# Patient Record
Sex: FEMALE | Race: WHITE | NOT HISPANIC OR LATINO | Employment: FULL TIME | URBAN - METROPOLITAN AREA
[De-identification: names, ages, dates, MRNs, and addresses within clinical notes are randomized per-mention and may not be internally consistent; named-entity substitution may affect disease eponyms.]

---

## 2022-11-30 ENCOUNTER — TELEMEDICINE (OUTPATIENT)
Dept: OBGYN CLINIC | Facility: CLINIC | Age: 32
End: 2022-11-30

## 2022-11-30 DIAGNOSIS — Z31.69 ENCOUNTER FOR PRECONCEPTION CONSULTATION: Primary | ICD-10-CM

## 2022-12-01 NOTE — PROGRESS NOTES
Virtual Regular Visit    Verification of patient location:    Patient is located in the following state in which I hold an active license [place]      Assessment/Plan:    Problem List Items Addressed This Visit    None  Visit Diagnoses     Encounter for preconception consultation    -  Primary        1) Labs drawn and results reviewed  Low Day 3 FSH and adequate AMH, indicative of great ovarian reserve  Thyroid panel normal  Adrenal axis total testosterone 25 ( not too high which would be indicative of PCO), DHEAS 183 in optimal range  Antiphospholipid antibodies negative  2) Luckily, her hormonal axes appear normal, prognosis for pregnancy great  Now I recommend infertility referral for semen analysis and consideration of ovulation induction with IUI or IVF  Referral recommendations given  3)PNV supplementation already discussed  Hold on libido enhancers due to impending pregnancy  4) Follow up prn    This was a 20 minute visit with greater than 50% of time spent in face to face counseling and coordination of care       Reason for visit is   Chief Complaint   Patient presents with   • Virtual Regular Visit        Encounter provider Rene Fernandes MD    Provider located at [facility] [place] [address]  [phone]      Recent Visits  No visits were found meeting these conditions  Showing recent visits within past 7 days and meeting all other requirements  Future Appointments  No visits were found meeting these conditions  Showing future appointments within next 150 days and meeting all other requirements       The patient was identified by name and date of birth  Jillian Richards was informed that this is a telemedicine visit and that the visit is being conducted through the [facility] [employer]  She agrees to proceed     My office door was closed  No one else was in the room    She acknowledged consent and understanding of privacy and security of the video platform  The patient has agreed to participate and understands they can discontinue the visit at any time  Patient is aware this is a billable service  Subjective      Jillian presents for lab review after our last preconception consultation  She and her  have had difficulty conceiving for over a year and was hoping I could offer advice and test labs before referral to infertility specialist, as my services were most likely covered by insurance  Luckily, she is ovulating predictably and regularly, but was concerned about hormone levels, and family history of miscarriage  Past Medical History:   Diagnosis Date   • Anxiety    • Asthma    • GERD (gastroesophageal reflux disease)    • Heart murmur    • Kidney stone    • Miscarriage 3/15/2015    7 weeks along  • Neck pain        Past Surgical History:   Procedure Laterality Date   • IR UPPER EXTREMITY VENOGRAM- DIAGNOSTIC  5/28/2021   • MD REMOVAL 1ST/CERVICAL RIB Left 8/12/2021    Procedure: FIRST RIB RESECTION;  Surgeon: Sven Cruz MD;  [facility];  Service: Thoracic   • THORACOSCOPY VIDEO ASSISTED SURGERY (VATS) Left 8/12/2021    Procedure: THORACOSCOPY VIDEO ASSISTED SURGERY (VATS);   Surgeon: Sven Cruz MD;  [facility];  Service: Thoracic   • WISDOM TOOTH EXTRACTION         Current Outpatient Medications   Medication Sig Dispense Refill   • albuterol (PROVENTIL HFA,VENTOLIN HFA) 90 mcg/act inhaler Inhale 2 puffs every 6 (six) hours as needed for wheezing or shortness of breath 1 Inhaler 5   • cetirizine (ZyrTEC) 10 mg tablet Take 1 tablet (10 mg total) by mouth daily 90 tablet 2   • citalopram (CeleXA) 20 mg tablet TAKE 1 TABLET BY MOUTH EVERY DAY 90 tablet 0   • clindamycin-benzoyl peroxide (BENZACLIN) gel Apply topically every morning 50 g 0   • clonazePAM (KlonoPIN) 0 5 mg tablet Take 1 tablet by mouth twice daily as needed for anxiety 60 tablet 0   • lidocaine (LIDODERM) 5 % Apply 1 patch topically daily for 10 days Remove & Discard patch within 12 hours or as directed by MD 10 patch 0   • ofloxacin (FLOXIN) 0 3 % otic solution Administer 10 drops into the left ear daily 5 mL 0   • omeprazole (PriLOSEC) 20 mg delayed release capsule TAKE 1 CAPSULE BY MOUTH EVERY DAY 90 capsule 0     No current facility-administered medications for this visit  Allergies   Allergen Reactions   • Nsaids GI Intolerance   • Formic Acid Swelling and Rash   • Latex Rash       Review of Systems   Constitutional: Negative for activity change, appetite change, fatigue and unexpected weight change  Endocrine: Negative  Genitourinary:        Decreased libido, see HPI   Skin: Negative  Psychiatric/Behavioral: The patient is nervous/anxious  Video Exam    There were no vitals filed for this visit      Physical Exam

## 2023-01-13 ENCOUNTER — APPOINTMENT (OUTPATIENT)
Dept: RADIOLOGY | Facility: CLINIC | Age: 33
End: 2023-01-13

## 2023-01-13 ENCOUNTER — OCCMED (OUTPATIENT)
Dept: URGENT CARE | Facility: CLINIC | Age: 33
End: 2023-01-13

## 2023-01-13 DIAGNOSIS — S63.502A LEFT WRIST SPRAIN, INITIAL ENCOUNTER: Primary | ICD-10-CM

## 2023-01-13 DIAGNOSIS — S63.502A LEFT WRIST SPRAIN, INITIAL ENCOUNTER: ICD-10-CM

## 2023-01-18 ENCOUNTER — OCCMED (OUTPATIENT)
Dept: URGENT CARE | Facility: CLINIC | Age: 33
End: 2023-01-18

## 2023-01-18 DIAGNOSIS — S63.502D SPRAIN OF LEFT WRIST, SUBSEQUENT ENCOUNTER: Primary | ICD-10-CM

## 2023-01-30 DIAGNOSIS — K21.00 GERD WITH ESOPHAGITIS: ICD-10-CM

## 2023-01-30 DIAGNOSIS — F41.9 ANXIETY: ICD-10-CM

## 2023-01-30 RX ORDER — OMEPRAZOLE 20 MG/1
CAPSULE, DELAYED RELEASE ORAL
Qty: 90 CAPSULE | Refills: 0 | Status: SHIPPED | OUTPATIENT
Start: 2023-01-30

## 2023-01-30 RX ORDER — CITALOPRAM 20 MG/1
TABLET ORAL
Qty: 90 TABLET | Refills: 0 | Status: SHIPPED | OUTPATIENT
Start: 2023-01-30 | End: 2023-02-06 | Stop reason: SDUPTHER

## 2023-01-31 ENCOUNTER — OFFICE VISIT (OUTPATIENT)
Dept: OBGYN CLINIC | Facility: CLINIC | Age: 33
End: 2023-01-31

## 2023-01-31 ENCOUNTER — APPOINTMENT (OUTPATIENT)
Dept: RADIOLOGY | Facility: AMBULARY SURGERY CENTER | Age: 33
End: 2023-01-31
Attending: STUDENT IN AN ORGANIZED HEALTH CARE EDUCATION/TRAINING PROGRAM

## 2023-01-31 ENCOUNTER — TELEPHONE (OUTPATIENT)
Dept: OBGYN CLINIC | Facility: HOSPITAL | Age: 33
End: 2023-01-31

## 2023-01-31 VITALS
BODY MASS INDEX: 25.9 KG/M2 | WEIGHT: 165 LBS | SYSTOLIC BLOOD PRESSURE: 126 MMHG | DIASTOLIC BLOOD PRESSURE: 77 MMHG | HEIGHT: 67 IN | HEART RATE: 87 BPM

## 2023-01-31 DIAGNOSIS — S69.92XA INJURY OF LEFT WRIST, INITIAL ENCOUNTER: ICD-10-CM

## 2023-01-31 DIAGNOSIS — T14.8XXA MUSCLE CONTUSION: Primary | ICD-10-CM

## 2023-01-31 DIAGNOSIS — M25.532 PAIN IN LEFT WRIST: ICD-10-CM

## 2023-01-31 DIAGNOSIS — G56.22 NEURITIS OF LEFT ULNAR NERVE: ICD-10-CM

## 2023-01-31 NOTE — LETTER
January 31, 2023     Patient: Osorio Dueñas  YOB: 1990  Date of Visit: 1/31/2023      To Whom it May Concern:    Osorio Dueñas is under my professional care  Jillian was seen in my office on 1/31/2023  Jillian may return to work full duty with no restrictions  If you have any questions or concerns, please don't hesitate to call           Sincerely,          Janis Alvarez MD
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
## 2023-01-31 NOTE — TELEPHONE ENCOUNTER
Caller: Karey Burgos from [facility]    Doctor/Office: Dr Jose Chen    CB#:       What needs to be faxed: Kelli Sanchez for 1/31/23    ATTN to: Karey Burgos    Fax#: [phone]      Documents were successfully e-faxed

## 2023-02-01 ENCOUNTER — TELEPHONE (OUTPATIENT)
Dept: OBGYN CLINIC | Facility: CLINIC | Age: 33
End: 2023-02-01

## 2023-02-01 NOTE — TELEPHONE ENCOUNTER
Caller: Erenest Fare w/ [facility]     Doctor: Joey Garcia     Reason for call: Please fax full MRI order to : [phone]  They only received front page and not the page with the doctors signature       Apt is 2/10/23     Call back#: [phone]

## 2023-02-01 NOTE — TELEPHONE ENCOUNTER
Order re faxed to number provided  Providers electronically sign the orders through Epic, they do not actually hand sign orders

## 2023-02-03 DIAGNOSIS — Z91.09 ENVIRONMENTAL ALLERGIES: ICD-10-CM

## 2023-02-03 RX ORDER — CETIRIZINE HYDROCHLORIDE 10 MG/1
10 TABLET ORAL DAILY
Qty: 90 TABLET | Refills: 0 | Status: SHIPPED | OUTPATIENT
Start: 2023-02-03

## 2023-02-06 DIAGNOSIS — F41.9 ANXIETY: ICD-10-CM

## 2023-02-07 RX ORDER — CITALOPRAM 20 MG/1
20 TABLET ORAL DAILY
Qty: 90 TABLET | Refills: 0 | Status: SHIPPED | OUTPATIENT
Start: 2023-02-07 | End: 2023-02-16

## 2023-02-16 ENCOUNTER — OFFICE VISIT (OUTPATIENT)
Dept: FAMILY MEDICINE CLINIC | Facility: CLINIC | Age: 33
End: 2023-02-16

## 2023-02-16 VITALS
DIASTOLIC BLOOD PRESSURE: 72 MMHG | HEART RATE: 80 BPM | RESPIRATION RATE: 18 BRPM | HEIGHT: 67 IN | WEIGHT: 173.8 LBS | TEMPERATURE: 96.5 F | SYSTOLIC BLOOD PRESSURE: 118 MMHG | OXYGEN SATURATION: 99 % | BODY MASS INDEX: 27.28 KG/M2

## 2023-02-16 DIAGNOSIS — F41.8 DEPRESSION WITH ANXIETY: ICD-10-CM

## 2023-02-16 DIAGNOSIS — J45.30 MILD PERSISTENT ASTHMA WITHOUT COMPLICATION: Primary | ICD-10-CM

## 2023-02-16 DIAGNOSIS — R63.5 UNEXPLAINED WEIGHT GAIN: ICD-10-CM

## 2023-02-16 RX ORDER — ALBUTEROL SULFATE 90 UG/1
2 AEROSOL, METERED RESPIRATORY (INHALATION) EVERY 6 HOURS PRN
Qty: 18 G | Refills: 3 | Status: SHIPPED | OUTPATIENT
Start: 2023-02-16

## 2023-02-16 RX ORDER — CITALOPRAM 10 MG/1
10 TABLET ORAL DAILY
Qty: 90 TABLET | Refills: 0 | Status: SHIPPED | OUTPATIENT
Start: 2023-02-16

## 2023-02-16 NOTE — PROGRESS NOTES
BMI Counseling: Body mass index is 27 22 kg/m²  The BMI is above normal  Nutrition recommendations include reducing portion sizes  Exercise recommendations include moderate aerobic physical activity for 150 minutes/week  OFFICE VISIT  Jillian Bourgeois 27 y o  female MRN: [id number]          Assessment / Plan:  Problem List Items Addressed This Visit        Respiratory    Mild persistent asthma without complication - Primary     No recent flare ups, under good control, using albuterol prn          Relevant Medications    albuterol (PROVENTIL HFA,VENTOLIN HFA) 90 mcg/act inhaler       Other    Depression with anxiety     Will reduce celexa to 10mg         Relevant Medications    citalopram (CeleXA) 10 mg tablet    Unexplained weight gain     reports a healthy diet, is limited with activity due to surgery, CPS type 2  Will obtain labs, possibility  of SSRI increase          Relevant Orders    Hemoglobin A1C    CBC and differential    Comprehensive metabolic panel    Lipid Panel with Direct LDL reflex         Reason For Visit / Chief Complaint  Chief Complaint   Patient presents with   • Follow-up     6 month weight gain        HPI:  Luisana Ford is a 27 y o  female who presents today for routine check up, known anxiety with depression, anxiety, CPS type 2  reports increase in weight, unknown reason  Historical Information   Past Medical History:   Diagnosis Date   • Anxiety    • Asthma    • GERD (gastroesophageal reflux disease)    • Heart murmur    • Kidney stone    • Miscarriage 3/15/2015    7 weeks along     • Neck pain      Past Surgical History:   Procedure Laterality Date   • IR UPPER EXTREMITY VENOGRAM- DIAGNOSTIC  5/28/2021   • AL EXCISION 1ST &/CERVICAL RIB Left 8/12/2021    Procedure: FIRST RIB RESECTION;  Surgeon: Manjula Matthew MD;  [facility];  Service: Thoracic   • THORACOSCOPY VIDEO ASSISTED SURGERY (VATS) Left 8/12/2021    Procedure: THORACOSCOPY VIDEO ASSISTED SURGERY (VATS); Surgeon: Asher Cheng MD;  [facility];  Service: Thoracic   • WISDOM TOOTH EXTRACTION       Social History   Social History     Substance and Sexual Activity   Alcohol Use Not Currently    Comment: social     Social History     Substance and Sexual Activity   Drug Use Never     Social History     Tobacco Use   Smoking Status Never   Smokeless Tobacco Never     Family History   Problem Relation Age of Onset   • No Known Problems Mother    • No Known Problems Father    • Breast cancer Neg Hx    • Colon cancer Neg Hx    • Ovarian cancer Neg Hx    • Uterine cancer Neg Hx    • Cervical cancer Neg Hx        Meds/Allergies   Allergies   Allergen Reactions   • Nsaids GI Intolerance   • Formic Acid Swelling and Rash   • Latex Rash       Meds:    Current Outpatient Medications:   •  albuterol (PROVENTIL HFA,VENTOLIN HFA) 90 mcg/act inhaler, Inhale 2 puffs every 6 (six) hours as needed for wheezing or shortness of breath, Disp: 18 g, Rfl: 3  •  cetirizine (ZyrTEC) 10 mg tablet, Take 1 tablet (10 mg total) by mouth daily, Disp: 90 tablet, Rfl: 0  •  citalopram (CeleXA) 10 mg tablet, Take 1 tablet (10 mg total) by mouth daily, Disp: 90 tablet, Rfl: 0  •  clindamycin-benzoyl peroxide (BENZACLIN) gel, Apply topically every morning, Disp: 50 g, Rfl: 0  •  clonazePAM (KlonoPIN) 0 5 mg tablet, Take 1 tablet by mouth twice daily as needed for anxiety, Disp: 60 tablet, Rfl: 0  •  omeprazole (PriLOSEC) 20 mg delayed release capsule, TAKE 1 CAPSULE BY MOUTH EVERY DAY, Disp: 90 capsule, Rfl: 0  •  lidocaine (LIDODERM) 5 %, Apply 1 patch topically daily for 10 days Remove & Discard patch within 12 hours or as directed by MD, Disp: 10 patch, Rfl: 0  •  ofloxacin (FLOXIN) 0 3 % otic solution, Administer 10 drops into the left ear daily, Disp: 5 mL, Rfl: 0      REVIEW OF SYSTEMS  Review of Systems   Constitutional: Negative for activity change, chills, fatigue and fever     HENT: Negative for congestion, ear discharge, ear pain, sinus pressure, sinus pain, sore throat, tinnitus and trouble swallowing  Eyes: Negative for photophobia, pain, discharge, itching and visual disturbance  Respiratory: Negative for cough, chest tightness, shortness of breath and wheezing  Cardiovascular: Negative for chest pain and leg swelling  Gastrointestinal: Negative for abdominal distention, abdominal pain, constipation, diarrhea, nausea and vomiting  Endocrine: Negative for polydipsia, polyphagia and polyuria  Genitourinary: Negative for dysuria and frequency  Musculoskeletal: Negative for arthralgias, myalgias, neck pain and neck stiffness  Skin: Negative for color change  Neurological: Negative for dizziness, syncope, weakness, numbness and headaches  Hematological: Does not bruise/bleed easily  Psychiatric/Behavioral: Negative for behavioral problems, confusion, self-injury, sleep disturbance and suicidal ideas  The patient is not nervous/anxious  Current Vitals:   Blood Pressure: 118/72 (02/16/23 1522)  Pulse: 80 (02/16/23 1522)  Temperature: (!) 96 5 °F (35 8 °C) (02/16/23 1522)  Temp Source: Tympanic (02/16/23 1522)  Respirations: 18 (02/16/23 1522)  Height: 5' 7" (170 2 cm) (02/16/23 1522)  Weight - Scale: 78 8 kg (173 lb 12 8 oz) (02/16/23 1522)  SpO2: 99 % (02/16/23 1522)  [unfilled]    PHYSICAL EXAMS:  Physical Exam  Constitutional:       Appearance: Normal appearance  She is well-developed  HENT:      Head: Normocephalic and atraumatic  Pulmonary:      Breath sounds: No wheezing or rhonchi  Abdominal:      General: There is no distension  Tenderness: There is no abdominal tenderness  Musculoskeletal:         General: No swelling, tenderness, deformity or signs of injury  Right lower leg: No edema  Left lower leg: No edema  Skin:     Findings: No bruising, erythema, lesion or rash  Neurological:      General: No focal deficit present        Mental Status: She is alert and oriented to person, place, and time  Psychiatric:         Mood and Affect: Mood normal          Behavior: Behavior normal          Thought Content: Thought content normal          Judgment: Judgment normal              Lab, imaging and other studies: I have personally reviewed pertinent reports  Ian Martinez

## 2023-02-17 ENCOUNTER — LAB (OUTPATIENT)
Dept: LAB | Facility: CLINIC | Age: 33
End: 2023-02-17

## 2023-02-17 DIAGNOSIS — R63.5 UNEXPLAINED WEIGHT GAIN: ICD-10-CM

## 2023-02-17 LAB
BASOPHILS # BLD AUTO: 0.02 THOUSANDS/ÂΜL (ref 0–0.1)
BASOPHILS NFR BLD AUTO: 0 % (ref 0–1)
EOSINOPHIL # BLD AUTO: 0.12 THOUSAND/ÂΜL (ref 0–0.61)
EOSINOPHIL NFR BLD AUTO: 2 % (ref 0–6)
ERYTHROCYTE [DISTWIDTH] IN BLOOD BY AUTOMATED COUNT: 13.3 % (ref 11.6–15.1)
HCT VFR BLD AUTO: 39.4 % (ref 34.8–46.1)
HGB BLD-MCNC: 12.5 G/DL (ref 11.5–15.4)
IMM GRANULOCYTES # BLD AUTO: 0.02 THOUSAND/UL (ref 0–0.2)
IMM GRANULOCYTES NFR BLD AUTO: 0 % (ref 0–2)
LYMPHOCYTES # BLD AUTO: 1.57 THOUSANDS/ÂΜL (ref 0.6–4.47)
LYMPHOCYTES NFR BLD AUTO: 24 % (ref 14–44)
MCH RBC QN AUTO: 27.7 PG (ref 26.8–34.3)
MCHC RBC AUTO-ENTMCNC: 31.7 G/DL (ref 31.4–37.4)
MCV RBC AUTO: 87 FL (ref 82–98)
MONOCYTES # BLD AUTO: 0.43 THOUSAND/ÂΜL (ref 0.17–1.22)
MONOCYTES NFR BLD AUTO: 7 % (ref 4–12)
NEUTROPHILS # BLD AUTO: 4.37 THOUSANDS/ÂΜL (ref 1.85–7.62)
NEUTS SEG NFR BLD AUTO: 67 % (ref 43–75)
NRBC BLD AUTO-RTO: 0 /100 WBCS
PLATELET # BLD AUTO: 204 THOUSANDS/UL (ref 149–390)
PMV BLD AUTO: 12 FL (ref 8.9–12.7)
RBC # BLD AUTO: 4.51 MILLION/UL (ref 3.81–5.12)
WBC # BLD AUTO: 6.53 THOUSAND/UL (ref 4.31–10.16)

## 2023-02-17 NOTE — ASSESSMENT & PLAN NOTE
reports a healthy diet, is limited with activity due to surgery, CPS type 2   Will obtain labs, possibility  of SSRI increase

## 2023-02-18 LAB
ALBUMIN SERPL BCP-MCNC: 3.5 G/DL (ref 3.5–5)
ALP SERPL-CCNC: 82 U/L (ref 46–116)
ALT SERPL W P-5'-P-CCNC: 21 U/L (ref 12–78)
ANION GAP SERPL CALCULATED.3IONS-SCNC: 5 MMOL/L (ref 4–13)
AST SERPL W P-5'-P-CCNC: 12 U/L (ref 5–45)
BILIRUB SERPL-MCNC: 0.32 MG/DL (ref 0.2–1)
BUN SERPL-MCNC: 9 MG/DL (ref 5–25)
CALCIUM SERPL-MCNC: 9.1 MG/DL (ref 8.3–10.1)
CHLORIDE SERPL-SCNC: 108 MMOL/L (ref 96–108)
CHOLEST SERPL-MCNC: 153 MG/DL
CO2 SERPL-SCNC: 25 MMOL/L (ref 21–32)
CREAT SERPL-MCNC: 0.71 MG/DL (ref 0.6–1.3)
GFR SERPL CREATININE-BSD FRML MDRD: 111 ML/MIN/1.73SQ M
GLUCOSE P FAST SERPL-MCNC: 72 MG/DL (ref 65–99)
HDLC SERPL-MCNC: 55 MG/DL
LDLC SERPL CALC-MCNC: 85 MG/DL (ref 0–100)
POTASSIUM SERPL-SCNC: 3.9 MMOL/L (ref 3.5–5.3)
PROT SERPL-MCNC: 7.1 G/DL (ref 6.4–8.4)
SODIUM SERPL-SCNC: 138 MMOL/L (ref 135–147)
TRIGL SERPL-MCNC: 67 MG/DL

## 2023-02-20 NOTE — RESULT ENCOUNTER NOTE
Please let her know all labs were noted, at present her [facility] is still pending  If abnormal we will call  (This may results overnight into Monday)

## 2023-02-21 ENCOUNTER — OFFICE VISIT (OUTPATIENT)
Dept: OBGYN CLINIC | Facility: CLINIC | Age: 33
End: 2023-02-21

## 2023-02-21 ENCOUNTER — TELEPHONE (OUTPATIENT)
Dept: OBGYN CLINIC | Facility: HOSPITAL | Age: 33
End: 2023-02-21

## 2023-02-21 VITALS
BODY MASS INDEX: 27.21 KG/M2 | HEART RATE: 72 BPM | DIASTOLIC BLOOD PRESSURE: 77 MMHG | WEIGHT: 173.4 LBS | SYSTOLIC BLOOD PRESSURE: 119 MMHG | HEIGHT: 67 IN

## 2023-02-21 DIAGNOSIS — S69.92XA INJURY OF LEFT WRIST, INITIAL ENCOUNTER: Primary | ICD-10-CM

## 2023-02-21 NOTE — PROGRESS NOTES
ORTHOPAEDIC HAND, WRIST, AND ELBOW OFFICE  VISIT       ASSESSMENT/PLAN:      Diagnoses and all orders for this visit:    Injury of left wrist, initial encounter  -     Ambulatory Referral to PT/OT Hand Therapy; Future      26 y/o female with left wrist injury DOI 1/13/23  We discussed pt's MRI findings with her today  I explained that there may be some slight signal in the area of the TFCC, possibly c/w a peripheral tear  However, this tear is not consistent with the type of injury sustained so could be either an incidental finding or not a true tear  We discussed how at this time, we do advise she start formal therapy  We'll have her continue her current work restrictions as she rehabs the wrist      The patient verbalized understanding of exam findings and treatment plan  We engaged in the shared decision-making process and treatment options were discussed at length with the patient  Surgical and conservative management discussed today along with risks and benefits  Follow Up:  6 weeks       To Do Next Visit:  Re-evaluation of current issue      Gregorio Foster MD  Attending, Orthopaedic Surgery  Hand, Wrist, and Elbow Surgery  [address] [place] [address]    ____________________________________________________________________________________________________________________________________________      CHIEF COMPLAINT:  Chief Complaint   Patient presents with   • Left Wrist - Follow-up       SUBJECTIVE:  Jillian Nix is a 27y o  year old female who presents today for follow up of left wrist pain  Since pt had continued discomfort, MRI was ordered and she is here to go over the results  She states that she discontinued the brace once she saw her MRI showed no fracture  Describes most of her pain being along the volar/ulnar aspect of her wrist where she sets it down to do work          I have personally reviewed all the relevant PMH, PSH, SH, FH, Medications and allergies      PAST MEDICAL HISTORY:  Past Medical History:   Diagnosis Date   • Anxiety    • Asthma    • GERD (gastroesophageal reflux disease)    • Heart murmur    • Kidney stone    • Miscarriage 3/15/2015    7 weeks along  • Neck pain        PAST SURGICAL HISTORY:  Past Surgical History:   Procedure Laterality Date   • IR UPPER EXTREMITY VENOGRAM- DIAGNOSTIC  5/28/2021   • CA EXCISION 1ST &/CERVICAL RIB Left 8/12/2021    Procedure: FIRST RIB RESECTION;  Surgeon: Anyi Parrish MD;  [facility];  Service: Thoracic   • THORACOSCOPY VIDEO ASSISTED SURGERY (VATS) Left 8/12/2021    Procedure: THORACOSCOPY VIDEO ASSISTED SURGERY (VATS);   Surgeon: Anyi Parrish MD;  Location[facility];  Service: Thoracic   • WISDOM TOOTH EXTRACTION         FAMILY HISTORY:  Family History   Problem Relation Age of Onset   • No Known Problems Mother    • No Known Problems Father    • Breast cancer Neg Hx    • Colon cancer Neg Hx    • Ovarian cancer Neg Hx    • Uterine cancer Neg Hx    • Cervical cancer Neg Hx        SOCIAL HISTORY:  Social History     Tobacco Use   • Smoking status: Never   • Smokeless tobacco: Never   Vaping Use   • Vaping Use: Never used   Substance Use Topics   • Alcohol use: Not Currently     Comment: social   • Drug use: Never       MEDICATIONS:    Current Outpatient Medications:   •  albuterol (PROVENTIL HFA,VENTOLIN HFA) 90 mcg/act inhaler, Inhale 2 puffs every 6 (six) hours as needed for wheezing or shortness of breath, Disp: 18 g, Rfl: 3  •  cetirizine (ZyrTEC) 10 mg tablet, Take 1 tablet (10 mg total) by mouth daily, Disp: 90 tablet, Rfl: 0  •  citalopram (CeleXA) 10 mg tablet, Take 1 tablet (10 mg total) by mouth daily, Disp: 90 tablet, Rfl: 0  •  clindamycin-benzoyl peroxide (BENZACLIN) gel, Apply topically every morning, Disp: 50 g, Rfl: 0  •  clonazePAM (KlonoPIN) 0 5 mg tablet, Take 1 tablet by mouth twice daily as needed for anxiety, Disp: 60 tablet, Rfl: 0  •  ofloxacin (FLOXIN) 0 3 % otic solution, Administer 10 drops into the left ear daily, Disp: 5 mL, Rfl: 0  •  omeprazole (PriLOSEC) 20 mg delayed release capsule, TAKE 1 CAPSULE BY MOUTH EVERY DAY, Disp: 90 capsule, Rfl: 0  •  lidocaine (LIDODERM) 5 %, Apply 1 patch topically daily for 10 days Remove & Discard patch within 12 hours or as directed by MD, Disp: 10 patch, Rfl: 0    ALLERGIES:  Allergies   Allergen Reactions   • Nsaids GI Intolerance   • Formic Acid Swelling and Rash   • Latex Rash           REVIEW OF SYSTEMS:  Musculoskeletal:        As noted in HPI  All other systems reviewed and are negative  VITALS:  Vitals:    02/21/23 1504   BP: 119/77   Pulse: 72       LABS:  HgA1c: No results found for: HGBA1C  BMP:   Lab Results   Component Value Date    CALCIUM 9 1 02/17/2023    K 3 9 02/17/2023    CO2 25 02/17/2023     02/17/2023    BUN 9 02/17/2023    CREATININE 0 71 02/17/2023       _____________________________________________________  PHYSICAL EXAMINATION:  General: well developed and well nourished, alert, oriented times 3 and appears comfortable  Psychiatric: Normal  HEENT: Normocephalic, Atraumatic Trachea Midline, No torticollis  Pulmonary: No audible wheezing or respiratory distress   Abdomen/GI: Non tender, non distended   Cardiovascular: No pitting edema, 2+ radial pulse   Skin: No masses, erythema, lacerations, fluctation, ulcerations  Neurovascular: Sensation Intact to the Median, Ulnar, Radial Nerve, Motor Intact to the Median, Ulnar, Radial Nerve and Pulses Intact  Musculoskeletal: Normal, except as noted in detailed exam and in HPI  MUSCULOSKELETAL EXAMINATION:  Left Wrist:  TTP along the ulnar fovea  Nontender to TFCC or ECU  Some milder ttp to FCU insertion  Flexion limited compared to R side  Pt with pain with ulnar deviation  ___________________________________________________  STUDIES REVIEWED:  MRI of the left wrist was reviewed in PACS by Dr Lupe Ch and demonstrates no acute osseous abnormality   There may be some slight signal in the TFCC c/w possible tear     Outside radiologist read (under Media tab): no significant findings      PROCEDURES PERFORMED:  Procedures  No Procedures performed today    _____________________________________________________      Ascencion Kurtz    I,:  Jason Hughes PA-C am acting as a scribe while in the presence of the attending physician :       I,:  Harlie Heimlich, MD personally performed the services described in this documentation    as scribed in my presence :

## 2023-02-21 NOTE — TELEPHONE ENCOUNTER
Caller: Carolina Sauceda Nurse manager    Doctor: Dr Jose A Marie    Reason for call: requesting OVN from 2/21 faxed to [phone]

## 2023-02-22 LAB
EST. AVERAGE GLUCOSE BLD GHB EST-MCNC: 100 MG/DL
HBA1C MFR BLD: 5.1 %

## 2023-03-03 ENCOUNTER — TELEPHONE (OUTPATIENT)
Dept: FAMILY MEDICINE CLINIC | Facility: CLINIC | Age: 33
End: 2023-03-03

## 2023-03-03 NOTE — TELEPHONE ENCOUNTER
Can you please addend patients office note from 09/05/2019  Patient reports that she never had a previous rotator cuff tear  Can you please remove that from her office note      Ty

## 2023-03-27 ENCOUNTER — EVALUATION (OUTPATIENT)
Dept: PHYSICAL THERAPY | Facility: MEDICAL CENTER | Age: 33
End: 2023-03-27

## 2023-03-27 DIAGNOSIS — G56.42 COMPLEX REGIONAL PAIN SYNDROME TYPE 2 OF LEFT UPPER EXTREMITY: Primary | ICD-10-CM

## 2023-03-27 NOTE — PROGRESS NOTES
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) - addressing with extensive counseling regarding pain neuroscience, diagnosis, prognosis, care options, and care planning along with graded exposure and graded motor imagery    Jillian Collazo is a pleasant 27 y o  female who presents with severe L UE pain 2+ years post surgery for L TOS  She has uncontrollable nociplastic pain throughout her L UE resulting in CRPS  No further referral appears necessary at this time based upon examination results as she is already consulting with pain management and her PCP  I expect she will progress very slowly (and inconsistently at first) but then will make considerable improvements toward full functional return over the next 12 months  She will require 12 weekly sessions during the first 3 months and then I expect she will be able to decrease frequency to 2x/month and taper to 1x/2 months as she progresses to only needing her home program to manage her symptoms  Positive prognostic indicators include positive attitude toward recovery  Negative prognostic indicators include chronicity of symptoms, anxiety, high symptom irritability, central sensitization and degree of peripheralization  Comparable signs:  1) 2 point discrimination  2) ability to use L UE during high heat and humidity (and also cold temperatures)    Goals  Patient will be independent with home exercise program    Patient will be able to manage symptoms independently  Patient will be able to touch her L forearm without severe pain  Patient will be able to use her L UE during the mid-summer heat and humidity  Plan  Plan details: 24 visits over 9 months        Subjective Evaluation    History of Present Illness  Mechanism of injury: 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  She subsequently was diagnosed with TOS and had resection surgery in 2021  However, she had complications after that surgery and developed CRPS Type II  She has tried many oral pharmaceuticals with no benefit  She is frustrated as she is not sure what she can do as nothing has helped thus far  Pain  At worst pain rating: 10    Patient Goals  Patient goals for therapy: decreased pain, decreased edema, increased motion and independence with ADLs/IADLs          Objective    skin changes - blanched skin on L, increased generalized edema throughout L UE (<1+), skin temp on L cooler than R    Light touch ([place] monofilaments) - 3 61 - accurate throughout R UE and most of L UE except L forearm, 4 31 - accurate bilaterally    2 point discrimination  R upper arm 2 3mm  R dorsal forearm 2 1mm  R ventral forearm 1 3mm  R hand 2 5mm  L upper arm 2 7mm  L dorsal forearm 3 0mm  R ventral forearm - untestable due to allodynia  L hand 3 0mm    Lateral discrimination - (L/R) 2 3/2 2s, 50/55%    Unable to run due to chronic back pain  Unable to exercise at pre-injury level due to SOB and feelings of tachycardia  Due to pain and need for thorough consultation regarding care planning, ROM and strength were not fully tested today, but based upon functional doffing/donning of shirt, shoulder/elbow/wrist mobility appears WNL           Precautions: none    Date:       Manual       ROM screen       Strength screen                            Neuromuscular Re-education       PNE              Bike (test threshold)              Laterality training (recommended obtaining cynthia)      Motor imagery       Mirror therapy                     Therapeutic Exercise                                   Therapeutic Activities Gait Training                     Modalities

## 2023-04-03 ENCOUNTER — TELEPHONE (OUTPATIENT)
Dept: OBGYN CLINIC | Facility: HOSPITAL | Age: 33
End: 2023-04-03

## 2023-04-03 NOTE — TELEPHONE ENCOUNTER
Caller: Bishop    Doctor: Blu Pittman    Reason for call: calling to confirm pt's appt for tomorrow    Call back#:

## 2023-04-04 ENCOUNTER — OFFICE VISIT (OUTPATIENT)
Dept: OBGYN CLINIC | Facility: CLINIC | Age: 33
End: 2023-04-04

## 2023-04-04 ENCOUNTER — TELEPHONE (OUTPATIENT)
Dept: OBGYN CLINIC | Facility: HOSPITAL | Age: 33
End: 2023-04-04

## 2023-04-04 VITALS
HEIGHT: 67 IN | DIASTOLIC BLOOD PRESSURE: 79 MMHG | BODY MASS INDEX: 26.74 KG/M2 | WEIGHT: 170.4 LBS | SYSTOLIC BLOOD PRESSURE: 112 MMHG | HEART RATE: 73 BPM

## 2023-04-04 DIAGNOSIS — S69.92XD INJURY OF LEFT WRIST, SUBSEQUENT ENCOUNTER: Primary | ICD-10-CM

## 2023-04-04 NOTE — TELEPHONE ENCOUNTER
Caller: Leonardo [facility]      Doctor: Leila Gale    Reason for call: requesting OVN from 4/4 faxed to [phone] attn : Danial Gooden

## 2023-04-04 NOTE — PROGRESS NOTES
ORTHOPAEDIC HAND, WRIST, AND ELBOW OFFICE  VISIT       ASSESSMENT/PLAN:      Diagnoses and all orders for this visit:    Injury of left wrist, subsequent encounter  -     Diclofenac Sodium (VOLTAREN) 1 %; Apply 2 g topically 4 (four) times a day      26 y/o female with left wrist injury DOI 1/13/23  The patient is doing well  She was advised to continue her efforts at formal therapy  She was advised to use the cock-up wrist brace at night  A prescription was provided for Voltaren Gel  We did discuss a possible de quervain's steroid injection at her next visit if her pain is not improved  The patient verbalized understanding of exam findings and treatment plan  We engaged in the shared decision-making process and treatment options were discussed at length with the patient  Surgical and conservative management discussed today along with risks and benefits  Follow Up:  6 weeks       To Do Next Visit:  Re-evaluation of current issue      Chandra Osorio MD  Attending, Orthopaedic Surgery  Hand, Wrist, and Elbow Surgery  [address] [place] [address]    ____________________________________________________________________________________________________________________________________________      CHIEF COMPLAINT:  Chief Complaint   Patient presents with   • Left Wrist - Follow-up       SUBJECTIVE:  Jillian Feliciano is a 27 y o  RHD female who presents today for follow up left wrist injury DOI 1/13/23  The patient she is doing well  She has been attending formal therapy and notes improvement in her range of motion  She notes appx 75% improvement  She notes intermittent pain to the dorsal and radial aspect of her wrist depending on how she rests it  She notes increased pain with typing due to how her wrist is positioned  She also notes increased pain if she sleeps on it wrong  She has not been taking anything OTC for pain            I have personally reviewed all the relevant PMH, PSH, SH, FH, Medications and allergies      PAST MEDICAL HISTORY:  Past Medical History:   Diagnosis Date   • Anxiety    • Asthma    • GERD (gastroesophageal reflux disease)    • Heart murmur    • Kidney stone    • Miscarriage 3/15/2015    7 weeks along  • Neck pain        PAST SURGICAL HISTORY:  Past Surgical History:   Procedure Laterality Date   • IR UPPER EXTREMITY VENOGRAM- DIAGNOSTIC  5/28/2021   • MT EXCISION 1ST &/CERVICAL RIB Left 8/12/2021    Procedure: FIRST RIB RESECTION;  Surgeon: Kailee Monsivais MD;  [facility];  Service: Thoracic   • THORACOSCOPY VIDEO ASSISTED SURGERY (VATS) Left 8/12/2021    Procedure: THORACOSCOPY VIDEO ASSISTED SURGERY (VATS);   Surgeon: Kailee Monsivais MD;  [facility];  Service: Thoracic   • WISDOM TOOTH EXTRACTION         FAMILY HISTORY:  Family History   Problem Relation Age of Onset   • No Known Problems Mother    • No Known Problems Father    • Breast cancer Neg Hx    • Colon cancer Neg Hx    • Ovarian cancer Neg Hx    • Uterine cancer Neg Hx    • Cervical cancer Neg Hx        SOCIAL HISTORY:  Social History     Tobacco Use   • Smoking status: Never   • Smokeless tobacco: Never   Vaping Use   • Vaping Use: Never used   Substance Use Topics   • Alcohol use: Not Currently     Comment: social   • Drug use: Never       MEDICATIONS:    Current Outpatient Medications:   •  albuterol (PROVENTIL HFA,VENTOLIN HFA) 90 mcg/act inhaler, Inhale 2 puffs every 6 (six) hours as needed for wheezing or shortness of breath, Disp: 18 g, Rfl: 3  •  cetirizine (ZyrTEC) 10 mg tablet, Take 1 tablet (10 mg total) by mouth daily, Disp: 90 tablet, Rfl: 0  •  citalopram (CeleXA) 10 mg tablet, Take 1 tablet (10 mg total) by mouth daily, Disp: 90 tablet, Rfl: 0  •  clindamycin-benzoyl peroxide (BENZACLIN) gel, Apply topically every morning, Disp: 50 g, Rfl: 0  •  clonazePAM (KlonoPIN) 0 5 mg tablet, Take 1 tablet by mouth twice daily as needed for anxiety, Disp: 60 tablet, Rfl: 0  • Diclofenac Sodium (VOLTAREN) 1 %, Apply 2 g topically 4 (four) times a day, Disp: 150 g, Rfl: 2  •  ofloxacin (FLOXIN) 0 3 % otic solution, Administer 10 drops into the left ear daily, Disp: 5 mL, Rfl: 0  •  omeprazole (PriLOSEC) 20 mg delayed release capsule, TAKE 1 CAPSULE BY MOUTH EVERY DAY, Disp: 90 capsule, Rfl: 0  •  lidocaine (LIDODERM) 5 %, Apply 1 patch topically daily for 10 days Remove & Discard patch within 12 hours or as directed by MD, Disp: 10 patch, Rfl: 0    ALLERGIES:  Allergies   Allergen Reactions   • Nsaids GI Intolerance   • Formic Acid Swelling and Rash   • Latex Rash           REVIEW OF SYSTEMS:  Musculoskeletal:        As noted in HPI  All other systems reviewed and are negative  VITALS:  Vitals:    04/04/23 1509   BP: 112/79   Pulse: 73       LABS:  HgA1c:   Lab Results   Component Value Date    HGBA1C 5 1 02/17/2023     BMP:   Lab Results   Component Value Date    CALCIUM 9 1 02/17/2023    K 3 9 02/17/2023    CO2 25 02/17/2023     02/17/2023    BUN 9 02/17/2023    CREATININE 0 71 02/17/2023       _____________________________________________________  PHYSICAL EXAMINATION:  General: well developed and well nourished, alert, oriented times 3 and appears comfortable  Psychiatric: Normal  HEENT: Normocephalic, Atraumatic Trachea Midline, No torticollis  Pulmonary: No audible wheezing or respiratory distress   Abdomen/GI: Non tender, non distended   Cardiovascular: No pitting edema, 2+ radial pulse   Skin: No masses, erythema, lacerations, fluctation, ulcerations  Neurovascular: Sensation Intact to the Median, Ulnar, Radial Nerve, Motor Intact to the Median, Ulnar, Radial Nerve and Pulses Intact  Musculoskeletal: Normal, except as noted in detailed exam and in HPI        MUSCULOSKELETAL EXAMINATION:  Left wrist   Ext 30  Flex 75  Compartments soft  Brisk capillary refill   ___________________________________________________  STUDIES REVIEWED:  No studies to review         PROCEDURES PERFORMED:  Procedures  No Procedures performed today    _____________________________________________________      Scribe Attestation    I,:  Josefina Browning MA am acting as a scribe while in the presence of the attending physician :       I,:  Joseph Avila MD personally performed the services described in this documentation    as scribed in my presence :

## 2023-04-06 ENCOUNTER — OFFICE VISIT (OUTPATIENT)
Dept: PHYSICAL THERAPY | Facility: MEDICAL CENTER | Age: 33
End: 2023-04-06

## 2023-04-06 DIAGNOSIS — G56.42 COMPLEX REGIONAL PAIN SYNDROME TYPE 2 OF LEFT UPPER EXTREMITY: Primary | ICD-10-CM

## 2023-04-06 NOTE — PROGRESS NOTES
Daily Note     Today's date: 2023  Patient name: Renato Murray  : 1990  MRN: [id number]  Referring provider: JOSE Jessica  Dx:   Encounter Diagnosis     ICD-10-CM    1  Complex regional pain syndrome type 2 of left upper extremity  G56 42                      Assessment:   Problem List:  1) L UE CRPS Type II (nociplastic pain) - addressing with extensive counseling regarding pain neuroscience, diagnosis, prognosis, care options, and care planning along with graded exposure and graded motor imagery    Comparable signs:  1) 2 point discrimination  2) ability to use L UE during high heat and humidity (and also cold temperatures)     Goals  Patient will be independent with home exercise program  - in progress  Patient will be able to manage symptoms independently  - in progress  Patient will be able to touch her L forearm without severe pain  - in progress  Patient will be able to use her L UE during the mid-summer heat and humidity  - in progress      Plan: Continue with plan of 1x/wk x 12 weeks  Subjective: Patient reports she has been working on the laterality cynthia          Objective: See treatment diary below  Laterality - accuracy improving to 90%, time still slow at 2 5s    Precautions: none    Date: 2023      Manual       ROM screen WNL      Strength screen [facility]                           Neuromuscular Re-education       PNE Book given, hw chap 1             Bike 10' ()             Laterality training SK      Motor imagery Grabbing forearm, wool shirt, air conditioner      Mirror therapy                     Therapeutic Exercise                                   Therapeutic Activities                     Gait Training                     Modalities

## 2023-04-27 ENCOUNTER — APPOINTMENT (OUTPATIENT)
Dept: PHYSICAL THERAPY | Facility: MEDICAL CENTER | Age: 33
End: 2023-04-27

## 2023-04-27 DIAGNOSIS — K21.00 GERD WITH ESOPHAGITIS: ICD-10-CM

## 2023-04-27 RX ORDER — OMEPRAZOLE 20 MG/1
CAPSULE, DELAYED RELEASE ORAL
Qty: 90 CAPSULE | Refills: 0 | Status: SHIPPED | OUTPATIENT
Start: 2023-04-27

## 2023-05-03 DIAGNOSIS — Z91.09 ENVIRONMENTAL ALLERGIES: ICD-10-CM

## 2023-05-03 RX ORDER — CETIRIZINE HYDROCHLORIDE 10 MG/1
TABLET ORAL
Qty: 90 TABLET | Refills: 0 | Status: SHIPPED | OUTPATIENT
Start: 2023-05-03

## 2023-05-04 ENCOUNTER — OFFICE VISIT (OUTPATIENT)
Dept: PHYSICAL THERAPY | Facility: MEDICAL CENTER | Age: 33
End: 2023-05-04

## 2023-05-04 DIAGNOSIS — G56.42 COMPLEX REGIONAL PAIN SYNDROME TYPE 2 OF LEFT UPPER EXTREMITY: Primary | ICD-10-CM

## 2023-05-05 NOTE — PROGRESS NOTES
Daily Note     Today's date: 2023  Patient name: Glo Navarro  : 1990  MRN: [id number]  Referring provider: JOSE Marrero  Dx:   Encounter Diagnosis     ICD-10-CM    1  Complex regional pain syndrome type 2 of left upper extremity  G56 42                      Assessment:   Problem List:  1) L UE CRPS Type II (nociplastic pain) - addressing with extensive counseling regarding pain neuroscience, diagnosis, prognosis, care options, and care planning along with graded exposure and graded motor imagery    Comparable signs:  1) 2 point discrimination  2) ability to use L UE during high heat and humidity (and also cold temperatures)     Goals  Patient will be independent with home exercise program  - in progress  Patient will be able to manage symptoms independently  - in progress  Patient will be able to touch her L forearm without severe pain  - in progress  Patient will be able to use her L UE during the mid-summer heat and humidity  - in progress      Plan: Continue with plan of 1x/wk x 7 weeks        Subjective: Patient reports she has been working on the laterality cynthia once daily and has been seeing improvement      Objective: See treatment diary below  Laterality - accuracy consistently >80%, time improving 1 9-2 1s, completed 4-5x/wk  Graphesthesia 100% on L forearm    Precautions: none    Date:       Manual                                          Neuromuscular Re-education       PNE Reviewed Ch 3-4; HW ch 5             Graphesthesia training SK      Sensory discrimination SK             Laterality training SK      Motor imagery Grabbing forearm, wool shirt, air conditioner      Mirror therapy Touch, pressure, stroking                    Therapeutic Exercise       Bike 10' ()                           Therapeutic Activities                     Gait Training                     Modalities

## 2023-05-11 ENCOUNTER — OFFICE VISIT (OUTPATIENT)
Dept: PHYSICAL THERAPY | Facility: MEDICAL CENTER | Age: 33
End: 2023-05-11

## 2023-05-11 DIAGNOSIS — G56.42 COMPLEX REGIONAL PAIN SYNDROME TYPE 2 OF LEFT UPPER EXTREMITY: Primary | ICD-10-CM

## 2023-05-11 NOTE — PROGRESS NOTES
Daily Note     Today's date: 2023  Patient name: Ioana Viramontes  : 1990  MRN: [id number]  Referring provider: JOSE Saavedra  Dx:   Encounter Diagnosis     ICD-10-CM    1  Complex regional pain syndrome type 2 of left upper extremity  G56 42 Ambulatory Referral for Acupuncture                     Assessment:   Problem List:  1) L UE CRPS Type II (nociplastic pain) - addressing with extensive counseling regarding pain neuroscience, diagnosis, prognosis, care options, and care planning along with graded exposure and graded motor imagery    Comparable signs:  1) 2 point discrimination - proximal forearm 1cm, distal forearm 1 5 cm  2) ability to use L UE during high heat and humidity (and also cold temperatures)     Goals  Patient will be independent with home exercise program  - in progress  Patient will be able to manage symptoms independently  - in progress  Patient will be able to touch her L forearm without severe pain  - in progress  Patient will be able to use her L UE during the mid-summer heat and humidity  - in progress      Plan: Continue with plan of 1x/wk x 7 weeks        Subjective: Patient reports she has been working on the laterality cynthia once daily and has been seeing improvement      Objective: See treatment diary below  Laterality - accuracy consistently >80%, time improving 1 4-1 8s, completed daily  2pt discrimination proximal forearm 1cm, distal forearm 1 5 cm  Graphesthesia 100% on L forearm    Precautions: none    Date:       Manual                                          Neuromuscular Re-education       PNE Reviewed Ch 5; HW ch 6             Graphesthesia training SK      Sensory discrimination SK             Laterality training SK      Motor imagery Grabbing forearm, wool shirt, air conditioner      Mirror therapy Touch, pressure, stroking                    Therapeutic Exercise       Bike 10'                           Therapeutic Activities Gait Training                     Modalities

## 2023-05-18 ENCOUNTER — APPOINTMENT (OUTPATIENT)
Dept: PHYSICAL THERAPY | Facility: MEDICAL CENTER | Age: 33
End: 2023-05-18
Payer: COMMERCIAL

## 2023-05-25 ENCOUNTER — APPOINTMENT (OUTPATIENT)
Dept: PHYSICAL THERAPY | Facility: MEDICAL CENTER | Age: 33
End: 2023-05-25
Payer: COMMERCIAL

## 2023-05-25 DIAGNOSIS — F41.8 DEPRESSION WITH ANXIETY: ICD-10-CM

## 2023-05-25 RX ORDER — CITALOPRAM 10 MG/1
TABLET ORAL
Qty: 90 TABLET | Refills: 0 | Status: SHIPPED | OUTPATIENT
Start: 2023-05-25

## 2023-06-01 ENCOUNTER — OFFICE VISIT (OUTPATIENT)
Dept: PHYSICAL THERAPY | Facility: MEDICAL CENTER | Age: 33
End: 2023-06-01

## 2023-06-01 DIAGNOSIS — G56.42 COMPLEX REGIONAL PAIN SYNDROME TYPE 2 OF LEFT UPPER EXTREMITY: Primary | ICD-10-CM

## 2023-06-01 NOTE — PROGRESS NOTES
Daily Note     Today's date: 2023  Patient name: Jeanne Olivares  : 1990  MRN: [id number]  Referring provider: JOSE Gastelum  Dx:   Encounter Diagnosis     ICD-10-CM    1  Complex regional pain syndrome type 2 of left upper extremity  G56 42                      Assessment:   Problem List:  1) L UE CRPS Type II (nociplastic pain) - addressing with extensive counseling regarding pain neuroscience, diagnosis, prognosis, care options, and care planning along with graded exposure and graded motor imagery  2) cervical hypomobility - addressing with mobs and mobility exercises     Comparable signs:  1) 2 point discrimination - proximal forearm 1cm, distal forearm 1 5 cm  2) ability to use L UE during high heat and humidity (and also cold temperatures)     Goals  Patient will be independent with home exercise program  - in progress  Patient will be able to manage symptoms independently  - in progress  Patient will be able to touch her L forearm without severe pain  - in progress  Patient will be able to use her L UE during the mid-summer heat and humidity  - in progress      Plan: Continue with plan of 1x/wk x 6 weeks  She is to progress to vanilla and context paradigms for laterality  Subjective: Patient reports she has been working on the laterality cynthia once daily and has been seeing improvement  Objective: See treatment diary below  Laterality - accuracy consistently >90%, time improving 1 2-1 8s, completed daily  2pt discrimination proximal forearm 1cm, distal forearm 1 5 cm  Graphesthesia 100% on L forearm  Cervical rotation limited 25%, hypomobility and pain w/ L cervical opening - rotation ROM and no remaining hypomobility or pain with cervical movement by end of session, also decrease in headache    Patient was educated about nerve sensitivity caused by central sensitization, driven by both biological and psychosocial factors   The brain as  of the body metaphor was incorporated  Patient encouraged to identify personal stressors which contribute to pain and discuss these with PT for guidance and plan to move ahead      Precautions: none    Date: 6/1      Manual       R cervical upglide  SK      MFR to R cervical paraspinals SK                           Neuromuscular Re-education       PNE SK             Graphesthesia training SK      Sensory discrimination SK             Laterality training SK      Motor imagery Grabbing forearm, wool shirt, air conditioner      Mirror therapy Touch, pressure, stroking                    Therapeutic Exercise       Bike 10'                           Therapeutic Activities                     Gait Training                     Modalities

## 2023-06-05 ENCOUNTER — TELEPHONE (OUTPATIENT)
Dept: OBGYN CLINIC | Facility: MEDICAL CENTER | Age: 33
End: 2023-06-05

## 2023-06-05 NOTE — TELEPHONE ENCOUNTER
Caller: Fanny Plascencia at [address]: Antonino Farley    Reason for call:     Fanny Plascencia confirming appointment, appt canceled      Call back#: n/a

## 2023-06-06 DIAGNOSIS — J45.30 MILD PERSISTENT ASTHMA WITHOUT COMPLICATION: ICD-10-CM

## 2023-06-06 RX ORDER — ALBUTEROL SULFATE 90 UG/1
AEROSOL, METERED RESPIRATORY (INHALATION)
Qty: 18 G | Refills: 3 | Status: SHIPPED | OUTPATIENT
Start: 2023-06-06

## 2023-06-08 ENCOUNTER — OFFICE VISIT (OUTPATIENT)
Dept: PHYSICAL THERAPY | Facility: MEDICAL CENTER | Age: 33
End: 2023-06-08
Payer: COMMERCIAL

## 2023-06-08 ENCOUNTER — OFFICE VISIT (OUTPATIENT)
Dept: FAMILY MEDICINE CLINIC | Facility: CLINIC | Age: 33
End: 2023-06-08
Payer: COMMERCIAL

## 2023-06-08 VITALS
HEART RATE: 72 BPM | HEIGHT: 67 IN | RESPIRATION RATE: 18 BRPM | BODY MASS INDEX: 27.31 KG/M2 | SYSTOLIC BLOOD PRESSURE: 120 MMHG | DIASTOLIC BLOOD PRESSURE: 88 MMHG | TEMPERATURE: 97.4 F | OXYGEN SATURATION: 98 % | WEIGHT: 174 LBS

## 2023-06-08 DIAGNOSIS — G43.009 MIGRAINE WITHOUT AURA AND WITHOUT STATUS MIGRAINOSUS, NOT INTRACTABLE: ICD-10-CM

## 2023-06-08 DIAGNOSIS — J45.30 MILD PERSISTENT ASTHMA WITHOUT COMPLICATION: Primary | ICD-10-CM

## 2023-06-08 DIAGNOSIS — Z83.3 FAMILY HISTORY OF DIABETES MELLITUS: ICD-10-CM

## 2023-06-08 DIAGNOSIS — Z13.220 SCREENING, LIPID: ICD-10-CM

## 2023-06-08 DIAGNOSIS — N83.202 LEFT OVARIAN CYST: ICD-10-CM

## 2023-06-08 DIAGNOSIS — F41.9 ANXIETY: ICD-10-CM

## 2023-06-08 DIAGNOSIS — G56.42 COMPLEX REGIONAL PAIN SYNDROME TYPE 2 OF LEFT UPPER EXTREMITY: Primary | ICD-10-CM

## 2023-06-08 PROCEDURE — 97140 MANUAL THERAPY 1/> REGIONS: CPT | Performed by: PHYSICAL THERAPIST

## 2023-06-08 PROCEDURE — 97110 THERAPEUTIC EXERCISES: CPT | Performed by: PHYSICAL THERAPIST

## 2023-06-08 PROCEDURE — 97112 NEUROMUSCULAR REEDUCATION: CPT | Performed by: PHYSICAL THERAPIST

## 2023-06-08 PROCEDURE — 99214 OFFICE O/P EST MOD 30 MIN: CPT | Performed by: NURSE PRACTITIONER

## 2023-06-08 NOTE — PROGRESS NOTES
Daily Note     Today's date: 2023  Patient name: Elton Mccann  : 1990  MRN: [id number]  Referring provider: JOSE Moreira  Dx:   Encounter Diagnosis     ICD-10-CM    1  Complex regional pain syndrome type 2 of left upper extremity  G56 42                      Assessment:   Problem List:  1) L UE CRPS Type II (nociplastic pain) - addressing with extensive counseling regarding pain neuroscience, diagnosis, prognosis, care options, and care planning along with graded exposure and graded motor imagery  2) cervical hypomobility - addressing with mobs and mobility exercises     Comparable signs:  1) 2 point discrimination - proximal forearm 1cm, distal forearm 1 5 cm  2) ability to use L UE during high heat and humidity (and also cold temperatures)     Goals  Patient will be independent with home exercise program  - in progress  Patient will be able to manage symptoms independently  - in progress  Patient will be able to touch her L forearm without severe pain  - in progress  Patient will be able to use her L UE during the mid-summer heat and humidity  - in progress      Plan: Continue with plan of 1x/wk x 6 weeks  She is to progress to vanilla and context paradigms for laterality  Subjective: Patient reports she has been working on the laterality cynthia once daily and has been seeing improvement  Objective: See treatment diary below  Laterality - speed & accuracy worsened due to not performing at home x 1 week (was accuracy consistently >90%, time improving 1 2-1 8s, when completed daily)  2pt discrimination proximal forearm 1cm, distal forearm 1 5 cm  Graphesthesia 100% on L forearm  Cervical rotation WNL    Patient was educated about nerve sensitivity caused by central sensitization, driven by both biological and psychosocial factors  The brain as  of the body metaphor was incorporated      Patient encouraged to identify personal stressors which contribute to pain and discuss these with PT for guidance and plan to move ahead  Patient educated on the relationship between emotions and pain, and the role that fear, catastrophization, nociception and threat play in persistent pain, by activating the bodies alarm system, resulting in hypersensitive nerves  Metaphors incorporated to foster deep learning and paradigm shift for patient  Patient encouraged to journal all the various stressors and life situations they have had to deal with since pain, and to conceptualize these as “filling their cup”      Precautions: none    Date: 6/8      Manual                                          Neuromuscular Re-education       PNE SK             Graphesthesia training SK      Sensory discrimination SK             Laterality training SK      Motor imagery Grabbing forearm, wool shirt, air conditioner      Mirror therapy Touch, pressure, stroking, wrist ROM, finger opposition, putty                    Therapeutic Exercise       Bike 10'                           Therapeutic Activities                     Gait Training                     Modalities

## 2023-06-08 NOTE — ASSESSMENT & PLAN NOTE
She has been unsuccessful with medication management, handout provided on migraine education  She was previously suggested Botox in which she is hesitant

## 2023-06-15 ENCOUNTER — APPOINTMENT (OUTPATIENT)
Dept: PHYSICAL THERAPY | Facility: MEDICAL CENTER | Age: 33
End: 2023-06-15
Payer: COMMERCIAL

## 2023-06-22 ENCOUNTER — OFFICE VISIT (OUTPATIENT)
Dept: PHYSICAL THERAPY | Facility: MEDICAL CENTER | Age: 33
End: 2023-06-22
Payer: COMMERCIAL

## 2023-06-22 DIAGNOSIS — G56.42 COMPLEX REGIONAL PAIN SYNDROME TYPE 2 OF LEFT UPPER EXTREMITY: Primary | ICD-10-CM

## 2023-06-22 PROCEDURE — 97140 MANUAL THERAPY 1/> REGIONS: CPT | Performed by: PHYSICAL THERAPIST

## 2023-06-22 PROCEDURE — 97110 THERAPEUTIC EXERCISES: CPT | Performed by: PHYSICAL THERAPIST

## 2023-06-22 PROCEDURE — 97112 NEUROMUSCULAR REEDUCATION: CPT | Performed by: PHYSICAL THERAPIST

## 2023-06-22 NOTE — PROGRESS NOTES
Daily Note     Today's date: 2023  Patient name: Yuli Argueta  : 1990  MRN: [id number]  Referring provider: JOSE Armijo  Dx:   Encounter Diagnosis     ICD-10-CM    1  Complex regional pain syndrome type 2 of left upper extremity  G56 42                      Assessment:   Problem List:  1) L UE CRPS Type II (nociplastic pain) - addressing with extensive counseling regarding pain neuroscience, diagnosis, prognosis, care options, and care planning along with graded exposure and graded motor imagery  2) cervical hypomobility - addressing with mobs and mobility exercises     Comparable signs:  1) 2 point discrimination - proximal forearm 1cm, distal forearm 1 5 cm  2) ability to use L UE during high heat and humidity (and also cold temperatures)     Goals  Patient will be independent with home exercise program  - in progress  Patient will be able to manage symptoms independently  - in progress  Patient will be able to touch her L forearm without severe pain  - in progress  Patient will be able to use her L UE during the mid-summer heat and humidity  - in progress      Plan: Continue with plan of 1x/wk x 5 weeks  She is to continue to work on vanilla and context paradigms for laterality  Also, change motor imagery to resting on forearm and doing dishes  Subjective: Patient reports she has been working on the laterality cynthia once daily and has been seeing improvement  Still lots of difficulty with vanilla and context        Objective: See treatment diary below  Laterality - speed & accuracy worsened due to not performing at home x 1 week (was accuracy consistently >90%, time improving 1 2-1 8s, when completed daily)  2pt discrimination proximal forearm 1cm, distal forearm 1 5 cm  Graphesthesia 100% on L forearm  Cervical rotation WNL    Patient was educated regarding endogenous mechanisms and strategies to increase the brain’s production of chemicals which decrease pain, such as aerobic exercise and improved pain knowledge  The concepts of pacing, graded exposure, ‘sore but safe’, and ‘hurt does not equal harm’ were discussed  Sleep hygiene and diaphragmatic breathing topics introduced to help calm the nervous system and reduce stress  Patient provided with tools to start exercise log      Precautions: none    Date: 6/22      Manual                                          Neuromuscular Re-education       PNE SK             Graphesthesia training SK      Sensory discrimination SK             Laterality training SK      Motor imagery Resting on forearm, doing dishes      Mirror therapy Touch, pressure, stroking, wrist ROM, finger opposition, putty                    Therapeutic Exercise       Bike 10'                           Therapeutic Activities                     Gait Training                     Modalities

## 2023-07-10 ENCOUNTER — OFFICE VISIT (OUTPATIENT)
Dept: PHYSICAL THERAPY | Facility: MEDICAL CENTER | Age: 33
End: 2023-07-10
Payer: COMMERCIAL

## 2023-07-10 DIAGNOSIS — G56.42 COMPLEX REGIONAL PAIN SYNDROME TYPE 2 OF LEFT UPPER EXTREMITY: Primary | ICD-10-CM

## 2023-07-10 PROCEDURE — 97110 THERAPEUTIC EXERCISES: CPT | Performed by: PHYSICAL THERAPIST

## 2023-07-10 PROCEDURE — 97140 MANUAL THERAPY 1/> REGIONS: CPT | Performed by: PHYSICAL THERAPIST

## 2023-07-10 PROCEDURE — 97112 NEUROMUSCULAR REEDUCATION: CPT | Performed by: PHYSICAL THERAPIST

## 2023-07-10 PROCEDURE — 97164 PT RE-EVAL EST PLAN CARE: CPT | Performed by: PHYSICAL THERAPIST

## 2023-07-10 NOTE — PROGRESS NOTES
Re-evaluation     Today's date: 7/10/2023  Patient name: Blair Victoria  : 1990  MRN: [id number]  Referring provider: JOSE Baum  Dx:   Encounter Diagnosis     ICD-10-CM    1. Complex regional pain syndrome type 2 of left upper extremity  G56.42                      Assessment:   Problem List:  1) L UE CRPS Type II (nociplastic pain) - addressing with extensive counseling regarding pain neuroscience, diagnosis, prognosis, care options, and care planning along with graded exposure and graded motor imagery  2) cervical hypomobility - addressing with mobs and mobility exercises     Comparable signs:  1) 2 point discrimination - proximal forearm 1cm, distal forearm 1.5 cm  2) ability to use L UE during high heat and humidity (and also cold temperatures)     Goals  Patient will be independent with home exercise program. - in progress  Patient will be able to manage symptoms independently. - in progress  Patient will be able to touch her L forearm without severe pain. - in progress  Patient will be able to use her L UE during the mid-summer heat and humidity. - in progress      Plan: Continue with plan of 1x/wk x 4 weeks. She is to continue to work on vanilla and context paradigms for laterality. Continue motor imagery for resting on forearm and doing dishes as she forgot to do those at home. Also, initiate neural sliders as we continue to address the central sensitization. Subjective: Patient reports she has been working on the laterality cynthia once daily and has been seeing improvement. Still lots of difficulty with vanilla, especially with accuracy. She forgot to change the motor imagery to resting on her forearm and doing dishes, but has been working on the other imagery. She also reports significant difficulty reaching behind her.       Objective: See treatment diary below  Laterality - speed & accuracy has improved, but she still struggles with accuracy with vanilla (accuracy <80%, but speed <2.2s) and thus has not been able to progress to context  2pt discrimination proximal forearm 1cm, distal forearm 1.5 cm   Graphesthesia 100% on L forearm  Cervical rotation WNL  Abnormal upper limb neurodynamics (pos ULTTa, b, & c)    Patient was educated regarding sympathetic nervous system topics as they pertain to pain, including stress biology, fight or flight response, the role of adrenaline and cortisol in pain, the body’s immune response and multiple output mechanisms. Metaphors were used to promote deep learning, and the role of self-care techniques useful in calming the  sympathetic nervous system were addressed. Patient encouraged to identify and record stress issues they are dealing with on a daily basis since experiencing pain. Patient also encouraged to think about their persona response to a hypothetical large threat, and how the body’s response to pain is mimicking that response.       Precautions: none    Date: 7/10      Manual                                          Neuromuscular Re-education       PNE SK             Graphesthesia training review      Sensory discrimination SK             Laterality training SK      Motor imagery Resting on forearm, doing dishes      Mirror therapy Touch, pressure, stroking, wrist ROM, finger opposition, putty             Neural glides (ulnar, radial & median) 10 ea                    Therapeutic Exercise       Bike 10'                           Therapeutic Activities                     Gait Training                     Modalities

## 2023-07-21 ENCOUNTER — HOSPITAL ENCOUNTER (OUTPATIENT)
Dept: ULTRASOUND IMAGING | Facility: CLINIC | Age: 33
Discharge: HOME | End: 2023-07-21
Payer: COMMERCIAL

## 2023-07-21 DIAGNOSIS — N83.202 LEFT OVARIAN CYST: ICD-10-CM

## 2023-07-21 PROCEDURE — 76856 US EXAM PELVIC COMPLETE: CPT

## 2023-07-21 PROCEDURE — 76830 TRANSVAGINAL US NON-OB: CPT

## 2023-07-27 DIAGNOSIS — K21.00 GERD WITH ESOPHAGITIS: ICD-10-CM

## 2023-07-27 RX ORDER — OMEPRAZOLE 20 MG/1
CAPSULE, DELAYED RELEASE ORAL
Qty: 90 CAPSULE | Refills: 0 | Status: SHIPPED | OUTPATIENT
Start: 2023-07-27

## 2023-07-28 DIAGNOSIS — F41.8 DEPRESSION WITH ANXIETY: ICD-10-CM

## 2023-07-28 DIAGNOSIS — Z91.09 ENVIRONMENTAL ALLERGIES: ICD-10-CM

## 2023-07-28 RX ORDER — CETIRIZINE HYDROCHLORIDE 10 MG/1
10 TABLET ORAL DAILY
Qty: 90 TABLET | Refills: 0 | Status: SHIPPED | OUTPATIENT
Start: 2023-07-28

## 2023-07-28 RX ORDER — CITALOPRAM HYDROBROMIDE 10 MG/1
10 TABLET ORAL DAILY
Qty: 90 TABLET | Refills: 0 | Status: SHIPPED | OUTPATIENT
Start: 2023-07-28

## 2023-08-03 ENCOUNTER — APPOINTMENT (OUTPATIENT)
Dept: PHYSICAL THERAPY | Facility: MEDICAL CENTER | Age: 33
End: 2023-08-03
Payer: COMMERCIAL

## 2023-08-10 ENCOUNTER — OFFICE VISIT (OUTPATIENT)
Dept: PHYSICAL THERAPY | Facility: MEDICAL CENTER | Age: 33
End: 2023-08-10
Payer: COMMERCIAL

## 2023-08-10 DIAGNOSIS — G56.42 COMPLEX REGIONAL PAIN SYNDROME TYPE 2 OF LEFT UPPER EXTREMITY: Primary | ICD-10-CM

## 2023-08-10 PROCEDURE — 97112 NEUROMUSCULAR REEDUCATION: CPT | Performed by: PHYSICAL THERAPIST

## 2023-08-10 NOTE — PROGRESS NOTES
Re-evaluation     Today's date: 8/10/2023  Patient name: Jensen Beltran  : 1990  MRN: [id number]  Referring provider: JOSE Godinez  Dx:   Encounter Diagnosis     ICD-10-CM    1. Complex regional pain syndrome type 2 of left upper extremity  G56.42                      Assessment:   Problem List:  1) L UE CRPS Type II (nociplastic pain) - addressing with extensive counseling regarding pain neuroscience, diagnosis, prognosis, care options, and care planning along with graded exposure and graded motor imagery  2) cervical hypomobility - addressing with mobs and mobility exercises     Comparable signs:  1) 2 point discrimination - proximal forearm 1cm, distal forearm 1.5 cm  2) ability to use L UE during high heat and humidity (and also cold temperatures)     Goals  Patient will be independent with home exercise program. - in progress  Patient will be able to manage symptoms independently. - in progress  Patient will be able to touch her L forearm without severe pain. - in progress  Patient will be able to use her L UE during the mid-summer heat and humidity. - in progress      Plan: Continue with plan of 1x/wk x 4 weeks. She is to continue to work on vanilla and context paradigms for laterality. Continue motor imagery for resting on forearm and doing dishes as she forgot to do those at home. Also, initiate neural sliders as we continue to address the central sensitization. Subjective: Patient reports she has been working on the laterality cynthia once daily and has been seeing improvement. Still lots of difficulty with vanilla, especially with accuracy. She forgot to change the motor imagery to resting on her forearm and doing dishes, but has been working on the other imagery. She also reports significant difficulty reaching behind her.       Objective: See treatment diary below  Laterality - speed & accuracy has improved, but she still struggles with accuracy with vanilla (accuracy >80%, but speed >2.2s) and thus has not been able to progress to context  2pt discrimination proximal forearm 1cm, distal forearm 1.3 cm, 3rd finger 0.9cm  Graphesthesia 100% on L forearm  Cervical rotation WNL  Abnormal upper limb neurodynamics (pos ULTTa, b, & c)    Patient was educated regarding sympathetic nervous system topics as they pertain to pain, including stress biology, fight or flight response, the role of adrenaline and cortisol in pain, the body’s immune response and multiple output mechanisms. Metaphors were used to promote deep learning, and the role of self-care techniques useful in calming the  sympathetic nervous system were addressed. Patient encouraged to identify and record stress issues they are dealing with on a daily basis since experiencing pain. Patient also encouraged to think about their persona response to a hypothetical large threat, and how the body’s response to pain is mimicking that response.       Precautions: none    Date: 8/10      Manual                                          Neuromuscular Re-education       PNE SK             Graphesthesia training review      Sensory discrimination SK             Laterality training SK      Motor imagery Resting on forearm, doing dishes      Mirror therapy Touch, pressure, stroking, wrist ROM, finger opposition, putty             Neural glides (ulnar, radial & median) 10 ea                    Therapeutic Exercise       Bike 10'                           Therapeutic Activities                     Gait Training                     Modalities

## 2023-08-22 ENCOUNTER — OFFICE VISIT (OUTPATIENT)
Dept: FAMILY MEDICINE CLINIC | Facility: CLINIC | Age: 33
End: 2023-08-22
Payer: COMMERCIAL

## 2023-08-22 VITALS
SYSTOLIC BLOOD PRESSURE: 128 MMHG | RESPIRATION RATE: 18 BRPM | WEIGHT: 178 LBS | OXYGEN SATURATION: 99 % | DIASTOLIC BLOOD PRESSURE: 72 MMHG | HEIGHT: 67 IN | TEMPERATURE: 97.8 F | HEART RATE: 72 BPM | BODY MASS INDEX: 27.94 KG/M2

## 2023-08-22 DIAGNOSIS — R13.10 DYSPHAGIA, UNSPECIFIED TYPE: ICD-10-CM

## 2023-08-22 DIAGNOSIS — K21.9 GASTROESOPHAGEAL REFLUX DISEASE WITHOUT ESOPHAGITIS: Primary | ICD-10-CM

## 2023-08-22 DIAGNOSIS — J45.30 MILD PERSISTENT ASTHMA WITHOUT COMPLICATION: ICD-10-CM

## 2023-08-22 PROCEDURE — 99214 OFFICE O/P EST MOD 30 MIN: CPT | Performed by: NURSE PRACTITIONER

## 2023-08-22 RX ORDER — OMEPRAZOLE 40 MG/1
40 CAPSULE, DELAYED RELEASE ORAL DAILY
Qty: 90 CAPSULE | Refills: 0 | Status: SHIPPED | OUTPATIENT
Start: 2023-08-22

## 2023-08-22 NOTE — PROGRESS NOTES
OFFICE VISIT  Jillian Ugalde 27 y.o. female MRN: [id number]          Assessment / Plan:  Problem List Items Addressed This Visit        Digestive    Gastroesophageal reflux disease without esophagitis - Primary     Increase omeprazole to 40 mg, GERD lifestyle changes advised. Do recommend a follow-up EGD, referral provided         Relevant Medications    omeprazole (PriLOSEC) 40 MG capsule    Other Relevant Orders    Ambulatory Referral to Gastroenterology    Dysphagia    Relevant Medications    omeprazole (PriLOSEC) 40 MG capsule    Other Relevant Orders    Ambulatory Referral to Gastroenterology       Respiratory    Mild persistent asthma without complication     No recent flareups, albuterol as needed. Remains active with cheerleading              Reason For Visit / Chief Complaint  Chief Complaint   Patient presents with   • Throat is tightening        HPI:  Matthew Aguillon is a 27 y.o. female who presents today for trouble swallowing. She reports  feeling her throat is closing. She reports getting food trapped in her throat. She reports some increase in heart burn, she is taking Prilosec milligrams  prior hx of EGD in 2011. She reports an occurrence yesterday, after eating spaghetti. Historical Information   Past Medical History:   Diagnosis Date   • Anxiety    • Asthma    • GERD (gastroesophageal reflux disease)    • Heart murmur    • Kidney stone    • Miscarriage 3/15/2015    7 weeks along. • Neck pain      Past Surgical History:   Procedure Laterality Date   • IR UPPER EXTREMITY VENOGRAM- DIAGNOSTIC  5/28/2021   • NC EXCISION 1ST &/CERVICAL RIB Left 8/12/2021    Procedure: FIRST RIB RESECTION;  Surgeon: Bee Cerna MD;  Location: [facility];  Service: Thoracic   • THORACOSCOPY VIDEO ASSISTED SURGERY (VATS) Left 8/12/2021    Procedure: THORACOSCOPY VIDEO ASSISTED SURGERY (VATS);   Surgeon: Bee Cerna MD;  Location: [facility];  Service: Thoracic   • WISDOM TOOTH EXTRACTION       Social History   Social History     Substance and Sexual Activity   Alcohol Use Not Currently    Comment: social     Social History     Substance and Sexual Activity   Drug Use Never     Social History     Tobacco Use   Smoking Status Never   Smokeless Tobacco Never     Family History   Problem Relation Age of Onset   • No Known Problems Mother    • No Known Problems Father    • Breast cancer Neg Hx    • Colon cancer Neg Hx    • Ovarian cancer Neg Hx    • Uterine cancer Neg Hx    • Cervical cancer Neg Hx        Meds/Allergies   Allergies   Allergen Reactions   • Nsaids GI Intolerance   • Formic Acid Swelling and Rash   • Latex Rash       Meds:    Current Outpatient Medications:   •  albuterol (PROVENTIL HFA,VENTOLIN HFA) 90 mcg/act inhaler, TAKE 2 PUFFS BY MOUTH EVERY 6 HOURS AS NEEDED FOR WHEEZE OR FOR SHORTNESS OF BREATH, Disp: 18 g, Rfl: 3  •  cetirizine (ZyrTEC) 10 mg tablet, Take 1 tablet (10 mg total) by mouth daily, Disp: 90 tablet, Rfl: 0  •  citalopram (CeleXA) 10 mg tablet, Take 1 tablet (10 mg total) by mouth daily, Disp: 90 tablet, Rfl: 0  •  clindamycin-benzoyl peroxide (BENZACLIN) gel, Apply topically every morning, Disp: 50 g, Rfl: 0  •  clonazePAM (KlonoPIN) 0.5 mg tablet, Take 1 tablet by mouth twice daily as needed for anxiety, Disp: 60 tablet, Rfl: 0  •  Diclofenac Sodium (VOLTAREN) 1 %, Apply 2 g topically 4 (four) times a day, Disp: 150 g, Rfl: 2  •  omeprazole (PriLOSEC) 40 MG capsule, Take 1 capsule (40 mg total) by mouth daily, Disp: 90 capsule, Rfl: 0      REVIEW OF SYSTEMS  Review of Systems   Constitutional: Negative for activity change, chills, fatigue and fever. HENT: Positive for trouble swallowing. Negative for congestion, ear discharge, ear pain, sinus pressure, sinus pain, sore throat and tinnitus. Eyes: Negative for photophobia, pain, discharge, itching and visual disturbance. Respiratory: Negative for cough, chest tightness, shortness of breath and wheezing. Cardiovascular: Negative for chest pain and leg swelling. Gastrointestinal: Negative for abdominal distention, abdominal pain, constipation, diarrhea, nausea and vomiting. Endocrine: Negative for polydipsia, polyphagia and polyuria. Genitourinary: Negative for dysuria and frequency. Musculoskeletal: Negative for arthralgias, myalgias, neck pain and neck stiffness. Skin: Negative for color change. Neurological: Negative for dizziness, syncope, weakness, numbness and headaches. Hematological: Does not bruise/bleed easily. Psychiatric/Behavioral: Negative for behavioral problems, confusion, self-injury, sleep disturbance and suicidal ideas. The patient is not nervous/anxious. Current Vitals:   Blood Pressure: 128/72 (08/22/23 0737)  Pulse: 72 (08/22/23 0737)  Temperature: 97.8 °F (36.6 °C) (08/22/23 0737)  Temp Source: Tympanic (08/22/23 0737)  Respirations: 18 (08/22/23 0737)  Height: 5' 7" (170.2 cm) (08/22/23 0737)  Weight - Scale: 80.7 kg (178 lb) (08/22/23 0737)  SpO2: 99 % (08/22/23 0737)  [unfilled]    PHYSICAL EXAMS:  Physical Exam  Vitals and nursing note reviewed. Constitutional:       Appearance: Normal appearance. She is well-developed. HENT:      Head: Normocephalic and atraumatic. Right Ear: Tympanic membrane, ear canal and external ear normal.      Left Ear: Tympanic membrane and ear canal normal.      Nose: Nose normal.      Mouth/Throat:      Mouth: Mucous membranes are moist.      Pharynx: Oropharynx is clear. Eyes:      Conjunctiva/sclera: Conjunctivae normal.      Pupils: Pupils are equal, round, and reactive to light. Cardiovascular:      Rate and Rhythm: Normal rate and regular rhythm. Pulses: Normal pulses. Heart sounds: Normal heart sounds. Pulmonary:      Effort: Pulmonary effort is normal.      Breath sounds: Normal breath sounds. No wheezing or rhonchi. Abdominal:      General: There is no distension. Palpations: Abdomen is soft. Tenderness: There is no abdominal tenderness. Musculoskeletal:         General: No swelling, tenderness, deformity or signs of injury. Normal range of motion. Cervical back: Normal range of motion. Right lower leg: No edema. Left lower leg: No edema. Skin:     General: Skin is warm. Findings: No bruising, erythema, lesion or rash. Neurological:      General: No focal deficit present. Mental Status: She is alert and oriented to person, place, and time. Psychiatric:         Mood and Affect: Mood normal.         Behavior: Behavior normal.         Thought Content: Thought content normal.         Judgment: Judgment normal.             Lab, imaging and other studies: I have personally reviewed pertinent reports. Lidia Phalen

## 2023-08-22 NOTE — LETTER
August 22, 2023     Patient: Froy Zepeda  YOB: 1990  Date of Visit: 8/22/2023      To Whom it May Concern:    Froy Zepeda is under my professional care. Jillian was seen in my office on 8/22/2023. She unable to complete the physical training for TACT 2 due to medical condition. If you have any questions or concerns, please don't hesitate to call.          Sincerely,          JOSE Siegel        CC: No Recipients

## 2023-08-22 NOTE — ASSESSMENT & PLAN NOTE
Increase omeprazole to 40 mg, GERD lifestyle changes advised.   Do recommend a follow-up EGD, referral provided

## 2023-09-26 ENCOUNTER — OFFICE VISIT (OUTPATIENT)
Dept: OBGYN CLINIC | Facility: CLINIC | Age: 33
End: 2023-09-26
Payer: COMMERCIAL

## 2023-09-26 VITALS
SYSTOLIC BLOOD PRESSURE: 110 MMHG | BODY MASS INDEX: 29.51 KG/M2 | DIASTOLIC BLOOD PRESSURE: 70 MMHG | HEIGHT: 67 IN | WEIGHT: 188 LBS

## 2023-09-26 DIAGNOSIS — N63.0 BREAST NODULE: Primary | ICD-10-CM

## 2023-09-26 PROCEDURE — 99213 OFFICE O/P EST LOW 20 MIN: CPT | Performed by: OBSTETRICS & GYNECOLOGY

## 2023-09-26 NOTE — PATIENT INSTRUCTIONS
Breast Mass   AMBULATORY CARE:   A breast mass  is a lump or growth in your breast or underarm. A cyst (fluid-filled pocket), an injury, or changes in your breast tissue are the most common causes. A breast mass can also be caused by cancer. You must follow up as directed to find the cause of your breast mass. Call your doctor if:   Your breast mass returns or grows larger. You have pain in your breast or underarm. You have nipple discharge. You notice other changes to your breasts or nipples, such as dimpling or a rash. You had an aspiration and you have redness, pain, swelling, or warmth near the aspiration site. You have a fever. You have questions or concerns about your condition or care. Continue to do breast self-exams as directed:  Check your breast for changes in size, shape, or feel of the breast tissue. Check under your arms and all around your breasts. If you have monthly periods, examine your breasts after your period is over. Contact your healthcare provider if you notice any changes in your breasts. If you have questions, ask for more information on how to do a breast self-exam.       Follow up with your doctor as directed: You may need to return for more tests. Write down your questions so you remember to ask them during your visits. © Copyright Rosita Martino 2023 Information is for End User's use only and may not be sold, redistributed or otherwise used for commercial purposes. The above information is an  only. It is not intended as medical advice for individual conditions or treatments. Talk to your doctor, nurse or pharmacist before following any medical regimen to see if it is safe and effective for you.

## 2023-09-26 NOTE — PROGRESS NOTES
Diagnoses and all orders for this visit:    Breast nodule  -     Mammo diagnostic left w 3d & cad; Future    call to schedule for diagnostic imaging,     Subjective    CC: Problem visit     Jillian Marquez is a 26 y.o. female   Patient reports left breast lump noticed in July. Lump is not painful  Diagnosed with CRPS in her left arm 1 year ago, she also had removal of first rib on left chest Aug 2021   No family hx of BC     Patient's last menstrual period was 2023 (exact date). Past Medical History:   Diagnosis Date   • Anxiety    • Asthma    • GERD (gastroesophageal reflux disease)    • Heart murmur    • Kidney stone    • Miscarriage 3/15/2015    7 weeks along. • Neck pain      Past Surgical History:   Procedure Laterality Date   • IR UPPER EXTREMITY VENOGRAM- DIAGNOSTIC  2021   • KS EXCISION 1ST &/CERVICAL RIB Left 2021    Procedure: FIRST RIB RESECTION;  Surgeon: Ramez Rod MD;  [facility];  Service: Thoracic   • THORACOSCOPY VIDEO ASSISTED SURGERY (VATS) Left 2021    Procedure: THORACOSCOPY VIDEO ASSISTED SURGERY (VATS);   Surgeon: Ramez Rod MD;  [facility];  Service: Thoracic   • WISDOM TOOTH EXTRACTION         Immunization History   Administered Date(s) Administered   • HPV Quadrivalent 2012, 2012, 2013   • Tdap 06/15/2012, 2022   • Tuberculin Skin Test-PPD Intradermal 2019       Family History   Problem Relation Age of Onset   • No Known Problems Mother    • No Known Problems Father    • Breast cancer Neg Hx    • Colon cancer Neg Hx    • Ovarian cancer Neg Hx    • Uterine cancer Neg Hx    • Cervical cancer Neg Hx      Social History     Tobacco Use   • Smoking status: Never   • Smokeless tobacco: Never   Vaping Use   • Vaping Use: Never used   Substance Use Topics   • Alcohol use: Not Currently     Comment: social   • Drug use: Never       Current Outpatient Medications:   •  albuterol (PROVENTIL HFA,VENTOLIN HFA) 90 mcg/act inhaler, TAKE 2 PUFFS BY MOUTH EVERY 6 HOURS AS NEEDED FOR WHEEZE OR FOR SHORTNESS OF BREATH, Disp: 18 g, Rfl: 3  •  cetirizine (ZyrTEC) 10 mg tablet, Take 1 tablet (10 mg total) by mouth daily, Disp: 90 tablet, Rfl: 0  •  citalopram (CeleXA) 10 mg tablet, Take 1 tablet (10 mg total) by mouth daily, Disp: 90 tablet, Rfl: 0  •  clindamycin-benzoyl peroxide (BENZACLIN) gel, Apply topically every morning, Disp: 50 g, Rfl: 0  •  clonazePAM (KlonoPIN) 0.5 mg tablet, Take 1 tablet by mouth twice daily as needed for anxiety, Disp: 60 tablet, Rfl: 0  •  Diclofenac Sodium (VOLTAREN) 1 %, Apply 2 g topically 4 (four) times a day, Disp: 150 g, Rfl: 2  •  omeprazole (PriLOSEC) 40 MG capsule, Take 1 capsule (40 mg total) by mouth daily, Disp: 90 capsule, Rfl: 0  Patient Active Problem List    Diagnosis Date Noted   • Dysphagia 08/22/2023   • Left ovarian cyst 06/08/2023   • Unexplained weight gain 02/17/2023   • COVID-19 virus infection 07/18/2022   • Musculoskeletal chest pain 06/21/2022   • Reversible airway obstruction 02/08/2022   • SOB (shortness of breath) 02/08/2022   • Right middle lobe pulmonary nodule 11/17/2021   • Post-operative pain 08/18/2021   • Transaminitis 08/18/2021   • Palpitations 06/25/2021   • Thoracic outlet syndrome 06/08/2021   • Close exposure to COVID-19 virus 04/21/2021   • Vitamin D deficiency 04/05/2021   • Atypical squamous cells of undetermined significance (ASCUS) on Papanicolaou smear of cervix 03/05/2021   • Hypertrophic and atrophic condition of skin 03/05/2021   • Migraine headache 03/05/2021   • Shoulder impingement syndrome, left 03/05/2021   • Cervical disc disorder at C5-C6 level with radiculopathy 06/02/2020   • Chronic left shoulder pain 05/29/2020   • Gastroesophageal reflux disease without esophagitis 10/18/2019   • Depression with anxiety 10/18/2019   • Chronic fatigue 10/18/2019   • Myalgia 10/18/2019   • Chest wall pain 09/05/2019   • Anxiety 2019   • Mild persistent asthma without complication    • Axillary hidradenitis suppurativa 2019       Allergies   Allergen Reactions   • Nsaids GI Intolerance   • Formic Acid Swelling and Rash   • Latex Rash       OB History    Para Term  AB Living   1   0   1 0   SAB IAB Ectopic Multiple Live Births   1              # Outcome Date GA Lbr Germán/2nd Weight Sex Delivery Anes PTL Lv   1 SAB                Vitals:    23 1639   BP: 110/70   BP Location: Right arm   Patient Position: Sitting   Cuff Size: Large   Weight: 85.3 kg (188 lb)   Height: 5' 7" (1.702 m)     Body mass index is 29.44 kg/m². Review of Systems     Constitutional: Negative for chills, fatigue, fever, headaches, visual disturbances, and unexpected weight change. Respiratory: Negative for cough, & shortness of breath. Cardiovascular: Negative for chest pain. .    Gastrointestinal: Negative for Abd pain, nausea & vomiting, constipation and diarrhea. Genitourinary: Negative for difficulty urinating, dysuria, hematuria, dyspareunia, unusual vaginal bleeding or discharge  Skin: Negative skin changes    Physical Exam     Constitutional: Alert & Oriented x3, well-developed and well-nourished. No distress. HENT: Atraumatic, Normocephalic,   Neck: Normal range of motion. Pulmonary: Effort normal.   Abdominal: Soft. No tenderness or masses  Musculoskeletal: Normal ROM  Skin: Warm & Dry  Psychological: Normal mood, thought content, behavior & judgement     Breasts:   Right: tissue soft without masses, tenderness, skin changes or nipple discharge. No areas of erythema or pain. No subclavicular, axillary, pectoral adenopathy  Left: surgical scar left lower outer breast , small oval 0.5 cm non tender nodule noted at 1 oclock 5-6 cm from nipple, this may be scar tissue from previous surgical procedure to remove her 1st rib. Tissue soft without  tenderness, skin changes or nipple discharge.  No areas of erythema or pain.  No subclavicular, axillary, pectoral adenopathy

## 2023-10-13 ENCOUNTER — CONSULT (OUTPATIENT)
Dept: GASTROENTEROLOGY | Facility: CLINIC | Age: 33
End: 2023-10-13
Payer: COMMERCIAL

## 2023-10-13 VITALS
SYSTOLIC BLOOD PRESSURE: 122 MMHG | BODY MASS INDEX: 29.51 KG/M2 | HEART RATE: 82 BPM | DIASTOLIC BLOOD PRESSURE: 70 MMHG | WEIGHT: 188 LBS | HEIGHT: 67 IN

## 2023-10-13 DIAGNOSIS — R13.10 DYSPHAGIA, UNSPECIFIED TYPE: Primary | ICD-10-CM

## 2023-10-13 DIAGNOSIS — K21.9 GASTROESOPHAGEAL REFLUX DISEASE WITHOUT ESOPHAGITIS: ICD-10-CM

## 2023-10-13 PROCEDURE — 99204 OFFICE O/P NEW MOD 45 MIN: CPT | Performed by: INTERNAL MEDICINE

## 2023-10-13 NOTE — PATIENT INSTRUCTIONS
Scheduled date of EGD(as of today): 10/19/23  Physician performing EGD: Ede Benedict  Location of EGD: [facility]  Instructions reviewed with patient by: Joel VALLEJO  Clearances:

## 2023-10-13 NOTE — LETTER
October 13, 2023     [address]  [facility] [address]    Patient: Froy Zepeda   YOB: 1990   Date of Visit: 10/13/2023       Dear Dr. Figueroa Sides: Thank you for referring Froy Zepeda to me for evaluation. Below are my notes for this consultation. If you have questions, please do not hesitate to call me. I look forward to following your patient along with you. Sincerely,        Yony Hidalgo MD        CC: No Recipients    Yony Hidalgo MD  10/13/2023 11:12 AM  Incomplete  [facility]    Dear Rayne Nunez,    I had the pleasure of seeing your patient Froy Zepeda in the office today and I thank you for this kind referral.       Chief Complaint: Swallowing problems      HPI:  Froy Zepeda is a 26 y.o. female who presents with a history of reflux and swallowing issues going back approximately 9 years. At that time she had an EGD because of significant reaction to anti-inflammatory medications which caused her a lot of reflux and discomfort. She was placed on Prilosec at that time. She took this for years. She is currently taking it again. Recently however she has had a syndrome where she will feel her throat closing off. She feels very dry mouth. If she tries to swallow something many times she will actually choke and not be able to initiate the swallow. She feels a sensation of fullness in the back of her throat. When she does initiate the swallow she occasionally feels the food get hung up behind her chest bone. She does have significant reflux. She has not had an EGD since that time. She has not had an ENT evaluation. She had some black stool in the past but not recently. She does have Raynaud's. She does not have any change in the consistency of her skin. She has no nausea or vomiting.   She has no chest pain other than the one associated with her chest wall syndrome. She has no shortness of breath. She does get some change in bowel habits with intermittent diarrhea and does carry a history of irritable bowel syndrome. She has no fever or chills. No other associated symptoms. .      Review of Systems:   Constitutional: No fever or chills, feels well, no tiredness, no recent weight gain or weight loss. HENT: No complaints of earache, no hearing loss, no nosebleeds, no nasal discharge, no sore throat, no hoarseness. Eyes: No complaints of eye pain, no red eyes, no discharge from eyes, no itchy eyes. Cardiovascular: No complaints of slow heart rate, no fast heart rate, no chest pain, no palpitations, no leg claudication, no lower extremity edema. Respiratory: No complaints of shortness of breath, no wheezing, no cough, no SOB on exertion, no orthopnea. Gastrointestinal: As noted in HPI  Genitourinary: No complaints of dysuria, no incontinence, no hesitancy, no nocturia. Musculoskeletal: As per HPI  Neurological: No complaints of headache, no confusion, no convulsions, no numbness or tingling, no dizziness or fainting, no limb weakness, no difficulty walking. Skin: No complaints of skin rash or skin lesions, no itching, no skin wound, no dry skin. Hematological/Lymphatic: No complaints of swollen glands, does not bleed easy. Allergic/Immunologic: No immunocompromised state. Endocrine:  No complaints of polyuria, no polydipsia. Psychiatric/Behavioral: is not suicidal, no sleep disturbances, positive anxiety , no change in personality, no emotional problems. Historical Information  Past Medical History:   Diagnosis Date   • Anxiety    • Asthma    • GERD (gastroesophageal reflux disease)    • Heart murmur    • Kidney stone    • Miscarriage 3/15/2015    7 weeks along.    • Neck pain      Past Surgical History:   Procedure Laterality Date   • IR UPPER EXTREMITY VENOGRAM- DIAGNOSTIC  5/28/2021   • [place] EXCISION 1ST &/CERVICAL RIB Left 8/12/2021 Procedure: FIRST RIB RESECTION;  Surgeon: Rodolfo Pacheco MD;  [facility];  Service: Thoracic   • THORACOSCOPY VIDEO ASSISTED SURGERY (VATS) Left 8/12/2021    Procedure: THORACOSCOPY VIDEO ASSISTED SURGERY (VATS); Surgeon: Rodolfo Pacheco MD;  [facility];  Service: Thoracic   • WISDOM TOOTH EXTRACTION       Social History  Social History     Substance and Sexual Activity   Alcohol Use Not Currently    Comment: social     Social History     Substance and Sexual Activity   Drug Use Never     Social History     Tobacco Use   Smoking Status Never   Smokeless Tobacco Never     Family History   Problem Relation Age of Onset   • No Known Problems Mother    • No Known Problems Father    • Breast cancer Neg Hx    • Colon cancer Neg Hx    • Ovarian cancer Neg Hx    • Uterine cancer Neg Hx    • Cervical cancer Neg Hx          Current Medications: has a current medication list which includes the following prescription(s): albuterol, cetirizine, citalopram, clindamycin-benzoyl peroxide, clonazepam, diclofenac sodium, and omeprazole. Vital Signs: /70   Pulse 82   Ht 5' 7" (1.702 m)   Wt 85.3 kg (188 lb)   LMP 09/08/2023 (Exact Date)   BMI 29.44 kg/m²     Physical Exam:   Constitutional  General Appearance: No acute distress, well appearing and well nourished  Head  Normocephalic  Eyes  Conjunctivae and lids: No swelling, erythema, or discharge. Pupils and irises: Equal, round and reactive to light. Ears, Nose, Mouth, and Throat  External inspection of ears and nose: Normal  Nasal mucosa, septum and turbinates: Normal without edema or erythema/   Oropharynx: Normal with no erythema, edema, exudate or lesions. Neck  Normal range of motion. Neck supple. Cardiovascular  Auscultation of the heart: Normal rate and rhythm, normal S1 and S2 without murmurs.   Examination of the extremities for edema and/or varicosities: Normal  Pulmonary/Chest  Respiratory effort: No increased work of breathing or signs of respiratory distress. Auscultation of lungs: Clear to auscultation, equal breath sounds bilaterally, no wheezes, rales, no rhonchi. Abdomen  Abdomen: Mild tenderness on deep palpation across the lower abdomen with no point tenderness or rebound no masses. Liver and spleen: No hepatomegaly or splenomegaly. Musculoskeletal  Gait and station: normal.  Digits and Nails: normal without clubbing or cyanosis. Inspection/palpation of joints, bones, and muscles: Normal  Neurological  No nystagmus or asterixis. Skin  Skin and subcutaneous tissue: Normal without rashes or lesions. Lymphatic  Palpation of the lymph nodes in neck: No lymphadenopathy. Psychiatric  Orientation to person, place and time: Normal.  Mood and affect: Normal.         Labs:   Lab Results   Component Value Date    ALT 21 02/17/2023    AST 12 02/17/2023    BUN 9 02/17/2023    CALCIUM 9.1 02/17/2023     02/17/2023    CO2 25 02/17/2023    CREATININE 0.71 02/17/2023    HDL 55 02/17/2023    HCT 39.4 02/17/2023    HGB 12.5 02/17/2023    HGBA1C 5.1 02/17/2023    MG 2.3 10/19/2019     02/17/2023    K 3.9 02/17/2023    TRIG 67 02/17/2023    WBC 6.53 02/17/2023         X-Rays & Procedures:   No orders to display         ______________________________________________________________________      Assessment & Plan:      Diagnoses and all orders for this visit:    Dysphagia, unspecified type  -     Ambulatory Referral to Gastroenterology  -     Ambulatory Referral to Otolaryngology; Future  -     EGD; Future    Gastroesophageal reflux disease without esophagitis  -     Ambulatory Referral to Gastroenterology  -     EGD; Future        I have taken the liberty of scheduling the patient for an EGD. We did review how to take the Prilosec. I have also put in a consultation for an ENT evaluation because of the nature of some of her complaints. I will be happy to inform you of her results and any further recommendations. I would like to thank you for allowing me to participate in her care.             With warmest regards,    Joshua Hurt MD, [facility]

## 2023-10-13 NOTE — H&P (VIEW-ONLY)
[facility]    Dear Domi Hernandez,    I had the pleasure of seeing your patient Evi Mtz in the office today and I thank you for this kind referral.       Chief Complaint: Swallowing problems      HPI:  Evi Mtz is a 26 y.o. female who presents with a history of reflux and swallowing issues going back approximately 9 years. At that time she had an EGD because of significant reaction to anti-inflammatory medications which caused her a lot of reflux and discomfort. She was placed on Prilosec at that time. She took this for years. She is currently taking it again. Recently however she has had a syndrome where she will feel her throat closing off. She feels very dry mouth. If she tries to swallow something many times she will actually choke and not be able to initiate the swallow. She feels a sensation of fullness in the back of her throat. When she does initiate the swallow she occasionally feels the food get hung up behind her chest bone. She does have significant reflux. She has not had an EGD since that time. She has not had an ENT evaluation. She had some black stool in the past but not recently. She does have Raynaud's. She does not have any change in the consistency of her skin. She has no nausea or vomiting. She has no chest pain other than the one associated with her chest wall syndrome. She has no shortness of breath. She does get some change in bowel habits with intermittent diarrhea and does carry a history of irritable bowel syndrome. She has no fever or chills. No other associated symptoms. .      Review of Systems:   Constitutional: No fever or chills, feels well, no tiredness, no recent weight gain or weight loss. HENT: No complaints of earache, no hearing loss, no nosebleeds, no nasal discharge, no sore throat, no hoarseness. Eyes: No complaints of eye pain, no red eyes, no discharge from eyes, no itchy eyes.   Cardiovascular: No complaints of slow heart rate, no fast heart rate, no chest pain, no palpitations, no leg claudication, no lower extremity edema. Respiratory: No complaints of shortness of breath, no wheezing, no cough, no SOB on exertion, no orthopnea. Gastrointestinal: As noted in HPI  Genitourinary: No complaints of dysuria, no incontinence, no hesitancy, no nocturia. Musculoskeletal: As per HPI  Neurological: No complaints of headache, no confusion, no convulsions, no numbness or tingling, no dizziness or fainting, no limb weakness, no difficulty walking. Skin: No complaints of skin rash or skin lesions, no itching, no skin wound, no dry skin. Hematological/Lymphatic: No complaints of swollen glands, does not bleed easy. Allergic/Immunologic: No immunocompromised state. Endocrine:  No complaints of polyuria, no polydipsia. Psychiatric/Behavioral: is not suicidal, no sleep disturbances, positive anxiety , no change in personality, no emotional problems. Historical Information   Past Medical History:   Diagnosis Date    Anxiety     Asthma     GERD (gastroesophageal reflux disease)     Heart murmur     Kidney stone     Miscarriage 3/15/2015    7 weeks along. Neck pain      Past Surgical History:   Procedure Laterality Date    IR UPPER EXTREMITY VENOGRAM- DIAGNOSTIC  5/28/2021    MA EXCISION 1ST &/CERVICAL RIB Left 8/12/2021    Procedure: FIRST RIB RESECTION;  Surgeon: Christina Hernández MD;  [facility];  Service: Thoracic    THORACOSCOPY VIDEO ASSISTED SURGERY (VATS) Left 8/12/2021    Procedure: THORACOSCOPY VIDEO ASSISTED SURGERY (VATS);   Surgeon: Christina Hernández MD;  [facility];  Service: Thoracic    WISDOM TOOTH EXTRACTION       Social History   Social History     Substance and Sexual Activity   Alcohol Use Not Currently    Comment: social     Social History     Substance and Sexual Activity   Drug Use Never     Social History     Tobacco Use   Smoking Status Never   Smokeless Tobacco Never     Family History   Problem Relation Age of Onset    No Known Problems Mother     No Known Problems Father     Breast cancer Neg Hx     Colon cancer Neg Hx     Ovarian cancer Neg Hx     Uterine cancer Neg Hx     Cervical cancer Neg Hx          Current Medications: has a current medication list which includes the following prescription(s): albuterol, cetirizine, citalopram, clindamycin-benzoyl peroxide, clonazepam, diclofenac sodium, and omeprazole. Vital Signs: /70   Pulse 82   Ht 5' 7" (1.702 m)   Wt 85.3 kg (188 lb)   LMP 09/08/2023 (Exact Date)   BMI 29.44 kg/m²     Physical Exam:   Constitutional  General Appearance: No acute distress, well appearing and well nourished  Head  Normocephalic  Eyes  Conjunctivae and lids: No swelling, erythema, or discharge. Pupils and irises: Equal, round and reactive to light. Ears, Nose, Mouth, and Throat  External inspection of ears and nose: Normal  Nasal mucosa, septum and turbinates: Normal without edema or erythema/   Oropharynx: Normal with no erythema, edema, exudate or lesions. Neck  Normal range of motion. Neck supple. Cardiovascular  Auscultation of the heart: Normal rate and rhythm, normal S1 and S2 without murmurs. Examination of the extremities for edema and/or varicosities: Normal  Pulmonary/Chest  Respiratory effort: No increased work of breathing or signs of respiratory distress. Auscultation of lungs: Clear to auscultation, equal breath sounds bilaterally, no wheezes, rales, no rhonchi. Abdomen  Abdomen: Mild tenderness on deep palpation across the lower abdomen with no point tenderness or rebound no masses. Liver and spleen: No hepatomegaly or splenomegaly. Musculoskeletal  Gait and station: normal.  Digits and Nails: normal without clubbing or cyanosis. Inspection/palpation of joints, bones, and muscles: Normal  Neurological  No nystagmus or asterixis.    Skin  Skin and subcutaneous tissue: Normal without rashes or lesions. Lymphatic  Palpation of the lymph nodes in neck: No lymphadenopathy. Psychiatric  Orientation to person, place and time: Normal.  Mood and affect: Normal.         Labs:   Lab Results   Component Value Date    ALT 21 02/17/2023    AST 12 02/17/2023    BUN 9 02/17/2023    CALCIUM 9.1 02/17/2023     02/17/2023    CO2 25 02/17/2023    CREATININE 0.71 02/17/2023    HDL 55 02/17/2023    HCT 39.4 02/17/2023    HGB 12.5 02/17/2023    HGBA1C 5.1 02/17/2023    MG 2.3 10/19/2019     02/17/2023    K 3.9 02/17/2023    TRIG 67 02/17/2023    WBC 6.53 02/17/2023         X-Rays & Procedures:   No orders to display         ______________________________________________________________________      Assessment & Plan:      Diagnoses and all orders for this visit:    Dysphagia, unspecified type  -     Ambulatory Referral to Gastroenterology  -     Ambulatory Referral to Otolaryngology; Future  -     EGD; Future    Gastroesophageal reflux disease without esophagitis  -     Ambulatory Referral to Gastroenterology  -     EGD; Future        I have taken the liberty of scheduling the patient for an EGD. We did review how to take the Prilosec. I have also put in a consultation for an ENT evaluation because of the nature of some of her complaints. I will be happy to inform you of her results and any further recommendations. I would like to thank you for allowing me to participate in her care.             With warmest regards,    Livia Sotomayor MD, [facility]

## 2023-10-13 NOTE — PROGRESS NOTES
[facility]    Dear Chris Fairfax,    I had the pleasure of seeing your patient Anjelica Sauceda in the office today and I thank you for this kind referral.       Chief Complaint: Swallowing problems      HPI:  Anjelica Sauceda is a 26 y.o. female who presents with a history of reflux and swallowing issues going back approximately 9 years. At that time she had an EGD because of significant reaction to anti-inflammatory medications which caused her a lot of reflux and discomfort. She was placed on Prilosec at that time. She took this for years. She is currently taking it again. Recently however she has had a syndrome where she will feel her throat closing off. She feels very dry mouth. If she tries to swallow something many times she will actually choke and not be able to initiate the swallow. She feels a sensation of fullness in the back of her throat. When she does initiate the swallow she occasionally feels the food get hung up behind her chest bone. She does have significant reflux. She has not had an EGD since that time. She has not had an ENT evaluation. She had some black stool in the past but not recently. She does have Raynaud's. She does not have any change in the consistency of her skin. She has no nausea or vomiting. She has no chest pain other than the one associated with her chest wall syndrome. She has no shortness of breath. She does get some change in bowel habits with intermittent diarrhea and does carry a history of irritable bowel syndrome. She has no fever or chills. No other associated symptoms. .      Review of Systems:   Constitutional: No fever or chills, feels well, no tiredness, no recent weight gain or weight loss. HENT: No complaints of earache, no hearing loss, no nosebleeds, no nasal discharge, no sore throat, no hoarseness. Eyes: No complaints of eye pain, no red eyes, no discharge from eyes, no itchy eyes.   Cardiovascular: No complaints of slow heart rate, no fast heart rate, no chest pain, no palpitations, no leg claudication, no lower extremity edema. Respiratory: No complaints of shortness of breath, no wheezing, no cough, no SOB on exertion, no orthopnea. Gastrointestinal: As noted in HPI  Genitourinary: No complaints of dysuria, no incontinence, no hesitancy, no nocturia. Musculoskeletal: As per HPI  Neurological: No complaints of headache, no confusion, no convulsions, no numbness or tingling, no dizziness or fainting, no limb weakness, no difficulty walking. Skin: No complaints of skin rash or skin lesions, no itching, no skin wound, no dry skin. Hematological/Lymphatic: No complaints of swollen glands, does not bleed easy. Allergic/Immunologic: No immunocompromised state. Endocrine:  No complaints of polyuria, no polydipsia. Psychiatric/Behavioral: is not suicidal, no sleep disturbances, positive anxiety , no change in personality, no emotional problems. Historical Information   Past Medical History:   Diagnosis Date    Anxiety     Asthma     GERD (gastroesophageal reflux disease)     Heart murmur     Kidney stone     Miscarriage 3/15/2015    7 weeks along. Neck pain      Past Surgical History:   Procedure Laterality Date    IR UPPER EXTREMITY VENOGRAM- DIAGNOSTIC  5/28/2021    WV EXCISION 1ST &/CERVICAL RIB Left 8/12/2021    Procedure: FIRST RIB RESECTION;  Surgeon: Reynaldo Woodard MD;  Location: [facility];  Service: Thoracic    THORACOSCOPY VIDEO ASSISTED SURGERY (VATS) Left 8/12/2021    Procedure: THORACOSCOPY VIDEO ASSISTED SURGERY (VATS);   Surgeon: Reynaldo Woodard MD;  Location: [facility];  Service: Thoracic    WISDOM TOOTH EXTRACTION       Social History   Social History     Substance and Sexual Activity   Alcohol Use Not Currently    Comment: social     Social History     Substance and Sexual Activity   Drug Use Never     Social History     Tobacco Use   Smoking Status Never   Smokeless Tobacco Never     Family History   Problem Relation Age of Onset    No Known Problems Mother     No Known Problems Father     Breast cancer Neg Hx     Colon cancer Neg Hx     Ovarian cancer Neg Hx     Uterine cancer Neg Hx     Cervical cancer Neg Hx          Current Medications: has a current medication list which includes the following prescription(s): albuterol, cetirizine, citalopram, clindamycin-benzoyl peroxide, clonazepam, diclofenac sodium, and omeprazole. Vital Signs: /70   Pulse 82   Ht 5' 7" (1.702 m)   Wt 85.3 kg (188 lb)   LMP 09/08/2023 (Exact Date)   BMI 29.44 kg/m²     Physical Exam:   Constitutional  General Appearance: No acute distress, well appearing and well nourished  Head  Normocephalic  Eyes  Conjunctivae and lids: No swelling, erythema, or discharge. Pupils and irises: Equal, round and reactive to light. Ears, Nose, Mouth, and Throat  External inspection of ears and nose: Normal  Nasal mucosa, septum and turbinates: Normal without edema or erythema/   Oropharynx: Normal with no erythema, edema, exudate or lesions. Neck  Normal range of motion. Neck supple. Cardiovascular  Auscultation of the heart: Normal rate and rhythm, normal S1 and S2 without murmurs. Examination of the extremities for edema and/or varicosities: Normal  Pulmonary/Chest  Respiratory effort: No increased work of breathing or signs of respiratory distress. Auscultation of lungs: Clear to auscultation, equal breath sounds bilaterally, no wheezes, rales, no rhonchi. Abdomen  Abdomen: Mild tenderness on deep palpation across the lower abdomen with no point tenderness or rebound no masses. Liver and spleen: No hepatomegaly or splenomegaly. Musculoskeletal  Gait and station: normal.  Digits and Nails: normal without clubbing or cyanosis. Inspection/palpation of joints, bones, and muscles: Normal  Neurological  No nystagmus or asterixis.    Skin  Skin and subcutaneous tissue: Normal without rashes or lesions. Lymphatic  Palpation of the lymph nodes in neck: No lymphadenopathy. Psychiatric  Orientation to person, place and time: Normal.  Mood and affect: Normal.         Labs:   Lab Results   Component Value Date    ALT 21 02/17/2023    AST 12 02/17/2023    BUN 9 02/17/2023    CALCIUM 9.1 02/17/2023     02/17/2023    CO2 25 02/17/2023    CREATININE 0.71 02/17/2023    HDL 55 02/17/2023    HCT 39.4 02/17/2023    HGB 12.5 02/17/2023    HGBA1C 5.1 02/17/2023    MG 2.3 10/19/2019     02/17/2023    K 3.9 02/17/2023    TRIG 67 02/17/2023    WBC 6.53 02/17/2023         X-Rays & Procedures:   No orders to display         ______________________________________________________________________      Assessment & Plan:      Diagnoses and all orders for this visit:    Dysphagia, unspecified type  -     Ambulatory Referral to Gastroenterology  -     Ambulatory Referral to Otolaryngology; Future  -     EGD; Future    Gastroesophageal reflux disease without esophagitis  -     Ambulatory Referral to Gastroenterology  -     EGD; Future        I have taken the liberty of scheduling the patient for an EGD. We did review how to take the Prilosec. I have also put in a consultation for an ENT evaluation because of the nature of some of her complaints. I will be happy to inform you of her results and any further recommendations. I would like to thank you for allowing me to participate in her care.             With warmest regards,    Tatum Dumont MD, [facility]

## 2023-10-18 DIAGNOSIS — Z91.09 ENVIRONMENTAL ALLERGIES: ICD-10-CM

## 2023-10-18 DIAGNOSIS — F41.8 DEPRESSION WITH ANXIETY: ICD-10-CM

## 2023-10-18 RX ORDER — CITALOPRAM HYDROBROMIDE 10 MG/1
10 TABLET ORAL DAILY
Qty: 90 TABLET | Refills: 0 | Status: SHIPPED | OUTPATIENT
Start: 2023-10-18

## 2023-10-18 RX ORDER — CETIRIZINE HYDROCHLORIDE 10 MG/1
10 TABLET ORAL DAILY
Qty: 90 TABLET | Refills: 0 | Status: SHIPPED | OUTPATIENT
Start: 2023-10-18

## 2023-10-19 ENCOUNTER — ANESTHESIA EVENT (OUTPATIENT)
Dept: GASTROENTEROLOGY | Facility: HOSPITAL | Age: 33
End: 2023-10-19

## 2023-10-19 ENCOUNTER — ANESTHESIA (OUTPATIENT)
Dept: GASTROENTEROLOGY | Facility: HOSPITAL | Age: 33
End: 2023-10-19

## 2023-10-19 ENCOUNTER — HOSPITAL ENCOUNTER (OUTPATIENT)
Dept: GASTROENTEROLOGY | Facility: HOSPITAL | Age: 33
Setting detail: OUTPATIENT SURGERY
End: 2023-10-19
Attending: INTERNAL MEDICINE
Payer: COMMERCIAL

## 2023-10-19 VITALS
WEIGHT: 181 LBS | HEIGHT: 67 IN | OXYGEN SATURATION: 100 % | RESPIRATION RATE: 16 BRPM | DIASTOLIC BLOOD PRESSURE: 50 MMHG | SYSTOLIC BLOOD PRESSURE: 95 MMHG | BODY MASS INDEX: 28.41 KG/M2 | HEART RATE: 66 BPM | TEMPERATURE: 97.4 F

## 2023-10-19 DIAGNOSIS — R13.10 DYSPHAGIA, UNSPECIFIED TYPE: ICD-10-CM

## 2023-10-19 DIAGNOSIS — K21.9 GASTROESOPHAGEAL REFLUX DISEASE WITHOUT ESOPHAGITIS: ICD-10-CM

## 2023-10-19 LAB
EXT PREGNANCY TEST URINE: NEGATIVE
EXT. CONTROL: NORMAL

## 2023-10-19 PROCEDURE — 43248 EGD GUIDE WIRE INSERTION: CPT | Performed by: INTERNAL MEDICINE

## 2023-10-19 PROCEDURE — 88305 TISSUE EXAM BY PATHOLOGIST: CPT | Performed by: PATHOLOGY

## 2023-10-19 PROCEDURE — 43239 EGD BIOPSY SINGLE/MULTIPLE: CPT | Performed by: INTERNAL MEDICINE

## 2023-10-19 PROCEDURE — 81025 URINE PREGNANCY TEST: CPT | Performed by: STUDENT IN AN ORGANIZED HEALTH CARE EDUCATION/TRAINING PROGRAM

## 2023-10-19 RX ORDER — PROPOFOL 10 MG/ML
INJECTION, EMULSION INTRAVENOUS AS NEEDED
Status: DISCONTINUED | OUTPATIENT
Start: 2023-10-19 | End: 2023-10-19

## 2023-10-19 RX ORDER — LIDOCAINE HYDROCHLORIDE 20 MG/ML
INJECTION, SOLUTION EPIDURAL; INFILTRATION; INTRACAUDAL; PERINEURAL AS NEEDED
Status: DISCONTINUED | OUTPATIENT
Start: 2023-10-19 | End: 2023-10-19

## 2023-10-19 RX ADMIN — PROPOFOL 50 MG: 10 INJECTION, EMULSION INTRAVENOUS at 11:24

## 2023-10-19 RX ADMIN — PROPOFOL 100 MG: 10 INJECTION, EMULSION INTRAVENOUS at 11:21

## 2023-10-19 RX ADMIN — PROPOFOL 100 MG: 10 INJECTION, EMULSION INTRAVENOUS at 11:18

## 2023-10-19 RX ADMIN — LIDOCAINE HYDROCHLORIDE 100 MG: 20 INJECTION, SOLUTION EPIDURAL; INFILTRATION; INTRACAUDAL; PERINEURAL at 11:18

## 2023-10-19 NOTE — ANESTHESIA POSTPROCEDURE EVALUATION
Post-Op Assessment Note    CV Status:  Stable  Pain Score: 0    Pain management: adequate     Mental Status:  Sleepy   Hydration Status:  Euvolemic   PONV Controlled:  Controlled   Airway Patency:  Patent      Post Op Vitals Reviewed: Yes      Staff: CRNA         No notable events documented.     /51 (10/19/23 1128)    Temp (!) 97.4 °F (36.3 °C) (10/19/23 1128)    Pulse 73 (10/19/23 1128)   Resp 20 (10/19/23 1128)    SpO2 99 % (10/19/23 1128)

## 2023-10-19 NOTE — ANESTHESIA PREPROCEDURE EVALUATION
Medical History    History Comments   Asthma    Neck pain    Anxiety    Heart murmur    Kidney stone    GERD (gastroesophageal reflux disease)    Miscarriage 7 weeks along. Procedure:  EGD    Relevant Problems   ANESTHESIA (within normal limits)      CARDIO   (+) Chest wall pain   (+) Migraine headache   (+) Musculoskeletal chest pain      GI/HEPATIC   (+) Dysphagia   (+) Gastroesophageal reflux disease without esophagitis      MUSCULOSKELETAL   (+) Shoulder impingement syndrome, left      NEURO/PSYCH   (+) Anxiety   (+) Chronic left shoulder pain   (+) Depression with anxiety   (+) Migraine headache      PULMONARY   (+) Mild persistent asthma without complication   (+) SOB (shortness of breath)        Physical Exam    Airway    Mallampati score: II  TM Distance: >3 FB  Neck ROM: full     Dental       Cardiovascular  Rate: normal    Pulmonary  Pulmonary exam normal     Other Findings  Per pt denies anything remaining that is loose or removeable        Anesthesia Plan  ASA Score- 2     Anesthesia Type- IV sedation with anesthesia with ASA Monitors. Additional Monitors:     Airway Plan:     Comment: Per patient, appropriately NPO, denies active CP/SOB/wheezing/symptoms related to heartburn/nausea/vomiting  . Plan Factors-Exercise tolerance (METS): >4 METS. Chart reviewed. Patient summary reviewed. Patient is not a current smoker. Induction- intravenous. Postoperative Plan-     Informed Consent- Anesthetic plan and risks discussed with patient. I personally reviewed this patient with the CRNA. Discussed and agreed on the Anesthesia Plan with the CRNA. Lachelle Heaton

## 2023-10-19 NOTE — INTERVAL H&P NOTE
H&P reviewed. After examining the patient I find no changes in the patients condition since the H&P had been written.     Vitals:    10/19/23 1029   BP: 109/67   Pulse: 66   Resp: 18   Temp: 98.4 °F (36.9 °C)   SpO2: 100%

## 2023-10-23 PROCEDURE — 88305 TISSUE EXAM BY PATHOLOGIST: CPT | Performed by: PATHOLOGY

## 2023-11-09 ENCOUNTER — TELEPHONE (OUTPATIENT)
Age: 33
End: 2023-11-09

## 2023-11-09 DIAGNOSIS — R49.0 HOARSENESS OF VOICE: ICD-10-CM

## 2023-11-09 DIAGNOSIS — J37.0 CHRONIC LARYNGITIS: Primary | ICD-10-CM

## 2023-11-09 DIAGNOSIS — R09.89 THROAT TIGHTNESS: ICD-10-CM

## 2023-11-14 DIAGNOSIS — K21.9 GASTROESOPHAGEAL REFLUX DISEASE WITHOUT ESOPHAGITIS: ICD-10-CM

## 2023-11-14 DIAGNOSIS — R13.10 DYSPHAGIA, UNSPECIFIED TYPE: ICD-10-CM

## 2023-11-14 RX ORDER — OMEPRAZOLE 40 MG/1
40 CAPSULE, DELAYED RELEASE ORAL DAILY
Qty: 90 CAPSULE | Refills: 0 | Status: SHIPPED | OUTPATIENT
Start: 2023-11-14

## 2023-11-24 ENCOUNTER — HOSPITAL ENCOUNTER (OUTPATIENT)
Dept: ULTRASOUND IMAGING | Facility: CLINIC | Age: 33
End: 2023-11-24
Payer: COMMERCIAL

## 2023-11-24 ENCOUNTER — HOSPITAL ENCOUNTER (OUTPATIENT)
Dept: MAMMOGRAPHY | Facility: CLINIC | Age: 33
End: 2023-11-24
Payer: COMMERCIAL

## 2023-11-24 VITALS — WEIGHT: 181 LBS | BODY MASS INDEX: 28.41 KG/M2 | HEIGHT: 67 IN

## 2023-11-24 DIAGNOSIS — N63.0 BREAST NODULE: ICD-10-CM

## 2023-11-24 PROCEDURE — 77066 DX MAMMO INCL CAD BI: CPT

## 2023-11-24 PROCEDURE — 76642 ULTRASOUND BREAST LIMITED: CPT

## 2023-11-24 PROCEDURE — G0279 TOMOSYNTHESIS, MAMMO: HCPCS

## 2023-11-27 ENCOUNTER — TELEPHONE (OUTPATIENT)
Dept: HEMATOLOGY ONCOLOGY | Facility: CLINIC | Age: 33
End: 2023-11-27

## 2023-11-27 ENCOUNTER — TELEPHONE (OUTPATIENT)
Dept: MAMMOGRAPHY | Facility: CLINIC | Age: 33
End: 2023-11-27

## 2023-11-27 NOTE — TELEPHONE ENCOUNTER
[facility]    Diagnosis: Pt with breast imaging highly suggestive to be positive for breast CA    Is this diagnosis cancer (Y/N):     Biopsy Date: 12/2/23    Does the patient have another biopsy pending: N/A  If so, when:    Preferred provider: Dr. Boni Hernandez    Preferred location: [facility]    Any requests for dates/times:  Wed 12/6/23    Any additional information: Per my conversation with Sharan Felty from Dr. Lacey Iyer office, please schedule this patient for Wed 12/6/23 with their office, pt with highly suspicious mass for breast CA, to have biopsy on Sat 12/2/23    Please advise when appointment is made

## 2023-11-27 NOTE — TELEPHONE ENCOUNTER
I called Jillian in response to a referral that was received for patient to establish care with Surgical Oncology. Outreach was made to schedule a consultation. I left a voicemail explaining the reason for my call and advised patient to call \[facility] [place] [facility]\"" at [phone]. Another attempt will be made to contact patient.  [facility])  [facility] Referral     Diagnosis: Pt with breast imaging highly suggestive to be positive for breast CA     Is this diagnosis cancer (Y/N):      Biopsy Date: 12/2/23     Does the patient have another biopsy pending: N/A  If so, when:     Preferred provider: Dr. Mariam Woods     Preferred location: [facility]     Any requests for dates/times:  Wed 12/6/23     Any additional information: Per my conversation with Samira Abarca from Dr. Isai Cagle office, please schedule this patient for Wed 12/6/23 with their office, pt with highly suspicious mass for breast CA, to have biopsy on Sat 12/2/23     Please advise when appointment is made

## 2023-11-27 NOTE — PROGRESS NOTES
Met with patient, patients mother and Dr. Toni Ramon regarding recommendation for;    ___X__ RIGHT and ___X___LEFT      ___X__Ultrasound guided  ______Stereotactic breast biopsies. __X___Verbalized understanding.       Blood thinners:  No: __X___ Yes: ______ What:                 Biopsy teaching sheet given:  Yes: ___X___ No: ________    Pt given contact information and adv to call with any questions/needs    Patient advised to arrive at [phone] for a 1100 appointment

## 2023-11-28 ENCOUNTER — DOCUMENTATION (OUTPATIENT)
Dept: HEMATOLOGY ONCOLOGY | Facility: CLINIC | Age: 33
End: 2023-11-28

## 2023-11-28 NOTE — PROGRESS NOTES
Intake received- chart reviewed for external records retrieval.    Patient is scheduled on 12-6-2023 with Dr. Pacheco Nesbitt for Dx: Breast Mass. Due to Dx on patient's referral, no records retrieval needed by Oncology Care Coordination.

## 2023-12-02 ENCOUNTER — HOSPITAL ENCOUNTER (OUTPATIENT)
Dept: MAMMOGRAPHY | Facility: CLINIC | Age: 33
Discharge: HOME | End: 2023-12-02
Payer: COMMERCIAL

## 2023-12-02 ENCOUNTER — LAB (OUTPATIENT)
Dept: LAB | Facility: CLINIC | Age: 33
End: 2023-12-02
Payer: COMMERCIAL

## 2023-12-02 ENCOUNTER — HOSPITAL ENCOUNTER (OUTPATIENT)
Dept: ULTRASOUND IMAGING | Facility: CLINIC | Age: 33
Discharge: HOME | End: 2023-12-02
Payer: COMMERCIAL

## 2023-12-02 VITALS — HEART RATE: 84 BPM | SYSTOLIC BLOOD PRESSURE: 130 MMHG | DIASTOLIC BLOOD PRESSURE: 73 MMHG

## 2023-12-02 DIAGNOSIS — Z83.3 FAMILY HISTORY OF DIABETES MELLITUS: ICD-10-CM

## 2023-12-02 DIAGNOSIS — F41.9 ANXIETY: ICD-10-CM

## 2023-12-02 DIAGNOSIS — R92.8 ABNORMAL MAMMOGRAM: ICD-10-CM

## 2023-12-02 DIAGNOSIS — Z13.220 SCREENING, LIPID: ICD-10-CM

## 2023-12-02 LAB
ALBUMIN SERPL BCP-MCNC: 3.9 G/DL (ref 3.5–5)
ALP SERPL-CCNC: 65 U/L (ref 34–104)
ALT SERPL W P-5'-P-CCNC: 16 U/L (ref 7–52)
ANION GAP SERPL CALCULATED.3IONS-SCNC: 8 MMOL/L
AST SERPL W P-5'-P-CCNC: 17 U/L (ref 13–39)
BASOPHILS # BLD AUTO: 0.02 THOUSANDS/ÂΜL (ref 0–0.1)
BASOPHILS NFR BLD AUTO: 0 % (ref 0–1)
BILIRUB SERPL-MCNC: 0.4 MG/DL (ref 0.2–1)
BUN SERPL-MCNC: 8 MG/DL (ref 5–25)
CALCIUM SERPL-MCNC: 9.1 MG/DL (ref 8.4–10.2)
CHLORIDE SERPL-SCNC: 107 MMOL/L (ref 96–108)
CHOLEST SERPL-MCNC: 127 MG/DL
CO2 SERPL-SCNC: 24 MMOL/L (ref 21–32)
CREAT SERPL-MCNC: 0.61 MG/DL (ref 0.6–1.3)
EOSINOPHIL # BLD AUTO: 0.17 THOUSAND/ÂΜL (ref 0–0.61)
EOSINOPHIL NFR BLD AUTO: 3 % (ref 0–6)
ERYTHROCYTE [DISTWIDTH] IN BLOOD BY AUTOMATED COUNT: 14 % (ref 11.6–15.1)
EST. AVERAGE GLUCOSE BLD GHB EST-MCNC: 108 MG/DL
GFR SERPL CREATININE-BSD FRML MDRD: 117 ML/MIN/1.73SQ M
GLUCOSE P FAST SERPL-MCNC: 100 MG/DL (ref 65–99)
HBA1C MFR BLD: 5.4 %
HCT VFR BLD AUTO: 37.3 % (ref 34.8–46.1)
HDLC SERPL-MCNC: 43 MG/DL
HGB BLD-MCNC: 11.6 G/DL (ref 11.5–15.4)
IMM GRANULOCYTES # BLD AUTO: 0.02 THOUSAND/UL (ref 0–0.2)
IMM GRANULOCYTES NFR BLD AUTO: 0 % (ref 0–2)
LDLC SERPL CALC-MCNC: 73 MG/DL (ref 0–100)
LYMPHOCYTES # BLD AUTO: 1.15 THOUSANDS/ÂΜL (ref 0.6–4.47)
LYMPHOCYTES NFR BLD AUTO: 23 % (ref 14–44)
MCH RBC QN AUTO: 26.9 PG (ref 26.8–34.3)
MCHC RBC AUTO-ENTMCNC: 31.1 G/DL (ref 31.4–37.4)
MCV RBC AUTO: 86 FL (ref 82–98)
MONOCYTES # BLD AUTO: 0.42 THOUSAND/ÂΜL (ref 0.17–1.22)
MONOCYTES NFR BLD AUTO: 8 % (ref 4–12)
NEUTROPHILS # BLD AUTO: 3.22 THOUSANDS/ÂΜL (ref 1.85–7.62)
NEUTS SEG NFR BLD AUTO: 66 % (ref 43–75)
NRBC BLD AUTO-RTO: 0 /100 WBCS
PLATELET # BLD AUTO: 224 THOUSANDS/UL (ref 149–390)
PMV BLD AUTO: 12.1 FL (ref 8.9–12.7)
POTASSIUM SERPL-SCNC: 3.7 MMOL/L (ref 3.5–5.3)
PROT SERPL-MCNC: 6.8 G/DL (ref 6.4–8.4)
RBC # BLD AUTO: 4.32 MILLION/UL (ref 3.81–5.12)
SODIUM SERPL-SCNC: 139 MMOL/L (ref 135–147)
TRIGL SERPL-MCNC: 56 MG/DL
TSH SERPL DL<=0.05 MIU/L-ACNC: 1.16 UIU/ML (ref 0.45–4.5)
WBC # BLD AUTO: 5 THOUSAND/UL (ref 4.31–10.16)

## 2023-12-02 PROCEDURE — 88341 IMHCHEM/IMCYTCHM EA ADD ANTB: CPT | Performed by: PATHOLOGY

## 2023-12-02 PROCEDURE — 80053 COMPREHEN METABOLIC PANEL: CPT

## 2023-12-02 PROCEDURE — A4648 IMPLANTABLE TISSUE MARKER: HCPCS

## 2023-12-02 PROCEDURE — 19084 BX BREAST ADD LESION US IMAG: CPT

## 2023-12-02 PROCEDURE — 85025 COMPLETE CBC W/AUTO DIFF WBC: CPT

## 2023-12-02 PROCEDURE — 88342 IMHCHEM/IMCYTCHM 1ST ANTB: CPT | Performed by: PATHOLOGY

## 2023-12-02 PROCEDURE — 84443 ASSAY THYROID STIM HORMONE: CPT

## 2023-12-02 PROCEDURE — 88305 TISSUE EXAM BY PATHOLOGIST: CPT | Performed by: PATHOLOGY

## 2023-12-02 PROCEDURE — 83036 HEMOGLOBIN GLYCOSYLATED A1C: CPT

## 2023-12-02 PROCEDURE — 36415 COLL VENOUS BLD VENIPUNCTURE: CPT

## 2023-12-02 PROCEDURE — 19083 BX BREAST 1ST LESION US IMAG: CPT

## 2023-12-02 PROCEDURE — 88360 TUMOR IMMUNOHISTOCHEM/MANUAL: CPT | Performed by: PATHOLOGY

## 2023-12-02 PROCEDURE — 80061 LIPID PANEL: CPT

## 2023-12-02 RX ORDER — LIDOCAINE HYDROCHLORIDE 10 MG/ML
5 INJECTION, SOLUTION EPIDURAL; INFILTRATION; INTRACAUDAL; PERINEURAL ONCE
Status: COMPLETED | OUTPATIENT
Start: 2023-12-02 | End: 2023-12-02

## 2023-12-02 RX ADMIN — LIDOCAINE HYDROCHLORIDE 5 ML: 10 INJECTION, SOLUTION EPIDURAL; INFILTRATION; INTRACAUDAL; PERINEURAL at 11:20

## 2023-12-02 RX ADMIN — LIDOCAINE HYDROCHLORIDE 5 ML: 10 INJECTION, SOLUTION EPIDURAL; INFILTRATION; INTRACAUDAL; PERINEURAL at 11:26

## 2023-12-02 NOTE — PROGRESS NOTES
Procedure type:    __X ___ultrasound guided _____stereotactic    Breast:    ___X__Left _____Right    Location:12:00 6 cmfn    Needle:12G    # of passes:3    Clip:mini Dulce    Performed by:Dr. Sailaja Guzman    Pressure held for 5 minutes by:Ml Muir RN    Steri Strips:    ___X__yes _____no    Band aid:    __X___yes_____no    Tolerated procedure:    __X___yes _____no

## 2023-12-02 NOTE — PROGRESS NOTES
Procedure type:    __X___ultrasound guided _____stereotactic    Breast:    _____Left __X___Right    Location: 6503 1 cmfn    Needle:12G    # of passes:3    Clip:Tophat    Performed by:Dr. Jacky Crowe    Pressure held for 5 minutes by:Ml Muir RN    Steri Strips:    ___X__yes _____no    Jameel Pereira aid:    __X___yes_____no    Tolerated procedure:    __X___yes _____no

## 2023-12-02 NOTE — PROGRESS NOTES
Ice pack given:    __X___yes _____no    Discharge instructions reviewed and given to patient:    __X___yes _____no    Discharged via:    __X___amulatory    _____wheelchair    _____stretcher    Stable on discharge:    __X___yes ____no    Pt ok to receive results over the phone, ok to leave a message

## 2023-12-04 NOTE — PROGRESS NOTES
Post procedure call completed    Bleeding: _____yes __X___no, pt had scant amt of dried blood on bandaids when they were removed. Pain: _____yes ___X___no, pt denies    Redness/Swelling: ______yes ___X___no, pt denies, used ice pack as directed, does have some bruising to right breast biopsy site.     Band aid removed: ___X__yes _____no    Steri-Strips intact: ___X___yes _____no (discussed with patient to remove steri strips on 12/5 if they have not come off on their own)    Pt with no questions at this time, adv will call when results available, adv to call with any questions or concerns, has name/# for contact

## 2023-12-05 PROBLEM — C50.812 MALIGNANT NEOPLASM OF OVERLAPPING SITES OF LEFT FEMALE BREAST (HCC): Status: ACTIVE | Noted: 2023-12-05

## 2023-12-06 ENCOUNTER — TELEPHONE (OUTPATIENT)
Dept: OBGYN CLINIC | Facility: CLINIC | Age: 33
End: 2023-12-06

## 2023-12-06 ENCOUNTER — TELEPHONE (OUTPATIENT)
Dept: HEMATOLOGY ONCOLOGY | Facility: CLINIC | Age: 33
End: 2023-12-06

## 2023-12-06 ENCOUNTER — TELEPHONE (OUTPATIENT)
Dept: MAMMOGRAPHY | Facility: CLINIC | Age: 33
End: 2023-12-06

## 2023-12-06 ENCOUNTER — CONSULT (OUTPATIENT)
Dept: SURGICAL ONCOLOGY | Facility: CLINIC | Age: 33
End: 2023-12-06
Payer: COMMERCIAL

## 2023-12-06 ENCOUNTER — APPOINTMENT (OUTPATIENT)
Dept: LAB | Facility: HOSPITAL | Age: 33
End: 2023-12-06
Payer: COMMERCIAL

## 2023-12-06 VITALS
TEMPERATURE: 98.4 F | WEIGHT: 180 LBS | RESPIRATION RATE: 16 BRPM | SYSTOLIC BLOOD PRESSURE: 140 MMHG | OXYGEN SATURATION: 98 % | HEART RATE: 88 BPM | BODY MASS INDEX: 28.25 KG/M2 | HEIGHT: 67 IN | DIASTOLIC BLOOD PRESSURE: 78 MMHG

## 2023-12-06 DIAGNOSIS — C50.812 MALIGNANT NEOPLASM OF OVERLAPPING SITES OF LEFT FEMALE BREAST, UNSPECIFIED ESTROGEN RECEPTOR STATUS (HCC): ICD-10-CM

## 2023-12-06 DIAGNOSIS — N91.2 AMENORRHEA: Primary | ICD-10-CM

## 2023-12-06 DIAGNOSIS — C50.812 MALIGNANT NEOPLASM OF OVERLAPPING SITES OF LEFT FEMALE BREAST, UNSPECIFIED ESTROGEN RECEPTOR STATUS (HCC): Primary | ICD-10-CM

## 2023-12-06 LAB — MISCELLANEOUS LAB TEST RESULT: NORMAL

## 2023-12-06 PROCEDURE — 88342 IMHCHEM/IMCYTCHM 1ST ANTB: CPT | Performed by: PATHOLOGY

## 2023-12-06 PROCEDURE — 99205 OFFICE O/P NEW HI 60 MIN: CPT | Performed by: SURGERY

## 2023-12-06 PROCEDURE — 88341 IMHCHEM/IMCYTCHM EA ADD ANTB: CPT | Performed by: PATHOLOGY

## 2023-12-06 PROCEDURE — 88305 TISSUE EXAM BY PATHOLOGIST: CPT | Performed by: PATHOLOGY

## 2023-12-06 PROCEDURE — 36415 COLL VENOUS BLD VENIPUNCTURE: CPT

## 2023-12-06 NOTE — TELEPHONE ENCOUNTER
Chart reviewed. Just dx with Breast ca- + bx on 12/2. LMP 11/3- making her 4w5d with [facility] 8/9/24. Recommend not waiting until 1/11 for US. Let check [id number] x 2- to verify rising appropriately  Then Schedule US around 8 wks (vs 10wk)   Need to know ASAP if this is a viable pregnancy, as it may affect care for Breast Ca. I called and left msg for pt since no answer.  Please f/u with her

## 2023-12-06 NOTE — TELEPHONE ENCOUNTER
Patient is actively being treated for breast cancer and just found out she is pregnant. Her oncologist wanted her to contact us asap so our offices could coordinate care with each other. She is scheduled for an US 1/11.  LMP 11/3

## 2023-12-06 NOTE — PROGRESS NOTES
Surgical Oncology Consult Note       [employer]  [facility]  [address] [id number]    [facility] DIANA Welch  1990  [phone]      No chief complaint on file. Assessment/Plan    1. Malignant neoplasm of overlapping sites of left female breast, unspecified estrogen receptor status ([address])  -     CBC and differential; Future  -     Comprehensive metabolic panel; Future         Oncology History   Malignant neoplasm of overlapping sites of left female breast ([address])   2023 Initial Diagnosis    Malignant neoplasm of overlapping sites of left female breast ([address])     2023 Biopsy    Bilateral breast ultrasound guided biopsy  A. Breast, Left, US Guided Left Breast Biopsy 12:00 6cmfn 3 passes 12g marquee raissa:  - Invasive carcinoma. - Further characterization is pending stains. B. Breast, Right, US Guided Right Breast Biopsy 10:00 9cmfn 3 passes 12g marquee:  - Benign breast tissue.  - Deeper levels are pending. This is a very pleasant 68-year-old female who felt a mass in the left breast at 12:00 and this was followed by ultrasound and biopsy. Biopsy is consistent with invasive ductal carcinoma. She also had a right breast biopsy pathology is consistent with benign breast tissue. She denies a family family history of breast cancer. She is a non-smoker. She is also 5-week pregnant. Receptor status pending. She has had a spontaneous  in the past 7-week. She is here with her , mom and her mother-in-law to discuss further management. Review of Systems   Constitutional:  Negative for chills and fever. HENT:  Negative for ear pain and sore throat. Eyes:  Negative for pain and visual disturbance. Respiratory:  Negative for cough and shortness of breath. Cardiovascular:  Negative for chest pain and palpitations.    Gastrointestinal:  Negative for abdominal pain and vomiting. Genitourinary:  Negative for dysuria and hematuria. Musculoskeletal:  Negative for arthralgias and back pain. Skin:  Negative for color change and rash. Neurological:  Negative for seizures and syncope. All other systems reviewed and are negative. Past Medical History:      Patient Active Problem List   Diagnosis   • Mild persistent asthma without complication   • Axillary hidradenitis suppurativa   • Anxiety   • Chest wall pain   • Gastroesophageal reflux disease without esophagitis   • Depression with anxiety   • Chronic fatigue   • Myalgia   • Chronic left shoulder pain   • Cervical disc disorder at C5-C6 level with radiculopathy   • Atypical squamous cells of undetermined significance (ASCUS) on Papanicolaou smear of cervix   • Hypertrophic and atrophic condition of skin   • Migraine headache   • Shoulder impingement syndrome, left   • Vitamin D deficiency   • Close exposure to COVID-19 virus   • Thoracic outlet syndrome   • Palpitations   • Post-operative pain   • Transaminitis   • Right middle lobe pulmonary nodule   • Reversible airway obstruction   • SOB (shortness of breath)   • Musculoskeletal chest pain   • COVID-19 virus infection   • Unexplained weight gain   • Left ovarian cyst   • Dysphagia   • Malignant neoplasm of overlapping sites of left female breast [facility])        Past Medical History:   Diagnosis Date   • Anxiety    • Asthma    • GERD (gastroesophageal reflux disease)    • Heart murmur    • Kidney stone    • Miscarriage 3/15/2015    7 weeks along.    • Neck pain         Past Surgical History:   Procedure Laterality Date   • BREAST BIOPSY Right 12/02/2023   • BREAST BIOPSY Left 12/02/2023   • EGD     • IR UPPER EXTREMITY VENOGRAM- DIAGNOSTIC  05/28/2021   • NV EXCISION 1ST &/CERVICAL RIB Left 08/12/2021    Procedure: FIRST RIB RESECTION;  Surgeon: Haley Joaquin MD;  Location: [facility];  Service: Thoracic   • THORACOSCOPY VIDEO ASSISTED SURGERY (VATS) Left 08/12/2021 Procedure: THORACOSCOPY VIDEO ASSISTED SURGERY (VATS); Surgeon: Bee Cerna MD;  Location: [facility];  Service: Thoracic   • US GUIDANCE BREAST BIOPSY LEFT EACH ADDITIONAL Left 12/2/2023   • US GUIDED BREAST BIOPSY RIGHT COMPLETE Right 12/2/2023   • WISDOM TOOTH EXTRACTION          Family History   Problem Relation Age of Onset   • No Known Problems Mother    • No Known Problems Father    • Bone cancer Maternal Grandmother    • Breast cancer Neg Hx    • Colon cancer Neg Hx    • Ovarian cancer Neg Hx    • Uterine cancer Neg Hx    • Cervical cancer Neg Hx         Social History     Socioeconomic History   • Marital status: /Civil Union     Spouse name: Not on file   • Number of children: Not on file   • Years of education: Not on file   • Highest education level: Not on file   Occupational History   • Not on file   Tobacco Use   • Smoking status: Never   • Smokeless tobacco: Never   Vaping Use   • Vaping Use: Never used   Substance and Sexual Activity   • Alcohol use: Not Currently     Comment: social   • Drug use: Never   • Sexual activity: Yes     Partners: Male     Birth control/protection: None   Other Topics Concern   • Not on file   Social History Narrative   • Not on file     Social Determinants of Health     Financial Resource Strain: Not on file   Food Insecurity: Not on file   Transportation Needs: No Transportation Needs (8/19/2021)    PRAPARE - Transportation    • Lack of Transportation (Medical): No    • Lack of Transportation (Non-Medical):  No   Physical Activity: Not on file   Stress: Not on file   Social Connections: Not on file   Intimate Partner Violence: Not on file   Housing Stability: Not on file        Current Outpatient Medications:   •  albuterol (PROVENTIL HFA,VENTOLIN HFA) 90 mcg/act inhaler, TAKE 2 PUFFS BY MOUTH EVERY 6 HOURS AS NEEDED FOR WHEEZE OR FOR SHORTNESS OF BREATH, Disp: 18 g, Rfl: 3  •  cetirizine (ZyrTEC) 10 mg tablet, TAKE 1 TABLET BY MOUTH EVERY DAY, Disp: 90 tablet, Rfl: 0  •  citalopram (CeleXA) 10 mg tablet, TAKE 1 TABLET BY MOUTH EVERY DAY, Disp: 90 tablet, Rfl: 0  •  clindamycin-benzoyl peroxide (BENZACLIN) gel, Apply topically every morning, Disp: 50 g, Rfl: 0  •  clonazePAM (KlonoPIN) 0.5 mg tablet, Take 1 tablet by mouth twice daily as needed for anxiety, Disp: 60 tablet, Rfl: 0  •  Diclofenac Sodium (VOLTAREN) 1 %, Apply 2 g topically 4 (four) times a day, Disp: 150 g, Rfl: 2  •  omeprazole (PriLOSEC) 40 MG capsule, TAKE 1 CAPSULE (40 MG TOTAL) BY MOUTH DAILY. , Disp: 90 capsule, Rfl: 0     Allergies   Allergen Reactions   • Nsaids GI Intolerance   • Formic Acid Swelling and Rash   • Latex Rash       Physical Exam:     Vitals:    12/06/23 1330   BP: 140/78   Pulse: 88   Resp: 16   Temp: 98.4 °F (36.9 °C)   SpO2: 98%     Physical Exam  Constitutional:       Appearance: Normal appearance. HENT:      Head: Normocephalic and atraumatic. Nose: Nose normal.      Mouth/Throat:      Mouth: Mucous membranes are moist.   Eyes:      Pupils: Pupils are equal, round, and reactive to light. Cardiovascular:      Rate and Rhythm: Normal rate. Pulses: Normal pulses. Heart sounds: Normal heart sounds. Pulmonary:      Effort: Pulmonary effort is normal.      Breath sounds: Normal breath sounds. Chest:          Comments: Left breast palpable mass with hematoma and bruise from recent biopsy at 12:00 biopsy-proven invasive ductal carcinoma. No palpable additional mass or masses appreciated. Left axillary and supraclavicular examination no palpable adenopathy. Right breast no palpable mass masses nipple discharge nipple retraction or skin changes other than bruise from recent biopsy. Patient was examined seated as well as supine position  Abdominal:      General: Bowel sounds are normal.      Palpations: Abdomen is soft. Musculoskeletal:         General: Normal range of motion. Cervical back: Normal range of motion and neck supple.    Skin: General: Skin is warm. Neurological:      General: No focal deficit present. Mental Status: She is alert and oriented to person, place, and time. Psychiatric:         Mood and Affect: Mood normal.         Behavior: Behavior normal.         Thought Content: Thought content normal.         Judgment: Judgment normal.     Results:   A. 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 * [place] grade 2 of 3 (total score: 6 of 9)    -- tubule formation < 10%, score 3    -- nuclear grade 2 of 3, score 2    -- mitoses < 3/mm2, (</= 7 mitoses/10HPF), score 1.   * Confirmed by tumor cell immunophenotype:    -- positive: nuclear stain for GATA3 and membranous stain for E-cadherin and p120.    -- negative:  p63, [facility]. -- osteoclast like giant cells highlighted with CD68   * Invasive carcinoma involves 2 of 2 submitted core biopsies, max. dimension = 18 millimeters. * Estrogen, progesterone & HER2 receptor studies pending, to be described in a separate receptor report. * Ductal carcinoma in situ (DCIS): Present, minor component (< 25% of total tumor). -- solid and cribriform architectural pattern, nuclear grade 2 of 3, without necrosis. * Lymph-vascular invasion: Not identified. * Microcalcifications: Rare noted. Note:    Kevin Wright from Dr. Babcock [facility] office is notified of the diagnosis in [facility] via [address] [place] [address] on 12/06/2023  at 9.55 am.  This case was reviewed at the intradepartmental  conference. B. Breast, Right, US Guided Right Breast Biopsy 10:00 9cmfn 3 passes 12g marqueelisabeth:  - Benign, fatty breast tissue. Discussion/Summary: This 64-year-old female who is 5-week pregnant with recent diagnosis of left breast invasive ductal carcinoma preliminary pending final pathology results as well as the receptor status.   We did discuss in detail the nature of breast cancer and management in first trimester pregnancy. We will obtain genetic testing. We will also present her in our multidisciplinary tumor board to discuss further management. She and her family had several questions I answered all of them. We did discuss in detail the cancer disease status cancer treatment plans and cancer treatment goals with patient's and family in detail. I did spend more than 80 minutes reviewing discussing in this young 49-year-old female who  being waiting for pregnancy now she is pregnant with 5 weeks/first trimester newly diagnosed left breast cancer. We did discuss in detail breast cancer initiation promotion progression and effects of pregnancy on breast cancer management pending receptor status. I answered all of her questions into detai andl seems to be they are very satisfied    Advance Care Planning/Advance Directives: David Yanez MD discussed the disease status, and treatment plans with [facility] DIANA Vee today 12/06/23 and will follow-up with the patient.

## 2023-12-06 NOTE — TELEPHONE ENCOUNTER
Patient Call    Who are you speaking with? Patient    If it is not the patient, are they listed on an active communication consent form? Yes   What is the reason for this call? Patient calling to make sure her results were in before her appt. Sent message to [facility] and she responded yes   Does this require a call back? No   If a call back is required, please list best call back number N/A   If a call back is required, advise that a message will be forwarded to their care team and someone will return their call as soon as possible. Did you relay this information to the patient?  Yes

## 2023-12-07 ENCOUNTER — PATIENT OUTREACH (OUTPATIENT)
Dept: HEMATOLOGY ONCOLOGY | Facility: CLINIC | Age: 33
End: 2023-12-07

## 2023-12-07 ENCOUNTER — DOCUMENTATION (OUTPATIENT)
Dept: HEMATOLOGY ONCOLOGY | Facility: CLINIC | Age: 33
End: 2023-12-07

## 2023-12-07 DIAGNOSIS — C50.812 MALIGNANT NEOPLASM OF OVERLAPPING SITES OF LEFT FEMALE BREAST, UNSPECIFIED ESTROGEN RECEPTOR STATUS (HCC): Primary | ICD-10-CM

## 2023-12-07 NOTE — TELEPHONE ENCOUNTER
Receptor results pending  indirect vs direct. Oncologist said this would determine pregnancy outcome for patient. One of them is hormones from pregnancy can make cancer grow faster the other one pregnancy should not effect cancer growth. Patient doesn't want to terminate pregnancy. she is awaiting these results with oncology. Patient aware of labs ordered and when to go for them. Will send task to  to reschedule US appointment for earlier.

## 2023-12-07 NOTE — PROGRESS NOTES
In-basket message received from Radha Miller RN to add patient to the breast [facility] on 12/11/2023. Chart reviewed and prep completed.

## 2023-12-07 NOTE — TELEPHONE ENCOUNTER
Left message to call back - would like to review recommendations from [address] [facility] [address] with her situation.  12/7

## 2023-12-07 NOTE — PROGRESS NOTES
Breast Oncology Nurse Navigator    Called patient for initial outreach from nurse navigator. Left voicemail message with contact information. Requested a call back. Referral placed for oncology social worker. Patient returned my phone call at [phone]. Introduced myself and my role. Denies questions at this time related to her recently diagnosed breast cancer. She is currently five weeks pregnant. Her ob/gyn team is aware of breast cancer diagnosis. Her main ob/gyn is out on a leave, so she has seen different providers. Attempting to have one present for our breast multidisciplinary case conference this coming Monday, where patient will be presented. Confirmed upcoming appointment with Dr Tamera Benavidez on 12/13/23. She met with Dr Tamera Benavidez for a consultation yesterday. Had blood work drawn for genetic testing yesterday, too. Spoke with someone in the ob/gyn office today and needs to have blood work drawn for that office. Knows that Dr Tamera Benavidez is awaiting receptors and genetic test results before coming up with a treatment plan. Discussed possible other members of her treatment team, including medical oncology and radiation oncology. Patient lives with her supportive . Has a large support system, including her spouse, mother, mother in law, other family members and friends. Denies issues with transportation. Referral already placed to OSW. Cancer family history includes: Maternal grandmother had liver cancer, maternal great aunt had unknown cancer. States she does not know much about her father's side of the family. Patient awaiting results of genetic test performed yesterday. Patient was asking about sugar intake and cancer, as she was told by a friend to stop eating sugar. Spoke about eating everything in moderation and not having too much sugar in the diet. Patient states she is not a big meat eater, but is currently not interested in eating any meat.   Spoke about how protein is difficult to digest versus the pasta she prefers to eat right now. Patient interested in speaking with a dietician to learn more. MST form completed and sent to oncology dietician. Patient also states she has GERD and takes Prilosec for same. She also takes Zyrtec and Celexa. Asking if those medications are safe during pregnancy. Advised her to call the ob/gyn office for guidance. Patient has ongoing pain issues in her left arm and chest.  Pain is worsened by extreme cold or humidity. She had a work injury over two years ago and had to have a rib removed. She deals with constant pain and CRPS. Patient states she will be meeting with human resources for her employer next week. Asking about paperwork and questions to ask. Suggested she request FMLA, if it is offered. Advised her that once a treatment plan is in the place, the treating provider's office could complete work related paperwork on her behalf. Discussed the [address] and some of the programs and services offered, including in person and virtual options, as well as the satellite location in [facility]. Patient has my contact information and knows she can reach out with questions. General assessment completed.   Spoke for 30 minutes

## 2023-12-08 ENCOUNTER — TELEPHONE (OUTPATIENT)
Dept: NUTRITION | Facility: CLINIC | Age: 33
End: 2023-12-08

## 2023-12-08 ENCOUNTER — PATIENT OUTREACH (OUTPATIENT)
Dept: CASE MANAGEMENT | Facility: HOSPITAL | Age: 33
End: 2023-12-08

## 2023-12-08 NOTE — PROGRESS NOTES
OncSW referral received, chart review completed. Pt has a new dx of breast cancer and has consulted with Dr. Pablo Prasad, will follow up again next week. Pt is noted to be early in her first trimester of pregnancy. MSW will outreach when a tx plan is in place.

## 2023-12-08 NOTE — TELEPHONE ENCOUNTER
Received call back from [facility]. Initial oncology nutrition consultation set up for 12/13/23 at [facility].

## 2023-12-08 NOTE — TELEPHONE ENCOUNTER
Received notification by [facility] on 12/7/23 that pt has triggered for oncology nutrition care (reason for referral: Patient is pregnant and newly diagnosed with breast cancer. Had questions regarding nutrition and sugar intake, etc ). Contacted Jillian today to establish care. No answer. Left voice message with the reason for today's call and this RD’s contact information asking that [facility] call back as able/desired.

## 2023-12-09 ENCOUNTER — APPOINTMENT (OUTPATIENT)
Dept: LAB | Facility: CLINIC | Age: 33
End: 2023-12-09
Payer: COMMERCIAL

## 2023-12-09 DIAGNOSIS — N91.2 AMENORRHEA: ICD-10-CM

## 2023-12-09 DIAGNOSIS — C50.812 MALIGNANT NEOPLASM OF OVERLAPPING SITES OF LEFT FEMALE BREAST, UNSPECIFIED ESTROGEN RECEPTOR STATUS (HCC): ICD-10-CM

## 2023-12-09 LAB
ABO GROUP BLD: NORMAL
ALBUMIN SERPL BCP-MCNC: 3.9 G/DL (ref 3.5–5)
ALP SERPL-CCNC: 67 U/L (ref 34–104)
ALT SERPL W P-5'-P-CCNC: 25 U/L (ref 7–52)
ANION GAP SERPL CALCULATED.3IONS-SCNC: 9 MMOL/L
AST SERPL W P-5'-P-CCNC: 20 U/L (ref 13–39)
B-HCG SERPL-ACNC: ABNORMAL MIU/ML (ref 0–5)
BASOPHILS # BLD AUTO: 0.02 THOUSANDS/ÂΜL (ref 0–0.1)
BASOPHILS NFR BLD AUTO: 0 % (ref 0–1)
BILIRUB SERPL-MCNC: 0.32 MG/DL (ref 0.2–1)
BLD GP AB SCN SERPL QL: NEGATIVE
BUN SERPL-MCNC: 8 MG/DL (ref 5–25)
CALCIUM SERPL-MCNC: 8.5 MG/DL (ref 8.4–10.2)
CHLORIDE SERPL-SCNC: 107 MMOL/L (ref 96–108)
CO2 SERPL-SCNC: 24 MMOL/L (ref 21–32)
CREAT SERPL-MCNC: 0.64 MG/DL (ref 0.6–1.3)
EOSINOPHIL # BLD AUTO: 0.21 THOUSAND/ÂΜL (ref 0–0.61)
EOSINOPHIL NFR BLD AUTO: 4 % (ref 0–6)
ERYTHROCYTE [DISTWIDTH] IN BLOOD BY AUTOMATED COUNT: 13.8 % (ref 11.6–15.1)
GFR SERPL CREATININE-BSD FRML MDRD: 115 ML/MIN/1.73SQ M
GLUCOSE P FAST SERPL-MCNC: 79 MG/DL (ref 65–99)
HCT VFR BLD AUTO: 35.4 % (ref 34.8–46.1)
HGB BLD-MCNC: 11.3 G/DL (ref 11.5–15.4)
IMM GRANULOCYTES # BLD AUTO: 0.01 THOUSAND/UL (ref 0–0.2)
IMM GRANULOCYTES NFR BLD AUTO: 0 % (ref 0–2)
LYMPHOCYTES # BLD AUTO: 1.1 THOUSANDS/ÂΜL (ref 0.6–4.47)
LYMPHOCYTES NFR BLD AUTO: 20 % (ref 14–44)
MCH RBC QN AUTO: 27.2 PG (ref 26.8–34.3)
MCHC RBC AUTO-ENTMCNC: 31.9 G/DL (ref 31.4–37.4)
MCV RBC AUTO: 85 FL (ref 82–98)
MONOCYTES # BLD AUTO: 0.56 THOUSAND/ÂΜL (ref 0.17–1.22)
MONOCYTES NFR BLD AUTO: 10 % (ref 4–12)
NEUTROPHILS # BLD AUTO: 3.67 THOUSANDS/ÂΜL (ref 1.85–7.62)
NEUTS SEG NFR BLD AUTO: 66 % (ref 43–75)
NRBC BLD AUTO-RTO: 0 /100 WBCS
PLATELET # BLD AUTO: 197 THOUSANDS/UL (ref 149–390)
PMV BLD AUTO: 12 FL (ref 8.9–12.7)
POTASSIUM SERPL-SCNC: 3.5 MMOL/L (ref 3.5–5.3)
PROT SERPL-MCNC: 6.6 G/DL (ref 6.4–8.4)
RBC # BLD AUTO: 4.15 MILLION/UL (ref 3.81–5.12)
RH BLD: POSITIVE
SODIUM SERPL-SCNC: 140 MMOL/L (ref 135–147)
SPECIMEN EXPIRATION DATE: NORMAL
WBC # BLD AUTO: 5.57 THOUSAND/UL (ref 4.31–10.16)

## 2023-12-09 PROCEDURE — 85025 COMPLETE CBC W/AUTO DIFF WBC: CPT

## 2023-12-09 PROCEDURE — 86900 BLOOD TYPING SEROLOGIC ABO: CPT

## 2023-12-09 PROCEDURE — 86901 BLOOD TYPING SEROLOGIC RH(D): CPT

## 2023-12-09 PROCEDURE — 84702 CHORIONIC GONADOTROPIN TEST: CPT

## 2023-12-09 PROCEDURE — 80053 COMPREHEN METABOLIC PANEL: CPT

## 2023-12-09 PROCEDURE — 86850 RBC ANTIBODY SCREEN: CPT

## 2023-12-09 PROCEDURE — 36415 COLL VENOUS BLD VENIPUNCTURE: CPT

## 2023-12-11 ENCOUNTER — TELEPHONE (OUTPATIENT)
Age: 33
End: 2023-12-11

## 2023-12-11 ENCOUNTER — TELEPHONE (OUTPATIENT)
Dept: OBGYN CLINIC | Facility: CLINIC | Age: 33
End: 2023-12-11

## 2023-12-11 ENCOUNTER — LAB (OUTPATIENT)
Dept: LAB | Facility: CLINIC | Age: 33
End: 2023-12-11
Payer: COMMERCIAL

## 2023-12-11 ENCOUNTER — DOCUMENTATION (OUTPATIENT)
Dept: HEMATOLOGY ONCOLOGY | Facility: CLINIC | Age: 33
End: 2023-12-11

## 2023-12-11 DIAGNOSIS — N91.2 AMENORRHEA: ICD-10-CM

## 2023-12-11 DIAGNOSIS — Z17.0 MALIGNANT NEOPLASM OF OVERLAPPING SITES OF LEFT BREAST IN FEMALE, ESTROGEN RECEPTOR POSITIVE: Primary | ICD-10-CM

## 2023-12-11 DIAGNOSIS — N91.2 AMENORRHEA: Primary | ICD-10-CM

## 2023-12-11 DIAGNOSIS — C50.812 MALIGNANT NEOPLASM OF OVERLAPPING SITES OF LEFT BREAST IN FEMALE, ESTROGEN RECEPTOR POSITIVE: Primary | ICD-10-CM

## 2023-12-11 LAB — B-HCG SERPL-ACNC: ABNORMAL MIU/ML (ref 0–5)

## 2023-12-11 PROCEDURE — 36415 COLL VENOUS BLD VENIPUNCTURE: CPT

## 2023-12-11 PROCEDURE — 84702 CHORIONIC GONADOTROPIN TEST: CPT

## 2023-12-11 NOTE — PROGRESS NOTES
BREAST CANCER MULTIDISCIPLINARY CASE REVIEW    DATE: 12/11/23      PRESENTING DOCTOR:  Dr. Madison Nelson: Dr. 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 female who was presented at the Breast Multidisciplinary Case Conference today to discuss treatment planning for patient who newly pregnant. Patient palpated mass in left breast which led to the diagnosis. Patient found out she was pregnant after diagnostic testing for breast cancer was performed. GENETICS: Collected 12/6/23. Results are pending    IMAGING REVIEWED:   11/24/23-mammogram diagnostic bilateral w 3d & cad  11/24/23-US breast bilateral limited diagnostic      PATHOLOGY REVIEWED:  12/2/23-tissue exam-left and right breast ultrasound guided biopsies      PHYSICIAN RECOMMENDED PLAN:  Recommend referral to maternal fetal medicine and follow recommendations made  Recommend awaiting genetic test results before making treatment plan  Recommend awaiting results from upcoming ultrasound to ensure pregnancy is viable  Recommend surgery as soon as it is considered safe to perform  Consider obtaining genomic test on surgical pathology specimen  Recommend not using the blue dye for sentinel lymph node biopsy during surgery while patient is pregnant      FOLLOW UP APPOINTMENTS:  12/13/23-Dr CAT [facility] surgical oncology follow-up  12/15/23-Dr Jj Grande medical oncology consultation  1/11/24-JASON GARCIA OBGYN follow-up      Team agreed to plan. The final treatment plan will be left to the discretion of the patient and the treating physician.      DISCLAIMERS:  TO THE TREATING PHYSICIAN:  This conference is a meeting of clinicians from various specialty areas who evaluate and discuss patients for whom a multidisciplinary treatment approach is being considered. Please note that the above opinion was a consensus of the conference attendees and is intended only to assist in quality care of the discussed patient. The responsibility for follow up on the input given during the conference, along with any final decisions regarding plan of care, is that of the patient and the patient's provider. Accordingly, appointments have only been recommended based on this information and have NOT been scheduled unless otherwise noted. TO THE PATIENT:  This summary is a brief record of major aspects of your cancer treatment. You may choose to share a copy with any of your doctors or nurses. However, this is not a detailed or comprehensive record of your care.       NCCN guidelines were readily available for review at this discussion

## 2023-12-11 NOTE — TELEPHONE ENCOUNTER
Spoke with patient to notify her of her appt on 12/15 at 1pm. I informed her that I will send her a [employer] message with further details. Patient verbalized understanding.

## 2023-12-11 NOTE — TELEPHONE ENCOUNTER
----- Message from Jemma Clark, [address] [facility] sent at 12/11/2023  1:21 PM EST -----  Order placed for US  Should be done around 7-8 wks.    LMP 11/3 - today is 5w3d

## 2023-12-12 PROBLEM — Z17.0 MALIGNANT NEOPLASM OF OVERLAPPING SITES OF LEFT BREAST IN FEMALE, ESTROGEN RECEPTOR POSITIVE: Status: ACTIVE | Noted: 2023-12-05

## 2023-12-13 ENCOUNTER — TELEPHONE (OUTPATIENT)
Dept: PERINATAL CARE | Facility: CLINIC | Age: 33
End: 2023-12-13

## 2023-12-13 ENCOUNTER — NUTRITION (OUTPATIENT)
Dept: NUTRITION | Facility: CLINIC | Age: 33
End: 2023-12-13

## 2023-12-13 ENCOUNTER — OFFICE VISIT (OUTPATIENT)
Dept: SURGICAL ONCOLOGY | Facility: CLINIC | Age: 33
End: 2023-12-13
Payer: COMMERCIAL

## 2023-12-13 VITALS
OXYGEN SATURATION: 98 % | SYSTOLIC BLOOD PRESSURE: 124 MMHG | WEIGHT: 177 LBS | HEIGHT: 67 IN | TEMPERATURE: 97.6 F | DIASTOLIC BLOOD PRESSURE: 76 MMHG | RESPIRATION RATE: 15 BRPM | HEART RATE: 76 BPM | BODY MASS INDEX: 27.78 KG/M2

## 2023-12-13 DIAGNOSIS — Z17.0 MALIGNANT NEOPLASM OF OVERLAPPING SITES OF LEFT BREAST IN FEMALE, ESTROGEN RECEPTOR POSITIVE: Primary | ICD-10-CM

## 2023-12-13 DIAGNOSIS — C50.812 MALIGNANT NEOPLASM OF OVERLAPPING SITES OF LEFT BREAST IN FEMALE, ESTROGEN RECEPTOR POSITIVE: Primary | ICD-10-CM

## 2023-12-13 DIAGNOSIS — Z71.3 NUTRITIONAL COUNSELING: Primary | ICD-10-CM

## 2023-12-13 PROCEDURE — 99204 OFFICE O/P NEW MOD 45 MIN: CPT | Performed by: SURGERY

## 2023-12-13 NOTE — TELEPHONE ENCOUNTER
Received call from Guillaume BRIGGS (surgical oncology) per Dr. Guevara Hernandez patient needs [facility] ultrasound with consult as soon as possible to determine plan of care. Reviewed with Dr. Almas Garcia and 1 hour [facility] appointment made available. Spoke with [facility] and Chastity together on the call, patient confirmed appointment at [facility] Thursday 12/14 arrival 0745/ ultrasound 0800. [facility] address provided.   Patient aware she will need to complete new patient paper work upon arrival.

## 2023-12-13 NOTE — PROGRESS NOTES
Outpatient Oncology Nutrition Consultation   Type of Consult: Initial Consult  Care Location: Office Visit    Reason for referral: Notification from RN [facility]. on 12/7/23 that pt has triggered for oncology nutrition care (reason for referral: Patient is pregnant and newly diagnosed with breast cancer. Had questions regarding nutrition and sugar intake, etc). Nutrition Assessment:   Oncology Diagnosis & Treatments:   Diagnosed with left beast cancer, ER positive, 12/2/23. HemOnc consult 12/15/23. Oncology History   Malignant neoplasm of overlapping sites of left breast in female, estrogen receptor positive    12/2/2023 Initial Diagnosis    Malignant neoplasm of overlapping sites of left female breast ([address])     12/2/2023 Biopsy    Bilateral breast ultrasound guided biopsy  A. Breast, Left, US Guided Left Breast Biopsy 12:00 6cmfn: Invasive breast carcinoma of no special type (ductal) with osteoclast-like stromal giant cells  Grade 2  ER 85  LA 95  HER2 1+     B. Breast, Right, US Guided Right Breast Biopsy 10:00 9cmfn:  Benign breast tissue. In review of the patient’s recent imaging, the left malignancy appears unifocal.  The biopsy-proven carcinoma measured 2.1 cm on ultrasound. Ultrasound of the left axilla was performed on 11/24/2023 and showed no suspicious adenopathy. Recent imaging of the contralateral right breast dated 12/2/2023 and 11/24/2023 was reviewed and shows no suspicious findings. The pathology results for the ultrasound-guided core needle biopsy (right breast 10:00, 9 cm from the nipple) are benign and concordant with imaging.      12/2/2023 Genetic Testing    [facility]  pending     12/2/2023 -  Cancer Staged    Staging form: Breast, AJCC 8th Edition  - Clinical stage from 12/2/2023: Stage IB (cT2, cN0, cM0, G2, ER+, LA+, HER2-) - Signed by Warren Carrasquillo MD on 12/13/2023  Stage prefix: Initial diagnosis  Histologic grading system: 3 grade system         Past Medical & Surgical Hx:   Patient Active Problem List   Diagnosis    Mild persistent asthma without complication    Axillary hidradenitis suppurativa    Anxiety    Chest wall pain    Gastroesophageal reflux disease without esophagitis    Depression with anxiety    Chronic fatigue    Myalgia    Chronic left shoulder pain    Cervical disc disorder at C5-C6 level with radiculopathy    Atypical squamous cells of undetermined significance (ASCUS) on Papanicolaou smear of cervix    Hypertrophic and atrophic condition of skin    Migraine headache    Shoulder impingement syndrome, left    Vitamin D deficiency    Close exposure to COVID-19 virus    Thoracic outlet syndrome    Palpitations    Post-operative pain    Transaminitis    Right middle lobe pulmonary nodule    Reversible airway obstruction    SOB (shortness of breath)    Musculoskeletal chest pain    COVID-19 virus infection    Unexplained weight gain    Left ovarian cyst    Dysphagia    Malignant neoplasm of overlapping sites of left breast in female, estrogen receptor positive      Past Medical History:   Diagnosis Date    Anxiety     Asthma     GERD (gastroesophageal reflux disease)     Heart murmur     Kidney stone     Miscarriage 3/15/2015    7 weeks along. Neck pain      Past Surgical History:   Procedure Laterality Date    BREAST BIOPSY Right 12/02/2023    BREAST BIOPSY Left 12/02/2023    EGD      IR UPPER EXTREMITY VENOGRAM- DIAGNOSTIC  05/28/2021    MT EXCISION 1ST &/CERVICAL RIB Left 08/12/2021    Procedure: FIRST RIB RESECTION;  Surgeon: Marco Antonio Peck MD;  Location: [facility];  Service: Thoracic    THORACOSCOPY VIDEO ASSISTED SURGERY (VATS) Left 08/12/2021    Procedure: THORACOSCOPY VIDEO ASSISTED SURGERY (VATS);   Surgeon: Marco Antonio Peck MD;  Location: [facility];  Service: Thoracic    US GUIDANCE BREAST BIOPSY LEFT EACH ADDITIONAL Left 12/2/2023    US GUIDED BREAST BIOPSY RIGHT COMPLETE Right 12/2/2023    WISDOM TOOTH EXTRACTION         Review of Medications:   Vitamins, Supplements and Herbals: Med List Reviewed & pt is only taking: Prenatal MVI    Current Outpatient Medications:     albuterol (PROVENTIL HFA,VENTOLIN HFA) 90 mcg/act inhaler, TAKE 2 PUFFS BY MOUTH EVERY 6 HOURS AS NEEDED FOR WHEEZE OR FOR SHORTNESS OF BREATH, Disp: 18 g, Rfl: 3    cetirizine (ZyrTEC) 10 mg tablet, TAKE 1 TABLET BY MOUTH EVERY DAY, Disp: 90 tablet, Rfl: 0    citalopram (CeleXA) 10 mg tablet, TAKE 1 TABLET BY MOUTH EVERY DAY (Patient not taking: Reported on 12/14/2023), Disp: 90 tablet, Rfl: 0    clindamycin-benzoyl peroxide (BENZACLIN) gel, Apply topically every morning (Patient not taking: Reported on 12/14/2023), Disp: 50 g, Rfl: 0    clonazePAM (KlonoPIN) 0.5 mg tablet, Take 1 tablet by mouth twice daily as needed for anxiety (Patient not taking: Reported on 12/14/2023), Disp: 60 tablet, Rfl: 0    Diclofenac Sodium (VOLTAREN) 1 %, Apply 2 g topically 4 (four) times a day (Patient not taking: Reported on 12/14/2023), Disp: 150 g, Rfl: 2    omeprazole (PriLOSEC) 40 MG capsule, TAKE 1 CAPSULE (40 MG TOTAL) BY MOUTH DAILY. , Disp: 90 capsule, Rfl: 0    Most Recent Lab Results:   Lab Results   Component Value Date    WBC 5.57 12/09/2023    NEUTROABS 3.67 12/09/2023    IRON 83 07/29/2020    CHOLESTEROL 127 12/02/2023    TRIG 56 12/02/2023    HDL 43 (L) 12/02/2023    LDLCALC 73 12/02/2023    ALT 25 12/09/2023    AST 20 12/09/2023    ALB 3.9 12/09/2023    SODIUM 140 12/09/2023    SODIUM 139 12/02/2023    K 3.5 12/09/2023    K 3.7 12/02/2023     12/09/2023    BUN 8 12/09/2023    BUN 8 12/02/2023    CREATININE 0.64 12/09/2023    CREATININE 0.61 12/02/2023    EGFR 115 12/09/2023    GLUF 79 12/09/2023    GLUF 100 (H) 12/02/2023    GLUC 88 10/10/2021    HGBA1C 5.4 12/02/2023    HGBA1C 5.1 02/17/2023    CALCIUM 8.5 12/09/2023    MG 2.3 10/19/2019       Anthropometric Measurements:   Height: 67"  Ht Readings from Last 1 Encounters:   12/14/23 5' 7" (1.702 m)     Wt Readings from Last 25 Encounters:   12/14/23 81.3 kg (179 lb 3.2 oz)   12/13/23 80.3 kg (177 lb)   12/06/23 81.6 kg (180 lb)   11/24/23 82.1 kg (181 lb)   10/19/23 82.1 kg (181 lb)   10/13/23 85.3 kg (188 lb)   09/26/23 85.3 kg (188 lb)   08/22/23 80.7 kg (178 lb)   06/08/23 78.9 kg (174 lb)   04/04/23 77.3 kg (170 lb 6.4 oz)   02/21/23 78.7 kg (173 lb 6.4 oz)   02/16/23 78.8 kg (173 lb 12.8 oz)   01/31/23 74.8 kg (165 lb)   11/09/22 77.2 kg (170 lb 3.2 oz)   10/31/22 77.6 kg (171 lb)   08/15/22 74.5 kg (164 lb 3.2 oz)   06/28/22 75.3 kg (166 lb)   06/21/22 76.7 kg (169 lb 1.5 oz)   05/25/22 73.9 kg (163 lb)   04/14/22 73.1 kg (161 lb 3.2 oz)   04/04/22 68 kg (150 lb)   02/28/22 68 kg (150 lb)   02/08/22 72.1 kg (159 lb)   01/22/22 72.2 kg (159 lb 3.2 oz)   11/17/21 69.1 kg (152 lb 6.4 oz)     Weight History:   Usual Weight: 130-145#  Varian: n/a  Home Scale: none    Oncology Nutrition-Anthropometrics      Flowsheet Row Nutrition from 12/13/2023 in [id number] [facility]   Patient age (years): 26 years   Patient (female) height (in): 79 in   Current Weight to be used for anthropometric calculations (lbs) 177 lbs   Current Weight to be used for anthropometric calculations (kg) 80.5 kg   BMI: 27.7   IBW female: 135 lbs   IBW (kg) female: 61.4 kg   IBW % (female) 131.1 %   Adjusted BW (female): 145.5 lbs   Adjusted BW kg (female): 66.1 kg   % weight change after 1 week: -1.7 %   Weight change after 1 week (lbs) -3 lbs   % weight change after 1 month: -2.2 %   Weight change after 1 month (lbs) -4 lbs   % weight change after 3 months: -5.9 %   Weight change after 3 months (lbs) -11 lbs   % weight change after 6 months: 1.7 %   Weight change after 6 months (lbs) 3 lbs   % weight change after 1 year: 11.2 %   Weight change after 1 year (lbs) 17.8 lbs            Nutrition-Focused Physical Findings: none observed    Food/Nutrition-Related History & Client/Social History:    Current Nutrition Impact Symptoms:  [x] Nausea [x] Reduced Appetite  [] Acid Reflux    [] Vomiting  [] Unintended Wt Loss  [] Malabsorption    [x] Diarrhea  [x] Unintended Wt Gain -over the past two years  [] Dumping Syndrome    [] Constipation  [] Thick Mucous/Secretions  [] Abdominal Pain    [] Dysgeusia (Altered Taste)  [] Xerostomia (Dry Mouth)  [] Gas    [] Dysosmia (Altered Smell)  [] Thrush  [] Difficulty Chewing    [] Oral Mucositis (Sore Mouth)  [] Fatigue  [] Hyperglycemia   Lab Results   Component Value Date    HGBA1C 5.4 2023      [] Odynophagia  [] Esophagitis  [] Other:    [] Dysphagia  [] Early Satiety  [] No Problems Eating      Food Allergies & Intolerances: yes: lactose intolerant     Current Diet: Lactose-Free and No red meat   Current Nutrition Intake: Less than usual   Appetite: Fair   Nutrition Route: PO  Oral Care: brushes QD  Activity level: Dizzy often in the morning. Degenerative disc disease limits physical activity. 25 Hr Diet Recall:   Breakfast: eggs  Lunch: ground turkey with pasta   Dinner: baked potato     Beverages: water with lemon (32oz x3-4),  body armour electrolyte drink (16oz x1)   Supplements:   None      Oncology Nutrition-Estimated Needs      Flowsheet Row Nutrition from 2023 in [address] [facility],[address]   Weight type used Actual weight   Weight in kilograms (kg) used for estimated needs 80.5 kg   Energy needs formula: Other (single)  [Estimated Energy Requirements (EER)]   Single value (kcal/kg): 5446   Energy needs based on single value: 926944 kcal   Protein needs formula: 1-1.2 g/kg   Protein needs based on 1 g/kg 80 g   Protein needs based on 1.2 g/k g   Fluid needs formula: 35-40 mL/kg   Fluid needs based on 35 mL/kg 2818 mL   Fluid needs in ounces 95 oz   Fluid needs based on 40 mL/kg 3220 mL   Fluid needs in ounces 109 oz         **Estimated nutrition needs are based on recommendations from the Nutrition Care Manual for 1st trimester of pregnancy.  Will adjust estimated nutrition needs to consider the additional increase in demand for nutrients when cancer treatment begins. Discussion & Intervention:   Lucero Horowitz was evaluated today for an initial RD consultation regarding poor po intake and pt request for diet guidance throughout treatment . Jillian  is planned to undergo treatment for breast cancer . Jillian explains today that she has been experiencing nausea, diarrhea, fatigue, and decreased appetite. She is pregnant and these symptoms may be related to pregnancy. Reviewed 24 hour recall, which revealed an inadequate po intake, and discussed ways to increase kcal, protein, and fluid intakes and optimize nutrient intake to prepare for possible upcoming treatment. Also reviewed the importance of wt management throughout the tx process and the role of a high kcal/ high protein diet in managing wt and overall health. Based on today's assessment, discussion included: MNT for: breast cancer, nausea, diarrhea, weight management, choosing a variety of colorful, plant-based foods to support a cancer preventative diet, a high calorie high protein diet & food choices to include at all meals & snacks, adequate hydration & fluid choices, eating smaller more frequent meals every 2-3 hours (5-6 small meals/day), having consistent and planned snacks between meals, eating when feeling most hungry, and mindful eating concepts. Moving forward, Jillian was encouraged to increase kcal, protein, and fluid intakes. Materials Provided:  Nutrition for Breast Cancer Survivors, Plant Based Diet, Nutrition During Cancer Treatment,  Protein Sources handout   All questions and concerns addressed during today’s visit. Jillian has RD contact information. Nutrition Diagnosis:   Inadequate Energy Intake related to physiological causes, disease state and treatment related issues as evidenced by food recall, wt loss and discussion with pt and/or family.   Increased Nutrient Needs related to increased demand for nutrients as evidenced by pt planned for cancer treatment. Monitoring & Evaluation:   Goals:  weight maintenance/stabilization  adequate nutrition impact symptom management  pt to meet >/=75% estimated nutrition needs daily  increase protein intake  increase fluid intake  increase fluid intake    Progress Towards Goals: Initiated    Nutrition Rx & Recommendations:  Diet: High Calorie, High Protein (for high calorie foods see pages 52-53, and for high protein foods see pages 49-51 in your Eating Hints book)  Small, frequent meals/snacks may be easier to tolerate than 3 large daily meals. Aim for 5-6 small meals per day (every 2-3 hours). Include protein at all meals/snacks. Include a variety of foods (as tolerated/allowed by diet). Follow a Cancer Preventative Nutrition Pattern: colorful, plant-based, low-fat, avoid added sugars, limit alcohol, include high fiber foods, limit processed meats, limit red meat, choose lean protein sources, use low-fat cooking methods, balance calories with physical activity, avoid excessive weight gain throughout life. Incorporate physical activity as able/allowed. Stay hydrated by sipping fluids of choice/tolerance throughout the day. Liquid nutrition may be better tolerated than solids at times. Alter food choices and eating patterns to accommodate changing needs. Light physical activity (such as walking) is encouraged, as able/tolerated. For diarrhea: drink plenty of fluids (>64 oz/day), avoid carbonation; eat 5-6 small meals/day; include high sodium & potassium foods/liquids (Sodium: soup/broth;   Potassium: bananas, canned apricots, potatoes); eat low-fiber foods; choose foods/liquids that are room temperature; avoid foods/drinks that can make diarrhea worse (ex. high fiber, high sugar, hot/cold drinks, greasy/fatty foods, gas forming foods such as beans, raw produce, milk products, alcohol, spicy foods, caffeine, sugar alcohols (xylitol, sorbitol), and apple juice (high in sorbitol) (see pages 15-16 in your Eating Hints book). If diarrhea is severe, consider adding Banatrol (banana flakes) 1-3 times per day mixed in applesauce (can be purchased online from [employer]). For nausea/vomiting: try plain/bland foods; try lemon/lime flavors; eat 5-6 small meals/day (except when vomiting); do not skip meals/snacks; choose appealing foods; sip only small amounts of liquids during meals; stay hydrated in between meals; eat foods/drinks that are room temperature; eat dry toast/crackers (see pages  21-22 and 31-32 in your Eating Hints book). Weigh yourself regularly. If you notice weight loss, make an effort to increase your daily food/calorie intake. If you continue to notice loss after these efforts, reach out to your dietitian to establish a plan to stabilize weight.     Follow Up Plan:  12/27/23 11am    Recommend Referral to Other Providers: none at this time

## 2023-12-13 NOTE — PATIENT INSTRUCTIONS
Nutrition Rx & Recommendations:  Diet: High Calorie, High Protein (for high calorie foods see pages 52-53, and for high protein foods see pages 49-51 in your Eating Hints book)  Small, frequent meals/snacks may be easier to tolerate than 3 large daily meals. Aim for 5-6 small meals per day (every 2-3 hours). Include protein at all meals/snacks. Include a variety of foods (as tolerated/allowed by diet). Follow a Cancer Preventative Nutrition Pattern: colorful, plant-based, low-fat, avoid added sugars, limit alcohol, include high fiber foods, limit processed meats, limit red meat, choose lean protein sources, use low-fat cooking methods, balance calories with physical activity, avoid excessive weight gain throughout life. Incorporate physical activity as able/allowed. Stay hydrated by sipping fluids of choice/tolerance throughout the day. Liquid nutrition may be better tolerated than solids at times. Alter food choices and eating patterns to accommodate changing needs. Refer to Eating Hints book for other meal/snack ideas and symptom management. For diarrhea: drink plenty of fluids (>64 oz/day), avoid carbonation; eat 5-6 small meals/day; include high sodium & potassium foods/liquids (Sodium: soup/broth; Potassium: bananas, canned apricots, potatoes); eat low-fiber foods; choose foods/liquids that are room temperature; avoid foods/drinks that can make diarrhea worse (ex. high fiber, high sugar, hot/cold drinks, greasy/fatty foods, gas forming foods such as beans, raw produce, milk products, alcohol, spicy foods, caffeine, sugar alcohols (xylitol, sorbitol), and apple juice (high in sorbitol) (see pages 15-16 in your Eating Hints book). If diarrhea is severe, consider adding Banatrol (banana flakes) 1-3 times per day mixed in applesauce (can be purchased online from [employer]).   For nausea/vomiting: try plain/bland foods; try lemon/lime flavors; eat 5-6 small meals/day (except when vomiting); do not skip meals/snacks; choose appealing foods; sip only small amounts of liquids during meals; stay hydrated in between meals; eat foods/drinks that are room temperature; eat dry toast/crackers (see pages  21-22 and 31-32 in your Eating Hints book). Weigh yourself regularly. If you notice weight loss, make an effort to increase your daily food/calorie intake. If you continue to notice loss after these efforts, reach out to your dietitian to establish a plan to stabilize weight.     Follow Up Plan: 12/27/23 11am   Recommend Referral to Other Providers: none at this time

## 2023-12-13 NOTE — PROGRESS NOTES
Surgical Oncology Follow Up  [facility]  [facility] [place] [address]    [facility]  1990  [id number]      Chief Complaint   Patient presents with   • Follow-up        Assessment & Plan: This is a 56-year-old female with recent diagnosis of left breast cancer ER/CO positive HER2 -2.1 cm mass no clinical evidence of axillary adenopathy. She is 7 weeks pregnant. .  She was presented in our multidisciplinary tumor board for further management. I appreciate the presence of gynecology staff. Plan is to proceed with surgery as soon as deemed safe by maternal-fetal medicine. We also need to obtain ultrasound to confirm the pregnancy as well her hCG has been progressively increasing. These findings were discussed with her and NCCN guideline was discussed and  printout was also given. Dr. Digna Kaur is going to see her this week to discuss postsurgical treatment and discuss the benefits of Oncotype testing as well. She has an upcoming appointment this week. We were also able to arrange her to be seen by maternal-fetal medicine attending as well as ultrasound of uterus tomorrow. Consensus from the multidisciplinary tumor board there is no real indication at this point to do any staging workup as recommended by the team.  These findings were discussed with the patient and her family member in detail and we will follow her maternal-fetal medicine recommendation made to proceed with surgery. We also discussed based on NCCN guideline at first trimester possibly breast is mastectomy with sentinel lymph node biopsy with single tracer technique. If it is second trimester breast conservation as well as mastectomy is recommended. She also understand if she has breast conservation surgery she will need adjuvant radiation. Hopefully we will have a genetic test results and bring her soon to make the surgery date.   We will also obtain [facility] recommendations prior to surgery. She understands that she has significant risk of spontaneous abortions and also risks of fetal anomaly which anesthesia team will discuss in detail with her based on the medications anesthesia team is planning to use at the time time of surgery. Cancer History:     Oncology History   Malignant neoplasm of overlapping sites of left breast in female, estrogen receptor positive    12/2/2023 Initial Diagnosis    Malignant neoplasm of overlapping sites of left female breast ([address])     12/2/2023 Biopsy    Bilateral breast ultrasound guided biopsy  A. Breast, Left, US Guided Left Breast Biopsy 12:00 6cmfn: Invasive breast carcinoma of no special type (ductal) with osteoclast-like stromal giant cells  Grade 2  ER 85  [place] 95  HER2 1+     B. Breast, Right, US Guided Right Breast Biopsy 10:00 9cmfn:  Benign breast tissue. In review of the patient’s recent imaging, the left malignancy appears unifocal.  The biopsy-proven carcinoma measured 2.1 cm on ultrasound. Ultrasound of the left axilla was performed on 11/24/2023 and showed no suspicious adenopathy. Recent imaging of the contralateral right breast dated 12/2/2023 and 11/24/2023 was reviewed and shows no suspicious findings. The pathology results for the ultrasound-guided core needle biopsy (right breast 10:00, 9 cm from the nipple) are benign and concordant with imaging. 12/2/2023 Genetic Testing    Ambry  pending           Interval History:   Follow-up with left breast cancer and pregnancy first trimester    Review of Systems:   Review of Systems   Constitutional:  Negative for chills and fever. HENT:  Negative for ear pain and sore throat. Eyes:  Negative for pain and visual disturbance. Respiratory:  Negative for cough and shortness of breath. Cardiovascular:  Negative for chest pain and palpitations. Gastrointestinal:  Negative for abdominal pain and vomiting.    Genitourinary:  Negative for dysuria and hematuria. Musculoskeletal:  Negative for arthralgias and back pain. Skin:  Negative for color change and rash. Neurological:  Negative for seizures and syncope. All other systems reviewed and are negative. Past Medical History     Patient Active Problem List   Diagnosis   • Mild persistent asthma without complication   • Axillary hidradenitis suppurativa   • Anxiety   • Chest wall pain   • Gastroesophageal reflux disease without esophagitis   • Depression with anxiety   • Chronic fatigue   • Myalgia   • Chronic left shoulder pain   • Cervical disc disorder at C5-C6 level with radiculopathy   • Atypical squamous cells of undetermined significance (ASCUS) on Papanicolaou smear of cervix   • Hypertrophic and atrophic condition of skin   • Migraine headache   • Shoulder impingement syndrome, left   • Vitamin D deficiency   • Close exposure to COVID-19 virus   • Thoracic outlet syndrome   • Palpitations   • Post-operative pain   • Transaminitis   • Right middle lobe pulmonary nodule   • Reversible airway obstruction   • SOB (shortness of breath)   • Musculoskeletal chest pain   • COVID-19 virus infection   • Unexplained weight gain   • Left ovarian cyst   • Dysphagia   • Malignant neoplasm of overlapping sites of left breast in female, estrogen receptor positive      Past Medical History:   Diagnosis Date   • Anxiety    • Asthma    • GERD (gastroesophageal reflux disease)    • Heart murmur    • Kidney stone    • Miscarriage 3/15/2015    7 weeks along.    • Neck pain      Past Surgical History:   Procedure Laterality Date   • BREAST BIOPSY Right 12/02/2023   • BREAST BIOPSY Left 12/02/2023   • EGD     • IR UPPER EXTREMITY VENOGRAM- DIAGNOSTIC  05/28/2021   • WV EXCISION 1ST &/CERVICAL RIB Left 08/12/2021    Procedure: FIRST RIB RESECTION;  Surgeon: Harman Lazo MD;  Location: [facility];  Service: Thoracic   • THORACOSCOPY VIDEO ASSISTED SURGERY (VATS) Left 08/12/2021    Procedure: Maryuri Number ASSISTED SURGERY (VATS); Surgeon: Haley Joaquin MD;  Location: [facility];  Service: Thoracic   • US GUIDANCE BREAST BIOPSY LEFT EACH ADDITIONAL Left 12/2/2023   • US GUIDED BREAST BIOPSY RIGHT COMPLETE Right 12/2/2023   • WISDOM TOOTH EXTRACTION       Family History   Problem Relation Age of Onset   • No Known Problems Mother    • No Known Problems Father    • Bone cancer Maternal Grandmother    • Breast cancer Neg Hx    • Colon cancer Neg Hx    • Ovarian cancer Neg Hx    • Uterine cancer Neg Hx    • Cervical cancer Neg Hx      Social History     Socioeconomic History   • Marital status: /Civil Union     Spouse name: Not on file   • Number of children: Not on file   • Years of education: Not on file   • Highest education level: Not on file   Occupational History   • Not on file   Tobacco Use   • Smoking status: Never   • Smokeless tobacco: Never   Vaping Use   • Vaping status: Never Used   Substance and Sexual Activity   • Alcohol use: Not Currently     Comment: social   • Drug use: Never   • Sexual activity: Yes     Partners: Male     Birth control/protection: None   Other Topics Concern   • Not on file   Social History Narrative   • Not on file     Social Determinants of Health     Financial Resource Strain: Not on file   Food Insecurity: Not on file   Transportation Needs: No Transportation Needs (8/19/2021)    PRAPARE - Transportation    • Lack of Transportation (Medical): No    • Lack of Transportation (Non-Medical):  No   Physical Activity: Not on file   Stress: Not on file   Social Connections: Not on file   Intimate Partner Violence: Not on file   Housing Stability: Not on file       Current Outpatient Medications:   •  albuterol (PROVENTIL HFA,VENTOLIN HFA) 90 mcg/act inhaler, TAKE 2 PUFFS BY MOUTH EVERY 6 HOURS AS NEEDED FOR WHEEZE OR FOR SHORTNESS OF BREATH, Disp: 18 g, Rfl: 3  •  cetirizine (ZyrTEC) 10 mg tablet, TAKE 1 TABLET BY MOUTH EVERY DAY, Disp: 90 tablet, Rfl: 0  •  citalopram (CeleXA) 10 mg tablet, TAKE 1 TABLET BY MOUTH EVERY DAY, Disp: 90 tablet, Rfl: 0  •  clindamycin-benzoyl peroxide (BENZACLIN) gel, Apply topically every morning, Disp: 50 g, Rfl: 0  •  clonazePAM (KlonoPIN) 0.5 mg tablet, Take 1 tablet by mouth twice daily as needed for anxiety, Disp: 60 tablet, Rfl: 0  •  Diclofenac Sodium (VOLTAREN) 1 %, Apply 2 g topically 4 (four) times a day, Disp: 150 g, Rfl: 2  •  omeprazole (PriLOSEC) 40 MG capsule, TAKE 1 CAPSULE (40 MG TOTAL) BY MOUTH DAILY. , Disp: 90 capsule, Rfl: 0  Allergies   Allergen Reactions   • Nsaids GI Intolerance   • Formic Acid Swelling and Rash   • Latex Rash       Physical Exam:     Vitals:    12/13/23 1031   BP: 124/76   Pulse: 76   Resp: 15   Temp: 97.6 °F (36.4 °C)   SpO2: 98%     Physical Exam  Constitutional:       Appearance: Normal appearance. HENT:      Head: Normocephalic and atraumatic. Nose: Nose normal.      Mouth/Throat:      Mouth: Mucous membranes are moist.   Eyes:      Pupils: Pupils are equal, round, and reactive to light. Cardiovascular:      Rate and Rhythm: Normal rate. Pulses: Normal pulses. Heart sounds: Normal heart sounds. Pulmonary:      Effort: Pulmonary effort is normal.      Breath sounds: Normal breath sounds. Chest:      Comments: Both breasts are engorged and difficult to palpate any mass or masses. Bilateral axillary and supraclavicular examination no palpable adenopathy  Abdominal:      General: Bowel sounds are normal.      Palpations: Abdomen is soft. Musculoskeletal:         General: Normal range of motion. Cervical back: Normal range of motion and neck supple. Skin:     General: Skin is warm. Neurological:      General: No focal deficit present. Mental Status: She is alert and oriented to person, place, and time. Psychiatric:         Mood and Affect: Mood normal.         Behavior: Behavior normal.         Thought Content:  Thought content normal.         Judgment: Judgment normal. Results & Discussion:   I did review receptor status ER/IN positive HER2 negative and hormone dependence. I did discuss with her in detail however multidisciplinary team meeting recommendations as well I did discussed in detail nature of breast cancer and pregnancy and surgical options. Will wait for genetic testing. She is leaning towards mastectomy at this point, pending genetic testing she understands and  agrees . All patient questions were answered. Advance Care Planning/Advance Directives: Little Halsted, MD discussed the disease status with [facility] DIANA costello 12/13/23  treatment plans and follow-up with the patient.

## 2023-12-14 ENCOUNTER — ULTRASOUND (OUTPATIENT)
Dept: PERINATAL CARE | Facility: OTHER | Age: 33
End: 2023-12-14
Payer: COMMERCIAL

## 2023-12-14 VITALS
WEIGHT: 179.2 LBS | SYSTOLIC BLOOD PRESSURE: 112 MMHG | HEIGHT: 67 IN | HEART RATE: 98 BPM | BODY MASS INDEX: 28.12 KG/M2 | DIASTOLIC BLOOD PRESSURE: 64 MMHG

## 2023-12-14 DIAGNOSIS — N91.2 AMENORRHEA: Primary | ICD-10-CM

## 2023-12-14 DIAGNOSIS — O36.80X0 ENCOUNTER TO DETERMINE FETAL VIABILITY OF PREGNANCY, SINGLE OR UNSPECIFIED FETUS: ICD-10-CM

## 2023-12-14 DIAGNOSIS — C50.919 MALIGNANT NEOPLASM OF FEMALE BREAST, UNSPECIFIED ESTROGEN RECEPTOR STATUS, UNSPECIFIED LATERALITY, UNSPECIFIED SITE OF BREAST (HCC): ICD-10-CM

## 2023-12-14 DIAGNOSIS — Z3A.01 LESS THAN 8 WEEKS GESTATION OF PREGNANCY: ICD-10-CM

## 2023-12-14 DIAGNOSIS — O9A.111: Primary | ICD-10-CM

## 2023-12-14 PROCEDURE — 76817 TRANSVAGINAL US OBSTETRIC: CPT | Performed by: OBSTETRICS & GYNECOLOGY

## 2023-12-14 PROCEDURE — 99205 OFFICE O/P NEW HI 60 MIN: CPT | Performed by: OBSTETRICS & GYNECOLOGY

## 2023-12-14 PROCEDURE — 99417 PROLNG OP E/M EACH 15 MIN: CPT | Performed by: OBSTETRICS & GYNECOLOGY

## 2023-12-14 NOTE — LETTER
December 14, 2023     Sarah Karyna, [facility]  [address] [facility]    Patient: Davis Cooley   YOB: 1990   Date of Visit: 12/14/2023       Dear Dr. Valorie Litten: Thank you for referring Davis Cooley to me for evaluation. Below are my notes for this consultation. If you have questions, please do not hesitate to call me. I look forward to following your patient along with you. Sincerely,        Sharmaine Mclaughlin MD        CC: MD Makayla Mercado CRNP Diannah Blossom, RN Monica Riley-Hermes, MSW Redia Schwab, MD Jannie Ko, MD  12/14/2023 11:05 AM  Sign when Signing Visit  CONSULT NOTE    Sarah Troncoso, PT  [facility],  [address] [place] [address]     Thank you for referring your Davis Cooley for a Maternal-Fetal Medicine Consultation:  Below is my consultation. Britany Zapata presents today for a viability ultrasound and to discuss a recent diagnosis of breast cancer. This is her second pregnancy. Her first pregnancy resulted in a spontaneous miscarriage between 5 and 7 weeks gestation in 2015. She has a history of asthma and depression for which she recently stopped taking Celexa. She takes albuterol as needed. She takes sertraline and omeprazole. She has an allergy to nonsteroidals which causes GI intolerance. She has a surgical history significant for a rib removal for thoracic outlet syndrome. Substance use history and family medical history are otherwise unremarkable. A review of systems is otherwise negative. The patient was recently diagnosed with clinical stage Ib ER/TN positive breast cancer HER2 negative. She has a 2.1 cm mass in her left breast.  Plan is for left mastectomy with axillary dissection. Patient is here to further discuss recommendations from a high risk pregnancy perspective.     A viable summers intrauterine pregnancy is appreciated today. Today's ultrasound demonstrates crown-rump length 7 days difference from gestational age and therefore recommend EDC by today's ultrasound. We discussed today's findings in detail and answered all of her questions to apparent satisfaction. We discussed follow-up in detail and I recommend she return next week for a fetal viability evaluation. Maternal-Fetal Medicine:  Ms. Jesusita Au was seen today in the [address] [place] [address] office Thank you for the referral and please don't hesitate to contact our office with any concerns or questions. Cancer complicating pregnancy in first trimester  We reviewed gestational breast cancer in detail from an obstetrical standpoint. I discussed with the patient that I strongly recommend that pregnant women with breast cancer be treated according to guidelines for those patients who are not pregnant with some modifications to protect the fetus. I discussed with the patient that treatment should be approached with a curative intent. The patient was accompanied by her mother-in-law who both felt that that was the plan. I discussed with the patient the option for pregnancy termination which can be considered. We reviewed data that pregnancy termination has not been demonstrated to improve overall outcomes and gestational breast cancer. We discussed the data is likely biased to some extent with those patient to likely have more advanced cancer or required or desired experimental treatment were likely counseled towards termination. We reviewed that the data is overall reassuring that termination is not required to have similar outcomes to those patients who are not pregnant. The patient is felt to likely have more localized cancer.   We discussed that the same local treatment options that are available for nonpregnant patients should be considered to women during pregnancy with the exception of need for radiation therapy. Because radiation therapy is generally avoided during pregnancy, when cancer is diagnosed early in pregnancy, breast conserving surgery is generally avoided in favor of mastectomy which would not require postoperative radiation therapy. The patient, her family, and her breast cancer surgeon were planning mastectomy for which I strongly agree. We reviewed data that axillary staging and dissection as well as the use of sentinel lymph node biopsies demonstrates evidence of safety and efficacy in patients during pregnancy. Based upon review of the plan, isosulfan blue dye is not planned to be used in [facility]. This dye is systemically absorbed after subcutaneous injection. There are small series which have utilize methylene blue with no apparent adverse effects. Radioactive colloid or another type of tracer can also be utilized. Again, the principal of curative intent should take precedence in this particular patient given her early gestational age and potential for curative surgical intervention. We briefly discussed systematic therapy including chemotherapy. It is not clear if this patient will require chemotherapy but we reviewed that it is recommended to avoid chemotherapeutic agents in the first trimester. We discussed that chemotherapy, if it provides survival benefit and is recommended to women outside of pregnancy, should not be withheld from this patient after surgery. We do recommend delaying chemotherapy until after organogenesis which would be after 11 to 12 weeks of gestational age. We discussed that risk of congenital malformations is low if it is administered starting in the second trimester. There are some risks associated with fetal growth restriction and  birth; however, these risks generally are outweighed by the maternal benefit of therapy. Further discussion can occur if patient requires chemotherapy.     We reviewed data regarding nonobstetric of surgery during pregnancy. We discussed that data generally comes some observational studies and expert opinion; however, the amount of data is impressive given that approximately 1% of all women during pregnancy undergo some type of surgical procedure. We specifically discussed nonobstetric surgery in the first trimester given the patient's gestational age and pending need for breast cancer surgery. We discussed that the risk of teratogenesis from nonobstetric surgery is an area of active research however there is no strong evidence of increased risk from anesthetic agents even when used during early pregnancy. There is no compelling evidence that specific agents should be avoided during the perioperative care of women during pregnancy. The only exception to this is sugammadex which reduces free progesterone levels and pharmacologic studies. This is a medication I would avoid. Although there is some warnings about negative effects of developing brain from administration of anesthetics and sedative drugs, this is more specific to the third trimester and early childhood. Both the FDA and the [employer] of OB/GYN advised that necessary surgery should not be avoided or delay during pregnancy. There have been multiple large retrospective studies that have not shown an increased risk and congenital anomalies in infants born to mothers who had surgery during pregnancy including nearly 3000 pregnancies with first trimester exposures. A systemic review of 54 studies of over 12,000 patients underwent surgery during pregnancy showed no significant increased risk in congenital anomalies compared to the general population. There were some early studies which suggested benzodiazepine specifically diazepam use in early pregnancy may be associated with fetal clefts. Subsequent studies have failed to demonstrate disassociation or definitive risk although small risks could not be excluded.   The benzodiazepine that is commonly used during surgery is midazolam which has not been associated with congenital malformations. The patient will be having an anesthesia consultation however based upon her specific situation, I would not recommend delaying definitive curative surgery because of any theoretical concerns with first trimester anesthetic exposure. We discussed risk of miscarriage related to nonobstetric surgery. We first reviewed the overall baseline risk of pregnancy loss which varies with gestational age and patient history. Early pregnancy loss occurs anywhere from 10 to 25% of all clinically recognized pregnancies with higher percentages earlier in the first trimester. As a comparison, the aforementioned systematic review of over 12,000 pregnancies in women undergoing obstetric surgery reported a 10.5% incidence of miscarriage during the first trimester which is similar to baseline first trimester risk of miscarriage. I discussed with the patient and her mother-in-law that given her early gestational age, spontaneous miscarriage is more likely to occur than if she were closer to 12 weeks pregnant. If and when the patient undergoes surgery within the next couple of weeks, I would not attribute a potential miscarriage to the surgery itself or anesthetic components given the after mentioned data. She will be seen weekly for fetal viability as well. In summary, I strongly recommend definitive surgical therapy not be delayed in this patient. I communicated this recommendation to the patient's breast cancer surgeon and to anesthesia. The patient and her family members are in agreement and would like to proceed with surgery without delay. At the conclusion of our discussion, all questions were answered to apparent satisfaction. Thank you very much for allowing us to participate in the care of this very nice patient. Should you have any questions, please do not hesitate to contact our office.     Please note I spent 15 minutes reviewing records prior to the visit, 60  minutes in patient contact including counseling and coordination of care, and 20 minutes in charting in the electronic medical record. Total time spent for the encounter is 95 minutes. Sincerely,           Karl Rivers.  Alesha Cano MD  Attending Physician, [address] [place] [address]

## 2023-12-14 NOTE — PROGRESS NOTES
CONSULT NOTE    Murray Guardado, [address]  [place],  [address] [facility]     Thank you for referring your Vicenta Fee for a Maternal-Fetal Medicine Consultation:  Below is my consultation. Mark Nelson presents today for a viability ultrasound and to discuss a recent diagnosis of breast cancer. This is her second pregnancy. Her first pregnancy resulted in a spontaneous miscarriage between 5 and 7 weeks gestation in 2015. She has a history of asthma and depression for which she recently stopped taking Celexa. She takes albuterol as needed. She takes sertraline and omeprazole. She has an allergy to nonsteroidals which causes GI intolerance. She has a surgical history significant for a rib removal for thoracic outlet syndrome. Substance use history and family medical history are otherwise unremarkable. A review of systems is otherwise negative. The patient was recently diagnosed with clinical stage Ib ER/NM positive breast cancer HER2 negative. She has a 2.1 cm mass in her left breast.  Plan is for left mastectomy with axillary dissection. Patient is here to further discuss recommendations from a high risk pregnancy perspective. A viable summers intrauterine pregnancy is appreciated today. Today's ultrasound demonstrates crown-rump length 7 days difference from gestational age and therefore recommend EDC by today's ultrasound. We discussed today's findings in detail and answered all of her questions to apparent satisfaction. We discussed follow-up in detail and I recommend she return next week for a fetal viability evaluation. Maternal-Fetal Medicine:  Ms. Rafael Hamilton was seen today in the [address] [place] [address] office Thank you for the referral and please don't hesitate to contact our office with any concerns or questions. Cancer complicating pregnancy in first trimester  We reviewed gestational breast cancer in detail from an obstetrical standpoint.   I discussed with the patient that I strongly recommend that pregnant women with breast cancer be treated according to guidelines for those patients who are not pregnant with some modifications to protect the fetus. I discussed with the patient that treatment should be approached with a curative intent. The patient was accompanied by her mother-in-law who both felt that that was the plan. I discussed with the patient the option for pregnancy termination which can be considered. We reviewed data that pregnancy termination has not been demonstrated to improve overall outcomes and gestational breast cancer. We discussed the data is likely biased to some extent with those patient to likely have more advanced cancer or required or desired experimental treatment were likely counseled towards termination. We reviewed that the data is overall reassuring that termination is not required to have similar outcomes to those patients who are not pregnant. The patient is felt to likely have more localized cancer. We discussed that the same local treatment options that are available for nonpregnant patients should be considered to women during pregnancy with the exception of need for radiation therapy. Because radiation therapy is generally avoided during pregnancy, when cancer is diagnosed early in pregnancy, breast conserving surgery is generally avoided in favor of mastectomy which would not require postoperative radiation therapy. The patient, her family, and her breast cancer surgeon were planning mastectomy for which I strongly agree. We reviewed data that axillary staging and dissection as well as the use of sentinel lymph node biopsies demonstrates evidence of safety and efficacy in patients during pregnancy. Based upon review of the plan, isosulfan blue dye is not planned to be used in [facility]. This dye is systemically absorbed after subcutaneous injection.   There are small series which have utilize methylene blue with no apparent adverse effects. Radioactive colloid or another type of tracer can also be utilized. Again, the principal of curative intent should take precedence in this particular patient given her early gestational age and potential for curative surgical intervention. We briefly discussed systematic therapy including chemotherapy. It is not clear if this patient will require chemotherapy but we reviewed that it is recommended to avoid chemotherapeutic agents in the first trimester. We discussed that chemotherapy, if it provides survival benefit and is recommended to women outside of pregnancy, should not be withheld from this patient after surgery. We do recommend delaying chemotherapy until after organogenesis which would be after 11 to 12 weeks of gestational age. We discussed that risk of congenital malformations is low if it is administered starting in the second trimester. There are some risks associated with fetal growth restriction and  birth; however, these risks generally are outweighed by the maternal benefit of therapy. Further discussion can occur if patient requires chemotherapy. We reviewed data regarding nonobstetric of surgery during pregnancy. We discussed that data generally comes some observational studies and expert opinion; however, the amount of data is impressive given that approximately 1% of all women during pregnancy undergo some type of surgical procedure. We specifically discussed nonobstetric surgery in the first trimester given the patient's gestational age and pending need for breast cancer surgery. We discussed that the risk of teratogenesis from nonobstetric surgery is an area of active research however there is no strong evidence of increased risk from anesthetic agents even when used during early pregnancy. There is no compelling evidence that specific agents should be avoided during the perioperative care of women during pregnancy.   The only exception to this is sugammadex which reduces free progesterone levels and pharmacologic studies. This is a medication I would avoid. Although there is some warnings about negative effects of developing brain from administration of anesthetics and sedative drugs, this is more specific to the third trimester and early childhood. Both the FDA and the [employer] of OB/GYN advised that necessary surgery should not be avoided or delay during pregnancy. There have been multiple large retrospective studies that have not shown an increased risk and congenital anomalies in infants born to mothers who had surgery during pregnancy including nearly 3000 pregnancies with first trimester exposures. A systemic review of 54 studies of over 12,000 patients underwent surgery during pregnancy showed no significant increased risk in congenital anomalies compared to the general population. There were some early studies which suggested benzodiazepine specifically diazepam use in early pregnancy may be associated with fetal clefts. Subsequent studies have failed to demonstrate disassociation or definitive risk although small risks could not be excluded. The benzodiazepine that is commonly used during surgery is midazolam which has not been associated with congenital malformations. The patient will be having an anesthesia consultation however based upon her specific situation, I would not recommend delaying definitive curative surgery because of any theoretical concerns with first trimester anesthetic exposure. We discussed risk of miscarriage related to nonobstetric surgery. We first reviewed the overall baseline risk of pregnancy loss which varies with gestational age and patient history. Early pregnancy loss occurs anywhere from 10 to 25% of all clinically recognized pregnancies with higher percentages earlier in the first trimester.   As a comparison, the aforementioned systematic review of over 12,000 pregnancies in women undergoing obstetric surgery reported a 10.5% incidence of miscarriage during the first trimester which is similar to baseline first trimester risk of miscarriage. I discussed with the patient and her mother-in-law that given her early gestational age, spontaneous miscarriage is more likely to occur than if she were closer to 12 weeks pregnant. If and when the patient undergoes surgery within the next couple of weeks, I would not attribute a potential miscarriage to the surgery itself or anesthetic components given the after mentioned data. She will be seen weekly for fetal viability as well. In summary, I strongly recommend definitive surgical therapy not be delayed in this patient. I communicated this recommendation to the patient's breast cancer surgeon and to anesthesia. The patient and her family members are in agreement and would like to proceed with surgery without delay. At the conclusion of our discussion, all questions were answered to apparent satisfaction. Thank you very much for allowing us to participate in the care of this very nice patient. Should you have any questions, please do not hesitate to contact our office. Please note I spent 15 minutes reviewing records prior to the visit, 60  minutes in patient contact including counseling and coordination of care, and 20 minutes in charting in the electronic medical record. Total time spent for the encounter is 95 minutes. Sincerely,           Rick Mckeon.  Zeeshan Krause MD  Attending Physician, [address] [place] [address]

## 2023-12-14 NOTE — ASSESSMENT & PLAN NOTE
We reviewed gestational breast cancer in detail from an obstetrical standpoint. I discussed with the patient that I strongly recommend that pregnant women with breast cancer be treated according to guidelines for those patients who are not pregnant with some modifications to protect the fetus. I discussed with the patient that treatment should be approached with a curative intent. The patient was accompanied by her mother-in-law who both felt that that was the plan. I discussed with the patient the option for pregnancy termination which can be considered. We reviewed data that pregnancy termination has not been demonstrated to improve overall outcomes and gestational breast cancer. We discussed the data is likely biased to some extent with those patient to likely have more advanced cancer or required or desired experimental treatment were likely counseled towards termination. We reviewed that the data is overall reassuring that termination is not required to have similar outcomes to those patients who are not pregnant. The patient is felt to likely have more localized cancer. We discussed that the same local treatment options that are available for nonpregnant patients should be considered to women during pregnancy with the exception of need for radiation therapy. Because radiation therapy is generally avoided during pregnancy, when cancer is diagnosed early in pregnancy, breast conserving surgery is generally avoided in favor of mastectomy which would not require postoperative radiation therapy. The patient, her family, and her breast cancer surgeon were planning mastectomy for which I strongly agree. We reviewed data that axillary staging and dissection as well as the use of sentinel lymph node biopsies demonstrates evidence of safety and efficacy in patients during pregnancy. Based upon review of the plan, isosulfan blue dye is not planned to be used in [facility].   This dye is systemically absorbed after subcutaneous injection. There are small series which have utilize methylene blue with no apparent adverse effects. Radioactive colloid or another type of tracer can also be utilized. Again, the principal of curative intent should take precedence in this particular patient given her early gestational age and potential for curative surgical intervention. We briefly discussed systematic therapy including chemotherapy. It is not clear if this patient will require chemotherapy but we reviewed that it is recommended to avoid chemotherapeutic agents in the first trimester. We discussed that chemotherapy, if it provides survival benefit and is recommended to women outside of pregnancy, should not be withheld from this patient after surgery. We do recommend delaying chemotherapy until after organogenesis which would be after 11 to 12 weeks of gestational age. We discussed that risk of congenital malformations is low if it is administered starting in the second trimester. There are some risks associated with fetal growth restriction and  birth; however, these risks generally are outweighed by the maternal benefit of therapy. Further discussion can occur if patient requires chemotherapy. We reviewed data regarding nonobstetric of surgery during pregnancy. We discussed that data generally comes some observational studies and expert opinion; however, the amount of data is impressive given that approximately 1% of all women during pregnancy undergo some type of surgical procedure. We specifically discussed nonobstetric surgery in the first trimester given the patient's gestational age and pending need for breast cancer surgery. We discussed that the risk of teratogenesis from nonobstetric surgery is an area of active research however there is no strong evidence of increased risk from anesthetic agents even when used during early pregnancy.   There is no compelling evidence that specific agents should be avoided during the perioperative care of women during pregnancy. The only exception to this is sugammadex which reduces free progesterone levels and pharmacologic studies. This is a medication I would avoid. Although there is some warnings about negative effects of developing brain from administration of anesthetics and sedative drugs, this is more specific to the third trimester and early childhood. Both the FDA and the [employer] of OB/GYN advised that necessary surgery should not be avoided or delay during pregnancy. There have been multiple large retrospective studies that have not shown an increased risk and congenital anomalies in infants born to mothers who had surgery during pregnancy including nearly 3000 pregnancies with first trimester exposures. A systemic review of 54 studies of over 12,000 patients underwent surgery during pregnancy showed no significant increased risk in congenital anomalies compared to the general population. There were some early studies which suggested benzodiazepine specifically diazepam use in early pregnancy may be associated with fetal clefts. Subsequent studies have failed to demonstrate disassociation or definitive risk although small risks could not be excluded. The benzodiazepine that is commonly used during surgery is midazolam which has not been associated with congenital malformations. The patient will be having an anesthesia consultation however based upon her specific situation, I would not recommend delaying definitive curative surgery because of any theoretical concerns with first trimester anesthetic exposure. We discussed risk of miscarriage related to nonobstetric surgery. We first reviewed the overall baseline risk of pregnancy loss which varies with gestational age and patient history.   Early pregnancy loss occurs anywhere from 10 to 25% of all clinically recognized pregnancies with higher percentages earlier in the first trimester. As a comparison, the aforementioned systematic review of over 12,000 pregnancies in women undergoing obstetric surgery reported a 10.5% incidence of miscarriage during the first trimester which is similar to baseline first trimester risk of miscarriage. I discussed with the patient and her mother-in-law that given her early gestational age, spontaneous miscarriage is more likely to occur than if she were closer to 12 weeks pregnant. If and when the patient undergoes surgery within the next couple of weeks, I would not attribute a potential miscarriage to the surgery itself or anesthetic components given the after mentioned data. She will be seen weekly for fetal viability as well. In summary, I strongly recommend definitive surgical therapy not be delayed in this patient. I communicated this recommendation to the patient's breast cancer surgeon and to anesthesia. The patient and her family members are in agreement and would like to proceed with surgery without delay. At the conclusion of our discussion, all questions were answered to apparent satisfaction. Thank you very much for allowing us to participate in the care of this very nice patient. Should you have any questions, please do not hesitate to contact our office. Please note I spent 15 minutes reviewing records prior to the visit, 60  minutes in patient contact including counseling and coordination of care, and 20 minutes in charting in the electronic medical record. Total time spent for the encounter is 95 minutes.

## 2023-12-15 ENCOUNTER — TELEPHONE (OUTPATIENT)
Dept: HEMATOLOGY ONCOLOGY | Facility: CLINIC | Age: 33
End: 2023-12-15

## 2023-12-15 ENCOUNTER — OFFICE VISIT (OUTPATIENT)
Dept: HEMATOLOGY ONCOLOGY | Facility: CLINIC | Age: 33
End: 2023-12-15
Payer: COMMERCIAL

## 2023-12-15 VITALS
TEMPERATURE: 98.1 F | OXYGEN SATURATION: 100 % | HEIGHT: 67 IN | HEART RATE: 95 BPM | DIASTOLIC BLOOD PRESSURE: 82 MMHG | RESPIRATION RATE: 17 BRPM | SYSTOLIC BLOOD PRESSURE: 140 MMHG | WEIGHT: 180 LBS | BODY MASS INDEX: 28.25 KG/M2

## 2023-12-15 DIAGNOSIS — Z17.0 MALIGNANT NEOPLASM OF OVERLAPPING SITES OF LEFT BREAST IN FEMALE, ESTROGEN RECEPTOR POSITIVE: ICD-10-CM

## 2023-12-15 DIAGNOSIS — C50.812 MALIGNANT NEOPLASM OF OVERLAPPING SITES OF LEFT BREAST IN FEMALE, ESTROGEN RECEPTOR POSITIVE: ICD-10-CM

## 2023-12-15 PROCEDURE — 99205 OFFICE O/P NEW HI 60 MIN: CPT | Performed by: INTERNAL MEDICINE

## 2023-12-17 NOTE — PROGRESS NOTES
Oncology Outpatient Consult Note  Jillian Ch 33 y.o. female MRN: @ Encounter: [id number]        Date:  12/17/2023        CC:  new diagnosis breast cancer      HPI:  Jillian Ch is seen for initial consultation 12/17/2023 regarding her newly diagnosed breast cancer.  The patient palpated a small lesion in the 12 o'clock position of the left breast and sought medical attention.  She had her breast biopsy with results in the onc history section of this note.  5 days after she found her biopsy result she found out she was 7 weeks pregnant.  She has been seen by [facility] and the pregnancy is viable.  In 2015 her first pregnancy ended in a miscarriage at around 5 to 7 weeks.  Her other medical history is remarkable for removal for thoracic outlet syndrome, asthma, depression.  She has stopped all of her depression medication because of the pregnancy.  She is having significant nausea and this first trimester and had an episode of vomiting this morning.  She is having intermittent diarrhea and constipation.  She feels dizzy at times.  She has had genetic testing and that result is pending.  She denies any headaches.  No chest pain shortness of breath.  She works as an  and also coaches a dance team.  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  She is  and has not ruled out wanting to have a second child if this pregnancy remains viable.  Her only other complaint is a significant amount of weight gain since the start of the pregnancy.        ROS: As stated in history of present illness otherwise her 14 point review of systems today was negative.    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 Breast, Left, US Guided Left Breast Biopsy 12:00 6cmfn:  Invasive breast carcinoma of no special type (ductal) with osteoclast-like stromal giant cells  Grade 2  ER 85  OR 95  HER2 1+     B. Breast, Right, US Guided Right Breast Biopsy 10:00 9cmfn:  Benign breast tissue.    In review of the patient’s recent imaging, the left malignancy appears unifocal.  The biopsy-proven carcinoma measured 2.1 cm on ultrasound. Ultrasound of the left axilla was performed on 11/24/2023 and showed no suspicious adenopathy.  Recent imaging of the contralateral right breast dated 12/2/2023 and 11/24/2023 was reviewed and shows no suspicious findings.  The pathology results for the ultrasound-guided core needle biopsy (right breast 10:00, 9 cm from the nipple) are benign and concordant with imaging.     12/2/2023 Genetic Testing    [facility]  pending     12/2/2023 -  Cancer Staged    Staging form: Breast, AJCC 8th Edition  - Clinical stage from 12/2/2023: Stage IB (cT2, cN0, cM0, G2, ER+, OR+, HER2-) - Signed by Elena Cornell MD on 12/13/2023  Stage prefix: Initial diagnosis  Histologic grading system: 3 grade system               Test Results:    Imaging: US guided breast biopsy right complete, US guidance breast biopsy left each additional, Mammo post biopsy bilateral    Addendum Date: 12/6/2023 Addendum:   ADDENDUM: PATHOLOGY RESULTS: Final Diagnosis A. Breast, Left, US Guided Left Breast Biopsy  12:00 6cmfn 3 passes 12g marquee raissa: -Invasive breast carcinoma of no special type (ductal) with osteoclast-like stromal giant cells. *Mervin grade 2 of 3 (total score: 6 of 9) --tubule formation < 10%, score 3 --nuclear grade 2 of 3, score 2 --mitoses < 3/mm2, (</= 7 mitoses/10HPF), score 1. *Confirmed by tumor cell immunophenotype: --positive: nuclear stain for GATA3 and membranous stain for E-cadherin and p120. --negative:  p63, SMMHC.  --osteoclast like giant cells highlighted with CD68 *Invasive carcinoma involves 2 of 2 submitted core biopsies, max. dimension = 18 millimeters. *Estrogen, progesterone & HER2 receptor studies pending, to be described in a separate receptor report. *Ductal carcinoma in situ (DCIS): Present, minor component (< 25% of total tumor). --solid and cribriform architectural pattern, nuclear grade 2 of 3, without necrosis. *Lymph-vascular invasion: Not identified. *Microcalcifications: Rare noted.  B. Breast, Right, US Guided Right Breast Biopsy 10:00 9cmfn 3 passes 12g marquee: - Benign, fatty breast tissue. In review of the patient’s recent imaging, the left malignancy appears unifocal.  The biopsy-proven carcinoma measured 2.1 cm on ultrasound. Ultrasound of the left axilla was performed on 11/24/2023 and showed no suspicious adenopathy. Recent imaging of the contralateral right breast dated 12/2/2023 and 11/24/2023 was reviewed and shows no suspicious findings.  The pathology results for the ultrasound-guided core needle biopsy (right breast 10:00, 9 cm from the nipple) are benign and concordant with imaging. RECOMMENDATION:      - Surgical Consultation for the left breast.      - Routine Screening Mammogram in 1 year for the right breast. END ADDENDUM    Result Date: 12/6/2023  Narrative: DIAGNOSIS: Abnormal mammogram INDICATION: Jillian Ch is a 33 y.o. female presenting for ultrasound-guided core needle biopsies. Prior to the procedure, previous imaging was  reviewed.  The procedure was explained to the patient in detail.  All questions were answered.  Written and verbal informed consent was obtained. FINDINGS: LEFT 1) MASS [A] US guidance breast biopsy left each additional: Images of the left breast mass in the upper outer quadrant at 12 o'clock in the middle portion, 6 cm from the nipple were obtained. The patient was placed supine on the biopsy table. A core needle biopsy was performed under ultrasound guidance using a lateral approach. The skin was prepped in the usual fashion. Anesthesia was administered using lidocaine 1%. A 12 G device was advanced in multiple passes. Preoperative ultrasound-guided Dulce  radiofrequency reflector localization was performed. A Dulce  radiofrequency reflector was inserted into the target area.  Operation of the reflector was confirmed using the  check device.  Post-placement ultrasound and digital mammographic imaging demonstrate the radiofrequency reflector was at the site. The patient tolerated the procedure well. There were no immediate complications. RIGHT 2) MASS [B] US guided breast biopsy right complete: Images of the right breast mass in the upper outer quadrant at 10 o'clock in the posterior portion, 9 cm from the nipple were obtained. The patient was placed supine on the biopsy table. A core needle biopsy was performed under ultrasound guidance using a lateral approach. The skin was prepped in the usual fashion. Anesthesia was administered using lidocaine 1%. A 12 G device was advanced in multiple passes. A top hat clip was inserted into the target area. Post-placement ultrasound imaging demonstrates the clip was at the site.  Postplacement mammogram demonstrates that the clip does not correlate with the mass seen on diagnostic mammogram dated 11/20/2023.  Utilizing the biopsy site as a landmark, the mass on the diagnostic mammogram (bilobed oval circumscribed low-density mass) was identified as a 1 cm simple  cyst in the 11 o'clock position of the right breast, 6 cm from the nipple.  The patient tolerated the procedure well. There were no immediate complications.      Impression:  Technically successful ultrasound-guided core needle biopsy of a suspicious mass in the 12 o'clock position of the left breast, 6 cm from the nipple with preoperative Dulce  radiofrequency reflector localization. Technically successful ultrasound-guided core needle biopsy of a hypoechoic mass in the 10 o'clock position of the right breast, 9 cm from the nipple. Oval bilobed low-density circumscribed mass in the upper outer right breast seen on diagnostic mammogram dated 11/20/2023 identified as a 1 cm simple cyst in the 11 o'clock position of the right breast, 6 cm from the nipple. RECOMMENDATION:      - Waiting for pathology for both breasts. Workstation ID: [id number]     Mammo diagnostic bilateral w 3d & cad, US breast bilateral limited (diagnostic)    Result Date: 11/24/2023  Narrative: DIAGNOSIS: Breast nodule TECHNIQUE: Digital diagnostic mammography was performed. Computer Aided Detection (CAD) analyzed all applicable images. Bilateral breast ultrasound was performed. COMPARISONS: None available. RELEVANT HISTORY: Family Breast Cancer History: History of breast cancer in Neg Hx. Family Medical History: No known relevant family medical history. Personal History: No known relevant hormone history. No known relevant surgical history. No known relevant medical history. RISK ASSESSMENT: 5 Year Tyrer-Cuzick: 0.47 % 10 Year Tyrer-Cuzick: 1.36 % Lifetime Tyrer-Cuzick: 17.96 % TISSUE DENSITY: There are scattered areas of fibroglandular density. INDICATION: Jillian Ch is a 33 y.o. female presenting for 0.5 cm oval shaped breast lump at 1 o clock 5-6 cm from nipple, non tender. FINDINGS: LEFT 1) MASS [A] Mammo diagnostic bilateral w 3d & cad: There is a 20 mm high density, irregularly shaped mass with spiculated margins seen  in the upper outer quadrant of the left breast in the middle depth. The mass correlates with the palpable mass reported by the patient. US breast bilateral limited (diagnostic): There is a 21 mm x 17 mm x 14 mm irregularly shaped, hypoechoic mass with spiculated margins with no posterior features seen in the left breast at 12 o'clock, 6 cm from the nipple. The mass correlates with the palpable mass reported by the patient and with the prior mammogram finding.  Doppler interrogation demonstrates scattered peripheral vascularity.  Ultrasound-guided core biopsy recommended to exclude malignancy. Evaluation of the axilla confirms no pathologic lymphadenopathy. RIGHT 2) MASS [B] Mammo diagnostic bilateral w 3d & cad: There is an oval bilobed mass versus 2 adjacent masses with circumscribed margins seen in the upper outer quadrant of the right breast in the posterior depth on the MLO and XCCL views. US breast bilateral limited (diagnostic): There is a 14 mm x 7 mm x 11 mm oval, parallel, hypoechoic bilobed mass with circumscribed margins seen in the right breast at 10 o'clock, 9 cm from the nipple. The mass correlates with the prior mammogram finding.  Ultrasound-guided core biopsy recommended. The above findings and recommendations are discussed with the patient, her mother, and the nurse navigator at the time of the study.     Impression: Bilateral ultrasound core biopsies recommended including for highly suspicious mass 12:00 location left breast. ASSESSMENT/BI-RADS CATEGORY: Left: 5 - Highly Suggestive of Malignancy Right: 4A - Low Suspicion for Malignancy Overall: 5 - Highly Suggestive of Malignancy RECOMMENDATION:      - Ultrasound-guided breast biopsy for both breasts. Workstation ID: [id number]       Labs:   Lab Results   Component Value Date    WBC 5.57 12/09/2023    HGB 11.3 (L) 12/09/2023    HCT 35.4 12/09/2023    MCV 85 12/09/2023     12/09/2023     Lab Results   Component Value Date    K 3.5  "12/09/2023     12/09/2023    CO2 24 12/09/2023    BUN 8 12/09/2023    CREATININE 0.64 12/09/2023    GLUF 79 12/09/2023    CALCIUM 8.5 12/09/2023    CORRECTEDCA 9.5 08/19/2021    AST 20 12/09/2023    ALT 25 12/09/2023    ALKPHOS 67 12/09/2023    EGFR 115 12/09/2023         No results found for: \"SPEP\", \"UPEP\"    No results found for: \"PSA\"    No results found for: \"CEA\"    No results found for: \"AFP\"    Lab Results   Component Value Date    IRON 83 07/29/2020       Lab Results   Component Value Date    ACEXSAFV83 331 01/06/2021               Active Problems:   Patient Active Problem List   Diagnosis    Mild persistent asthma without complication    Axillary hidradenitis suppurativa    Anxiety    Chest wall pain    Gastroesophageal reflux disease without esophagitis    Depression with anxiety    Chronic fatigue    Myalgia    Chronic left shoulder pain    Cervical disc disorder at C5-C6 level with radiculopathy    Atypical squamous cells of undetermined significance (ASCUS) on Papanicolaou smear of cervix    Hypertrophic and atrophic condition of skin    Migraine headache    Shoulder impingement syndrome, left    Vitamin D deficiency    Close exposure to COVID-19 virus    Thoracic outlet syndrome    Palpitations    Post-operative pain    Transaminitis    Right middle lobe pulmonary nodule    Reversible airway obstruction    SOB (shortness of breath)    Musculoskeletal chest pain    COVID-19 virus infection    Unexplained weight gain    Left ovarian cyst    Dysphagia    Malignant neoplasm of overlapping sites of left breast in female, estrogen receptor positive     Cancer complicating pregnancy in first trimester       Past Medical History:   Past Medical History:   Diagnosis Date    Anxiety     Asthma     GERD (gastroesophageal reflux disease)     Heart murmur     Kidney stone     Miscarriage 3/15/2015    7 weeks along.    Neck pain        Surgical History:   Past Surgical History:   Procedure Laterality Date    " BREAST BIOPSY Right 12/02/2023    BREAST BIOPSY Left 12/02/2023    EGD      IR UPPER EXTREMITY VENOGRAM- DIAGNOSTIC  05/28/2021    CT EXCISION 1ST &/CERVICAL RIB Left 08/12/2021    Procedure: FIRST RIB RESECTION;  Surgeon: Jonathan Schaffer MD;  Location: [facility];  Service: Thoracic    THORACOSCOPY VIDEO ASSISTED SURGERY (VATS) Left 08/12/2021    Procedure: THORACOSCOPY VIDEO ASSISTED SURGERY (VATS);  Surgeon: Jonathan Schaffer MD;  Location: [facility];  Service: Thoracic    US GUIDANCE BREAST BIOPSY LEFT EACH ADDITIONAL Left 12/2/2023    US GUIDED BREAST BIOPSY RIGHT COMPLETE Right 12/2/2023    WISDOM TOOTH EXTRACTION         Family History:    Family History   Problem Relation Age of Onset    No Known Problems Mother     No Known Problems Father     Bone cancer Maternal Grandmother     Breast cancer Neg Hx     Colon cancer Neg Hx     Ovarian cancer Neg Hx     Uterine cancer Neg Hx     Cervical cancer Neg Hx        Cancer-related family history includes Bone cancer in her maternal grandmother. There is no history of Breast cancer, Colon cancer, Ovarian cancer, Uterine cancer, or Cervical cancer.    Social History:   Social History     Socioeconomic History    Marital status: /Civil Union     Spouse name: Not on file    Number of children: Not on file    Years of education: Not on file    Highest education level: Not on file   Occupational History    Not on file   Tobacco Use    Smoking status: Never    Smokeless tobacco: Never   Vaping Use    Vaping status: Never Used   Substance and Sexual Activity    Alcohol use: Not Currently     Comment: social    Drug use: Never    Sexual activity: Yes     Partners: Male     Birth control/protection: None   Other Topics Concern    Not on file   Social History Narrative    Not on file     Social Determinants of Health     Financial Resource Strain: Not on file   Food Insecurity: Not on file   Transportation Needs: No Transportation Needs (8/19/2021)    PRAPARE -  Transportation     Lack of Transportation (Medical): No     Lack of Transportation (Non-Medical): No   Physical Activity: Not on file   Stress: Not on file   Social Connections: Not on file   Intimate Partner Violence: Not on file   Housing Stability: Not on file       Current Medications:   Current Outpatient Medications   Medication Sig Dispense Refill    albuterol (PROVENTIL HFA,VENTOLIN HFA) 90 mcg/act inhaler TAKE 2 PUFFS BY MOUTH EVERY 6 HOURS AS NEEDED FOR WHEEZE OR FOR SHORTNESS OF BREATH 18 g 3    cetirizine (ZyrTEC) 10 mg tablet TAKE 1 TABLET BY MOUTH EVERY DAY 90 tablet 0    omeprazole (PriLOSEC) 40 MG capsule TAKE 1 CAPSULE (40 MG TOTAL) BY MOUTH DAILY. 90 capsule 0    citalopram (CeleXA) 10 mg tablet TAKE 1 TABLET BY MOUTH EVERY DAY (Patient not taking: Reported on 12/14/2023) 90 tablet 0    clindamycin-benzoyl peroxide (BENZACLIN) gel Apply topically every morning (Patient not taking: Reported on 12/14/2023) 50 g 0    clonazePAM (KlonoPIN) 0.5 mg tablet Take 1 tablet by mouth twice daily as needed for anxiety (Patient not taking: Reported on 12/14/2023) 60 tablet 0    Diclofenac Sodium (VOLTAREN) 1 % Apply 2 g topically 4 (four) times a day (Patient not taking: Reported on 12/14/2023) 150 g 2     No current facility-administered medications for this visit.       Allergies:   Allergies   Allergen Reactions    Nsaids GI Intolerance    Formic Acid Swelling and Rash    Latex Rash         Physical Exam:    Body surface area is 1.93 meters squared.    Wt Readings from Last 3 Encounters:   12/15/23 81.6 kg (180 lb)   12/14/23 81.3 kg (179 lb 3.2 oz)   12/13/23 80.3 kg (177 lb)        Temp Readings from Last 3 Encounters:   12/15/23 98.1 °F (36.7 °C)   12/13/23 97.6 °F (36.4 °C) (Temporal)   12/06/23 98.4 °F (36.9 °C) (Temporal)        BP Readings from Last 3 Encounters:   12/15/23 140/82   12/14/23 112/64   12/13/23 124/76         Pulse Readings from Last 3 Encounters:   12/15/23 95   12/14/23 98   12/13/23  76     @LASTSAO2(3)@    Physical Exam     Constitutional   General appearance: No acute distress, well appearing and well nourished.    Eyes   Conjunctiva and lids: No swelling, erythema or discharge.    Pupils and irises: Equal, round and reactive to light.    Ears, Nose, Mouth, and Throat   External inspection of ears and nose: Normal.    Nasal mucosa, septum, and turbinates: Normal without edema or erythema.    Oropharynx: Normal with no erythema, edema, exudate or lesions.    Pulmonary   Respiratory effort: No increased work of breathing or signs of respiratory distress.    Auscultation of lungs: Clear to auscultation.    Cardiovascular   Palpation of heart: Normal PMI, no thrills.    Auscultation of heart: Normal rate and rhythm, normal S1 and S2, without murmurs.    Examination of extremities for edema and/or varicosities: Normal.    Carotid pulses: Normal.    Abdomen   Abdomen: Non-tender, no masses.    Liver and spleen: No hepatomegaly or splenomegaly.    Lymphatic   Palpation of lymph nodes in neck: No lymphadenopathy.    Musculoskeletal   Gait and station: Normal.    Digits and nails: Normal without clubbing or cyanosis.    Inspection/palpation of joints, bones, and muscles: Normal.    Skin   Skin and subcutaneous tissue: Normal without rashes or lesions.    Neurologic   Cranial nerves: Cranial nerves 2-12 intact.    Sensation: No sensory loss.    Psychiatric   Orientation to person, place, and time: Normal.    Mood and affect: Normal.    Breast - 1 cm palpable lesion at the 12:00 positive of the left breast        Assessment/ Plan: 33-year-old female with a clinical T2 N0 ER/NM positive HER2 negative invasive ductal carcinoma.  The osteoclast like giant stromal cells are a rare histologic subtype of invasive ductal carcinoma.  As per my review of the literature this is not treated any differently than an invasive ductal carcinoma.  She is going to proceed with left-sided mastectomy and has been cleared  for this by [facility].  I appreciate their very thorough evaluation and examination of this patient.  I am mary send Oncotype on the biopsy specimen to know if there would be a role for adjuvant chemotherapy.  We broke down the Oncotype score and explained the benefit for each range.  26 and over she would need chemotherapy, 20-25 benefits of chemotherapy are about 6.5% in premenopausal women.  19 and under the benefit is very small to none at all.  We will plan on meeting in 3 weeks to review that result.  If she would need adjuvant chemotherapy I would offer her Adriamycin and Cytoxan.  By then we would be into the second trimester of pregnancy where chemotherapy becomes safer for the fetus.  She is planning on mastectomy to avoid radiation.  We are also waiting on her genetic testing.  The pregnancy itself, putting the ovaries in a quiescent state, may protect her fertility.  As long as she is pregnant, obviously, there is no way to harvest eggs for future pregnancies.  If she would want to have pregnancies in the future we know that is safe as is being pregnant now in terms of breast cancer outcomes and risks.  She does want to keep this pregnancy and I reassured her that there is no reason she cannot do that.  I will reach out to her [facility] provider to see if it would be okay if I gave her some Zofran to help with the nausea she is having in this trimester.  I gave her my nurses direct extension to call us if she has any questions or concerns in the meantime.        I spent 60 minutes in chart review, face to face counseling, coordination of care, and documentation.

## 2023-12-19 ENCOUNTER — TELEPHONE (OUTPATIENT)
Dept: SURGICAL ONCOLOGY | Facility: CLINIC | Age: 33
End: 2023-12-19

## 2023-12-19 ENCOUNTER — PATIENT OUTREACH (OUTPATIENT)
Dept: HEMATOLOGY ONCOLOGY | Facility: CLINIC | Age: 33
End: 2023-12-19

## 2023-12-19 DIAGNOSIS — O9A.111: Primary | ICD-10-CM

## 2023-12-19 NOTE — PROGRESS NOTES
Breast Oncology Nurse Navigator    Called patient to check in after recent follow up appointment with Dr Cornell and consultation with Dr Khalil.  Patient states the visits went well.      Reviewed all upcoming appointments related to her breast cancer diagnosis.  Patient is going to see her OB tomorrow.  Patient asking about when genetic test results will be in.  Message sent to genetics counselor.    Patient states she needs a letter for [employer] stating her diagnosis and current diagnostics/treatment plan.  Asked if it could be sent to [employer].  Will route this message to medical oncology and surgical oncology nurses for assistance.    Patient states her mother went to the hospital yesterday in [place], [place] after calling 911 and patient did not sleep well.  She is unsure of how her mother is doing at this time as her mom's cell phone  last night.  Support offered.    Patient has my contact information and knows to reach out with questions.

## 2023-12-19 NOTE — TELEPHONE ENCOUNTER
Called Jillian. Left voice message to check on patients mom. Advised her STAT genetic panel was negative. Advised to bring someone with her to tomorrow's appointment - someone that will be with her through recovery after surgery. Advised tomorrow's appointment can be a bit overwhelming since we review everything to expect from surgery. Told her if she has any information from [employer] to bring it to tomorrow's appointment so I can type the letter that she needs. Left direct teams number for patient to call back and if not I can answer all questions at tomorrow's visit with Dr. Cornell.

## 2023-12-20 ENCOUNTER — HOSPITAL ENCOUNTER (OUTPATIENT)
Dept: RADIOLOGY | Facility: HOSPITAL | Age: 33
Discharge: HOME | End: 2023-12-20
Payer: COMMERCIAL

## 2023-12-20 ENCOUNTER — APPOINTMENT (OUTPATIENT)
Dept: LAB | Facility: HOSPITAL | Age: 33
End: 2023-12-20
Attending: SURGERY
Payer: COMMERCIAL

## 2023-12-20 ENCOUNTER — OFFICE VISIT (OUTPATIENT)
Dept: SURGICAL ONCOLOGY | Facility: CLINIC | Age: 33
End: 2023-12-20
Payer: COMMERCIAL

## 2023-12-20 VITALS
TEMPERATURE: 97.8 F | SYSTOLIC BLOOD PRESSURE: 118 MMHG | DIASTOLIC BLOOD PRESSURE: 80 MMHG | WEIGHT: 177 LBS | BODY MASS INDEX: 27.78 KG/M2 | HEART RATE: 90 BPM | RESPIRATION RATE: 16 BRPM | HEIGHT: 67 IN | OXYGEN SATURATION: 99 %

## 2023-12-20 DIAGNOSIS — Z01.818 PRE-OP EXAMINATION: ICD-10-CM

## 2023-12-20 DIAGNOSIS — Z17.0 MALIGNANT NEOPLASM OF OVERLAPPING SITES OF LEFT BREAST IN FEMALE, ESTROGEN RECEPTOR POSITIVE: ICD-10-CM

## 2023-12-20 DIAGNOSIS — Z17.0 MALIGNANT NEOPLASM OF OVERLAPPING SITES OF LEFT BREAST IN FEMALE, ESTROGEN RECEPTOR POSITIVE: Primary | ICD-10-CM

## 2023-12-20 DIAGNOSIS — C50.812 MALIGNANT NEOPLASM OF OVERLAPPING SITES OF LEFT BREAST IN FEMALE, ESTROGEN RECEPTOR POSITIVE: ICD-10-CM

## 2023-12-20 DIAGNOSIS — C50.812 MALIGNANT NEOPLASM OF OVERLAPPING SITES OF LEFT BREAST IN FEMALE, ESTROGEN RECEPTOR POSITIVE: Primary | ICD-10-CM

## 2023-12-20 LAB
ATRIAL RATE: 82 BPM
P AXIS: 2 DEGREES
PR INTERVAL: 154 MS
QRS AXIS: 54 DEGREES
QRSD INTERVAL: 82 MS
QT INTERVAL: 360 MS
QTC INTERVAL: 420 MS
T WAVE AXIS: 49 DEGREES
VENTRICULAR RATE: 82 BPM

## 2023-12-20 PROCEDURE — 99205 OFFICE O/P NEW HI 60 MIN: CPT | Performed by: SURGERY

## 2023-12-20 PROCEDURE — 93005 ELECTROCARDIOGRAM TRACING: CPT

## 2023-12-20 PROCEDURE — 71046 X-RAY EXAM CHEST 2 VIEWS: CPT

## 2023-12-20 NOTE — PROGRESS NOTES
Surgical Oncology Follow Up  [facility]  [facility] [address] [address]    Jillian Ch  1990  [id number]      Chief Complaint   Patient presents with    Follow-up        Assessment & Plan:   33-year-old female with recent diagnosis of early stage breast cancer left side she has a surgical history of left report thoracic outlet syndrome .  She is recently diagnosed with ER/IN positive HER2 negative left breast cancer approximately 2.1 cm she had a ultrasound recently and consistent with viable pregnancy.  She has seen maternal-fetal medicine physician and I have discussed with him extensively over the phone as well as reviewed his note and we will proceed with surgery.  Surgical consent was obtained for left mastectomy with single tracer sentinel lymph node biopsy by utilizing radioactive tracer and possible axillary dissection.  Surgical consent was obtained after explained the benefit procedure alternatives and possible complication including slight risk of  as well as a birth defect risks were also discussed.  Wound healing chronic pain and all were discussed.  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 Breast, Left, US Guided Left Breast Biopsy 12:00 6cmfn:  Invasive breast carcinoma of no special type (ductal) with osteoclast-like stromal giant cells  Grade 2  ER 85  IN 95  HER2 1+     B. Breast, Right, US Guided Right Breast Biopsy 10:00 9cmfn:  Benign breast  tissue.    In review of the patient’s recent imaging, the left malignancy appears unifocal.  The biopsy-proven carcinoma measured 2.1 cm on ultrasound. Ultrasound of the left axilla was performed on 11/24/2023 and showed no suspicious adenopathy.  Recent imaging of the contralateral right breast dated 12/2/2023 and 11/24/2023 was reviewed and shows no suspicious findings.  The pathology results for the ultrasound-guided core needle biopsy (right breast 10:00, 9 cm from the nipple) are benign and concordant with imaging.     12/2/2023 Genetic Testing    [facility]  pending     12/2/2023 -  Cancer Staged    Staging form: Breast, AJCC 8th Edition  - Clinical stage from 12/2/2023: Stage IB (cT2, cN0, cM0, G2, ER+, OH+, HER2-) - Signed by Elena Cornell MD on 12/13/2023  Stage prefix: Initial diagnosis  Histologic grading system: 3 grade system             Interval History:   Follow-up with left breast cancer and pregnancy    Review of Systems:   Review of Systems   Constitutional:  Negative for chills and fever.   HENT:  Negative for ear pain and sore throat.    Eyes:  Negative for pain and visual disturbance.   Respiratory:  Negative for cough and shortness of breath.    Cardiovascular:  Negative for chest pain and palpitations.   Gastrointestinal:  Negative for abdominal pain and vomiting.   Genitourinary:  Negative for dysuria and hematuria.   Musculoskeletal:  Negative for arthralgias and back pain.   Skin:  Negative for color change and rash.   Neurological:  Negative for seizures and syncope.   All other systems reviewed and are negative.      Past Medical History     Patient Active Problem List   Diagnosis    Mild persistent asthma without complication    Axillary hidradenitis suppurativa    Anxiety    Chest wall pain    Gastroesophageal reflux disease without esophagitis    Depression with anxiety    Chronic fatigue    Myalgia    Chronic left shoulder pain    Cervical disc disorder at C5-C6 level with  radiculopathy    Atypical squamous cells of undetermined significance (ASCUS) on Papanicolaou smear of cervix    Hypertrophic and atrophic condition of skin    Migraine headache    Shoulder impingement syndrome, left    Vitamin D deficiency    Close exposure to COVID-19 virus    Thoracic outlet syndrome    Palpitations    Post-operative pain    Transaminitis    Right middle lobe pulmonary nodule    Reversible airway obstruction    SOB (shortness of breath)    Musculoskeletal chest pain    COVID-19 virus infection    Unexplained weight gain    Left ovarian cyst    Dysphagia    Malignant neoplasm of overlapping sites of left breast in female, estrogen receptor positive     Cancer complicating pregnancy in first trimester     Past Medical History:   Diagnosis Date    Anxiety     Asthma     GERD (gastroesophageal reflux disease)     Heart murmur     Kidney stone     Miscarriage 3/15/2015    7 weeks along.    Neck pain      Past Surgical History:   Procedure Laterality Date    BREAST BIOPSY Right 12/02/2023    BREAST BIOPSY Left 12/02/2023    EGD      IR UPPER EXTREMITY VENOGRAM- DIAGNOSTIC  05/28/2021    PA EXCISION 1ST &/CERVICAL RIB Left 08/12/2021    Procedure: FIRST RIB RESECTION;  Surgeon: Jonathan Schaffer MD;  [facility];  Service: Thoracic    THORACOSCOPY VIDEO ASSISTED SURGERY (VATS) Left 08/12/2021    Procedure: THORACOSCOPY VIDEO ASSISTED SURGERY (VATS);  Surgeon: Jonathan Schaffer MD;  [facility];  Service: Thoracic    US GUIDANCE BREAST BIOPSY LEFT EACH ADDITIONAL Left 12/2/2023    US GUIDED BREAST BIOPSY RIGHT COMPLETE Right 12/2/2023    WISDOM TOOTH EXTRACTION       Family History   Problem Relation Age of Onset    No Known Problems Mother     No Known Problems Father     Bone cancer Maternal Grandmother     Breast cancer Neg Hx     Colon cancer Neg Hx     Ovarian cancer Neg Hx     Uterine cancer Neg Hx     Cervical cancer Neg Hx      Social History     Socioeconomic History    Marital  status: /Civil Union     Spouse name: Not on file    Number of children: Not on file    Years of education: Not on file    Highest education level: Not on file   Occupational History    Not on file   Tobacco Use    Smoking status: Never    Smokeless tobacco: Never   Vaping Use    Vaping status: Never Used   Substance and Sexual Activity    Alcohol use: Not Currently     Comment: social    Drug use: Never    Sexual activity: Yes     Partners: Male     Birth control/protection: None   Other Topics Concern    Not on file   Social History Narrative    Not on file     Social Determinants of Health     Financial Resource Strain: Not on file   Food Insecurity: Not on file   Transportation Needs: No Transportation Needs (8/19/2021)    PRAPARE - Transportation     Lack of Transportation (Medical): No     Lack of Transportation (Non-Medical): No   Physical Activity: Not on file   Stress: Not on file   Social Connections: Not on file   Intimate Partner Violence: Not on file   Housing Stability: Not on file       Current Outpatient Medications:     albuterol (PROVENTIL HFA,VENTOLIN HFA) 90 mcg/act inhaler, TAKE 2 PUFFS BY MOUTH EVERY 6 HOURS AS NEEDED FOR WHEEZE OR FOR SHORTNESS OF BREATH, Disp: 18 g, Rfl: 3    cetirizine (ZyrTEC) 10 mg tablet, TAKE 1 TABLET BY MOUTH EVERY DAY, Disp: 90 tablet, Rfl: 0    omeprazole (PriLOSEC) 40 MG capsule, TAKE 1 CAPSULE (40 MG TOTAL) BY MOUTH DAILY., Disp: 90 capsule, Rfl: 0    citalopram (CeleXA) 10 mg tablet, TAKE 1 TABLET BY MOUTH EVERY DAY (Patient not taking: Reported on 12/14/2023), Disp: 90 tablet, Rfl: 0    clindamycin-benzoyl peroxide (BENZACLIN) gel, Apply topically every morning (Patient not taking: Reported on 12/14/2023), Disp: 50 g, Rfl: 0    clonazePAM (KlonoPIN) 0.5 mg tablet, Take 1 tablet by mouth twice daily as needed for anxiety (Patient not taking: Reported on 12/14/2023), Disp: 60 tablet, Rfl: 0    Diclofenac Sodium (VOLTAREN) 1 %, Apply 2 g topically 4 (four)  times a day (Patient not taking: Reported on 12/14/2023), Disp: 150 g, Rfl: 2  Allergies   Allergen Reactions    Nsaids GI Intolerance    Formic Acid Swelling and Rash    Latex Rash       Physical Exam:     Vitals:    12/20/23 1459   BP: 118/80   Pulse: 90   Resp: 16   Temp: 97.8 °F (36.6 °C)   SpO2: 99%     Physical Exam  Constitutional:       Appearance: Normal appearance.   HENT:      Head: Normocephalic and atraumatic.      Nose: Nose normal.      Mouth/Throat:      Mouth: Mucous membranes are moist.   Eyes:      Pupils: Pupils are equal, round, and reactive to light.   Cardiovascular:      Rate and Rhythm: Normal rate.      Pulses: Normal pulses.      Heart sounds: Normal heart sounds.   Pulmonary:      Effort: Pulmonary effort is normal.      Breath sounds: Normal breath sounds.   Chest:          Comments: Bilateral breast engorged no discrete discrete palpable masses.  Bilateral axillary and supraclavicular examination no palpable adenopathy  Abdominal:      General: Bowel sounds are normal.      Palpations: Abdomen is soft.   Musculoskeletal:         General: Normal range of motion.      Cervical back: Normal range of motion and neck supple.   Skin:     General: Skin is warm.   Neurological:      General: No focal deficit present.      Mental Status: She is alert and oriented to person, place, and time.   Psychiatric:         Mood and Affect: Mood normal.         Behavior: Behavior normal.         Thought Content: Thought content normal.         Judgment: Judgment normal.           Results & Discussion:   I did review maternal-fetal medicine progress note, medical oncology progress note as well as discussed over the phone with maternal-fetal medicine.  We also present the patient in our tumor board consensus was to proceed with surgery.  I did discussed in detail nature of breast cancer and pregnancy, we did discuss in detail disease status cancer treatment plans and cancer treatment goals.  She also have  already lymphedema of the left upper extremity from her thoracic outlet syndrome and rib removal surgery.  I did spend more than 80 minutes discussing with collaborating physicians, reviewing the charts, physical examination and extensive consultation of this young female with early stage breast cancer and pregnancy.  She and her 's hope to keep this pregnancy while taking care of breast the breast cancer.  She also has an upcoming ultrasound this week and maternal-fetal medicine consultation.  she understands and  agrees . All patient questions were answered.       Advance Care Planning/Advance Directives:  I Elena Cornell MD discussed the disease status with Jillian Ch  today 12/20/23  treatment plans and follow-up with the patient.

## 2023-12-20 NOTE — H&P (VIEW-ONLY)
Surgical Oncology Follow Up  [facility]  [facility] [address] [address]    Jillian Ch  1990  [id number]      Chief Complaint   Patient presents with    Follow-up        Assessment & Plan:   33-year-old female with recent diagnosis of early stage breast cancer left side she has a surgical history of left report thoracic outlet syndrome .  She is recently diagnosed with ER/SC positive HER2 negative left breast cancer approximately 2.1 cm she had a ultrasound recently and consistent with viable pregnancy.  She has seen maternal-fetal medicine physician and I have discussed with him extensively over the phone as well as reviewed his note and we will proceed with surgery.  Surgical consent was obtained for left mastectomy with single tracer sentinel lymph node biopsy by utilizing radioactive tracer and possible axillary dissection.  Surgical consent was obtained after explained the benefit procedure alternatives and possible complication including slight risk of  as well as a birth defect risks were also discussed.  Wound healing chronic pain and all were discussed.  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 Breast, Left, US Guided Left Breast Biopsy 12:00 6cmfn:  Invasive breast carcinoma of no special type (ductal) with osteoclast-like stromal giant cells  Grade 2  ER 85  SC 95  HER2 1+     B. Breast, Right, US Guided Right Breast Biopsy 10:00 9cmfn:  Benign breast  tissue.    In review of the patient’s recent imaging, the left malignancy appears unifocal.  The biopsy-proven carcinoma measured 2.1 cm on ultrasound. Ultrasound of the left axilla was performed on 11/24/2023 and showed no suspicious adenopathy.  Recent imaging of the contralateral right breast dated 12/2/2023 and 11/24/2023 was reviewed and shows no suspicious findings.  The pathology results for the ultrasound-guided core needle biopsy (right breast 10:00, 9 cm from the nipple) are benign and concordant with imaging.     12/2/2023 Genetic Testing    [facility]  pending     12/2/2023 -  Cancer Staged    Staging form: Breast, AJCC 8th Edition  - Clinical stage from 12/2/2023: Stage IB (cT2, cN0, cM0, G2, ER+, LA+, HER2-) - Signed by Elena Cornell MD on 12/13/2023  Stage prefix: Initial diagnosis  Histologic grading system: 3 grade system             Interval History:   Follow-up with left breast cancer and pregnancy    Review of Systems:   Review of Systems   Constitutional:  Negative for chills and fever.   HENT:  Negative for ear pain and sore throat.    Eyes:  Negative for pain and visual disturbance.   Respiratory:  Negative for cough and shortness of breath.    Cardiovascular:  Negative for chest pain and palpitations.   Gastrointestinal:  Negative for abdominal pain and vomiting.   Genitourinary:  Negative for dysuria and hematuria.   Musculoskeletal:  Negative for arthralgias and back pain.   Skin:  Negative for color change and rash.   Neurological:  Negative for seizures and syncope.   All other systems reviewed and are negative.      Past Medical History     Patient Active Problem List   Diagnosis    Mild persistent asthma without complication    Axillary hidradenitis suppurativa    Anxiety    Chest wall pain    Gastroesophageal reflux disease without esophagitis    Depression with anxiety    Chronic fatigue    Myalgia    Chronic left shoulder pain    Cervical disc disorder at C5-C6 level with  radiculopathy    Atypical squamous cells of undetermined significance (ASCUS) on Papanicolaou smear of cervix    Hypertrophic and atrophic condition of skin    Migraine headache    Shoulder impingement syndrome, left    Vitamin D deficiency    Close exposure to COVID-19 virus    Thoracic outlet syndrome    Palpitations    Post-operative pain    Transaminitis    Right middle lobe pulmonary nodule    Reversible airway obstruction    SOB (shortness of breath)    Musculoskeletal chest pain    COVID-19 virus infection    Unexplained weight gain    Left ovarian cyst    Dysphagia    Malignant neoplasm of overlapping sites of left breast in female, estrogen receptor positive     Cancer complicating pregnancy in first trimester     Past Medical History:   Diagnosis Date    Anxiety     Asthma     GERD (gastroesophageal reflux disease)     Heart murmur     Kidney stone     Miscarriage 3/15/2015    7 weeks along.    Neck pain      Past Surgical History:   Procedure Laterality Date    BREAST BIOPSY Right 12/02/2023    BREAST BIOPSY Left 12/02/2023    EGD      IR UPPER EXTREMITY VENOGRAM- DIAGNOSTIC  05/28/2021    DE EXCISION 1ST &/CERVICAL RIB Left 08/12/2021    Procedure: FIRST RIB RESECTION;  Surgeon: Jonathan Schaffer MD;  [facility];  Service: Thoracic    THORACOSCOPY VIDEO ASSISTED SURGERY (VATS) Left 08/12/2021    Procedure: THORACOSCOPY VIDEO ASSISTED SURGERY (VATS);  Surgeon: Jonathan Schaffer MD;  [facility];  Service: Thoracic    US GUIDANCE BREAST BIOPSY LEFT EACH ADDITIONAL Left 12/2/2023    US GUIDED BREAST BIOPSY RIGHT COMPLETE Right 12/2/2023    WISDOM TOOTH EXTRACTION       Family History   Problem Relation Age of Onset    No Known Problems Mother     No Known Problems Father     Bone cancer Maternal Grandmother     Breast cancer Neg Hx     Colon cancer Neg Hx     Ovarian cancer Neg Hx     Uterine cancer Neg Hx     Cervical cancer Neg Hx      Social History     Socioeconomic History    Marital  status: /Civil Union     Spouse name: Not on file    Number of children: Not on file    Years of education: Not on file    Highest education level: Not on file   Occupational History    Not on file   Tobacco Use    Smoking status: Never    Smokeless tobacco: Never   Vaping Use    Vaping status: Never Used   Substance and Sexual Activity    Alcohol use: Not Currently     Comment: social    Drug use: Never    Sexual activity: Yes     Partners: Male     Birth control/protection: None   Other Topics Concern    Not on file   Social History Narrative    Not on file     Social Determinants of Health     Financial Resource Strain: Not on file   Food Insecurity: Not on file   Transportation Needs: No Transportation Needs (8/19/2021)    PRAPARE - Transportation     Lack of Transportation (Medical): No     Lack of Transportation (Non-Medical): No   Physical Activity: Not on file   Stress: Not on file   Social Connections: Not on file   Intimate Partner Violence: Not on file   Housing Stability: Not on file       Current Outpatient Medications:     albuterol (PROVENTIL HFA,VENTOLIN HFA) 90 mcg/act inhaler, TAKE 2 PUFFS BY MOUTH EVERY 6 HOURS AS NEEDED FOR WHEEZE OR FOR SHORTNESS OF BREATH, Disp: 18 g, Rfl: 3    cetirizine (ZyrTEC) 10 mg tablet, TAKE 1 TABLET BY MOUTH EVERY DAY, Disp: 90 tablet, Rfl: 0    omeprazole (PriLOSEC) 40 MG capsule, TAKE 1 CAPSULE (40 MG TOTAL) BY MOUTH DAILY., Disp: 90 capsule, Rfl: 0    citalopram (CeleXA) 10 mg tablet, TAKE 1 TABLET BY MOUTH EVERY DAY (Patient not taking: Reported on 12/14/2023), Disp: 90 tablet, Rfl: 0    clindamycin-benzoyl peroxide (BENZACLIN) gel, Apply topically every morning (Patient not taking: Reported on 12/14/2023), Disp: 50 g, Rfl: 0    clonazePAM (KlonoPIN) 0.5 mg tablet, Take 1 tablet by mouth twice daily as needed for anxiety (Patient not taking: Reported on 12/14/2023), Disp: 60 tablet, Rfl: 0    Diclofenac Sodium (VOLTAREN) 1 %, Apply 2 g topically 4 (four)  times a day (Patient not taking: Reported on 12/14/2023), Disp: 150 g, Rfl: 2  Allergies   Allergen Reactions    Nsaids GI Intolerance    Formic Acid Swelling and Rash    Latex Rash       Physical Exam:     Vitals:    12/20/23 1459   BP: 118/80   Pulse: 90   Resp: 16   Temp: 97.8 °F (36.6 °C)   SpO2: 99%     Physical Exam  Constitutional:       Appearance: Normal appearance.   HENT:      Head: Normocephalic and atraumatic.      Nose: Nose normal.      Mouth/Throat:      Mouth: Mucous membranes are moist.   Eyes:      Pupils: Pupils are equal, round, and reactive to light.   Cardiovascular:      Rate and Rhythm: Normal rate.      Pulses: Normal pulses.      Heart sounds: Normal heart sounds.   Pulmonary:      Effort: Pulmonary effort is normal.      Breath sounds: Normal breath sounds.   Chest:          Comments: Bilateral breast engorged no discrete discrete palpable masses.  Bilateral axillary and supraclavicular examination no palpable adenopathy  Abdominal:      General: Bowel sounds are normal.      Palpations: Abdomen is soft.   Musculoskeletal:         General: Normal range of motion.      Cervical back: Normal range of motion and neck supple.   Skin:     General: Skin is warm.   Neurological:      General: No focal deficit present.      Mental Status: She is alert and oriented to person, place, and time.   Psychiatric:         Mood and Affect: Mood normal.         Behavior: Behavior normal.         Thought Content: Thought content normal.         Judgment: Judgment normal.           Results & Discussion:   I did review maternal-fetal medicine progress note, medical oncology progress note as well as discussed over the phone with maternal-fetal medicine.  We also present the patient in our tumor board consensus was to proceed with surgery.  I did discussed in detail nature of breast cancer and pregnancy, we did discuss in detail disease status cancer treatment plans and cancer treatment goals.  She also have  already lymphedema of the left upper extremity from her thoracic outlet syndrome and rib removal surgery.  I did spend more than 80 minutes discussing with collaborating physicians, reviewing the charts, physical examination and extensive consultation of this young female with early stage breast cancer and pregnancy.  She and her 's hope to keep this pregnancy while taking care of breast the breast cancer.  She also has an upcoming ultrasound this week and maternal-fetal medicine consultation.  she understands and  agrees . All patient questions were answered.       Advance Care Planning/Advance Directives:  I Elena Cornell MD discussed the disease status with Jillian Ch  today 12/20/23  treatment plans and follow-up with the patient.

## 2023-12-21 ENCOUNTER — TELEPHONE (OUTPATIENT)
Dept: SURGICAL ONCOLOGY | Facility: CLINIC | Age: 33
End: 2023-12-21

## 2023-12-21 ENCOUNTER — ULTRASOUND (OUTPATIENT)
Dept: PERINATAL CARE | Facility: OTHER | Age: 33
End: 2023-12-21
Payer: COMMERCIAL

## 2023-12-21 VITALS
HEIGHT: 67 IN | BODY MASS INDEX: 28.06 KG/M2 | SYSTOLIC BLOOD PRESSURE: 130 MMHG | HEART RATE: 93 BPM | WEIGHT: 178.8 LBS | DIASTOLIC BLOOD PRESSURE: 68 MMHG

## 2023-12-21 DIAGNOSIS — C50.812 MALIGNANT NEOPLASM OF OVERLAPPING SITES OF LEFT BREAST IN FEMALE, ESTROGEN RECEPTOR POSITIVE: Primary | ICD-10-CM

## 2023-12-21 DIAGNOSIS — R11.0 NAUSEA: Primary | ICD-10-CM

## 2023-12-21 DIAGNOSIS — O9A.111: ICD-10-CM

## 2023-12-21 DIAGNOSIS — Z17.0 MALIGNANT NEOPLASM OF OVERLAPPING SITES OF LEFT BREAST IN FEMALE, ESTROGEN RECEPTOR POSITIVE: Primary | ICD-10-CM

## 2023-12-21 DIAGNOSIS — Z3A.01 LESS THAN 8 WEEKS GESTATION OF PREGNANCY: ICD-10-CM

## 2023-12-21 DIAGNOSIS — C50.919 MALIGNANT NEOPLASM OF FEMALE BREAST, UNSPECIFIED ESTROGEN RECEPTOR STATUS, UNSPECIFIED LATERALITY, UNSPECIFIED SITE OF BREAST (HCC): ICD-10-CM

## 2023-12-21 DIAGNOSIS — N91.2 AMENORRHEA: ICD-10-CM

## 2023-12-21 LAB
DME PARACHUTE DELIVERY DATE REQUESTED: NORMAL
DME PARACHUTE DELIVERY NOTE: NORMAL
DME PARACHUTE ITEM DESCRIPTION: NORMAL
DME PARACHUTE ITEM DESCRIPTION: NORMAL
DME PARACHUTE ORDER STATUS: NORMAL
DME PARACHUTE SUPPLIER NAME: NORMAL
DME PARACHUTE SUPPLIER PHONE: NORMAL

## 2023-12-21 PROCEDURE — 76801 OB US < 14 WKS SINGLE FETUS: CPT | Performed by: OBSTETRICS & GYNECOLOGY

## 2023-12-21 PROCEDURE — 99214 OFFICE O/P EST MOD 30 MIN: CPT | Performed by: OBSTETRICS & GYNECOLOGY

## 2023-12-21 RX ORDER — ONDANSETRON 4 MG/1
4 TABLET, FILM COATED ORAL EVERY 8 HOURS PRN
Qty: 30 TABLET | Refills: 2 | Status: SHIPPED | OUTPATIENT
Start: 2023-12-21

## 2023-12-21 NOTE — TELEPHONE ENCOUNTER
Spoke with Fetal Medicine office in [place] to inquire if we could obtain fetal monitoring services for Jillian's surgery on 1/2. They stated that they would reach out to the provider who sees Jillian. Left my direct # to contact me.

## 2023-12-21 NOTE — PROGRESS NOTES
"[facility]: Jillian Ch was seen today for nuchal translucency ultrasound.  See ultrasound report under \"OB Procedures\" tab.   The time spent on this established patient on the encounter date included 10 minutes previsit service time reviewing records and precharting, 15 minutes face-to-face service time counseling regarding results and coordinating care, and  10 minutes charting, totalling 35 minutes.  Please don't hesitate to contact our office with any concerns or questions.  -Zina Cummings MD      "

## 2023-12-21 NOTE — TELEPHONE ENCOUNTER
Spoke with patient and let her know that Dr. Khalil spoke with Dr. Granda and she is allowed to take 4mg zofran. Patient would like us to send this to her pharmacy.

## 2023-12-22 ENCOUNTER — PATIENT MESSAGE (OUTPATIENT)
Dept: SURGICAL ONCOLOGY | Facility: CLINIC | Age: 33
End: 2023-12-22

## 2023-12-22 ENCOUNTER — PREP FOR PROCEDURE (OUTPATIENT)
Dept: SURGICAL ONCOLOGY | Facility: CLINIC | Age: 33
End: 2023-12-22

## 2023-12-22 ENCOUNTER — PATIENT OUTREACH (OUTPATIENT)
Dept: CASE MANAGEMENT | Facility: HOSPITAL | Age: 33
End: 2023-12-22

## 2023-12-22 ENCOUNTER — TELEPHONE (OUTPATIENT)
Dept: OBGYN CLINIC | Facility: CLINIC | Age: 33
End: 2023-12-22

## 2023-12-22 NOTE — TELEPHONE ENCOUNTER
Kiara with Dr Singletary' office was attempting to contact you regarding this patient's upcoming procedure. Dr Singletary is wanting to know about fetal monitoring during the surgery. Please call her back at [phone] as soon as you can. Thank you!

## 2023-12-22 NOTE — TELEPHONE ENCOUNTER
Discussed w/ Dr Rose and For pre and post surgery Fetal heart rate check- should be able to  done by ER/nursing staff.  They have doppler/portable US that they can bring to Recovery room to check this.

## 2023-12-22 NOTE — PROGRESS NOTES
Biopsychosocial and Barriers Assessment    Cancer Diagnosis: breast, stage IB  Home/Cell Phone: [phone]  Emergency Contact:  Efraín  Marital Status:   Interpretation concerns, speaks another language, preferred language: speaks English  Cultural concerns: none noted  Ability to read or write: independent    Caregiver/Support: , in laws, mother, friends  Children: currently in her first trimester of pregnancy  Child/Elder care: Not at this time    Housing: lives local to [facility]  Home Setup:   Lives With:   Daily Living Activities: independent  Durable Medical Equipment: none  Ambulation: independent    Preferred Pharmacy:   High co-pays with insurance: some concerns, see note  High co-pays with medication coverage: NA  No medication coverage: NA    Primary Care Provider: Dr. Baptiste  Hx of Home Health Care: none, will have after surgery, referral in progress  Hx of Short term rehab:  none  Mental Health Hx: depression and anxiety  Substance Abuse Hx: none  Employment: works full time with [employer], mentioned a second job that just ended  [place] Status/Location:  Ability to pay bills: some concerns for the future  POA/LW/AD:  Transportation Plan/Concerns: mother in law is retired and will assist as needed.      What do you know about your Cancer Diagnosis    What has your doctor told you about your cancer diagnosis:    What has your doctor told you about your cancer treatment:    What specific concerns do you have about your diagnosis and treatment:    Have you been made aware of any hair loss associated with treatment:    Additional Comments:      MSW s/w pt by phone this morning to introduce myself as a resource to her and assess for any needs.  Pt acknowledges being overwhelmed with appointments and information.  She is in her first trimester of pregnancy at this time and is newly dx with breast cancer.  She is scheduled for surgery with Dr. Cornell on 1/2, home health referral is placed  and noted to be pending at this time.  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  She notes that her  works full time as well and will take some time off to be with her after surgery.  There is some concern about finances, as she has had so many appointments recently which has used up a lot of her PTO.  She will be on FMLA starting 1/3 and has filed a claim with an insurance provider that she has through work, it sounds similar to a STD policy.  Pt reports that she is on her 's health insurance but did have a secondary insurance through the state which was termed on 11/30 without notice or explanation.  She says that she has been unable to get anyone on the phone to explain why this happened.  I suggested that she reach out to her state rep and have sent her this information today via email.    Pt notes that she has strong support in her life from her family, including her mother in law who is retired and lives locally.  She has been attending most appointments with her.  Pt's mother is very involved as well but lives 4 hours away and was recently hospitalized with pneumonia and an artery issue, so she has been unable to be here right now.  Pt says that she has a strong network of friends and coworkers as well.  She does note a history of depression and anxiety and was interested in information on support groups.  I sent this to her as well today via email.    At this point, pt says that she is just focused on what the next steps are.  She was appreciative of the call today, the offer for ongoing support, and the information provided.  I did send her an email with the state rep and state senator's office information as well as the [facility] website and their list of support groups.  My contact information is all listed in that email and she was  encouraged to reach out and use it as needed.   I will check that home health is set up for her closer to her surgery date, and will follow up with her when any additional tx recommendations are in place.  No other needs or concerns noted at this time.

## 2023-12-22 NOTE — TELEPHONE ENCOUNTER
Thank you for getting back to me. Just to clarify and confirm, Dr. Cornell was inquiring about intra op fetal monitoring, during the surgery. I realize this may be outside of the usual protocol for [place].   Please let me know if this is something we could obtain assistance with from [facility]. Thank you in advance!

## 2023-12-27 ENCOUNTER — PATIENT OUTREACH (OUTPATIENT)
Dept: CASE MANAGEMENT | Facility: HOSPITAL | Age: 33
End: 2023-12-27

## 2023-12-27 ENCOUNTER — ANESTHESIA EVENT (OUTPATIENT)
Dept: PERIOP | Facility: HOSPITAL | Age: 33
DRG: 781 | End: 2023-12-27
Payer: COMMERCIAL

## 2023-12-27 ENCOUNTER — NUTRITION (OUTPATIENT)
Dept: NUTRITION | Facility: CLINIC | Age: 33
End: 2023-12-27

## 2023-12-27 ENCOUNTER — OFFICE VISIT (OUTPATIENT)
Dept: FAMILY MEDICINE CLINIC | Facility: CLINIC | Age: 33
End: 2023-12-27
Payer: COMMERCIAL

## 2023-12-27 VITALS
WEIGHT: 173.8 LBS | BODY MASS INDEX: 27.28 KG/M2 | OXYGEN SATURATION: 100 % | DIASTOLIC BLOOD PRESSURE: 70 MMHG | HEART RATE: 88 BPM | SYSTOLIC BLOOD PRESSURE: 108 MMHG | TEMPERATURE: 98.5 F | HEIGHT: 67 IN

## 2023-12-27 DIAGNOSIS — Z00.00 ENCOUNTER FOR WELLNESS EXAMINATION IN ADULT: Primary | ICD-10-CM

## 2023-12-27 DIAGNOSIS — Z71.3 NUTRITIONAL COUNSELING: Primary | ICD-10-CM

## 2023-12-27 DIAGNOSIS — O9A.111: Primary | ICD-10-CM

## 2023-12-27 DIAGNOSIS — Z17.0 MALIGNANT NEOPLASM OF OVERLAPPING SITES OF LEFT BREAST IN FEMALE, ESTROGEN RECEPTOR POSITIVE: ICD-10-CM

## 2023-12-27 DIAGNOSIS — C50.812 MALIGNANT NEOPLASM OF OVERLAPPING SITES OF LEFT BREAST IN FEMALE, ESTROGEN RECEPTOR POSITIVE: ICD-10-CM

## 2023-12-27 PROCEDURE — 99395 PREV VISIT EST AGE 18-39: CPT | Performed by: NURSE PRACTITIONER

## 2023-12-27 NOTE — PATIENT INSTRUCTIONS
Nutrition Rx & Recommendations:  Diet: High Calorie, High Protein (for high calorie foods see pages 52-53, and for high protein foods see pages 49-51 in your Eating Hints book)  Small, frequent meals/snacks may be easier to tolerate than 3 large daily meals.  Aim for 5-6 small meals per day (every 2-3 hours).  Include protein at all meals/snacks.  Include a variety of foods (as tolerated/allowed by diet).  Follow a Cancer Preventative Nutrition Pattern: colorful, plant-based, low-fat, avoid added sugars, limit alcohol, include high fiber foods, limit processed meats, limit red meat, choose lean protein sources, use low-fat cooking methods, balance calories with physical activity, avoid excessive weight gain throughout life.  Incorporate physical activity as able/allowed.  Stay hydrated by sipping fluids of choice/tolerance throughout the day.  Liquid nutrition may be better tolerated than solids at times.  Alter food choices and eating patterns to accommodate changing needs.  Light physical activity (such as walking) is encouraged, as able/tolerated.  Weigh yourself regularly. If you notice weight loss, make an effort to increase your daily food/calorie intake. If you continue to notice loss after these efforts, reach out to your dietitian to establish a plan to stabilize weight.    For nausea/vomiting, suggestions include:  Eat 5-6 smaller meals throughout the day instead of 3 large meals.   Sip on calorie containing fluids throughout the day: 100% fruit juice, diluted juice, bone broth (higher protein broth), creamy soups, sports drinks (Gatorade, Poweraide, Pedialyte, etc.), Italian ice, popsicles, milkshakes, smoothies, oral nutrition supplements (Ensure, Boost, Glucerna etc.), gelatin/Jello, etc. (see page 41 of Eating Hints Book for other examples).  Try Ensure Clear and Pedialyte samples   Eat bland foods and foods easy on the stomach: clear broth (chicken, vegetable, bone, mushroom, beef), juice (caution  with acidic juices), potatoes, pretzels, crackers, cereal (Corn Flakes, Rice Chex, Rice Crispies, Cheerios), oatmeal or cream of wheat, white bread or toast, white rice, cooked vegetables (caution with gas producing vegetables), plain pasta, eggs, peanut butter, boiled chicken or turkey, soft cheeses.  Consume foods and drinks at room temperature. Try to avoid eating/drinking anything too hot or cold.   Try to avoid foods with strong odors.   Suck on tart candies such as lemon drops or ginger chews to help relieve nausea.   Ginger tea or flat ginger ale.   Foods to avoid:  High sugar foods such as soda, candies, desserts.   Greasy/fried foods  Gas producing foods such as broccoli, cabbage, Ione sprouts, raw fruits/vegetables, beans.  Caution with diary products unless they are low lactose or lactose free.   Alcohol  Spicy foods   Acidic foods: coffee, tea, citrus, tomato   Avoid eating your favorite foods when you feel nauseous so you don't develop a dislike of those foods.   Refer to pages 21-22 in Eating Hints Book for additional suggestions.    Follow Up Plan: 1/19/24 11am phone call follow up   Recommend Referral to Other Providers: none at this time

## 2023-12-27 NOTE — PROGRESS NOTES
ADULT ANNUAL PHYSICAL  [facility]    NAME: Jillian Ch  AGE: 33 y.o. SEX: female  : 1990     DATE: 2023     Assessment and Plan:     Problem List Items Addressed This Visit    None  Visit Diagnoses       Encounter for wellness examination in adult    -  Primary            Immunizations and preventive care screenings were discussed with patient today. Appropriate education was printed on patient's after visit summary.    Counseling:  Alcohol/drug use: discussed moderation in alcohol intake, the recommendations for healthy alcohol use, and avoidance of illicit drug use.  Dental Health: discussed importance of regular tooth brushing, flossing, and dental visits.  Injury prevention: discussed safety/seat belts, safety helmets, smoke detectors, carbon dioxide detectors, and smoking near bedding or upholstery.  Sexual health: discussed sexually transmitted diseases, partner selection, use of condoms, avoidance of unintended pregnancy, and contraceptive alternatives.  Exercise: the importance of regular exercise/physical activity was discussed. Recommend exercise 3-5 times per week for at least 30 minutes.          Return in about 6 months (around 2024).     Chief Complaint:     Chief Complaint   Patient presents with    Annual Exam      History of Present Illness:     Adult Annual Physical   Patient here for a comprehensive physical exam. The patient reports problems - recently dx with breast cancer, currently 8 weeks pregnant  .    Diet and Physical Activity  Diet/Nutrition: well balanced diet, poor diet, and nauseated, 1st trimester .   Exercise: no formal exercise.      Depression Screening  PHQ-2/9 Depression Screening    Little interest or pleasure in doing things: 1 - several days  Feeling down, depressed, or hopeless: 1 - several days  Trouble falling or staying asleep, or sleeping too much: 2 - more than half the  days  Feeling tired or having little energy: 3 - nearly every day  Poor appetite or overeatin - several days  Feeling bad about yourself - or that you are a failure or have let yourself or your family down: 1 - several days  Trouble concentrating on things, such as reading the newspaper or watching television: 2 - more than half the days  Moving or speaking so slowly that other people could have noticed. Or the opposite - being so fidgety or restless that you have been moving around a lot more than usual: 0 - not at all  Thoughts that you would be better off dead, or of hurting yourself in some way: 0 - not at all  PHQ-9 Score: 11   PHQ-9 Interpretation: Moderate depression          General Health  Sleep: gets 7-8 hours of sleep on average.   Hearing: normal - bilateral.  Vision: no vision problems.   Dental: regular dental visits.       /GYN Health  Follows with gynecology? yes   Last menstrual period: currently pregnant   Contraceptive method:  currently pregnant  .  History of STDs?: no.     Advanced Care Planning  Do you have an advanced directive? no  Do you have a durable medical power of ? no     Review of Systems:     Review of Systems   Constitutional:  Negative for activity change, chills, fatigue and fever.   HENT:  Negative for congestion, ear discharge, ear pain, sinus pressure, sinus pain, sore throat, tinnitus and trouble swallowing.    Eyes:  Negative for photophobia, pain, discharge, itching and visual disturbance.   Respiratory:  Negative for cough, chest tightness, shortness of breath and wheezing.    Cardiovascular:  Negative for chest pain and leg swelling.   Gastrointestinal:  Positive for nausea. Negative for abdominal distention, abdominal pain, constipation, diarrhea and vomiting.   Endocrine: Negative for polydipsia, polyphagia and polyuria.   Genitourinary:  Negative for dysuria and frequency.   Musculoskeletal:  Negative for arthralgias, myalgias, neck pain and neck  stiffness.   Skin:  Negative for color change.        Hot flashes   Neurological:  Positive for headaches. Negative for dizziness, syncope, weakness and numbness.   Hematological:  Does not bruise/bleed easily.   Psychiatric/Behavioral:  Negative for behavioral problems, confusion, self-injury, sleep disturbance and suicidal ideas. The patient is nervous/anxious.       Past Medical History:     Past Medical History:   Diagnosis Date    Anxiety     Asthma     GERD (gastroesophageal reflux disease)     Heart murmur     Kidney stone     Miscarriage 3/15/2015    7 weeks along.    Neck pain       Past Surgical History:     Past Surgical History:   Procedure Laterality Date    BREAST BIOPSY Right 12/02/2023    BREAST BIOPSY Left 12/02/2023    EGD      IR UPPER EXTREMITY VENOGRAM- DIAGNOSTIC  05/28/2021    VA EXCISION 1ST &/CERVICAL RIB Left 08/12/2021    Procedure: FIRST RIB RESECTION;  Surgeon: Jonathan Schaffer MD;  Location: [facility];  Service: Thoracic    THORACOSCOPY VIDEO ASSISTED SURGERY (VATS) Left 08/12/2021    Procedure: THORACOSCOPY VIDEO ASSISTED SURGERY (VATS);  Surgeon: Jonathan Schaffer MD;  Location: [facility];  Service: Thoracic    US GUIDANCE BREAST BIOPSY LEFT EACH ADDITIONAL Left 12/2/2023    US GUIDED BREAST BIOPSY RIGHT COMPLETE Right 12/2/2023    WISDOM TOOTH EXTRACTION        Social History:     Social History     Socioeconomic History    Marital status: /Civil Union     Spouse name: None    Number of children: None    Years of education: None    Highest education level: None   Occupational History    None   Tobacco Use    Smoking status: Never    Smokeless tobacco: Never   Vaping Use    Vaping status: Never Used   Substance and Sexual Activity    Alcohol use: Not Currently     Comment: social    Drug use: Never    Sexual activity: Yes     Partners: Male     Birth control/protection: None   Other Topics Concern    None   Social History Narrative    None     Social Determinants of Health      Financial Resource Strain: Not on file   Food Insecurity: Not on file   Transportation Needs: No Transportation Needs (8/19/2021)    PRAPARE - Transportation     Lack of Transportation (Medical): No     Lack of Transportation (Non-Medical): No   Physical Activity: Not on file   Stress: Not on file   Social Connections: Not on file   Intimate Partner Violence: Not on file   Housing Stability: Not on file      Family History:     Family History   Problem Relation Age of Onset    No Known Problems Mother     No Known Problems Father     Bone cancer Maternal Grandmother     Breast cancer Neg Hx     Colon cancer Neg Hx     Ovarian cancer Neg Hx     Uterine cancer Neg Hx     Cervical cancer Neg Hx       Current Medications:     Current Outpatient Medications   Medication Sig Dispense Refill    albuterol (PROVENTIL HFA,VENTOLIN HFA) 90 mcg/act inhaler TAKE 2 PUFFS BY MOUTH EVERY 6 HOURS AS NEEDED FOR WHEEZE OR FOR SHORTNESS OF BREATH 18 g 3    cetirizine (ZyrTEC) 10 mg tablet TAKE 1 TABLET BY MOUTH EVERY DAY 90 tablet 0    omeprazole (PriLOSEC) 40 MG capsule TAKE 1 CAPSULE (40 MG TOTAL) BY MOUTH DAILY. 90 capsule 0    ondansetron (ZOFRAN) 4 mg tablet Take 1 tablet (4 mg total) by mouth every 8 (eight) hours as needed for nausea or vomiting 30 tablet 2    citalopram (CeleXA) 10 mg tablet TAKE 1 TABLET BY MOUTH EVERY DAY (Patient not taking: Reported on 12/14/2023) 90 tablet 0    clindamycin-benzoyl peroxide (BENZACLIN) gel Apply topically every morning (Patient not taking: Reported on 12/14/2023) 50 g 0    clonazePAM (KlonoPIN) 0.5 mg tablet Take 1 tablet by mouth twice daily as needed for anxiety (Patient not taking: Reported on 12/14/2023) 60 tablet 0    Diclofenac Sodium (VOLTAREN) 1 % Apply 2 g topically 4 (four) times a day (Patient not taking: Reported on 12/14/2023) 150 g 2     No current facility-administered medications for this visit.      Allergies:     Allergies   Allergen Reactions    Nsaids GI Intolerance  "   Formic Acid Swelling and Rash    Latex Rash      Physical Exam:     /70   Pulse 88   Temp 98.5 °F (36.9 °C) (Tympanic)   Ht 5' 7\" (1.702 m)   Wt 78.8 kg (173 lb 12.8 oz)   LMP 10/31/2023   SpO2 100%   BMI 27.22 kg/m²     Physical Exam  Vitals and nursing note reviewed.   Constitutional:       Appearance: Normal appearance.   HENT:      Head: Normocephalic and atraumatic.   Cardiovascular:      Rate and Rhythm: Normal rate and regular rhythm.      Pulses: Normal pulses.      Heart sounds: Normal heart sounds.   Pulmonary:      Effort: Pulmonary effort is normal.      Breath sounds: Normal breath sounds.   Abdominal:      Palpations: Abdomen is soft.   Musculoskeletal:         General: Normal range of motion.      Cervical back: Normal range of motion and neck supple.   Skin:     General: Skin is warm.   Neurological:      General: No focal deficit present.      Mental Status: She is alert and oriented to person, place, and time.   Psychiatric:         Mood and Affect: Affect is tearful.      Comments: Emotional support provided           JOSE Stover   [facility]    "

## 2023-12-27 NOTE — PROGRESS NOTES
Outpatient Oncology Nutrition Consultation   Type of Consult: Follow Up  Care Location: Office Visit    Reason for referral: Notification from RN Navigator Kamla HAYWOOD on 12/7/23 that pt has triggered for oncology nutrition care (reason for referral: Patient is pregnant and newly diagnosed with breast cancer. Had questions regarding nutrition and sugar intake, etc).     Nutrition Assessment:   Oncology Diagnosis & Treatments:   Diagnosed with left beast cancer, ER positive, 12/2/23.   Scheduled for left mastectomy 1/2/24.   Planned for chemotherapy, follow up with [facility] 1/5/23.   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 Breast, Left, US Guided Left Breast Biopsy 12:00 6cmfn:  Invasive breast carcinoma of no special type (ductal) with osteoclast-like stromal giant cells  Grade 2  ER 85  WA 95  HER2 1+     B. Breast, Right, US Guided Right Breast Biopsy 10:00 9cmfn:  Benign breast tissue.    In review of the patient’s recent imaging, the left malignancy appears unifocal.  The biopsy-proven carcinoma measured 2.1 cm on ultrasound. Ultrasound of the left axilla was performed on 11/24/2023 and showed no suspicious adenopathy.  Recent imaging of the contralateral right breast dated 12/2/2023 and 11/24/2023 was reviewed and shows no suspicious findings.  The pathology results for the ultrasound-guided core needle biopsy (right breast 10:00, 9 cm from the nipple) are benign and concordant with imaging.     12/2/2023 Genetic Testing    [facility]  pending     12/2/2023 -  Cancer Staged    Staging form: Breast, AJCC 8th Edition  -  Clinical stage from 12/2/2023: Stage IB (cT2, cN0, cM0, G2, ER+, SD+, HER2-) - Signed by Elena Cornell MD on 12/13/2023  Stage prefix: Initial diagnosis  Histologic grading system: 3 grade system         Past Medical & Surgical Hx:   Patient Active Problem List   Diagnosis    Mild persistent asthma without complication    Axillary hidradenitis suppurativa    Anxiety    Chest wall pain    Gastroesophageal reflux disease without esophagitis    Depression with anxiety    Chronic fatigue    Myalgia    Chronic left shoulder pain    Cervical disc disorder at C5-C6 level with radiculopathy    Atypical squamous cells of undetermined significance (ASCUS) on Papanicolaou smear of cervix    Hypertrophic and atrophic condition of skin    Migraine headache    Shoulder impingement syndrome, left    Vitamin D deficiency    Close exposure to COVID-19 virus    Thoracic outlet syndrome    Palpitations    Post-operative pain    Transaminitis    Right middle lobe pulmonary nodule    Reversible airway obstruction    SOB (shortness of breath)    Musculoskeletal chest pain    COVID-19 virus infection    Unexplained weight gain    Left ovarian cyst    Dysphagia    Malignant neoplasm of overlapping sites of left breast in female, estrogen receptor positive     Cancer complicating pregnancy in first trimester     Past Medical History:   Diagnosis Date    Anxiety     Asthma     GERD (gastroesophageal reflux disease)     Heart murmur     Kidney stone     Miscarriage 3/15/2015    7 weeks along.    Neck pain      Past Surgical History:   Procedure Laterality Date    BREAST BIOPSY Right 12/02/2023    BREAST BIOPSY Left 12/02/2023    EGD      IR UPPER EXTREMITY VENOGRAM- DIAGNOSTIC  05/28/2021    SD EXCISION 1ST &/CERVICAL RIB Left 08/12/2021    Procedure: FIRST RIB RESECTION;  Surgeon: Jonathan Schaffer MD;  [facility]: [facility];  Service: Thoracic    THORACOSCOPY VIDEO ASSISTED SURGERY (VATS) Left 08/12/2021    Procedure:  THORACOSCOPY VIDEO ASSISTED SURGERY (VATS);  Surgeon: Jonathan Schaffer MD;  Location: [facility];  Service: Thoracic    US GUIDANCE BREAST BIOPSY LEFT EACH ADDITIONAL Left 12/2/2023    US GUIDED BREAST BIOPSY RIGHT COMPLETE Right 12/2/2023    WISDOM TOOTH EXTRACTION         Review of Medications:   Vitamins, Supplements and Herbals: Med List Reviewed & pt is only taking: Prenatal MVI    Current Outpatient Medications:     albuterol (PROVENTIL HFA,VENTOLIN HFA) 90 mcg/act inhaler, TAKE 2 PUFFS BY MOUTH EVERY 6 HOURS AS NEEDED FOR WHEEZE OR FOR SHORTNESS OF BREATH, Disp: 18 g, Rfl: 3    cetirizine (ZyrTEC) 10 mg tablet, TAKE 1 TABLET BY MOUTH EVERY DAY, Disp: 90 tablet, Rfl: 0    citalopram (CeleXA) 10 mg tablet, TAKE 1 TABLET BY MOUTH EVERY DAY (Patient not taking: Reported on 12/14/2023), Disp: 90 tablet, Rfl: 0    clindamycin-benzoyl peroxide (BENZACLIN) gel, Apply topically every morning (Patient not taking: Reported on 12/14/2023), Disp: 50 g, Rfl: 0    clonazePAM (KlonoPIN) 0.5 mg tablet, Take 1 tablet by mouth twice daily as needed for anxiety (Patient not taking: Reported on 12/14/2023), Disp: 60 tablet, Rfl: 0    Diclofenac Sodium (VOLTAREN) 1 %, Apply 2 g topically 4 (four) times a day (Patient not taking: Reported on 12/14/2023), Disp: 150 g, Rfl: 2    omeprazole (PriLOSEC) 40 MG capsule, TAKE 1 CAPSULE (40 MG TOTAL) BY MOUTH DAILY., Disp: 90 capsule, Rfl: 0    ondansetron (ZOFRAN) 4 mg tablet, Take 1 tablet (4 mg total) by mouth every 8 (eight) hours as needed for nausea or vomiting, Disp: 30 tablet, Rfl: 2    Most Recent Lab Results:   Lab Results   Component Value Date    WBC 5.57 12/09/2023    NEUTROABS 3.67 12/09/2023    IRON 83 07/29/2020    CHOLESTEROL 127 12/02/2023    TRIG 56 12/02/2023    HDL 43 (L) 12/02/2023    LDLCALC 73 12/02/2023    ALT 25 12/09/2023    AST 20 12/09/2023    ALB 3.9 12/09/2023    SODIUM 140 12/09/2023    SODIUM 139 12/02/2023    K 3.5 12/09/2023    K 3.7 12/02/2023      "12/09/2023    BUN 8 12/09/2023    BUN 8 12/02/2023    CREATININE 0.64 12/09/2023    CREATININE 0.61 12/02/2023    EGFR 115 12/09/2023    GLUF 79 12/09/2023    GLUF 100 (H) 12/02/2023    GLUC 88 10/10/2021    HGBA1C 5.4 12/02/2023    HGBA1C 5.1 02/17/2023    CALCIUM 8.5 12/09/2023    MG 2.3 10/19/2019       Anthropometric Measurements:   Height: 67\"  Ht Readings from Last 1 Encounters:   12/27/23 5' 7\" (1.702 m)     Wt Readings from Last 25 Encounters:   12/27/23 78.8 kg (173 lb 12.8 oz)   12/21/23 81.1 kg (178 lb 12.8 oz)   12/20/23 80.3 kg (177 lb)   12/15/23 81.6 kg (180 lb)   12/14/23 81.3 kg (179 lb 3.2 oz)   12/13/23 80.3 kg (177 lb)   12/06/23 81.6 kg (180 lb)   11/24/23 82.1 kg (181 lb)   10/19/23 82.1 kg (181 lb)   10/13/23 85.3 kg (188 lb)   09/26/23 85.3 kg (188 lb)   08/22/23 80.7 kg (178 lb)   06/08/23 78.9 kg (174 lb)   04/04/23 77.3 kg (170 lb 6.4 oz)   02/21/23 78.7 kg (173 lb 6.4 oz)   02/16/23 78.8 kg (173 lb 12.8 oz)   01/31/23 74.8 kg (165 lb)   11/09/22 77.2 kg (170 lb 3.2 oz)   10/31/22 77.6 kg (171 lb)   08/15/22 74.5 kg (164 lb 3.2 oz)   06/28/22 75.3 kg (166 lb)   06/21/22 76.7 kg (169 lb 1.5 oz)   05/25/22 73.9 kg (163 lb)   04/14/22 73.1 kg (161 lb 3.2 oz)   04/04/22 68 kg (150 lb)     Weight History:   Usual Weight: 130-145#  Varian: n/a  Home Scale: none    Oncology Nutrition-Anthropometrics      Flowsheet Row [facility] 12/13/2023 [facility]   Patient [age] (years): 33 years 33 years   Patient (female) height (in): 67 in 67 in   Current Weight to be used for anthropometric calculations (lbs) 173.8 lbs 177 lbs   Current Weight to be used for anthropometric calculations (kg) 79 kg 80.5 kg   BMI: 27.2 27.7   IBW female: 135 lbs 135 lbs   IBW (kg) female: 61.4 kg 61.4 kg   IBW % (female) 128.7 % 131.1 %   Adjusted BW (female): 144.7 lbs 145.5 lbs   Adjusted BW kg (female): 65.8 kg 66.1 " kg   % weight change after 1 week: -1.8 % -1.7 %   Weight change after 1 week (lbs) -3.2 lbs -3 lbs   % weight change after 1 month: -4 % -2.2 %   Weight change after 1 month (lbs) -7.2 lbs -4 lbs   % weight change after 3 months: -7.6 % -5.9 %   Weight change after 3 months (lbs) -14.2 lbs -11 lbs   % weight change after 6 months: -0.1 % 1.7 %   Weight change after 6 months (lbs) -0.2 lbs 3 lbs   % weight change after 1 year: 2.1 % 11.2 %   Weight change after 1 year (lbs) 3.6 lbs 17.8 lbs            Nutrition-Focused Physical Findings: none observed    Food/Nutrition-Related History & Client/Social History:    Current Nutrition Impact Symptoms:  [x] Nausea   -just picked up zofran rx  [x] Reduced Appetite  [] Acid Reflux    [x] Vomiting  [x] Unintended Wt Loss   -significant in past 3 months  [] Malabsorption    [x] Diarrhea  [] Unintended Wt Gain -over the past two years  [] Dumping Syndrome    [] Constipation  [] Thick Mucous/Secretions  [] Abdominal Pain    [] Dysgeusia (Altered Taste)  [] Xerostomia (Dry Mouth)  [] Gas    [] Dysosmia (Altered Smell)  [] Thrush  [] Difficulty Chewing    [] Oral Mucositis (Sore Mouth)  [] Fatigue  [] Hyperglycemia   Lab Results   Component Value Date    HGBA1C 5.4 12/02/2023      [] Odynophagia  [] Esophagitis  [] Other:    [] Dysphagia  [] Early Satiety  [] No Problems Eating      Food Allergies & Intolerances: yes: lactose intolerant     Current Diet: Lactose-Free and No red meat   Current Nutrition Intake: Less than usual   Appetite: Poor  Nutrition Route: PO  Oral Care: brushes QD  Activity level: Dizzy often in the morning. Degenerative disc disease limits physical activity.     24 Hr Diet Recall:   Yesterday: [place] w/ jam, cheese slices  This morning: Egg sandwich    Beverages: water with lemon (32oz x1),  body armour electrolyte drink (16oz x0-1), ginger ale (sips)  Supplements:   None      Oncology Nutrition-Estimated Needs      Flowsheet Row Nutrition from 12/27/2023  in [facility] [place] Nutrition from 2023 in [facility] [place]   Weight type used Actual weight Actual weight   Weight in kilograms (kg) used for estimated needs -- 80.5 kg   Weight in kilograms (kg) used for estimated needs 79 kg --   Energy needs formula:  35-40 kcal/kg Other (single)  [Estimated Energy Requirements (EER)]   Single value (kcal/kg): -- 2339   Energy needs based on 35 kcal/k kcal --   Energy needs based on 40 kcal/k kcal --   Energy needs based on single value: -- 556809 kcal   Protein needs formula: 1-1.2 g/kg 1-1.2 g/kg   Protein needs based on 1 g/kg 79 g 80 g   Protein needs based on 1.2 g/k g 97 g   Fluid needs formula: 35-40 mL/kg 35-40 mL/kg   Fluid needs based on 35 mL/kg 2769 mL 2818 mL   Fluid needs in ounces 94 oz 95 oz   Fluid needs based on 40 mL/kg 3164 mL 3220 mL   Fluid needs in ounces 107 oz 109 oz         **Estimated nutrition needs are based on comparative standards for malnutrition. Estimations fall within the comparative standards for energy and protein needs during pregnancy w/ morning sickness.     Discussion & Intervention:   Jillian was evaluated today for an RD follow up regarding poor po intake and pt request for diet guidance throughout treatment .  Jillian  is planned to undergo treatment for breast cancer , she is scheduled for mastectomy on 24. She explains today that she has been struggling with nausea and vomiting. She has been eating and hydrating very little. She did  a prescription for Zofran today.   Reviewed 24 hour recall, which revealed an inadequate po intake, and discussed ways to increase kcal, protein, and fluid intakes and optimize nutrient intake.Based on today's assessment, discussion included: MNT for: breast cancer, nausea/vomiting, weight management, adequate hydration & fluid choices, sipping on calorie containing beverages (examples include: adding whole milk or cream to  coffee, oral nutrition supplements, juice, electrolyte replacement beverages, milk, etc.), eating smaller more frequent meals every 2-3 hours (5-6 small meals/day), eating when feeling most hungry, utilizing oral nutrition supplements, and mindful eating concepts.   Moving forward, Jillian was encouraged to increase kcal, protein, and fluid intakes.  Materials Provided: Ensure samples and Ensure Coupons (Ensure Clear), Pedialyte samples   All questions and concerns addressed during today’s visit.  Jillian has RD contact information.    Nutrition Diagnosis:   Inadequate Energy Intake related to physiological causes, disease state and treatment related issues as evidenced by food recall, wt loss and discussion with pt and/or family.  Increased Nutrient Needs related to increased demand for nutrients as evidenced by pt planned for cancer treatment.  Patient has clinical indicators (or ASPEN criteria) consistent with moderate protein-calorie malnutrition in the context of Acute Illness or Injury as evidenced by >7.5% wt loss in 3 months and </=75% energy intake vs. Estimated needs for >/=1 month.  Monitoring & Evaluation:   Goals:  weight maintenance/stabilization  adequate nutrition impact symptom management  pt to meet >/=75% estimated nutrition needs daily  increase protein intake  increase fluid intake  increase calorie intake    Progress Towards Goals: Not Progressing    Nutrition Rx & Recommendations:  Diet: High Calorie, High Protein (for high calorie foods see pages 52-53, and for high protein foods see pages 49-51 in your Eating Hints book)  Small, frequent meals/snacks may be easier to tolerate than 3 large daily meals.  Aim for 5-6 small meals per day (every 2-3 hours).  Include protein at all meals/snacks.  Include a variety of foods (as tolerated/allowed by diet).  Follow a Cancer Preventative Nutrition Pattern: colorful, plant-based, low-fat, avoid added sugars, limit alcohol, include high fiber foods, limit  processed meats, limit red meat, choose lean protein sources, use low-fat cooking methods, balance calories with physical activity, avoid excessive weight gain throughout life.  Incorporate physical activity as able/allowed.  Stay hydrated by sipping fluids of choice/tolerance throughout the day.  Liquid nutrition may be better tolerated than solids at times.  Alter food choices and eating patterns to accommodate changing needs.  Light physical activity (such as walking) is encouraged, as able/tolerated.  Weigh yourself regularly. If you notice weight loss, make an effort to increase your daily food/calorie intake. If you continue to notice loss after these efforts, reach out to your dietitian to establish a plan to stabilize weight.    For nausea/vomiting, suggestions include:  Eat 5-6 smaller meals throughout the day instead of 3 large meals.   Sip on calorie containing fluids throughout the day: 100% fruit juice, diluted juice, bone broth (higher protein broth), creamy soups, sports drinks (Gatorade, Poweraide, Pedialyte, etc.), Italian ice, popsicles, milkshakes, smoothies, oral nutrition supplements (Ensure, Boost, Glucerna etc.), gelatin/Jello, etc. (see page 41 of Eating Hints Book for other examples).  Try Ensure Clear and Pedialyte samples   Eat bland foods and foods easy on the stomach: clear broth (chicken, vegetable, bone, mushroom, beef), juice (caution with acidic juices), potatoes, pretzels, crackers, cereal (Corn Flakes, Rice Chex, Rice Crispies, Cheerios), oatmeal or cream of wheat, white bread or toast, white rice, cooked vegetables (caution with gas producing vegetables), plain pasta, eggs, peanut butter, boiled chicken or turkey, soft cheeses.  Consume foods and drinks at room temperature. Try to avoid eating/drinking anything too hot or cold.   Try to avoid foods with strong odors.   Suck on tart candies such as lemon drops or ginger chews to help relieve nausea.   Ginger tea or flat ginger ale.    Foods to avoid:  High sugar foods such as soda, candies, desserts.   Greasy/fried foods  Gas producing foods such as broccoli, cabbage, [place] sprouts, raw fruits/vegetables, beans.  Caution with diary products unless they are low lactose or lactose free.   Alcohol  Spicy foods   Acidic foods: coffee, tea, citrus, tomato   Avoid eating your favorite foods when you feel nauseous so you don't develop a dislike of those foods.   Refer to pages 21-22 in Eating Hints Book for additional suggestions.    Follow Up Plan:  1/19/24 11am phone call follow up    Recommend Referral to Other Providers: none at this time

## 2023-12-27 NOTE — PROGRESS NOTES
Call out to faby REARDON/amada Villegas who confirms that pt is accepted for SOC 1/4/24 for post mastectomy drain care.  Made office and inpt team aware of this.  No other needs noted at this time.

## 2023-12-28 ENCOUNTER — TELEPHONE (OUTPATIENT)
Dept: SURGICAL ONCOLOGY | Facility: CLINIC | Age: 33
End: 2023-12-28

## 2023-12-28 ENCOUNTER — HOME HEALTH ADMISSION (OUTPATIENT)
Dept: HOME HEALTH SERVICES | Facility: HOME HEALTHCARE | Age: 33
End: 2023-12-28
Payer: COMMERCIAL

## 2023-12-28 NOTE — TELEPHONE ENCOUNTER
Called patient to discuss [employer] message included attachment of her completed FMLA as well as her spouse's. I stated on the voicemail that her FMLA paperwork did not have a return fax number. I stated her spouse's had 2 fax numbers so I have not faxed them anywhere as of yet. I did attach to her [employer] message so she can print it if she would like. Also advised the bra barry has been trying to get in contact with her to discuss the bra order after surgery. I left her Shabana number to call back as requested [phone]. I also left my direct teams number if patient would like to call me back.

## 2023-12-28 NOTE — PRE-PROCEDURE INSTRUCTIONS
Pre-Surgery Instructions:   Medication Instructions    albuterol (PROVENTIL HFA,VENTOLIN HFA) 90 mcg/act inhaler Continue as prescribed     cetirizine (ZyrTEC) 10 mg tablet Do not take day of surgery; continue as prescribed excluding DOS     omeprazole (PriLOSEC) 40 MG capsule Take Day of Surgery; Continue to take as prescribed including DOS with a small sip of water, unless usually taken at night     ondansetron (ZOFRAN) 4 mg tablet Take Day of Surgery; Continue to take as prescribed including DOS with a small sip of water, unless usually taken at night     Prenatal Multivit-Min-Fe-FA (PRE-SONIA PO) Do not take day of surgery; continue as prescribed excluding DOS (per patient, per surgery)    Medication instructions for day surgery reviewed. Please use only a sip of water to take your instructed medications. Avoid all over the counter vitamins, supplements and NSAIDS for one week prior to surgery per anesthesia guidelines. Tylenol is ok to take as needed.     You will receive a call one business day prior to surgery with an arrival time and hospital directions. If your surgery is scheduled on a Monday, the hospital will be calling you on the Friday prior to your surgery. If you have not heard from anyone by 8pm, please call the hospital supervisor through the hospital  at [phone]. (Srinivas [phone]).    Do not eat or drink anything after midnight the night before your surgery, including candy, mints, lifesavers, or chewing gum. Do not drink alcohol 24hrs before your surgery. Try not to smoke at least 24hrs before your surgery.       Follow the pre surgery showering instructions as listed in the “My Surgical Experience Booklet” or otherwise provided by your surgeon's office. Do not use a blade to shave the surgical area 1 week before surgery. It is okay to use a clean electric clippers up to 24 hours before surgery. Do not apply any lotions, creams, including makeup, cologne, deodorant, or perfumes  after showering on the day of your surgery. Do not use dry shampoo, hair spray, hair gel, or any type of hair products.     No contact lenses, eye make-up, or artificial eyelashes. Remove nail polish, including gel polish, and any artificial, gel, or acrylic nails if possible. Remove all jewelry including rings and body piercing jewelry.     Wear causal clothing that is easy to take on and off. Consider your type of surgery.    Keep any valuables, jewelry, piercings at home. Please bring any specially ordered equipment (sling, braces) if indicated.    Arrange for a responsible person to drive you to and from the hospital on the day of your surgery. Visitor Guidelines discussed.     Call the surgeon's office with any new illnesses, exposures, or additional questions prior to surgery.    Please reference your “My Surgical Experience Booklet” for additional information to prepare for your upcoming surgery.

## 2023-12-29 ENCOUNTER — TELEPHONE (OUTPATIENT)
Dept: OBGYN CLINIC | Facility: CLINIC | Age: 33
End: 2023-12-29

## 2023-12-29 ENCOUNTER — LAB REQUISITION (OUTPATIENT)
Dept: LAB | Facility: HOSPITAL | Age: 33
End: 2023-12-29

## 2023-12-29 ENCOUNTER — ULTRASOUND (OUTPATIENT)
Age: 33
End: 2023-12-29

## 2023-12-29 VITALS — WEIGHT: 173.8 LBS | BODY MASS INDEX: 27.22 KG/M2 | SYSTOLIC BLOOD PRESSURE: 118 MMHG | DIASTOLIC BLOOD PRESSURE: 58 MMHG

## 2023-12-29 DIAGNOSIS — R92.8 OTHER ABNORMAL AND INCONCLUSIVE FINDINGS ON DIAGNOSTIC IMAGING OF BREAST: ICD-10-CM

## 2023-12-29 DIAGNOSIS — C50.912 MALIGNANT NEOPLASM OF UNSPECIFIED SITE OF LEFT FEMALE BREAST (HCC): ICD-10-CM

## 2023-12-29 DIAGNOSIS — Z3A.01 7 WEEKS GESTATION OF PREGNANCY: Primary | ICD-10-CM

## 2023-12-29 PROCEDURE — PNV: Performed by: STUDENT IN AN ORGANIZED HEALTH CARE EDUCATION/TRAINING PROGRAM

## 2023-12-29 NOTE — TELEPHONE ENCOUNTER
Dr Rose to see pt today at [facility] office 11AM., TVUS.    Called pt - directions given- pt's mother to drive her to appt.

## 2023-12-29 NOTE — PROGRESS NOTES
"Patoient here with concerns of miscarry. Passed small clot this moring around 2:30am. Minimal cramping. Now only notices bleeding when wiping. States she should be around 8 weeks but she is \"measuring small\".   "

## 2023-12-29 NOTE — ASSESSMENT & PLAN NOTE
34yo  at 7weeks 3 days presents for viability check     Pt reports passing small clot at 2:30AM with subsequent light red spotting on pad. Pt has minimal cramping.     FHR present today on BSUS 168bpm.     -bleeding precautions provided   -of note, pt has mastectomy scheduled for 24 due to diagnosis of breast cancer. Plan for FHR check prior to surgery and post surgery.   -pt to return for initial PNV

## 2023-12-29 NOTE — TELEPHONE ENCOUNTER
Pt called with c/o vag. Blding & clots - started last night.  Pt is 8-3 wks according to LMP of 10/31/23.  Had early Dating US at [facility]- showed 7-3 wks.  Hx of miscarriage 2015.  TT sent to Dr JENKINS.

## 2023-12-29 NOTE — PROGRESS NOTES
7 weeks gestation of pregnancy  32yo  at 7weeks 3 days presents for viability check     Pt reports passing small clot at 2:30AM with subsequent light red spotting on pad. Pt has minimal cramping.     FHR present today on BSUS 168bpm.     -bleeding precautions provided   -of note, pt has mastectomy scheduled for 24 due to diagnosis of breast cancer. Plan for FHR check prior to surgery and post surgery.   -pt to return for initial PNV    Ultrasound Probe Disinfection    A transvaginal ultrasound was performed.   Prior to use, disinfection was performed with High Level Disinfection Process (Floqq).  Probe serial number [id number] was used.      Nemo Rose DO  23  11:38 AM

## 2023-12-29 NOTE — TELEPHONE ENCOUNTER
Called pt with Dr JENKINS recommendations to observe , per Dr JENKINS,  if miscarriage will have severe cramping & heavy blding. Can take motrin or tylenol  pt is B Positive.  Pt to be seen early next week.  Pt informed me she has breast cancer of the left breast & is scheduled for a mastectomy this Tuesday 1/2/24 at [facility]. Message sent to Dr JENKINS.- attempt to schedule US - office is closed Monday, will ck with DR Rose.  Await a call back.

## 2024-01-01 NOTE — ANESTHESIA PREPROCEDURE EVALUATION
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, estrogen receptor positive       MUSCULOSKELETAL   (+) Shoulder impingement syndrome, left      NEURO/PSYCH   (+) Anxiety   (+) Chronic left shoulder pain   (+) Depression with anxiety   (+) Migraine headache      PULMONARY   (+) Mild persistent asthma without complication   (+) SOB (shortness of breath)      Musculoskeletal and Integument   (+) Cervical disc disorder at C5-C6 level with radiculopathy      Other   (+) 7 weeks gestation of pregnancy   (+) Reversible airway obstruction        Physical Exam    Airway    Mallampati score: II  TM Distance: >3 FB  Neck ROM: full     Dental   No notable dental hx     Cardiovascular      Pulmonary   No wheezes    Other Findings  post-pubertal.      Anesthesia Plan  ASA Score- 3     Anesthesia Type- general with ASA Monitors.         Additional Monitors:     Airway Plan: LMA.    Comment: Current pregnancy, TIVA, scop patch.       Plan Factors-Exercise tolerance (METS): >4 METS.    Chart reviewed. EKG reviewed.  Existing labs reviewed. Patient summary reviewed.    Patient is not a current smoker.  Patient did not smoke on day of surgery.            Induction- intravenous.    Postoperative Plan- Plan for postoperative opioid use. Planned trial extubation    Informed Consent- Anesthetic plan and risks discussed with patient.  I personally reviewed this patient with the CRNA. Discussed  "and agreed on the Anesthesia Plan with the CRNA..            VITALS  LMP 10/31/2023   BP Readings from Last 3 Encounters:   12/29/23 118/58   12/27/23 108/70   12/21/23 130/68     LABS  Results from Last 12 Months   Lab Units 12/09/23  1000 12/02/23  0902   SODIUM mmol/L 140 139   POTASSIUM mmol/L 3.5 3.7   CHLORIDE mmol/L 107 107   CO2 mmol/L 24 24   ANION GAP mmol/L 9 8   BUN mg/dL 8 8   CREATININE mg/dL 0.64 0.61   CALCIUM mg/dL 8.5 9.1   AST U/L 20 17   ALT U/L 25 16   ALK PHOS U/L 67 65   TOTAL BILIRUBIN mg/dL 0.32 0.40   ALBUMIN g/dL 3.9 3.9     Results from Last 12 Months   Lab Units 12/09/23  1000 12/02/23  0902   WBC Thousand/uL 5.57 5.00   HEMOGLOBIN g/dL 11.3* 11.6   HEMATOCRIT % 35.4 37.3   PLATELETS Thousands/uL 197 224     No results for input(s): \"APTT\", \"INR\", \"PTT\" in the last 8784 hours.    ECG  Encounter Date: 12/20/23   EKG 12 lead   Result Value    Ventricular Rate 82    Atrial Rate 82    NJ Interval 154    QRSD Interval 82    QT Interval 360    QTC Interval 420    P Axis 2    QRS Axis 54    T Wave Axis 49    Narrative    Normal sinus rhythm  Normal ECG    Confirmed by Tianna Stone ([id number]) on 12/20/2023 7:53:47 PM     No results found for this or any previous visit.    ECHOCARDIOGRAPHY AND OTHER TESTING/IMAGING  n/a    ANESTHESIA RISK-BENEFIT DISCUSSION  BENEFITS INCLUDE (NBK [id number], PMID 05733031):   (1) A specialized anesthesia team reduces mortality and morbidity for major surgeries.  (2) The team provides analgesia/sedation/amnesia/akinesia as safely as possible.  (3) The team strives to reduce discomfort as much as reasonably possible.    RISKS ASSESSMENT (AND PLANS TO MITIGATE RISKS) INCLUDE:    Neurologic system: IntraOp awareness (Risk is ~1:1,000 - 1:14,000; PMID 67993205), Stroke (Risk ~<0.1-2% for most cases; PMID 88503551), nerve injury, vision loss, and POCD.  Since regional anesthesia may be used, risks of block failure, bleeding, infection, and nerve injury were " discussed.  Airway and Pulmonary system: Dental or mouth injury, throat pain, critical hypoxia, pneumothorax, prolonged intubation, post-op respiratory compromise.  Airway/Intubation risks and prior data: Direct laryngoscopy, Stylet; Type: Cuffed, Left double lumen tube, Oral; Tube Size: 37 FR; Laryngoscope: Mac; Blade Size: 3; Location: Oral; Grade View: 2a; Insertion Attempts: 1; Placement Verification: Auscultation, Cuff palpitation, End tidal CO2, Fiberoptic visualization   Major ARISCAT risk factors for pulmonary complications include: Other factors/Clinical gestalt: 1 pt, yielding a score 0-1= Low risk, 1.6%.  Cardiovascular system: Hypotension/Vasoplegia, arrhythmias, blood clot, elysia-op cardiac injury/MACE, bleeding, infection, or injury to vascular structures.  Signs of active, severe cardiac instability: none  Jorge's RCRI score items: none, yielding an RCRI Score of 0= 0.4% risk of MACE  Are elysia-op or intra-op beta blockers indicated? (PMID 32645299): no  FEN/GI system: Aspiration risk (~0.5% [facility] [id number]) and PONV (10-80% per Apfel score).  ASA NPO guideline compliance?: Yes  Medication risk assessment: Allergic reactions, bleeding due to anticoagulant use, overdoses, drug-drug interactions, injury to a fetus or  in pregnant or breastfeeding patients, sedation while driving/operating heavy machinery.  Recent relevant medications: yes: Recent albuterol  Personal or family history of anesthesia complications: yes: PONV  Pregnancy Status: Positive  Estimate mortality risks associated with anesthesia based on ASA-PS (PMID 17212764): ASA-PS III: 1:3,500

## 2024-01-02 ENCOUNTER — ANESTHESIA (OUTPATIENT)
Dept: PERIOP | Facility: HOSPITAL | Age: 34
DRG: 781 | End: 2024-01-02
Payer: COMMERCIAL

## 2024-01-02 ENCOUNTER — HOSPITAL ENCOUNTER (OUTPATIENT)
Dept: NUCLEAR MEDICINE | Facility: HOSPITAL | Age: 34
Discharge: HOME | DRG: 781 | End: 2024-01-02
Attending: SURGERY
Payer: COMMERCIAL

## 2024-01-02 ENCOUNTER — HOSPITAL ENCOUNTER (INPATIENT)
Facility: HOSPITAL | Age: 34
LOS: 1 days | Discharge: HOME | DRG: 781 | End: 2024-01-03
Attending: SURGERY | Admitting: SURGERY
Payer: COMMERCIAL

## 2024-01-02 ENCOUNTER — APPOINTMENT (OUTPATIENT)
Dept: ULTRASOUND IMAGING | Facility: HOSPITAL | Age: 34
DRG: 781 | End: 2024-01-02
Payer: COMMERCIAL

## 2024-01-02 ENCOUNTER — HOSPITAL ENCOUNTER (OUTPATIENT)
Dept: ULTRASOUND IMAGING | Facility: HOSPITAL | Age: 34
Discharge: HOME | DRG: 781 | End: 2024-01-02
Payer: COMMERCIAL

## 2024-01-02 ENCOUNTER — APPOINTMENT (OUTPATIENT)
Dept: RADIOLOGY | Facility: HOSPITAL | Age: 34
DRG: 781 | End: 2024-01-02
Payer: COMMERCIAL

## 2024-01-02 DIAGNOSIS — C50.812 MALIGNANT NEOPLASM OF OVERLAPPING SITES OF LEFT BREAST IN FEMALE, ESTROGEN RECEPTOR POSITIVE: ICD-10-CM

## 2024-01-02 DIAGNOSIS — O9A.111: ICD-10-CM

## 2024-01-02 DIAGNOSIS — Z17.0 MALIGNANT NEOPLASM OF OVERLAPPING SITES OF LEFT BREAST IN FEMALE, ESTROGEN RECEPTOR POSITIVE: ICD-10-CM

## 2024-01-02 PROBLEM — Z98.890 PONV (POSTOPERATIVE NAUSEA AND VOMITING): Status: ACTIVE | Noted: 2024-01-02

## 2024-01-02 PROBLEM — R11.2 PONV (POSTOPERATIVE NAUSEA AND VOMITING): Status: ACTIVE | Noted: 2024-01-02

## 2024-01-02 LAB
DME PARACHUTE DELIVERY DATE ACTUAL: NORMAL
DME PARACHUTE DELIVERY DATE REQUESTED: NORMAL
DME PARACHUTE DELIVERY NOTE: NORMAL
DME PARACHUTE ITEM DESCRIPTION: NORMAL
DME PARACHUTE ITEM DESCRIPTION: NORMAL
DME PARACHUTE ORDER STATUS: NORMAL
DME PARACHUTE SUPPLIER NAME: NORMAL
DME PARACHUTE SUPPLIER PHONE: NORMAL

## 2024-01-02 PROCEDURE — 88341 IMHCHEM/IMCYTCHM EA ADD ANTB: CPT | Performed by: PATHOLOGY

## 2024-01-02 PROCEDURE — 88342 IMHCHEM/IMCYTCHM 1ST ANTB: CPT | Performed by: PATHOLOGY

## 2024-01-02 PROCEDURE — 38792 RA TRACER ID OF SENTINL NODE: CPT

## 2024-01-02 PROCEDURE — 88344 IMHCHEM/IMCYTCHM EA MLT ANTB: CPT | Performed by: PATHOLOGY

## 2024-01-02 PROCEDURE — 0HTU0ZZ RESECTION OF LEFT BREAST, OPEN APPROACH: ICD-10-PCS | Performed by: SURGERY

## 2024-01-02 PROCEDURE — A9541 TC99M SULFUR COLLOID: HCPCS

## 2024-01-02 PROCEDURE — 88333 PATH CONSLTJ SURG CYTO XM 1: CPT | Performed by: PATHOLOGY

## 2024-01-02 PROCEDURE — 76801 OB US < 14 WKS SINGLE FETUS: CPT

## 2024-01-02 PROCEDURE — 88307 TISSUE EXAM BY PATHOLOGIST: CPT | Performed by: PATHOLOGY

## 2024-01-02 PROCEDURE — 07B60ZZ EXCISION OF LEFT AXILLARY LYMPHATIC, OPEN APPROACH: ICD-10-PCS | Performed by: SURGERY

## 2024-01-02 PROCEDURE — 38900 IO MAP OF SENT LYMPH NODE: CPT | Performed by: SURGERY

## 2024-01-02 PROCEDURE — 38792 RA TRACER ID OF SENTINL NODE: CPT | Performed by: SURGERY

## 2024-01-02 PROCEDURE — 19307 MAST MOD RAD: CPT | Performed by: SURGERY

## 2024-01-02 PROCEDURE — 76816 OB US FOLLOW-UP PER FETUS: CPT

## 2024-01-02 PROCEDURE — 19307 MAST MOD RAD: CPT

## 2024-01-02 RX ORDER — LIDOCAINE HYDROCHLORIDE 10 MG/ML
INJECTION, SOLUTION EPIDURAL; INFILTRATION; INTRACAUDAL; PERINEURAL AS NEEDED
Status: DISCONTINUED | OUTPATIENT
Start: 2024-01-02 | End: 2024-01-02

## 2024-01-02 RX ORDER — SODIUM CHLORIDE, SODIUM LACTATE, POTASSIUM CHLORIDE, CALCIUM CHLORIDE 600; 310; 30; 20 MG/100ML; MG/100ML; MG/100ML; MG/100ML
INJECTION, SOLUTION INTRAVENOUS CONTINUOUS PRN
Status: DISCONTINUED | OUTPATIENT
Start: 2024-01-02 | End: 2024-01-02

## 2024-01-02 RX ORDER — SCOLOPAMINE TRANSDERMAL SYSTEM 1 MG/1
1 PATCH, EXTENDED RELEASE TRANSDERMAL ONCE
Status: DISCONTINUED | OUTPATIENT
Start: 2024-01-02 | End: 2024-01-02

## 2024-01-02 RX ORDER — FENTANYL CITRATE 50 UG/ML
INJECTION, SOLUTION INTRAMUSCULAR; INTRAVENOUS AS NEEDED
Status: DISCONTINUED | OUTPATIENT
Start: 2024-01-02 | End: 2024-01-02

## 2024-01-02 RX ORDER — ONDANSETRON 2 MG/ML
4 INJECTION INTRAMUSCULAR; INTRAVENOUS ONCE AS NEEDED
Status: DISCONTINUED | OUTPATIENT
Start: 2024-01-02 | End: 2024-01-02 | Stop reason: HOSPADM

## 2024-01-02 RX ORDER — ONDANSETRON 2 MG/ML
INJECTION INTRAMUSCULAR; INTRAVENOUS AS NEEDED
Status: DISCONTINUED | OUTPATIENT
Start: 2024-01-02 | End: 2024-01-02

## 2024-01-02 RX ORDER — ACETAMINOPHEN 325 MG/1
650 TABLET ORAL EVERY 6 HOURS PRN
Status: DISCONTINUED | OUTPATIENT
Start: 2024-01-02 | End: 2024-01-03 | Stop reason: HOSPADM

## 2024-01-02 RX ORDER — PROPOFOL 10 MG/ML
INJECTION, EMULSION INTRAVENOUS AS NEEDED
Status: DISCONTINUED | OUTPATIENT
Start: 2024-01-02 | End: 2024-01-02

## 2024-01-02 RX ORDER — EPHEDRINE SULFATE 50 MG/ML
INJECTION INTRAVENOUS AS NEEDED
Status: DISCONTINUED | OUTPATIENT
Start: 2024-01-02 | End: 2024-01-02

## 2024-01-02 RX ORDER — PROPOFOL 10 MG/ML
INJECTION, EMULSION INTRAVENOUS CONTINUOUS PRN
Status: DISCONTINUED | OUTPATIENT
Start: 2024-01-02 | End: 2024-01-02

## 2024-01-02 RX ORDER — HYDROMORPHONE HCL/PF 1 MG/ML
0.5 SYRINGE (ML) INJECTION
Status: DISCONTINUED | OUTPATIENT
Start: 2024-01-02 | End: 2024-01-02 | Stop reason: HOSPADM

## 2024-01-02 RX ORDER — MAGNESIUM HYDROXIDE 1200 MG/15ML
LIQUID ORAL AS NEEDED
Status: DISCONTINUED | OUTPATIENT
Start: 2024-01-02 | End: 2024-01-02 | Stop reason: HOSPADM

## 2024-01-02 RX ORDER — ALBUTEROL SULFATE 90 UG/1
AEROSOL, METERED RESPIRATORY (INHALATION) AS NEEDED
Status: DISCONTINUED | OUTPATIENT
Start: 2024-01-02 | End: 2024-01-02

## 2024-01-02 RX ORDER — OXYCODONE HYDROCHLORIDE 5 MG/1
5 TABLET ORAL EVERY 4 HOURS PRN
Status: DISCONTINUED | OUTPATIENT
Start: 2024-01-02 | End: 2024-01-03 | Stop reason: HOSPADM

## 2024-01-02 RX ORDER — DIPHENHYDRAMINE HYDROCHLORIDE 50 MG/ML
INJECTION INTRAMUSCULAR; INTRAVENOUS AS NEEDED
Status: DISCONTINUED | OUTPATIENT
Start: 2024-01-02 | End: 2024-01-02

## 2024-01-02 RX ORDER — CEFAZOLIN SODIUM 2 G/50ML
2000 SOLUTION INTRAVENOUS ONCE
Status: COMPLETED | OUTPATIENT
Start: 2024-01-02 | End: 2024-01-02

## 2024-01-02 RX ORDER — BUPIVACAINE HYDROCHLORIDE 5 MG/ML
INJECTION, SOLUTION EPIDURAL; INTRACAUDAL AS NEEDED
Status: DISCONTINUED | OUTPATIENT
Start: 2024-01-02 | End: 2024-01-02 | Stop reason: HOSPADM

## 2024-01-02 RX ORDER — ONDANSETRON 2 MG/ML
4 INJECTION INTRAMUSCULAR; INTRAVENOUS EVERY 6 HOURS PRN
Status: DISCONTINUED | OUTPATIENT
Start: 2024-01-02 | End: 2024-01-03 | Stop reason: HOSPADM

## 2024-01-02 RX ORDER — METOCLOPRAMIDE HYDROCHLORIDE 5 MG/ML
INJECTION INTRAMUSCULAR; INTRAVENOUS AS NEEDED
Status: DISCONTINUED | OUTPATIENT
Start: 2024-01-02 | End: 2024-01-02

## 2024-01-02 RX ORDER — FENTANYL CITRATE/PF 50 MCG/ML
25 SYRINGE (ML) INJECTION
Status: DISCONTINUED | OUTPATIENT
Start: 2024-01-02 | End: 2024-01-02 | Stop reason: HOSPADM

## 2024-01-02 RX ADMIN — FENTANYL CITRATE 50 MCG: 50 INJECTION, SOLUTION INTRAMUSCULAR; INTRAVENOUS at 08:15

## 2024-01-02 RX ADMIN — FENTANYL CITRATE 25 MCG: 50 INJECTION INTRAMUSCULAR; INTRAVENOUS at 10:22

## 2024-01-02 RX ADMIN — EPHEDRINE SULFATE 5 MG: 50 INJECTION, SOLUTION INTRAVENOUS at 08:21

## 2024-01-02 RX ADMIN — ALBUTEROL SULFATE 2 PUFF: 90 AEROSOL, METERED RESPIRATORY (INHALATION) at 08:03

## 2024-01-02 RX ADMIN — LIDOCAINE HYDROCHLORIDE 50 MG: 10 INJECTION, SOLUTION EPIDURAL; INFILTRATION; INTRACAUDAL; PERINEURAL at 08:06

## 2024-01-02 RX ADMIN — DIPHENHYDRAMINE HYDROCHLORIDE 25 MG: 50 INJECTION, SOLUTION INTRAMUSCULAR; INTRAVENOUS at 08:37

## 2024-01-02 RX ADMIN — METOCLOPRAMIDE HYDROCHLORIDE 10 MG: 5 INJECTION INTRAMUSCULAR; INTRAVENOUS at 08:30

## 2024-01-02 RX ADMIN — DIPHENHYDRAMINE HYDROCHLORIDE 25 MG: 50 INJECTION, SOLUTION INTRAMUSCULAR; INTRAVENOUS at 08:08

## 2024-01-02 RX ADMIN — FENTANYL CITRATE 25 MCG: 50 INJECTION, SOLUTION INTRAMUSCULAR; INTRAVENOUS at 08:27

## 2024-01-02 RX ADMIN — FENTANYL CITRATE 25 MCG: 50 INJECTION, SOLUTION INTRAMUSCULAR; INTRAVENOUS at 08:05

## 2024-01-02 RX ADMIN — PROPOFOL 100 MCG/KG/MIN: 10 INJECTION, EMULSION INTRAVENOUS at 08:08

## 2024-01-02 RX ADMIN — Medication 2.5 MG: at 11:46

## 2024-01-02 RX ADMIN — PROPOFOL 200 MG: 10 INJECTION, EMULSION INTRAVENOUS at 08:08

## 2024-01-02 RX ADMIN — DIPHENHYDRAMINE HYDROCHLORIDE 25 MG: 50 INJECTION, SOLUTION INTRAMUSCULAR; INTRAVENOUS at 08:34

## 2024-01-02 RX ADMIN — ACETAMINOPHEN 650 MG: 325 TABLET, FILM COATED ORAL at 21:58

## 2024-01-02 RX ADMIN — CEFAZOLIN SODIUM 2000 MG: 2 SOLUTION INTRAVENOUS at 07:55

## 2024-01-02 RX ADMIN — CEFAZOLIN SODIUM 2000 MG: 2 SOLUTION INTRAVENOUS at 08:00

## 2024-01-02 RX ADMIN — SCOPALAMINE 1 PATCH: 1 PATCH, EXTENDED RELEASE TRANSDERMAL at 07:48

## 2024-01-02 RX ADMIN — SODIUM CHLORIDE, SODIUM LACTATE, POTASSIUM CHLORIDE, AND CALCIUM CHLORIDE: .6; .31; .03; .02 INJECTION, SOLUTION INTRAVENOUS at 07:20

## 2024-01-02 RX ADMIN — ONDANSETRON 4 MG: 2 INJECTION INTRAMUSCULAR; INTRAVENOUS at 09:10

## 2024-01-02 NOTE — ANESTHESIA POSTPROCEDURE EVALUATION
Post-Op Assessment Note    Post surgical fetal US shows similar HR to prior.    Patient tolerated the procedure well.

## 2024-01-02 NOTE — PLAN OF CARE
Problem: PAIN - ADULT  Goal: Verbalizes/displays adequate comfort level or baseline comfort level  Description: Interventions:  - Encourage patient to monitor pain and request assistance  - Assess pain using appropriate pain scale  - Administer analgesics based on type and severity of pain and evaluate response  - Implement non-pharmacological measures as appropriate and evaluate response  - Consider cultural and social influences on pain and pain management  - Notify physician/advanced practitioner if interventions unsuccessful or patient reports new pain  Outcome: Progressing     Problem: INFECTION - ADULT  Goal: Absence or prevention of progression during hospitalization  Description: INTERVENTIONS:  - Assess and monitor for signs and symptoms of infection  - Monitor lab/diagnostic results  - Monitor all insertion sites, i.e. indwelling lines, tubes, and drains  - Monitor endotracheal if appropriate and nasal secretions for changes in amount and color  - [place] appropriate cooling/warming therapies per order  - Administer medications as ordered  - Instruct and encourage patient and family to use good hand hygiene technique  - Identify and instruct in appropriate isolation precautions for identified infection/condition  Outcome: Progressing  Goal: Absence of fever/infection during neutropenic period  Description: INTERVENTIONS:  - Monitor WBC    Outcome: Progressing     Problem: SAFETY ADULT  Goal: Patient will remain free of falls  Description: INTERVENTIONS:  - Educate patient/family on patient safety including physical limitations  - Instruct patient to call for assistance with activity   - Consult OT/PT to assist with strengthening/mobility   - Keep Call bell within reach  - Keep bed low and locked with side rails adjusted as appropriate  - Keep care items and personal belongings within reach  - Initiate and maintain comfort rounds  - Make Fall Risk Sign visible to staff  - Offer Toileting every  Hours,  in advance of need  - Initiate/Maintain alarm  - Obtain necessary fall risk management equipment:   - Apply yellow socks and bracelet for high fall risk patients  - Consider moving patient to room near nurses station  Outcome: Progressing  Goal: Maintain or return to baseline ADL function  Description: INTERVENTIONS:  -  Assess patient's ability to carry out ADLs; assess patient's baseline for ADL function and identify physical deficits which impact ability to perform ADLs (bathing, care of mouth/teeth, toileting, grooming, dressing, etc.)  - Assess/evaluate cause of self-care deficits   - Assess range of motion  - Assess patient's mobility; develop plan if impaired  - Assess patient's need for assistive devices and provide as appropriate  - Encourage maximum independence but intervene and supervise when necessary  - Involve family in performance of ADLs  - Assess for home care needs following discharge   - Consider OT consult to assist with ADL evaluation and planning for discharge  - Provide patient education as appropriate  Outcome: Progressing  Goal: Maintains/Returns to pre admission functional level  Description: INTERVENTIONS:  - Perform AM-PAC 6 Click Basic Mobility/ Daily Activity assessment daily.  - Set and communicate daily mobility goal to care team and patient/family/caregiver.   - Collaborate with rehabilitation services on mobility goals if consulted  - Perform Range of Motion  times a day.  - Reposition patient every  hours.  - Dangle patient  times a day  - Stand patient  times a day  - Ambulate patient  times a day  - Out of bed to chair  times a day   - Out of bed for meals times a day  - Out of bed for toileting  - Record patient progress and toleration of activity level   Outcome: Progressing     Problem: Knowledge Deficit  Goal: Patient/family/caregiver demonstrates understanding of disease process, treatment plan, medications, and discharge instructions  Description: Complete learning  assessment and assess knowledge base.  Interventions:  - Provide teaching at level of understanding  - Provide teaching via preferred learning methods  Outcome: Progressing

## 2024-01-02 NOTE — INTERVAL H&P NOTE
H&P reviewed. After examining the patient I find no changes in the patients condition since the H&P had been written.    Vitals:    01/02/24 0648   BP: 113/65   Pulse: 81   Resp: 16   Temp: 97.9 °F (36.6 °C)   SpO2: 100%

## 2024-01-02 NOTE — ANESTHESIA POSTPROCEDURE EVALUATION
Post-Op Assessment Note    CV Status:  Stable  Pain Score: 0    Pain management: adequate       Mental Status:  Sleepy   Hydration Status:  Stable   PONV Controlled:  None   Airway Patency:  Patent     Post Op Vitals Reviewed: Yes      Staff: Anesthesiologist, CRNA               BP   131/61   Temp   98.1   Pulse  107   Resp   14   SpO2   100% on facemask

## 2024-01-03 VITALS
RESPIRATION RATE: 16 BRPM | DIASTOLIC BLOOD PRESSURE: 72 MMHG | OXYGEN SATURATION: 98 % | HEIGHT: 67 IN | BODY MASS INDEX: 27.06 KG/M2 | WEIGHT: 172.4 LBS | SYSTOLIC BLOOD PRESSURE: 115 MMHG | TEMPERATURE: 98.6 F | HEART RATE: 79 BPM

## 2024-01-03 PROBLEM — R11.2 PONV (POSTOPERATIVE NAUSEA AND VOMITING): Status: RESOLVED | Noted: 2024-01-02 | Resolved: 2024-01-03

## 2024-01-03 PROBLEM — Z98.890 PONV (POSTOPERATIVE NAUSEA AND VOMITING): Status: RESOLVED | Noted: 2024-01-02 | Resolved: 2024-01-03

## 2024-01-03 PROBLEM — Z90.12 H/O LEFT MASTECTOMY: Status: ACTIVE | Noted: 2024-01-03

## 2024-01-03 LAB
ANION GAP SERPL CALCULATED.3IONS-SCNC: 8 MMOL/L
BASOPHILS # BLD AUTO: 0.01 THOUSANDS/ÂΜL (ref 0–0.1)
BASOPHILS NFR BLD AUTO: 0 % (ref 0–1)
BUN SERPL-MCNC: 4 MG/DL (ref 5–25)
CALCIUM SERPL-MCNC: 8.4 MG/DL (ref 8.4–10.2)
CHLORIDE SERPL-SCNC: 107 MMOL/L (ref 96–108)
CO2 SERPL-SCNC: 22 MMOL/L (ref 21–32)
CREAT SERPL-MCNC: 0.54 MG/DL (ref 0.6–1.3)
EOSINOPHIL # BLD AUTO: 0.11 THOUSAND/ÂΜL (ref 0–0.61)
EOSINOPHIL NFR BLD AUTO: 2 % (ref 0–6)
ERYTHROCYTE [DISTWIDTH] IN BLOOD BY AUTOMATED COUNT: 14.3 % (ref 11.6–15.1)
GFR SERPL CREATININE-BSD FRML MDRD: 122 ML/MIN/1.73SQ M
GLUCOSE SERPL-MCNC: 95 MG/DL (ref 65–140)
HCT VFR BLD AUTO: 31.6 % (ref 34.8–46.1)
HGB BLD-MCNC: 10 G/DL (ref 11.5–15.4)
IMM GRANULOCYTES # BLD AUTO: 0.03 THOUSAND/UL (ref 0–0.2)
IMM GRANULOCYTES NFR BLD AUTO: 0 % (ref 0–2)
LYMPHOCYTES # BLD AUTO: 1.51 THOUSANDS/ÂΜL (ref 0.6–4.47)
LYMPHOCYTES NFR BLD AUTO: 21 % (ref 14–44)
MCH RBC QN AUTO: 26.4 PG (ref 26.8–34.3)
MCHC RBC AUTO-ENTMCNC: 31.6 G/DL (ref 31.4–37.4)
MCV RBC AUTO: 83 FL (ref 82–98)
MONOCYTES # BLD AUTO: 0.49 THOUSAND/ÂΜL (ref 0.17–1.22)
MONOCYTES NFR BLD AUTO: 7 % (ref 4–12)
NEUTROPHILS # BLD AUTO: 5.13 THOUSANDS/ÂΜL (ref 1.85–7.62)
NEUTS SEG NFR BLD AUTO: 70 % (ref 43–75)
NRBC BLD AUTO-RTO: 0 /100 WBCS
PLATELET # BLD AUTO: 183 THOUSANDS/UL (ref 149–390)
PMV BLD AUTO: 11.4 FL (ref 8.9–12.7)
POTASSIUM SERPL-SCNC: 3.5 MMOL/L (ref 3.5–5.3)
RBC # BLD AUTO: 3.79 MILLION/UL (ref 3.81–5.12)
SODIUM SERPL-SCNC: 137 MMOL/L (ref 135–147)
WBC # BLD AUTO: 7.28 THOUSAND/UL (ref 4.31–10.16)

## 2024-01-03 PROCEDURE — 85025 COMPLETE CBC W/AUTO DIFF WBC: CPT

## 2024-01-03 PROCEDURE — 99024 POSTOP FOLLOW-UP VISIT: CPT | Performed by: SURGERY

## 2024-01-03 PROCEDURE — 80048 BASIC METABOLIC PNL TOTAL CA: CPT

## 2024-01-03 RX ADMIN — Medication 2.5 MG: at 02:36

## 2024-01-03 RX ADMIN — ACETAMINOPHEN 650 MG: 325 TABLET, FILM COATED ORAL at 09:41

## 2024-01-03 NOTE — UTILIZATION REVIEW
"Initial Clinical Review    Elective  surgical procedure  Age/Sex: 33 y.o. female  Surgery Date: 1/2  Procedure: Left - LEFT BREAST MASTECTOMY. LYMPHATIC MAPPING WITH RADIOACTIVE DYE. SENTINEL LYMPH NODE BIOPSY. ZAHEER LEFT BREAST. INJECTION IN OR AT 0800 BY DR MOORE   Anesthesia: general   Operative Findings: [place] lymph node touch prep negative for carcinoma  Zaheer clip in the specimen.    POD#1 Progress Note:     Admission Orders: Date/Time/Statement:   POD1 s/p left mastectomy and lymph node biopsy . Prn meds pain . Enc amb , DVT ppx , IS , cont home meds. Okay for DC today .     Orders Placed This Encounter   Procedures    Inpatient Admission     Standing Status:   Standing     Number of Occurrences:   1     Order Specific Question:   Level of Care     Answer:   Med Surg [16]     Order Specific Question:   Estimated length of stay     Answer:   More than 2 Midnights     Order Specific Question:   Certification     Answer:   I certify that inpatient services are medically necessary for this patient for a duration of greater than two midnights. See H&P and MD Progress Notes for additional information about the patient's course of treatment.     Vital Signs: /69   Pulse 79   Temp 98.8 °F (37.1 °C)   Resp 16   Ht 5' 7\" (1.702 m)   Wt 78.2 kg (172 lb 6.4 oz)   LMP 10/31/2023   SpO2 98%   BMI 27.00 kg/m²     Pertinent Labs/Diagnostic Test Results:   US OB follow up transabdominal approach   Final Result by Kathy Christopher MD (01/02 1742)   1. Live embryo with fetal heart rate of approximately 183 bpm.      Workstation performed: [id number]         US follow up   Final Result by Reggie Coburn MD (01/02 1205)      US OB < 14 weeks single or first gestation level 1   Final Result by Luis Carlos Arechiga MD (01/02 0833)   Addendum (preliminary) 1 of 1 by Luis Carlos Arechiga MD (01/02 0833)   ADDENDUM:      Please note only transabdominal imaging was performed.      Final      Single live intrauterine " gestation at 8 weeks 6 days (range +/- 4 days).      BALTAZAR of 8/7/2024.            Workstation performed: [id number]         Mammo raissa  breast specimen (No Charge)    (Results Pending)         Results from last 7 days   Lab Units 01/03/24  0432   WBC Thousand/uL 7.28   HEMOGLOBIN g/dL 10.0*   HEMATOCRIT % 31.6*   PLATELETS Thousands/uL 183   NEUTROS ABS Thousands/µL 5.13         Results from last 7 days   Lab Units 01/03/24  0432   SODIUM mmol/L 137   POTASSIUM mmol/L 3.5   CHLORIDE mmol/L 107   CO2 mmol/L 22   ANION GAP mmol/L 8   BUN mg/dL 4*   CREATININE mg/dL 0.54*   EGFR ml/min/1.73sq m 122   CALCIUM mg/dL 8.4     Results from last 7 days   Lab Units 01/03/24  0432   GLUCOSE RANDOM mg/dL 95         Diet: reg   Mobility: act as linnette   DVT Prophylaxis: SCD    Medications/Pain Control:   Scheduled Medications:     Continuous IV Infusions:     PRN Meds:  acetaminophen, 650 mg, Oral, Q6H PRN  lactated ringers, 1,000 mL, Intravenous, Once PRN   And  lactated ringers, 1,000 mL, Intravenous, Once PRN  ondansetron, 4 mg, Intravenous, Q6H PRN  oxyCODONE, 5 mg, Oral, Q4H PRN  oxyCODONE, 2.5 mg, Oral, Q4H PRN        Network Utilization Review Department  ATTENTION: Please call with any questions or concerns to [phone] and carefully listen to the prompts so that you are directed to the right person. All voicemails are confidential.   For Discharge needs, contact Care Management DC Support Team at [phone] opt. [phone]  Send all requests for admission clinical reviews, approved or denied determinations and any other requests to dedicated fax number below belonging to the campus where the patient is receiving treatment. List of dedicated fax numbers for the Facilities:  FACILITY NAME UR FAX NUMBER   ADMISSION DENIALS (Administrative/Medical Necessity) [phone]   DISCHARGE SUPPORT TEAM (NETWORK) [phone]   PARENT CHILD HEALTH (Maternity/NICU/Pediatrics) [phone]   [facility]  [phone]   [facility] [phone]   Replaced by [facility] [phone]   [facility] [phone]   [facility] [phone]   [facility] [phone]   [facility] [phone]   [facility] [phone]   [facility] [phone]   [facility] [phone]   [facility] [phone]   '

## 2024-01-03 NOTE — NURSING NOTE
AVS reviewed with patient. Patient demonstrated understanding of AVS by performing teach back method. IV removed from patient. Patient is ready for discharge.

## 2024-01-03 NOTE — PROGRESS NOTES
"Progress Note - General Surgery   Jillian Ch 33 y.o. female MRN: [id number]  Unit/Bed#: -[facility] Encounter: [id number]    Assessment:  Jillian Ch is a 33 y.o. female POD1 s/p left mastectomy and lymph node biopsy  Lymph node negative    AVSS, no leukocytosis, remainder of labs unremarkle    Plan:  Okay for discharge home today  Patient educated on wound care and f/u instructions  PRN pain medication and anti-emetics  Encourage ambulation  DVT ppx: heparin  Incentive spirometry 10 times/hour while awake  Continue home medications as prescribed     Subjective/Objective    Subjective: No acute events overnight.     Objective:     Blood pressure 109/69, pulse 79, temperature 98.8 °F (37.1 °C), resp. rate 16, height 5' 7\" (1.702 m), weight 78.2 kg (172 lb 6.4 oz), last menstrual period 10/31/2023, SpO2 98%, not currently breastfeeding.,Body mass index is 27 kg/m².      Intake/Output Summary (Last 24 hours) at 1/3/2024 0807  Last data filed at 1/3/2024 0240  Gross per 24 hour   Intake 1000 ml   Output 75 ml   Net 925 ml       Invasive Devices       Peripheral Intravenous Line  Duration             Peripheral IV 01/02/24 Right Antecubital 1 day    Peripheral IV 01/02/24 Right <1 day              Drain  Duration             Closed/Suction Drain Inferior;Lateral;Left Breast Bulb 19 Fr. <1 day    Closed/Suction Drain Inferior;Left Breast Bulb 19 Fr. <1 day                    Physical Exam:   GEN: NAD  HEENT: NCAT, MMM  Chest: left mastectomy incision c/d/I with prineo dressing in place. No erythema, edema, exudate. DARRYL drain x 2 with light brown thin fluid from surgicel.  CV: RRR, no m/r/g  Lung: Normal effort, CTA B/L, no w/r/r  Ab: Soft, NT/ND  Extrem: No CCE   Neuro: A+Ox3     Lab, Imaging and other studies:I have personally reviewed pertinent lab results.     VTE Pharmacologic Prophylaxis: Heparin  VTE Mechanical Prophylaxis: sequential compression device    Recent Results (from the past 36 " hour(s))   Tissue Exam    Collection Time: 01/02/24  8:56 AM   Result Value Ref Range    Case Report       Surgical Pathology Report                         Case: [id number]                                   Authorizing Provider:  Elena Cornell,  Collected:           01/02/2024 0856                                     MD                                                                           Ordering Location:     [facility] Received:            01/02/2024 0902                                     Operating Room                                                               Pathologist:           Daxa Mayfield MD                                                         Intraop:               Daxa Mayfield MD                                                         Specimen:    Lymph Node, [place], Left Axillary sentinel lymph node #1 Hot                             Intraoperative Consultation       Touch  preparation diagnosis (3 slides):   Negative for malignant cells.    Dr. CARIE Cornell notified 1/2/24 @ 0925 hours.   *Electronic Signature* Daxa Mayfield M.D.   Interpretation performed at [facility], [address] [place] [address], [facility].     Basic metabolic panel    Collection Time: 01/03/24  4:32 AM   Result Value Ref Range    Sodium 137 135 - 147 mmol/L    Potassium 3.5 3.5 - 5.3 mmol/L    Chloride 107 96 - 108 mmol/L    CO2 22 21 - 32 mmol/L    ANION GAP 8 mmol/L    BUN 4 (L) 5 - 25 mg/dL    Creatinine 0.54 (L) 0.60 - 1.30 mg/dL    Glucose 95 65 - 140 mg/dL    Calcium 8.4 8.4 - 10.2 mg/dL    eGFR 122 ml/min/1.73sq m   CBC and differential    Collection Time: 01/03/24  4:32 AM   Result Value Ref Range    WBC 7.28 4.31 - 10.16 Thousand/uL    RBC 3.79 (L) 3.81 - 5.12 Million/uL    Hemoglobin 10.0 (L) 11.5 - 15.4 g/dL    Hematocrit 31.6 (L) 34.8 - 46.1 %    MCV 83 82 - 98 fL    MCH 26.4 (L) 26.8 - 34.3 pg    MCHC 31.6 31.4 - 37.4 g/dL    RDW 14.3 11.6 - 15.1 %     MPV 11.4 8.9 - 12.7 fL    Platelets 183 149 - 390 Thousands/uL    nRBC 0 /100 WBCs    Neutrophils Relative 70 43 - 75 %    Immat GRANS % 0 0 - 2 %    Lymphocytes Relative 21 14 - 44 %    Monocytes Relative 7 4 - 12 %    Eosinophils Relative 2 0 - 6 %    Basophils Relative 0 0 - 1 %    Neutrophils Absolute 5.13 1.85 - 7.62 Thousands/µL    Immature Grans Absolute 0.03 0.00 - 0.20 Thousand/uL    Lymphocytes Absolute 1.51 0.60 - 4.47 Thousands/µL    Monocytes Absolute 0.49 0.17 - 1.22 Thousand/µL    Eosinophils Absolute 0.11 0.00 - 0.61 Thousand/µL    Basophils Absolute 0.01 0.00 - 0.10 Thousands/µL

## 2024-01-03 NOTE — PLAN OF CARE
Problem: PAIN - ADULT  Goal: Verbalizes/displays adequate comfort level or baseline comfort level  Description: Interventions:  - Encourage patient to monitor pain and request assistance  - Assess pain using appropriate pain scale  - Administer analgesics based on type and severity of pain and evaluate response  - Implement non-pharmacological measures as appropriate and evaluate response  - Consider cultural and social influences on pain and pain management  - Notify physician/advanced practitioner if interventions unsuccessful or patient reports new pain  Outcome: Progressing     Problem: INFECTION - ADULT  Goal: Absence or prevention of progression during hospitalization  Description: INTERVENTIONS:  - Assess and monitor for signs and symptoms of infection  - Monitor lab/diagnostic results  - Monitor all insertion sites, i.e. indwelling lines, tubes, and drains  - Monitor endotracheal if appropriate and nasal secretions for changes in amount and color  - [place] appropriate cooling/warming therapies per order  - Administer medications as ordered  - Instruct and encourage patient and family to use good hand hygiene technique  - Identify and instruct in appropriate isolation precautions for identified infection/condition  Outcome: Progressing  Goal: Absence of fever/infection during neutropenic period  Description: INTERVENTIONS:  - Monitor WBC    Outcome: Progressing     Problem: SAFETY ADULT  Goal: Patient will remain free of falls  Description: INTERVENTIONS:  - Educate patient/family on patient safety including physical limitations  - Instruct patient to call for assistance with activity   - Consult OT/PT to assist with strengthening/mobility   - Keep Call bell within reach  - Keep bed low and locked with side rails adjusted as appropriate  - Keep care items and personal belongings within reach  - Initiate and maintain comfort rounds  - Make Fall Risk Sign visible to staff  - Offer Toileting every 2 Hours,  in advance of need  - Initiate/Maintain alarms  - Obtain necessary fall risk management equipment  - Apply yellow socks and bracelet for high fall risk patients  - Consider moving patient to room near nurses station  Outcome: Progressing  Goal: Maintain or return to baseline ADL function  Description: INTERVENTIONS:  -  Assess patient's ability to carry out ADLs; assess patient's baseline for ADL function and identify physical deficits which impact ability to perform ADLs (bathing, care of mouth/teeth, toileting, grooming, dressing, etc.)  - Assess/evaluate cause of self-care deficits   - Assess range of motion  - Assess patient's mobility; develop plan if impaired  - Assess patient's need for assistive devices and provide as appropriate  - Encourage maximum independence but intervene and supervise when necessary  - Involve family in performance of ADLs  - Assess for home care needs following discharge   - Consider OT consult to assist with ADL evaluation and planning for discharge  - Provide patient education as appropriate  Outcome: Progressing  Goal: Maintains/Returns to pre admission functional level  Description: INTERVENTIONS:  - Perform AM-PAC 6 Click Basic Mobility/ Daily Activity assessment daily.  - Set and communicate daily mobility goal to care team and patient/family/caregiver.   - Collaborate with rehabilitation services on mobility goals if consulted  - Perform Range of Motion 3 times a day.  - Reposition patient every 2 hours.  - Dangle patient 3 times a day  - Stand patient 3 times a day  - Ambulate patient 3 times a day  - Out of bed to chair 3 times a day   - Out of bed for meals 3 times a day  - Out of bed for toileting  - Record patient progress and toleration of activity level   Outcome: Progressing     Problem: DISCHARGE PLANNING  Goal: Discharge to home or other facility with appropriate resources  Description: INTERVENTIONS:  - Identify barriers to discharge w/patient and caregiver  -  Arrange for needed discharge resources and transportation as appropriate  - Identify discharge learning needs (meds, wound care, etc.)  - Arrange for interpretive services to assist at discharge as needed  - Refer to Case Management Department for coordinating discharge planning if the patient needs post-hospital services based on physician/advanced practitioner order or complex needs related to functional status, cognitive ability, or social support system  Outcome: Progressing     Problem: Knowledge Deficit  Goal: Patient/family/caregiver demonstrates understanding of disease process, treatment plan, medications, and discharge instructions  Description: Complete learning assessment and assess knowledge base.  Interventions:  - Provide teaching at level of understanding  - Provide teaching via preferred learning methods  Outcome: Progressing

## 2024-01-04 ENCOUNTER — TELEPHONE (OUTPATIENT)
Dept: HEMATOLOGY ONCOLOGY | Facility: CLINIC | Age: 34
End: 2024-01-04

## 2024-01-04 ENCOUNTER — TRANSITIONAL CARE MANAGEMENT (OUTPATIENT)
Dept: FAMILY MEDICINE CLINIC | Facility: CLINIC | Age: 34
End: 2024-01-04

## 2024-01-04 ENCOUNTER — PATIENT MESSAGE (OUTPATIENT)
Dept: HEMATOLOGY ONCOLOGY | Facility: CLINIC | Age: 34
End: 2024-01-04

## 2024-01-04 ENCOUNTER — HOME CARE VISIT (OUTPATIENT)
Dept: HOME HEALTH SERVICES | Facility: HOME HEALTHCARE | Age: 34
End: 2024-01-04
Payer: COMMERCIAL

## 2024-01-04 VITALS
HEART RATE: 80 BPM | OXYGEN SATURATION: 99 % | RESPIRATION RATE: 18 BRPM | DIASTOLIC BLOOD PRESSURE: 70 MMHG | SYSTOLIC BLOOD PRESSURE: 140 MMHG | TEMPERATURE: 98.2 F

## 2024-01-04 PROCEDURE — 400013 VN SOC

## 2024-01-04 PROCEDURE — G0299 HHS/HOSPICE OF RN EA 15 MIN: HCPCS

## 2024-01-04 NOTE — TELEPHONE ENCOUNTER
Patient Call    Who are you speaking with? Patient    If it is not the patient, are they listed on an active communication consent form? Yes   What is the reason for this call? Can appmt tomorrow be virtual appmt?   Does this require a call back? Yes   If a call back is required, please list best call back number [phone] or [phone]    If a call back is required, advise that a message will be forwarded to their care team and someone will return their call as soon as possible.   Did you relay this information to the patient? Yes

## 2024-01-04 NOTE — TELEPHONE ENCOUNTER
Patient Call    Who are you speaking with? Patient and [facility] Nurse     If it is not the patient, are they listed on an active communication consent form? N/A   What is the reason for this call? Patient would like to know if she can use ice on the surgicalarea since she is not on meds   Does this require a call back? Yes   If a call back is required, please list [facility] call back number [phone]   If a call back is required, advise that a message will be forwarded to their care team and someone will return their call as soon as possible.   Did you relay this information to the patient? Yes

## 2024-01-04 NOTE — TELEPHONE ENCOUNTER
Spoke with patient  made him aware her appt was made virtual. Dr. Khalil will give her a call at the EOD tomorrow.

## 2024-01-04 NOTE — UTILIZATION REVIEW
NOTIFICATION OF ADMISSION DISCHARGE   This is a Notification of Discharge from [facility]. Please be advised that this patient has been discharge from our facility. Below you will find the admission and discharge date and time including the patient’s disposition.   UTILIZATION REVIEW CONTACT:  Garima Fajardo  Utilization   Network Utilization Review Department  Phone: [phone] x carefully listen to the prompts. All voicemails are confidential.  Email: [email or web address]     ADMISSION INFORMATION  PRESENTATION DATE: 1/2/2024  6:20 AM  OBERVATION ADMISSION DATE:   INPATIENT ADMISSION DATE: 1/2/24 10:02 AM   DISCHARGE DATE: 1/3/2024  1:29 PM   DISPOSITION:Home/Self Care    Network Utilization Review Department  ATTENTION: Please call with any questions or concerns to [phone] and carefully listen to the prompts so that you are directed to the right person. All voicemails are confidential.   For Discharge needs, contact Care Management DC Support Team at [phone] opt. 2  Send all requests for admission clinical reviews, approved or denied determinations and any other requests to dedicated fax number below belonging to the campus where the patient is receiving treatment. List of dedicated fax numbers for the Facilities:  FACILITY NAME UR FAX NUMBER   ADMISSION DENIALS (Administrative/Medical Necessity) [phone]   DISCHARGE SUPPORT TEAM ([facility]) [phone]   PARENT CHILD HEALTH (Maternity/NICU/Pediatrics) [phone]   [facility] [phone]   [facility] [phone]   [facility] [phone]   [facility] [phone]   [facility] [phone]   [facility] [phone]   [facility] [phone]   [facility] [phone]    [facility] [phone]   [facility] [phone]   [facility] [phone]

## 2024-01-04 NOTE — TELEPHONE ENCOUNTER
Contacted Pathology: Melo Pleitez requested update on patients AP send out. She stated Dr. Mayfield is assigned [id number] made Claire [facility] aware.

## 2024-01-05 ENCOUNTER — APPOINTMENT (EMERGENCY)
Dept: VASCULAR ULTRASOUND | Facility: HOSPITAL | Age: 34
End: 2024-01-05
Payer: COMMERCIAL

## 2024-01-05 ENCOUNTER — HOSPITAL ENCOUNTER (EMERGENCY)
Facility: HOSPITAL | Age: 34
Discharge: HOME | End: 2024-01-05
Attending: EMERGENCY MEDICINE
Payer: COMMERCIAL

## 2024-01-05 ENCOUNTER — TELEMEDICINE (OUTPATIENT)
Dept: HEMATOLOGY ONCOLOGY | Facility: CLINIC | Age: 34
End: 2024-01-05
Payer: COMMERCIAL

## 2024-01-05 VITALS
TEMPERATURE: 98.6 F | RESPIRATION RATE: 18 BRPM | DIASTOLIC BLOOD PRESSURE: 66 MMHG | SYSTOLIC BLOOD PRESSURE: 122 MMHG | HEART RATE: 85 BPM | OXYGEN SATURATION: 100 %

## 2024-01-05 DIAGNOSIS — Z51.81 THERAPEUTIC DRUG MONITORING: Primary | ICD-10-CM

## 2024-01-05 DIAGNOSIS — O22.30 DVT (DEEP VEIN THROMBOSIS) IN PREGNANCY: Primary | ICD-10-CM

## 2024-01-05 LAB
ALBUMIN SERPL BCP-MCNC: 4 G/DL (ref 3.5–5)
ALP SERPL-CCNC: 84 U/L (ref 34–104)
ALT SERPL W P-5'-P-CCNC: 34 U/L (ref 7–52)
ANION GAP SERPL CALCULATED.3IONS-SCNC: 9 MMOL/L
AST SERPL W P-5'-P-CCNC: 28 U/L (ref 13–39)
BASOPHILS # BLD AUTO: 0.01 THOUSANDS/ÂΜL (ref 0–0.1)
BASOPHILS NFR BLD AUTO: 0 % (ref 0–1)
BILIRUB DIRECT SERPL-MCNC: 0 MG/DL (ref 0–0.2)
BILIRUB SERPL-MCNC: 0.25 MG/DL (ref 0.2–1)
BUN SERPL-MCNC: 6 MG/DL (ref 5–25)
CALCIUM SERPL-MCNC: 9.3 MG/DL (ref 8.4–10.2)
CHLORIDE SERPL-SCNC: 102 MMOL/L (ref 96–108)
CO2 SERPL-SCNC: 23 MMOL/L (ref 21–32)
CREAT SERPL-MCNC: 0.62 MG/DL (ref 0.6–1.3)
EOSINOPHIL # BLD AUTO: 0.15 THOUSAND/ÂΜL (ref 0–0.61)
EOSINOPHIL NFR BLD AUTO: 1 % (ref 0–6)
ERYTHROCYTE [DISTWIDTH] IN BLOOD BY AUTOMATED COUNT: 14.2 % (ref 11.6–15.1)
GFR SERPL CREATININE-BSD FRML MDRD: 117 ML/MIN/1.73SQ M
GLUCOSE SERPL-MCNC: 88 MG/DL (ref 65–140)
HCT VFR BLD AUTO: 35.8 % (ref 34.8–46.1)
HGB BLD-MCNC: 11.4 G/DL (ref 11.5–15.4)
IMM GRANULOCYTES # BLD AUTO: 0.03 THOUSAND/UL (ref 0–0.2)
IMM GRANULOCYTES NFR BLD AUTO: 0 % (ref 0–2)
INR PPP: 0.96 (ref 0.84–1.19)
LYMPHOCYTES # BLD AUTO: 1.23 THOUSANDS/ÂΜL (ref 0.6–4.47)
LYMPHOCYTES NFR BLD AUTO: 12 % (ref 14–44)
MCH RBC QN AUTO: 26.8 PG (ref 26.8–34.3)
MCHC RBC AUTO-ENTMCNC: 31.8 G/DL (ref 31.4–37.4)
MCV RBC AUTO: 84 FL (ref 82–98)
MONOCYTES # BLD AUTO: 0.48 THOUSAND/ÂΜL (ref 0.17–1.22)
MONOCYTES NFR BLD AUTO: 5 % (ref 4–12)
NEUTROPHILS # BLD AUTO: 8.74 THOUSANDS/ÂΜL (ref 1.85–7.62)
NEUTS SEG NFR BLD AUTO: 82 % (ref 43–75)
NRBC BLD AUTO-RTO: 0 /100 WBCS
PLATELET # BLD AUTO: 216 THOUSANDS/UL (ref 149–390)
PMV BLD AUTO: 11.6 FL (ref 8.9–12.7)
POTASSIUM SERPL-SCNC: 4.2 MMOL/L (ref 3.5–5.3)
PROT SERPL-MCNC: 7.5 G/DL (ref 6.4–8.4)
PROTHROMBIN TIME: 13.4 SECONDS (ref 11.6–14.5)
RBC # BLD AUTO: 4.25 MILLION/UL (ref 3.81–5.12)
SCAN RESULT: NORMAL
SODIUM SERPL-SCNC: 134 MMOL/L (ref 135–147)
WBC # BLD AUTO: 10.64 THOUSAND/UL (ref 4.31–10.16)

## 2024-01-05 PROCEDURE — 80048 BASIC METABOLIC PNL TOTAL CA: CPT | Performed by: EMERGENCY MEDICINE

## 2024-01-05 PROCEDURE — 99285 EMERGENCY DEPT VISIT HI MDM: CPT | Performed by: EMERGENCY MEDICINE

## 2024-01-05 PROCEDURE — 88307 TISSUE EXAM BY PATHOLOGIST: CPT | Performed by: PATHOLOGY

## 2024-01-05 PROCEDURE — 96372 THER/PROPH/DIAG INJ SC/IM: CPT

## 2024-01-05 PROCEDURE — 99285 EMERGENCY DEPT VISIT HI MDM: CPT

## 2024-01-05 PROCEDURE — 88344 IMHCHEM/IMCYTCHM EA MLT ANTB: CPT | Performed by: PATHOLOGY

## 2024-01-05 PROCEDURE — 88341 IMHCHEM/IMCYTCHM EA ADD ANTB: CPT | Performed by: PATHOLOGY

## 2024-01-05 PROCEDURE — 36415 COLL VENOUS BLD VENIPUNCTURE: CPT | Performed by: EMERGENCY MEDICINE

## 2024-01-05 PROCEDURE — 85025 COMPLETE CBC W/AUTO DIFF WBC: CPT | Performed by: EMERGENCY MEDICINE

## 2024-01-05 PROCEDURE — 85610 PROTHROMBIN TIME: CPT | Performed by: EMERGENCY MEDICINE

## 2024-01-05 PROCEDURE — 80076 HEPATIC FUNCTION PANEL: CPT | Performed by: EMERGENCY MEDICINE

## 2024-01-05 PROCEDURE — 88333 PATH CONSLTJ SURG CYTO XM 1: CPT | Performed by: PATHOLOGY

## 2024-01-05 PROCEDURE — 93971 EXTREMITY STUDY: CPT

## 2024-01-05 PROCEDURE — 88342 IMHCHEM/IMCYTCHM 1ST ANTB: CPT | Performed by: PATHOLOGY

## 2024-01-05 PROCEDURE — 99442 PR PHYS/QHP TELEPHONE EVALUATION 11-20 MIN: CPT | Performed by: INTERNAL MEDICINE

## 2024-01-05 RX ORDER — ENOXAPARIN SODIUM 100 MG/ML
1 INJECTION SUBCUTANEOUS ONCE
Status: COMPLETED | OUTPATIENT
Start: 2024-01-05 | End: 2024-01-05

## 2024-01-05 RX ORDER — ENOXAPARIN SODIUM 150 MG/ML
1 INJECTION SUBCUTANEOUS EVERY 12 HOURS
Qty: 60 ML | Refills: 0 | Status: SHIPPED | OUTPATIENT
Start: 2024-01-05 | End: 2024-02-04

## 2024-01-05 RX ADMIN — ENOXAPARIN SODIUM 80 MG: 80 INJECTION SUBCUTANEOUS at 18:32

## 2024-01-05 NOTE — DISCHARGE INSTRUCTIONS
Lovenox twice daily subcutaneous injection  If you have difficulty getting the medication from the pharmacy go to [facility] tomorrow morning for another dose and for evaluation by the OB service.  Follow-up with your surgeon for increasing bleeding  Return worsening symptoms or any problems

## 2024-01-05 NOTE — TELEPHONE ENCOUNTER
Called patient back regarding swelling in leg. Reviewed with Dr. Cornell and it was expressed she should go to the ER to be evaluated. Patient stated understanding. All questions answered at this time. Patient appreciative of call back

## 2024-01-05 NOTE — TELEPHONE ENCOUNTER
Called patient and let her know not to use any ice. She can use warm damp compression right over her dressing. Ensured I educated her on making sure the cloth is not too hot so she does not burn her self. Advised to use it 10-15 minutes at a time 4-5 times per day. Patient stated understanding. Patient asked about her bloodwork. I had Dr. Cornell review. Advised patient to stay well hydrated and if pain is not going away to call before end of day today so we can send her something a bit stronger for pain if needed. Patient appreciative and all questions answered at this time.

## 2024-01-05 NOTE — ED PROVIDER NOTES
History  Chief Complaint   Patient presents with    Post-op Problem     Pt had mastectomy on , now c/o L calf pain. Pt advised by surgeon to come in for eval. Pt also approx 8 weeks pregnant       HPI patient is a 33-year-old female status post mastectomy and currently 8 weeks pregnant.  She Emergency Department because she had called her surgeon and complained of some left calf pain.  Concerned about a DVT.  Patient has 2 drains in from her recent mastectomy she reports almost no drainage from the 1 Jeremi-Calvo drain she has a small amount of drainage and the other 1.  She reports a generalized soreness of her left calf that somewhat radiates up towards her left thigh primarily laterally on her left thigh.  She denies any recent trauma.  She denies any fever or chills.  Is any previous history of DVT.  Past medical history of asthma anxiety miscarriage in the past G1, P0, kidney stone  Family is noncontributory  Social history, non-smoker no history of drug abuse    Prior to Admission Medications   Prescriptions Last Dose Informant Patient Reported? Taking?   Prenatal Multivit-Min-Fe-FA (PRE- PO)   Yes No   Sig: Take 1 tablet by mouth in the morning   albuterol (PROVENTIL HFA,VENTOLIN HFA) 90 mcg/act inhaler  Self No No   Sig: TAKE 2 PUFFS BY MOUTH EVERY 6 HOURS AS NEEDED FOR WHEEZE OR FOR SHORTNESS OF BREATH   cetirizine (ZyrTEC) 10 mg tablet  Self No No   Sig: TAKE 1 TABLET BY MOUTH EVERY DAY   omeprazole (PriLOSEC) 40 MG capsule  Self No No   Sig: TAKE 1 CAPSULE (40 MG TOTAL) BY MOUTH DAILY.   ondansetron (ZOFRAN) 4 mg tablet   No No   Sig: Take 1 tablet (4 mg total) by mouth every 8 (eight) hours as needed for nausea or vomiting      Facility-Administered Medications: None       Past Medical History:   Diagnosis Date    Anesthesia complication     gait dysfunction/ urinary retention after nerve block,    Anxiety     Asthma     CRPS (complex regional pain syndrome), upper limb     left chest/arm/hand     GERD (gastroesophageal reflux disease)     Heart murmur     Kidney stone     Miscarriage 3/15/2015    7 weeks along.    Neck pain     PONV (postoperative nausea and vomiting) 1/2/2024       Past Surgical History:   Procedure Laterality Date    BREAST BIOPSY Right 12/02/2023    BREAST BIOPSY Left 12/02/2023    EGD      IR UPPER EXTREMITY VENOGRAM- DIAGNOSTIC  05/28/2021    IN BX/EXC LYMPH NODE OPEN SUPERFICIAL Left 1/2/2024    Procedure: LEFT BREAST MASTECTOMY, LYMPHATIC MAPPING WITH RADIOACTIVE DYE, SENTINEL LYMPH NODE BIOPSY, ZAHEER LEFT BREAST, INJECTION IN OR AT 0800 BY DR CORNELL;  Surgeon: Elena Cornell MD;  Location: [facility];  Service: Surgical Oncology    IN EXCISION 1ST &/CERVICAL RIB Left 08/12/2021    Procedure: FIRST RIB RESECTION;  Surgeon: Jonathan Schaffer MD;  Location: [facility];  Service: Thoracic    IN INJ RADIOACTIVE TRACER FOR ID OF SENTINEL NODE Left 1/2/2024    Procedure: LEFT BREAST MASTECTOMY, LYMPHATIC MAPPING WITH RADIOACTIVE DYE, SENTINEL LYMPH NODE BIOPSY, ZAHEER LEFT BREAST, INJECTION IN OR AT 0800 BY DR CORNELL;  Surgeon: Elena Cornell MD;  [facility];  Service: Surgical Oncology    IN INTRAOP SENTINEL LYMPH NODE ID W/DYE INJECTION Left 1/2/2024    Procedure: LEFT BREAST MASTECTOMY, LYMPHATIC MAPPING WITH RADIOACTIVE DYE, SENTINEL LYMPH NODE BIOPSY, ZAHEER LEFT BREAST, INJECTION IN OR AT 0800 BY DR CORNELL;  Surgeon: Elena Cornell MD;  [facility];  Service: Surgical Oncology    IN MASTECTOMY SIMPLE COMPLETE Left 1/2/2024    Procedure: LEFT BREAST MASTECTOMY, LYMPHATIC MAPPING WITH RADIOACTIVE DYE, SENTINEL LYMPH NODE BIOPSY, ZAHEER LEFT BREAST, INJECTION IN OR AT 0800 BY DR CORNELL;  Surgeon: Elena Cornell MD;  [facility];  Service: Surgical Oncology    THORACOSCOPY VIDEO ASSISTED SURGERY (VATS) Left 08/12/2021    Procedure: THORACOSCOPY VIDEO ASSISTED SURGERY (VATS);  Surgeon: Jonathan Schaffer MD;  Location: [facility]  MAIN [facility];  Service: Thoracic    US GUIDANCE BREAST BIOPSY LEFT EACH ADDITIONAL Left 12/2/2023    US GUIDED BREAST BIOPSY RIGHT COMPLETE Right 12/2/2023    WISDOM TOOTH EXTRACTION         Family History   Problem Relation Age of Onset    No Known Problems Mother     No Known Problems Father     Bone cancer Maternal Grandmother     Breast cancer Neg Hx     Colon cancer Neg Hx     Ovarian cancer Neg Hx     Uterine cancer Neg Hx     Cervical cancer Neg Hx      I have reviewed and agree with the history as documented.    E-Cigarette/Vaping    E-Cigarette Use Never User      E-Cigarette/Vaping Substances    Nicotine No     THC No     CBD No     Flavoring No     Other No     Unknown No      Social History     Tobacco Use    Smoking status: Never    Smokeless tobacco: Never   Vaping Use    Vaping status: Never Used   Substance Use Topics    Alcohol use: Not Currently     Comment: social    Drug use: Never       Review of Systems   Constitutional:  Negative for fever.   HENT:  Negative for congestion.    Eyes:  Negative for pain and redness.   Respiratory:  Negative for cough and shortness of breath.    Cardiovascular:  Negative for chest pain.   Gastrointestinal:  Negative for abdominal pain and vomiting.   Reports left leg pain primarily left calf    Physical Exam  Physical Exam  Vitals and nursing note reviewed.   Constitutional:       Appearance: She is well-developed.   HENT:      Head: Normocephalic.      Right Ear: External ear normal.      Left Ear: External ear normal.      Nose: Nose normal.      Mouth/Throat:      Mouth: Mucous membranes are moist.      Pharynx: Oropharynx is clear.   Eyes:      General: Lids are normal.      Extraocular Movements: Extraocular movements intact.      Pupils: Pupils are equal, round, and reactive to light.   Cardiovascular:      Rate and Rhythm: Normal rate and regular rhythm.      Pulses: Normal pulses.      Heart sounds: Normal heart sounds.   Pulmonary:      Effort: Pulmonary  effort is normal. No respiratory distress.      Comments: Patient has 2 drains from her left operative site 1 drain has no bleeding and at the other drain has a small amount of bright 30 cc of blood, no active drainage, her surgical dressing is dry  Abdominal:      General: Abdomen is flat. Bowel sounds are normal.      Tenderness: There is no abdominal tenderness.      Comments: Abdomen soft nontender bedside ultrasound shows a intrauterine approximately 8 weeks fetus with heart rate of about 140.   Musculoskeletal:         General: No deformity. Normal range of motion.      Cervical back: Normal range of motion and neck supple.      Comments: There is tenderness to the left calf and left lateral thigh, good distal pulses and sensation no discoloration of the skin.   Skin:     General: Skin is warm and dry.   Neurological:      General: No focal deficit present.      Mental Status: She is alert and oriented to person, place, and time.   Psychiatric:         Mood and Affect: Mood normal.         Vital Signs  ED Triage Vitals [01/05/24 1639]   Temperature Pulse Respirations Blood Pressure SpO2   98.6 °F (37 °C) 90 18 119/69 100 %      Temp Source Heart Rate Source Patient Position - Orthostatic VS BP Location FiO2 (%)   Temporal -- -- -- --      Pain Score       --           Vitals:    01/05/24 1639 01/05/24 1800 01/05/24 1830   BP: 119/69 118/66 122/66   Pulse: 90 84 85         Visual Acuity      ED Medications  Medications   enoxaparin (LOVENOX) subcutaneous injection 80 mg (80 mg Subcutaneous Given 1/5/24 1832)       Diagnostic Studies  Results Reviewed       Procedure Component Value Units Date/Time    Hepatic function panel [[id number]]  (Normal) Collected: 01/05/24 1706    Lab Status: Final result Specimen: Blood from Arm, Right Updated: 01/05/24 1756     Total Bilirubin 0.25 mg/dL      Bilirubin, Direct 0.00 mg/dL      Alkaline Phosphatase 84 U/L      AST 28 U/L      ALT 34 U/L      Total Protein 7.5 g/dL       Albumin 4.0 g/dL     Basic metabolic panel [[id number]]  (Abnormal) Collected: 01/05/24 1706    Lab Status: Final result Specimen: Blood from Arm, Right Updated: 01/05/24 1756     Sodium 134 mmol/L      Potassium 4.2 mmol/L      Chloride 102 mmol/L      CO2 23 mmol/L      ANION GAP 9 mmol/L      BUN 6 mg/dL      Creatinine 0.62 mg/dL      Glucose 88 mg/dL      Calcium 9.3 mg/dL      eGFR 117 ml/min/1.73sq m     Narrative:      National Kidney Disease Foundation guidelines for Chronic Kidney Disease (CKD):     Stage 1 with normal or high GFR (GFR > 90 mL/min/1.73 square meters)    Stage 2 Mild CKD (GFR = 60-89 mL/min/1.73 square meters)    Stage 3A Moderate CKD (GFR = 45-59 mL/min/1.73 square meters)    Stage 3B Moderate CKD (GFR = 30-44 mL/min/1.73 square meters)    Stage 4 Severe CKD (GFR = 15-29 mL/min/1.73 square meters)    Stage 5 End Stage CKD (GFR <15 mL/min/1.73 square meters)  Note: GFR calculation is accurate only with a steady state creatinine    Protime-INR [[id number]]  (Normal) Collected: 01/05/24 1706    Lab Status: Final result Specimen: Blood from Arm, Right Updated: 01/05/24 1731     Protime 13.4 seconds      INR 0.96    CBC and differential [[id number]]  (Abnormal) Collected: 01/05/24 1706    Lab Status: Final result Specimen: Blood from Arm, Right Updated: 01/05/24 1720     WBC 10.64 Thousand/uL      RBC 4.25 Million/uL      Hemoglobin 11.4 g/dL      Hematocrit 35.8 %      MCV 84 fL      MCH 26.8 pg      MCHC 31.8 g/dL      RDW 14.2 %      MPV 11.6 fL      Platelets 216 Thousands/uL      nRBC 0 /100 WBCs      Neutrophils Relative 82 %      Immat GRANS % 0 %      Lymphocytes Relative 12 %      Monocytes Relative 5 %      Eosinophils Relative 1 %      Basophils Relative 0 %      Neutrophils Absolute 8.74 Thousands/µL      Immature Grans Absolute 0.03 Thousand/uL      Lymphocytes Absolute 1.23 Thousands/µL      Monocytes Absolute 0.48 Thousand/µL      Eosinophils Absolute 0.15 Thousand/µL      Basophils  Absolute 0.01 Thousands/µL                    VAS lower limb venous duplex study, unilateral/limited    (Results Pending)              Procedures  Procedures         ED Course         Diagnostic testing:  Electrolytes are within normal limits normal BUN/creatinine  Liver functions were normal  Coags were normal  White count was normal at 10.6 no sign of inflammation hemoglobin was normal 11 no sign of anemia.  I spoke with the ultrasound tech there is a DVT in the left calf.    I spoke with the patient's surgeon Dr. Cornell discussed that the patient does have a DVT he agreed with anticoagulation.  I spoke with Dr. Vila covering SLOGA amaury first trimester DVT patient is usually started on Lovenox 1 mg/kg twice daily.  Patient was given her first injection here and we will train the patient to do the subcu injection.  I will write for Lovenox which patient will get from the pharmacy.  Discussed with the patient that if she has difficulty getting the prescription will be recommended that she go to [facility] tomorrow for second injection and that she could be followed by fetal maternal counseling.  Discussed with the patient and her significant other at length.                      SBIRT 22yo+      Flowsheet Row Most Recent Value   Initial Alcohol Screen: US AUDIT-C     1. How often do you have a drink containing alcohol? 0 Filed at: 01/05/2024 1706   2. How many drinks containing alcohol do you have on a typical day you are drinking?  0 Filed at: 01/05/2024 1706   3b. FEMALE Any Age, or MALE 65+: How often do you have 4 or more drinks on one occassion? 0 Filed at: 01/05/2024 1706   Audit-C Score 0 Filed at: 01/05/2024 1706   PETRA: How many times in the past year have you...    Used an illegal drug or used a prescription medication for non-medical reasons? Never Filed at: 01/05/2024 1706                      Medical Decision Making  Ed decision making unfortunate 33-year-old female currently 8 and half  weeks pregnant with a left breast cancer treated with mastectomy recently.  Now presents with some left calf pain.  Ultrasound consistent with DVT.  Discussed with the patient's surgeon and also with her obstetrician started on Lovenox.  Discussed treatment of follow-up discussed indications to return.    Amount and/or Complexity of Data Reviewed  Labs: ordered.    Risk  Prescription drug management.             Disposition  Final diagnoses:   DVT (deep vein thrombosis) in pregnancy     Time reflects when diagnosis was documented in both MDM as applicable and the Disposition within this note       Time User Action Codes Description Comment    1/5/2024  6:17 PM Javy Bender Add [O22.30] DVT (deep vein thrombosis) in pregnancy           ED Disposition       ED Disposition   Discharge    Condition   Stable    Date/Time   Fri Jan 5, 2024 1817    Comment   Jillian Ch discharge to home/self care.                   Follow-up Information       Follow up With Specialties Details Why Contact Info    Cheyanne Joseph MD Obstetrics and Gynecology, Obstetrics, Gynecology   [address]  [address]  [facility] [address]  [phone]              Discharge Medication List as of 1/5/2024  6:19 PM        START taking these medications    Details   enoxaparin (LOVENOX) 150 mg/mL injection Inject 0.5 mL (75 mg total) under the skin every 12 (twelve) hours, Starting Fri 1/5/2024, Until Sun 2/4/2024, Normal           CONTINUE these medications which have NOT CHANGED    Details   albuterol (PROVENTIL HFA,VENTOLIN HFA) 90 mcg/act inhaler TAKE 2 PUFFS BY MOUTH EVERY 6 HOURS AS NEEDED FOR WHEEZE OR FOR SHORTNESS OF BREATH, Normal      cetirizine (ZyrTEC) 10 mg tablet TAKE 1 TABLET BY MOUTH EVERY DAY, Starting Wed 10/18/2023, Normal      omeprazole (PriLOSEC) 40 MG capsule TAKE 1 CAPSULE (40 MG TOTAL) BY MOUTH DAILY., Starting Tue 11/14/2023, Normal      ondansetron (ZOFRAN) 4 mg tablet Take 1 tablet (4 mg total) by mouth  every 8 (eight) hours as needed for nausea or vomiting, Starting Thu 2023, Normal      Prenatal Multivit-Min-Fe-FA (PRE-SONIA PO) Take 1 tablet by mouth in the morning, Historical Med             No discharge procedures on file.    PDMP Review         Value Time User    PDMP Reviewed  Yes 2022 11:59 AM JOSE Stover            ED Provider  Electronically Signed by             Javy Bender MD  24 [id number]

## 2024-01-07 PROCEDURE — 93971 EXTREMITY STUDY: CPT | Performed by: SURGERY

## 2024-01-07 NOTE — PROGRESS NOTES
Virtual Brief Visit    This Visit is being completed by telephone. The Patient is located at Other and in the following state in which I hold an active license [place]    The patient was identified by name and date of birth. Jillian DIANA Ariasmisael Ch was informed that this is a telemedicine visit and that the visit is being conducted through Telephone.  My office door was closed. No one else was in the room.  She acknowledged consent and understanding of privacy and security of the video platform. The patient has agreed to participate and understands they can discontinue the visit at any time.    Patient is aware this is a billable service.     Patient presents for follow-up to discuss her Oncotype result.  When I called the patient she was actually in the emergency room due to severe leg cramps.  She had a mastectomy with axillary dissection on 1/2/24 and we do have final pathology results.  Pathologic stage is a pT2 N0.  Her Oncotype was 23 reflecting a 6.5% benefit from chemotherapy in her age group.  She is 8.5 weeks pregnant and the pregnancy itself so far is healthy and normal.    Assessment/Plan: 33-year-old female with a pT2 N0, ER positive left-sided breast cancer.  There will be a benefit in her age range to adjuvant chemotherapy.  I would offer her 4 cycles of AC given every 3 weeks.  She needs a month to heal from her surgery and by that time she will be in her second trimester.  I am going see her back towards the end of the month to start planning for chemotherapy.  I will get an echocardiogram before that visit to ensure normal ejection fraction.  I will also put her case back on for presentation at our breast tumor board.  She was in a lot of discomfort and we were talking so I did not go through many of the details of chemotherapy.  When she is feeling better I will see her in the office and we can do consent and set things up in person.  She knows to call me in the interim with any questions or  concerns.    Problem List Items Addressed This Visit    None      Recent Visits  Date Type Provider Dept   01/05/24 Telemedicine Nemo Khalil DO [facility] [place]   01/04/24 Telephone Larissa Howard [facility] [place]   01/04/24 Telephone Nemo Khalil DO [facility] [place]   Showing recent visits within past 7 days and meeting all other requirements  Future Appointments  No visits were found meeting these conditions.  Showing future appointments within next 150 days and meeting all other requirements         Visit Time  Total Visit Duration: 12 min

## 2024-01-08 ENCOUNTER — DOCUMENTATION (OUTPATIENT)
Dept: HEMATOLOGY ONCOLOGY | Facility: CLINIC | Age: 34
End: 2024-01-08

## 2024-01-08 ENCOUNTER — HOME CARE VISIT (OUTPATIENT)
Dept: HOME HEALTH SERVICES | Facility: HOME HEALTHCARE | Age: 34
End: 2024-01-08
Payer: COMMERCIAL

## 2024-01-08 ENCOUNTER — TELEPHONE (OUTPATIENT)
Dept: HEMATOLOGY ONCOLOGY | Facility: CLINIC | Age: 34
End: 2024-01-08

## 2024-01-08 ENCOUNTER — TELEPHONE (OUTPATIENT)
Dept: OBGYN CLINIC | Facility: CLINIC | Age: 34
End: 2024-01-08

## 2024-01-08 ENCOUNTER — PATIENT MESSAGE (OUTPATIENT)
Dept: SURGICAL ONCOLOGY | Facility: CLINIC | Age: 34
End: 2024-01-08

## 2024-01-08 VITALS
RESPIRATION RATE: 16 BRPM | DIASTOLIC BLOOD PRESSURE: 80 MMHG | TEMPERATURE: 98 F | SYSTOLIC BLOOD PRESSURE: 116 MMHG | OXYGEN SATURATION: 99 % | HEART RATE: 76 BPM

## 2024-01-08 PROCEDURE — G0299 HHS/HOSPICE OF RN EA 15 MIN: HCPCS

## 2024-01-08 RX ADMIN — ENOXAPARIN SODIUM 75 MG: 150 INJECTION SUBCUTANEOUS at 09:32

## 2024-01-08 NOTE — TELEPHONE ENCOUNTER
Return call from Kasey Montoya Nurse.  Reviewed OTC medications that are acceptable to take for constipation.  Also, recommended pt drink  required amt of water, also fruit & fiber, can take a colace daily, or metamucil, miralax.  Advised pt to call with any further questions/concerns.

## 2024-01-08 NOTE — PROGRESS NOTES
In-basket message received from Dr. Khalil to add patient to the breast [facility] on 1/15/2024. Chart reviewed and prep completed.

## 2024-01-08 NOTE — TELEPHONE ENCOUNTER
Patient's visiting nurse was calling as patient is currently 8 weeks pregnant and has not had a bowel movement since New Years.  She is on lovanox and has DVT, looking for a call back to the visiting nurse, Lindsay Alvarez, @ [phone] to discuss plan of action and what the patient can do

## 2024-01-08 NOTE — TELEPHONE ENCOUNTER
A call was placed to Jillian, reached her voicemail. A message was left regarding the scheduled echo. The appointment is set for 2/8/24 8am [place]. If this appt is nor convenient, please call [facility] [phone].

## 2024-01-09 ENCOUNTER — TELEPHONE (OUTPATIENT)
Dept: OBGYN CLINIC | Facility: CLINIC | Age: 34
End: 2024-01-09

## 2024-01-09 ENCOUNTER — INITIAL PRENATAL (OUTPATIENT)
Dept: OBGYN CLINIC | Facility: CLINIC | Age: 34
End: 2024-01-09

## 2024-01-09 VITALS — HEIGHT: 67 IN | BODY MASS INDEX: 27 KG/M2 | WEIGHT: 172 LBS

## 2024-01-09 DIAGNOSIS — O22.31 DVT COMPLICATING PREGNANCY, FIRST TRIMESTER: Primary | ICD-10-CM

## 2024-01-09 DIAGNOSIS — Z34.01 ENCOUNTER FOR SUPERVISION OF NORMAL FIRST PREGNANCY IN FIRST TRIMESTER: ICD-10-CM

## 2024-01-09 DIAGNOSIS — Z31.430 ENCOUNTER OF FEMALE FOR TESTING FOR GENETIC DISEASE CARRIER STATUS FOR PROCREATIVE MANAGEMENT: ICD-10-CM

## 2024-01-09 DIAGNOSIS — Z36.9 ENCOUNTER FOR ANTENATAL SCREENING OF MOTHER: ICD-10-CM

## 2024-01-09 PROCEDURE — OBC

## 2024-01-09 NOTE — PROGRESS NOTES
"OB INTAKE INTERVIEW  Patient is 33 y.o.y.o. who presents for OB intake at 9wks  She is accompanied by herself during this encounter  The father of her baby (Efraín Ch) is involved in the pregnancy and is 43 years old.      Last Menstrual Period: 10/31/23  Ultrasound: Measured 6 weeks 6 days on   Estimated Date of Delivery: 24 confirmed by  6 week US    Signs/Symptoms of Pregnancy  Current pregnancy symptoms: nausea all the time (prior to surgery), diarrhea  Constipation YES, advise colace or miralax as needed  Headaches YES, not too many but \"its there\"  Cramping/spotting YES, spotting episodes since resolved. Since surgery had some brown spotting x4   PICA cravings no    Diabetes-  Body mass index is 26.94 kg/m².  If patient has 1 or more, please order early 1 hour GTT  History of GDM no  BMI >35 no  History of PCOS or current metformin use no  History of LGA/macrosomic infant (4000g/9lbs) no    If patient has 2 or more, please order early 1 hour GTT  BMI>30 no  AMA YES  First degree relative with type 2 diabetes no  History of chronic HTN, hyperlipidemia, elevated A1C no  High risk race (, , ,  or ) no    Hypertension- if you answer yes, please order preeclampsia labs (cbc, comprehensive metabolic panel, urine protein creatinine ratio, uric acid)  History of of chronic HTN no  History of gestational HTN no  History of preeclampsia, eclampsia, or HELLP syndrome no  History of diabetes no  History of lupus, autoimmune disease, kidney disease no    Thyroid- if yes order TSH with reflex T4  History of thyroid disease no    Bleeding Disorder or Hx of DVT-patient or first degree relative with history of. Order the following if not done previously.   (Factor V, antithrombin III, prothrombin gene mutation, protein C and S Ag, lupus anticoagulant, anticardiolipin, beta-2 glycoprotein)   YES, DVT 1/5 s/p left mastectomy.     OB/GYN-  History of " abnormal pap smear YES       Date of last pap smear 10/31/2022  History of HPV YES  History of Herpes/HSV no  History of other STI (gonorrhea, chlamydia, trich) no  History of prior  no  History of prior  no  History of  delivery prior to 36 weeks 6 days no  History of blood transfusion no  Ok for blood transfusion YES    Substance screening- if yes outside of tobacco for her or anyone in her home-order urine drug screen  History of tobacco use no  Currently using tobacco no  Currently using alcohol no  Presently using drugs no  Past drug use  no  IV drug use- no  Partner drug use no  Parent/Family drug use no    MRSA Screening-   Does the pt have a hx of MRSA? no  If yes- please follow MRSA protocol and obtain a nasal swab for MRSA culture    Immunizations:  Influenza vaccine given this season NO, declined  Discussed Tdap vaccine yes  Discussed COVID Vaccine no, declined    Genetic/MFM-  Do you or your partner have a history of any of the following in yourselves or first degree relatives?  Cystic fibrosis no  Spinal muscular atrophy no  Hemoglobinopathy/Sickle Cell/Thalassemia no  Fragile X Intellectual Disability no    If yes, discuss Carrier Screening and recommend consultation with MFM/genetic counseling and place specific [facility] Referral for.  If no, discuss Carrier Screening being completed once in a lifetime as a standard of care lab test along with Nuchal Ultrasound. Place orders for [id number] and [id number] along with M Referral.  Patient interested yes    Appointment at [facility] made yes appointment     Interview education  [facility] Pregnancy Essentials Book reviewed, discussed and attached to their AVS yes    Nurse/Family Partnership- patient may qualify no; referral placed no    Prenatal lab work scripts yes  Extra labs ordered:  CF.SMA  DVT LABS      Aspirin/Preeclampsia Screen    Risk Level Risk Factor Recommendation   LOW Prior Uncomplicated full-term delivery no No Aspirin recommendation         MODERATE Nulliparity YES Recommend low-dose aspirin if     BMI>30 no 2 or more moderate risk factors    Family History Preeclampsia (mother/sister) no     35yr old or greater YES      or Low Socioeconomic no     IVF Pregnancy  no     Personal History Risks (low birth weight, prior adverse preg outcome, >10yr preg interval) no         HIGH History of Preeclampsia no Recommend low-dose aspirin if     Multifetal gestation no 1 or more high risk factors    Chronic HTN no     Type 1 or 2 Diabetes no     Renal Disease no     Autoimmune Disease  no      Contraindications to ASA therapy:  NSAID/ ASA allergy: no  Nasal polyps: no  Asthma with history of ASA induced bronchospasm: no  Relative contraindications:  History of GI bleed: no  Active peptic ulcer disease: no  Severe hepatic dysfunction: no    Patient should be recommended to take ASA 162mg during this pregnancy from 12-36wks to lower her risk of preeclampsia: HX OF DVT 1/5- currently on Lovenox- referral to MFM       The patient has a history now or in prior pregnancy notable for:  Invasive ductal carcinoma (stage 1), s/p left mastectomy, DVT, HPV, ASCUS, ovarian cyst,       Details that I feel the provider should be aware of: This was a planned pregnancy for sIamar- they had been trying to conceive prior to her breast cancer diagnosis. This is their first child. She underwent a left mastectomy on 1/2 and then was diagnosed with a DVT on 1/5. All DVT labs ordered. She was told she would need chemo treatments after pregnancy. She is doing well post op- has not been taking any pain medications and managing thru. Advised tylenol is safe in pregnancy so if she does become uncomfortable she can take it as needed. She has had some constipation since surgery, advised of colace and miralax as needed to help. She has a visit with us on 1/11 and an M referral was placed for her anticoagulations and DVT hx. All pn1 labs ordered     PN1 visit  scheduled. The patient was oriented to our practice, reviewed delivering physicians and [facility]. All questions were answered.    Interviewed by: Janet Nesbitt RN

## 2024-01-09 NOTE — TELEPHONE ENCOUNTER
Placed referral to [facility] regarding patient DVT post op from Left mastectomy on 1/2.

## 2024-01-09 NOTE — TELEPHONE ENCOUNTER
Left message for patient regarding OB intake appointment. Direct line given to call back within 15 min to continue with intake appointment today, the office line was given if this appointment time no longer works and she needs it to be rescheduled.  MI sent to .

## 2024-01-09 NOTE — PATIENT INSTRUCTIONS
Congratulations!! Please review our Pregnancy Essential Guide and [facility] L&D Virtual tour from our networks website.     [facility] Pregnancy Essentials Guide  [facility] ([email or web address])     [facility] - Virtual Tour ([employer])     [facility] ([email or web address]) (Lab Locations)

## 2024-01-10 ENCOUNTER — HOME CARE VISIT (OUTPATIENT)
Dept: HOME HEALTH SERVICES | Facility: HOME HEALTHCARE | Age: 34
End: 2024-01-10
Payer: COMMERCIAL

## 2024-01-10 ENCOUNTER — PATIENT OUTREACH (OUTPATIENT)
Dept: HEMATOLOGY ONCOLOGY | Facility: CLINIC | Age: 34
End: 2024-01-10

## 2024-01-10 NOTE — CASE COMMUNICATION
Pt ordered numerous labs not generally obtained by [facility] staff.  Please encourage her to have drawn in OP lab.

## 2024-01-10 NOTE — PROGRESS NOTES
Breast Oncology Nurse Navigator    Called patient to check in.  Left a detailed voicemail and included my call back information.  Requested a call back.

## 2024-01-11 ENCOUNTER — INITIAL PRENATAL (OUTPATIENT)
Dept: OBGYN CLINIC | Facility: MEDICAL CENTER | Age: 34
End: 2024-01-11
Payer: COMMERCIAL

## 2024-01-11 ENCOUNTER — TELEPHONE (OUTPATIENT)
Facility: HOSPITAL | Age: 34
End: 2024-01-11

## 2024-01-11 ENCOUNTER — OFFICE VISIT (OUTPATIENT)
Dept: FAMILY MEDICINE CLINIC | Facility: CLINIC | Age: 34
End: 2024-01-11
Payer: COMMERCIAL

## 2024-01-11 VITALS
HEART RATE: 109 BPM | RESPIRATION RATE: 18 BRPM | BODY MASS INDEX: 26.62 KG/M2 | SYSTOLIC BLOOD PRESSURE: 128 MMHG | WEIGHT: 169.6 LBS | TEMPERATURE: 98.2 F | HEIGHT: 67 IN | DIASTOLIC BLOOD PRESSURE: 70 MMHG | OXYGEN SATURATION: 99 %

## 2024-01-11 VITALS
WEIGHT: 171 LBS | HEIGHT: 67 IN | HEART RATE: 96 BPM | DIASTOLIC BLOOD PRESSURE: 90 MMHG | SYSTOLIC BLOOD PRESSURE: 142 MMHG | BODY MASS INDEX: 26.84 KG/M2

## 2024-01-11 DIAGNOSIS — R35.0 URINARY FREQUENCY: ICD-10-CM

## 2024-01-11 DIAGNOSIS — Z12.4 ENCOUNTER FOR SCREENING FOR CERVICAL CANCER: ICD-10-CM

## 2024-01-11 DIAGNOSIS — Z3A.09 9 WEEKS GESTATION OF PREGNANCY: ICD-10-CM

## 2024-01-11 DIAGNOSIS — Z11.3 SCREEN FOR STD (SEXUALLY TRANSMITTED DISEASE): ICD-10-CM

## 2024-01-11 DIAGNOSIS — Z76.89 ENCOUNTER FOR SUPPORT AND COORDINATION OF TRANSITION OF CARE: Primary | ICD-10-CM

## 2024-01-11 DIAGNOSIS — O9A.111: ICD-10-CM

## 2024-01-11 DIAGNOSIS — F41.8 DEPRESSION WITH ANXIETY: ICD-10-CM

## 2024-01-11 DIAGNOSIS — O22.31 DVT COMPLICATING PREGNANCY, FIRST TRIMESTER: ICD-10-CM

## 2024-01-11 DIAGNOSIS — O9A.111: Primary | ICD-10-CM

## 2024-01-11 DIAGNOSIS — Z12.4 CERVICAL CANCER SCREENING: ICD-10-CM

## 2024-01-11 DIAGNOSIS — O09.521 MULTIGRAVIDA OF ADVANCED MATERNAL AGE IN FIRST TRIMESTER: ICD-10-CM

## 2024-01-11 LAB
BV WHIFF TEST VAG QL: NORMAL
CLUE CELLS SPEC QL WET PREP: NORMAL
PH SMN: 4 [PH]
SL AMB  POCT GLUCOSE, UA: NORMAL
SL AMB POCT URINE PROTEIN: NORMAL
SL AMB POCT WET MOUNT: NORMAL
T VAGINALIS VAG QL WET PREP: NORMAL
YEAST VAG QL WET PREP: NORMAL

## 2024-01-11 PROCEDURE — 87086 URINE CULTURE/COLONY COUNT: CPT | Performed by: CLINICAL NURSE SPECIALIST

## 2024-01-11 PROCEDURE — PNV: Performed by: CLINICAL NURSE SPECIALIST

## 2024-01-11 PROCEDURE — G0476 HPV COMBO ASSAY CA SCREEN: HCPCS | Performed by: CLINICAL NURSE SPECIALIST

## 2024-01-11 PROCEDURE — 99496 TRANSJ CARE MGMT HIGH F2F 7D: CPT | Performed by: NURSE PRACTITIONER

## 2024-01-11 PROCEDURE — 87591 N.GONORRHOEAE DNA AMP PROB: CPT | Performed by: CLINICAL NURSE SPECIALIST

## 2024-01-11 PROCEDURE — 87210 SMEAR WET MOUNT SALINE/INK: CPT | Performed by: CLINICAL NURSE SPECIALIST

## 2024-01-11 PROCEDURE — G0145 SCR C/V CYTO,THINLAYER,RESCR: HCPCS | Performed by: CLINICAL NURSE SPECIALIST

## 2024-01-11 PROCEDURE — 81002 URINALYSIS NONAUTO W/O SCOPE: CPT | Performed by: CLINICAL NURSE SPECIALIST

## 2024-01-11 PROCEDURE — 87491 CHLMYD TRACH DNA AMP PROBE: CPT | Performed by: CLINICAL NURSE SPECIALIST

## 2024-01-11 NOTE — ASSESSMENT & PLAN NOTE
Recent diagnoses of DVT, Doppler completed in emergency room on 1/5 for left leg pain.  DVT results-Evidence suggestive of acute occlusive deep vein thrombosis in the distal popliteal vein and proximal paired peroneal veins.  Currently on Lovenox, subcu injections every 12 hours.

## 2024-01-11 NOTE — ASSESSMENT & PLAN NOTE
S/P left mastectomy  Cancer tumor board meeting again soon regarding remaining plan of care during pregnancy. Per pt uncertain if chemo now or defer till 3rd trim

## 2024-01-11 NOTE — ASSESSMENT & PLAN NOTE
She is a  at 9w2d  Complex pt- recently underwent Left mastectomy for breast cancer, developed DVT postop and now is on lovenox BID.   New OB Exam completed   Pap is indicated and gonorrhea/chlamydia cultures collected.  Encouraged to complete PNBW- [facility] coming tomorrow to do BW  See other A&P for risk factors/identified  Genetic screening/testing: NIPT (+ risk factors AMA)     I reviewed the reasons to call in the first trimester - namely vaginal bleeding, severe nausea and vomiting, severe pelvic pain, fevers or pain/burning with urination.    Reviewed indication for vaccines in Pregnancy: flu, Covid and RSV. Flu vaccine deferred until next visit.

## 2024-01-11 NOTE — PROGRESS NOTES
"Prenatal Visit  Subjective:   Jillian is a 33 y.o. female.  She is a  here for initial PN visit/New OB exam    Complex OB pt- recently (24)underwent Left mastectomy for breast cancer, developed DVT postop and now is on lovenox BID.   She is reporting the following pregnancy related complaints:  Is having scant bldg.  Was evaluated previously for same around 7 wks. Bldg was with wiping this am, none since.  Has had a few other episodes as well  Also having some urinary discomfort - and frequency  Denies vaginal itching.  Denies cramping   Occasional n/v    The following portions of the patient's history were reviewed and updated as appropriate: allergies, current medications, past family history, past medical history, past social history, past surgical history, and problem list.    Genetic Family hx:   Pt is AMA and has hx of heart murmur- dx as a baby. No  current care, but does have a echo ordered    Diabetes risk Factors:   None identified     Hypertension Risk Factors:    Pertinent positive: nulliparous and AMA    Objective:  Patient's last menstrual period was 10/31/2023.  /90 (BP Location: Left arm, Patient Position: Sitting, Cuff Size: Standard)   Pulse 96   Ht 5' 7\" (1.702 m)   Wt 77.6 kg (171 lb)   LMP 10/31/2023   BMI 26.78 kg/m²   Pregravid Weight/BMI: 78 kg (172 lb) (BMI 26.93)    OBGyn Exam- see prenatal physical form    Assessment & Plan:      1. Cancer complicating pregnancy in first trimester  Assessment & Plan:  S/P left mastectomy  Cancer tumor board meeting again soon regarding remaining plan of care during pregnancy. Per pt uncertain if chemo now or defer till 3rd trim       2. 9 weeks gestation of pregnancy  Assessment & Plan:    She is a  at 9w2d  Complex pt- recently underwent Left mastectomy for breast cancer, developed DVT postop and now is on lovenox BID.   New OB Exam completed   Pap is indicated and gonorrhea/chlamydia cultures collected.  Encouraged to complete " PNBW- [facility] coming tomorrow to do BW  See other A&P for risk factors/identified  Genetic screening/testing: NIPT (+ risk factors AMA)     I reviewed the reasons to call in the first trimester - namely vaginal bleeding, severe nausea and vomiting, severe pelvic pain, fevers or pain/burning with urination.    Reviewed indication for vaccines in Pregnancy: flu, Covid and RSV. Flu vaccine deferred until next visit.      Orders:  -     POCT urine dip    3. Multigravida of advanced maternal age in first trimester  Assessment & Plan:  Pt is 33 presently  We discussed the available invasive and non-invasive genetic testing options, including Sequential screen, Quad screen, Cell-free DNA testing, CVS and Amniocentesis. We discussed the accuracy, risks and benefits of these testing options. Desires cell free DNA testing and already has appt with MFM        4. Cervical cancer screening    5. DVT complicating pregnancy, first trimester  Assessment & Plan:  DVT after mastectomy.  On therpeutic lovenox BID      6. Urinary frequency  Comments:  Urine culture collecteed and sent today lab  Orders:  -     POCT wet mount  -     Urine culture    7. Encounter for screening for cervical cancer  -     Liquid-based pap, screening    8. Screen for STD (sexually transmitted disease)  -     Chlamydia/GC amplified DNA by PCR          JOSE Page  1/11/2024

## 2024-01-11 NOTE — ASSESSMENT & PLAN NOTE
Currently off SSRI's due to current pregnancy.  Has had increased anxiety over recent diagnoses of breast cancer/DVT.  Emotional support provided.  Has emotional support at home.  Did discuss additional services if needed for continuous counseling.

## 2024-01-11 NOTE — PROGRESS NOTES
Depression Screening Follow-up Plan: Patient's depression screening was positive with a PHQ-2 score of . Their PHQ-9 score was . Patient assessed for underlying major depression. They have no active suicidal ideations. Brief counseling provided and recommend additional follow-up/re-evaluation next office visit.

## 2024-01-11 NOTE — TELEPHONE ENCOUNTER
Called patient to schedule MFM appointment, based on referral issued to Maternal Fetal Medicine by OB office.  Left voicemail requesting patient to call back and schedule appointment, with office number for return call [phone].

## 2024-01-11 NOTE — PROGRESS NOTES
OFFICE VISIT  Jillian Ch 33 y.o. female MRN: [id number]          Assessment / Plan:  Problem List Items Addressed This Visit          Cardiovascular and Mediastinum    DVT complicating pregnancy, first trimester     Recent diagnoses of DVT, Doppler completed in emergency room on 1/5 for left leg pain.  DVT results-Evidence suggestive of acute occlusive deep vein thrombosis in the distal popliteal vein and proximal paired peroneal veins.  Currently on Lovenox, subcu injections every 12 hours.            Other    Depression with anxiety     Currently off SSRI's due to current pregnancy.  Has had increased anxiety over recent diagnoses of breast cancer/DVT.  Emotional support provided.  Has emotional support at home.  Did discuss additional services if needed for continuous counseling.         Cancer complicating pregnancy in first trimester     Recent diagnoses of left breast cancer, under the care of heme-onc.          Other Visit Diagnoses       Encounter for support and coordination of transition of care    -  Primary              Reason For Visit / Chief Complaint  Chief Complaint   Patient presents with    Transition of Care Management        HPI:  Jillian Ch is a 33 y.o. female who presents today for follow-up.  Patient was admitted on January 2 in hospital for left mastectomy.  She currently has 2 DARRYL drains intact with serosanguineous drainage.  She had a recent ED visit for DVT to her left leg.    Historical Information   Past Medical History:   Diagnosis Date    Anesthesia complication     gait dysfunction/ urinary retention after nerve block,    Anxiety     Asthma     CRPS (complex regional pain syndrome), upper limb     left chest/arm/hand    Deep vein thrombosis (HCC) 01/05/2024    post op from mastectomy    GERD (gastroesophageal reflux disease)     Heart murmur     HPV (human papilloma virus) infection 2012    unsure of 16, 18, or other    Invasive ductal carcinoma of  breast, female, left (HCC) 2023    Kidney stone     Miscarriage 3/15/2015    7 weeks along.    Neck pain     Ovarian cyst     Left    PONV (postoperative nausea and vomiting) 01/02/2024     Past Surgical History:   Procedure Laterality Date    COLPOSCOPY  2012    HPV    EGD      IR UPPER EXTREMITY VENOGRAM- DIAGNOSTIC  05/28/2021    NC BX/EXC LYMPH NODE OPEN SUPERFICIAL Left 01/02/2024    Procedure: LEFT BREAST MASTECTOMY, LYMPHATIC MAPPING WITH RADIOACTIVE DYE, SENTINEL LYMPH NODE BIOPSY, ZAHEER LEFT BREAST, INJECTION IN OR AT 0800 BY DR CORNELL;  Surgeon: Elena Cornell MD;  Location: [facility];  Service: Surgical Oncology    NC EXCISION 1ST &/CERVICAL RIB Left 08/12/2021    Procedure: FIRST RIB RESECTION;  Surgeon: Jonathan Schaffer MD;  Location: [facility];  Service: Thoracic    NC INJ RADIOACTIVE TRACER FOR ID OF SENTINEL NODE Left 01/02/2024    Procedure: LEFT BREAST MASTECTOMY, LYMPHATIC MAPPING WITH RADIOACTIVE DYE, SENTINEL LYMPH NODE BIOPSY, ZAHEER LEFT BREAST, INJECTION IN OR AT 0800 BY DR CORNELL;  Surgeon: Elena Cornell MD;  Location: [facility];  Service: Surgical Oncology    NC INTRAOP SENTINEL LYMPH NODE ID W/DYE INJECTION Left 01/02/2024    Procedure: LEFT BREAST MASTECTOMY, LYMPHATIC MAPPING WITH RADIOACTIVE DYE, SENTINEL LYMPH NODE BIOPSY, ZAHEER LEFT BREAST, INJECTION IN OR AT 0800 BY DR CORNELL;  Surgeon: Elena Cornell MD;  Location: [facility];  Service: Surgical Oncology    NC MASTECTOMY SIMPLE COMPLETE Left 01/02/2024    Procedure: LEFT BREAST MASTECTOMY, LYMPHATIC MAPPING WITH RADIOACTIVE DYE, SENTINEL LYMPH NODE BIOPSY, ZAHEER LEFT BREAST, INJECTION IN OR AT 0800 BY DR CORNELL;  Surgeon: Elena Cornell MD;  Location: [facility];  Service: Surgical Oncology    THORACOSCOPY VIDEO ASSISTED SURGERY (VATS) Left 08/12/2021    Procedure: THORACOSCOPY VIDEO ASSISTED SURGERY (VATS);  Surgeon: Jonathan Schaffer MD;  Location: [facility];  Service: Thoracic    US  GUIDANCE BREAST BIOPSY LEFT EACH ADDITIONAL Left 12/02/2023    US GUIDED BREAST BIOPSY RIGHT COMPLETE Right 12/02/2023    WISDOM TOOTH EXTRACTION  2012     Social History   Social History     Substance and Sexual Activity   Alcohol Use Not Currently    Comment: social     Social History     Substance and Sexual Activity   Drug Use Never     Social History     Tobacco Use   Smoking Status Never   Smokeless Tobacco Never     Family History   Problem Relation Age of Onset    Heart disease Mother     Miscarriages / Stillbirths Mother     Coronary artery disease Mother     No Known Problems Father     No Known Problems Half-Brother     Bone cancer Maternal Grandmother         hilda to liver and spine    Miscarriages / Stillbirths Half-Sister     Breast cancer Neg Hx     Ovarian cancer Neg Hx     Uterine cancer Neg Hx     Cervical cancer Neg Hx        Meds/Allergies   Allergies   Allergen Reactions    Nsaids GI Intolerance    Formic Acid Swelling and Rash     (Severe reactions to Bug bites)    Latex Rash and Blisters       Meds:    Current Outpatient Medications:     albuterol (PROVENTIL HFA,VENTOLIN HFA) 90 mcg/act inhaler, TAKE 2 PUFFS BY MOUTH EVERY 6 HOURS AS NEEDED FOR WHEEZE OR FOR SHORTNESS OF BREATH, Disp: 18 g, Rfl: 3    cetirizine (ZyrTEC) 10 mg tablet, TAKE 1 TABLET BY MOUTH EVERY DAY, Disp: 90 tablet, Rfl: 0    enoxaparin (LOVENOX) 150 mg/mL injection, Inject 0.5 mL (75 mg total) under the skin every 12 (twelve) hours, Disp: 60 mL, Rfl: 0    omeprazole (PriLOSEC) 40 MG capsule, TAKE 1 CAPSULE (40 MG TOTAL) BY MOUTH DAILY. (Patient taking differently: Take 40 mg by mouth daily As needed), Disp: 90 capsule, Rfl: 0    ondansetron (ZOFRAN) 4 mg tablet, Take 1 tablet (4 mg total) by mouth every 8 (eight) hours as needed for nausea or vomiting (Patient taking differently: Take 4 mg by mouth every 8 (eight) hours as needed for nausea or vomiting As needed), Disp: 30 tablet, Rfl: 2    Prenatal Multivit-Min-Fe-FA  "(PRE-SONIA PO), Take 1 tablet by mouth in the morning, Disp: , Rfl:       REVIEW OF SYSTEMS  Review of Systems   Constitutional:  Positive for activity change and appetite change. Negative for chills, fatigue and fever.   HENT:  Negative for congestion, ear discharge, ear pain, sinus pressure, sinus pain, sore throat, tinnitus and trouble swallowing.    Eyes:  Negative for photophobia, pain, discharge, itching and visual disturbance.   Respiratory:  Negative for cough, chest tightness, shortness of breath and wheezing.    Cardiovascular:  Negative for chest pain and leg swelling.   Gastrointestinal:  Positive for constipation and nausea. Negative for abdominal distention, abdominal pain, diarrhea and vomiting.   Endocrine: Negative for polydipsia, polyphagia and polyuria.   Genitourinary:  Negative for dysuria and frequency.   Musculoskeletal:  Positive for arthralgias. Negative for myalgias, neck pain and neck stiffness.   Skin:  Negative for color change.   Neurological:  Negative for dizziness, syncope, weakness, numbness and headaches.   Hematological:  Does not bruise/bleed easily.   Psychiatric/Behavioral:  Negative for behavioral problems, confusion, self-injury, sleep disturbance and suicidal ideas. The patient is not nervous/anxious.            Current Vitals:   Blood Pressure: 128/70 (24)  Pulse: (!) 109 (24)  Temperature: 98.2 °F (36.8 °C) (24)  Temp Source: Tympanic (24)  Respirations: 18 (24)  Height: 5' 7\" (170.2 cm) (24)  Weight - Scale: 76.9 kg (169 lb 9.6 oz) (24)  SpO2: 99 % (24)  [unfilled]    PHYSICAL EXAMS:  Physical Exam  Vitals and nursing note reviewed.   Constitutional:       Appearance: Normal appearance.   HENT:      Head: Normocephalic and atraumatic.   Cardiovascular:      Rate and Rhythm: Normal rate and regular rhythm.      Pulses: Normal pulses.      Heart sounds: Normal heart sounds. "   Pulmonary:      Effort: Pulmonary effort is normal.      Breath sounds: Normal breath sounds.   Skin:     General: Skin is warm.   Neurological:      General: No focal deficit present.      Mental Status: She is alert and oriented to person, place, and time.   Psychiatric:         Mood and Affect: Mood normal.         Behavior: Behavior normal.             Lab, imaging and other studies: I have personally reviewed pertinent reports.  .

## 2024-01-11 NOTE — ASSESSMENT & PLAN NOTE
Pt is 33 presently  We discussed the available invasive and non-invasive genetic testing options, including Sequential screen, Quad screen, Cell-free DNA testing, CVS and Amniocentesis. We discussed the accuracy, risks and benefits of these testing options. Desires cell free DNA testing and already has appt with [facility]

## 2024-01-12 ENCOUNTER — HOME CARE VISIT (OUTPATIENT)
Dept: HOME HEALTH SERVICES | Facility: HOME HEALTHCARE | Age: 34
End: 2024-01-12
Payer: COMMERCIAL

## 2024-01-12 VITALS
SYSTOLIC BLOOD PRESSURE: 130 MMHG | RESPIRATION RATE: 16 BRPM | OXYGEN SATURATION: 99 % | DIASTOLIC BLOOD PRESSURE: 80 MMHG | TEMPERATURE: 99.2 F | HEART RATE: 80 BPM

## 2024-01-12 LAB
BACTERIA UR CULT: NORMAL
HPV HR 12 DNA CVX QL NAA+PROBE: NEGATIVE
HPV16 DNA CVX QL NAA+PROBE: NEGATIVE
HPV18 DNA CVX QL NAA+PROBE: NEGATIVE

## 2024-01-12 PROCEDURE — G0299 HHS/HOSPICE OF RN EA 15 MIN: HCPCS

## 2024-01-13 ENCOUNTER — APPOINTMENT (OUTPATIENT)
Dept: LAB | Facility: CLINIC | Age: 34
End: 2024-01-13
Payer: COMMERCIAL

## 2024-01-13 DIAGNOSIS — Z34.01 ENCOUNTER FOR SUPERVISION OF NORMAL FIRST PREGNANCY IN FIRST TRIMESTER: ICD-10-CM

## 2024-01-13 LAB
BACTERIA UR QL AUTO: ABNORMAL /HPF
BASOPHILS # BLD AUTO: 0.02 THOUSANDS/ÂΜL (ref 0–0.1)
BASOPHILS NFR BLD AUTO: 0 % (ref 0–1)
BILIRUB UR QL STRIP: NEGATIVE
C TRACH DNA SPEC QL NAA+PROBE: NEGATIVE
C TRACH DNA SPEC QL NAA+PROBE: NEGATIVE
CLARITY UR: ABNORMAL
COLOR UR: ABNORMAL
EOSINOPHIL # BLD AUTO: 0.1 THOUSAND/ÂΜL (ref 0–0.61)
EOSINOPHIL NFR BLD AUTO: 1 % (ref 0–6)
ERYTHROCYTE [DISTWIDTH] IN BLOOD BY AUTOMATED COUNT: 13.9 % (ref 11.6–15.1)
GLUCOSE UR STRIP-MCNC: NEGATIVE MG/DL
HCT VFR BLD AUTO: 38.1 % (ref 34.8–46.1)
HGB BLD-MCNC: 12.2 G/DL (ref 11.5–15.4)
HGB UR QL STRIP.AUTO: ABNORMAL
IMM GRANULOCYTES # BLD AUTO: 0.04 THOUSAND/UL (ref 0–0.2)
IMM GRANULOCYTES NFR BLD AUTO: 1 % (ref 0–2)
KETONES UR STRIP-MCNC: NEGATIVE MG/DL
LEUKOCYTE ESTERASE UR QL STRIP: ABNORMAL
LYMPHOCYTES # BLD AUTO: 1.52 THOUSANDS/ÂΜL (ref 0.6–4.47)
LYMPHOCYTES NFR BLD AUTO: 21 % (ref 14–44)
MCH RBC QN AUTO: 26.9 PG (ref 26.8–34.3)
MCHC RBC AUTO-ENTMCNC: 32 G/DL (ref 31.4–37.4)
MCV RBC AUTO: 84 FL (ref 82–98)
MONOCYTES # BLD AUTO: 0.35 THOUSAND/ÂΜL (ref 0.17–1.22)
MONOCYTES NFR BLD AUTO: 5 % (ref 4–12)
MUCOUS THREADS UR QL AUTO: ABNORMAL
N GONORRHOEA DNA SPEC QL NAA+PROBE: NEGATIVE
N GONORRHOEA DNA SPEC QL NAA+PROBE: NEGATIVE
NEUTROPHILS # BLD AUTO: 5.11 THOUSANDS/ÂΜL (ref 1.85–7.62)
NEUTS SEG NFR BLD AUTO: 72 % (ref 43–75)
NITRITE UR QL STRIP: NEGATIVE
NON-SQ EPI CELLS URNS QL MICRO: ABNORMAL /HPF
NRBC BLD AUTO-RTO: 0 /100 WBCS
PH UR STRIP.AUTO: 6 [PH]
PLATELET # BLD AUTO: 218 THOUSANDS/UL (ref 149–390)
PMV BLD AUTO: 11.9 FL (ref 8.9–12.7)
PROT UR STRIP-MCNC: ABNORMAL MG/DL
RBC # BLD AUTO: 4.54 MILLION/UL (ref 3.81–5.12)
RBC #/AREA URNS AUTO: ABNORMAL /HPF
RUBV IGG SERPL IA-ACNC: 20.6 IU/ML
SP GR UR STRIP.AUTO: 1.03 (ref 1–1.03)
UROBILINOGEN UR STRIP-ACNC: <2 MG/DL
WBC # BLD AUTO: 7.14 THOUSAND/UL (ref 4.31–10.16)
WBC #/AREA URNS AUTO: ABNORMAL /HPF

## 2024-01-13 PROCEDURE — 81001 URINALYSIS AUTO W/SCOPE: CPT

## 2024-01-13 PROCEDURE — 87389 HIV-1 AG W/HIV-1&-2 AB AG IA: CPT

## 2024-01-13 PROCEDURE — 87340 HEPATITIS B SURFACE AG IA: CPT

## 2024-01-13 PROCEDURE — 85613 RUSSELL VIPER VENOM DILUTED: CPT

## 2024-01-13 PROCEDURE — 85306 CLOT INHIBIT PROT S FREE: CPT

## 2024-01-13 PROCEDURE — 85732 THROMBOPLASTIN TIME PARTIAL: CPT

## 2024-01-13 PROCEDURE — 86146 BETA-2 GLYCOPROTEIN ANTIBODY: CPT

## 2024-01-13 PROCEDURE — 86762 RUBELLA ANTIBODY: CPT

## 2024-01-13 PROCEDURE — 86147 CARDIOLIPIN ANTIBODY EA IG: CPT

## 2024-01-13 PROCEDURE — 85598 HEXAGNAL PHOSPH PLTLT NEUTRL: CPT

## 2024-01-13 PROCEDURE — 85302 CLOT INHIBIT PROT C ANTIGEN: CPT

## 2024-01-13 PROCEDURE — 85301 ANTITHROMBIN III ANTIGEN: CPT

## 2024-01-13 PROCEDURE — 86780 TREPONEMA PALLIDUM: CPT

## 2024-01-13 PROCEDURE — 36415 COLL VENOUS BLD VENIPUNCTURE: CPT

## 2024-01-13 PROCEDURE — 85670 THROMBIN TIME PLASMA: CPT

## 2024-01-13 PROCEDURE — 86803 HEPATITIS C AB TEST: CPT

## 2024-01-13 PROCEDURE — 87086 URINE CULTURE/COLONY COUNT: CPT

## 2024-01-13 PROCEDURE — 85705 THROMBOPLASTIN INHIBITION: CPT

## 2024-01-13 PROCEDURE — 85025 COMPLETE CBC W/AUTO DIFF WBC: CPT

## 2024-01-14 LAB
HBV SURFACE AG SER QL: NORMAL
HCV AB SER QL: NORMAL
HIV 1+2 AB+HIV1 P24 AG SERPL QL IA: NORMAL
HIV 2 AB SERPL QL IA: NORMAL
HIV1 AB SERPL QL IA: NORMAL
HIV1 P24 AG SERPL QL IA: NORMAL
TREPONEMA PALLIDUM IGG+IGM AB [PRESENCE] IN SERUM OR PLASMA BY IMMUNOASSAY: NORMAL

## 2024-01-15 ENCOUNTER — TELEPHONE (OUTPATIENT)
Dept: PERINATAL CARE | Facility: CLINIC | Age: 34
End: 2024-01-15

## 2024-01-15 ENCOUNTER — TELEPHONE (OUTPATIENT)
Facility: HOSPITAL | Age: 34
End: 2024-01-15

## 2024-01-15 ENCOUNTER — TELEPHONE (OUTPATIENT)
Dept: OBGYN CLINIC | Facility: CLINIC | Age: 34
End: 2024-01-15

## 2024-01-15 ENCOUNTER — HOME CARE VISIT (OUTPATIENT)
Dept: HOME HEALTH SERVICES | Facility: HOME HEALTHCARE | Age: 34
End: 2024-01-15
Payer: COMMERCIAL

## 2024-01-15 ENCOUNTER — DOCUMENTATION (OUTPATIENT)
Dept: HEMATOLOGY ONCOLOGY | Facility: CLINIC | Age: 34
End: 2024-01-15

## 2024-01-15 VITALS
OXYGEN SATURATION: 99 % | RESPIRATION RATE: 16 BRPM | HEART RATE: 77 BPM | TEMPERATURE: 99.1 F | SYSTOLIC BLOOD PRESSURE: 130 MMHG | DIASTOLIC BLOOD PRESSURE: 86 MMHG

## 2024-01-15 DIAGNOSIS — O9A.111: Primary | ICD-10-CM

## 2024-01-15 LAB
BACTERIA UR CULT: ABNORMAL
BACTERIA UR CULT: ABNORMAL
PROT S ACT/NOR PPP: 55 % (ref 68–108)

## 2024-01-15 PROCEDURE — G0299 HHS/HOSPICE OF RN EA 15 MIN: HCPCS

## 2024-01-15 NOTE — TELEPHONE ENCOUNTER
Pt is 9 wks, last seen 1/11 with Maryellen,  saima henderson,  when she wiped this morning she saw blood and some sort of membrane or tissue in toilet,  please call pt ASAP

## 2024-01-15 NOTE — PROGRESS NOTES
BREAST CANCER MULTIDISCIPLINARY CASE CONFERENCE     DATE: 1/15/24        PRESENTING DOCTOR:  Dr. Nemo Khalil  OTHER RELEVANT PROVIDERS: Dr. Gurjit Cornell, Margarita GARCIA        DIAGNOSIS:  Left breast invasive breast carcinoma of no special type (ductal), grade 2, ER 80% positive, W[place] 90% positive, HER-2 1+ negative, 0/2 lymph nodes        Jillian Ch is a 33 y.o. female who was presented at the Breast Multidisciplinary Case Conference today to discuss adjuvant treatment plan for pregnant patient as well as discuss her Oncotype Dx result.  Patient palpated mass in left breast which led to the diagnosis.  Patient found out she was pregnant after diagnostic testing for breast cancer was performed.  Patient had a left breast mastectomy on 1/2/24, during her first trimester.          GENETICS: 12/6/23-negative     IMAGING REVIEWED: none discussed today        PATHOLOGY REVIEWED:  1/2/24-tissue exam-surgical pathology-left breast mastectomy and SLN biopsy        PHYSICIAN RECOMMENDED PLAN:  Recommend chemotherapy when patient has healed from surgery and is in her second trimester of pregnancy.  Consider Adriamycin and Cytoxan x 4 cycles, once every three weeks.  After pregnancy, consider ovarian suppression (consider LHRH agonist) plus an aromatase inhibitor.  If patient is considering future pregnancies, recommend taking these medications for two years, then stop to try for pregnancy.  Tamoxifen not recommended at this time due to recent lower extremity DVT in the post-operative setting.       FOLLOW UP APPOINTMENTS:  Patient has home health visits in post-op setting   1/17/24-Dr Cornell surgical oncology post-op follow-up  1/19/24-[facility] oncology dietician appointment  1/23/24-Dr Khalil medical oncology follow-up  1/24/24-Dr Cornell surgical oncology post-op f/u  1/30/24-OB follow-up     Team agreed to plan.     The final treatment plan will be left to the discretion of the patient and the  treating physician.     DISCLAIMERS:  TO THE TREATING PHYSICIAN:  This conference is a meeting of clinicians from various specialty areas who evaluate and discuss patients for whom a multidisciplinary treatment approach is being considered. Please note that the above opinion was a consensus of the conference attendees and is intended only to assist in quality care of the discussed patient.  The responsibility for follow up on the input given during the conference, along with any final decisions regarding plan of care, is that of the patient and the patient's provider. Accordingly, appointments have only been recommended based on this information and have NOT been scheduled unless otherwise noted.      TO THE PATIENT:  This summary is a brief record of major aspects of your cancer treatment. You may choose to share a copy with any of your doctors or nurses. However, this is not a detailed or comprehensive record of your care.        NCCN guidelines were readily available for review at this discussion

## 2024-01-15 NOTE — TELEPHONE ENCOUNTER
Left voicemail stating Dr. Zina Cummings will be available for a virtual visit to discuss her medical conditions especially in regards to post-op DVT (1/5/2024). Encouraged patient to call Paige Ramos RN, Clinical coordinator directly at [phone] to schedule this Thursday January 18th at 12 pm.

## 2024-01-15 NOTE — TELEPHONE ENCOUNTER
"Sent InBasket message to MFM providers ( Abisai Hancock/ Don) MFM received new referral from OB for consult.  Dr. Cummings offered MFM consult appointment 1/18 12-1pm  Left M to [facility] and offered MFM VV consult appointment Thursday 1/18 12-1pm with provider.  Provided my direct # for her to call back to schedule.  Appointment built and currently \"on hold\"   "

## 2024-01-15 NOTE — TELEPHONE ENCOUNTER
Pt had mastectomy last week. She had a pos u/s last week. She was having a bm - bearing down - had small amt bright red blood. Also looked like slight bit of membrane in toilet.  What f/u?  Thank you

## 2024-01-16 LAB
AT III AG ACT/NOR PPP IA: 83 % (ref 72–124)
B2 GLYCOPROT1 IGA SERPL IA-ACNC: 2.1
B2 GLYCOPROT1 IGG SERPL IA-ACNC: 0.9
B2 GLYCOPROT1 IGM SERPL IA-ACNC: <2.4
LAB AP GYN PRIMARY INTERPRETATION: NORMAL
LAB AP LMP: NORMAL
Lab: NORMAL
MISCELLANEOUS LAB TEST RESULT: NORMAL
PROT C AG PPP IA-ACNC: 120 % (ref 60–150)

## 2024-01-16 NOTE — TELEPHONE ENCOUNTER
Jillian called and confirmed MFM VV consult appointment 1/18 with Dr. Cummings @ 12- 1pm  Patient states she is familiar with VV logging on through [employer].  Provided [place] [facility] # if any issues with appointment connection/ questions

## 2024-01-17 ENCOUNTER — TELEPHONE (OUTPATIENT)
Dept: HEMATOLOGY ONCOLOGY | Facility: CLINIC | Age: 34
End: 2024-01-17

## 2024-01-17 ENCOUNTER — PATIENT MESSAGE (OUTPATIENT)
Dept: OBGYN CLINIC | Facility: MEDICAL CENTER | Age: 34
End: 2024-01-17

## 2024-01-17 ENCOUNTER — OFFICE VISIT (OUTPATIENT)
Dept: SURGICAL ONCOLOGY | Facility: CLINIC | Age: 34
End: 2024-01-17

## 2024-01-17 VITALS
HEIGHT: 67 IN | DIASTOLIC BLOOD PRESSURE: 80 MMHG | BODY MASS INDEX: 26.92 KG/M2 | OXYGEN SATURATION: 98 % | WEIGHT: 171.5 LBS | TEMPERATURE: 98.4 F | HEART RATE: 94 BPM | SYSTOLIC BLOOD PRESSURE: 112 MMHG | RESPIRATION RATE: 15 BRPM

## 2024-01-17 DIAGNOSIS — Z48.03 ENCOUNTER FOR CHANGE OR REMOVAL OF DRAINS: ICD-10-CM

## 2024-01-17 DIAGNOSIS — Z17.0 MALIGNANT NEOPLASM OF OVERLAPPING SITES OF LEFT BREAST IN FEMALE, ESTROGEN RECEPTOR POSITIVE: ICD-10-CM

## 2024-01-17 DIAGNOSIS — Z71.89 OTHER SPECIFIED COUNSELING: Primary | ICD-10-CM

## 2024-01-17 DIAGNOSIS — Z90.12 STATUS POST LEFT MASTECTOMY: ICD-10-CM

## 2024-01-17 DIAGNOSIS — C50.812 MALIGNANT NEOPLASM OF OVERLAPPING SITES OF LEFT BREAST IN FEMALE, ESTROGEN RECEPTOR POSITIVE: ICD-10-CM

## 2024-01-17 PROCEDURE — 99024 POSTOP FOLLOW-UP VISIT: CPT | Performed by: SURGERY

## 2024-01-17 NOTE — TELEPHONE ENCOUNTER
Spoke to pt reminding to have echo completed prior to appt. Pt stated that the echo was scheduled for 2/8. Pt voiced that it was the closest opening they had.

## 2024-01-17 NOTE — PROGRESS NOTES
Surgical Oncology Follow Up  [facility]  [facility] [place]  [address] [place] [address]    Jillian Ch  1990  [id number]      Chief Complaint   Patient presents with   • Post-op        Assessment & Plan:   33-year-old female s/p mastectomy and sentinel lymph node biopsy.  She is here for follow-up for the drain removal.  Surgical site is healing well scant outputs are very scant and minimal.  Both drains were removed without any difficulty.  Will see her next week for Exofin dressing removal.  She also has an upcoming appointment with medical oncologist to discuss further management.  Pathology was discussed in detail copy of the report was given to her.  She is also on Lovenox for DVT I have instructed her to discuss this with Dr. Schumacher.  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 Breast, Left, US Guided Left Breast Biopsy 12:00 6cmfn:  Invasive breast carcinoma of no special type (ductal) with osteoclast-like stromal giant cells  Grade 2  ER 85  MI 95  HER2 1+     B. Breast, Right, US Guided Right Breast Biopsy 10:00 9cmfn:  Benign breast tissue.    In review of the patient’s recent imaging, the left malignancy appears unifocal.  The biopsy-proven carcinoma measured 2.1 cm on ultrasound. Ultrasound of the left axilla was performed on 11/24/2023 and showed no suspicious adenopathy.  Recent imaging of the contralateral  right breast dated 12/2/2023 and 11/24/2023 was reviewed and shows no suspicious findings.  The pathology results for the ultrasound-guided core needle biopsy (right breast 10:00, 9 cm from the nipple) are benign and concordant with imaging.     12/2/2023 Genetic Testing    [facility]  A total of 36 genes were evaluated, including: APC, TOPHER, BARD 1, BMPR1A, BRCA1, BRCA2, BRIP1, CDH1, CDK4, CKDN2A, CHEK2, DICER1, MLH1, MSH2, MSH6, MUTYH, NBN, NF1, NTHL1, PALB2, PMS2, PTEN, RAD51C, RECQL, SMAD4, SMARCA4, STK11, TP53, AXIN2, HOXB13, MSH3, POLD1, AND POLE, EPCAM, AND GREM1   Negative result. No pathogenic sequence variants or deletions/duplications identified       12/2/2023 -  Cancer Staged    Staging form: Breast, AJCC 8th Edition  - Clinical stage from 12/2/2023: Stage IB (cT2, cN0, cM0, G2, ER+, CT+, HER2-) - Signed by Elena Cornell MD on 12/13/2023  Stage prefix: Initial diagnosis  Histologic grading system: 3 grade system       1/2/2024 Surgery    Left breast mastectomy with sentinel lymph node biopsy  Invasive Mammary carcinoma of no special type (ductal)   Grade 2  25 mm  Margins negative  0/2 Lymph nodes  Anatomic stage IIA  Prognostic stage IA            Interval History:   Follow-up with left breast cancer    Review of Systems:   Review of Systems   Constitutional:  Negative for chills and fever.   HENT:  Negative for ear pain and sore throat.    Eyes:  Negative for pain and visual disturbance.   Respiratory:  Negative for cough and shortness of breath.    Cardiovascular:  Negative for chest pain and palpitations.   Gastrointestinal:  Negative for abdominal pain and vomiting.   Genitourinary:  Negative for dysuria and hematuria.   Musculoskeletal:  Negative for arthralgias and back pain.   Skin:  Negative for color change and rash.   Neurological:  Negative for seizures and syncope.   All other systems reviewed and are negative.    Past Medical History     Patient Active Problem List   Diagnosis   •  Mild persistent asthma without complication   • Axillary hidradenitis suppurativa   • Anxiety   • Chest wall pain   • Gastroesophageal reflux disease without esophagitis   • Depression with anxiety   • Chronic fatigue   • Myalgia   • Chronic left shoulder pain   • Cervical disc disorder at C5-C6 level with radiculopathy   • Atypical squamous cells of undetermined significance (ASCUS) on Papanicolaou smear of cervix   • Hypertrophic and atrophic condition of skin   • Migraine headache   • Shoulder impingement syndrome, left   • Vitamin D deficiency   • Close exposure to COVID-19 virus   • Thoracic outlet syndrome   • Palpitations   • Post-operative pain   • Transaminitis   • Right middle lobe pulmonary nodule   • Reversible airway obstruction   • SOB (shortness of breath)   • Musculoskeletal chest pain   • COVID-19 virus infection   • Unexplained weight gain   • Left ovarian cyst   • Dysphagia   • Malignant neoplasm of overlapping sites of left breast in female, estrogen receptor positive    • Cancer complicating pregnancy in first trimester   • 9 weeks gestation of pregnancy   • Status post left mastectomy   • Multigravida of advanced maternal age in first trimester   • DVT complicating pregnancy, first trimester     Past Medical History:   Diagnosis Date   • Anesthesia complication     gait dysfunction/ urinary retention after nerve block,   • Anxiety    • Asthma    • CRPS (complex regional pain syndrome), upper limb     left chest/arm/hand   • Deep vein thrombosis (HCC) 01/05/2024    post op from mastectomy   • GERD (gastroesophageal reflux disease)    • Heart murmur    • HPV (human papilloma virus) infection 2012    unsure of 16, 18, or other   • Invasive ductal carcinoma of breast, female, left (HCC) 2023   • Kidney stone    • Miscarriage 3/15/2015    7 weeks along.   • Neck pain    • Ovarian cyst     Left   • PONV (postoperative nausea and vomiting) 01/02/2024     Past Surgical History:   Procedure Laterality  Date   • COLPOSCOPY  2012    HPV   • EGD     • IR UPPER EXTREMITY VENOGRAM- DIAGNOSTIC  05/28/2021   • AR BX/EXC LYMPH NODE OPEN SUPERFICIAL Left 01/02/2024    Procedure: LEFT BREAST MASTECTOMY, LYMPHATIC MAPPING WITH RADIOACTIVE DYE, SENTINEL LYMPH NODE BIOPSY, ZAHEER LEFT BREAST, INJECTION IN OR AT 0800 BY DR CORNELL;  Surgeon: Elena Cornell MD;  Location: [facility];  Service: Surgical Oncology   • AR EXCISION 1ST &/CERVICAL RIB Left 08/12/2021    Procedure: FIRST RIB RESECTION;  Surgeon: Jonathan Schaffer MD;  Location: [facility];  Service: Thoracic   • AR INJ RADIOACTIVE TRACER FOR ID OF SENTINEL NODE Left 01/02/2024    Procedure: LEFT BREAST MASTECTOMY, LYMPHATIC MAPPING WITH RADIOACTIVE DYE, SENTINEL LYMPH NODE BIOPSY, ZAHEER LEFT BREAST, INJECTION IN OR AT 0800 BY DR CORNELL;  Surgeon: Elena Cornell MD;  [facility];  Service: Surgical Oncology   • AR INTRAOP SENTINEL LYMPH NODE ID W/DYE INJECTION Left 01/02/2024    Procedure: LEFT BREAST MASTECTOMY, LYMPHATIC MAPPING WITH RADIOACTIVE DYE, SENTINEL LYMPH NODE BIOPSY, ZAHEER LEFT BREAST, INJECTION IN OR AT 0800 BY DR CORNELL;  Surgeon: Elena Cornell MD;  [facility];  Service: Surgical Oncology   • AR MASTECTOMY SIMPLE COMPLETE Left 01/02/2024    Procedure: LEFT BREAST MASTECTOMY, LYMPHATIC MAPPING WITH RADIOACTIVE DYE, SENTINEL LYMPH NODE BIOPSY, ZAHEER LEFT BREAST, INJECTION IN OR AT 0800 BY DR CORNELL;  Surgeon: Elena Cornell MD;  [facility];  Service: Surgical Oncology   • THORACOSCOPY VIDEO ASSISTED SURGERY (VATS) Left 08/12/2021    Procedure: THORACOSCOPY VIDEO ASSISTED SURGERY (VATS);  Surgeon: Jonathan Schaffer MD;  [facility]: [facility];  Service: Thoracic   • US GUIDANCE BREAST BIOPSY LEFT EACH ADDITIONAL Left 12/02/2023   • US GUIDED BREAST BIOPSY RIGHT COMPLETE Right 12/02/2023   • WISDOM TOOTH EXTRACTION  2012     Family History   Problem Relation Age of Onset   • Heart disease  Mother    • Miscarriages / Stillbirths Mother    • Coronary artery disease Mother    • No Known Problems Father    • No Known Problems Half-Brother    • Bone cancer Maternal Grandmother         hilda to liver and spine   • Miscarriages / Stillbirths Half-Sister    • Breast cancer Neg Hx    • Ovarian cancer Neg Hx    • Uterine cancer Neg Hx    • Cervical cancer Neg Hx      Social History     Socioeconomic History   • Marital status: /Civil Union     Spouse name: Not on file   • Number of children: Not on file   • Years of education: Not on file   • Highest education level: Not on file   Occupational History   • Not on file   Tobacco Use   • Smoking status: Never   • Smokeless tobacco: Never   Vaping Use   • Vaping status: Never Used   Substance and Sexual Activity   • Alcohol use: Not Currently     Comment: social   • Drug use: Never   • Sexual activity: Yes     Partners: Male     Birth control/protection: None   Other Topics Concern   • Not on file   Social History Narrative   • Not on file     Social Determinants of Health     Financial Resource Strain: Not on file   Food Insecurity: Not on file   Transportation Needs: No Transportation Needs (8/19/2021)    PRAPARE - Transportation    • Lack of Transportation (Medical): No    • Lack of Transportation (Non-Medical): No   Physical Activity: Not on file   Stress: Not on file   Social Connections: Not on file   Intimate Partner Violence: Not on file   Housing Stability: Not on file       Current Outpatient Medications:   •  albuterol (PROVENTIL HFA,VENTOLIN HFA) 90 mcg/act inhaler, TAKE 2 PUFFS BY MOUTH EVERY 6 HOURS AS NEEDED FOR WHEEZE OR FOR SHORTNESS OF BREATH, Disp: 18 g, Rfl: 3  •  cetirizine (ZyrTEC) 10 mg tablet, TAKE 1 TABLET BY MOUTH EVERY DAY, Disp: 90 tablet, Rfl: 0  •  enoxaparin (LOVENOX) 150 mg/mL injection, Inject 0.5 mL (75 mg total) under the skin every 12 (twelve) hours, Disp: 60 mL, Rfl: 0  •  omeprazole (PriLOSEC) 40 MG capsule, TAKE 1  CAPSULE (40 MG TOTAL) BY MOUTH DAILY. (Patient taking differently: Take 40 mg by mouth daily As needed), Disp: 90 capsule, Rfl: 0  •  ondansetron (ZOFRAN) 4 mg tablet, Take 1 tablet (4 mg total) by mouth every 8 (eight) hours as needed for nausea or vomiting (Patient taking differently: Take 4 mg by mouth every 8 (eight) hours as needed for nausea or vomiting As needed), Disp: 30 tablet, Rfl: 2  •  Prenatal Multivit-Min-Fe-FA (PRE-SONIA PO), Take 1 tablet by mouth in the morning, Disp: , Rfl:   Allergies   Allergen Reactions   • Nsaids GI Intolerance   • Formic Acid Swelling and Rash     (Severe reactions to Bug bites)   • Latex Rash and Blisters       Physical Exam:     Vitals:    24 1102   BP: 112/80   Pulse: 94   Resp: 15   Temp: 98.4 °F (36.9 °C)   SpO2: 98%     Physical Exam  Constitutional:       Appearance: Normal appearance.   HENT:      Head: Normocephalic and atraumatic.      Nose: Nose normal.      Mouth/Throat:      Mouth: Mucous membranes are moist.   Eyes:      Pupils: Pupils are equal, round, and reactive to light.   Cardiovascular:      Rate and Rhythm: Normal rate.      Pulses: Normal pulses.      Heart sounds: Normal heart sounds.   Pulmonary:      Effort: Pulmonary effort is normal.      Breath sounds: Normal breath sounds.   Chest:          Comments: Left mastectomy site is healing well.  No evidence of surgical site infection.  There were 2 drains all removed as scant output.    Right breast no palpable mass masses.  No nipple discharge no nipple retraction.  Right axillary and supraclavicular examination no palpable adenopathy.    Patient was examined seated as well as supine position.  Abdominal:      General: Bowel sounds are normal. There is distension.      Palpations: Abdomen is soft.   Musculoskeletal:         General: Normal range of motion.      Cervical back: Normal range of motion and neck supple.   Skin:     General: Skin is warm.   Neurological:      General: No focal deficit  present.      Mental Status: She is alert and oriented to person, place, and time.   Psychiatric:         Mood and Affect: Mood normal.         Behavior: Behavior normal.         Thought Content: Thought content normal.         Judgment: Judgment normal.       Results & Discussion:   Pathology:A. Lymph Node, [place], Left Axillary sentinel lymph node #1 Hot, lymphadenectomy:  - One benign lymph node (0/1); negative for malignancy on multiple examined levels.       B. Breast, Left, Left Mastectomy Suture Marks Axillary Tail:  - Invasive mammary carcinoma of no special type (ductal), identified @ 12 o'clock,     upper quadrants, 25 mm in greatest gross dimension.    -- [place] histologic grade 2 of 3 (total score: 6 of 9)        * Glandular (acinar) Tubular Differentiation < 10%, score 3.        * Nuclear Pleomorphism 2 of 3, score 2.        * Mitotic Rate < 3/mm2, (</= 7 mitoses/10HPF; 5 mitoses/10 HPF), score 1.    -- Confirmed by tumor cell immunophenotype:        * Positive: E-cadherin, P120 (membranous, possible focal aberrant cytoplasmic staining).        * Negative: p63, calponin-B.    -- Associated prior biopsy site changes with ZAHEER  marker  - Ductal carcinoma in-situ (DCIS): Present, not an extensive intraductal component     (< 20% of total tumor).     -- Size and Extent: At least 16 mm in greatest approximate extent; present in        4 sequential sections/ 4 of 16 blocks associated with and adjacent to invasive carcinoma.     -- Architectural Patterns: Solid, cribriform, cancerization of lobules.     -- Nuclear grade: II (intermediate).     -- Necrosis: Present, focal necrosis.   - Margins uninvolved by invasive and in-situ carcinoma.    -- Invasive carcinoma & DCIS: At least 20 mm from nearest gross upper outer/inner quadrants,        anterior/superior margin (no invasive carcinoma or DCIS identified in examined margin sections).  - Benign nipple and skin.    -- Invasive carcinoma does not  invade the dermis or epidermis.     -- DCIS does not involve the nipple epidermis.     -- No dermal lymphatic and/or vascular invasion.     -- Dermal fibrosis suggestive of scar.   - Lymphatic and/or vascular invasion: Focus suspicious for lymphatic invasion.     -- D2-40 and CKC/CD31 immunostains performed.   - Microcalcifications: Present, associated with non-neoplastic breast tissue.    - One benign lymph node (0/1), lateral breast.     -- Black pigmentation present.   - Benign fibrocystic changes without atypia (usual ductal hyperplasia, apocrine adenosis,     fibroadenomatoid changes, cystic and papillary apocrine metaplasia).   I did review pathology in detail. I did discussed in detail nature of breast cancer especially during pregnancy.  Given her Oncotype is high she may adjuvant therapy and I will leave that up to medical oncologist to decide.  Discussed in detail the nature of breast cancer during pregnancy and further management.    Advance Care Planning/Advance Directives:  I Elena Cornell MD discussed the disease status with Jillian Ch  today 01/17/24  treatment plans and follow-up with the patient.

## 2024-01-18 ENCOUNTER — PATIENT MESSAGE (OUTPATIENT)
Dept: OBGYN CLINIC | Facility: MEDICAL CENTER | Age: 34
End: 2024-01-18

## 2024-01-18 ENCOUNTER — ROUTINE PRENATAL (OUTPATIENT)
Dept: OBGYN CLINIC | Facility: MEDICAL CENTER | Age: 34
End: 2024-01-18

## 2024-01-18 ENCOUNTER — TELEPHONE (OUTPATIENT)
Dept: OBGYN CLINIC | Facility: CLINIC | Age: 34
End: 2024-01-18

## 2024-01-18 ENCOUNTER — HOME CARE VISIT (OUTPATIENT)
Dept: HOME HEALTH SERVICES | Facility: HOME HEALTHCARE | Age: 34
End: 2024-01-18
Payer: COMMERCIAL

## 2024-01-18 ENCOUNTER — TELEMEDICINE (OUTPATIENT)
Dept: PERINATAL CARE | Facility: CLINIC | Age: 34
End: 2024-01-18
Payer: COMMERCIAL

## 2024-01-18 VITALS
WEIGHT: 171 LBS | SYSTOLIC BLOOD PRESSURE: 128 MMHG | BODY MASS INDEX: 26.84 KG/M2 | HEIGHT: 67 IN | DIASTOLIC BLOOD PRESSURE: 64 MMHG

## 2024-01-18 VITALS
TEMPERATURE: 96.5 F | HEART RATE: 95 BPM | SYSTOLIC BLOOD PRESSURE: 128 MMHG | DIASTOLIC BLOOD PRESSURE: 88 MMHG | RESPIRATION RATE: 16 BRPM | OXYGEN SATURATION: 98 %

## 2024-01-18 DIAGNOSIS — O20.9 VAGINAL BLEEDING AFFECTING EARLY PREGNANCY: ICD-10-CM

## 2024-01-18 DIAGNOSIS — O22.31 DVT COMPLICATING PREGNANCY, FIRST TRIMESTER: ICD-10-CM

## 2024-01-18 DIAGNOSIS — O9A.111: Primary | ICD-10-CM

## 2024-01-18 DIAGNOSIS — Z3A.10 10 WEEKS GESTATION OF PREGNANCY: Primary | ICD-10-CM

## 2024-01-18 LAB
APTT HEX PL PPP: 4 SEC (ref 0–11)
APTT SCREEN TO CONFIRM RATIO: 1.08 RATIO (ref 0–1.34)
APTT-LA 1H NP PPP: 40.6 SEC (ref 0–40.5)
APTT-LA IMM 4:1 NP PPP: 38.2 SEC (ref 0–40.5)
CONFIRM APTT/NORMAL: 37.8 SEC (ref 0–47.6)
DRVVT IMM 1:2 NP PPP: 41.9 SEC (ref 0–40.4)
DRVVT SCREEN TO CONFIRM RATIO: 1.2 RATIO (ref 0.8–1.2)
LA PPP-IMP: ABNORMAL
SCREEN APTT: 43.6 SEC (ref 0–43.5)
SCREEN DRVVT: 48.8 SEC (ref 0–47)
THROMBIN TIME: 16.2 SEC (ref 0–23)

## 2024-01-18 PROCEDURE — PNV: Performed by: CLINICAL NURSE SPECIALIST

## 2024-01-18 PROCEDURE — G0299 HHS/HOSPICE OF RN EA 15 MIN: HCPCS

## 2024-01-18 PROCEDURE — 99215 OFFICE O/P EST HI 40 MIN: CPT | Performed by: OBSTETRICS & GYNECOLOGY

## 2024-01-18 NOTE — PATIENT COMMUNICATION
from Dr Cummings from [facility]- they just completed a virtual visit with her and still spotting. Will have staff reach out for appt this afternoon.

## 2024-01-18 NOTE — TELEPHONE ENCOUNTER
----- Message from Jillian Ch sent at 1/17/2024  8:45 PM EST -----  Regarding: Ultrasound   Contact: [phone]  Wojciech Avila,     Anyway I can get my baby checked since I was bleeding the other day? I’m sure you saw the message from when I called, but it would put my mind at ease knowing if everything is okay.     Thank you,   Jillian

## 2024-01-18 NOTE — ASSESSMENT & PLAN NOTE
Her imaging on 2024 (POD#3 s/p L mastectomy) showed acute occlusive DVT in the distal popliteal vein and proximal paired peroneal veins on the left side.  There was no thrombus in the right.     We discussed that acute treatment is typically 3-6 months. I defer ultimately the duration of therapeutic anticoagulation to hematology. I would recommend continuing prophylactic therapy throughout pregnancy and at least 6 weeks postpartum (or further depending on hematology and medical oncology recommendations). It would be ideal to decreased to prophylactic after initial DVT treatment to optimize her anticoagulation getting closer to delivery as she would require 12 hours (versus 24 hours) to hold lovenox for prophylactic (vs therapeutic dosing) prior to neuraxial. Delivery is typically timed (At/after 39 weeks unless obstetrically indicated sooner). Repeat imaging for resolution of DVT at 6 months after the initial VTE is also recommended. Lovenox can be resumed 6 hours after  or 12 hours after  if clinically stable.    She is seeing hematology on 24 and we look forward to their recommendations on duration of therapeutic dosing and how long to continue prophylactic dosing after pregnancy (again, would recommend at least 6 weeks from obstetric standpoint).

## 2024-01-18 NOTE — LETTER
2024     Cheyanne Joseph MD  [address]  [address]  [facility] [address]    Patient: Jillian Ch   YOB: 1990   Date of Visit: 2024       Dear Dr. Joseph:    Thank you for referring Jillian Ch to me for evaluation. Below are my notes for this consultation.    If you have questions, please do not hesitate to call me. I look forward to following your patient along with you.         Sincerely,        Zina Cummings MD        CC: DO Zina Rodrigues MD  2024  2:42 PM  Sign when Signing Visit  Video Visit: MATERNAL-FETAL MEDICINE     Virtual Regular Visit    Verification of patient location: Jillian Ch is located in the [facility] state in which I hold an active license [place].  The patient was identified by name and date of birth. She was informed that this is a telemedicine visit and that the visit is being conducted through the Epic Embedded platform. She agrees to proceed..  My office door was closed. No one else was in the room.  She acknowledged consent and understanding of privacy and security of the video platform. The patient has agreed to participate and understands they can discontinue the visit at any time.  Patient is aware this is a billable service.     Assessment and Plan: Jillian Ch is a 33 y.o. 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  Her postoperative course was complicated by left lower leg DVT on postoperative day 3.  She is now on lovenox therapeutic dosing 150 mg BID         DVT complicating pregnancy, first trimester     Her imaging on 2024 (POD#3 s/p L mastectomy) showed acute occlusive DVT in the distal popliteal vein and proximal paired peroneal veins on the left side.  There was no thrombus in the right.     We discussed that acute treatment is typically 3-6 months. I defer ultimately the duration of therapeutic anticoagulation to hematology. I would recommend continuing prophylactic therapy throughout pregnancy and at least 6 weeks postpartum (or further depending on hematology and medical oncology recommendations). It would be ideal to decreased to prophylactic after initial DVT treatment to optimize her anticoagulation getting closer to delivery as she would require 12 hours (versus 24 hours) to hold lovenox for prophylactic (vs therapeutic dosing) prior to neuraxial. Delivery is typically timed (At/after 39 weeks unless obstetrically indicated sooner). Repeat imaging for resolution of DVT at 6 months after the initial VTE is also recommended. Lovenox can be resumed 6 hours after  or 12 hours after  if clinically stable.    She is seeing hematology on 24 and we look forward to their recommendations on duration of therapeutic dosing and how long to continue prophylactic dosing after pregnancy (again, would recommend at least 6 weeks from obstetric standpoint).         Vaginal bleeding affecting early pregnancy     She describes some spotting with wiping over several episodes yesterday. She has not yet heard back from her OB office. She is very much desiring to check on the baby given the context of recent surgery and DVT and now new bleeding either at her OB or with MFM. She is not seeing us in person next until  for her 12 week NT scan.  - I reached out to Margarita Walters of [facility] and they are  happy to help see her today to check on fetal status              Referring physician:   Cheyanne Joseph Md  [address]  [address]  [place] [address]         Allergies   Allergen Reactions   • Nsaids GI Intolerance   • Formic Acid Swelling and Rash     (Severe reactions to Bug bites)   • Latex Rash and Blisters        IDALMIS Walsh is a 32 yo  at 10w2d with BALTAZAR 2024.  She is known to [facility] for an early diagnosis of breast cancer in this pregnancy.  She underwent a left breast mastectomy with sentinel lymph node biopsy on 2024 when she was around 8 weeks gestation.  Her postoperative course was complicated by left lower leg DVT on postoperative day 3.      Her imaging on 2024 showed acute occlusive DVT in the distal popliteal vein and proximal paired peroneal veins on the left side.  There was no thrombus in the right.     She shares that overall she is doing okay however she has good days and bad days.  She had her drains removed yesterday and states that sleeping has somewhat improved last night since then.  However she has significant chest wall soreness associated with her left mastectomy and has limited her medication intake per her preference.    She does still have likely pregnancy related nausea for which she very sparingly takes Zofran.  She did have an episode of spotting when wiping over several episodes yesterday.  She has no further discharge/cramping so far today but is understandably wishing to check on the baby given all her above complications      Vitals: Last menstrual period 10/31/2023, not currently breastfeeding.  Physical Exam  Constitutional:       General: She is not in acute distress.     Appearance: Normal appearance. She is not ill-appearing, toxic-appearing or diaphoretic.   HENT:      Head: Normocephalic and atraumatic.      Nose: No congestion or rhinorrhea.   Eyes:      General: No scleral icterus.        Right eye: No discharge.         Left eye: No discharge.       Conjunctiva/sclera: Conjunctivae normal.   Pulmonary:      Effort: Pulmonary effort is normal. No respiratory distress.   Abdominal:      Palpations: Abdomen is soft.      Tenderness: There is no abdominal tenderness. There is no right CVA tenderness, left CVA tenderness, guarding or rebound.      Hernia: No hernia is present.   Musculoskeletal:         General: No tenderness or signs of injury.      Cervical back: Normal range of motion.      Right lower leg: No edema.      Left lower leg: No edema.   Skin:     Coloration: Skin is not jaundiced or pale.      Findings: No erythema, lesion or rash.   Neurological:      General: No focal deficit present.      Mental Status: She is alert and oriented to person, place, and time.   Psychiatric:         Mood and Affect: Mood normal.         Behavior: Behavior normal.          -------------------------    The total time spent on this established patient on the encounter date was 40 minutes. This time included previsit service time of  10 minutes reviewing records and precharting, face-to-face (via virtual platform) service time of  20 minutes counseling regarding results and coordinating care, and post-service time of  10 minutes charting.    At the conclusion of today's encounter, all questions were answered to her satisfaction.  Thank you very much for this kind referral and please do not hesitate to contact me with any further questions or concerns.    Sincerely,    Zina Cummings MD  Attending Physician, Maternal-Fetal Medicine  [facility]       VIRTUAL VISIT DISCLAIMER      Jillian Ch verbally agrees to participate in Virtual Care Services.  Patient is aware that Virtual Care Services could be limited without vital signs or the ability to perform a full hands-on physical exam. Jillian ORTIZ Capmarileejerardo Ch understands she or the provider may request at any time to terminate the video visit and request the patient to seek  care or treatment in person.

## 2024-01-18 NOTE — PROGRESS NOTES
Video Visit: MATERNAL-FETAL MEDICINE     Virtual Regular Visit    Verification of patient location: Jillian Ch is located in the following state in which I hold an active license [place].  The patient was identified by name and date of birth. She was informed that this is a telemedicine visit and that the visit is being conducted through the Epic Embedded platform. She agrees to proceed..  My office door was closed. No one else was in the room.  She acknowledged consent and understanding of privacy and security of the video platform. The patient has agreed to participate and understands they can discontinue the visit at any time.  Patient is aware this is a billable service.     Assessment and Plan: Jillian Ch is a 33 y.o. 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  She underwent a left breast mastectomy with sentinel lymph node biopsy on 2024 when she was around 8 weeks gestation  for invasive DCIS that is ER LA Her2 positive, with negative lymph node.  Her postoperative course was complicated by left lower leg DVT on postoperative day 3.  She is now on lovenox therapeutic dosing 150 mg BID         DVT complicating pregnancy, first trimester     Her imaging on 2024 (POD#3 s/p L mastectomy) showed acute occlusive DVT in the distal popliteal vein and proximal paired peroneal veins on the left side.  There was no thrombus in the right.     We discussed that acute treatment is typically 3-6 months. I defer ultimately the duration of therapeutic anticoagulation to hematology. I would recommend continuing  prophylactic therapy throughout pregnancy and at least 6 weeks postpartum (or further depending on hematology and medical oncology recommendations). It would be ideal to decreased to prophylactic after initial DVT treatment to optimize her anticoagulation getting closer to delivery as she would require 12 hours (versus 24 hours) to hold lovenox for prophylactic (vs therapeutic dosing) prior to neuraxial. Delivery is typically timed (At/after 39 weeks unless obstetrically indicated sooner). Repeat imaging for resolution of DVT at 6 months after the initial VTE is also recommended. Lovenox can be resumed 6 hours after  or 12 hours after  if clinically stable.    She is seeing hematology on 24 and we look forward to their recommendations on duration of therapeutic dosing and how long to continue prophylactic dosing after pregnancy (again, would recommend at least 6 weeks from obstetric standpoint).         Vaginal bleeding affecting early pregnancy     She describes some spotting with wiping over several episodes yesterday. She has not yet heard back from her OB office. She is very much desiring to check on the baby given the context of recent surgery and DVT and now new bleeding either at her OB or with [facility]. She is not seeing us in person next until  for her 12 week NT scan.  - I reached out to Margarita Walters of [facility] and they are happy to help see her today to check on fetal status              Referring physician:   Cheyanne Joseph Md  [address]  [address]  [place] [address]         Allergies   Allergen Reactions    Nsaids GI Intolerance    Formic Acid Swelling and Rash     (Severe reactions to Bug bites)    Latex Rash and Blisters        IDALMIS Walsh is a 34 yo  at 10w2d with BALTAZAR 2024.  She is known to [facility] for an early diagnosis of breast cancer in this pregnancy.  She underwent a left breast mastectomy with sentinel lymph node biopsy on 2024 when she was around 8 weeks  gestation.  Her postoperative course was complicated by left lower leg DVT on postoperative day 3.      Her imaging on 1/5/2024 showed acute occlusive DVT in the distal popliteal vein and proximal paired peroneal veins on the left side.  There was no thrombus in the right.     She shares that overall she is doing okay however she has good days and bad days.  She had her drains removed yesterday and states that sleeping has somewhat improved last night since then.  However she has significant chest wall soreness associated with her left mastectomy and has limited her medication intake per her preference.    She does still have likely pregnancy related nausea for which she very sparingly takes Zofran.  She did have an episode of spotting when wiping over several episodes yesterday.  She has no further discharge/cramping so far today but is understandably wishing to check on the baby given all her above complications      Vitals: Last menstrual period 10/31/2023, not currently breastfeeding.  Physical Exam  Constitutional:       General: She is not in acute distress.     Appearance: Normal appearance. She is not ill-appearing, toxic-appearing or diaphoretic.   HENT:      Head: Normocephalic and atraumatic.      Nose: No congestion or rhinorrhea.   Eyes:      General: No scleral icterus.        Right eye: No discharge.         Left eye: No discharge.      Conjunctiva/sclera: Conjunctivae normal.   Pulmonary:      Effort: Pulmonary effort is normal. No respiratory distress.   Abdominal:      Palpations: Abdomen is soft.      Tenderness: There is no abdominal tenderness. There is no right CVA tenderness, left CVA tenderness, guarding or rebound.      Hernia: No hernia is present.   Musculoskeletal:         General: No tenderness or signs of injury.      Cervical back: Normal range of motion.      Right lower leg: No edema.      Left lower leg: No edema.   Skin:     Coloration: Skin is not jaundiced or pale.      Findings:  No erythema, lesion or rash.   Neurological:      General: No focal deficit present.      Mental Status: She is alert and oriented to person, place, and time.   Psychiatric:         Mood and Affect: Mood normal.         Behavior: Behavior normal.          -------------------------    The total time spent on this established patient on the encounter date was 40 minutes. This time included previsit service time of  10 minutes reviewing records and precharting, face-to-face (via virtual platform) service time of  20 minutes counseling regarding results and coordinating care, and post-service time of  10 minutes charting.    At the conclusion of today's encounter, all questions were answered to her satisfaction.  Thank you very much for this kind referral and please do not hesitate to contact me with any further questions or concerns.    Sincerely,    Zina Cummings MD  Attending Physician, Maternal-Fetal Medicine  [facility]       VIRTUAL VISIT DISCLAIMER      Jillian Newsomeman verbally agrees to participate in Virtual Care Services.  Patient is aware that Virtual Care Services could be limited without vital signs or the ability to perform a full hands-on physical exam. Jillian Newsomeman understands she or the provider may request at any time to terminate the video visit and request the patient to seek care or treatment in person.

## 2024-01-18 NOTE — ASSESSMENT & PLAN NOTE
She is known to [facility] for an early diagnosis of breast cancer in this pregnancy.  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  Her postoperative course was complicated by left lower leg DVT on postoperative day 3.  She is now on lovenox therapeutic dosing 150 mg BID

## 2024-01-18 NOTE — ASSESSMENT & PLAN NOTE
US done today and confirms + FHR  No KAY noted on US and No bldg on pelvic exam.   Reassured.   F/U for routine visit.  Call with recurrent bldg.

## 2024-01-18 NOTE — PROGRESS NOTES
Prenatal Visit  Subjective:   Jillian is a 33 y.o.  10w2d here for Pregnancy Problem (Vaginal bldg- wants FHR check)    OB prob visit    She is a  who is 10w2d  c/o vaginal bleeding. Bleeding has been occurring intermittently since 7 wks of pregnancy.    2 days ago was having abd pain due to constipation and straining for a BM- and had red bldg after that lasted on and off through the day. No bleeding today. Denies menstrual like cramping.     Complicated pregnancy thus far, dx with Breast CA at around the same time as she discovered pregnancy. S/P L mastectomy on 24, complicated by DVT on POD#3. Now on lovenox    Objective:  Vitals:    24 1531   BP: 128/64     Pregravid Weight/BMI: 78 kg (172 lb) (BMI 26.93)  Current Weight: 77.6 kg (171 lb)   Total Weight Gain: -0.454 kg (-1 lb)     OBGyn Exam  Physical Exam  Fetal Heart Rate: 153 US     General: Not in acute distress and appearing well nourished and well groomed  Genitourinary:          Pelvic exam  Spec exam without active bldg- no red/brown in vagina. Normal appearing white/translucent d/c. Cervix is closed   Cardiovascular:   Rate and Rhythm: Normal rate.   Pulmonary:    Effort: normal, not labored  Abdominal:  Abdomen is soft and gravid   Musculoskeletal: Active movement of all extremities, no gross limitations of ROM     Edema: None  Neurological:   Mental Status: She is alert and oriented to person, place, and time.   Skin: General: Skin is warm and dry.   Psychiatric:    Mood and Affect: Mood normal.      Behavior: Behavior normal  FIRST TRIMESTER OBSTETRIC ULTRASOUND     Patient's last menstrual period was 10/31/2023.      INDICATION: vaginal bleeding      Additional Findings: none     FHR: 153     IMPRESSION:  SLIUP with +   No KAY seen.  No adnexal masses      JOSE Page   [facility] -[facility] [place] [address]    [place] [address]  Dept: [phone]  Dept Fax: [phone]  Loc: [phone]  Loc Fax: [phone]    Ultrasound Probe Disinfection    A transvaginal ultrasound was performed.   Prior to use, disinfection was performed with High Level Disinfection Process (Trophon).                  Assessment & Plan:    1. 10 weeks gestation of pregnancy  -     AMB US OB < 14 weeks single or first gestation level 1    2. Vaginal bleeding affecting early pregnancy  Assessment & Plan:  US done today and confirms + FHR  No KAY noted on US and No bldg on pelvic exam.   Reassured.   F/U for routine visit.  Call with recurrent bldg.    Orders:  -     AMB US OB < 14 weeks single or first gestation level 1        JOSE Page  1/18/2024

## 2024-01-18 NOTE — ASSESSMENT & PLAN NOTE
She describes some spotting with wiping over several episodes yesterday. She has not yet heard back from her OB office. She is very much desiring to check on the baby given the context of recent surgery and DVT and now new bleeding either at her OB or with MFM. She is not seeing us in person next until 1/30 for her 12 week NT scan.  - I reached out to Margarita Walters of [facility] and they are happy to help see her today to check on fetal status

## 2024-01-19 ENCOUNTER — NUTRITION (OUTPATIENT)
Dept: NUTRITION | Facility: CLINIC | Age: 34
End: 2024-01-19

## 2024-01-19 DIAGNOSIS — Z71.3 NUTRITIONAL COUNSELING: Primary | ICD-10-CM

## 2024-01-19 NOTE — PROGRESS NOTES
Outpatient Oncology Nutrition Consultation   Type of Consult: Follow Up  Care Location: Telephone Call    Reason for referral: Notification from RN Navigator Kamla HAYWOOD on 12/7/23 that pt has triggered for oncology nutrition care (reason for referral: Patient is pregnant and newly diagnosed with breast cancer. Had questions regarding nutrition and sugar intake, etc).     Nutrition Assessment:   Oncology Diagnosis & Treatments:   Diagnosed with left beast cancer, ER positive, 12/2/23.   S/p left mastectomy 1/2/24.   Planned for chemotherapy, follow up with [facility] 1/23/23.   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 Breast, Left, US Guided Left Breast Biopsy 12:00 6cmfn:  Invasive breast carcinoma of no special type (ductal) with osteoclast-like stromal giant cells  Grade 2  ER 85  MI 95  HER2 1+     B. Breast, Right, US Guided Right Breast Biopsy 10:00 9cmfn:  Benign breast tissue.    In review of the patient’s recent imaging, the left malignancy appears unifocal.  The biopsy-proven carcinoma measured 2.1 cm on ultrasound. Ultrasound of the left axilla was performed on 11/24/2023 and showed no suspicious adenopathy.  Recent imaging of the contralateral right breast dated 12/2/2023 and 11/24/2023 was reviewed and shows no suspicious findings.  The pathology results for the ultrasound-guided core needle biopsy (right breast 10:00, 9 cm from the nipple) are benign and concordant with imaging.     12/2/2023 Genetic Testing    [place]  A total of 36 genes  were evaluated, including: APC, TOPHER, BARD 1, BMPR1A, BRCA1, BRCA2, BRIP1, CDH1, CDK4, CKDN2A, CHEK2, DICER1, MLH1, MSH2, MSH6, MUTYH, NBN, NF1, NTHL1, PALB2, PMS2, PTEN, RAD51C, RECQL, SMAD4, SMARCA4, STK11, TP53, AXIN2, HOXB13, MSH3, POLD1, AND POLE, EPCAM, AND GREM1   Negative result. No pathogenic sequence variants or deletions/duplications identified       12/2/2023 -  Cancer Staged    Staging form: Breast, AJCC 8th Edition  - Clinical stage from 12/2/2023: Stage IB (cT2, cN0, cM0, G2, ER+, ND+, HER2-) - Signed by Elena Cornell MD on 12/13/2023  Stage prefix: Initial diagnosis  Histologic grading system: 3 grade system       1/2/2024 Surgery    Left breast mastectomy with sentinel lymph node biopsy  Invasive Mammary carcinoma of no special type (ductal)   Grade 2  25 mm  Margins negative  0/2 Lymph nodes  Anatomic stage IIA  Prognostic stage IA        Past Medical & Surgical Hx:   Patient Active Problem List   Diagnosis    Mild persistent asthma without complication    Axillary hidradenitis suppurativa    Anxiety    Chest wall pain    Gastroesophageal reflux disease without esophagitis    Depression with anxiety    Chronic fatigue    Myalgia    Chronic left shoulder pain    Cervical disc disorder at C5-C6 level with radiculopathy    Atypical squamous cells of undetermined significance (ASCUS) on Papanicolaou smear of cervix    Hypertrophic and atrophic condition of skin    Migraine headache    Shoulder impingement syndrome, left    Vitamin D deficiency    Close exposure to COVID-19 virus    Thoracic outlet syndrome    Palpitations    Post-operative pain    Transaminitis    Right middle lobe pulmonary nodule    Reversible airway obstruction    SOB (shortness of breath)    Musculoskeletal chest pain    COVID-19 virus infection    Unexplained weight gain    Left ovarian cyst    Dysphagia    Malignant neoplasm of overlapping sites of left breast in female, estrogen receptor positive     Cancer  complicating pregnancy in first trimester    10 weeks gestation of pregnancy    Status post left mastectomy    Multigravida of advanced maternal age in first trimester    DVT complicating pregnancy, first trimester    Vaginal bleeding affecting early pregnancy     Past Medical History:   Diagnosis Date    Anesthesia complication     gait dysfunction/ urinary retention after nerve block,    Anxiety     Asthma     CRPS (complex regional pain syndrome), upper limb     left chest/arm/hand    Deep vein thrombosis (HCC) 01/05/2024    post op from mastectomy    GERD (gastroesophageal reflux disease)     Heart murmur     HPV (human papilloma virus) infection 2012    unsure of 16, 18, or other    Invasive ductal carcinoma of breast, female, left (HCC) 2023    Kidney stone     Miscarriage 3/15/2015    7 weeks along.    Neck pain     Ovarian cyst     Left    PONV (postoperative nausea and vomiting) 01/02/2024     Past Surgical History:   Procedure Laterality Date    COLPOSCOPY  2012    HPV    EGD      IR UPPER EXTREMITY VENOGRAM- DIAGNOSTIC  05/28/2021    NE BX/EXC LYMPH NODE OPEN SUPERFICIAL Left 01/02/2024    Procedure: LEFT BREAST MASTECTOMY, LYMPHATIC MAPPING WITH RADIOACTIVE DYE, SENTINEL LYMPH NODE BIOPSY, ZAHEER LEFT BREAST, INJECTION IN OR AT 0800 BY DR CORNELL;  Surgeon: Elena Cornell MD;  Location: [facility];  Service: Surgical Oncology    NE EXCISION 1ST &/CERVICAL RIB Left 08/12/2021    Procedure: FIRST RIB RESECTION;  Surgeon: Jonathan Schaffer MD;  [facility];  Service: Thoracic    NE INJ RADIOACTIVE TRACER FOR ID OF SENTINEL NODE Left 01/02/2024    Procedure: LEFT BREAST MASTECTOMY, LYMPHATIC MAPPING WITH RADIOACTIVE DYE, SENTINEL LYMPH NODE BIOPSY, ZAHEER LEFT BREAST, INJECTION IN OR AT 0800 BY DR CORNELL;  Surgeon: Elena Cornell MD;  [facility];  Service: Surgical Oncology    NE INTRAOP SENTINEL LYMPH NODE ID W/DYE INJECTION Left 01/02/2024    Procedure: LEFT BREAST  MASTECTOMY, LYMPHATIC MAPPING WITH RADIOACTIVE DYE, SENTINEL LYMPH NODE BIOPSY, ZAHEER LEFT BREAST, INJECTION IN OR AT 0800 BY DR CORNELL;  Surgeon: Elena Cornell MD;  [facility];  Service: Surgical Oncology    NY MASTECTOMY SIMPLE COMPLETE Left 2024    Procedure: LEFT BREAST MASTECTOMY, LYMPHATIC MAPPING WITH RADIOACTIVE DYE, SENTINEL LYMPH NODE BIOPSY, ZAHEER LEFT BREAST, INJECTION IN OR AT 0800 BY DR CORNELL;  Surgeon: Elena Cornell MD;  [facility];  Service: Surgical Oncology    THORACOSCOPY VIDEO ASSISTED SURGERY (VATS) Left 2021    Procedure: THORACOSCOPY VIDEO ASSISTED SURGERY (VATS);  Surgeon: Jonathan Schaffer MD;  [facility];  Service: Thoracic    US GUIDANCE BREAST BIOPSY LEFT EACH ADDITIONAL Left 2023    US GUIDED BREAST BIOPSY RIGHT COMPLETE Right 2023    WISDOM TOOTH EXTRACTION         Review of Medications:   Vitamins, Supplements and Herbals: Med List Reviewed & pt is only taking: Prenatal MVI    Current Outpatient Medications:     albuterol (PROVENTIL HFA,VENTOLIN HFA) 90 mcg/act inhaler, TAKE 2 PUFFS BY MOUTH EVERY 6 HOURS AS NEEDED FOR WHEEZE OR FOR SHORTNESS OF BREATH, Disp: 18 g, Rfl: 3    cetirizine (ZyrTEC) 10 mg tablet, TAKE 1 TABLET BY MOUTH EVERY DAY, Disp: 90 tablet, Rfl: 0    enoxaparin (LOVENOX) 150 mg/mL injection, Inject 0.5 mL (75 mg total) under the skin every 12 (twelve) hours, Disp: 60 mL, Rfl: 0    omeprazole (PriLOSEC) 40 MG capsule, TAKE 1 CAPSULE (40 MG TOTAL) BY MOUTH DAILY. (Patient taking differently: Take 40 mg by mouth daily As needed), Disp: 90 capsule, Rfl: 0    ondansetron (ZOFRAN) 4 mg tablet, Take 1 tablet (4 mg total) by mouth every 8 (eight) hours as needed for nausea or vomiting (Patient taking differently: Take 4 mg by mouth every 8 (eight) hours as needed for nausea or vomiting As needed), Disp: 30 tablet, Rfl: 2    Prenatal Multivit-Min-Fe-FA (PRE- PO), Take 1 tablet by mouth in the  PAST MEDICAL HISTORY:  Atrial fibrillation     CVA (cerebral vascular accident)     Neuropathy     Osteoarthritis      "morning, Disp: , Rfl:     Most Recent Lab Results:   Lab Results   Component Value Date    WBC 7.14 01/13/2024    NEUTROABS 5.11 01/13/2024    IRON 83 07/29/2020    CHOLESTEROL 127 12/02/2023    TRIG 56 12/02/2023    HDL 43 (L) 12/02/2023    LDLCALC 73 12/02/2023    ALT 34 01/05/2024    AST 28 01/05/2024    ALB 4.0 01/05/2024    SODIUM 134 (L) 01/05/2024    SODIUM 137 01/03/2024    K 4.2 01/05/2024    K 3.5 01/03/2024     01/05/2024    BUN 6 01/05/2024    BUN 4 (L) 01/03/2024    CREATININE 0.62 01/05/2024    CREATININE 0.54 (L) 01/03/2024    EGFR 117 01/05/2024    GLUF 79 12/09/2023    GLUF 100 (H) 12/02/2023    GLUC 88 01/05/2024    HGBA1C 5.4 12/02/2023    HGBA1C 5.1 02/17/2023    CALCIUM 9.3 01/05/2024    MG 2.3 10/19/2019       Anthropometric Measurements:   Height: 67\"  Ht Readings from Last 1 Encounters:   01/18/24 5' 7\" (1.702 m)     Wt Readings from Last 25 Encounters:   01/18/24 77.6 kg (171 lb)   01/17/24 77.8 kg (171 lb 8 oz)   01/11/24 77.6 kg (171 lb)   01/11/24 76.9 kg (169 lb 9.6 oz)   01/09/24 78 kg (172 lb)   01/02/24 78.2 kg (172 lb 6.4 oz)   12/29/23 78.8 kg (173 lb 12.8 oz)   12/27/23 78.8 kg (173 lb 12.8 oz)   12/21/23 81.1 kg (178 lb 12.8 oz)   12/20/23 80.3 kg (177 lb)   12/15/23 81.6 kg (180 lb)   12/14/23 81.3 kg (179 lb 3.2 oz)   12/13/23 80.3 kg (177 lb)   12/06/23 81.6 kg (180 lb)   11/24/23 82.1 kg (181 lb)   10/19/23 82.1 kg (181 lb)   10/13/23 85.3 kg (188 lb)   09/26/23 85.3 kg (188 lb)   08/22/23 80.7 kg (178 lb)   06/08/23 78.9 kg (174 lb)   04/04/23 77.3 kg (170 lb 6.4 oz)   02/21/23 78.7 kg (173 lb 6.4 oz)   02/16/23 78.8 kg (173 lb 12.8 oz)   01/31/23 74.8 kg (165 lb)   11/09/22 77.2 kg (170 lb 3.2 oz)     Weight History:   Usual Weight: 130-145#  Varian: n/a  Home Scale: none    Oncology Nutrition-Anthropometrics      Flowsheet Row Nutrition from 1/19/2024 in [facility] [place] Nutrition from 12/27/2023 in [facility] [place] "   Patient age (years): 33 years 33 years   Patient (female) height (in): 67 in 67 in   Current Weight to be used for anthropometric calculations (lbs) 171 lbs 173.8 lbs   Current Weight to be used for anthropometric calculations (kg) 77.7 kg 79 kg   BMI: 26.8 27.2   IBW female: 135 lbs 135 lbs   IBW (kg) female: 61.4 kg 61.4 kg   IBW % (female) 126.7 % 128.7 %   Adjusted BW (female): 144 lbs 144.7 lbs   Adjusted BW kg (female): 65.5 kg 65.8 kg   % weight change after 1 week: 0 % -1.8 %   Weight change after 1 week (lbs) 0 lbs -3.2 lbs   % weight change after 1 month: -3.4 % -4 %   Weight change after 1 month (lbs) -6 lbs -7.2 lbs   % weight change after 3 months: -5.5 % -7.6 %   Weight change after 3 months (lbs) -10 lbs -14.2 lbs   % weight change after 6 months: -- -0.1 %   Weight change after 6 months (lbs) -- -0.2 lbs   % weight change after 1 year: 3.6 % 2.1 %   Weight change after 1 year (lbs) 6 lbs 3.6 lbs            Nutrition-Focused Physical Findings: n/a due to telephone call    Food/Nutrition-Related History & Client/Social History:    Current Nutrition Impact Symptoms:  [x] Nausea   -using zofran  [x] Reduced Appetite  [] Acid Reflux    [x] Vomiting  [x] Unintended Wt Loss   -about 10# in past 3 months  [] Malabsorption    [] Diarrhea  [] Unintended Wt Gain -over the past two years  [] Dumping Syndrome    [] Constipation  [] Thick Mucous/Secretions  [] Abdominal Pain    [] Dysgeusia (Altered Taste)  [] Xerostomia (Dry Mouth)  [] Gas    [] Dysosmia (Altered Smell)  [] Thrush  [] Difficulty Chewing    [] Oral Mucositis (Sore Mouth)  [] Fatigue  [] Hyperglycemia   Lab Results   Component Value Date    HGBA1C 5.4 12/02/2023      [] Odynophagia  [] Esophagitis  [] Other:    [] Dysphagia  [] Early Satiety  [] No Problems Eating      Food Allergies & Intolerances: yes: lactose intolerant     Current Diet: Lactose-Free and No red meat   Current Nutrition Intake: Decreased since last visit  Appetite:  Poor  Nutrition Route: PO  Oral Care: brushes QD  Activity level: Dizzy often in the morning. Degenerative disc disease limits physical activity.     24 Hr Diet Recall:   Breakfast: was eating eggs but can no longer tolerate  So far today has been sipping on a lactose free protein shake    Beverages: water with lemon (32oz x1),  body armour electrolyte drink (16oz x0-1), ginger ale (sips)  Supplements:   Ensure Clear (10 oz, 180 kcal, 8 g pro) was tolerating well; plans to get more when her  goes grocery shopping this weekend  Lactose free protein shake 1x daily     Oncology Nutrition-Estimated Needs      Flowsheet [facility] Nutrition from 2024 in [facility] [place] Nutrition from 2023 in [facility] [place]   Weight type used Actual weight Actual weight   Weight in kilograms (kg) used for estimated needs 77.7 kg --   Weight in kilograms (kg) used for estimated needs -- 79 kg   Energy needs formula:  30-35 kcal/kg 35-40 kcal/kg   Energy needs based on 30 kcal/k kcal --   Energy needs based on 35 kcal/k kcal --   Energy needs based on 35 kcal/kg: -- 2765 kcal   Energy needs based on 40 kcal/kg: -- 3160 kcal   Protein needs formula: 1.2-1.5 g/kg 1-1.2 g/kg   Protein needs based on 1 g/kg -- 79 g   Protein needs based on 1.2 g/kg: -- 95 g   Protein needs based on 1.2 g/k g --   Protein needs based on 1.5 g/kg 117 g --   Fluid needs formula: 35-40 mL/kg 35-40 mL/kg   Fluid needs based on 35 mL/kg 2720 mL 2769 mL   Fluid needs in ounces 92 oz 94 oz   Fluid needs based on 40 mL/kg 3108 mL 3164 mL   Fluid needs in ounces 105 oz 107 oz             Discussion & Intervention:   Jillian was evaluated today for an RD follow up regarding poor po intake and pt request for diet guidance throughout treatment .  Jillian  is planned to undergo treatment for breast cancer , she recently underwent mastectomy and is planned for chemotherapy. She explains today that  she continues to struggle with nausea. She is taking zofran which is helping. Her oral intakes are limited, she exlpains that if she consumes more than a few bites of food at a time she will feel very nauseous. Reviewed 24 hour recall, which revealed an inadequate po intake, and discussed ways to increase kcal, protein, and fluid intakes and optimize nutrient intake.Based on today's assessment, discussion included: MNT for: breast cancer, nausea/vomiting, weight management, adequate hydration & fluid choices, sipping on calorie containing beverages (examples include: adding whole milk or cream to coffee, oral nutrition supplements, juice, electrolyte replacement beverages, milk, etc.), eating smaller more frequent meals every 2-3 hours (5-6 small meals/day), eating when feeling most hungry, utilizing oral nutrition supplements, and mindful eating concepts.   Moving forward, Jillian was encouraged to increase kcal, protein, and fluid intakes.  Materials Provided: not applicable   All questions and concerns addressed during today’s visit.  Jillian has RD contact information.    Nutrition Diagnosis:   Inadequate Energy Intake related to physiological causes, disease state and treatment related issues as evidenced by food recall, wt loss and discussion with pt and/or family.  Increased Nutrient Needs related to increased demand for nutrients as evidenced by pt is s/p surgery and planned for chemotherapy.  Monitoring & Evaluation:   Goals:  weight maintenance/stabilization  adequate nutrition impact symptom management  pt to meet >/=75% estimated nutrition needs daily  increase protein intake  increase fluid intake  increase calorie intake    Progress Towards Goals: Not Progressing    Nutrition Rx & Recommendations:  Diet: High Calorie, High Protein (for high calorie foods see pages 52-53, and for high protein foods see pages 49-51 in your Eating Hints book)  Small, frequent meals/snacks may be easier to tolerate than 3  large daily meals.  Aim for 5-6 small meals per day (every 2-3 hours).  Include protein at all meals/snacks.  Include a variety of foods (as tolerated/allowed by diet).  Follow a Cancer Preventative Nutrition Pattern: colorful, plant-based, low-fat, avoid added sugars, limit alcohol, include high fiber foods, limit processed meats, limit red meat, choose lean protein sources, use low-fat cooking methods, balance calories with physical activity, avoid excessive weight gain throughout life.  Incorporate physical activity as able/allowed.  Stay hydrated by sipping fluids of choice/tolerance throughout the day.  Liquid nutrition may be better tolerated than solids at times.  Alter food choices and eating patterns to accommodate changing needs.  Light physical activity (such as walking) is encouraged, as able/tolerated.  Weigh yourself regularly. If you notice weight loss, make an effort to increase your daily food/calorie intake. If you continue to notice loss after these efforts, reach out to your dietitian to establish a plan to stabilize weight.    For nausea/vomiting, suggestions include:  Eat 5-6 smaller meals throughout the day instead of 3 large meals.   Sip on calorie containing fluids throughout the day: 100% fruit juice, diluted juice, bone broth (higher protein broth), creamy soups, sports drinks (Gatorade, Poweraide, Pedialyte, etc.), Italian ice, popsicles, milkshakes, smoothies, oral nutrition supplements (Ensure, Boost, Glucerna etc.), gelatin/Jello, etc. (see page 41 of Eating Hints Book for other examples).  Continue Ensure Clear   Continue lactose free protein shake   Eat bland foods and foods easy on the stomach: clear broth (chicken, vegetable, bone, mushroom, beef), juice (caution with acidic juices), potatoes, pretzels, crackers, cereal (Corn Flakes, Rice Chex, Rice Crispies, Cheerios), oatmeal or cream of wheat, white bread or toast, white rice, cooked vegetables (caution with gas producing  vegetables), plain pasta, eggs, peanut butter, boiled chicken or turkey, soft cheeses.  Consume foods and drinks at room temperature. Try to avoid eating/drinking anything too hot or cold.   Try to avoid foods with strong odors.   Suck on tart candies such as lemon drops or ginger chews to help relieve nausea.   Ginger tea or flat ginger ale.   Foods to avoid:  High sugar foods such as soda, candies, desserts.   Greasy/fried foods  Gas producing foods such as broccoli, cabbage, [place] sprouts, raw fruits/vegetables, beans.  Caution with diary products unless they are low lactose or lactose free.   Alcohol  Spicy foods   Acidic foods: coffee, tea, citrus, tomato   Avoid eating your favorite foods when you feel nauseous so you don't develop a dislike of those foods.   Refer to pages 21-22 in Eating Hints Book for additional suggestions.    Follow Up Plan:  2/2/24 11am phone call follow up    Recommend Referral to Other Providers: none at this time

## 2024-01-22 ENCOUNTER — TELEPHONE (OUTPATIENT)
Dept: OBGYN CLINIC | Facility: CLINIC | Age: 34
End: 2024-01-22

## 2024-01-22 ENCOUNTER — TELEPHONE (OUTPATIENT)
Age: 34
End: 2024-01-22

## 2024-01-22 NOTE — TELEPHONE ENCOUNTER
Spoke with patient to see if she could come in at 11am instead of 12. Patient confirmed appt time.

## 2024-01-22 NOTE — TELEPHONE ENCOUNTER
----- Message from Nemo Rose DO sent at 1/22/2024 11:40 AM EST -----  Please let patient know her prenatal labs are normal. Her APS workup did come back positive, but it is likely because she is currently being treated for a DVT. We can repeat again post partum if needed. Thanks!

## 2024-01-23 ENCOUNTER — TELEPHONE (OUTPATIENT)
Dept: HEMATOLOGY ONCOLOGY | Facility: CLINIC | Age: 34
End: 2024-01-23

## 2024-01-23 ENCOUNTER — OFFICE VISIT (OUTPATIENT)
Dept: HEMATOLOGY ONCOLOGY | Facility: CLINIC | Age: 34
End: 2024-01-23
Payer: COMMERCIAL

## 2024-01-23 ENCOUNTER — APPOINTMENT (OUTPATIENT)
Dept: LAB | Facility: HOSPITAL | Age: 34
End: 2024-01-23
Attending: STUDENT IN AN ORGANIZED HEALTH CARE EDUCATION/TRAINING PROGRAM
Payer: COMMERCIAL

## 2024-01-23 VITALS
DIASTOLIC BLOOD PRESSURE: 82 MMHG | WEIGHT: 169 LBS | OXYGEN SATURATION: 100 % | HEIGHT: 67 IN | HEART RATE: 101 BPM | RESPIRATION RATE: 17 BRPM | SYSTOLIC BLOOD PRESSURE: 126 MMHG | TEMPERATURE: 98 F | BODY MASS INDEX: 26.53 KG/M2

## 2024-01-23 DIAGNOSIS — Z31.430 ENCOUNTER OF FEMALE FOR TESTING FOR GENETIC DISEASE CARRIER STATUS FOR PROCREATIVE MANAGEMENT: ICD-10-CM

## 2024-01-23 DIAGNOSIS — Z17.0 MALIGNANT NEOPLASM OF OVERLAPPING SITES OF LEFT BREAST IN FEMALE, ESTROGEN RECEPTOR POSITIVE: ICD-10-CM

## 2024-01-23 DIAGNOSIS — Z17.0 MALIGNANT NEOPLASM OF OVERLAPPING SITES OF LEFT BREAST IN FEMALE, ESTROGEN RECEPTOR POSITIVE: Primary | ICD-10-CM

## 2024-01-23 DIAGNOSIS — C50.812 MALIGNANT NEOPLASM OF OVERLAPPING SITES OF LEFT BREAST IN FEMALE, ESTROGEN RECEPTOR POSITIVE: Primary | ICD-10-CM

## 2024-01-23 DIAGNOSIS — Z34.01 ENCOUNTER FOR SUPERVISION OF NORMAL FIRST PREGNANCY IN FIRST TRIMESTER: ICD-10-CM

## 2024-01-23 DIAGNOSIS — C50.812 MALIGNANT NEOPLASM OF OVERLAPPING SITES OF LEFT BREAST IN FEMALE, ESTROGEN RECEPTOR POSITIVE: ICD-10-CM

## 2024-01-23 DIAGNOSIS — Z36.9 ENCOUNTER FOR ANTENATAL SCREENING OF MOTHER: ICD-10-CM

## 2024-01-23 DIAGNOSIS — O22.30 DVT (DEEP VEIN THROMBOSIS) IN PREGNANCY: ICD-10-CM

## 2024-01-23 DIAGNOSIS — E86.0 DEHYDRATION: ICD-10-CM

## 2024-01-23 PROCEDURE — 36415 COLL VENOUS BLD VENIPUNCTURE: CPT

## 2024-01-23 PROCEDURE — 99215 OFFICE O/P EST HI 40 MIN: CPT | Performed by: INTERNAL MEDICINE

## 2024-01-23 PROCEDURE — 81329 SMN1 GENE DOS/DELETION ALYS: CPT

## 2024-01-23 RX ORDER — SODIUM CHLORIDE 9 MG/ML
20 INJECTION, SOLUTION INTRAVENOUS ONCE
OUTPATIENT
Start: 2024-02-12

## 2024-01-23 RX ORDER — LIDOCAINE AND PRILOCAINE 25; 25 MG/G; MG/G
CREAM TOPICAL
Qty: 30 G | Refills: 2 | Status: SHIPPED | OUTPATIENT
Start: 2024-01-23

## 2024-01-23 RX ORDER — ONDANSETRON HYDROCHLORIDE 8 MG/1
8 TABLET, FILM COATED ORAL EVERY 8 HOURS PRN
Qty: 30 TABLET | Refills: 2 | Status: SHIPPED | OUTPATIENT
Start: 2024-01-23 | End: 2024-01-24

## 2024-01-23 RX ORDER — DOXORUBICIN HYDROCHLORIDE 2 MG/ML
56.25 INJECTION, SOLUTION INTRAVENOUS ONCE
OUTPATIENT
Start: 2024-02-12

## 2024-01-23 RX ORDER — ENOXAPARIN SODIUM 150 MG/ML
1 INJECTION SUBCUTANEOUS EVERY 12 HOURS
Qty: 60 ML | Refills: 2 | Status: SHIPPED | OUTPATIENT
Start: 2024-01-23 | End: 2024-01-30

## 2024-01-23 NOTE — PROGRESS NOTES
Oncology Outpatient Follow-up Note  Jillian Ch 33 y.o. female MRN: @ Encounter: [id number]    Date:  1/23/2024    Interval History:     Patient is here for follow-up for ER/TN positive, left breast cancer. Patient found out that she is 7 weeks pregnant around the time she was diagnosed with breast cancer. She is currently on her 11th week of pregnancy. Patient underwent left breast mastectomy on 1/2/2024.  Pathology resulted as 85% positive, 95% positive, HER2 1+ invasive ductal carcinoma, 25 mm in greatest dimension.  All margins and sampled regional lymph nodes were negative for tumor. Stage IIA (pT2, pN0, cM0).      Additionally, patient was found to have an acute occlusive DVT in the distal popliteal vein and proximal paired peroneal veins in the left lower extremity few days after her surgery. Patient has been on Lovenox injections, tolerating it well without any acute bleeding complications.     She continues to have significant nausea and episodes of vomiting, refractory to the use of Zofran and scopolamine patch. Additionally, patient has been very constipated since her surgery (has had only 5-6 BMs despite use of colace).     Patient's Oncotype Dx recurrence score had resulted as 23; with patient's young age and intermediate score, adjuvant chemotherapy       Review of Systems   Constitutional:  Negative for appetite change, chills, fatigue and fever.   HENT:   Negative for hearing loss, mouth sores, nosebleeds and sore throat.    Eyes:  Negative for eye problems and icterus.   Respiratory:  Negative for chest tightness, cough, shortness of breath and wheezing.    Cardiovascular:  Negative for chest pain and palpitations.   Gastrointestinal:  Positive for constipation, nausea and vomiting. Negative for abdominal distention, abdominal pain, blood in stool and diarrhea.   Endocrine: Negative for hot flashes.   Genitourinary:  Negative for difficulty urinating, dyspareunia, dysuria, frequency  and hematuria.    Musculoskeletal:  Negative for back pain, flank pain, gait problem, myalgias and neck pain.   Skin:  Negative for itching and rash.   Neurological:  Negative for dizziness, gait problem, headaches, light-headedness and speech difficulty.   Hematological:  Does not bruise/bleed easily.   Psychiatric/Behavioral:  Negative for confusion. The patient is not nervous/anxious.        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 Breast, Left, US Guided Left Breast Biopsy 12:00 6cmfn:  Invasive breast carcinoma of no special type (ductal) with osteoclast-like stromal giant cells  Grade 2  ER 85  AL 95  HER2 1+     B. Breast, Right, US Guided Right Breast Biopsy 10:00 9cmfn:  Benign breast tissue.    In review of the patient’s recent imaging, the left malignancy appears unifocal.  The biopsy-proven carcinoma measured 2.1 cm on ultrasound. Ultrasound of the left axilla was performed on 11/24/2023 and showed no suspicious adenopathy.  Recent imaging of the contralateral right breast dated 12/2/2023 and 11/24/2023 was reviewed and  shows no suspicious findings.  The pathology results for the ultrasound-guided core needle biopsy (right breast 10:00, 9 cm from the nipple) are benign and concordant with imaging.     12/2/2023 Genetic Testing    [facility]  A total of 36 genes were evaluated, including: APC, TOPHER, BARD 1, BMPR1A, BRCA1, BRCA2, BRIP1, CDH1, CDK4, CKDN2A, CHEK2, DICER1, MLH1, MSH2, MSH6, MUTYH, NBN, NF1, NTHL1, PALB2, PMS2, PTEN, RAD51C, RECQL, SMAD4, SMARCA4, STK11, TP53, AXIN2, HOXB13, MSH3, POLD1, AND POLE, EPCAM, AND GREM1   Negative result. No pathogenic sequence variants or deletions/duplications identified       12/2/2023 -  Cancer Staged    Staging form: Breast, AJCC 8th Edition  - Clinical stage from 12/2/2023: Stage IB (cT2, cN0, cM0, G2, ER+, VT+, HER2-) - Signed by Elena Cornell MD on 12/13/2023  Stage prefix: Initial diagnosis  Histologic grading system: 3 grade system       1/2/2024 Surgery    Left breast mastectomy with sentinel lymph node biopsy  Invasive Mammary carcinoma of no special type (ductal)   Grade 2  25 mm  Margins negative  0/2 Lymph nodes  Anatomic stage IIA  Prognostic stage IA      2/12/2024 -  Chemotherapy    DOXOrubicin (ADRIAMYCIN), 56.25 mg/m2 = 106 mg (93.8 % of original dose 60 mg/m2), Intravenous, Once, 0 of 4 cycles  Dose modification: 56.25 mg/m2 (original dose 60 mg/m2, Cycle 1, Reason: Other (Must fill in a comment), Comment: max recommended dose)  alteplase (CATHFLO), 2 mg, Intracatheter, Every 1 Minute as needed, 0 of 4 cycles  cyclophosphamide (CYTOXAN) IVPB, 600 mg/m2 = 1,128 mg, Intravenous, Once, 0 of 4 cycles  fosaprepitant (EMEND) IVPB, 150 mg, Intravenous, Once, 0 of 4 cycles             Test Results:    Imaging: US guided breast biopsy right complete, US guidance breast biopsy left each additional, Mammo post biopsy bilateral    Addendum Date: 12/6/2023 Addendum:   ADDENDUM: PATHOLOGY RESULTS: Final Diagnosis A. Breast, Left, US Guided Left Breast Biopsy 12:00 6cmfn 3 passes  12g marquee raissa: -Invasive breast carcinoma of no special type (ductal) with osteoclast-like stromal giant cells. *Mervin grade 2 of 3 (total score: 6 of 9) --tubule formation < 10%, score 3 --nuclear grade 2 of 3, score 2 --mitoses < 3/mm2, (</= 7 mitoses/10HPF), score 1. *Confirmed by tumor cell immunophenotype: --positive: nuclear stain for GATA3 and membranous stain for E-cadherin and p120. --negative:  p63, SMMHC.  --osteoclast like giant cells highlighted with CD68 *Invasive carcinoma involves 2 of 2 submitted core biopsies, max. dimension = 18 millimeters. *Estrogen, progesterone & HER2 receptor studies pending, to be described in a separate receptor report. *Ductal carcinoma in situ (DCIS): Present, minor component (< 25% of total tumor). --solid and cribriform architectural pattern, nuclear grade 2 of 3, without necrosis. *Lymph-vascular invasion: Not identified. *Microcalcifications: Rare noted.  B. Breast, Right, US Guided Right Breast Biopsy 10:00 9cmfn 3 passes 12g castro: - Benign, fatty breast tissue. In review of the patient’s recent imaging, the left malignancy appears unifocal.  The biopsy-proven carcinoma measured 2.1 cm on ultrasound. Ultrasound of the left axilla was performed on 11/24/2023 and showed no suspicious adenopathy. Recent imaging of the contralateral right breast dated 12/2/2023 and 11/24/2023 was reviewed and shows no suspicious findings.  The pathology results for the ultrasound-guided core needle biopsy (right breast 10:00, 9 cm from the nipple) are benign and concordant with imaging. RECOMMENDATION:      - Surgical Consultation for the left breast.      - Routine Screening Mammogram in 1 year for the right breast. END ADDENDUM    Result Date: 12/6/2023  Narrative: DIAGNOSIS: Abnormal mammogram INDICATION: Jillian Ch is a 33 y.o. female presenting for ultrasound-guided core needle biopsies. Prior to the procedure, previous imaging was reviewed.  The procedure  was explained to the patient in detail.  All questions were answered.  Written and verbal informed consent was obtained. FINDINGS: LEFT 1) MASS [A] US guidance breast biopsy left each additional: Images of the left breast mass in the upper outer quadrant at 12 o'clock in the middle portion, 6 cm from the nipple were obtained. The patient was placed supine on the biopsy table. A core needle biopsy was performed under ultrasound guidance using a lateral approach. The skin was prepped in the usual fashion. Anesthesia was administered using lidocaine 1%. A 12 G device was advanced in multiple passes. Preoperative ultrasound-guided Dulce  radiofrequency reflector localization was performed. A Dulce  radiofrequency reflector was inserted into the target area.  Operation of the reflector was confirmed using the  check device.  Post-placement ultrasound and digital mammographic imaging demonstrate the radiofrequency reflector was at the site. The patient tolerated the procedure well. There were no immediate complications. RIGHT 2) MASS [B] US guided breast biopsy right complete: Images of the right breast mass in the upper outer quadrant at 10 o'clock in the posterior portion, 9 cm from the nipple were obtained. The patient was placed supine on the biopsy table. A core needle biopsy was performed under ultrasound guidance using a lateral approach. The skin was prepped in the usual fashion. Anesthesia was administered using lidocaine 1%. A 12 G device was advanced in multiple passes. A top hat clip was inserted into the target area. Post-placement ultrasound imaging demonstrates the clip was at the site.  Postplacement mammogram demonstrates that the clip does not correlate with the mass seen on diagnostic mammogram dated 11/20/2023.  Utilizing the biopsy site as a landmark, the mass on the diagnostic mammogram (bilobed oval circumscribed low-density mass) was identified as a 1 cm simple cyst in the 11 o'clock  position of the right breast, 6 cm from the nipple.  The patient tolerated the procedure well. There were no immediate complications.      Impression:  Technically successful ultrasound-guided core needle biopsy of a suspicious mass in the 12 o'clock position of the left breast, 6 cm from the nipple with preoperative Dulce  radiofrequency reflector localization. Technically successful ultrasound-guided core needle biopsy of a hypoechoic mass in the 10 o'clock position of the right breast, 9 cm from the nipple. Oval bilobed low-density circumscribed mass in the upper outer right breast seen on diagnostic mammogram dated 11/20/2023 identified as a 1 cm simple cyst in the 11 o'clock position of the right breast, 6 cm from the nipple. RECOMMENDATION:      - Waiting for pathology for both breasts. Workstation ID: [id number]     Mammo diagnostic bilateral w 3d & cad, US breast bilateral limited (diagnostic)    Result Date: 11/24/2023  Narrative: DIAGNOSIS: Breast nodule TECHNIQUE: Digital diagnostic mammography was performed. Computer Aided Detection (CAD) analyzed all applicable images. Bilateral breast ultrasound was performed. COMPARISONS: None available. RELEVANT HISTORY: Family Breast Cancer History: History of breast cancer in Neg Hx. Family Medical History: No known relevant family medical history. Personal History: No known relevant hormone history. No known relevant surgical history. No known relevant medical history. RISK ASSESSMENT: 5 Year Tyrer-Cuzick: 0.47 % 10 Year Tyrer-Cuzick: 1.36 % Lifetime Tyrer-Cuzick: 17.96 % TISSUE DENSITY: There are scattered areas of fibroglandular density. INDICATION: Jillian Ch is a 33 y.o. female presenting for 0.5 cm oval shaped breast lump at 1 o clock 5-6 cm from nipple, non tender. FINDINGS: LEFT 1) MASS [A] Mammo diagnostic bilateral w 3d & cad: There is a 20 mm high density, irregularly shaped mass with spiculated margins seen in the upper outer  quadrant of the left breast in the middle depth. The mass correlates with the palpable mass reported by the patient. US breast bilateral limited (diagnostic): There is a 21 mm x 17 mm x 14 mm irregularly shaped, hypoechoic mass with spiculated margins with no posterior features seen in the left breast at 12 o'clock, 6 cm from the nipple. The mass correlates with the palpable mass reported by the patient and with the prior mammogram finding.  Doppler interrogation demonstrates scattered peripheral vascularity.  Ultrasound-guided core biopsy recommended to exclude malignancy. Evaluation of the axilla confirms no pathologic lymphadenopathy. RIGHT 2) MASS [B] Mammo diagnostic bilateral w 3d & cad: There is an oval bilobed mass versus 2 adjacent masses with circumscribed margins seen in the upper outer quadrant of the right breast in the posterior depth on the MLO and XCCL views. US breast bilateral limited (diagnostic): There is a 14 mm x 7 mm x 11 mm oval, parallel, hypoechoic bilobed mass with circumscribed margins seen in the right breast at 10 o'clock, 9 cm from the nipple. The mass correlates with the prior mammogram finding.  Ultrasound-guided core biopsy recommended. The above findings and recommendations are discussed with the patient, her mother, and the nurse navigator at the time of the study.     Impression: Bilateral ultrasound core biopsies recommended including for highly suspicious mass 12:00 location left breast. ASSESSMENT/BI-RADS CATEGORY: Left: 5 - Highly Suggestive of Malignancy Right: 4A - Low Suspicion for Malignancy Overall: 5 - Highly Suggestive of Malignancy RECOMMENDATION:      - Ultrasound-guided breast biopsy for both breasts. Workstation ID: [id number]       Labs:   Lab Results   Component Value Date    WBC 7.14 01/13/2024    HGB 12.2 01/13/2024    HCT 38.1 01/13/2024    MCV 84 01/13/2024     01/13/2024     Lab Results   Component Value Date    K 4.2 01/05/2024      01/05/2024    CO2 23 01/05/2024    BUN 6 01/05/2024    CREATININE 0.62 01/05/2024    GLUF 79 12/09/2023    CALCIUM 9.3 01/05/2024    CORRECTEDCA 9.5 08/19/2021    AST 28 01/05/2024    ALT 34 01/05/2024    ALKPHOS 84 01/05/2024    EGFR 117 01/05/2024       Lab Results   Component Value Date    IRON 83 07/29/2020       Lab Results   Component Value Date    XOGVIYWT34 331 01/06/2021               Active Problems:   Patient Active Problem List   Diagnosis    Mild persistent asthma without complication    Axillary hidradenitis suppurativa    Anxiety    Chest wall pain    Gastroesophageal reflux disease without esophagitis    Depression with anxiety    Chronic fatigue    Myalgia    Chronic left shoulder pain    Cervical disc disorder at C5-C6 level with radiculopathy    Atypical squamous cells of undetermined significance (ASCUS) on Papanicolaou smear of cervix    Hypertrophic and atrophic condition of skin    Migraine headache    Shoulder impingement syndrome, left    Vitamin D deficiency    Close exposure to COVID-19 virus    Thoracic outlet syndrome    Palpitations    Post-operative pain    Transaminitis    Right middle lobe pulmonary nodule    Reversible airway obstruction    SOB (shortness of breath)    Musculoskeletal chest pain    COVID-19 virus infection    Unexplained weight gain    Left ovarian cyst    Dysphagia    Malignant neoplasm of overlapping sites of left breast in female, estrogen receptor positive     Cancer complicating pregnancy in first trimester    10 weeks gestation of pregnancy    Status post left mastectomy    Multigravida of advanced maternal age in first trimester    DVT complicating pregnancy, first trimester    Vaginal bleeding affecting early pregnancy    Dehydration       Past Medical History:   Past Medical History:   Diagnosis Date    Anesthesia complication     gait dysfunction/ urinary retention after nerve block,    Anxiety     Asthma     CRPS (complex regional pain syndrome), upper  limb     left chest/arm/hand    Deep vein thrombosis (HCC) 01/05/2024    post op from mastectomy    GERD (gastroesophageal reflux disease)     Heart murmur     HPV (human papilloma virus) infection 2012    unsure of 16, 18, or other    Invasive ductal carcinoma of breast, female, left (HCC) 2023    Kidney stone     Miscarriage 3/15/2015    7 weeks along.    Neck pain     Ovarian cyst     Left    PONV (postoperative nausea and vomiting) 01/02/2024       Surgical History:   Past Surgical History:   Procedure Laterality Date    COLPOSCOPY  2012    HPV    EGD      IR UPPER EXTREMITY VENOGRAM- DIAGNOSTIC  05/28/2021    CT BX/EXC LYMPH NODE OPEN SUPERFICIAL Left 01/02/2024    Procedure: LEFT BREAST MASTECTOMY, LYMPHATIC MAPPING WITH RADIOACTIVE DYE, SENTINEL LYMPH NODE BIOPSY, ZAHEER LEFT BREAST, INJECTION IN OR AT 0800 BY DR CORNELL;  Surgeon: Elena Cornell MD;  Location: [facility];  Service: Surgical Oncology    CT EXCISION 1ST &/CERVICAL RIB Left 08/12/2021    Procedure: FIRST RIB RESECTION;  Surgeon: Jonathan Schaffer MD;  Location: [facility];  Service: Thoracic    CT INJ RADIOACTIVE TRACER FOR ID OF SENTINEL NODE Left 01/02/2024    Procedure: LEFT BREAST MASTECTOMY, LYMPHATIC MAPPING WITH RADIOACTIVE DYE, SENTINEL LYMPH NODE BIOPSY, ZAHEER LEFT BREAST, INJECTION IN OR AT 0800 BY DR CORNELL;  Surgeon: Elena Cornell MD;  Location: [facility];  Service: Surgical Oncology    CT INTRAOP SENTINEL LYMPH NODE ID W/DYE INJECTION Left 01/02/2024    Procedure: LEFT BREAST MASTECTOMY, LYMPHATIC MAPPING WITH RADIOACTIVE DYE, SENTINEL LYMPH NODE BIOPSY, ZAHEER LEFT BREAST, INJECTION IN OR AT 0800 BY DR CORNELL;  Surgeon: Elena Cornell MD;  Location: [facility];  Service: Surgical Oncology    CT MASTECTOMY SIMPLE COMPLETE Left 01/02/2024    Procedure: LEFT BREAST MASTECTOMY, LYMPHATIC MAPPING WITH RADIOACTIVE DYE, SENTINEL LYMPH NODE BIOPSY, ZAHEER LEFT BREAST, INJECTION IN OR AT 0800 BY DR CORNELL;   Surgeon: Elena Cornell MD;  Location: [facility];  Service: Surgical Oncology    THORACOSCOPY VIDEO ASSISTED SURGERY (VATS) Left 08/12/2021    Procedure: THORACOSCOPY VIDEO ASSISTED SURGERY (VATS);  Surgeon: Jonathan Schaffer MD;  Location:  MAIN OR;  Service: Thoracic    US GUIDANCE BREAST BIOPSY LEFT EACH ADDITIONAL Left 12/02/2023    US GUIDED BREAST BIOPSY RIGHT COMPLETE Right 12/02/2023    WISDOM TOOTH EXTRACTION  2012       Family History:    Family History   Problem Relation Age of Onset    Heart disease Mother     Miscarriages / Stillbirths Mother     Coronary artery disease Mother     No Known Problems Father     No Known Problems Half-Brother     Bone cancer Maternal Grandmother         hilda to liver and spine    Miscarriages / Stillbirths Half-Sister     Breast cancer Neg Hx     Ovarian cancer Neg Hx     Uterine cancer Neg Hx     Cervical cancer Neg Hx        Cancer-related family history includes Bone cancer in her maternal grandmother. There is no history of Breast cancer, Ovarian cancer, Uterine cancer, or Cervical cancer.    Social History:   Social History     Socioeconomic History    Marital status: /Civil Union     Spouse name: Not on file    Number of children: Not on file    Years of education: Not on file    Highest education level: Not on file   Occupational History    Not on file   Tobacco Use    Smoking status: Never    Smokeless tobacco: Never   Vaping Use    Vaping status: Never Used   Substance and Sexual Activity    Alcohol use: Not Currently     Comment: social    Drug use: Never    Sexual activity: Yes     Partners: Male     Birth control/protection: None   Other Topics Concern    Not on file   Social History Narrative    Not on file     Social Determinants of Health     Financial Resource Strain: Not on file   Food Insecurity: Not on file   Transportation Needs: No Transportation Needs (8/19/2021)    PRAPARE - Transportation     Lack of Transportation (Medical): No      Lack of Transportation (Non-Medical): No   Physical Activity: Not on file   Stress: Not on file   Social Connections: Not on file   Intimate Partner Violence: Not on file   Housing Stability: Not on file       Current Medications:   Current Outpatient Medications   Medication Sig Dispense Refill    albuterol (PROVENTIL HFA,VENTOLIN HFA) 90 mcg/act inhaler TAKE 2 PUFFS BY MOUTH EVERY 6 HOURS AS NEEDED FOR WHEEZE OR FOR SHORTNESS OF BREATH 18 g 3    cetirizine (ZyrTEC) 10 mg tablet TAKE 1 TABLET BY MOUTH EVERY DAY 90 tablet 0    enoxaparin (LOVENOX) 150 mg/mL injection Inject 0.5 mL (75 mg total) under the skin every 12 (twelve) hours 60 mL 2    lidocaine-prilocaine (EMLA) cream Apply to port 30 to 60 min prior to use 30 g 2    omeprazole (PriLOSEC) 40 MG capsule TAKE 1 CAPSULE (40 MG TOTAL) BY MOUTH DAILY. (Patient taking differently: Take 40 mg by mouth daily As needed) 90 capsule 0    ondansetron (ZOFRAN) 8 mg tablet Take 1 tablet (8 mg total) by mouth every 8 (eight) hours as needed for nausea or vomiting 30 tablet 2    Prenatal Multivit-Min-Fe-FA (PRE- PO) Take 1 tablet by mouth in the morning       No current facility-administered medications for this visit.       Allergies:   Allergies   Allergen Reactions    Nsaids GI Intolerance    Formic Acid Swelling and Rash     (Severe reactions to Bug bites)    Latex Rash and Blisters         Physical Exam:    Body surface area is 1.88 meters squared.    Wt Readings from Last 3 Encounters:   24 76.7 kg (169 lb)   24 77.6 kg (171 lb)   24 77.8 kg (171 lb 8 oz)        Temp Readings from Last 3 Encounters:   24 98 °F (36.7 °C)   24 (!) 96.5 °F (35.8 °C) (Tympanic)   24 98.4 °F (36.9 °C) (Temporal)        BP Readings from Last 3 Encounters:   24 126/82   24 128/64   24 128/88         Pulse Readings from Last 3 Encounters:   24 101   24 95   24 94     @LASTSAO2(3)@    Physical  Exam  Constitutional:       General: She is not in acute distress.     Appearance: Normal appearance. She is not toxic-appearing.   HENT:      Head: Normocephalic and atraumatic.      Mouth/Throat:      Mouth: Mucous membranes are moist.      Pharynx: No oropharyngeal exudate or posterior oropharyngeal erythema.   Eyes:      General: No scleral icterus.     Extraocular Movements: Extraocular movements intact.      Conjunctiva/sclera: Conjunctivae normal.      Pupils: Pupils are equal, round, and reactive to light.   Cardiovascular:      Rate and Rhythm: Normal rate and regular rhythm.      Heart sounds: No murmur heard.     No gallop.   Pulmonary:      Effort: No respiratory distress.      Breath sounds: Normal breath sounds. No stridor. No wheezing or rhonchi.   Abdominal:      General: Bowel sounds are normal. There is no distension.      Palpations: Abdomen is soft. There is no mass.      Tenderness: There is no abdominal tenderness.   Musculoskeletal:         General: No swelling, tenderness, deformity or signs of injury.      Cervical back: No rigidity.      Right lower leg: No edema.      Left lower leg: No edema.   Lymphadenopathy:      Cervical: No cervical adenopathy.   Skin:     Capillary Refill: Capillary refill takes less than 2 seconds.      Coloration: Skin is not jaundiced or pale.      Findings: No bruising or erythema.      Comments: Left breast mastectomy surgical scars appear to be healing well   Neurological:      General: No focal deficit present.      Mental Status: She is oriented to person, place, and time.      Motor: No weakness.         Assessment/ Plan:     1. Malignant neoplasm of overlapping sites of left breast in female, estrogen receptor positive   ondansetron (ZOFRAN) 8 mg tablet    lidocaine-prilocaine (EMLA) cream    Infusion Calculated Appointment Request    CBC and differential    Comprehensive metabolic panel    Ambulatory Referral to Interventional Radiology      2. DVT (deep  vein thrombosis) in pregnancy  enoxaparin (LOVENOX) 150 mg/mL injection      3. Dehydration          Left breast cancer, ER/KS positive    Ms. Ch is a 33 year old pregnant female who was diagnosed with ER/KS positive, left breast cancer in December 2023.  Patient underwent left breast mastectomy on 1/2/2024.  Pathology resulted as 85% positive, 95% positive, HER2 1+ invasive ductal carcinoma, 25 mm in greatest dimension.  All margins and sampled regional lymph nodes were negative for tumor. Stage IIA (pT2, pN0, cM0).  She is currently on her 11th week of pregnancy.    Patient's Oncotype Dx recurrence score had resulted as 23. With patient's young age and intermediate Oncotype DX score, adjuvant chemotherapy was recommended to decrease chances of systemic cancer recurrence. Doxorubicin plus cytoxan (AC regimen, not dose-dense) x 4 cycles (every 21 days) was recommended for the patient to start during second trimester. We discussed the common potential side effects of this regimen and patient signed the informed consent to proceed with treatment.     We will tentatively plan to start first cycle around 2/12/24 (after patient gets her baseline cardiac echocardiogram on 2/8/24). Patient was recommended to have a port placed (we checked and confirmed with IR that it would be ok for her to receive contrast with port placement). Patient was prescribed Zofran 8mg q8h to be used as needed for nausea. Additionally, patient was recommended to start taking Miralax as she has been having significant level of constipation since her breast surgery. We will arrange for IV hydration to be arranged at the infusion center on a routine basis given that nausea/vomiting have been persistent problems for the patient. Patient was interested in participating in the cold capping program; our RN provided patient with details of cold capping. Patient was advised to follow up in office prior to C2 of treatment.     2. DVT     With regards  to patient's DVT, she was found to have an acute occlusive DVT in the distal popliteal vein and proximal paired peroneal veins in the left lower extremity few days after her surgery. Patient has been on Lovenox injections, tolerating it well without any acute bleeding complications. Patient was recommended to continue twice daily therapeutic dosing of Lovenox for the time being given that it was diagnosed just 3 weeks ago. Exact duration TBD, but would favor continuing therapeutic dose of AC for at least 3 to 6 months.

## 2024-01-24 ENCOUNTER — OFFICE VISIT (OUTPATIENT)
Dept: SURGICAL ONCOLOGY | Facility: CLINIC | Age: 34
End: 2024-01-24

## 2024-01-24 VITALS
DIASTOLIC BLOOD PRESSURE: 64 MMHG | RESPIRATION RATE: 15 BRPM | HEART RATE: 89 BPM | TEMPERATURE: 97.7 F | SYSTOLIC BLOOD PRESSURE: 110 MMHG | WEIGHT: 169 LBS | BODY MASS INDEX: 26.53 KG/M2 | HEIGHT: 67 IN | OXYGEN SATURATION: 99 %

## 2024-01-24 DIAGNOSIS — Z90.12 STATUS POST LEFT MASTECTOMY: ICD-10-CM

## 2024-01-24 DIAGNOSIS — C50.812 MALIGNANT NEOPLASM OF OVERLAPPING SITES OF LEFT BREAST IN FEMALE, ESTROGEN RECEPTOR POSITIVE: Primary | ICD-10-CM

## 2024-01-24 DIAGNOSIS — Z17.0 MALIGNANT NEOPLASM OF OVERLAPPING SITES OF LEFT BREAST IN FEMALE, ESTROGEN RECEPTOR POSITIVE: Primary | ICD-10-CM

## 2024-01-24 PROCEDURE — 99024 POSTOP FOLLOW-UP VISIT: CPT | Performed by: SURGERY

## 2024-01-24 RX ORDER — ONDANSETRON HYDROCHLORIDE 8 MG/1
TABLET, FILM COATED ORAL
Qty: 30 TABLET | Refills: 2 | Status: SHIPPED | OUTPATIENT
Start: 2024-01-24 | End: 2024-01-31 | Stop reason: SDUPTHER

## 2024-01-24 NOTE — TELEPHONE ENCOUNTER
Patient was approved for speciality, waiting for port to be scheduled to call patient again for preferences.

## 2024-01-24 NOTE — TELEPHONE ENCOUNTER
While we try to accommodate patient requests, our priority is to schedule treatment according to Doctor's orders and site availability.    Does the Provider use the intake sheet or checkout note?  What would be a preferred day of the week that would work best for your infusion appointment? Any day   Do you prefer mornings or afternoons for your appointments? Mornings   Are there any days or dates that do not work for your schedule, including any upcoming vacations? N/A  We are going to try our best to schedule you at the infusion center closest to your home.  In the event that we are unable to what would be your next preferred infusion site or sites?     1. [facility]  2. [facility]     Do you have transportation to take you to all of your appointments? Yes   Would you like the infusion center to draw labs from your port? (disregard if patient doesn't have a port or need labs for infusion appointment) Yes

## 2024-01-24 NOTE — PROGRESS NOTES
Surgical Oncology Follow Up  [facility]  [facility] [place]  [address] [address]    Jillian Ch  1990  [id number]      Chief Complaint   Patient presents with   • Post-op        Assessment & Plan:   33-year-old female who is 12-week pregnancy with recent diagnosis of left breast cancer stage Ib she underwent successful left mastectomy and sentinel lymph node biopsy.  She is doing well well-healed surgical surgical site.  Pathology was discussed and copy was given to the patient.  She is continuously following with obstetrician.  She has seen medical oncologist and plan for adjuvant therapy in second trimester.  She also has lower leg nonocclusive DVT and on anticoagulant coagulant Lovenox and is being managed by medical oncologist.  I will see her in 3 months time.  She was told to call us with any questions or concerns in the interim she understand and agreed to do so.    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 Breast, Left, US Guided Left Breast Biopsy 12:00 6cmfn:  Invasive breast carcinoma of no special type (ductal) with osteoclast-like stromal giant cells  Grade 2  ER 85  IA 95  HER2 1+     B. Breast, Right, US Guided Right Breast Biopsy 10:00 9cmfn:  Benign breast tissue.    In review of the patient’s recent imaging, the left malignancy appears unifocal.  The biopsy-proven carcinoma measured 2.1 cm on ultrasound. Ultrasound of the left  axilla was performed on 11/24/2023 and showed no suspicious adenopathy.  Recent imaging of the contralateral right breast dated 12/2/2023 and 11/24/2023 was reviewed and shows no suspicious findings.  The pathology results for the ultrasound-guided core needle biopsy (right breast 10:00, 9 cm from the nipple) are benign and concordant with imaging.     12/2/2023 Genetic Testing    [facility]  A total of 36 genes were evaluated, including: APC, TOPHER, BARD 1, BMPR1A, BRCA1, BRCA2, BRIP1, CDH1, CDK4, CKDN2A, CHEK2, DICER1, MLH1, MSH2, MSH6, MUTYH, NBN, NF1, NTHL1, PALB2, PMS2, PTEN, RAD51C, RECQL, SMAD4, SMARCA4, STK11, TP53, AXIN2, HOXB13, MSH3, POLD1, AND POLE, EPCAM, AND GREM1   Negative result. No pathogenic sequence variants or deletions/duplications identified       12/2/2023 -  Cancer Staged    Staging form: Breast, AJCC 8th Edition  - Clinical stage from 12/2/2023: Stage IB (cT2, cN0, cM0, G2, ER+, DE+, HER2-) - Signed by Elena Cornell MD on 12/13/2023  Stage prefix: Initial diagnosis  Histologic grading system: 3 grade system       1/2/2024 Surgery    Left breast mastectomy with sentinel lymph node biopsy  Invasive Mammary carcinoma of no special type (ductal)   Grade 2  25 mm  Margins negative  0/2 Lymph nodes  Anatomic stage IIA  Prognostic stage IA      2/12/2024 -  Chemotherapy    DOXOrubicin (ADRIAMYCIN), 56.25 mg/m2 = 106 mg (93.8 % of original dose 60 mg/m2), Intravenous, Once, 0 of 4 cycles  Dose modification: 56.25 mg/m2 (original dose 60 mg/m2, Cycle 1, Reason: Other (Must fill in a comment), Comment: max recommended dose)  alteplase (CATHFLO), 2 mg, Intracatheter, Every 1 Minute as needed, 0 of 4 cycles  cyclophosphamide (CYTOXAN) IVPB, 600 mg/m2 = 1,128 mg, Intravenous, Once, 0 of 4 cycles  fosaprepitant (EMEND) IVPB, 150 mg, Intravenous, Once, 0 of 4 cycles           Interval History:   Follow-up with left breast cancer    Review of Systems:   Review of Systems   Constitutional:  Negative  for chills and fever.   HENT:  Negative for ear pain and sore throat.    Eyes:  Negative for pain and visual disturbance.   Respiratory:  Negative for cough and shortness of breath.    Cardiovascular:  Negative for chest pain and palpitations.   Gastrointestinal:  Positive for abdominal distention. Negative for abdominal pain and vomiting.   Genitourinary:  Negative for dysuria and hematuria.   Musculoskeletal:  Negative for arthralgias and back pain.   Skin:  Negative for color change and rash.   Neurological:  Negative for seizures and syncope.   All other systems reviewed and are negative.    Past Medical History     Patient Active Problem List   Diagnosis   • Mild persistent asthma without complication   • Axillary hidradenitis suppurativa   • Anxiety   • Chest wall pain   • Gastroesophageal reflux disease without esophagitis   • Depression with anxiety   • Chronic fatigue   • Myalgia   • Chronic left shoulder pain   • Cervical disc disorder at C5-C6 level with radiculopathy   • Atypical squamous cells of undetermined significance (ASCUS) on Papanicolaou smear of cervix   • Hypertrophic and atrophic condition of skin   • Migraine headache   • Shoulder impingement syndrome, left   • Vitamin D deficiency   • Close exposure to COVID-19 virus   • Thoracic outlet syndrome   • Palpitations   • Post-operative pain   • Transaminitis   • Right middle lobe pulmonary nodule   • Reversible airway obstruction   • SOB (shortness of breath)   • Musculoskeletal chest pain   • COVID-19 virus infection   • Unexplained weight gain   • Left ovarian cyst   • Dysphagia   • Malignant neoplasm of overlapping sites of left breast in female, estrogen receptor positive    • Cancer complicating pregnancy in first trimester   • 10 weeks gestation of pregnancy   • Status post left mastectomy   • Multigravida of advanced maternal age in first trimester   • DVT complicating pregnancy, first trimester   • Vaginal bleeding affecting early  pregnancy   • Dehydration     Past Medical History:   Diagnosis Date   • Anesthesia complication     gait dysfunction/ urinary retention after nerve block,   • Anxiety    • Asthma    • CRPS (complex regional pain syndrome), upper limb     left chest/arm/hand   • Deep vein thrombosis (HCC) 01/05/2024    post op from mastectomy   • GERD (gastroesophageal reflux disease)    • Heart murmur    • HPV (human papilloma virus) infection 2012    unsure of 16, 18, or other   • Invasive ductal carcinoma of breast, female, left (HCC) 2023   • Kidney stone    • Miscarriage 3/15/2015    7 weeks along.   • Neck pain    • Ovarian cyst     Left   • PONV (postoperative nausea and vomiting) 01/02/2024     Past Surgical History:   Procedure Laterality Date   • COLPOSCOPY  2012    HPV   • EGD     • IR UPPER EXTREMITY VENOGRAM- DIAGNOSTIC  05/28/2021   • MI BX/EXC LYMPH NODE OPEN SUPERFICIAL Left 01/02/2024    Procedure: LEFT BREAST MASTECTOMY, LYMPHATIC MAPPING WITH RADIOACTIVE DYE, SENTINEL LYMPH NODE BIOPSY, ZAHEER LEFT BREAST, INJECTION IN OR AT 0800 BY DR CORNELL;  Surgeon: Elena Cornell MD;  [facility];  Service: Surgical Oncology   • MI EXCISION 1ST &/CERVICAL RIB Left 08/12/2021    Procedure: FIRST RIB RESECTION;  Surgeon: Jonathan Schaffer MD;  Location: [facility];  Service: Thoracic   • MI INJ RADIOACTIVE TRACER FOR ID OF SENTINEL NODE Left 01/02/2024    Procedure: LEFT BREAST MASTECTOMY, LYMPHATIC MAPPING WITH RADIOACTIVE DYE, SENTINEL LYMPH NODE BIOPSY, ZAHEER LEFT BREAST, INJECTION IN OR AT 0800 BY DR CORNELL;  Surgeon: Elena Cornell MD;  [facility];  Service: Surgical Oncology   • MI INTRAOP SENTINEL LYMPH NODE ID W/DYE INJECTION Left 01/02/2024    Procedure: LEFT BREAST MASTECTOMY, LYMPHATIC MAPPING WITH RADIOACTIVE DYE, SENTINEL LYMPH NODE BIOPSY, ZAHEER LEFT BREAST, INJECTION IN OR AT 0800 BY DR CORNELL;  Surgeon: Elena Cornell MD;  [facility];  Service: Surgical  Oncology   • NH MASTECTOMY SIMPLE COMPLETE Left 01/02/2024    Procedure: LEFT BREAST MASTECTOMY, LYMPHATIC MAPPING WITH RADIOACTIVE DYE, SENTINEL LYMPH NODE BIOPSY, ZAHEER LEFT BREAST, INJECTION IN OR AT 0800 BY DR CORNELL;  Surgeon: Elena Cornell MD;  [facility];  Service: Surgical Oncology   • THORACOSCOPY VIDEO ASSISTED SURGERY (VATS) Left 08/12/2021    Procedure: THORACOSCOPY VIDEO ASSISTED SURGERY (VATS);  Surgeon: Jonathan Schaffer MD;  Location:  MAIN OR;  Service: Thoracic   • US GUIDANCE BREAST BIOPSY LEFT EACH ADDITIONAL Left 12/02/2023   • US GUIDED BREAST BIOPSY RIGHT COMPLETE Right 12/02/2023   • WISDOM TOOTH EXTRACTION  2012     Family History   Problem Relation Age of Onset   • Heart disease Mother    • Miscarriages / Stillbirths Mother    • Coronary artery disease Mother    • No Known Problems Father    • No Known Problems Half-Brother    • Bone cancer Maternal Grandmother         hilda to liver and spine   • Miscarriages / Stillbirths Half-Sister    • Breast cancer Neg Hx    • Ovarian cancer Neg Hx    • Uterine cancer Neg Hx    • Cervical cancer Neg Hx      Social History     Socioeconomic History   • Marital status: /Civil Union     Spouse name: Not on file   • Number of children: Not on file   • Years of education: Not on file   • Highest education level: Not on file   Occupational History   • Not on file   Tobacco Use   • Smoking status: Never   • Smokeless tobacco: Never   Vaping Use   • Vaping status: Never Used   Substance and Sexual Activity   • Alcohol use: Not Currently     Comment: social   • Drug use: Never   • Sexual activity: Yes     Partners: Male     Birth control/protection: None   Other Topics Concern   • Not on file   Social History Narrative   • Not on file     Social Determinants of Health     Financial Resource Strain: Not on file   Food Insecurity: Not on file   Transportation Needs: No Transportation Needs (8/19/2021)    PRAPARE - Transportation    •  Lack of Transportation (Medical): No    • Lack of Transportation (Non-Medical): No   Physical Activity: Not on file   Stress: Not on file   Social Connections: Not on file   Intimate Partner Violence: Not on file   Housing Stability: Not on file       Current Outpatient Medications:   •  albuterol (PROVENTIL HFA,VENTOLIN HFA) 90 mcg/act inhaler, TAKE 2 PUFFS BY MOUTH EVERY 6 HOURS AS NEEDED FOR WHEEZE OR FOR SHORTNESS OF BREATH, Disp: 18 g, Rfl: 3  •  cetirizine (ZyrTEC) 10 mg tablet, TAKE 1 TABLET BY MOUTH EVERY DAY, Disp: 90 tablet, Rfl: 0  •  enoxaparin (LOVENOX) 150 mg/mL injection, Inject 0.5 mL (75 mg total) under the skin every 12 (twelve) hours, Disp: 60 mL, Rfl: 2  •  lidocaine-prilocaine (EMLA) cream, Apply to port 30 to 60 min prior to use, Disp: 30 g, Rfl: 2  •  omeprazole (PriLOSEC) 40 MG capsule, TAKE 1 CAPSULE (40 MG TOTAL) BY MOUTH DAILY. (Patient taking differently: Take 40 mg by mouth daily As needed), Disp: 90 capsule, Rfl: 0  •  ondansetron (ZOFRAN) 8 mg tablet, TAKE 1 TABLET BY MOUTH EVERY 8 HOURS AS NEEDED FOR NAUSEA OR VOMITING., Disp: 30 tablet, Rfl: 2  •  Prenatal Multivit-Min-Fe-FA (PRE-SONIA PO), Take 1 tablet by mouth in the morning, Disp: , Rfl:   Allergies   Allergen Reactions   • Nsaids GI Intolerance   • Formic Acid Swelling and Rash     (Severe reactions to Bug bites)   • Latex Rash and Blisters       Physical Exam:     Vitals:    24 1352   BP: 110/64   Pulse: 89   Resp: 15   Temp: 97.7 °F (36.5 °C)   SpO2: 99%     Physical Exam  Constitutional:       Appearance: Normal appearance.   HENT:      Head: Normocephalic and atraumatic.      Nose: Nose normal.      Mouth/Throat:      Mouth: Mucous membranes are moist.   Eyes:      Pupils: Pupils are equal, round, and reactive to light.   Cardiovascular:      Rate and Rhythm: Normal rate.      Pulses: Normal pulses.      Heart sounds: Normal heart sounds.   Pulmonary:      Effort: Pulmonary effort is normal.      Breath sounds: Normal  breath sounds.   Chest:          Comments: Left mastectomy flaps are clean intact no surgical site infection no evidence of local recurrence.  Left axillary and supraclavicular examination no palpable adenopathy.    Right breast no palpable mass masses nipple discharge no skin changes.  Right axillary and supraclavicular examination no palpable adenopathy.    Patient was examined seated as well as supine position  Abdominal:      General: Bowel sounds are normal.      Palpations: Abdomen is soft.   Musculoskeletal:         General: Normal range of motion.      Cervical back: Normal range of motion and neck supple.   Skin:     General: Skin is warm.   Neurological:      General: No focal deficit present.      Mental Status: She is alert and oriented to person, place, and time.   Psychiatric:         Mood and Affect: Mood normal.         Behavior: Behavior normal.         Thought Content: Thought content normal.         Judgment: Judgment normal.       Results & Discussion:   I did review pathology and discussed I did discussed in detail nature of breast cancer during pregnancy and further management.  She was told to call us with any questions or concerns in the interim.  If not I will see her in 3 months time.  She will continue to follow-up with medical oncologist as well as obstetrics and gynecology. she understands and  agrees . All patient questions were answered.       Advance Care Planning/Advance Directives:  I Elena Cornell MD discussed the disease status with Jillian Ch  today 01/24/24  treatment plans and follow-up with the patient.

## 2024-01-25 ENCOUNTER — TELEPHONE (OUTPATIENT)
Dept: INFUSION CENTER | Facility: CLINIC | Age: 34
End: 2024-01-25

## 2024-01-25 ENCOUNTER — DOCUMENTATION (OUTPATIENT)
Dept: HEMATOLOGY ONCOLOGY | Facility: CLINIC | Age: 34
End: 2024-01-25

## 2024-01-25 DIAGNOSIS — Z91.09 ENVIRONMENTAL ALLERGIES: ICD-10-CM

## 2024-01-25 RX ORDER — CETIRIZINE HYDROCHLORIDE 10 MG/1
10 TABLET ORAL DAILY
Qty: 90 TABLET | Refills: 0 | Status: SHIPPED | OUTPATIENT
Start: 2024-01-25

## 2024-01-25 NOTE — TELEPHONE ENCOUNTER
Lvm with time and date of infusions, patient aware schedule is updated on [facility]. Patient has my teams number to return my call and confirm appts.

## 2024-01-25 NOTE — TELEPHONE ENCOUNTER
Left voicemail for patient regarding upcoming first appointment, verified appointment date/time, discussed infusion centers policies and procedures including visitor policy. Callback # left for any questions

## 2024-01-26 ENCOUNTER — HOSPITAL ENCOUNTER (OUTPATIENT)
Dept: INFUSION CENTER | Facility: CLINIC | Age: 34
End: 2024-01-26
Payer: COMMERCIAL

## 2024-01-26 ENCOUNTER — PATIENT OUTREACH (OUTPATIENT)
Dept: CASE MANAGEMENT | Facility: HOSPITAL | Age: 34
End: 2024-01-26

## 2024-01-26 ENCOUNTER — APPOINTMENT (OUTPATIENT)
Dept: LAB | Facility: HOSPITAL | Age: 34
End: 2024-01-26
Attending: STUDENT IN AN ORGANIZED HEALTH CARE EDUCATION/TRAINING PROGRAM
Payer: COMMERCIAL

## 2024-01-26 ENCOUNTER — HOME CARE VISIT (OUTPATIENT)
Dept: HOME HEALTH SERVICES | Facility: HOME HEALTHCARE | Age: 34
End: 2024-01-26
Payer: COMMERCIAL

## 2024-01-26 ENCOUNTER — TELEPHONE (OUTPATIENT)
Dept: OBGYN CLINIC | Facility: CLINIC | Age: 34
End: 2024-01-26

## 2024-01-26 ENCOUNTER — TELEPHONE (OUTPATIENT)
Dept: HOME HEALTH SERVICES | Facility: HOME HEALTHCARE | Age: 34
End: 2024-01-26

## 2024-01-26 VITALS
DIASTOLIC BLOOD PRESSURE: 70 MMHG | RESPIRATION RATE: 17 BRPM | TEMPERATURE: 96.9 F | SYSTOLIC BLOOD PRESSURE: 124 MMHG | HEART RATE: 92 BPM

## 2024-01-26 DIAGNOSIS — E86.0 DEHYDRATION: Primary | ICD-10-CM

## 2024-01-26 DIAGNOSIS — Z34.01 ENCOUNTER FOR SUPERVISION OF NORMAL FIRST PREGNANCY IN FIRST TRIMESTER: ICD-10-CM

## 2024-01-26 PROCEDURE — 81241 F5 GENE: CPT

## 2024-01-26 PROCEDURE — 36415 COLL VENOUS BLD VENIPUNCTURE: CPT

## 2024-01-26 PROCEDURE — 96360 HYDRATION IV INFUSION INIT: CPT

## 2024-01-26 RX ADMIN — SODIUM CHLORIDE 1000 ML: 0.9 INJECTION, SOLUTION INTRAVENOUS at 12:20

## 2024-01-26 NOTE — TELEPHONE ENCOUNTER
Call to [facility]. Spoke with representative. Transferred to Ejnnifer's line. L/M.

## 2024-01-26 NOTE — TELEPHONE ENCOUNTER
Called patient to cancel nursing visit today. Possible Phone DC if not patient next visit is on 2/1/24.

## 2024-01-26 NOTE — PROGRESS NOTES
Patient arrives for IV hydration. Patient offers no complaints today. PIV established, pt tolerated entire infusion without complication. PIV removed. AVS declined.     Next appointment: 2/2/24 @ 5022

## 2024-01-26 NOTE — PROGRESS NOTES
"Call out to pt this morning to check in since her surgery, she is now scheduled to start tx next month and will use the [facility] system.  LM for pt with my direct number and encouraged her to call me as needed before starting tx, otherwise I will see her when she gets started.      Pt returned my message and we were able to reconnect this afternoon by phone, she was in the infusion center getting hydration and tells me that she's been struggling with nausea.  She says that she is slowly healing from surgery and feels sore.  Luckily, so far everything is looking good with her pregnancy.  She says that her employer has put her out of work for the next year, on a program called \"income protection\".  She says that she will get paid a portion of her salary, but not in the summer.  They did encourage her to apply for disability as well.  I emailed her information today on how to apply for SSD online, by phone, or at our local office.  She denies any other needs at this time, I have encouraged her to reach out if needed and otherwise will see her in the infusion center when she starts treatments.  No other needs noted at this time.  "

## 2024-01-26 NOTE — TELEPHONE ENCOUNTER
----- Message from Marian Quick MA sent at 1/26/2024  8:10 AM EST -----  Can you please help me with this, thanks   ----- Message -----  From: Angie Cano MA  Sent: 1/25/2024   3:30 PM EST  To: Marian Quick MA  Subject: Question Regarding Cystic Fibrosis Test          Good Afternoon!    I have Jennifer from [facility] on the phone with some questions regarding this patient'sCystic Fibrosis test. These tests are no longer orderable and need to verify which of the following 2 tests is needed.  Either test # [id number] or #[id number]    Please give her a call back at [phone].

## 2024-01-29 ENCOUNTER — TELEPHONE (OUTPATIENT)
Dept: OBGYN CLINIC | Facility: CLINIC | Age: 34
End: 2024-01-29

## 2024-01-30 ENCOUNTER — APPOINTMENT (OUTPATIENT)
Dept: LAB | Facility: CLINIC | Age: 34
End: 2024-01-30
Payer: COMMERCIAL

## 2024-01-30 ENCOUNTER — ROUTINE PRENATAL (OUTPATIENT)
Dept: PERINATAL CARE | Facility: OTHER | Age: 34
End: 2024-01-30
Payer: COMMERCIAL

## 2024-01-30 VITALS
WEIGHT: 170.8 LBS | HEIGHT: 67 IN | DIASTOLIC BLOOD PRESSURE: 64 MMHG | SYSTOLIC BLOOD PRESSURE: 118 MMHG | BODY MASS INDEX: 26.81 KG/M2 | HEART RATE: 92 BPM

## 2024-01-30 DIAGNOSIS — C50.812 MALIGNANT NEOPLASM OF OVERLAPPING SITES OF LEFT BREAST IN FEMALE, ESTROGEN RECEPTOR POSITIVE: Primary | ICD-10-CM

## 2024-01-30 DIAGNOSIS — O22.30 DVT (DEEP VEIN THROMBOSIS) IN PREGNANCY: ICD-10-CM

## 2024-01-30 DIAGNOSIS — Z36.82 ENCOUNTER FOR NUCHAL TRANSLUCENCY TESTING: ICD-10-CM

## 2024-01-30 DIAGNOSIS — Z17.0 MALIGNANT NEOPLASM OF OVERLAPPING SITES OF LEFT BREAST IN FEMALE, ESTROGEN RECEPTOR POSITIVE: Primary | ICD-10-CM

## 2024-01-30 DIAGNOSIS — C50.812 MALIGNANT NEOPLASM OF OVERLAPPING SITES OF LEFT BREAST IN FEMALE, ESTROGEN RECEPTOR POSITIVE: ICD-10-CM

## 2024-01-30 DIAGNOSIS — Z3A.12 12 WEEKS GESTATION OF PREGNANCY: ICD-10-CM

## 2024-01-30 DIAGNOSIS — O09.521 MULTIGRAVIDA OF ADVANCED MATERNAL AGE IN FIRST TRIMESTER: Primary | ICD-10-CM

## 2024-01-30 DIAGNOSIS — Z17.0 MALIGNANT NEOPLASM OF OVERLAPPING SITES OF LEFT BREAST IN FEMALE, ESTROGEN RECEPTOR POSITIVE: ICD-10-CM

## 2024-01-30 LAB
ALBUMIN SERPL BCP-MCNC: 3.9 G/DL (ref 3.5–5)
ALP SERPL-CCNC: 95 U/L (ref 34–104)
ALT SERPL W P-5'-P-CCNC: 33 U/L (ref 7–52)
ANION GAP SERPL CALCULATED.3IONS-SCNC: 11 MMOL/L
AST SERPL W P-5'-P-CCNC: 17 U/L (ref 13–39)
BASOPHILS # BLD AUTO: 0.02 THOUSANDS/ÂΜL (ref 0–0.1)
BASOPHILS NFR BLD AUTO: 0 % (ref 0–1)
BILIRUB SERPL-MCNC: 0.28 MG/DL (ref 0.2–1)
BUN SERPL-MCNC: 7 MG/DL (ref 5–25)
CALCIUM SERPL-MCNC: 8.9 MG/DL (ref 8.4–10.2)
CHLORIDE SERPL-SCNC: 101 MMOL/L (ref 96–108)
CITATION REF LAB TEST: NORMAL
CLINICAL INFO: NORMAL
CO2 SERPL-SCNC: 22 MMOL/L (ref 21–32)
CREAT SERPL-MCNC: 0.65 MG/DL (ref 0.6–1.3)
EOSINOPHIL # BLD AUTO: 0.09 THOUSAND/ÂΜL (ref 0–0.61)
EOSINOPHIL NFR BLD AUTO: 2 % (ref 0–6)
ERYTHROCYTE [DISTWIDTH] IN BLOOD BY AUTOMATED COUNT: 14.4 % (ref 11.6–15.1)
ETHNIC BACKGROUND STATED: NORMAL
GENE DIS ANL CARRIER INTERP-IMP: NORMAL
GENE MUT TESTED BLD/T: NORMAL
GFR SERPL CREATININE-BSD FRML MDRD: 115 ML/MIN/1.73SQ M
GLUCOSE SERPL-MCNC: 72 MG/DL (ref 65–140)
HCT VFR BLD AUTO: 37.5 % (ref 34.8–46.1)
HGB BLD-MCNC: 11.6 G/DL (ref 11.5–15.4)
IMM GRANULOCYTES # BLD AUTO: 0.02 THOUSAND/UL (ref 0–0.2)
IMM GRANULOCYTES NFR BLD AUTO: 0 % (ref 0–2)
LAB DIRECTOR NAME PROVIDER: NORMAL
LYMPHOCYTES # BLD AUTO: 1.19 THOUSANDS/ÂΜL (ref 0.6–4.47)
LYMPHOCYTES NFR BLD AUTO: 22 % (ref 14–44)
MCH RBC QN AUTO: 26.5 PG (ref 26.8–34.3)
MCHC RBC AUTO-ENTMCNC: 30.9 G/DL (ref 31.4–37.4)
MCV RBC AUTO: 86 FL (ref 82–98)
MONOCYTES # BLD AUTO: 0.31 THOUSAND/ÂΜL (ref 0.17–1.22)
MONOCYTES NFR BLD AUTO: 6 % (ref 4–12)
NEUTROPHILS # BLD AUTO: 3.88 THOUSANDS/ÂΜL (ref 1.85–7.62)
NEUTS SEG NFR BLD AUTO: 70 % (ref 43–75)
NRBC BLD AUTO-RTO: 0 /100 WBCS
PLATELET # BLD AUTO: 183 THOUSANDS/UL (ref 149–390)
PMV BLD AUTO: 13.2 FL (ref 8.9–12.7)
POTASSIUM SERPL-SCNC: 3.3 MMOL/L (ref 3.5–5.3)
PROT SERPL-MCNC: 7.3 G/DL (ref 6.4–8.4)
RBC # BLD AUTO: 4.37 MILLION/UL (ref 3.81–5.12)
REASON FOR REFERRAL (NARRATIVE): NORMAL
RECOMMENDATION PATIENT DOC-IMP: NORMAL
REF LAB TEST METHOD: NORMAL
SERVICE CMNT-IMP: NORMAL
SMN1 GENE MUT ANL BLD/T: NORMAL
SODIUM SERPL-SCNC: 134 MMOL/L (ref 135–147)
SPECIMEN SOURCE: NORMAL
WBC # BLD AUTO: 5.51 THOUSAND/UL (ref 4.31–10.16)

## 2024-01-30 PROCEDURE — 76801 OB US < 14 WKS SINGLE FETUS: CPT | Performed by: OBSTETRICS & GYNECOLOGY

## 2024-01-30 PROCEDURE — 99214 OFFICE O/P EST MOD 30 MIN: CPT | Performed by: OBSTETRICS & GYNECOLOGY

## 2024-01-30 PROCEDURE — 76813 OB US NUCHAL MEAS 1 GEST: CPT | Performed by: OBSTETRICS & GYNECOLOGY

## 2024-01-30 PROCEDURE — 36415 COLL VENOUS BLD VENIPUNCTURE: CPT

## 2024-01-30 PROCEDURE — 80053 COMPREHEN METABOLIC PANEL: CPT

## 2024-01-30 PROCEDURE — 85025 COMPLETE CBC W/AUTO DIFF WBC: CPT

## 2024-01-30 PROCEDURE — 36415 COLL VENOUS BLD VENIPUNCTURE: CPT | Performed by: OBSTETRICS & GYNECOLOGY

## 2024-01-30 RX ORDER — ENOXAPARIN SODIUM 150 MG/ML
1 INJECTION SUBCUTANEOUS EVERY 12 HOURS
Qty: 180 ML | Refills: 0 | Status: SHIPPED | OUTPATIENT
Start: 2024-01-30 | End: 2024-04-29

## 2024-01-30 NOTE — PROGRESS NOTES
"Patient chose to have [employer] Non-invasive Prenatal Screen without fetal sex.   Patient choose billed thru insurance. .   Patient assistance program (PAP) application provided to patient no.  PAP sent with specimen No.     Patient given brochure and is aware [employer] will contact their insurance and coordinate coverage.  Patient made aware she will receive a text message and e-mail from [employer].   Patient informed text/phone call message will come from area code  \"415\".  Provided [employer] Client Services # [phone] and web site at [email or web address].   \"[place] your test online\" card with barcode and test tube ID provided to patient.   Reviewed [employer]'s web site states 5-7 business days for results via their portal.   [employer] message will be sent to patient when [facility] receives results /provider reviews.    2 vials of blood drawn from  right arm by Shameka Villela   Patient tolerated blood draw without difficulty.  Specimens labeled with patient identifiers (name, date of birth, specimen collection date), order and specimen were verified with patient, packed and sent via [employer] lab .  FED EX  tracking #  [id number]  Copy of lab order scanned to Epic media.    Maternal Fetal Medicine will have results in approximately 7-10 business days and will call patient or notify via [employer].  Patient aware viewing lab result online will reveal fetal sex if ordered.    Patient verbalized understanding of all instructions and no questions at this time.   "

## 2024-01-30 NOTE — LETTER
"January 30, 2024     Nemo Khalil, DO  [address]  [address]  [facility] [address]    Patient: Jillian Ch   YOB: 1990   Date of Visit: 1/30/2024       Dear Dr. Khalil:    Thank you for referring Jillian Ch to me for evaluation. Below are my notes for this consultation.    If you have questions, please do not hesitate to call me. I look forward to following your patient along with you.         Sincerely,        Rasta Granda MD        CC: No Recipients    Rasta Granda MD  1/30/2024  4:12 PM  Sign when Signing Visit  The patient was seen today for an ultrasound.  Please see ultrasound report (located under Ob Procedures) for additional details.   Thank you very much for allowing us to participate in the care of this very nice patient.  Should you have any questions, please do not hesitate to contact me.     Rasta Granda MD FACOG  Attending Physician, Maternal-Fetal Medicine  [facility]    Lourdes Li  1/30/2024  3:55 PM  Sign when Signing Visit  Patient chose to have [facility] Non-invasive Prenatal Screen without fetal sex.   Patient choose billed thru insurance. .   Patient assistance program (PAP) application provided to patient no.  PAP sent with specimen No.     Patient given brochure and is aware [email or web address] will contact their insurance and coordinate coverage.  Patient made aware she will receive a text message and e-mail from [employer].   Patient informed text/phone call message will come from area code  \"415\".  Provided [employer] Client Services # [phone] and web site at [email or web address].   \"[place] your test online\" card with barcode and test tube ID provided to patient.   Reviewed [employer]'s web site states 5-7 business days for results via their portal.   [employer] message will be sent to patient when [facility] receives results /provider reviews.    2 vials of blood drawn from  right arm by Shameka Villela"   Patient tolerated blood draw without difficulty.  Specimens labeled with patient identifiers (name, date of birth, specimen collection date), order and specimen were verified with patient, packed and sent via [employer] lab .  FED EX  tracking #  [id number]  Copy of lab order scanned to Epic media.    Maternal Fetal Medicine will have results in approximately 7-10 business days and will call patient or notify via [employer].  Patient aware viewing lab result online will reveal fetal sex if ordered.    Patient verbalized understanding of all instructions and no questions at this time.

## 2024-01-30 NOTE — PROGRESS NOTES
The patient was seen today for an ultrasound.  Please see ultrasound report (located under Ob Procedures) for additional details.   Thank you very much for allowing us to participate in the care of this very nice patient.  Should you have any questions, please do not hesitate to contact me.     Rasta Granda MD FACOG  Attending Physician, Maternal-Fetal Medicine  [facility]

## 2024-01-31 DIAGNOSIS — C50.812 MALIGNANT NEOPLASM OF OVERLAPPING SITES OF LEFT BREAST IN FEMALE, ESTROGEN RECEPTOR POSITIVE: ICD-10-CM

## 2024-01-31 DIAGNOSIS — Z17.0 MALIGNANT NEOPLASM OF OVERLAPPING SITES OF LEFT BREAST IN FEMALE, ESTROGEN RECEPTOR POSITIVE: ICD-10-CM

## 2024-01-31 RX ORDER — ONDANSETRON HYDROCHLORIDE 8 MG/1
8 TABLET, FILM COATED ORAL EVERY 8 HOURS PRN
Qty: 30 TABLET | Refills: 3 | Status: SHIPPED | OUTPATIENT
Start: 2024-01-31

## 2024-01-31 NOTE — TELEPHONE ENCOUNTER
"Rec'd VM from patient \"Hi Katy, it's Josefina Ch. I did not get any of the Zofran that Doctor Winsome prescribed. I just want to know if like it actually went through or not because I'm like almost out. I think I only have like 1 dose left for two doses left of the ones that I already have. Can you give me a call back and let me know [phone]? Thanks. Hiren.\"      Left VM for patient to let her know that we will send in another script for zonfran now to her pharmacy. I left my direct teams number for her to callback if needed,  "

## 2024-02-01 ENCOUNTER — HOME CARE VISIT (OUTPATIENT)
Dept: HOME HEALTH SERVICES | Facility: HOME HEALTHCARE | Age: 34
End: 2024-02-01
Payer: COMMERCIAL

## 2024-02-01 VITALS
RESPIRATION RATE: 16 BRPM | SYSTOLIC BLOOD PRESSURE: 122 MMHG | DIASTOLIC BLOOD PRESSURE: 80 MMHG | HEART RATE: 68 BPM | OXYGEN SATURATION: 99 % | TEMPERATURE: 98 F

## 2024-02-01 PROCEDURE — G0299 HHS/HOSPICE OF RN EA 15 MIN: HCPCS

## 2024-02-02 ENCOUNTER — NUTRITION (OUTPATIENT)
Dept: NUTRITION | Facility: CLINIC | Age: 34
End: 2024-02-02

## 2024-02-02 ENCOUNTER — HOSPITAL ENCOUNTER (OUTPATIENT)
Dept: INFUSION CENTER | Facility: CLINIC | Age: 34
End: 2024-02-02
Payer: COMMERCIAL

## 2024-02-02 DIAGNOSIS — E86.0 DEHYDRATION: Primary | ICD-10-CM

## 2024-02-02 DIAGNOSIS — Z71.3 NUTRITIONAL COUNSELING: Primary | ICD-10-CM

## 2024-02-02 LAB
F5 GENE MUT ANL BLD/T: NORMAL
Lab: NORMAL

## 2024-02-02 PROCEDURE — 96360 HYDRATION IV INFUSION INIT: CPT

## 2024-02-02 RX ORDER — CEFAZOLIN SODIUM 1 G/50ML
1000 SOLUTION INTRAVENOUS ONCE
Status: CANCELLED | OUTPATIENT
Start: 2024-02-02 | End: 2024-02-02

## 2024-02-02 RX ORDER — SODIUM CHLORIDE 9 MG/ML
30 INJECTION, SOLUTION INTRAVENOUS CONTINUOUS
Status: CANCELLED | OUTPATIENT
Start: 2024-02-02

## 2024-02-02 RX ADMIN — SODIUM CHLORIDE 1000 ML: 0.9 INJECTION, SOLUTION INTRAVENOUS at 10:57

## 2024-02-02 NOTE — PROGRESS NOTES
Outpatient Oncology Nutrition Consultation   Type of Consult: Follow Up  Care Location: Telephone Call    Reason for referral: Notification from RN Navigator Kamla HAYWOOD on 12/7/23 that pt has triggered for oncology nutrition care (reason for referral: Patient is pregnant and newly diagnosed with breast cancer. Had questions regarding nutrition and sugar intake, etc).     Nutrition Assessment:   Oncology Diagnosis & Treatments:   Diagnosed with left beast cancer, ER positive, 12/2/23.   S/p left mastectomy 1/2/24.   Planned for chemotherapy (doxorubicin, cytoxan, emend) start 2/12/24.   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 Breast, Left, US Guided Left Breast Biopsy 12:00 6cmfn:  Invasive breast carcinoma of no special type (ductal) with osteoclast-like stromal giant cells  Grade 2  ER 85  CT 95  HER2 1+     B. Breast, Right, US Guided Right Breast Biopsy 10:00 9cmfn:  Benign breast tissue.    In review of the patient’s recent imaging, the left malignancy appears unifocal.  The biopsy-proven carcinoma measured 2.1 cm on ultrasound. Ultrasound of the left axilla was performed on 11/24/2023 and showed no suspicious adenopathy.  Recent imaging of the contralateral right breast dated 12/2/2023 and 11/24/2023 was reviewed and shows no suspicious findings.  The pathology results for the ultrasound-guided core needle biopsy (right breast 10:00, 9 cm from the nipple) are benign and concordant with imaging.     12/2/2023 Genetic Testing    [facility]  A total of  36 genes were evaluated, including: APC, TOPHER, BARD 1, BMPR1A, BRCA1, BRCA2, BRIP1, CDH1, CDK4, CKDN2A, CHEK2, DICER1, MLH1, MSH2, MSH6, MUTYH, NBN, NF1, NTHL1, PALB2, PMS2, PTEN, RAD51C, RECQL, SMAD4, SMARCA4, STK11, TP53, AXIN2, HOXB13, MSH3, POLD1, AND POLE, EPCAM, AND GREM1   Negative result. No pathogenic sequence variants or deletions/duplications identified       12/2/2023 -  Cancer Staged    Staging form: Breast, AJCC 8th Edition  - Clinical stage from 12/2/2023: Stage IB (cT2, cN0, cM0, G2, ER+, KS+, HER2-) - Signed by Elena Cornell MD on 12/13/2023  Stage prefix: Initial diagnosis  Histologic grading system: 3 grade system       1/2/2024 Surgery    Left breast mastectomy with sentinel lymph node biopsy  Invasive Mammary carcinoma of no special type (ductal)   Grade 2  25 mm  Margins negative  0/2 Lymph nodes  Anatomic stage IIA  Prognostic stage IA      2/12/2024 -  Chemotherapy    DOXOrubicin (ADRIAMYCIN), 56.25 mg/m2 = 106 mg (93.8 % of original dose 60 mg/m2), Intravenous, Once, 0 of 4 cycles  Dose modification: 56.25 mg/m2 (original dose 60 mg/m2, Cycle 1, Reason: Other (Must fill in a comment), Comment: max recommended dose)  alteplase (CATHFLO), 2 mg, Intracatheter, Every 1 Minute as needed, 0 of 4 cycles  cyclophosphamide (CYTOXAN) IVPB, 600 mg/m2 = 1,128 mg, Intravenous, Once, 0 of 4 cycles  fosaprepitant (EMEND) IVPB, 150 mg, Intravenous, Once, 0 of 4 cycles       Past Medical & Surgical Hx:   Patient Active Problem List   Diagnosis    Mild persistent asthma without complication    Axillary hidradenitis suppurativa    Anxiety    Chest wall pain    Gastroesophageal reflux disease without esophagitis    Depression with anxiety    Chronic fatigue    Myalgia    Chronic left shoulder pain    Cervical disc disorder at C5-C6 level with radiculopathy    Atypical squamous cells of undetermined significance (ASCUS) on Papanicolaou smear of cervix    Hypertrophic and atrophic condition of skin     Migraine headache    Shoulder impingement syndrome, left    Vitamin D deficiency    Close exposure to COVID-19 virus    Thoracic outlet syndrome    Palpitations    Post-operative pain    Transaminitis    Right middle lobe pulmonary nodule    Reversible airway obstruction    SOB (shortness of breath)    Musculoskeletal chest pain    COVID-19 virus infection    Unexplained weight gain    Left ovarian cyst    Dysphagia    Malignant neoplasm of overlapping sites of left breast in female, estrogen receptor positive     Cancer complicating pregnancy in first trimester    12 weeks gestation of pregnancy    Status post left mastectomy    Multigravida of advanced maternal age in first trimester    DVT complicating pregnancy, first trimester    Vaginal bleeding affecting early pregnancy    Dehydration     Past Medical History:   Diagnosis Date    Anesthesia complication     gait dysfunction/ urinary retention after nerve block,    Anxiety     Asthma     CRPS (complex regional pain syndrome), upper limb     left chest/arm/hand    Deep vein thrombosis (HCC) 01/05/2024    post op from mastectomy    GERD (gastroesophageal reflux disease)     Heart murmur     HPV (human papilloma virus) infection 2012    unsure of 16, 18, or other    Invasive ductal carcinoma of breast, female, left (HCC) 2023    Kidney stone     Miscarriage 3/15/2015    7 weeks along.    Neck pain     Ovarian cyst     Left    PONV (postoperative nausea and vomiting) 01/02/2024     Past Surgical History:   Procedure Laterality Date    COLPOSCOPY  2012    HPV    EGD      IR UPPER EXTREMITY VENOGRAM- DIAGNOSTIC  05/28/2021    RI BX/EXC LYMPH NODE OPEN SUPERFICIAL Left 01/02/2024    Procedure: LEFT BREAST MASTECTOMY, LYMPHATIC MAPPING WITH RADIOACTIVE DYE, SENTINEL LYMPH NODE BIOPSY, ZAHEER LEFT BREAST, INJECTION IN OR AT 0800 BY DR CORNELL;  Surgeon: Elena Cornell MD;  Location: [facility];  Service: Surgical Oncology    RI EXCISION 1ST &/CERVICAL RIB Left  08/12/2021    Procedure: FIRST RIB RESECTION;  Surgeon: Jonathan Schaffer MD;  Location: [facility];  Service: Thoracic    NY INJ RADIOACTIVE TRACER FOR ID OF SENTINEL NODE Left 01/02/2024    Procedure: LEFT BREAST MASTECTOMY, LYMPHATIC MAPPING WITH RADIOACTIVE DYE, SENTINEL LYMPH NODE BIOPSY, ZAHEER LEFT BREAST, INJECTION IN OR AT 0800 BY DR CORNELL;  Surgeon: Elena Cornell MD;  [facility];  Service: Surgical Oncology    NY INTRAOP SENTINEL LYMPH NODE ID W/DYE INJECTION Left 01/02/2024    Procedure: LEFT BREAST MASTECTOMY, LYMPHATIC MAPPING WITH RADIOACTIVE DYE, SENTINEL LYMPH NODE BIOPSY, ZAHEER LEFT BREAST, INJECTION IN OR AT 0800 BY DR CORNELL;  Surgeon: Elena Cornell MD;  [facility];  Service: Surgical Oncology    NY MASTECTOMY SIMPLE COMPLETE Left 01/02/2024    Procedure: LEFT BREAST MASTECTOMY, LYMPHATIC MAPPING WITH RADIOACTIVE DYE, SENTINEL LYMPH NODE BIOPSY, ZAHEER LEFT BREAST, INJECTION IN OR AT 0800 BY DR CORNELL;  Surgeon: Elena Cornell MD;  [facility];  Service: Surgical Oncology    THORACOSCOPY VIDEO ASSISTED SURGERY (VATS) Left 08/12/2021    Procedure: THORACOSCOPY VIDEO ASSISTED SURGERY (VATS);  Surgeon: Jonathan Schaffer MD;  [facility];  Service: Thoracic    US GUIDANCE BREAST BIOPSY LEFT EACH ADDITIONAL Left 12/02/2023    US GUIDED BREAST BIOPSY RIGHT COMPLETE Right 12/02/2023    WISDOM TOOTH EXTRACTION  2012       Review of Medications:   Vitamins, Supplements and Herbals: Med List Reviewed & pt is only taking: Prenatal MVI    Current Outpatient Medications:     albuterol (PROVENTIL HFA,VENTOLIN HFA) 90 mcg/act inhaler, TAKE 2 PUFFS BY MOUTH EVERY 6 HOURS AS NEEDED FOR WHEEZE OR FOR SHORTNESS OF BREATH (Patient not taking: Reported on 1/30/2024), Disp: 18 g, Rfl: 3    cetirizine (ZyrTEC) 10 mg tablet, TAKE 1 TABLET BY MOUTH EVERY DAY, Disp: 90 tablet, Rfl: 0    enoxaparin (LOVENOX) 150 mg/mL injection, Inject 0.5 mL (75 mg total)  "under the skin every 12 (twelve) hours, Disp: 180 mL, Rfl: 0    lidocaine-prilocaine (EMLA) cream, Apply to port 30 to 60 min prior to use (Patient not taking: Reported on 2024), Disp: 30 g, Rfl: 2    omeprazole (PriLOSEC) 40 MG capsule, TAKE 1 CAPSULE (40 MG TOTAL) BY MOUTH DAILY. (Patient not taking: Reported on 2024), Disp: 90 capsule, Rfl: 0    ondansetron (ZOFRAN) 8 mg tablet, Take 1 tablet (8 mg total) by mouth every 8 (eight) hours as needed for nausea or vomiting, Disp: 30 tablet, Rfl: 3    Prenatal Multivit-Min-Fe-FA (PRE- PO), Take 1 tablet by mouth in the morning, Disp: , Rfl:   No current facility-administered medications for this visit.    Facility-Administered Medications Ordered in Other Visits:     alteplase (CATHFLO) injection 2 mg, 2 mg, Intracatheter, Q1MIN PRN, Nemo Agostino, DO    ondansetron (ZOFRAN) 8 mg in sodium chloride 0.9 % 50 mL IVPB, 8 mg, Intravenous, Once, Nemo Agostino, DO    sodium chloride 0.9 % bolus 1,000 mL, 1,000 mL, Intravenous, Once, Nemo Agostino, DO, Last Rate: 1,000 mL/hr at 24 1057, 1,000 mL at 24 1057    Most Recent Lab Results:   Lab Results   Component Value Date    WBC 5.51 2024    NEUTROABS 3.88 2024    IRON 83 2020    CHOLESTEROL 127 2023    TRIG 56 2023    HDL 43 (L) 2023    LDLCALC 73 2023    ALT 33 2024    AST 17 2024    ALB 3.9 2024    SODIUM 134 (L) 2024    SODIUM 134 (L) 2024    K 3.3 (L) 2024    K 4.2 2024     2024    BUN 7 2024    BUN 6 2024    CREATININE 0.65 2024    CREATININE 0.62 2024    EGFR 115 2024    GLUF 79 2023    GLUF 100 (H) 2023    GLUC 72 2024    HGBA1C 5.4 2023    HGBA1C 5.1 2023    CALCIUM 8.9 2024    MG 2.3 10/19/2019       Anthropometric Measurements:   Height: 67\"  Ht Readings from Last 1 Encounters:   24 5' 7\" (1.702 m)     Wt Readings from " Last 25 Encounters:   01/30/24 77.5 kg (170 lb 12.8 oz)   01/24/24 76.7 kg (169 lb)   01/23/24 76.7 kg (169 lb)   01/18/24 77.6 kg (171 lb)   01/17/24 77.8 kg (171 lb 8 oz)   01/11/24 77.6 kg (171 lb)   01/11/24 76.9 kg (169 lb 9.6 oz)   01/09/24 78 kg (172 lb)   01/02/24 78.2 kg (172 lb 6.4 oz)   12/29/23 78.8 kg (173 lb 12.8 oz)   12/27/23 78.8 kg (173 lb 12.8 oz)   12/21/23 81.1 kg (178 lb 12.8 oz)   12/20/23 80.3 kg (177 lb)   12/15/23 81.6 kg (180 lb)   12/14/23 81.3 kg (179 lb 3.2 oz)   12/13/23 80.3 kg (177 lb)   12/06/23 81.6 kg (180 lb)   11/24/23 82.1 kg (181 lb)   10/19/23 82.1 kg (181 lb)   10/13/23 85.3 kg (188 lb)   09/26/23 85.3 kg (188 lb)   08/22/23 80.7 kg (178 lb)   06/08/23 78.9 kg (174 lb)   04/04/23 77.3 kg (170 lb 6.4 oz)   02/21/23 78.7 kg (173 lb 6.4 oz)     Weight History:   Usual Weight: 130-145#  Varian: n/a  Home Scale: none    Oncology Nutrition-Anthropometrics      Flowsheet Row Nutrition from 2/2/2024 in [facility] [place] Nutrition from 1/19/2024 in [facility] [place]   Patient age (years): 33 years 33 years   Patient (female) height (in): 67 in 67 in   Current Weight to be used for anthropometric calculations (lbs) 170.8 lbs 171 lbs   Current Weight to be used for anthropometric calculations (kg) 77.6 kg 77.7 kg   BMI: 26.7 26.8   IBW female: 135 lbs 135 lbs   IBW (kg) female: 61.4 kg 61.4 kg   IBW % (female) 126.5 % 126.7 %   Adjusted BW (female): 144 lbs 144 lbs   Adjusted BW kg (female): 65.5 kg 65.5 kg   % weight change after 1 week: 1.1 % 0 %   Weight change after 1 week (lbs) 1.8 lbs 0 lbs   % weight change after 1 month: -1.7 % -3.4 %   Weight change after 1 month (lbs) -3 lbs -6 lbs   % weight change after 3 months: -5.6 % -5.5 %   Weight change after 3 months (lbs) -10.2 lbs -10 lbs   % weight change after 6 months: -4 % --   Weight change after 6 months (lbs) -7.2 lbs --   % weight change after 1 year: -- 3.6 %   Weight change  after 1 year (lbs) -- 6 lbs            Nutrition-Focused Physical Findings: n/a due to telephone call    Food/Nutrition-Related History & Client/Social History:    Current Nutrition Impact Symptoms:  [x] Nausea   -using zofran  [x] Reduced Appetite  [] Acid Reflux    [x] Vomiting  [x] Unintended Wt Loss   -about 10# in past 3 months  [] Malabsorption    [] Diarrhea  [] Unintended Wt Gain -over the past two years  [] Dumping Syndrome    [] Constipation  [] Thick Mucous/Secretions  [] Abdominal Pain    [] Dysgeusia (Altered Taste)  [] Xerostomia (Dry Mouth)  [] Gas    [] Dysosmia (Altered Smell)  [] Thrush  [] Difficulty Chewing    [] Oral Mucositis (Sore Mouth)  [] Fatigue  [] Hyperglycemia   Lab Results   Component Value Date    HGBA1C 5.4 12/02/2023      [] Odynophagia  [] Esophagitis  [] Other:    [] Dysphagia  [] Early Satiety  [] No Problems Eating      Food Allergies & Intolerances: yes: lactose intolerant     Current Diet: Lactose-Free and No red meat   Current Nutrition Intake: Unchanged from last visit  Appetite: Poor  Nutrition Route: PO  Oral Care: brushes QD  Activity level: Dizzy often in the morning. Degenerative disc disease limits physical activity.     24 Hr Diet Recall:   Breakfast: egg and croissant sandwich   Dinner: mashed potatoes     Beverages: gatorade +water with lemon (32oz x1); IV hydration weekly  Supplements:   Homemade Smoothies/Shakes ; plant based protein powder     Oncology Nutrition-Estimated Needs      Flowsheet Row [facility] from 1/19/2024 [facility]   Weight type used Actual weight Actual weight   Weight in kilograms (kg) used for estimated needs -- 77.7 kg   Energy needs formula:  Other (single) 30-35 kcal/kg   Single value (kcal/kg): 2648  [Estimated Energy Requirements + 340kcal for 2nd trimester pregnancy] --   Energy needs based on 30 kcal/kg: -- 2332 kcal   Energy needs  based on 35 kcal/kg: -- 2720 kcal   Protein needs formula: 1.2-1.5 g/kg 1.2-1.5 g/kg   Protein needs based on 1.2 g/k g 93 g   Protein needs based on 1.5 g/kg 116 g 117 g   Fluid needs formula: 35-40 mL/kg 35-40 mL/kg   Fluid needs based on 35 mL/kg 2720 mL 2720 mL   Fluid needs in ounces 92 oz 92 oz   Fluid needs based on 40 mL/kg 3108 mL 3108 mL   Fluid needs in ounces 105 oz 105 oz          **Estimated nutrition needs are based on comparative standards for 2nd trimester pregnancy.     Discussion & Intervention:   Jillian was evaluated today for an RD follow up regarding poor po intake and pt request for diet guidance throughout treatment .  Jillian  is planned to undergo treatment for breast cancer , she recently underwent mastectomy in January and is planned for chemotherapy to start . She continues to struggle with nausea, and is taking zofran which is helping. Her oral intakes continue to be decreased, she is tolerating bland foods like mashed potatoes. Her protein sources are limited, she is tolerating eggs and a plant based protein powder.   Based on today's assessment, discussion included: MNT for: breast cancer, nausea/vomiting, weight management, a bland/easy on the stomach diet & food choices to include at all meals & snacks, adequate hydration & fluid choices, eating smaller more frequent meals every 2-3 hours (5-6 small meals/day), eating when feeling most hungry, and utilizing oral nutrition supplements.   Moving forward, Jillian was encouraged to increase kcal, protein, and fluid intakes.  Materials Provided: not applicable   All questions and concerns addressed during today’s visit.  Jillian has RD contact information.    Nutrition Diagnosis:   Inadequate Energy Intake related to physiological causes, disease state and treatment related issues as evidenced by food recall, wt loss and discussion with pt and/or family.  Increased Nutrient Needs related to increased demand for nutrients as evidenced  by pt is s/p surgery and planned for chemotherapy.  Monitoring & Evaluation:   Goals:  weight maintenance/stabilization  adequate nutrition impact symptom management  pt to meet >/=75% estimated nutrition needs daily  increase protein intake  increase fluid intake  increase calorie intake    Progress Towards Goals: Progressing    Nutrition Rx & Recommendations:  Diet: High Calorie, High Protein (for high calorie foods see pages 52-53, and for high protein foods see pages 49-51 in your Eating Hints book)  Small, frequent meals/snacks may be easier to tolerate than 3 large daily meals.  Aim for 5-6 small meals per day (every 2-3 hours).  Include protein at all meals/snacks.  Include a variety of foods (as tolerated/allowed by diet).  Follow a Cancer Preventative Nutrition Pattern: colorful, plant-based, low-fat, avoid added sugars, limit alcohol, include high fiber foods, limit processed meats, limit red meat, choose lean protein sources, use low-fat cooking methods, balance calories with physical activity, avoid excessive weight gain throughout life.  Incorporate physical activity as able/allowed.  Stay hydrated by sipping fluids of choice/tolerance throughout the day.  Liquid nutrition may be better tolerated than solids at times.  Alter food choices and eating patterns to accommodate changing needs.  Light physical activity (such as walking) is encouraged, as able/tolerated.  Weigh yourself regularly. If you notice weight loss, make an effort to increase your daily food/calorie intake. If you continue to notice loss after these efforts, reach out to your dietitian to establish a plan to stabilize weight.    For nausea/vomiting, suggestions include:  Eat 5-6 smaller meals throughout the day instead of 3 large meals.   Sip on calorie containing fluids throughout the day: 100% fruit juice, diluted juice, bone broth (higher protein broth), creamy soups, sports drinks (Gatorade, Poweraide, Pedialyte, etc.), Italian ice,  popsicles, milkshakes, smoothies, oral nutrition supplements (Ensure, Boost, Glucerna etc.), gelatin/Jello, etc. (see page 41 of Eating Hints Book for other examples).  Continue gatorade with water  Continue plant based protein drink   Eat bland foods and foods easy on the stomach: clear broth (chicken, vegetable, bone, mushroom, beef), juice (caution with acidic juices), potatoes, pretzels, crackers, cereal (Corn Flakes, Rice Chex, Rice Crispies, Cheerios), oatmeal or cream of wheat, white bread or toast, white rice, cooked vegetables (caution with gas producing vegetables), plain pasta, eggs, peanut butter, boiled chicken or turkey, soft cheeses.  Consume foods and drinks at room temperature. Try to avoid eating/drinking anything too hot or cold.   Suck on tart candies such as lemon drops or ginger chews to help relieve nausea.   Ginger tea or flat ginger ale.   Foods to avoid: Foods with strong odors; High sugar foods such as soda, candies, desserts; Greasy/fried foods; Gas producing foods such as broccoli, cabbage, [place] sprouts, raw fruits/vegetables, beans; Caution with diary products unless they are low lactose or lactose free; Alcohol; Spicy foods; Acidic foods: coffee, tea, citrus, tomato; Avoid eating your favorite foods when you feel nauseous so you don't develop a dislike of those foods.     Follow Up Plan:  2/12/24 during infusion    Recommend Referral to Other Providers: none at this time

## 2024-02-02 NOTE — PROGRESS NOTES
Pt here for hydration infusion, offering no complaints.  Right AC IV placed with positive blood return noted.  Tolerated infusion without incident.  PIV removed.  AVS declined. Next appt  2/9 @ 10 am.  Walked out in stable condition.

## 2024-02-02 NOTE — PATIENT INSTRUCTIONS
Nutrition Rx & Recommendations:  Diet: High Calorie, High Protein (for high calorie foods see pages 52-53, and for high protein foods see pages 49-51 in your Eating Hints book)  Small, frequent meals/snacks may be easier to tolerate than 3 large daily meals.  Aim for 5-6 small meals per day (every 2-3 hours).  Include protein at all meals/snacks.  Include a variety of foods (as tolerated/allowed by diet).  Follow a Cancer Preventative Nutrition Pattern: colorful, plant-based, low-fat, avoid added sugars, limit alcohol, include high fiber foods, limit processed meats, limit red meat, choose lean protein sources, use low-fat cooking methods, balance calories with physical activity, avoid excessive weight gain throughout life.  Incorporate physical activity as able/allowed.  Stay hydrated by sipping fluids of choice/tolerance throughout the day.  Liquid nutrition may be better tolerated than solids at times.  Alter food choices and eating patterns to accommodate changing needs.  Light physical activity (such as walking) is encouraged, as able/tolerated.  Weigh yourself regularly. If you notice weight loss, make an effort to increase your daily food/calorie intake. If you continue to notice loss after these efforts, reach out to your dietitian to establish a plan to stabilize weight.    For nausea/vomiting, suggestions include:  Eat 5-6 smaller meals throughout the day instead of 3 large meals.   Sip on calorie containing fluids throughout the day: 100% fruit juice, diluted juice, bone broth (higher protein broth), creamy soups, sports drinks (Gatorade, Poweraide, Pedialyte, etc.), Italian ice, popsicles, milkshakes, smoothies, oral nutrition supplements (Ensure, Boost, Glucerna etc.), gelatin/Jello, etc. (see page 41 of Eating Hints Book for other examples).  Continue gatorade with water  Continue plant based protein drink   Eat bland foods and foods easy on the stomach: clear broth (chicken, vegetable, bone, mushroom,  beef), juice (caution with acidic juices), potatoes, pretzels, crackers, cereal (Corn Flakes, Rice Chex, Rice Crispies, Cheerios), oatmeal or cream of wheat, white bread or toast, white rice, cooked vegetables (caution with gas producing vegetables), plain pasta, eggs, peanut butter, boiled chicken or turkey, soft cheeses.  Consume foods and drinks at room temperature. Try to avoid eating/drinking anything too hot or cold.   Suck on tart candies such as lemon drops or ginger chews to help relieve nausea.   Ginger tea or flat ginger ale.   Foods to avoid: Foods with strong odors; High sugar foods such as soda, candies, desserts; Greasy/fried foods; Gas producing foods such as broccoli, cabbage, [place] sprouts, raw fruits/vegetables, beans; Caution with diary products unless they are low lactose or lactose free; Alcohol; Spicy foods; Acidic foods: coffee, tea, citrus, tomato; Avoid eating your favorite foods when you feel nauseous so you don't develop a dislike of those foods.     Follow Up Plan: 2/12/24 during infusion   Recommend Referral to Other Providers: none at this time

## 2024-02-05 ENCOUNTER — TELEPHONE (OUTPATIENT)
Dept: SURGICAL ONCOLOGY | Facility: CLINIC | Age: 34
End: 2024-02-05

## 2024-02-07 ENCOUNTER — HOSPITAL ENCOUNTER (OUTPATIENT)
Dept: NON INVASIVE DIAGNOSTICS | Facility: HOSPITAL | Age: 34
Discharge: HOME | End: 2024-02-07
Attending: INTERNAL MEDICINE
Payer: COMMERCIAL

## 2024-02-07 VITALS
HEART RATE: 90 BPM | TEMPERATURE: 98 F | SYSTOLIC BLOOD PRESSURE: 124 MMHG | OXYGEN SATURATION: 98 % | HEIGHT: 67 IN | WEIGHT: 163.14 LBS | RESPIRATION RATE: 18 BRPM | DIASTOLIC BLOOD PRESSURE: 78 MMHG | BODY MASS INDEX: 25.61 KG/M2

## 2024-02-07 DIAGNOSIS — Z17.0 MALIGNANT NEOPLASM OF OVERLAPPING SITES OF LEFT BREAST IN FEMALE, ESTROGEN RECEPTOR POSITIVE: ICD-10-CM

## 2024-02-07 DIAGNOSIS — C50.812 MALIGNANT NEOPLASM OF OVERLAPPING SITES OF LEFT BREAST IN FEMALE, ESTROGEN RECEPTOR POSITIVE: ICD-10-CM

## 2024-02-07 LAB
ALBUMIN SERPL BCP-MCNC: 3.9 G/DL (ref 3.5–5)
ALP SERPL-CCNC: 77 U/L (ref 34–104)
ALT SERPL W P-5'-P-CCNC: 17 U/L (ref 7–52)
ANION GAP SERPL CALCULATED.3IONS-SCNC: 7 MMOL/L
AST SERPL W P-5'-P-CCNC: 13 U/L (ref 13–39)
BILIRUB SERPL-MCNC: 0.22 MG/DL (ref 0.2–1)
BUN SERPL-MCNC: 7 MG/DL (ref 5–25)
CALCIUM SERPL-MCNC: 9.2 MG/DL (ref 8.4–10.2)
CHLORIDE SERPL-SCNC: 105 MMOL/L (ref 96–108)
CO2 SERPL-SCNC: 24 MMOL/L (ref 21–32)
CREAT SERPL-MCNC: 0.68 MG/DL (ref 0.6–1.3)
ERYTHROCYTE [DISTWIDTH] IN BLOOD BY AUTOMATED COUNT: 14.1 % (ref 11.6–15.1)
GFR SERPL CREATININE-BSD FRML MDRD: 113 ML/MIN/1.73SQ M
GLUCOSE P FAST SERPL-MCNC: 87 MG/DL (ref 65–99)
GLUCOSE SERPL-MCNC: 87 MG/DL (ref 65–140)
HCT VFR BLD AUTO: 34.2 % (ref 34.8–46.1)
HGB BLD-MCNC: 11.2 G/DL (ref 11.5–15.4)
MCH RBC QN AUTO: 27.2 PG (ref 26.8–34.3)
MCHC RBC AUTO-ENTMCNC: 32.7 G/DL (ref 31.4–37.4)
MCV RBC AUTO: 83 FL (ref 82–98)
PLATELET # BLD AUTO: 160 THOUSANDS/UL (ref 149–390)
PMV BLD AUTO: 11.4 FL (ref 8.9–12.7)
POTASSIUM SERPL-SCNC: 3.9 MMOL/L (ref 3.5–5.3)
PROT SERPL-MCNC: 7.3 G/DL (ref 6.4–8.4)
RBC # BLD AUTO: 4.12 MILLION/UL (ref 3.81–5.12)
SODIUM SERPL-SCNC: 136 MMOL/L (ref 135–147)
WBC # BLD AUTO: 6.62 THOUSAND/UL (ref 4.31–10.16)

## 2024-02-07 PROCEDURE — 77001 FLUOROGUIDE FOR VEIN DEVICE: CPT

## 2024-02-07 PROCEDURE — 76937 US GUIDE VASCULAR ACCESS: CPT

## 2024-02-07 PROCEDURE — C1894 INTRO/SHEATH, NON-LASER: HCPCS

## 2024-02-07 PROCEDURE — 77001 FLUOROGUIDE FOR VEIN DEVICE: CPT | Performed by: RADIOLOGY

## 2024-02-07 PROCEDURE — 36561 INSERT TUNNELED CV CATH: CPT | Performed by: RADIOLOGY

## 2024-02-07 PROCEDURE — 85027 COMPLETE CBC AUTOMATED: CPT | Performed by: RADIOLOGY

## 2024-02-07 PROCEDURE — 80053 COMPREHEN METABOLIC PANEL: CPT | Performed by: RADIOLOGY

## 2024-02-07 PROCEDURE — 36561 INSERT TUNNELED CV CATH: CPT

## 2024-02-07 PROCEDURE — 76937 US GUIDE VASCULAR ACCESS: CPT | Performed by: RADIOLOGY

## 2024-02-07 PROCEDURE — C1788 PORT, INDWELLING, IMP: HCPCS

## 2024-02-07 RX ORDER — SODIUM CHLORIDE 9 MG/ML
30 INJECTION, SOLUTION INTRAVENOUS CONTINUOUS
Status: DISCONTINUED | OUTPATIENT
Start: 2024-02-07 | End: 2024-02-08 | Stop reason: HOSPADM

## 2024-02-07 RX ORDER — CEFAZOLIN SODIUM 1 G/50ML
1000 SOLUTION INTRAVENOUS ONCE
Status: COMPLETED | OUTPATIENT
Start: 2024-02-07 | End: 2024-02-07

## 2024-02-07 RX ORDER — ACETAMINOPHEN 325 MG/1
650 TABLET ORAL EVERY 4 HOURS PRN
Status: DISCONTINUED | OUTPATIENT
Start: 2024-02-07 | End: 2024-02-08 | Stop reason: HOSPADM

## 2024-02-07 RX ADMIN — CEFAZOLIN SODIUM 1000 MG: 1 SOLUTION INTRAVENOUS at 09:40

## 2024-02-07 RX ADMIN — Medication 20 ML: at 10:16

## 2024-02-07 NOTE — BRIEF OP NOTE (RAD/CATH)
INTERVENTIONAL RADIOLOGY PROCEDURE NOTE    Date: 2/7/2024    Procedure:   Procedure Summary       Date: 02/07/24 Room / Location: [facility]    Anesthesia Start:  Anesthesia Stop:     Procedure: IR PORT PLACEMENT Diagnosis:       Malignant neoplasm of overlapping sites of left breast in female, estrogen receptor positive       (needs chemo)    Scheduled Providers:  Responsible Provider:     Anesthesia Type: Not recorded ASA Status: Not recorded            Preoperative diagnosis:   1. Malignant neoplasm of overlapping sites of left breast in female, estrogen receptor positive          Postoperative diagnosis: Same.    Surgeon: Zeyad Cortes MD     Assistant: None. No qualified resident was available.    Blood loss: 2 mL    Specimens: None     Findings: Successful right chest port placement.    Complications: None immediate.    Anesthesia: local

## 2024-02-07 NOTE — DISCHARGE INSTRUCTIONS
Perma-cath Placement   WHAT YOU NEED TO KNOW:   A perma-cath is a catheter placed through a vein into or near your right atrium. Your right atrium is the right upper chamber of your heart. A perma-cath is used for dialysis in an emergency or until a long-term device is ready to use. After your procedure, you will have some pain and swelling on your chest and neck. You may have some bruises on your chest and neck. You may also have 2 dressings, one on your chest and one on your neck.   DISCHARGE INSTRUCTIONS:   Call 911 for any of the following:   You feel lightheaded, short of breath, and have chest pain.    Your catheter comes out   Contact Interventional Radiology at [phone] ([facility] PATIENTS: Contact Interventional Radiology at [phone]) ([facility] PATIENTS: Contact Interventional Radiology at [phone]) if:  Blood soaks through your bandage.   You have new swelling in your arm, neck, face, or chest on your right side.  Your catheter gets wet.    Your bruises or pain get worse.   You have a fever or chills.  Persistent nausea or vomiting.   Your incision is red, swollen, or draining pus.   You have questions or concerns about your condition or care.  Self-care:       Resume your normal diet.  Keep your dressings dry. Do not take a shower or swim. You may take a tub bath, but do not get your dressings wet. Water in your wound can cause bacteria to grow and cause an infection. If your dressing gets wet, dry it off and cover it with dry sterile gauze. Call your healthcare provider. Do not use soaps or ointments.  Do not change your dressings. Your healthcare provider or dialysis nurse will change your dressings. Your dressings should stay in place until your healthcare provider removes them. The dressing on your chest will stay as long as you have the catheter in place. The dressing prevents infection.    Do not remove the red and blue caps from the end of your catheter. The caps prevent air from getting  into your catheter.  Follow up with your healthcare provider as directed: Write down your questions so you remember to ask them during your visits.

## 2024-02-07 NOTE — H&P
Interventional Radiology  History and Physical 2/7/2024     Jillian Ch   1990   [id number]    Assessment/Plan:  33 year old female with left breast cancer will be undergoing chemotherapy and presents for port placement.    Problem List Items Addressed This Visit          Other    Malignant neoplasm of overlapping sites of left breast in female, estrogen receptor positive     Relevant Orders    IR port placement          Subjective:     Patient ID: Jillian Ch is a 33 y.o. female.    History of Present Illness  Patient with left breast cancer will be undergoing chemotherapy and presents for port placement.    Review of Systems      Past Medical History:   Diagnosis Date    Anesthesia complication     gait dysfunction/ urinary retention after nerve block,    Anxiety     Asthma     CRPS (complex regional pain syndrome), upper limb     left chest/arm/hand    Deep vein thrombosis (HCC) 01/05/2024    post op from mastectomy    GERD (gastroesophageal reflux disease)     Heart murmur     HPV (human papilloma virus) infection 2012    unsure of 16, 18, or other    Invasive ductal carcinoma of breast, female, left (HCC) 2023    Kidney stone     Miscarriage 3/15/2015    7 weeks along.    Neck pain     Ovarian cyst     Left    PONV (postoperative nausea and vomiting) 01/02/2024        Past Surgical History:   Procedure Laterality Date    COLPOSCOPY  2012    HPV    EGD      IR UPPER EXTREMITY VENOGRAM- DIAGNOSTIC  05/28/2021    SD BX/EXC LYMPH NODE OPEN SUPERFICIAL Left 01/02/2024    Procedure: LEFT BREAST MASTECTOMY, LYMPHATIC MAPPING WITH RADIOACTIVE DYE, SENTINEL LYMPH NODE BIOPSY, ZAHEER LEFT BREAST, INJECTION IN OR AT 0800 BY DR CORNELL;  Surgeon: Elena Cornell MD;  Location: [facility];  Service: Surgical Oncology    SD EXCISION 1ST &/CERVICAL RIB Left 08/12/2021    Procedure: FIRST RIB RESECTION;  Surgeon: Jonathan Schaffer MD;  Location: [facility];  Service: Thoracic    SD INJ  RADIOACTIVE TRACER FOR ID OF SENTINEL NODE Left 01/02/2024    Procedure: LEFT BREAST MASTECTOMY, LYMPHATIC MAPPING WITH RADIOACTIVE DYE, SENTINEL LYMPH NODE BIOPSY, ZAHEER LEFT BREAST, INJECTION IN OR AT 0800 BY DR CORNELL;  Surgeon: Elena Cornell MD;  Location: [facility];  Service: Surgical Oncology    NH INTRAOP SENTINEL LYMPH NODE ID W/DYE INJECTION Left 01/02/2024    Procedure: LEFT BREAST MASTECTOMY, LYMPHATIC MAPPING WITH RADIOACTIVE DYE, SENTINEL LYMPH NODE BIOPSY, ZAHEER LEFT BREAST, INJECTION IN OR AT 0800 BY DR CORNELL;  Surgeon: Elena Cornell MD;  Location[facility];  Service: Surgical Oncology    NH MASTECTOMY SIMPLE COMPLETE Left 01/02/2024    Procedure: LEFT BREAST MASTECTOMY, LYMPHATIC MAPPING WITH RADIOACTIVE DYE, SENTINEL LYMPH NODE BIOPSY, ZAHEER LEFT BREAST, INJECTION IN OR AT 0800 BY DR CORNELL;  Surgeon: Elena Cornell MD;  Location: [facility];  Service: Surgical Oncology    THORACOSCOPY VIDEO ASSISTED SURGERY (VATS) Left 08/12/2021    Procedure: THORACOSCOPY VIDEO ASSISTED SURGERY (VATS);  Surgeon: Jonathan Schaffer MD;  Location: [facility];  Service: Thoracic    US GUIDANCE BREAST BIOPSY LEFT EACH ADDITIONAL Left 12/02/2023    US GUIDED BREAST BIOPSY RIGHT COMPLETE Right 12/02/2023    WISDOM TOOTH EXTRACTION  2012        Social History     Tobacco Use   Smoking Status Never   Smokeless Tobacco Never        Social History     Substance and Sexual Activity   Alcohol Use Not Currently    Comment: social        Social History     Substance and Sexual Activity   Drug Use Never        Allergies   Allergen Reactions    Nsaids GI Intolerance    Formic Acid Swelling and Rash     (Severe reactions to Bug bites)    Latex Rash and Blisters       Current Outpatient Medications   Medication Sig Dispense Refill    albuterol (PROVENTIL HFA,VENTOLIN HFA) 90 mcg/act inhaler TAKE 2 PUFFS BY MOUTH EVERY 6 HOURS AS NEEDED FOR WHEEZE OR FOR SHORTNESS OF BREATH (Patient not taking:  "Reported on 2024) 18 g 3    cetirizine (ZyrTEC) 10 mg tablet TAKE 1 TABLET BY MOUTH EVERY DAY 90 tablet 0    enoxaparin (LOVENOX) 150 mg/mL injection Inject 0.5 mL (75 mg total) under the skin every 12 (twelve) hours 180 mL 0    lidocaine-prilocaine (EMLA) cream Apply to port 30 to 60 min prior to use (Patient not taking: Reported on 2024) 30 g 2    omeprazole (PriLOSEC) 40 MG capsule TAKE 1 CAPSULE (40 MG TOTAL) BY MOUTH DAILY. (Patient not taking: Reported on 2024) 90 capsule 0    ondansetron (ZOFRAN) 8 mg tablet Take 1 tablet (8 mg total) by mouth every 8 (eight) hours as needed for nausea or vomiting 30 tablet 3    Prenatal Multivit-Min-Fe-FA (PRE- PO) Take 1 tablet by mouth in the morning       Current Facility-Administered Medications   Medication Dose Route Frequency Provider Last Rate Last Admin    ceFAZolin (ANCEF) IVPB (premix in dextrose) 1,000 mg 50 mL  1,000 mg Intravenous Once Zeyad Cortes MD        sodium chloride 0.9 % infusion  30 mL/hr Intravenous Continuous Zeyad Cortes MD              Objective:    Vitals:    24 0858   BP: 113/69   Pulse: 78   Resp: 20   Temp: (!) 97.3 °F (36.3 °C)   TempSrc: Temporal   SpO2: 100%   Weight: 74 kg (163 lb 2.3 oz)   Height: 5' 7\" (1.702 m)        Physical Exam  Constitutional:       Appearance: Normal appearance.   Cardiovascular:      Rate and Rhythm: Normal rate.   Pulmonary:      Effort: Pulmonary effort is normal.   Skin:     General: Skin is dry.           No results found for: \"BNP\"   Lab Results   Component Value Date    WBC 5.51 2024    HGB 11.6 2024    HCT 37.5 2024    MCV 86 2024     2024     Lab Results   Component Value Date    INR 0.96 2024    INR 1.16 2021    PROTIME 13.4 2024    PROTIME 14.8 (H) 2021     Lab Results   Component Value Date    PTT 29 2021         I have personally reviewed pertinent imaging and laboratory results.     Code Status: Prior  Advance " Directive and Living Will:      Power of :    POLST:      This text is generated with voice recognition software. There may be translation, syntax,  or grammatical errors. If you have any questions, please contact the dictating provider.

## 2024-02-08 ENCOUNTER — HOSPITAL ENCOUNTER (OUTPATIENT)
Dept: NON INVASIVE DIAGNOSTICS | Facility: MEDICAL CENTER | Age: 34
Discharge: HOME | End: 2024-02-08
Payer: COMMERCIAL

## 2024-02-08 VITALS
SYSTOLIC BLOOD PRESSURE: 124 MMHG | HEIGHT: 67 IN | HEART RATE: 90 BPM | WEIGHT: 163 LBS | BODY MASS INDEX: 25.58 KG/M2 | DIASTOLIC BLOOD PRESSURE: 78 MMHG

## 2024-02-08 DIAGNOSIS — Z51.81 THERAPEUTIC DRUG MONITORING: ICD-10-CM

## 2024-02-08 PROBLEM — Z3A.13 13 WEEKS GESTATION OF PREGNANCY: Status: ACTIVE | Noted: 2023-12-29

## 2024-02-08 LAB
AORTIC ROOT: 2.9 CM
APICAL FOUR CHAMBER EJECTION FRACTION: 54 %
BSA FOR ECHO PROCEDURE: 1.85 M2
E WAVE DECELERATION TIME: 164 MS
E/A RATIO: 1.36
FRACTIONAL SHORTENING: 32 (ref 28–44)
GLOBAL LONGITUIDAL STRAIN: -20 %
INTERVENTRICULAR SEPTUM IN DIASTOLE (PARASTERNAL SHORT AXIS VIEW): 0.6 CM
INTERVENTRICULAR SEPTUM: 0.6 CM (ref 0.6–1.1)
LAAS-AP2: 13.5 CM2
LAAS-AP4: 11.9 CM2
LEFT ATRIUM SIZE: 2.7 CM
LEFT ATRIUM VOLUME (MOD BIPLANE): 29 ML
LEFT ATRIUM VOLUME INDEX (MOD BIPLANE): 15.7 ML/M2
LEFT INTERNAL DIMENSION IN SYSTOLE: 3.2 CM (ref 2.1–4)
LEFT VENTRICULAR INTERNAL DIMENSION IN DIASTOLE: 4.7 CM (ref 3.5–6)
LEFT VENTRICULAR POSTERIOR WALL IN END DIASTOLE: 0.6 CM
LEFT VENTRICULAR STROKE VOLUME: 63 ML
LVSV (TEICH): 63 ML
MV E'TISSUE VEL-LAT: 22 CM/S
MV E'TISSUE VEL-SEP: 13 CM/S
MV PEAK A VEL: 0.47 M/S
MV PEAK E VEL: 64 CM/S
MV STENOSIS PRESSURE HALF TIME: 48 MS
MV VALVE AREA P 1/2 METHOD: 4.58
RIGHT ATRIUM AREA SYSTOLE A4C: 15.6 CM2
RIGHT VENTRICLE ID DIMENSION: 3.4 CM
SL CV LEFT ATRIUM LENGTH A2C: 4.4 CM
SL CV LV EF: 60
SL CV PED ECHO LEFT VENTRICLE DIASTOLIC VOLUME (MOD BIPLANE) 2D: 103 ML
SL CV PED ECHO LEFT VENTRICLE SYSTOLIC VOLUME (MOD BIPLANE) 2D: 40 ML
TR MAX PG: 11 MMHG
TR PEAK VELOCITY: 1.6 M/S
TRICUSPID ANNULAR PLANE SYSTOLIC EXCURSION: 2.3 CM
TRICUSPID VALVE PEAK REGURGITATION VELOCITY: 1.64 M/S

## 2024-02-08 PROCEDURE — 93306 TTE W/DOPPLER COMPLETE: CPT

## 2024-02-08 PROCEDURE — 93356 MYOCRD STRAIN IMG SPCKL TRCK: CPT | Performed by: INTERNAL MEDICINE

## 2024-02-08 PROCEDURE — 93356 MYOCRD STRAIN IMG SPCKL TRCK: CPT

## 2024-02-08 PROCEDURE — 93306 TTE W/DOPPLER COMPLETE: CPT | Performed by: INTERNAL MEDICINE

## 2024-02-09 ENCOUNTER — HOSPITAL ENCOUNTER (OUTPATIENT)
Dept: INFUSION CENTER | Facility: CLINIC | Age: 34
End: 2024-02-09
Payer: COMMERCIAL

## 2024-02-09 ENCOUNTER — HOSPITAL ENCOUNTER (OUTPATIENT)
Dept: NON INVASIVE DIAGNOSTICS | Facility: HOSPITAL | Age: 34
Discharge: HOME | End: 2024-02-09
Attending: INTERNAL MEDICINE

## 2024-02-09 ENCOUNTER — TELEPHONE (OUTPATIENT)
Dept: HEMATOLOGY ONCOLOGY | Facility: CLINIC | Age: 34
End: 2024-02-09

## 2024-02-09 ENCOUNTER — ROUTINE PRENATAL (OUTPATIENT)
Dept: OBGYN CLINIC | Facility: MEDICAL CENTER | Age: 34
End: 2024-02-09
Payer: COMMERCIAL

## 2024-02-09 VITALS
SYSTOLIC BLOOD PRESSURE: 122 MMHG | DIASTOLIC BLOOD PRESSURE: 66 MMHG | HEART RATE: 83 BPM | RESPIRATION RATE: 18 BRPM | TEMPERATURE: 99 F

## 2024-02-09 VITALS
WEIGHT: 170 LBS | DIASTOLIC BLOOD PRESSURE: 74 MMHG | HEIGHT: 67 IN | HEART RATE: 82 BPM | SYSTOLIC BLOOD PRESSURE: 130 MMHG | BODY MASS INDEX: 26.68 KG/M2

## 2024-02-09 DIAGNOSIS — K13.79 MOUTH SORES: Primary | ICD-10-CM

## 2024-02-09 DIAGNOSIS — G89.18 POST-OPERATIVE PAIN: Primary | ICD-10-CM

## 2024-02-09 DIAGNOSIS — C50.812 MALIGNANT NEOPLASM OF OVERLAPPING SITES OF LEFT FEMALE BREAST, UNSPECIFIED ESTROGEN RECEPTOR STATUS (HCC): ICD-10-CM

## 2024-02-09 DIAGNOSIS — Z17.0 MALIGNANT NEOPLASM OF OVERLAPPING SITES OF LEFT BREAST IN FEMALE, ESTROGEN RECEPTOR POSITIVE: Primary | ICD-10-CM

## 2024-02-09 DIAGNOSIS — E86.0 DEHYDRATION: Primary | ICD-10-CM

## 2024-02-09 DIAGNOSIS — C50.812 MALIGNANT NEOPLASM OF OVERLAPPING SITES OF LEFT BREAST IN FEMALE, ESTROGEN RECEPTOR POSITIVE: Primary | ICD-10-CM

## 2024-02-09 DIAGNOSIS — Z17.0 MALIGNANT NEOPLASM OF OVERLAPPING SITES OF LEFT BREAST IN FEMALE, ESTROGEN RECEPTOR POSITIVE: ICD-10-CM

## 2024-02-09 DIAGNOSIS — O20.9 VAGINAL BLEEDING AFFECTING EARLY PREGNANCY: ICD-10-CM

## 2024-02-09 DIAGNOSIS — Z3A.13 13 WEEKS GESTATION OF PREGNANCY: Primary | ICD-10-CM

## 2024-02-09 DIAGNOSIS — Z34.81 PRENATAL CARE, SUBSEQUENT PREGNANCY, FIRST TRIMESTER: ICD-10-CM

## 2024-02-09 DIAGNOSIS — K13.79 MOUTH SORES: ICD-10-CM

## 2024-02-09 DIAGNOSIS — O9A.111: ICD-10-CM

## 2024-02-09 DIAGNOSIS — T80.212A PORT OR RESERVOIR INFECTION, INITIAL ENCOUNTER: ICD-10-CM

## 2024-02-09 DIAGNOSIS — O09.521 MULTIGRAVIDA OF ADVANCED MATERNAL AGE IN FIRST TRIMESTER: ICD-10-CM

## 2024-02-09 DIAGNOSIS — C50.812 MALIGNANT NEOPLASM OF OVERLAPPING SITES OF LEFT FEMALE BREAST, UNSPECIFIED ESTROGEN RECEPTOR STATUS (HCC): Primary | ICD-10-CM

## 2024-02-09 DIAGNOSIS — C50.812 MALIGNANT NEOPLASM OF OVERLAPPING SITES OF LEFT BREAST IN FEMALE, ESTROGEN RECEPTOR POSITIVE: ICD-10-CM

## 2024-02-09 DIAGNOSIS — O22.31 DVT COMPLICATING PREGNANCY, FIRST TRIMESTER: ICD-10-CM

## 2024-02-09 LAB
ALBUMIN SERPL BCP-MCNC: 3.4 G/DL (ref 3.5–5)
ALP SERPL-CCNC: 66 U/L (ref 34–104)
ALT SERPL W P-5'-P-CCNC: 13 U/L (ref 7–52)
ANION GAP SERPL CALCULATED.3IONS-SCNC: 7 MMOL/L
AST SERPL W P-5'-P-CCNC: 16 U/L (ref 13–39)
BASOPHILS # BLD AUTO: 0.01 THOUSANDS/ÂΜL (ref 0–0.1)
BASOPHILS NFR BLD AUTO: 0 % (ref 0–1)
BILIRUB SERPL-MCNC: 0.24 MG/DL (ref 0.2–1)
BUN SERPL-MCNC: 8 MG/DL (ref 5–25)
CALCIUM ALBUM COR SERPL-MCNC: 8.5 MG/DL (ref 8.3–10.1)
CALCIUM SERPL-MCNC: 8 MG/DL (ref 8.4–10.2)
CHLORIDE SERPL-SCNC: 108 MMOL/L (ref 96–108)
CO2 SERPL-SCNC: 20 MMOL/L (ref 21–32)
CREAT SERPL-MCNC: 0.47 MG/DL (ref 0.6–1.3)
EOSINOPHIL # BLD AUTO: 0.12 THOUSAND/ÂΜL (ref 0–0.61)
EOSINOPHIL NFR BLD AUTO: 2 % (ref 0–6)
ERYTHROCYTE [DISTWIDTH] IN BLOOD BY AUTOMATED COUNT: 14.2 % (ref 11.6–15.1)
GFR SERPL CREATININE-BSD FRML MDRD: 128 ML/MIN/1.73SQ M
GLUCOSE SERPL-MCNC: 74 MG/DL (ref 65–140)
HCT VFR BLD AUTO: 31.5 % (ref 34.8–46.1)
HGB BLD-MCNC: 10 G/DL (ref 11.5–15.4)
IMM GRANULOCYTES # BLD AUTO: 0.01 THOUSAND/UL (ref 0–0.2)
IMM GRANULOCYTES NFR BLD AUTO: 0 % (ref 0–2)
LYMPHOCYTES # BLD AUTO: 1.15 THOUSANDS/ÂΜL (ref 0.6–4.47)
LYMPHOCYTES NFR BLD AUTO: 19 % (ref 14–44)
MCH RBC QN AUTO: 26.7 PG (ref 26.8–34.3)
MCHC RBC AUTO-ENTMCNC: 31.7 G/DL (ref 31.4–37.4)
MCV RBC AUTO: 84 FL (ref 82–98)
MONOCYTES # BLD AUTO: 0.39 THOUSAND/ÂΜL (ref 0.17–1.22)
MONOCYTES NFR BLD AUTO: 6 % (ref 4–12)
NEUTROPHILS # BLD AUTO: 4.5 THOUSANDS/ÂΜL (ref 1.85–7.62)
NEUTS SEG NFR BLD AUTO: 73 % (ref 43–75)
NRBC BLD AUTO-RTO: 0 /100 WBCS
PLATELET # BLD AUTO: 144 THOUSANDS/UL (ref 149–390)
PMV BLD AUTO: 11.9 FL (ref 8.9–12.7)
POTASSIUM SERPL-SCNC: 3.7 MMOL/L (ref 3.5–5.3)
PROT SERPL-MCNC: 6.5 G/DL (ref 6.4–8.4)
RBC # BLD AUTO: 3.75 MILLION/UL (ref 3.81–5.12)
SL AMB  POCT GLUCOSE, UA: NORMAL
SL AMB POCT URINE PROTEIN: NORMAL
SODIUM SERPL-SCNC: 135 MMOL/L (ref 135–147)
WBC # BLD AUTO: 6.18 THOUSAND/UL (ref 4.31–10.16)

## 2024-02-09 PROCEDURE — 96360 HYDRATION IV INFUSION INIT: CPT

## 2024-02-09 PROCEDURE — 81002 URINALYSIS NONAUTO W/O SCOPE: CPT | Performed by: CLINICAL NURSE SPECIALIST

## 2024-02-09 PROCEDURE — 80053 COMPREHEN METABOLIC PANEL: CPT | Performed by: INTERNAL MEDICINE

## 2024-02-09 PROCEDURE — 96374 THER/PROPH/DIAG INJ IV PUSH: CPT

## 2024-02-09 PROCEDURE — 96361 HYDRATE IV INFUSION ADD-ON: CPT

## 2024-02-09 PROCEDURE — 85025 COMPLETE CBC W/AUTO DIFF WBC: CPT | Performed by: INTERNAL MEDICINE

## 2024-02-09 PROCEDURE — PNV: Performed by: CLINICAL NURSE SPECIALIST

## 2024-02-09 RX ORDER — CEPHALEXIN 500 MG/1
500 CAPSULE ORAL EVERY 6 HOURS SCHEDULED
Qty: 28 CAPSULE | Refills: 0 | Status: SHIPPED | OUTPATIENT
Start: 2024-02-09 | End: 2024-02-16

## 2024-02-09 RX ORDER — DIPHENHYDRAMINE HYDROCHLORIDE AND LIDOCAINE HYDROCHLORIDE AND ALUMINUM HYDROXIDE AND MAGNESIUM HYDRO
10 KIT EVERY 4 HOURS PRN
Qty: 119 ML | Refills: 1 | Status: SHIPPED | OUTPATIENT
Start: 2024-02-09 | End: 2024-02-09

## 2024-02-09 RX ORDER — DIPHENHYDRAMINE HYDROCHLORIDE AND LIDOCAINE HYDROCHLORIDE AND ALUMINUM HYDROXIDE AND MAGNESIUM HYDRO
10 KIT EVERY 4 HOURS PRN
Qty: 237 ML | Refills: 1 | Status: SHIPPED | OUTPATIENT
Start: 2024-02-09

## 2024-02-09 RX ADMIN — SODIUM CHLORIDE 1000 ML: 0.9 INJECTION, SOLUTION INTRAVENOUS at 10:45

## 2024-02-09 RX ADMIN — ONDANSETRON 8 MG: 2 INJECTION INTRAMUSCULAR; INTRAVENOUS at 10:26

## 2024-02-09 NOTE — TELEPHONE ENCOUNTER
Spoke with patient regarding her lab values. Patient was concerned that her carbon dioxide was at 20. I informed her that 21 is the low end of normal and we will monitor with her next set of labs on Friday 16th. Patient verbalized understanding. I also reviewed with the patient to go to the ED if her port gets very painful or if she develops a fever. Patient verbalized understanding. I reviewed her Hgb of 10, which is slightly decreased from her previous draw. Patient is non-symptomatic but does have nose bleeds that are more from dry air and she notices blood clots. She states that they stop fairly quick. I told her to keep an eye on this and if they get worse to call us. Patient verbalized understanding.

## 2024-02-09 NOTE — QUICK NOTE
IR QUICK NOTE      33-year-old female with left breast cancer currently undergoing chemotherapy status post insertion of right chest port 2/7/2024.  She presents to interventional radiology this afternoon with complaints of increased redness and burning sensation to port site. See picture below. She reports a temperature of 99 earlier today.  She denies fever and chills otherwise.  Labs obtained today by hematology oncology with no leukocytosis noted on CBC. Dr. Cortes at bedside for patient evaluation.      Plan:  - Will start po abx (Keflex)  - Continue Tylenol as needed for discomfort  - Plan for re-evaluation in 1 week.   - Pt given instructions for ER precautions in the event her symptoms worsen. She verbalized understanding.          JOSE Foote

## 2024-02-09 NOTE — PROGRESS NOTES
Patient arrives to infusion center for IV hydration and labs. Port placement 2 days ago, port assessed. Port is red, raised, warm to touch. No discharge from port, pt complaining of severe burning pain from port. Office contacted, per Claire Leong RN, stat IR appointment after infusion today. PIV established, patient tolerated entire infusion without incident.     PIV removed, patient instructed to go directly to IR.     Next appointment: 2/12/24 @0800

## 2024-02-09 NOTE — PROGRESS NOTES
Prenatal Visit  Subjective:   Jillian is a 33 y.o.  13w2d here for Routine Prenatal Visit (BALTAZAR /Blood type B+ /Labs UTD/Flu declined /Urine -/-/Denies LOF, VB, or CTX./Pt has +FM/Questions or concerns-  )       She reports that she is having the following symptoms: + N/V. Zofran PRN- not always effective. Has also tried scopalamine patch. But is improving from previous. She denies any cramping, LOF/unusual discharge or VB.  Was having bldg earlier in pregnancy but none since last visit.     Maintains lovenox 150 mg BID due to postop DVT.    + Breast cancer- has port placed and was starting chemo Monday- however site is mildly infected and delayed start by a week.    The patient has chosen to undergo  NIPT genetic screening- results not back yet      Objective:  Vitals:    24 1554   BP: 130/74   Pulse: 82     Pregravid Weight/BMI: 78 kg (172 lb) (BMI 26.93)  Current Weight: 77.1 kg (170 lb)   Total Weight Gain: -0.907 kg (-2 lb)     Fetal Heart Rate: 150      OBGyn Exam  Physical Exam  Fetal Heart Rate: 150     General: Not in acute distress and appearing well nourished and well groomed  Genitourinary:          Pelvic exam exam deferred   Cardiovascular:   Rate and Rhythm: Normal rate.   Pulmonary:    Effort: normal, not labored  Abdominal:  Abdomen is soft   Musculoskeletal: Active movement of all extremities, no gross limitations of ROM     Edema:None noted  Neurological:   Mental Status: She is alert and oriented to person, place, and time.   Skin: General: Skin is warm and dry.   Psychiatric:    Mood and Affect: Mood normal.      Behavior: Behavior normal          Assessment & Plan:        1. 13 weeks gestation of pregnancy  Assessment & Plan:  , 13w2d  Doing well overall  Genetic screening NIPT pending   Discussed recommended testing for ONTD - AFP and order placed.  Keep appt for Level 2 US.   We reviewed the common symptoms of this stage of pregnancy as well as warning signs/symptoms,  including spotting/bleeding, severe pain or persistent nausea and vomiting with an inability keep anything down for 24 hours.    I have reviewed and updated the patient's problem list Her questions were answered to her satisfaction.    Orders:  -     POCT urine dip    2. Cancer complicating pregnancy in first trimester  Assessment & Plan:  + Breast cancer- has port placed and was starting chemo Monday- however site is mildly infected and delayed start by a week.      3. DVT complicating pregnancy, first trimester  Assessment & Plan:    Maintains lovenox 150 mg BID due to postop DVT.      4. Multigravida of advanced maternal age in first trimester    5. Vaginal bleeding affecting early pregnancy  Assessment & Plan:  No further bldg       6. Prenatal care, subsequent pregnancy, first trimester        JOSE Page  2/12/2024

## 2024-02-09 NOTE — SEDATION DOCUMENTATION
Dr Cortes inspected right chest from Port placement, patient starting antibiotics, will reinspect area on Monday, if gets worse with pain and reddness then go to ER

## 2024-02-12 ENCOUNTER — HOSPITAL ENCOUNTER (OUTPATIENT)
Dept: INFUSION CENTER | Facility: CLINIC | Age: 34
Discharge: HOME | End: 2024-02-12

## 2024-02-12 ENCOUNTER — TELEPHONE (OUTPATIENT)
Dept: NUTRITION | Facility: CLINIC | Age: 34
End: 2024-02-12

## 2024-02-12 NOTE — TELEPHONE ENCOUNTER
Jillian was scheduled for oncology nutrition follow up during infusion today. Per chart review, infusion schedule has changed. Nutrition follow up rescheduled to next week's infusion.

## 2024-02-12 NOTE — ASSESSMENT & PLAN NOTE
+ Breast cancer- has port placed and was starting chemo Monday- however site is mildly infected and delayed start by a week.

## 2024-02-12 NOTE — ASSESSMENT & PLAN NOTE
, 13w2d  Doing well overall  Genetic screening NIPT pending   Discussed recommended testing for ONTD - AFP and order placed.  Keep appt for Level 2 US.   We reviewed the common symptoms of this stage of pregnancy as well as warning signs/symptoms, including spotting/bleeding, severe pain or persistent nausea and vomiting with an inability keep anything down for 24 hours.    I have reviewed and updated the patient's problem list Her questions were answered to her satisfaction.

## 2024-02-14 ENCOUNTER — TELEPHONE (OUTPATIENT)
Facility: HOSPITAL | Age: 34
End: 2024-02-14

## 2024-02-14 ENCOUNTER — PATIENT OUTREACH (OUTPATIENT)
Dept: CASE MANAGEMENT | Facility: HOSPITAL | Age: 34
End: 2024-02-14

## 2024-02-14 NOTE — TELEPHONE ENCOUNTER
I left a message for Jillian at [phone] asking her to call me back about her [facility] NIPS results. NIPS returned no result due to specimen quality metrics. Callback number of [phone] provided.    Margarita Shelton, CGC

## 2024-02-14 NOTE — PROGRESS NOTES
MSW r/o to pt this morning by phone to check in, her first infusion earlier this week was cancelled by the provider's office.  She will instead get started next week.  She tells me that her pregnancy is going well overall but she is still struggling with nausea from the pregnancy.  They are not going to find out the sex of the baby so that it's a surprise at birth.  She has her cold cap and supplies and is hopeful that it will work for her.  She says that she did receive my email with information on SSD, and has started the online application, but notes that it's lengthy and very involved.  I encouraged her to work at it as she can and did let her know that [employer] will reach out for her medical records as well when the application is submitted.  She denies any other needs or concerns at this point but was appreciative of the call to check in.  I encouraged her to reach out as needed moving forward, she has my direct number and my email address as well.  I will otherwise check in with her periodically to make sure she is managing everything OK.  No other needs noted at this time.

## 2024-02-15 ENCOUNTER — TELEPHONE (OUTPATIENT)
Facility: HOSPITAL | Age: 34
End: 2024-02-15

## 2024-02-15 NOTE — TELEPHONE ENCOUNTER
I called Jillian at [phone], left a voicemail, and she called me back shortly afterwards. We discussed the availability of early anatomy ultrasound, expected to be performed at 16 weeks gestation. Jillian would like to pursue this. Standard 20 week anatomy ultrasound will also be performed. We discussed the option of quad screen for aneuploidy and that it is typically performed at 16-18 weeks gestation. This order can be placed by her OB, or by the [facility] department if necessary. Jillian stated she is feeling well today and has no further questions.     Margarita Shelton, CGC

## 2024-02-16 ENCOUNTER — HOSPITAL ENCOUNTER (OUTPATIENT)
Dept: INFUSION CENTER | Facility: CLINIC | Age: 34
End: 2024-02-16
Payer: COMMERCIAL

## 2024-02-16 ENCOUNTER — TELEPHONE (OUTPATIENT)
Facility: HOSPITAL | Age: 34
End: 2024-02-16

## 2024-02-16 ENCOUNTER — TELEPHONE (OUTPATIENT)
Dept: PERINATAL CARE | Facility: CLINIC | Age: 34
End: 2024-02-16

## 2024-02-16 VITALS
TEMPERATURE: 97.5 F | DIASTOLIC BLOOD PRESSURE: 67 MMHG | RESPIRATION RATE: 18 BRPM | SYSTOLIC BLOOD PRESSURE: 122 MMHG | HEART RATE: 94 BPM

## 2024-02-16 DIAGNOSIS — E86.0 DEHYDRATION: Primary | ICD-10-CM

## 2024-02-16 DIAGNOSIS — C50.812 MALIGNANT NEOPLASM OF OVERLAPPING SITES OF LEFT BREAST IN FEMALE, ESTROGEN RECEPTOR POSITIVE: ICD-10-CM

## 2024-02-16 DIAGNOSIS — Z17.0 MALIGNANT NEOPLASM OF OVERLAPPING SITES OF LEFT BREAST IN FEMALE, ESTROGEN RECEPTOR POSITIVE: ICD-10-CM

## 2024-02-16 LAB
ALBUMIN SERPL BCP-MCNC: 3.7 G/DL (ref 3.5–5)
ALP SERPL-CCNC: 75 U/L (ref 34–104)
ALT SERPL W P-5'-P-CCNC: 18 U/L (ref 7–52)
ANION GAP SERPL CALCULATED.3IONS-SCNC: 9 MMOL/L
AST SERPL W P-5'-P-CCNC: 12 U/L (ref 13–39)
BASOPHILS # BLD AUTO: 0.02 THOUSANDS/ÂΜL (ref 0–0.1)
BASOPHILS NFR BLD AUTO: 0 % (ref 0–1)
BILIRUB SERPL-MCNC: 0.21 MG/DL (ref 0.2–1)
BUN SERPL-MCNC: 8 MG/DL (ref 5–25)
CALCIUM SERPL-MCNC: 8.9 MG/DL (ref 8.4–10.2)
CFTR FULL MUT ANL BLD/T SEQ: NORMAL
CHLORIDE SERPL-SCNC: 105 MMOL/L (ref 96–108)
CITATION REF LAB TEST: NORMAL
CLINICAL INFO: NORMAL
CO2 SERPL-SCNC: 21 MMOL/L (ref 21–32)
CREAT SERPL-MCNC: 0.54 MG/DL (ref 0.6–1.3)
EOSINOPHIL # BLD AUTO: 0.24 THOUSAND/ÂΜL (ref 0–0.61)
EOSINOPHIL NFR BLD AUTO: 3 % (ref 0–6)
ERYTHROCYTE [DISTWIDTH] IN BLOOD BY AUTOMATED COUNT: 14.1 % (ref 11.6–15.1)
ETHNIC BACKGROUND STATED: NORMAL
GENE DIS ANL CARRIER INTERP-IMP: NORMAL
GFR SERPL CREATININE-BSD FRML MDRD: 122 ML/MIN/1.73SQ M
GLUCOSE SERPL-MCNC: 104 MG/DL (ref 65–140)
HCT VFR BLD AUTO: 31.7 % (ref 34.8–46.1)
HGB BLD-MCNC: 10.5 G/DL (ref 11.5–15.4)
IMM GRANULOCYTES # BLD AUTO: 0.04 THOUSAND/UL (ref 0–0.2)
IMM GRANULOCYTES NFR BLD AUTO: 1 % (ref 0–2)
INDICATION: NORMAL
LAB DIRECTOR NAME PROVIDER: NORMAL
LYMPHOCYTES # BLD AUTO: 1.24 THOUSANDS/ÂΜL (ref 0.6–4.47)
LYMPHOCYTES NFR BLD AUTO: 17 % (ref 14–44)
MCH RBC QN AUTO: 27.3 PG (ref 26.8–34.3)
MCHC RBC AUTO-ENTMCNC: 33.1 G/DL (ref 31.4–37.4)
MCV RBC AUTO: 82 FL (ref 82–98)
MONOCYTES # BLD AUTO: 0.36 THOUSAND/ÂΜL (ref 0.17–1.22)
MONOCYTES NFR BLD AUTO: 5 % (ref 4–12)
NEUTROPHILS # BLD AUTO: 5.24 THOUSANDS/ÂΜL (ref 1.85–7.62)
NEUTS SEG NFR BLD AUTO: 74 % (ref 43–75)
NRBC BLD AUTO-RTO: 0 /100 WBCS
PLATELET # BLD AUTO: 145 THOUSANDS/UL (ref 149–390)
PMV BLD AUTO: 12 FL (ref 8.9–12.7)
POTASSIUM SERPL-SCNC: 3.4 MMOL/L (ref 3.5–5.3)
PROT SERPL-MCNC: 6.9 G/DL (ref 6.4–8.4)
RBC # BLD AUTO: 3.85 MILLION/UL (ref 3.81–5.12)
RECOMMENDATION PATIENT DOC-IMP: NORMAL
REF LAB TEST METHOD: NORMAL
SERVICE CMNT-IMP: NORMAL
SODIUM SERPL-SCNC: 135 MMOL/L (ref 135–147)
SPECIMEN SOURCE: NORMAL
WBC # BLD AUTO: 7.14 THOUSAND/UL (ref 4.31–10.16)

## 2024-02-16 PROCEDURE — 85025 COMPLETE CBC W/AUTO DIFF WBC: CPT | Performed by: INTERNAL MEDICINE

## 2024-02-16 PROCEDURE — 96360 HYDRATION IV INFUSION INIT: CPT

## 2024-02-16 PROCEDURE — 80053 COMPREHEN METABOLIC PANEL: CPT | Performed by: INTERNAL MEDICINE

## 2024-02-16 RX ADMIN — SODIUM CHLORIDE 1000 ML: 0.9 INJECTION, SOLUTION INTRAVENOUS at 09:38

## 2024-02-16 NOTE — TELEPHONE ENCOUNTER
Margarita Shelton, genetic counselor spoke with Dr. Alcocer and got his approval to offer early anatomy + quad screening rather than an NIPS redraw.     Left voicemail asking patient to call [phone] to schedule an early anatomy per Margarita & Dr. Alcocer's recommendations.

## 2024-02-16 NOTE — PROGRESS NOTES
Pt here for hydration infusion, offering no complaints. Pt presented previously with infected port and was recently treated with antibiotics. Port site assessed by multiple Rns. Site clean dry and intact with no complaints of pain or swelling no cause for concern. Per pt okay to use port for hydration.  R port placed with positive blood return noted. Labs drawn via port. Tolerated infusion without incident. Port de accessed  AVS declined. Walked out in stable condition. Next appointment on 2/21/24 at 7:30am.

## 2024-02-16 NOTE — TELEPHONE ENCOUNTER
----- Message from Margarita Shelton sent at 2/15/2024  3:12 PM EST -----  Regarding: Schedule early anatomy ultrasound  Hello all,    Could someone please call this patient to book an early anatomy ultrasound at roughly 16 weeks gestation? This was approved by Dr. Alcocer. She is 14w2d today and has an BALTAZAR of 8/13/2024.    Thanks!    Margarita

## 2024-02-21 ENCOUNTER — NUTRITION (OUTPATIENT)
Dept: NUTRITION | Facility: CLINIC | Age: 34
End: 2024-02-21

## 2024-02-21 ENCOUNTER — HOSPITAL ENCOUNTER (OUTPATIENT)
Dept: INFUSION CENTER | Facility: CLINIC | Age: 34
Discharge: HOME | End: 2024-02-21
Payer: COMMERCIAL

## 2024-02-21 VITALS
HEART RATE: 83 BPM | RESPIRATION RATE: 18 BRPM | WEIGHT: 165 LBS | HEIGHT: 67 IN | SYSTOLIC BLOOD PRESSURE: 122 MMHG | DIASTOLIC BLOOD PRESSURE: 68 MMHG | BODY MASS INDEX: 25.9 KG/M2 | TEMPERATURE: 96.9 F

## 2024-02-21 DIAGNOSIS — Z17.0 MALIGNANT NEOPLASM OF OVERLAPPING SITES OF LEFT BREAST IN FEMALE, ESTROGEN RECEPTOR POSITIVE: Primary | ICD-10-CM

## 2024-02-21 DIAGNOSIS — C50.812 MALIGNANT NEOPLASM OF OVERLAPPING SITES OF LEFT BREAST IN FEMALE, ESTROGEN RECEPTOR POSITIVE: Primary | ICD-10-CM

## 2024-02-21 DIAGNOSIS — Z71.3 NUTRITIONAL COUNSELING: Primary | ICD-10-CM

## 2024-02-21 PROCEDURE — 96411 CHEMO IV PUSH ADDL DRUG: CPT

## 2024-02-21 PROCEDURE — 96367 TX/PROPH/DG ADDL SEQ IV INF: CPT

## 2024-02-21 PROCEDURE — 96413 CHEMO IV INFUSION 1 HR: CPT

## 2024-02-21 RX ORDER — SODIUM CHLORIDE 9 MG/ML
20 INJECTION, SOLUTION INTRAVENOUS ONCE
Status: COMPLETED | OUTPATIENT
Start: 2024-02-21 | End: 2024-02-21

## 2024-02-21 RX ORDER — DOXORUBICIN HYDROCHLORIDE 2 MG/ML
56.25 INJECTION, SOLUTION INTRAVENOUS ONCE
Status: COMPLETED | OUTPATIENT
Start: 2024-02-21 | End: 2024-02-21

## 2024-02-21 RX ADMIN — SODIUM CHLORIDE 20 ML/HR: 0.9 INJECTION, SOLUTION INTRAVENOUS at 08:42

## 2024-02-21 RX ADMIN — DOXORUBICIN HYDROCHLORIDE 106 MG: 2 INJECTION, SOLUTION INTRAVENOUS at 10:34

## 2024-02-21 RX ADMIN — FOSAPREPITANT 150 MG: 150 INJECTION, POWDER, LYOPHILIZED, FOR SOLUTION INTRAVENOUS at 09:44

## 2024-02-21 RX ADMIN — DEXAMETHASONE SODIUM PHOSPHATE: 10 INJECTION, SOLUTION INTRAMUSCULAR; INTRAVENOUS at 08:42

## 2024-02-21 RX ADMIN — CYCLOPHOSPHAMIDE 1128 MG: 1 INJECTION, POWDER, FOR SOLUTION INTRAVENOUS; ORAL at 10:53

## 2024-02-21 NOTE — PATIENT INSTRUCTIONS
Nutrition Rx & Recommendations:  Diet: High Calorie, High Protein (for high calorie foods see pages 52-53, and for high protein foods see pages 49-51 in your Eating Hints book)  Small, frequent meals/snacks may be easier to tolerate than 3 large daily meals.  Aim for 5-6 small meals per day (every 2-3 hours).  Include protein at all meals/snacks.  Include a variety of foods (as tolerated/allowed by diet).  Follow a Cancer Preventative Nutrition Pattern: colorful, plant-based, low-fat, avoid added sugars, limit alcohol, include high fiber foods, limit processed meats, limit red meat, choose lean protein sources, use low-fat cooking methods, balance calories with physical activity, avoid excessive weight gain throughout life.  Incorporate physical activity as able/allowed.  Stay hydrated by sipping fluids of choice/tolerance throughout the day.  Liquid nutrition may be better tolerated than solids at times.  Alter food choices and eating patterns to accommodate changing needs.  Light physical activity (such as walking) is encouraged, as able/tolerated.  Weigh yourself regularly. If you notice weight loss, make an effort to increase your daily food/calorie intake. If you continue to notice loss after these efforts, reach out to your dietitian to establish a plan to stabilize weight.    For nausea/vomiting, suggestions include:  Eat 5-6 smaller meals throughout the day instead of 3 large meals.   Sip on calorie containing fluids throughout the day: 100% fruit juice, diluted juice, bone broth (higher protein broth), creamy soups, sports drinks (Gatorade, Poweraide, Pedialyte, etc.), Italian ice, popsicles, milkshakes, smoothies, oral nutrition supplements (Ensure, Boost, Glucerna etc.), gelatin/Jello, etc. (see page 41 of Eating Hints Book for other examples).  Continue gatorade with water. Try Pedialyte samples.   Continue plant based protein drink   Eat bland foods and foods easy on the stomach: clear broth (chicken,  vegetable, bone, mushroom, beef), juice (caution with acidic juices), potatoes, pretzels, crackers, cereal (Corn Flakes, Rice Chex, Rice Crispies, Cheerios), oatmeal or cream of wheat, white bread or toast, white rice, cooked vegetables (caution with gas producing vegetables), plain pasta, eggs, peanut butter, boiled chicken or turkey, soft cheeses.  Consume foods and drinks at room temperature. Try to avoid eating/drinking anything too hot or cold.   Suck on tart candies such as lemon drops or ginger chews to help relieve nausea.   Ginger tea or flat ginger ale.   Foods to avoid: Foods with strong odors; High sugar foods such as soda, candies, desserts; Greasy/fried foods; Gas producing foods such as broccoli, cabbage, [place] sprouts, raw fruits/vegetables, beans; Caution with diary products unless they are low lactose or lactose free; Alcohol; Spicy foods; Acidic foods: coffee, tea, citrus, tomato; Avoid eating your favorite foods when you feel nauseous so you don't develop a dislike of those foods.     Follow Up Plan: 3/1/24 during hydration   Recommend Referral to Other Providers: none at this time

## 2024-02-21 NOTE — PROGRESS NOTES
Pt here for chemotherapy, offering no complaints. Port accessed and flushed with positive blood return noted. Tolerated infusion without incident. Pt received paxman therapy for 90 minutes tolerated therapy well. Port flushed with positive blood return noted port removed. AVS declined. Walked out in stable condition. Next appointment on 2/23/24 at 8am.

## 2024-02-21 NOTE — PROGRESS NOTES
Outpatient Oncology Nutrition Consultation   Type of Consult: Follow Up  Care Location: [facility]; met with patient and her mother in law     Reason for referral: Notification from RN Navigator Kamla HAYWOOD on 12/7/23 that pt has triggered for oncology nutrition care (reason for referral: Patient is pregnant and newly diagnosed with breast cancer. Had questions regarding nutrition and sugar intake, etc).     Nutrition Assessment:   Oncology Diagnosis & Treatments:   Diagnosed with left beast cancer, ER positive, 12/2/23.   S/p left mastectomy 1/2/24.   Chemotherapy (doxorubicin, cytoxan, emend) start today 2/21/24.   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 Breast, Left, US Guided Left Breast Biopsy 12:00 6cmfn:  Invasive breast carcinoma of no special type (ductal) with osteoclast-like stromal giant cells  Grade 2  ER 85  NE 95  HER2 1+     B. Breast, Right, US Guided Right Breast Biopsy 10:00 9cmfn:  Benign breast tissue.    In review of the patient’s recent imaging, the left malignancy appears unifocal.  The biopsy-proven carcinoma measured 2.1 cm on ultrasound. Ultrasound of the left axilla was performed on 11/24/2023 and showed no suspicious adenopathy.  Recent imaging of the contralateral right breast dated 12/2/2023 and 11/24/2023 was reviewed and shows no suspicious findings.  The pathology results for the ultrasound-guided core needle biopsy (right breast 10:00, 9 cm from the nipple) are benign and concordant with imaging.     12/2/2023  Genetic Testing    [facility]  A total of 36 genes were evaluated, including: APC, TOPHER, BARD 1, BMPR1A, BRCA1, BRCA2, BRIP1, CDH1, CDK4, CKDN2A, CHEK2, DICER1, MLH1, MSH2, MSH6, MUTYH, NBN, NF1, NTHL1, PALB2, PMS2, PTEN, RAD51C, RECQL, SMAD4, SMARCA4, STK11, TP53, AXIN2, HOXB13, MSH3, POLD1, AND POLE, EPCAM, AND GREM1   Negative result. No pathogenic sequence variants or deletions/duplications identified       12/2/2023 -  Cancer Staged    Staging form: Breast, AJCC 8th Edition  - Clinical stage from 12/2/2023: Stage IB (cT2, cN0, cM0, G2, ER+, NH+, HER2-) - Signed by Elena Cornell MD on 12/13/2023  Stage prefix: Initial diagnosis  Histologic grading system: 3 grade system       1/2/2024 Surgery    Left breast mastectomy with sentinel lymph node biopsy  Invasive Mammary carcinoma of no special type (ductal)   Grade 2  25 mm  Margins negative  0/2 Lymph nodes  Anatomic stage IIA  Prognostic stage IA      2/21/2024 -  Chemotherapy    DOXOrubicin (ADRIAMYCIN), 56.25 mg/m2 = 106 mg (93.8 % of original dose 60 mg/m2), Intravenous, Once, 1 of 4 cycles  Dose modification: 56.25 mg/m2 (original dose 60 mg/m2, Cycle 1, Reason: Other (Must fill in a comment), Comment: max recommended dose)  alteplase (CATHFLO), 2 mg, Intracatheter, Every 1 Minute as needed, 1 of 4 cycles  cyclophosphamide (CYTOXAN) IVPB, 600 mg/m2 = 1,128 mg, Intravenous, Once, 1 of 4 cycles  fosaprepitant (EMEND) IVPB, 150 mg, Intravenous, Once, 1 of 4 cycles       Past Medical & Surgical Hx:   Patient Active Problem List   Diagnosis    Mild persistent asthma without complication    Axillary hidradenitis suppurativa    Anxiety    Chest wall pain    Gastroesophageal reflux disease without esophagitis    Depression with anxiety    Chronic fatigue    Myalgia    Chronic left shoulder pain    Cervical disc disorder at C5-C6 level with radiculopathy    Atypical squamous cells of undetermined significance (ASCUS) on Papanicolaou smear of cervix     Hypertrophic and atrophic condition of skin    Migraine headache    Shoulder impingement syndrome, left    Vitamin D deficiency    Close exposure to COVID-19 virus    Thoracic outlet syndrome    Palpitations    Post-operative pain    Transaminitis    Right middle lobe pulmonary nodule    Reversible airway obstruction    SOB (shortness of breath)    Musculoskeletal chest pain    COVID-19 virus infection    Unexplained weight gain    Left ovarian cyst    Dysphagia    Malignant neoplasm of overlapping sites of left breast in female, estrogen receptor positive     Cancer complicating pregnancy in first trimester    13 weeks gestation of pregnancy    Status post left mastectomy    Multigravida of advanced maternal age in first trimester    DVT complicating pregnancy, first trimester    Vaginal bleeding affecting early pregnancy    Dehydration     Past Medical History:   Diagnosis Date    Anesthesia complication     gait dysfunction/ urinary retention after nerve block,    Anxiety     Asthma     CRPS (complex regional pain syndrome), upper limb     left chest/arm/hand    Deep vein thrombosis (HCC) 01/05/2024    post op from mastectomy    GERD (gastroesophageal reflux disease)     Heart murmur     HPV (human papilloma virus) infection 2012    unsure of 16, 18, or other    Invasive ductal carcinoma of breast, female, left (HCC) 2023    Kidney stone     Miscarriage 3/15/2015    7 weeks along.    Neck pain     Ovarian cyst     Left    PONV (postoperative nausea and vomiting) 01/02/2024     Past Surgical History:   Procedure Laterality Date    COLPOSCOPY  2012    HPV    EGD      IR PORT PLACEMENT  2/7/2024    IR UPPER EXTREMITY VENOGRAM- DIAGNOSTIC  05/28/2021    NH BX/EXC LYMPH NODE OPEN SUPERFICIAL Left 01/02/2024    Procedure: LEFT BREAST MASTECTOMY, LYMPHATIC MAPPING WITH RADIOACTIVE DYE, SENTINEL LYMPH NODE BIOPSY, ZAHEER LEFT BREAST, INJECTION IN OR AT 0800 BY DR CORNELL;  Surgeon: Elena Cornell MD;  Location:  [facility];  Service: Surgical Oncology    MS EXCISION 1ST &/CERVICAL RIB Left 08/12/2021    Procedure: FIRST RIB RESECTION;  Surgeon: Jonathan Schaffer MD;  Location: [facility];  Service: Thoracic    MS INJ RADIOACTIVE TRACER FOR ID OF SENTINEL NODE Left 01/02/2024    Procedure: LEFT BREAST MASTECTOMY, LYMPHATIC MAPPING WITH RADIOACTIVE DYE, SENTINEL LYMPH NODE BIOPSY, ZAHEER LEFT BREAST, INJECTION IN OR AT 0800 BY DR CORNELL;  Surgeon: Elena Cornell MD;  Location: [facility];  Service: Surgical Oncology    MS INTRAOP SENTINEL LYMPH NODE ID W/DYE INJECTION Left 01/02/2024    Procedure: LEFT BREAST MASTECTOMY, LYMPHATIC MAPPING WITH RADIOACTIVE DYE, SENTINEL LYMPH NODE BIOPSY, ZAHEER LEFT BREAST, INJECTION IN OR AT 0800 BY DR CORNELL;  Surgeon: Elena Cornell MD;  Location: [facility];  Service: Surgical Oncology    MS MASTECTOMY SIMPLE COMPLETE Left 01/02/2024    Procedure: LEFT BREAST MASTECTOMY, LYMPHATIC MAPPING WITH RADIOACTIVE DYE, SENTINEL LYMPH NODE BIOPSY, ZAHEER LEFT BREAST, INJECTION IN OR AT 0800 BY DR CORNELL;  Surgeon: Elena Cornell MD;  Location: [facility];  Service: Surgical Oncology    THORACOSCOPY VIDEO ASSISTED SURGERY (VATS) Left 08/12/2021    Procedure: THORACOSCOPY VIDEO ASSISTED SURGERY (VATS);  Surgeon: Jonathan Schaffer MD;  [facility]: [facility];  Service: Thoracic    US GUIDANCE BREAST BIOPSY LEFT EACH ADDITIONAL Left 12/02/2023    US GUIDED BREAST BIOPSY RIGHT COMPLETE Right 12/02/2023    WISDOM TOOTH EXTRACTION  2012       Review of Medications:   Vitamins, Supplements and Herbals: Med List Reviewed & pt is only taking: Prenatal MVI    Current Outpatient Medications:     albuterol (PROVENTIL HFA,VENTOLIN HFA) 90 mcg/act inhaler, TAKE 2 PUFFS BY MOUTH EVERY 6 HOURS AS NEEDED FOR WHEEZE OR FOR SHORTNESS OF BREATH, Disp: 18 g, Rfl: 3    cetirizine (ZyrTEC) 10 mg tablet, TAKE 1 TABLET BY MOUTH EVERY DAY, Disp: 90 tablet, Rfl: 0    diphenhydramine, lidocaine, Al/Mg  hydroxide, simethicone (Magic Mouthwash) SUSP, SWISH AND SPIT 10 ML EVERY 4 (FOUR) HOURS AS NEEDED FOR MOUTH PAIN OR DISCOMFORT (Patient not taking: Reported on 2024), Disp: 237 mL, Rfl: 1    enoxaparin (LOVENOX) 150 mg/mL injection, Inject 0.5 mL (75 mg total) under the skin every 12 (twelve) hours, Disp: 180 mL, Rfl: 0    lidocaine-prilocaine (EMLA) cream, Apply to port 30 to 60 min prior to use, Disp: 30 g, Rfl: 2    omeprazole (PriLOSEC) 40 MG capsule, TAKE 1 CAPSULE (40 MG TOTAL) BY MOUTH DAILY., Disp: 90 capsule, Rfl: 0    ondansetron (ZOFRAN) 8 mg tablet, Take 1 tablet (8 mg total) by mouth every 8 (eight) hours as needed for nausea or vomiting, Disp: 30 tablet, Rfl: 3    Prenatal Multivit-Min-Fe-FA (PRE-SONIA PO), Take 1 tablet by mouth in the morning, Disp: , Rfl:   No current facility-administered medications for this visit.    Facility-Administered Medications Ordered in Other Visits:     alteplase (CATHFLO) injection 2 mg, 2 mg, Intracatheter, Q1MIN PRN, Nmeo Agostino, DO    cyclophosphamide (CYTOXAN) 1,128 mg in sodium chloride 0.9 % 250 mL IVPB, 600 mg/m2 (Treatment Plan Recorded), Intravenous, Once, Nemo Agostino, DO    DOXOrubicin (ADRIAMYCIN) injection 106 mg, 56.25 mg/m2 (Treatment Plan Recorded), Intravenous, Once, Nemo Agostino, DO, 106 mg at 24 1034    Most Recent Lab Results:   Lab Results   Component Value Date    WBC 7.14 2024    NEUTROABS 5.24 2024    IRON 83 2020    CHOLESTEROL 127 2023    TRIG 56 2023    HDL 43 (L) 2023    LDLCALC 73 2023    ALT 18 2024    AST 12 (L) 2024    ALB 3.7 2024    SODIUM 135 2024    SODIUM 135 2024    K 3.4 (L) 2024    K 3.7 2024     2024    BUN 8 2024    BUN 8 2024    CREATININE 0.54 (L) 2024    CREATININE 0.47 (L) 2024    EGFR 122 2024    GLUF 87 2024    GLUF 79 2023    GLUC 104 2024    HGBA1C 5.4  "12/02/2023    HGBA1C 5.1 02/17/2023    CALCIUM 8.9 02/16/2024    MG 2.3 10/19/2019       Anthropometric Measurements:   Height: 67\"  Ht Readings from Last 1 Encounters:   02/21/24 5' 6.73\" (1.695 m)     Wt Readings from Last 30 Encounters:   02/21/24 74.8 kg (165 lb)   02/09/24 77.1 kg (170 lb)   02/08/24 73.9 kg (163 lb)   02/07/24 74 kg (163 lb 2.3 oz)   01/30/24 77.5 kg (170 lb 12.8 oz)   01/24/24 76.7 kg (169 lb)   01/23/24 76.7 kg (169 lb)   01/18/24 77.6 kg (171 lb)   01/17/24 77.8 kg (171 lb 8 oz)   01/11/24 77.6 kg (171 lb)   01/11/24 76.9 kg (169 lb 9.6 oz)   01/09/24 78 kg (172 lb)   01/02/24 78.2 kg (172 lb 6.4 oz)   12/29/23 78.8 kg (173 lb 12.8 oz)   12/27/23 78.8 kg (173 lb 12.8 oz)   12/21/23 81.1 kg (178 lb 12.8 oz)   12/20/23 80.3 kg (177 lb)   12/15/23 81.6 kg (180 lb)   12/14/23 81.3 kg (179 lb 3.2 oz)   12/13/23 80.3 kg (177 lb)   12/06/23 81.6 kg (180 lb)   11/24/23 82.1 kg (181 lb)   10/19/23 82.1 kg (181 lb)   10/13/23 85.3 kg (188 lb)   09/26/23 85.3 kg (188 lb)   08/22/23 80.7 kg (178 lb)   06/08/23 78.9 kg (174 lb)   04/04/23 77.3 kg (170 lb 6.4 oz)   02/21/23 78.7 kg (173 lb 6.4 oz)   02/16/23 78.8 kg (173 lb 12.8 oz)     Weight History:   Usual Weight: 130-145#  Varian: n/a  Home Scale: none    Oncology Nutrition-Anthropometrics      [facility] 2[facility]/2024 in [facility] On[facility] Dietitian [place]   Patient [age] (years): 33 years 33 years   Patient (female) height (in): 67 in 67 in   Current Weight to be used for anthropometric calculations (lbs) 165 lbs 170.8 lbs   Current Weight to be used for anthropometric calculations (kg) 75 kg 77.6 kg   BMI: 25.8 26.7   IBW female: 135 lbs 135 lbs   IBW (kg) female: 61.4 kg 61.4 kg   IBW % (female) 122.2 % 126.5 %   Adjusted BW (female): 142.5 lbs 144 lbs   Adjusted BW kg (female): 64.8 kg 65.5 kg   % weight change after 1 week: -- 1.1 %   Weight change after 1 " week (lbs) -- 1.8 lbs   % weight change after 1 month: -2.4 % -1.7 %   Weight change after 1 month (lbs) -4 lbs -3 lbs   % weight change after 3 months: -8.8 % -5.6 %   Weight change after 3 months (lbs) -16 lbs -10.2 lbs   % weight change after 6 months: -7.3 % -4 %   Weight change after 6 months (lbs) -13 lbs -7.2 lbs   % weight change after 1 year: -5.1 % --   Weight change after 1 year (lbs) -8.8 lbs --            Nutrition-Focused Physical Findings: none observed    Food/Nutrition-Related History & Client/Social History:    Current Nutrition Impact Symptoms:  [x] Nausea   -using zofran  [x] Reduced Appetite  [] Acid Reflux    [] Vomiting  [x] Unintended Wt Loss   -significant x3 months  [] Malabsorption    [] Diarrhea  [] Unintended Wt Gain  [] Dumping Syndrome    [] Constipation  [] Thick Mucous/Secretions  [] Abdominal Pain    [] Dysgeusia (Altered Taste)  [] Xerostomia (Dry Mouth)  [] Gas    [] Dysosmia (Altered Smell)  [] Thrush  [] Difficulty Chewing    [] Oral Mucositis (Sore Mouth)  [] Fatigue  [] Hyperglycemia   Lab Results   Component Value Date    HGBA1C 5.4 12/02/2023      [] Odynophagia  [] Esophagitis  [] Other:    [] Dysphagia  [] Early Satiety  [] No Problems Eating      Food Allergies & Intolerances: yes: lactose intolerant     Current Diet: Lactose-Free and No red meat   Current Nutrition Intake: Unchanged from last visit  Appetite: Poor  Nutrition Route: PO  Oral Care: brushes QD  Activity level: Dizzy often in the morning. Degenerative disc disease limits physical activity.     24 Hr Diet Recall:   Breakfast: bagel   Picks on foods throughout the day. Cheese sticks, crackers.     Beverages: water with lemon (32oz x1); ginger ale (12oz x1)  IV hydration weekly  Supplements:   Homemade Smoothies/Shakes ; plant based protein powder     Oncology Nutrition-Estimated Needs      Flowsheet Meseret Nutrition from 2/21/2024 in [facility] Oncology Dietitian Jailene Nutrition from 2/2/2024 in [facility]  Oncology Dietitian Jailene   Weight type used Actual weight Actual weight   Energy needs formula:  Other (single) Other (single)   Single value (kcal/kg): 2614  [Estimated Energy Requirements + 340kcal for 2nd trimester pregnancy] 2648  [Estimated Energy Requirements + 340kcal for 2nd trimester pregnancy]   Protein needs formula: 1.2-1.5 g/kg 1.2-1.5 g/kg   Protein needs based on 1.2 g/k g 93 g   Protein needs based on 1.5 g/kg 113 g 116 g   Fluid needs formula: 35-40 mL/kg 35-40 mL/kg   Fluid needs based on 35 mL/kg 2625 mL 2720 mL   Fluid needs in ounces 89 oz 92 oz   Fluid needs based on 40 mL/kg 3000 mL 3108 mL   Fluid needs in ounces 101 oz 105 oz          **Estimated nutrition needs are based on comparative standards for 2nd trimester pregnancy.     Discussion & Intervention:   Jillian was evaluated today for an RD follow up regarding poor po intake and pt request for diet guidance throughout treatment .  Jillian is currently undergoing tx for breast cancer . She continues to struggle with nausea and poor appetite. Her PO intakes are unchanged, and remain inadequate. She is eating what she can throughout the day. This morning she was able to tolerate a bagel. She is working on staying hydrated with water and ginger ale.   Based on today's assessment, discussion included: MNT for: breast cancer, nausea/vomiting, weight management, a bland/easy on the stomach diet & food choices to include at all meals & snacks, adequate hydration & fluid choices, eating smaller more frequent meals every 2-3 hours (5-6 small meals/day), keeping non-perishable foods nearby to snack on, and eating when feeling most hungry.   Moving forward, Jillian will continue current nutrition plan of care.  Materials Provided:  Pedialyte samples and coupons     All questions and concerns addressed during today’s visit.  Jillian has RD contact information.    Nutrition Diagnosis:   Inadequate Energy Intake related to physiological causes,  disease state and treatment related issues as evidenced by food recall, wt loss and discussion with pt and/or family.  Increased Nutrient Needs (kcal & pro) related to increased demand for nutrients and disease state as evidenced by cancer dx and pt undergoing tx for cancer.  Patient has clinical indicators (or ASPEN criteria) consistent with severe protein-calorie malnutrition in the context of Chronic Illness as evidenced by >7.5% wt loss in 3 months and </=75% energy intake vs. Estimated needs for >/=1 month.  Monitoring & Evaluation:   Goals:  weight maintenance/stabilization  adequate nutrition impact symptom management  pt to meet >/=75% estimated nutrition needs daily  increase protein intake  increase fluid intake  increase calorie intake    Progress Towards Goals: Progressing    Nutrition Rx & Recommendations:  Diet: High Calorie, High Protein (for high calorie foods see pages 52-53, and for high protein foods see pages 49-51 in your Eating Hints book)  Small, frequent meals/snacks may be easier to tolerate than 3 large daily meals.  Aim for 5-6 small meals per day (every 2-3 hours).  Include protein at all meals/snacks.  Include a variety of foods (as tolerated/allowed by diet).  Follow a Cancer Preventative Nutrition Pattern: colorful, plant-based, low-fat, avoid added sugars, limit alcohol, include high fiber foods, limit processed meats, limit red meat, choose lean protein sources, use low-fat cooking methods, balance calories with physical activity, avoid excessive weight gain throughout life.  Incorporate physical activity as able/allowed.  Stay hydrated by sipping fluids of choice/tolerance throughout the day.  Liquid nutrition may be better tolerated than solids at times.  Alter food choices and eating patterns to accommodate changing needs.  Light physical activity (such as walking) is encouraged, as able/tolerated.  Weigh yourself regularly. If you notice weight loss, make an effort to increase  your daily food/calorie intake. If you continue to notice loss after these efforts, reach out to your dietitian to establish a plan to stabilize weight.    For nausea/vomiting, suggestions include:  Eat 5-6 smaller meals throughout the day instead of 3 large meals.   Sip on calorie containing fluids throughout the day: 100% fruit juice, diluted juice, bone broth (higher protein broth), creamy soups, sports drinks (Gatorade, Poweraide, Pedialyte, etc.), Italian ice, popsicles, milkshakes, smoothies, oral nutrition supplements (Ensure, Boost, Glucerna etc.), gelatin/Jello, etc. (see page 41 of Eating Hints Book for other examples).  Continue gatorade with water. Try Pedialyte samples.   Continue plant based protein drink   Eat bland foods and foods easy on the stomach: clear broth (chicken, vegetable, bone, mushroom, beef), juice (caution with acidic juices), potatoes, pretzels, crackers, cereal (Corn Flakes, Rice Chex, Rice Crispies, Cheerios), oatmeal or cream of wheat, white bread or toast, white rice, cooked vegetables (caution with gas producing vegetables), plain pasta, eggs, peanut butter, boiled chicken or turkey, soft cheeses.  Consume foods and drinks at room temperature. Try to avoid eating/drinking anything too hot or cold.   Suck on tart candies such as lemon drops or ginger chews to help relieve nausea.   Ginger tea or flat ginger ale.   Foods to avoid: Foods with strong odors; High sugar foods such as soda, candies, desserts; Greasy/fried foods; Gas producing foods such as broccoli, cabbage, Gary sprouts, raw fruits/vegetables, beans; Caution with diary products unless they are low lactose or lactose free; Alcohol; Spicy foods; Acidic foods: coffee, tea, citrus, tomato; Avoid eating your favorite foods when you feel nauseous so you don't develop a dislike of those foods.     Follow Up Plan:  3/1/24 during hydration    Recommend Referral to Other Providers: none at this time

## 2024-02-22 ENCOUNTER — PATIENT OUTREACH (OUTPATIENT)
Dept: HEMATOLOGY ONCOLOGY | Facility: CLINIC | Age: 34
End: 2024-02-22

## 2024-02-22 NOTE — PROGRESS NOTES
[facility] outreach scheduled for today to check in after patient's first chemotherapy treatment. A voicemail was left stating thereason for my call and my contact information was provided. I requested a return call at patient's earliest convenience.

## 2024-02-23 ENCOUNTER — HOSPITAL ENCOUNTER (OUTPATIENT)
Dept: INFUSION CENTER | Facility: CLINIC | Age: 34
End: 2024-02-23
Payer: COMMERCIAL

## 2024-02-23 VITALS
DIASTOLIC BLOOD PRESSURE: 55 MMHG | TEMPERATURE: 97.6 F | RESPIRATION RATE: 18 BRPM | SYSTOLIC BLOOD PRESSURE: 104 MMHG | HEART RATE: 79 BPM

## 2024-02-23 DIAGNOSIS — E86.0 DEHYDRATION: Primary | ICD-10-CM

## 2024-02-23 DIAGNOSIS — K21.9 GASTROESOPHAGEAL REFLUX DISEASE WITHOUT ESOPHAGITIS: ICD-10-CM

## 2024-02-23 DIAGNOSIS — R13.10 DYSPHAGIA, UNSPECIFIED TYPE: ICD-10-CM

## 2024-02-23 RX ORDER — OMEPRAZOLE 40 MG/1
40 CAPSULE, DELAYED RELEASE ORAL DAILY
Qty: 30 CAPSULE | Refills: 0 | Status: SHIPPED | OUTPATIENT
Start: 2024-02-23

## 2024-02-23 RX ADMIN — SODIUM CHLORIDE 1000 ML: 0.9 INJECTION, SOLUTION INTRAVENOUS at 08:20

## 2024-02-23 RX ADMIN — ONDANSETRON 8 MG: 2 INJECTION INTRAMUSCULAR; INTRAVENOUS at 08:20

## 2024-02-23 NOTE — PROGRESS NOTES
Jillian Ch  tolerated treatment well with no complications.      Jillian Ch is aware of future appt on 3/1 at 1330.     AVS  (Declined by Jillian Ch) d/c from unit stable

## 2024-02-23 NOTE — PROGRESS NOTES
"Patient returned my call and left voicemail.    \"Wojciech Cota, this is Josefina Ch calling back. Sorry I didn't really tired and exhausted but I did have. Excuse me, I did have fevers this morning but they seem to have subsided and they still have a headache. I know still feels like it's burning but other than that I think I'm alright. I was coughing a bit in the morning too, but I haven't since. I do have allergies, so I don't know if that had anything to do with it. Yeah, If you want, give me a call back. OK. Hi.\"  "

## 2024-02-26 ENCOUNTER — APPOINTMENT (EMERGENCY)
Dept: MRI IMAGING | Facility: HOSPITAL | Age: 34
End: 2024-02-26
Payer: COMMERCIAL

## 2024-02-26 ENCOUNTER — HOSPITAL ENCOUNTER (EMERGENCY)
Facility: HOSPITAL | Age: 34
Discharge: HOME | End: 2024-02-26
Attending: EMERGENCY MEDICINE
Payer: COMMERCIAL

## 2024-02-26 ENCOUNTER — TELEPHONE (OUTPATIENT)
Age: 34
End: 2024-02-26

## 2024-02-26 ENCOUNTER — TELEPHONE (OUTPATIENT)
Dept: HEMATOLOGY ONCOLOGY | Facility: CLINIC | Age: 34
End: 2024-02-26

## 2024-02-26 VITALS
TEMPERATURE: 99.1 F | HEART RATE: 104 BPM | SYSTOLIC BLOOD PRESSURE: 101 MMHG | DIASTOLIC BLOOD PRESSURE: 56 MMHG | OXYGEN SATURATION: 98 % | RESPIRATION RATE: 18 BRPM

## 2024-02-26 VITALS
OXYGEN SATURATION: 100 % | SYSTOLIC BLOOD PRESSURE: 115 MMHG | HEART RATE: 95 BPM | TEMPERATURE: 98.9 F | RESPIRATION RATE: 18 BRPM | DIASTOLIC BLOOD PRESSURE: 67 MMHG

## 2024-02-26 DIAGNOSIS — R42 DIZZINESS: ICD-10-CM

## 2024-02-26 DIAGNOSIS — Z45.2 ENCOUNTER FOR CARE RELATED TO VASCULAR ACCESS PORT: Primary | ICD-10-CM

## 2024-02-26 DIAGNOSIS — O26.899 ABDOMINAL PAIN AFFECTING PREGNANCY: ICD-10-CM

## 2024-02-26 DIAGNOSIS — R10.9 ABDOMINAL PAIN AFFECTING PREGNANCY: ICD-10-CM

## 2024-02-26 DIAGNOSIS — Z17.0 MALIGNANT NEOPLASM OF OVERLAPPING SITES OF LEFT BREAST IN FEMALE, ESTROGEN RECEPTOR POSITIVE: Primary | ICD-10-CM

## 2024-02-26 DIAGNOSIS — R10.9 ABDOMINAL PAIN: Primary | ICD-10-CM

## 2024-02-26 DIAGNOSIS — Z34.92 SECOND TRIMESTER PREGNANCY: ICD-10-CM

## 2024-02-26 DIAGNOSIS — C50.812 MALIGNANT NEOPLASM OF OVERLAPPING SITES OF LEFT BREAST IN FEMALE, ESTROGEN RECEPTOR POSITIVE: Primary | ICD-10-CM

## 2024-02-26 LAB
2HR DELTA HS TROPONIN: <-1 NG/L
ALBUMIN SERPL BCP-MCNC: 3.9 G/DL (ref 3.5–5)
ALP SERPL-CCNC: 72 U/L (ref 34–104)
ALT SERPL W P-5'-P-CCNC: 15 U/L (ref 7–52)
ANION GAP SERPL CALCULATED.3IONS-SCNC: 11 MMOL/L
APTT PPP: 41 SECONDS (ref 23–37)
AST SERPL W P-5'-P-CCNC: 10 U/L (ref 13–39)
BACTERIA UR QL AUTO: NORMAL /HPF
BASOPHILS # BLD MANUAL: 0 THOUSAND/UL (ref 0–0.1)
BASOPHILS NFR MAR MANUAL: 0 % (ref 0–1)
BILIRUB SERPL-MCNC: 0.25 MG/DL (ref 0.2–1)
BILIRUB UR QL STRIP: NEGATIVE
BUN SERPL-MCNC: 8 MG/DL (ref 5–25)
CALCIUM SERPL-MCNC: 8.9 MG/DL (ref 8.4–10.2)
CARDIAC TROPONIN I PNL SERPL HS: 3 NG/L
CARDIAC TROPONIN I PNL SERPL HS: <2 NG/L
CHLORIDE SERPL-SCNC: 102 MMOL/L (ref 96–108)
CLARITY UR: CLEAR
CO2 SERPL-SCNC: 21 MMOL/L (ref 21–32)
COLOR UR: YELLOW
CREAT SERPL-MCNC: 0.55 MG/DL (ref 0.6–1.3)
EOSINOPHIL # BLD MANUAL: 0 THOUSAND/UL (ref 0–0.4)
EOSINOPHIL NFR BLD MANUAL: 0 % (ref 0–6)
ERYTHROCYTE [DISTWIDTH] IN BLOOD BY AUTOMATED COUNT: 13.4 % (ref 11.6–15.1)
FLUAV RNA RESP QL NAA+PROBE: NEGATIVE
FLUBV RNA RESP QL NAA+PROBE: NEGATIVE
GFR SERPL CREATININE-BSD FRML MDRD: 121 ML/MIN/1.73SQ M
GLUCOSE SERPL-MCNC: 93 MG/DL (ref 65–140)
GLUCOSE UR STRIP-MCNC: NEGATIVE MG/DL
HCT VFR BLD AUTO: 31.5 % (ref 34.8–46.1)
HGB BLD-MCNC: 10.3 G/DL (ref 11.5–15.4)
HGB UR QL STRIP.AUTO: NEGATIVE
INR PPP: 1.03 (ref 0.84–1.19)
KETONES UR STRIP-MCNC: NEGATIVE MG/DL
LACTATE SERPL-SCNC: 1.2 MMOL/L (ref 0.5–2)
LEUKOCYTE ESTERASE UR QL STRIP: NEGATIVE
LYMPHOCYTES # BLD AUTO: 0.74 THOUSAND/UL (ref 0.6–4.47)
LYMPHOCYTES # BLD AUTO: 14 % (ref 14–44)
MAGNESIUM SERPL-MCNC: 1.8 MG/DL (ref 1.9–2.7)
MCH RBC QN AUTO: 27.2 PG (ref 26.8–34.3)
MCHC RBC AUTO-ENTMCNC: 32.7 G/DL (ref 31.4–37.4)
MCV RBC AUTO: 83 FL (ref 82–98)
MONOCYTES # BLD AUTO: 0.05 THOUSAND/UL (ref 0–1.22)
MONOCYTES NFR BLD: 1 % (ref 4–12)
NEUTROPHILS # BLD MANUAL: 4.47 THOUSAND/UL (ref 1.85–7.62)
NEUTS SEG NFR BLD AUTO: 85 % (ref 43–75)
NITRITE UR QL STRIP: NEGATIVE
NON-SQ EPI CELLS URNS QL MICRO: NORMAL /HPF
PH UR STRIP.AUTO: 6 [PH]
PHOSPHATE SERPL-MCNC: 3.5 MG/DL (ref 2.7–4.5)
PLATELET # BLD AUTO: 142 THOUSANDS/UL (ref 149–390)
PLATELET BLD QL SMEAR: ADEQUATE
PMV BLD AUTO: 11.3 FL (ref 8.9–12.7)
POTASSIUM SERPL-SCNC: 3.5 MMOL/L (ref 3.5–5.3)
PROT SERPL-MCNC: 7.4 G/DL (ref 6.4–8.4)
PROT UR STRIP-MCNC: ABNORMAL MG/DL
PROTHROMBIN TIME: 14.1 SECONDS (ref 11.6–14.5)
RBC # BLD AUTO: 3.79 MILLION/UL (ref 3.81–5.12)
RBC #/AREA URNS AUTO: NORMAL /HPF
RBC MORPH BLD: NORMAL
RSV RNA RESP QL NAA+PROBE: NEGATIVE
SARS-COV-2 RNA RESP QL NAA+PROBE: NEGATIVE
SODIUM SERPL-SCNC: 134 MMOL/L (ref 135–147)
SP GR UR STRIP.AUTO: 1.03 (ref 1–1.03)
UROBILINOGEN UR STRIP-ACNC: <2 MG/DL
WBC # BLD AUTO: 5.26 THOUSAND/UL (ref 4.31–10.16)
WBC #/AREA URNS AUTO: NORMAL /HPF

## 2024-02-26 PROCEDURE — 84484 ASSAY OF TROPONIN QUANT: CPT | Performed by: EMERGENCY MEDICINE

## 2024-02-26 PROCEDURE — 36415 COLL VENOUS BLD VENIPUNCTURE: CPT | Performed by: EMERGENCY MEDICINE

## 2024-02-26 PROCEDURE — 84100 ASSAY OF PHOSPHORUS: CPT

## 2024-02-26 PROCEDURE — 96372 THER/PROPH/DIAG INJ SC/IM: CPT

## 2024-02-26 PROCEDURE — 72148 MRI LUMBAR SPINE W/O DYE: CPT

## 2024-02-26 PROCEDURE — 96365 THER/PROPH/DIAG IV INF INIT: CPT

## 2024-02-26 PROCEDURE — 96375 TX/PRO/DX INJ NEW DRUG ADDON: CPT

## 2024-02-26 PROCEDURE — 0241U HB NFCT DS VIR RESP RNA 4 TRGT: CPT

## 2024-02-26 PROCEDURE — 85730 THROMBOPLASTIN TIME PARTIAL: CPT

## 2024-02-26 PROCEDURE — 85007 BL SMEAR W/DIFF WBC COUNT: CPT | Performed by: EMERGENCY MEDICINE

## 2024-02-26 PROCEDURE — 99284 EMERGENCY DEPT VISIT MOD MDM: CPT

## 2024-02-26 PROCEDURE — 87147 CULTURE TYPE IMMUNOLOGIC: CPT | Performed by: EMERGENCY MEDICINE

## 2024-02-26 PROCEDURE — 83605 ASSAY OF LACTIC ACID: CPT

## 2024-02-26 PROCEDURE — 85027 COMPLETE CBC AUTOMATED: CPT | Performed by: EMERGENCY MEDICINE

## 2024-02-26 PROCEDURE — 87086 URINE CULTURE/COLONY COUNT: CPT | Performed by: EMERGENCY MEDICINE

## 2024-02-26 PROCEDURE — 96366 THER/PROPH/DIAG IV INF ADDON: CPT

## 2024-02-26 PROCEDURE — 99284 EMERGENCY DEPT VISIT MOD MDM: CPT | Performed by: EMERGENCY MEDICINE

## 2024-02-26 PROCEDURE — 83735 ASSAY OF MAGNESIUM: CPT

## 2024-02-26 PROCEDURE — 87040 BLOOD CULTURE FOR BACTERIA: CPT

## 2024-02-26 PROCEDURE — 85610 PROTHROMBIN TIME: CPT

## 2024-02-26 PROCEDURE — 80053 COMPREHEN METABOLIC PANEL: CPT | Performed by: EMERGENCY MEDICINE

## 2024-02-26 PROCEDURE — 81001 URINALYSIS AUTO W/SCOPE: CPT | Performed by: EMERGENCY MEDICINE

## 2024-02-26 RX ORDER — ENOXAPARIN SODIUM 100 MG/ML
70 INJECTION SUBCUTANEOUS ONCE
Status: COMPLETED | OUTPATIENT
Start: 2024-02-26 | End: 2024-02-26

## 2024-02-26 RX ORDER — ONDANSETRON 2 MG/ML
4 INJECTION INTRAMUSCULAR; INTRAVENOUS ONCE
Status: COMPLETED | OUTPATIENT
Start: 2024-02-26 | End: 2024-02-26

## 2024-02-26 RX ADMIN — ONDANSETRON 4 MG: 2 INJECTION INTRAMUSCULAR; INTRAVENOUS at 16:30

## 2024-02-26 RX ADMIN — ENOXAPARIN SODIUM 70 MG: 80 INJECTION SUBCUTANEOUS at 22:16

## 2024-02-26 RX ADMIN — SODIUM CHLORIDE, SODIUM LACTATE, POTASSIUM CHLORIDE, AND CALCIUM CHLORIDE 1000 ML: .6; .31; .03; .02 INJECTION, SOLUTION INTRAVENOUS at 17:20

## 2024-02-26 NOTE — TELEPHONE ENCOUNTER
----- Message from Jillian Ch sent at 2/25/2024  5:03 PM EST -----  Regarding: Ultrasound   Contact: [phone]  Wojciech Avila,     Anyway I can make sure the baby’s heartbeat is good before going to my appointment on Thursday. I just need to have some peace of mind, the first round of chemo was pretty rough.     Thank you,   Jillian

## 2024-02-26 NOTE — TELEPHONE ENCOUNTER
Rec'd call from patient that her port site was bleeding yesterday and is still bleeding now. Patient states that she is also feeling light headed, dizzy and she has a cough and congestion. COVID test was negative at home on Friday. I advised the patient that she should go to the ED. Patient is in agreement with the plan. Patient is going to call around and see if there is anyone available to take her. ADT 21 placed for zayda.

## 2024-02-26 NOTE — ED NOTES
"Patient ambulated in room. Patient c/o dizziness upon standing and walking around. States, \"it feels like I am going to pass out rather than the room spinning\". Patient placed back in bed and denies dizziness while sitting. Provider notified.      Doris Akhtar RN  02/26/24 [id number]    "

## 2024-02-26 NOTE — ED PROVIDER NOTES
History  Chief Complaint   Patient presents with    Vascular Access Problem     Port placement of right chest on ; began bleeding from site last night. Reports generalized weakness, HA, and fatigue. Pt reports she is also 14 weeks pregnant.      Patient is a 33-year-old female past medical history breast cancer, GERD, anxiety depression, asthma, DVT, migraine presenting for port bleeding, multiple complaints.  Patient states that she had profuse bleeding from her port last night, placed hydrogen peroxide and a Band-Aid on it and does not note any bleeding this morning.  Notes nausea vomiting and diarrhea, 2 episodes this morning, none yesterday and too numerous to count episodes diarrhea yesterday into today decreased p.o. intake.  Notes cough, nasal congestion, sore throat.  States had first round of chemotherapy for breast cancer last week and was given antiemetic but is still vomiting.  Is on Lovenox for prior DVT.  Notes a global headache radiating from the back of her head forward which she states normally her headaches are to one side of her head and this is global.  Notes intermittent dizziness, vertigo last night and lightheadedness currently.  Notes right lower quadrant pain with laying flat for the last several days and low-grade fevers at home temp 99.7.  Denies any chest pain, vaginal discharge, vaginal bleeding, rashes, vision changes, dysuria or hematuria, numbness or tingling or weakness.  Last menstrual period was 10/31 and she is a -0-1-0 at approximately 16 weeks gestation with 1 prior miscarriage.  Denies any head injuries.  Did not take any antipyretic today.        Prior to Admission Medications   Prescriptions Last Dose Informant Patient Reported? Taking?   Prenatal Multivit-Min-Fe-FA (PRE- PO)  Self Yes No   Sig: Take 1 tablet by mouth in the morning   albuterol (PROVENTIL HFA,VENTOLIN HFA) 90 mcg/act inhaler  Self No No   Sig: TAKE 2 PUFFS BY MOUTH EVERY 6 HOURS AS NEEDED FOR  WHEEZE OR FOR SHORTNESS OF BREATH   cetirizine (ZyrTEC) 10 mg tablet   No No   Sig: TAKE 1 TABLET BY MOUTH EVERY DAY   diphenhydramine, lidocaine, Al/Mg hydroxide, simethicone (Magic Mouthwash) SUSP   No No   Sig: SWISH AND SPIT 10 ML EVERY 4 (FOUR) HOURS AS NEEDED FOR MOUTH PAIN OR DISCOMFORT   Patient not taking: Reported on 2/9/2024   enoxaparin (LOVENOX) 150 mg/mL injection   No No   Sig: Inject 0.5 mL (75 mg total) under the skin every 12 (twelve) hours   lidocaine-prilocaine (EMLA) cream  Self No No   Sig: Apply to port 30 to 60 min prior to use   omeprazole (PriLOSEC) 40 MG capsule   No No   Sig: Take 1 capsule (40 mg total) by mouth daily   ondansetron (ZOFRAN) 8 mg tablet   No No   Sig: Take 1 tablet (8 mg total) by mouth every 8 (eight) hours as needed for nausea or vomiting      Facility-Administered Medications: None       Past Medical History:   Diagnosis Date    Anesthesia complication     gait dysfunction/ urinary retention after nerve block,    Anxiety     Asthma     CRPS (complex regional pain syndrome), upper limb     left chest/arm/hand    Deep vein thrombosis (HCC) 01/05/2024    post op from mastectomy    GERD (gastroesophageal reflux disease)     Heart murmur     HPV (human papilloma virus) infection 2012    unsure of 16, 18, or other    Invasive ductal carcinoma of breast, female, left (HCC) 2023    Kidney stone     Miscarriage 3/15/2015    7 weeks along.    Neck pain     Ovarian cyst     Left    PONV (postoperative nausea and vomiting) 01/02/2024       Past Surgical History:   Procedure Laterality Date    COLPOSCOPY  2012    HPV    EGD      IR PORT PLACEMENT  2/7/2024    IR UPPER EXTREMITY VENOGRAM- DIAGNOSTIC  05/28/2021    NM BX/EXC LYMPH NODE OPEN SUPERFICIAL Left 01/02/2024    Procedure: LEFT BREAST MASTECTOMY, LYMPHATIC MAPPING WITH RADIOACTIVE DYE, SENTINEL LYMPH NODE BIOPSY, ZAHEER LEFT BREAST, INJECTION IN OR AT 0800 BY DR CORNELL;  Surgeon: Elena Cornell MD;  Location: [facility];  Service: Surgical Oncology    OR EXCISION 1ST &/CERVICAL RIB Left 08/12/2021    Procedure: FIRST RIB RESECTION;  Surgeon: Jonathan Schaffer MD;  Location: [facility];  Service: Thoracic    OR INJ RADIOACTIVE TRACER FOR ID OF SENTINEL NODE Left 01/02/2024    Procedure: LEFT BREAST MASTECTOMY, LYMPHATIC MAPPING WITH RADIOACTIVE DYE, SENTINEL LYMPH NODE BIOPSY, ZAHEER LEFT BREAST, INJECTION IN OR AT 0800 BY DR CORNELL;  Surgeon: Elena Cornell MD;  Location: [facility];  Service: Surgical Oncology    OR INTRAOP SENTINEL LYMPH NODE ID W/DYE INJECTION Left 01/02/2024    Procedure: LEFT BREAST MASTECTOMY, LYMPHATIC MAPPING WITH RADIOACTIVE DYE, SENTINEL LYMPH NODE BIOPSY, ZAHEER LEFT BREAST, INJECTION IN OR AT 0800 BY DR CORNELL;  Surgeon: Elena Cornell MD;  Location: [facility];  Service: Surgical Oncology    OR MASTECTOMY SIMPLE COMPLETE Left 01/02/2024    Procedure: LEFT BREAST MASTECTOMY, LYMPHATIC MAPPING WITH RADIOACTIVE DYE, SENTINEL LYMPH NODE BIOPSY, ZAHEER LEFT BREAST, INJECTION IN OR AT 0800 BY DR CORNELL;  Surgeon: Elena Cornell MD;  Location: [facility];  Service: Surgical Oncology    THORACOSCOPY VIDEO ASSISTED SURGERY (VATS) Left 08/12/2021    Procedure: THORACOSCOPY VIDEO ASSISTED SURGERY (VATS);  Surgeon: Jonathan Schaffer MD;  Location: [facility];  Service: Thoracic    US GUIDANCE BREAST BIOPSY LEFT EACH ADDITIONAL Left 12/02/2023    US GUIDED BREAST BIOPSY RIGHT COMPLETE Right 12/02/2023    WISDOM TOOTH EXTRACTION  2012       Family History   Problem Relation Age of Onset    Heart disease Mother     Miscarriages / Stillbirths Mother     Coronary artery disease Mother     No Known Problems Father     No Known Problems Half-Brother     Bone cancer Maternal Grandmother         hilda to liver and spine    Miscarriages / Stillbirths Half-Sister     Breast cancer Neg Hx     Ovarian cancer Neg Hx     Uterine cancer Neg Hx     Cervical cancer Neg Hx      I have reviewed and  agree with the history as documented.    E-Cigarette/Vaping    E-Cigarette Use Never User      E-Cigarette/Vaping Substances    Nicotine No     THC No     CBD No     Flavoring No     Other No     Unknown No      Social History     Tobacco Use    Smoking status: Never    Smokeless tobacco: Never   Vaping Use    Vaping status: Never Used   Substance Use Topics    Alcohol use: Not Currently     Comment: social    Drug use: Never       Review of Systems   All other systems reviewed and are negative.      Physical Exam  Physical Exam  Vitals reviewed.   Constitutional:       General: She is not in acute distress.     Appearance: Normal appearance. She is not ill-appearing.   HENT:      Mouth/Throat:      Mouth: Mucous membranes are moist.   Eyes:      Extraocular Movements: Extraocular movements intact.      Conjunctiva/sclera: Conjunctivae normal.      Pupils: Pupils are equal, round, and reactive to light.   Cardiovascular:      Rate and Rhythm: Normal rate and regular rhythm.      Pulses: Normal pulses.      Heart sounds: Normal heart sounds.   Pulmonary:      Effort: Pulmonary effort is normal.      Breath sounds: Normal breath sounds.   Abdominal:      General: Abdomen is flat.      Palpations: Abdomen is soft.      Tenderness: There is abdominal tenderness. There is guarding. There is no right CVA tenderness or left CVA tenderness.      Comments: Right lower quadrant tenderness with intermittent guarding with deep palpation the right lower quadrant   Musculoskeletal:         General: No swelling. Normal range of motion.      Cervical back: Neck supple.      Right lower leg: No edema.      Left lower leg: No edema.   Skin:     General: Skin is warm and dry.   Neurological:      General: No focal deficit present.      Mental Status: She is alert.      Cranial Nerves: No cranial nerve deficit.      Sensory: No sensory deficit.      Motor: No weakness.      Coordination: Coordination normal.      Gait: Gait normal.    Psychiatric:         Mood and Affect: Mood normal.         Vital Signs  ED Triage Vitals [02/26/24 0948]   Temperature Pulse Respirations Blood Pressure SpO2   98.5 °F (36.9 °C) 95 18 115/67 100 %      Temp Source Heart Rate Source Patient Position - Orthostatic VS BP Location FiO2 (%)   Temporal Monitor Sitting Left arm --      Pain Score       5           Vitals:    02/26/24 0948   BP: 115/67   Pulse: 95   Patient Position - Orthostatic VS: Sitting         Visual Acuity      ED Medications  Medications - No data to display    Diagnostic Studies  Results Reviewed       None                   No orders to display              Procedures  Procedures         ED Course                                             Medical Decision Making  Patient is a 33-year-old female past medical history of breast cancer on chemotherapy, migraine, DVT, asthma, GERD, anxiety depression presenting for multiple complaints including port bleeding.  Patient is well-appearing at bedside with stable vitals and in no acute distress.  She does have abdominal tenderness with guarding with deep palpation of the right lower quadrant, no CVA tenderness, no gross amount is on neurologic exam, no active bleeding or tenderness from port site and no other significant physical exam findings.  During examination patient received call from maternal-fetal medicine who she follows with who told her that she was to go to labor and delivery at the [facility].  Have discussed with patient that we can transfer her to [facility] after completion of workup however patient states that she would prefer to have workup performed at [facility] and would like to be discharged at this time.  Have discussed that it would be safer to be transferred an ambulance should patient experience any decompensation undiagnosed medical issues however patient states that she would like to be discharged and will travel over by private vehicle understands risks.  Will  discharge at this time.             Disposition  Final diagnoses:   None     ED Disposition       None          Follow-up Information    None         Patient's Medications   Discharge Prescriptions    No medications on file       No discharge procedures on file.    PDMP Review         Value Time User    PDMP Reviewed  Yes 6/20/2022 11:59 AM JOSE Stover            ED Provider  Electronically Signed by             Katy Hernandez DO  02/26/24 1804

## 2024-02-26 NOTE — TELEPHONE ENCOUNTER
Patient called into office asking if she can get checked to see if baby is okay due to her receiving chemo treatments.   Patient states she is going to the [facility] to the ED and wondering if she can go get checked at the L&D unit.  Call got disconnected.  Left v/m with patient stating she will have to go to the [facility] to be further evaluated at L&D and to call back with any further questions.

## 2024-02-26 NOTE — ED NOTES
"Patient states seen in [facility] ER this morning. Patient reports 16 weeks pregnant, sent to [facility] for OB. OB sent patient to ER for eval due to \"not being 16 weeks pregnant yet.\"      Eleni Catalan RN  02/26/24 1158    "

## 2024-02-26 NOTE — TELEPHONE ENCOUNTER
Spoke to patient who was sent to Labor an [facility]. She states they want to sent her back to ER. She states her port is bleeding and she is having abdominal pain. I advised patient to stay and get evaluated at hospital rather than get an appointment in the office for a heart beat check. Pt receptive to advice.

## 2024-02-26 NOTE — ED PROVIDER NOTES
"History  Chief Complaint   Patient presents with    Dizziness     Pt reports dizziness after port began bleeding last night. Pt states \"I woke up with my shirt wet with blood and the site bleeding\" Hx of infection to port.      33-year-old female with history of breast cancer and 15 weeks pregnant.  Patient initially presented to [facility] ED this morning due to bleeding from her port site as well as suprapubic abdominal pain and flulike symptoms.  Patient was then told that she needed to come to the [facility] ED because she was pregnant and [facility] as OB services.  Patient then presented to [facility] ED with same complaints.  Patient states that her PICC site is no longer bleeding but that her abdominal pain is now more on the right side and radiating down into the groin.  Patient states the pain is sharp and stabbing in nature rating 8 out of 10 worse with palpation.  Pain is not worsened with release.  Patient states that nothing has helped the pain.  Patient is also complaining of myalgias and headache.  Patient states that the headache is associated with dizziness that is changed with sitting to standing.  Dizziness will go away after standing for a few moments.  Headache is throbbing and on the right side of the head.  Patient does mention she had nausea but no vomiting or diarrhea.  Patient is denying chest pain, palpitations, shortness of breath, difficulty breathing, vomiting.        Prior to Admission Medications   Prescriptions Last Dose Informant Patient Reported? Taking?   Prenatal Multivit-Min-Fe-FA (PRE- PO)  Self Yes No   Sig: Take 1 tablet by mouth in the morning   albuterol (PROVENTIL HFA,VENTOLIN HFA) 90 mcg/act inhaler  Self No No   Sig: TAKE 2 PUFFS BY MOUTH EVERY 6 HOURS AS NEEDED FOR WHEEZE OR FOR SHORTNESS OF BREATH   cetirizine (ZyrTEC) 10 mg tablet   No No   Sig: TAKE 1 TABLET BY MOUTH EVERY DAY   diphenhydramine, lidocaine, Al/Mg hydroxide, simethicone (Magic Mouthwash) SUSP   No No   Sig: " SWISH AND SPIT 10 ML EVERY 4 (FOUR) HOURS AS NEEDED FOR MOUTH PAIN OR DISCOMFORT   Patient not taking: Reported on 2/9/2024   enoxaparin (LOVENOX) 150 mg/mL injection   No No   Sig: Inject 0.5 mL (75 mg total) under the skin every 12 (twelve) hours   lidocaine-prilocaine (EMLA) cream  Self No No   Sig: Apply to port 30 to 60 min prior to use   omeprazole (PriLOSEC) 40 MG capsule   No No   Sig: Take 1 capsule (40 mg total) by mouth daily   ondansetron (ZOFRAN) 8 mg tablet   No No   Sig: Take 1 tablet (8 mg total) by mouth every 8 (eight) hours as needed for nausea or vomiting      Facility-Administered Medications: None       Past Medical History:   Diagnosis Date    Anesthesia complication     gait dysfunction/ urinary retention after nerve block,    Anxiety     Asthma     CRPS (complex regional pain syndrome), upper limb     left chest/arm/hand    Deep vein thrombosis (HCC) 01/05/2024    post op from mastectomy    GERD (gastroesophageal reflux disease)     Heart murmur     HPV (human papilloma virus) infection 2012    unsure of 16, 18, or other    Invasive ductal carcinoma of breast, female, left (HCC) 2023    Kidney stone     Miscarriage 3/15/2015    7 weeks along.    Neck pain     Ovarian cyst     Left    PONV (postoperative nausea and vomiting) 01/02/2024       Past Surgical History:   Procedure Laterality Date    COLPOSCOPY  2012    HPV    EGD      IR PORT PLACEMENT  2/7/2024    IR UPPER EXTREMITY VENOGRAM- DIAGNOSTIC  05/28/2021    WV BX/EXC LYMPH NODE OPEN SUPERFICIAL Left 01/02/2024    Procedure: LEFT BREAST MASTECTOMY, LYMPHATIC MAPPING WITH RADIOACTIVE DYE, SENTINEL LYMPH NODE BIOPSY, ZAHEER LEFT BREAST, INJECTION IN OR AT 0800 BY DR CORNELL;  Surgeon: Elena Cornell MD;  Location: [facility];  Service: Surgical Oncology    WV EXCISION 1ST &/CERVICAL RIB Left 08/12/2021    Procedure: FIRST RIB RESECTION;  Surgeon: Jonathan Schaffer MD;  Location: [facility];  Service: Thoracic    WV INJ RADIOACTIVE  TRACER FOR ID OF SENTINEL NODE Left 01/02/2024    Procedure: LEFT BREAST MASTECTOMY, LYMPHATIC MAPPING WITH RADIOACTIVE DYE, SENTINEL LYMPH NODE BIOPSY, ZAHEER LEFT BREAST, INJECTION IN OR AT 0800 BY DR CORNELL;  Surgeon: Elena Cornell MD;  [facility]: [facility];  Service: Surgical Oncology    NE INTRAOP SENTINEL LYMPH NODE ID W/DYE INJECTION Left 01/02/2024    Procedure: LEFT BREAST MASTECTOMY, LYMPHATIC MAPPING WITH RADIOACTIVE DYE, SENTINEL LYMPH NODE BIOPSY, ZAHEER LEFT BREAST, INJECTION IN OR AT 0800 BY DR CORNELL;  Surgeon: Elena Cornell MD;  [facility];  Service: Surgical Oncology    NE MASTECTOMY SIMPLE COMPLETE Left 01/02/2024    Procedure: LEFT BREAST MASTECTOMY, LYMPHATIC MAPPING WITH RADIOACTIVE DYE, SENTINEL LYMPH NODE BIOPSY, ZAHEER LEFT BREAST, INJECTION IN OR AT 0800 BY DR CORNELL;  Surgeon: Elena Cornell MD;  [facility];  Service: Surgical Oncology    THORACOSCOPY VIDEO ASSISTED SURGERY (VATS) Left 08/12/2021    Procedure: THORACOSCOPY VIDEO ASSISTED SURGERY (VATS);  Surgeon: Jonathan Schaffer MD;  [facility]: [facility];  Service: Thoracic    US GUIDANCE BREAST BIOPSY LEFT EACH ADDITIONAL Left 12/02/2023    US GUIDED BREAST BIOPSY RIGHT COMPLETE Right 12/02/2023    WISDOM TOOTH EXTRACTION  2012       Family History   Problem Relation Age of Onset    Heart disease Mother     Miscarriages / Stillbirths Mother     Coronary artery disease Mother     No Known Problems Father     No Known Problems Half-Brother     Bone cancer Maternal Grandmother         hilda to liver and spine    Miscarriages / Stillbirths Half-Sister     Breast cancer Neg Hx     Ovarian cancer Neg Hx     Uterine cancer Neg Hx     Cervical cancer Neg Hx      I have reviewed and agree with the history as documented.    E-Cigarette/Vaping    E-Cigarette Use Never User      E-Cigarette/Vaping Substances    Nicotine No     THC No     CBD No     Flavoring No     Other No     Unknown No      Social  History     Tobacco Use    Smoking status: Never    Smokeless tobacco: Never   Vaping Use    Vaping status: Never Used   Substance Use Topics    Alcohol use: Not Currently     Comment: social    Drug use: Never        Review of Systems   Constitutional:  Positive for chills and fever.   HENT:  Negative for sinus pressure, sinus pain, sneezing and sore throat.    Eyes:  Negative for visual disturbance.   Respiratory:  Negative for chest tightness and shortness of breath.    Cardiovascular:  Negative for chest pain and palpitations.   Gastrointestinal:  Positive for abdominal pain and nausea. Negative for blood in stool, diarrhea and vomiting.   Endocrine: Negative for polyuria.   Genitourinary:  Negative for difficulty urinating, vaginal bleeding, vaginal discharge and vaginal pain.   Musculoskeletal:  Negative for arthralgias.   Skin:  Negative for pallor.   Neurological:  Positive for dizziness and headaches. Negative for weakness, light-headedness and numbness.   Psychiatric/Behavioral:  Negative for agitation.        Physical Exam  ED Triage Vitals   Temperature Pulse Respirations Blood Pressure SpO2   02/26/24 1153 02/26/24 1153 02/26/24 1153 02/26/24 1153 02/26/24 1153   99.1 °F (37.3 °C) (!) 124 16 125/62 100 %      Temp Source Heart Rate Source Patient Position - Orthostatic VS BP Location FiO2 (%)   02/26/24 1153 02/26/24 1153 02/26/24 1153 02/26/24 1153 --   Oral Monitor Sitting Right arm       Pain Score       02/26/24 1510       No Pain             Orthostatic Vital Signs  Vitals:    02/26/24 1153 02/26/24 1406 02/26/24 1510   BP: 125/62 109/58 103/64   Pulse: (!) 124 87 91   Patient Position - Orthostatic VS: Sitting Sitting Lying       Physical Exam  Constitutional:       Appearance: Normal appearance.   HENT:      Head: Normocephalic and atraumatic.      Right Ear: External ear normal.      Left Ear: External ear normal.      Nose: Nose normal.   Eyes:      Extraocular Movements: Extraocular movements  intact.   Cardiovascular:      Rate and Rhythm: Normal rate.      Pulses: Normal pulses.      Heart sounds: Normal heart sounds.   Pulmonary:      Effort: Pulmonary effort is normal.      Breath sounds: Normal breath sounds.   Abdominal:      Palpations: Abdomen is soft.      Tenderness: There is abdominal tenderness in the right lower quadrant and suprapubic area.          Comments: Pain is sharp in nature on palpation and suprapubic to the right region of the lower abdomen.   Musculoskeletal:         General: Normal range of motion.      Cervical back: Normal range of motion.   Skin:     General: Skin is warm.      Capillary Refill: Capillary refill takes less than 2 seconds.   Neurological:      General: No focal deficit present.      Mental Status: She is alert and oriented to person, place, and time.   Psychiatric:         Mood and Affect: Mood normal.         Behavior: Behavior normal.         ED Medications  Medications   lactated ringers bolus 1,000 mL (1,000 mL Intravenous New Bag 2/26/24 1720)   ondansetron (ZOFRAN) injection 4 mg (4 mg Intravenous Given 2/26/24 1630)       Diagnostic Studies  Results Reviewed       Procedure Component Value Units Date/Time    Blood culture #1 [[id number]] Collected: 02/26/24 1321    Lab Status: Preliminary result Specimen: Blood from Arm, Right Updated: 02/26/24 1601     Blood Culture Received in Microbiology Lab. Culture in Progress.    Blood culture #2 [[id number]] Collected: 02/26/24 1336    Lab Status: Preliminary result Specimen: Blood from Arm, Right Updated: 02/26/24 1601     Blood Culture Received in Microbiology Lab. Culture in Progress.    Urine Microscopic [[id number]] Collected: 02/26/24 1528    Lab Status: Final result Specimen: Urine, Clean Catch Updated: 02/26/24 1549     RBC, UA 1-2 /hpf      WBC, UA 1-2 /hpf      Epithelial Cells Occasional /hpf      Bacteria, UA Occasional /hpf      URINE COMMENT --    UA w Reflex to Microscopic w Reflex to Culture  [[id number]]  (Abnormal) Collected: 02/26/24 1528    Lab Status: Final result Specimen: Urine, Clean Catch Updated: 02/26/24 1546     Color, UA Yellow     Clarity, UA Clear     Specific Gravity, UA 1.031     pH, UA 6.0     Leukocytes, UA Negative     Nitrite, UA Negative     Protein, UA Trace mg/dl      Glucose, UA Negative mg/dl      Ketones, UA Negative mg/dl      Urobilinogen, UA <2.0 mg/dl      Bilirubin, UA Negative     Occult Blood, UA Negative     URINE COMMENT --    Urine culture [[id number]] Collected: 02/26/24 1528    Lab Status: In process Specimen: Urine, Clean Catch Updated: 02/26/24 1546    HS Troponin I 2hr [[id number]]  (Normal) Collected: 02/26/24 1509    Lab Status: Final result Specimen: Blood from Arm, Right Updated: 02/26/24 1539     hs TnI 2hr <2 ng/L      Delta 2hr hsTnI <-1 ng/L     COVID/FLU/RSV [[id number]]  (Normal) Collected: 02/26/24 1321    Lab Status: Final result Specimen: Nares from Nasopharyngeal Swab Updated: 02/26/24 1423     SARS-CoV-2 Negative     INFLUENZA A PCR Negative     INFLUENZA B PCR Negative     RSV PCR Negative    Narrative:      FOR PEDIATRIC PATIENTS - copy/paste COVID Guidelines URL to browser: [email or web address]    SARS-CoV-2 assay is a Nucleic Acid Amplification assay intended for the  qualitative detection of nucleic acid from SARS-CoV-2 in nasopharyngeal  swabs. Results are for the presumptive identification of SARS-CoV-2 RNA.    Positive results are indicative of infection with SARS-CoV-2, the virus  causing COVID-19, but do not rule out bacterial infection or co-infection  with other viruses. Laboratories within the [place] and its  territories are required to report all positive results to the appropriate  public health authorities. Negative results do not preclude SARS-CoV-2  infection and should not be used as the sole basis for treatment or other  patient management decisions. Negative results must  be combined with  clinical observations, patient history, and epidemiological information.  This test has not been FDA cleared or approved.    This test has been authorized by FDA under an Emergency Use Authorization  (EUA). This test is only authorized for the duration of time the  declaration that circumstances exist justifying the authorization of the  emergency use of an in vitro diagnostic tests for detection of SARS-CoV-2  virus and/or diagnosis of COVID-19 infection under section 564(b)(1) of  the Act, 21 U.S.C. 360bbb-3(b)(1), unless the authorization is terminated  or revoked sooner. The test has been validated but independent review by FDA  and CLIA is pending.    Test performed using [employer] GeneXpert: This RT-PCR assay targets N2,  a region unique to SARS-CoV-2. A conserved region in the E-gene was chosen  for pan-Sarbecovirus detection which includes SARS-CoV-2.    According to CMS-2020-01-R, this platform meets the definition of high-throughput technology.    Protime-INR [[id number]]  (Normal) Collected: 02/26/24 1327    Lab Status: Final result Specimen: Blood from Arm, Right Updated: 02/26/24 1406     Protime 14.1 seconds      INR 1.03    APTT [[id number]]  (Abnormal) Collected: 02/26/24 1327    Lab Status: Final result Specimen: Blood from Arm, Right Updated: 02/26/24 1406     PTT 41 seconds     RBC Morphology Reflex Test [[id number]] Collected: 02/26/24 1311    Lab Status: Final result Specimen: Blood from Arm, Right Updated: 02/26/24 1401    Lactic acid, plasma (w/reflex if result > 2.0) [[id number]]  (Normal) Collected: 02/26/24 1336    Lab Status: Final result Specimen: Blood from Arm, Right Updated: 02/26/24 1357     LACTIC ACID 1.2 mmol/L     Narrative:      Result may be elevated if tourniquet was used during collection.    CBC and differential [[id number]]  (Abnormal) Collected: 02/26/24 1311    Lab Status: Final result Specimen: Blood from Arm, Right Updated: 02/26/24 1353     WBC 5.26  Thousand/uL      RBC 3.79 Million/uL      Hemoglobin 10.3 g/dL      Hematocrit 31.5 %      MCV 83 fL      MCH 27.2 pg      MCHC 32.7 g/dL      RDW 13.4 %      MPV 11.3 fL      Platelets 142 Thousands/uL     Manual Differential(PHLEBS Do Not Order) [[id number]]  (Abnormal) Collected: 02/26/24 1311    Lab Status: Final result Specimen: Blood from Arm, Right Updated: 02/26/24 1353     Segmented % 85 %      Lymphocytes % 14 %      Monocytes % 1 %      Eosinophils, % 0 %      Basophils % 0 %      Absolute Neutrophils 4.47 Thousand/uL      Lymphocytes Absolute 0.74 Thousand/uL      Monocytes Absolute 0.05 Thousand/uL      Eosinophils Absolute 0.00 Thousand/uL      Basophils Absolute 0.00 Thousand/uL      Total Counted --     RBC Morphology Normal     Platelet Estimate Adequate    HS Troponin 0hr (reflex protocol) [[id number]]  (Normal) Collected: 02/26/24 1311    Lab Status: Final result Specimen: Blood from Arm, Right Updated: 02/26/24 1344     hs TnI 0hr 3 ng/L     Comprehensive metabolic panel [[id number]]  (Abnormal) Collected: 02/26/24 1311    Lab Status: Final result Specimen: Blood from Arm, Right Updated: 02/26/24 1337     Sodium 134 mmol/L      Potassium 3.5 mmol/L      Chloride 102 mmol/L      CO2 21 mmol/L      ANION GAP 11 mmol/L      BUN 8 mg/dL      Creatinine 0.55 mg/dL      Glucose 93 mg/dL      Calcium 8.9 mg/dL      AST 10 U/L      ALT 15 U/L      Alkaline Phosphatase 72 U/L      Total Protein 7.4 g/dL      Albumin 3.9 g/dL      Total Bilirubin 0.25 mg/dL      eGFR 121 ml/min/1.73sq m     Narrative:      National Kidney Disease Foundation guidelines for Chronic Kidney Disease (CKD):     Stage 1 with normal or high GFR (GFR > 90 mL/min/1.73 square meters)    Stage 2 Mild CKD (GFR = 60-89 mL/min/1.73 square meters)    Stage 3A Moderate CKD (GFR = 45-59 mL/min/1.73 square meters)    Stage 3B Moderate CKD (GFR = 30-44 mL/min/1.73 square meters)    Stage 4 Severe CKD (GFR = 15-29 mL/min/1.73 square meters)     Stage 5 End Stage CKD (GFR <15 mL/min/1.73 square meters)  Note: GFR calculation is accurate only with a steady state creatinine    Phosphorus [[id number]]  (Normal) Collected: 02/26/24 1311    Lab Status: Final result Specimen: Blood from Arm, Right Updated: 02/26/24 1337     Phosphorus 3.5 mg/dL     Magnesium [[id number]]  (Abnormal) Collected: 02/26/24 1311    Lab Status: Final result Specimen: Blood from Arm, Right Updated: 02/26/24 1337     Magnesium 1.8 mg/dL                    MRI abdomen appendicitis wo contrast    (Results Pending)         Procedures  Procedures      ED Course  ED Course as of 02/26/24 1744   Mon Feb 26, 2024   1351 Comprehensive metabolic panel(!)  Mild decrease in sodium otherwise unremarkable CMP for pregnancy.   1406 LACTIC ACID: 1.2  reassuring   1505 hs TnI 0hr: 3  reassuring   1506 Segs Relative(!): 85  Mild elevation    1506 COVID/FLU/RSV  nregative   1724 UA w Reflex to Microscopic w Reflex to Culture(!)  Within normal limits   1725 Urine Microscopic  Within normal limits   1725 HS Troponin I 2hr  Reassuring, less than 2   1726 Neuro exam completed on patient.  Cranial nerves II through XII intact with no signs of neurodeficits.  Patient able to do finger-to-nose and heel-to-shin with no deficits.                             SBIRT 22yo+      Flowsheet Row Most Recent Value   Initial Alcohol Screen: US AUDIT-C     1. How often do you have a drink containing alcohol? 0 Filed at: 02/26/2024 1549   2. How many drinks containing alcohol do you have on a typical day you are drinking?  0 Filed at: 02/26/2024 1549   3b. FEMALE Any Age, or MALE 65+: How often do you have 4 or more drinks on one occassion? 0 Filed at: 02/26/2024 1549   Audit-C Score 0 Filed at: 02/26/2024 1549   PETRA: How many times in the past year have you...    Used an illegal drug or used a prescription medication for non-medical reasons? Never Filed at: 02/26/2024 1549                  Medical Decision Making  33-year-old  female presenting to the ED with abdominal pain and flulike symptoms.  Patient is 15 weeks pregnant.  Patient was seen at [facility] earlier today and sent to [place] ED for OB services.    Spoke with OB who states that they just need fetal heart tones otherwise patient is to early on in the pregnancy to be seen by them on the floor.    Concerned at this time for possible electrolyte abnormality, anemia, appendicitis, UTI, post chemo reaction.    Labs: Sepsis-like workup including CBC, CMP, lactic acid, blood cultures, coags, troponin, flu RSV COVID, UA  Medications: Zofran and lactated Ringer's  See ED course for interpretation of labs    Following further evaluation, appendicitis is higher on differential and we will order MRI.    MRI ordered with the permission of radiologist Dr. Cisneros.    Patient signed out to night team.  Pending MRI results.    Amount and/or Complexity of Data Reviewed  Labs: ordered. Decision-making details documented in ED Course.  Radiology: ordered.    Risk  Prescription drug management.          Disposition  Final diagnoses:   None     ED Disposition       None          Follow-up Information    None         Patient's Medications   Discharge Prescriptions    No medications on file     No discharge procedures on file.    PDMP Review         Value Time User    PDMP Reviewed  Yes 6/20/2022 11:59 AM JOSE Stover             ED Provider  Attending physically available and evaluated Jillian Ch. I managed the patient along with the ED Attending.    Electronically Signed by           Rasta Mcneal MD  02/26/24 8404

## 2024-02-26 NOTE — ED PROCEDURE NOTE
Procedure  POC Cardiac US    Date/Time: 2/26/2024 3:16 PM    Performed by: Gerald Woo DO  Authorized by: Gerald Woo DO    Patient location:  ED  Procedure details:     Exam Type:  Educational    Indications: chest pain and dyspnea      Assessment / Evaluation for: cardiac function and pericardial effusion      Exam Type: initial exam      Image quality: limited diagnostic      Image availability:  Video obtained and images available in PACS  Patient Details:     Cardiac Rhythm:  Regular    Mechanical ventilation: No    Cardiac findings:     Echo technique: limited 2D      Views obtained: parasternal long axis, parasternal short axis, subcostal and apical      Pericardial effusion: absent      Tamponade physiology: absent      Wall motion: normal      LV systolic function: normal      RV dilation: none    Pulmonary findings:     Left Lung Findings: left lung sliding      Right lung findings: right lung sliding      B-lines: no B-lines present    IVC findings:     IVC Size: small      IVC Inspiratory Collapse: normal    Interpretation:     Fluid Status:  Euvolemic  POC Pelvic US    Date/Time: 2/26/2024 3:16 PM    Performed by: Gerald Woo DO  Authorized by: Gerald Woo DO    Patient location:  ED  Procedure details:     Exam Type:  Educational    Indications: pregnant with abdominal pain      Assessment for: free pelvic fluid      Technique:  Transabdominal obstetric (HCG+) exam    Views obtained: left adnexa, right adnexa, uterus (transverse and sagittal) and pouch of James      Image quality: limited diagnostic      Image availability:  Images available in PACS and video obtained  Uterine findings:     Intrauterine pregnancy: identified      Single gestation: identified      Fetal heart rate: identified    Right adnexa findings:     Right ovary:  Limited quality    Right ovarian cyst: not identified    Left adnexa findings:     Left ovary:  Limited  quality    Left ovarian cyst: not identified    Other findings:     Free pelvic fluid: not identified      Free peritoneal fluid: not identified    Interpretation:     Ultrasound impressions: indeterminate      Pregnancy findings: intrauterine pregnancy (IUP)    POC Lung US    Date/Time: 2/26/2024 3:16 PM    Performed by: Gerald Woo DO  Authorized by: Gerald Woo DO    Patient location:  ED  Procedure details:     Exam Type:  Educational    Indications: chest pain and dyspnea      Assessment / Evaluation for:  Pleural effusion and pneumothorax    Structures Visualized: pleural line, rib, left hemithorax and right hemithorax      Exam Type: initial exam      Image quality: limited diagnostic      Image availability:  Video obtained and images available in PACS  Left Hemithorax Findings:     Left pleura visualized:  Visualized    Left Hemithorax Findings: normal      Left lung findings: normal interstitium    Right Lung Findings:     Right pleural visualized:  Visualized    Right hemithorax findings: normal      Right lung findings: normal interstitium    Interpretation:     Findings: normal thoracic ultrasound                     Gerald Woo DO  02/29/24 1633

## 2024-02-26 NOTE — TELEPHONE ENCOUNTER
Pt called stating she was speaking with another nurse but got disconnected. Relayed message from AWILDA Mcleod that pt is to go to [facility] L&D. Pt states she will go there when she leaves the ER.

## 2024-02-26 NOTE — ED ATTENDING ATTESTATION
2/26/2024  I, Yoanna Sinclair MD, saw and evaluated the patient. I have discussed the patient with the resident/non-physician practitioner and agree with the resident's/non-physician practitioner's findings, Plan of Care, and MDM as documented in the resident's/non-physician practitioner's note, except where noted. All available labs and Radiology studies were reviewed.  I was present for key portions of any procedure(s) performed by the resident/non-physician practitioner and I was immediately available to provide assistance.       At this point I agree with the current assessment done in the Emergency Department.  I have conducted an independent evaluation of this patient a history and physical is as follows:      34 yo female with breast CA undergoing chemo at 15 weeks p/w multiple complaints.  Reports HA/nausea/vomiting/diarrhea/cough congestion/chills/and RLQ abdominal pain.  Reports feeling dizzy/lightheaded with ambulation.  Denies double vision/loss of vision/change sin vision/speech/motor.  Exam reassuring.  Neuro intact.  No vol overload.  Afebrile.  Non-meningitis.  Pt does have RLQ pain .  Labs and testing reassuring.  Symptoms minus RLQ pain improved with ED treatment, however pt with persistent/significant TTP on exam.  At this point will pursue MRI to r/o appy.  Pt signed out to Dr. Rangel pending results of imaging and re-eval.        ED Course  ED Course as of 02/26/24 1714   Mon Feb 26, 2024   1339 Phosphorus: 3.5  wnl   1339 Comprehensive metabolic panel(!)  Minimally low sodium, otherwise Reassuring, no end organ damage, no AG, normal bicarb.       1416 LACTIC ACID: 1.2  wnl   1416 Protime-INR  wnl   1416 MAGNESIUM(!): 1.8  Minimally low magnesium   1416 Phosphorus: 3.5  wnl   1416 hs TnI 0hr: 3  wnl   1426 COVID/FLU/RSV  negative   1554 UA w Reflex to Microscopic w Reflex to Culture(!)  wnl   1554 Urine Microscopic  wnl         Critical Care Time  Procedures

## 2024-02-28 ENCOUNTER — TELEPHONE (OUTPATIENT)
Dept: HEMATOLOGY ONCOLOGY | Facility: CLINIC | Age: 34
End: 2024-02-28

## 2024-02-28 ENCOUNTER — PATIENT MESSAGE (OUTPATIENT)
Dept: HEMATOLOGY ONCOLOGY | Facility: CLINIC | Age: 34
End: 2024-02-28

## 2024-02-28 DIAGNOSIS — E86.0 DEHYDRATION: Primary | ICD-10-CM

## 2024-02-28 LAB — BACTERIA UR CULT: ABNORMAL

## 2024-02-28 NOTE — TELEPHONE ENCOUNTER
Replied to patients [employer] message with updates. Patient has [place] infusion center number if rescheduling is needed.

## 2024-02-28 NOTE — TELEPHONE ENCOUNTER
Omar Dumont informed and verbalized understanding. Please schedule patient for fluids/zofran 3 times a week

## 2024-02-28 NOTE — TELEPHONE ENCOUNTER
Spoke with patient regarding the symptoms she has been experiencing. Patient states that she was advised from the ED doctor that she was taking her Lovenox wrong and she was giving herself double the dose. She was instructed how to take this correctly at the ED. Patient's port was bleeding on Monday and that was one of the reasons she was sent to the ED. Patient was upset about this because her and her  were taught how to self-inject her Lovenox from Dr. Granda and that is how they have been doing this. Patient also states that she still has a temperature in the 99 range, which is high for her. Patient states that she is not feeling well since her first chemotherapy. She states that it is hard for her to eat/drink anything. Patient tried to drink fluids and eat, but this causes her severe stomach cramps/pain. Patient denies any vomiting, but is nauseous. Patient is having diarrhea 2-3 times daily and it is not getting any better. Patient states that she lost about 25 pounds since her surgery. Patient is feeling weak and fatigue and is generally not feeling well overall. Patient complains of a constant headache since the start of chemo that hasn't went away yet. Patient is requesting that she gets fluids three times a week instead of once weekly since she is having diarrhea and nausea. I informed the patient that I will speak with Dr. Khalil and call her back with a plan.

## 2024-02-28 NOTE — TELEPHONE ENCOUNTER
Spoke with patient and informed her that I spoke with Dr. Khalil, we are going to do fluids and zofran 3 times a week. We can do more often if you want!  For the cough- Dr. Khalil wants you to take Mucinex over the counter.   If your temperature gets over 100.4, please go to the ED.   Dr. Khalil does want you to get imodium to take over the counter for the diarrhea. She says that this is safe for you to take!  At your next visit with Dr. Khalil on 3/12 she will dose reduce the chemo to help with some of these side effects.   Patient verbalized understanding and is in agreement with the plan.

## 2024-02-28 NOTE — PROGRESS NOTES
Patient called me to discuss how she is feeling. Patient was in ER 2 days ago for dizziness/SOB/fatigue. Patient was hydrated and was advised that she was giving herself the incorrect dose of lovenox. When patient was instructed on how to self administer lovenox she was told to inject 1 whole syringe(150 mg) 2 times a day. Dose is ordered as 75 mg (half of syringe) 2 times a day. Patient is concerned about the increased risk of double dosing of lovenox and would like to discuss.     Patient's current symptoms are severe headache in the temporal region, dizziness is improving, wet cough, low grade fevers (highest was 99.7), diarrhea and has lost 25 lbs since her surgery in January. She is working with our oncology dietician already. Denies CP and SOB. She is also asking if she could have hydration infusions more than 1 time a week?     Patient is concerned and would like a call back from Dr. Wilson's team to review and provide recommendations. Advised to patient I will route my message to Dr. Khalil's team as a high priority message. Patient was thankful for my help.

## 2024-02-29 ENCOUNTER — ROUTINE PRENATAL (OUTPATIENT)
Dept: PERINATAL CARE | Facility: OTHER | Age: 34
End: 2024-02-29
Payer: COMMERCIAL

## 2024-02-29 ENCOUNTER — TELEPHONE (OUTPATIENT)
Facility: HOSPITAL | Age: 34
End: 2024-02-29

## 2024-02-29 VITALS
WEIGHT: 162.4 LBS | SYSTOLIC BLOOD PRESSURE: 120 MMHG | BODY MASS INDEX: 25.49 KG/M2 | HEIGHT: 67 IN | HEART RATE: 117 BPM | DIASTOLIC BLOOD PRESSURE: 70 MMHG

## 2024-02-29 DIAGNOSIS — O09.522 ELDERLY MULTIGRAVIDA, SECOND TRIMESTER: Primary | ICD-10-CM

## 2024-02-29 DIAGNOSIS — O9A.112 CANCER COMPLICATING PREGNANCY IN SECOND TRIMESTER: ICD-10-CM

## 2024-02-29 DIAGNOSIS — Z3A.16 16 WEEKS GESTATION OF PREGNANCY: ICD-10-CM

## 2024-02-29 PROCEDURE — 99214 OFFICE O/P EST MOD 30 MIN: CPT | Performed by: OBSTETRICS & GYNECOLOGY

## 2024-02-29 PROCEDURE — 76805 OB US >/= 14 WKS SNGL FETUS: CPT | Performed by: OBSTETRICS & GYNECOLOGY

## 2024-02-29 NOTE — PROGRESS NOTES
Please refer to the [facility] ultrasound report in Ob Procedures for additional information regarding today's visit

## 2024-02-29 NOTE — LETTER
February 29, 2024     Nemo Rose DO  [address] [facility] [address]  [place] [id number]    Patient: Jillian Ch   YOB: 1990   Date of Visit: 2/29/2024       Dear Dr. Rose:    Thank you for referring Jillian Ch to me for evaluation. Below are my notes for this consultation.    If you have questions, please do not hesitate to call me. I look forward to following your patient along with you.         Sincerely,        Hari Alcocer MD        CC: No Recipients    Hari Alcocer MD  2/29/2024  2:41 PM  Sign when Signing Visit  Please refer to the [facility] ultrasound report in Ob Procedures for additional information regarding today's visit

## 2024-03-01 ENCOUNTER — NUTRITION (OUTPATIENT)
Dept: NUTRITION | Facility: CLINIC | Age: 34
End: 2024-03-01

## 2024-03-01 ENCOUNTER — HOSPITAL ENCOUNTER (OUTPATIENT)
Dept: INFUSION CENTER | Facility: CLINIC | Age: 34
End: 2024-03-01
Payer: COMMERCIAL

## 2024-03-01 VITALS
SYSTOLIC BLOOD PRESSURE: 128 MMHG | HEART RATE: 100 BPM | TEMPERATURE: 97.6 F | RESPIRATION RATE: 17 BRPM | DIASTOLIC BLOOD PRESSURE: 57 MMHG

## 2024-03-01 DIAGNOSIS — Z71.3 NUTRITIONAL COUNSELING: Primary | ICD-10-CM

## 2024-03-01 DIAGNOSIS — E86.0 DEHYDRATION: Primary | ICD-10-CM

## 2024-03-01 DIAGNOSIS — C50.812 MALIGNANT NEOPLASM OF OVERLAPPING SITES OF LEFT BREAST IN FEMALE, ESTROGEN RECEPTOR POSITIVE: ICD-10-CM

## 2024-03-01 DIAGNOSIS — Z17.0 MALIGNANT NEOPLASM OF OVERLAPPING SITES OF LEFT BREAST IN FEMALE, ESTROGEN RECEPTOR POSITIVE: ICD-10-CM

## 2024-03-01 PROCEDURE — 96374 THER/PROPH/DIAG INJ IV PUSH: CPT

## 2024-03-01 PROCEDURE — 96361 HYDRATE IV INFUSION ADD-ON: CPT

## 2024-03-01 RX ADMIN — ONDANSETRON 8 MG: 2 INJECTION INTRAMUSCULAR; INTRAVENOUS at 13:56

## 2024-03-01 RX ADMIN — SODIUM CHLORIDE 1000 ML: 0.9 INJECTION, SOLUTION INTRAVENOUS at 13:55

## 2024-03-01 NOTE — PATIENT INSTRUCTIONS
Nutrition Rx & Recommendations:  Diet: High Calorie, High Protein (for high calorie foods see pages 52-53, and for high protein foods see pages 49-51 in your Eating Hints book)  Small, frequent meals/snacks may be easier to tolerate than 3 large daily meals.  Aim for 5-6 small meals per day (every 2-3 hours).  Include protein at all meals/snacks.  Include a variety of foods (as tolerated/allowed by diet).  Follow a Cancer Preventative Nutrition Pattern: colorful, plant-based, low-fat, avoid added sugars, limit alcohol, include high fiber foods, limit processed meats, limit red meat, choose lean protein sources, use low-fat cooking methods, balance calories with physical activity, avoid excessive weight gain throughout life.  Incorporate physical activity as able/allowed.  Stay hydrated by sipping fluids of choice/tolerance throughout the day.  Liquid nutrition may be better tolerated than solids at times.  Alter food choices and eating patterns to accommodate changing needs.  Light physical activity (such as walking) is encouraged, as able/tolerated.  Weigh yourself regularly. If you notice weight loss, make an effort to increase your daily food/calorie intake. If you continue to notice loss after these efforts, reach out to your dietitian to establish a plan to stabilize weight.    For nausea/vomiting, suggestions include:  Eat 5-6 smaller meals throughout the day instead of 3 large meals.   Sip on calorie containing fluids throughout the day: 100% fruit juice, diluted juice, bone broth (higher protein broth), creamy soups, sports drinks (Gatorade, Poweraide, Pedialyte, etc.), Italian ice, popsicles, milkshakes, smoothies, oral nutrition supplements (Ensure, Boost, Glucerna etc.), gelatin/Jello, etc. (see page 41 of Eating Hints Book for other examples).  Continue gatorade with water. Try Pedialyte samples.   Can try alkaline water   Eat bland foods and foods easy on the stomach: clear broth (chicken, vegetable,  bone, mushroom, beef), juice (caution with acidic juices), potatoes, pretzels, crackers, cereal (Corn Flakes, Rice Chex, Rice Crispies, Cheerios), oatmeal or cream of wheat, white bread or toast, white rice, cooked vegetables (caution with gas producing vegetables), plain pasta, eggs, peanut butter, boiled chicken or turkey, soft cheeses.  Consume foods and drinks at room temperature. Try to avoid eating/drinking anything too hot or cold.   Suck on tart candies such as lemon drops or ginger chews to help relieve nausea.   Ginger tea or flat ginger ale.   Foods to avoid: Foods with strong odors; High sugar foods such as soda, candies, desserts; Greasy/fried foods; Gas producing foods such as broccoli, cabbage, Conde sprouts, raw fruits/vegetables, beans; Caution with diary products unless they are low lactose or lactose free; Alcohol; Spicy foods; Acidic foods: coffee, tea, citrus, tomato; Avoid eating your favorite foods when you feel nauseous so you don't develop a dislike of those foods.     Follow Up Plan: 3/13/24 during infusion   Recommend Referral to Other Providers: none at this time

## 2024-03-01 NOTE — PROGRESS NOTES
Outpatient Oncology Nutrition Consultation   Type of Consult: Follow Up  Care Location: [facility]    Reason for referral: Notification from RN Navigator Kamla HAYWOOD on 12/7/23 that pt has triggered for oncology nutrition care (reason for referral: Patient is pregnant and newly diagnosed with breast cancer. Had questions regarding nutrition and sugar intake, etc).     Nutrition Assessment:   Oncology Diagnosis & Treatments:   Diagnosed with left beast cancer, ER positive, 12/2/23.   S/p left mastectomy 1/2/24.   Chemotherapy (doxorubicin, cytoxan, emend) start 2/21/24.   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 Breast, Left, US Guided Left Breast Biopsy 12:00 6cmfn:  Invasive breast carcinoma of no special type (ductal) with osteoclast-like stromal giant cells  Grade 2  ER 85  MI 95  HER2 1+     B. Breast, Right, US Guided Right Breast Biopsy 10:00 9cmfn:  Benign breast tissue.    In review of the patient’s recent imaging, the left malignancy appears unifocal.  The biopsy-proven carcinoma measured 2.1 cm on ultrasound. Ultrasound of the left axilla was performed on 11/24/2023 and showed no suspicious adenopathy.  Recent imaging of the contralateral right breast dated 12/2/2023 and 11/24/2023 was reviewed and shows no suspicious findings.  The pathology results for the ultrasound-guided core needle biopsy (right breast 10:00, 9 cm from the nipple) are benign and concordant with imaging.     12/2/2023 Genetic Testing    [place]  A total of 36 genes  were evaluated, including: APC, TOPHER, BARD 1, BMPR1A, BRCA1, BRCA2, BRIP1, CDH1, CDK4, CKDN2A, CHEK2, DICER1, MLH1, MSH2, MSH6, MUTYH, NBN, NF1, NTHL1, PALB2, PMS2, PTEN, RAD51C, RECQL, SMAD4, SMARCA4, STK11, TP53, AXIN2, HOXB13, MSH3, POLD1, AND POLE, EPCAM, AND GREM1   Negative result. No pathogenic sequence variants or deletions/duplications identified       12/2/2023 -  Cancer Staged    Staging form: Breast, AJCC 8th Edition  - Clinical stage from 12/2/2023: Stage IB (cT2, cN0, cM0, G2, ER+, UT+, HER2-) - Signed by Elena Cornell MD on 12/13/2023  Stage prefix: Initial diagnosis  Histologic grading system: 3 grade system       1/2/2024 Surgery    Left breast mastectomy with sentinel lymph node biopsy  Invasive Mammary carcinoma of no special type (ductal)   Grade 2  25 mm  Margins negative  0/2 Lymph nodes  Anatomic stage IIA  Prognostic stage IA      2/21/2024 -  Chemotherapy    DOXOrubicin (ADRIAMYCIN), 56.25 mg/m2 = 106 mg (93.8 % of original dose 60 mg/m2), Intravenous, Once, 1 of 4 cycles  Dose modification: 56.25 mg/m2 (original dose 60 mg/m2, Cycle 1, Reason: Other (Must fill in a comment), Comment: max recommended dose)  Administration: 106 mg (2/21/2024)  alteplase (CATHFLO), 2 mg, Intracatheter, Every 1 Minute as needed, 1 of 4 cycles  cyclophosphamide (CYTOXAN) IVPB, 600 mg/m2 = 1,128 mg, Intravenous, Once, 1 of 4 cycles  Administration: 1,128 mg (2/21/2024)  fosaprepitant (EMEND) IVPB, 150 mg, Intravenous, Once, 1 of 4 cycles  Administration: 150 mg (2/21/2024)       Past Medical & Surgical Hx:   Patient Active Problem List   Diagnosis    Mild persistent asthma without complication    Axillary hidradenitis suppurativa    Anxiety    Chest wall pain    Gastroesophageal reflux disease without esophagitis    Depression with anxiety    Chronic fatigue    Myalgia    Chronic left shoulder pain    Cervical disc disorder at C5-C6 level with radiculopathy    Atypical squamous cells of undetermined  significance (ASCUS) on Papanicolaou smear of cervix    Hypertrophic and atrophic condition of skin    Migraine headache    Shoulder impingement syndrome, left    Vitamin D deficiency    Close exposure to COVID-19 virus    Thoracic outlet syndrome    Palpitations    Post-operative pain    Transaminitis    Right middle lobe pulmonary nodule    Reversible airway obstruction    SOB (shortness of breath)    Musculoskeletal chest pain    COVID-19 virus infection    Unexplained weight gain    Left ovarian cyst    Dysphagia    Malignant neoplasm of overlapping sites of left breast in female, estrogen receptor positive     Cancer complicating pregnancy in first trimester    13 weeks gestation of pregnancy    Status post left mastectomy    Multigravida of advanced maternal age in first trimester    DVT complicating pregnancy, first trimester    Vaginal bleeding affecting early pregnancy    Dehydration     Past Medical History:   Diagnosis Date    Anesthesia complication     gait dysfunction/ urinary retention after nerve block,    Anxiety     Asthma     CRPS (complex regional pain syndrome), upper limb     left chest/arm/hand    Deep vein thrombosis (HCC) 01/05/2024    post op from mastectomy    GERD (gastroesophageal reflux disease)     Heart murmur     HPV (human papilloma virus) infection 2012    unsure of 16, 18, or other    Invasive ductal carcinoma of breast, female, left (HCC) 2023    Kidney stone     Miscarriage 3/15/2015    7 weeks along.    Neck pain     Ovarian cyst     Left    PONV (postoperative nausea and vomiting) 01/02/2024     Past Surgical History:   Procedure Laterality Date    COLPOSCOPY  2012    HPV    EGD      IR PORT PLACEMENT  2/7/2024    IR UPPER EXTREMITY VENOGRAM- DIAGNOSTIC  05/28/2021    AZ BX/EXC LYMPH NODE OPEN SUPERFICIAL Left 01/02/2024    Procedure: LEFT BREAST MASTECTOMY, LYMPHATIC MAPPING WITH RADIOACTIVE DYE, SENTINEL LYMPH NODE BIOPSY, ZAHEER LEFT BREAST, INJECTION IN OR AT 0800 BY   OSCAR;  Surgeon: Elena Cornell MD;  Location: [facility];  Service: Surgical Oncology    HI EXCISION 1ST &/CERVICAL RIB Left 08/12/2021    Procedure: FIRST RIB RESECTION;  Surgeon: Jonathan Schaffer MD;  [facility]: [facility];  Service: Thoracic    HI INJ RADIOACTIVE TRACER FOR ID OF SENTINEL NODE Left 01/02/2024    Procedure: LEFT BREAST MASTECTOMY, LYMPHATIC MAPPING WITH RADIOACTIVE DYE, SENTINEL LYMPH NODE BIOPSY, ZAHEER LEFT BREAST, INJECTION IN OR AT 0800 BY DR CORNELL;  Surgeon: Elena Cornell MD;  [facility]: [facility];  Service: Surgical Oncology    HI INTRAOP SENTINEL LYMPH NODE ID W/DYE INJECTION Left 01/02/2024    Procedure: LEFT BREAST MASTECTOMY, LYMPHATIC MAPPING WITH RADIOACTIVE DYE, SENTINEL LYMPH NODE BIOPSY, ZAHEER LEFT BREAST, INJECTION IN OR AT 0800 BY DR CORNELL;  Surgeon: Elena Cornell MD;  [facility];  Service: Surgical Oncology    HI MASTECTOMY SIMPLE COMPLETE Left 01/02/2024    Procedure: LEFT BREAST MASTECTOMY, LYMPHATIC MAPPING WITH RADIOACTIVE DYE, SENTINEL LYMPH NODE BIOPSY, ZAHEER LEFT BREAST, INJECTION IN OR AT 0800 BY DR CORNELL;  Surgeon: Elena Cornell MD;  [facility];  Service: Surgical Oncology    THORACOSCOPY VIDEO ASSISTED SURGERY (VATS) Left 08/12/2021    Procedure: THORACOSCOPY VIDEO ASSISTED SURGERY (VATS);  Surgeon: Jonathan Schaffer MD;  [facility]: [facility];  Service: Thoracic    US GUIDANCE BREAST BIOPSY LEFT EACH ADDITIONAL Left 12/02/2023    US GUIDED BREAST BIOPSY RIGHT COMPLETE Right 12/02/2023    WISDOM TOOTH EXTRACTION  2012       Review of Medications:   Vitamins, Supplements and Herbals: Med List Reviewed & pt is only taking: Prenatal MVI                                                                                                                                                                                                                                                                                                                                                                                                                                                                                                                              Current Outpatient Medications:     albuterol (PROVENTIL HFA,VENTOLIN HFA) 90 mcg/act inhaler, TAKE 2 PUFFS BY MOUTH EVERY 6 HOURS AS NEEDED FOR WHEEZE OR FOR SHORTNESS OF BREATH, Disp: 18 g, Rfl: 3    cetirizine (ZyrTEC) 10 mg tablet, TAKE 1 TABLET BY MOUTH EVERY DAY, Disp: 90 tablet, Rfl: 0    diphenhydramine, lidocaine, Al/Mg hydroxide, simethicone (Magic Mouthwash) SUSP, SWISH AND SPIT 10 ML EVERY 4 (FOUR) HOURS AS NEEDED FOR MOUTH PAIN OR DISCOMFORT, Disp: 237 mL, Rfl: 1    enoxaparin (LOVENOX) 150 mg/mL injection, Inject 0.5 mL (75 mg total) under the skin every 12 (twelve) hours, Disp: 180 mL, Rfl: 0    lidocaine-prilocaine (EMLA) cream, Apply to port 30 to 60 min prior to use, Disp: 30 g, Rfl: 2    omeprazole (PriLOSEC) 40 MG capsule, Take 1 capsule (40 mg total) by mouth daily, Disp: 30 capsule, Rfl: 0    ondansetron (ZOFRAN) 8 mg tablet, Take 1 tablet (8 mg total) by mouth every 8 (eight) hours as needed for nausea or vomiting, Disp: 30 tablet, Rfl: 3    Prenatal Multivit-Min-Fe-FA (PRE-SONIA PO), Take 1 tablet by mouth in the morning, Disp: , Rfl:   No current facility-administered medications for this visit.    Facility-Administered Medications Ordered in Other Visits:     alteplase (CATHFLO) injection 2 mg, 2 mg, Intracatheter, Q1MIN PRN, Nemo Khalil,     Most Recent Lab Results:   Lab Results   Component Value Date    WBC 5.26 2024    NEUTROABS 5.24 2024    IRON 83 2020    CHOLESTEROL 127 2023    TRIG 56 2023    HDL 43 (L) 2023    LDLCALC 73 2023    ALT 15 2024    AST 10 (L) 2024    ALB 3.9 2024    SODIUM 134 (L) 2024    SODIUM 135 2024    K 3.5 2024    K 3.4 (L) 2024      "02/26/2024    BUN 8 02/26/2024    BUN 8 02/16/2024    CREATININE 0.55 (L) 02/26/2024    CREATININE 0.54 (L) 02/16/2024    EGFR 121 02/26/2024    PHOS 3.5 02/26/2024    GLUF 87 02/07/2024    GLUF 79 12/09/2023    GLUC 93 02/26/2024    HGBA1C 5.4 12/02/2023    HGBA1C 5.1 02/17/2023    CALCIUM 8.9 02/26/2024    MG 1.8 (L) 02/26/2024       Anthropometric Measurements:   Height: 67\"  Ht Readings from Last 1 Encounters:   02/29/24 5' 7\" (1.702 m)     Wt Readings from Last 30 Encounters:   02/29/24 73.7 kg (162 lb 6.4 oz)   02/21/24 74.8 kg (165 lb)   02/09/24 77.1 kg (170 lb)   02/08/24 73.9 kg (163 lb)   02/07/24 74 kg (163 lb 2.3 oz)   01/30/24 77.5 kg (170 lb 12.8 oz)   01/24/24 76.7 kg (169 lb)   01/23/24 76.7 kg (169 lb)   01/18/24 77.6 kg (171 lb)   01/17/24 77.8 kg (171 lb 8 oz)   01/11/24 77.6 kg (171 lb)   01/11/24 76.9 kg (169 lb 9.6 oz)   01/09/24 78 kg (172 lb)   01/02/24 78.2 kg (172 lb 6.4 oz)   12/29/23 78.8 kg (173 lb 12.8 oz)   12/27/23 78.8 kg (173 lb 12.8 oz)   12/21/23 81.1 kg (178 lb 12.8 oz)   12/20/23 80.3 kg (177 lb)   12/15/23 81.6 kg (180 lb)   12/14/23 81.3 kg (179 lb 3.2 oz)   12/13/23 80.3 kg (177 lb)   12/06/23 81.6 kg (180 lb)   11/24/23 82.1 kg (181 lb)   10/19/23 82.1 kg (181 lb)   10/13/23 85.3 kg (188 lb)   09/26/23 85.3 kg (188 lb)   08/22/23 80.7 kg (178 lb)   06/08/23 78.9 kg (174 lb)   04/04/23 77.3 kg (170 lb 6.4 oz)   02/21/23 78.7 kg (173 lb 6.4 oz)     Weight History:   Usual Weight: 130-145#  Varian: n/a  Home Scale: none    Oncology Nutrition-Anthropometrics      Flowsheet Row Nutrition from 3/1/2024 in [facility] Oncology Dietitian [place] Nutrition from 2/21/2024 in [facility] Oncology Dietitian [place]   Patient [age] (years): 33 years 33 years   Patient (female) height (in): 67 in 67 in   Current Weight to be used for anthropometric calculations (lbs) 162.2 lbs 165 lbs   Current Weight to be used for anthropometric calculations (kg) 73.7 kg 75 kg   BMI: 25.4 25.8 "   IBW female: 135 lbs 135 lbs   IBW (kg) female: 61.4 kg 61.4 kg   IBW % (female) 120.1 % 122.2 %   Adjusted BW (female): 141.8 lbs 142.5 lbs   Adjusted BW kg (female): 64.5 kg 64.8 kg   % weight change after 1 week: -1.7 % --   Weight change after 1 week (lbs) -2.8 lbs --   % weight change after 1 month: -5 % -2.4 %   Weight change after 1 month (lbs) -8.6 lbs -4 lbs   % weight change after 3 months: -10.4 % -8.8 %   Weight change after 3 months (lbs) -18.8 lbs -16 lbs   % weight change after 6 months: -8.9 % -7.3 %   Weight change after 6 months (lbs) -15.8 lbs -13 lbs   % weight change after 1 year: -6.5 % -5.1 %   Weight change after 1 year (lbs) -11.2 lbs -8.8 lbs            Nutrition-Focused Physical Findings: none observed    Food/Nutrition-Related History & Client/Social History:    Current Nutrition Impact Symptoms:  [x] Nausea   -using zofran  [x] Reduced Appetite  [] Acid Reflux    [] Vomiting  [x] Unintended Wt Loss   -significant x3 months  [] Malabsorption    [x] Diarrhea   -recently started taking imodium  [] Unintended Wt Gain  [] Dumping Syndrome    [] Constipation  [] Thick Mucous/Secretions  [] Abdominal Pain    [] Dysgeusia (Altered Taste)  [] Xerostomia (Dry Mouth)  [] Gas    [] Dysosmia (Altered Smell)  [] Thrush  [] Difficulty Chewing    [] Oral Mucositis (Sore Mouth)  [] Fatigue  [] Hyperglycemia   Lab Results   Component Value Date    HGBA1C 5.4 12/02/2023      [] Odynophagia  [] Esophagitis  [] Other:    [] Dysphagia  [] Early Satiety  [] No Problems Eating      Food Allergies & Intolerances: yes: lactose intolerant     Current Diet: Lactose-Free and No red meat   Current Nutrition Intake: Unchanged from last visit  Appetite: Poor  Nutrition Route: PO  Oral Care: brushes QD  Activity level: Dizzy often in the morning. Degenerative disc disease limits physical activity.     24 Hr Diet Recall:   Breakfast: bagel or bread   Picks on foods throughout the day. Veggie straws, applesauce, broth.       Beverages: water with lemon (32oz x1); ginger ale (12oz x1), Pedialyte,  IV hydration 3x weekly  Supplements:   None    Oncology Nutrition-Estimated Needs      Flowsheet Row Nutrition from 3/1/2024 in [facility] Oncology Dietitian [place] Nutrition from 2024 in [facility] Oncology Dietitian [place]   Weight type used Actual weight Actual weight   Energy needs formula:  Other (single)  [Estimated Energy Requirements = Nonpregnant EER + 340kcal for 2nd trimester pregnancy] Other (single)   Single value (kcal/kg): 2608 2614  [Estimated Energy Requirements + 340kcal for 2nd trimester pregnancy]   Protein needs formula: 1.2-1.5 g/kg 1.2-1.5 g/kg   Protein needs based on 1.2 g/k g 90 g   Protein needs based on 1.5 g/kg 111 g 113 g   Fluid needs formula: 35-40 mL/kg 35-40 mL/kg   Fluid needs based on 35 mL/kg 2576 mL 2625 mL   Fluid needs in ounces 87 oz 89 oz   Fluid needs based on 40 mL/kg 2944 mL 3000 mL   Fluid needs in ounces 100 oz 101 oz          **Estimated nutrition needs are based on comparative standards for 2nd trimester pregnancy.     Discussion & Intervention:   Jillian was evaluated today for an RD follow up regarding poor po intake and pt request for diet guidance throughout treatment .  Jillian is currently undergoing tx for breast cancer . She continues to struggle with nausea and poor appetite. She is taking zofran which is somewhat helping her nausea. She is also experiencing diarrhea with any oral intake. Recently she started taking imodium which has helped. She is using water, IV hydration, Pedialyte, and broth for hydration. She recently discussed TPN as a means of nutrition intake with her OB office, and plans to discuss this with her oncologist during her next appointment.   Based on today's assessment, discussion included: MNT for: breast cancer, nausea/vomiting, weight management, a bland/easy on the stomach diet & food choices to include at all meals & snacks, adequate  hydration & fluid choices, eating smaller more frequent meals every 2-3 hours (5-6 small meals/day), keeping non-perishable foods nearby to snack on, and eating when feeling most hungry.   Moving forward, Jillian will continue current nutrition plan of care.  Materials Provided:  [place] samples     All questions and concerns addressed during today’s visit.  Jillian has RD contact information.    Nutrition Diagnosis:   Inadequate Energy Intake related to physiological causes, disease state and treatment related issues as evidenced by food recall, wt loss and discussion with pt and/or family.  Increased Nutrient Needs (kcal & pro) related to increased demand for nutrients and disease state as evidenced by cancer dx and pt undergoing tx for cancer.  Patient has clinical indicators (or ASPEN criteria) consistent with severe protein-calorie malnutrition in the context of Chronic Illness as evidenced by >7.5% wt loss in 3 months and </=75% energy intake vs. Estimated needs for >/=1 month.  Monitoring & Evaluation:   Goals:  weight maintenance/stabilization  adequate nutrition impact symptom management  pt to meet >/=75% estimated nutrition needs daily  increase protein intake  increase fluid intake  increase calorie intake    Progress Towards Goals: Not Progressing    Nutrition Rx & Recommendations:  Diet: High Calorie, High Protein (for high calorie foods see pages 52-53, and for high protein foods see pages 49-51 in your Eating Hints book)  Small, frequent meals/snacks may be easier to tolerate than 3 large daily meals.  Aim for 5-6 small meals per day (every 2-3 hours).  Include protein at all meals/snacks.  Include a variety of foods (as tolerated/allowed by diet).  Follow a Cancer Preventative Nutrition Pattern: colorful, plant-based, low-fat, avoid added sugars, limit alcohol, include high fiber foods, limit processed meats, limit red meat, choose lean protein sources, use low-fat cooking methods, balance calories  with physical activity, avoid excessive weight gain throughout life.  Incorporate physical activity as able/allowed.  Stay hydrated by sipping fluids of choice/tolerance throughout the day.  Liquid nutrition may be better tolerated than solids at times.  Alter food choices and eating patterns to accommodate changing needs.  Light physical activity (such as walking) is encouraged, as able/tolerated.  Weigh yourself regularly. If you notice weight loss, make an effort to increase your daily food/calorie intake. If you continue to notice loss after these efforts, reach out to your dietitian to establish a plan to stabilize weight.    For nausea/vomiting, suggestions include:  Eat 5-6 smaller meals throughout the day instead of 3 large meals.   Sip on calorie containing fluids throughout the day: 100% fruit juice, diluted juice, bone broth (higher protein broth), creamy soups, sports drinks (Gatorade, Poweraide, Pedialyte, etc.), Italian ice, popsicles, milkshakes, smoothies, oral nutrition supplements (Ensure, Boost, Glucerna etc.), gelatin/Jello, etc. (see page 41 of Eating Hints Book for other examples).  Continue gatorade with water. Try Pedialyte samples.   Can try alkaline water   Eat bland foods and foods easy on the stomach: clear broth (chicken, vegetable, bone, mushroom, beef), juice (caution with acidic juices), potatoes, pretzels, crackers, cereal (Corn Flakes, Rice Chex, Rice Crispies, Cheerios), oatmeal or cream of wheat, white bread or toast, white rice, cooked vegetables (caution with gas producing vegetables), plain pasta, eggs, peanut butter, boiled chicken or turkey, soft cheeses.  Consume foods and drinks at room temperature. Try to avoid eating/drinking anything too hot or cold.   Suck on tart candies such as lemon drops or ginger chews to help relieve nausea.   Ginger tea or flat ginger ale.   Foods to avoid: Foods with strong odors; High sugar foods such as soda, candies, desserts; Greasy/fried  foods; Gas producing foods such as broccoli, cabbage, [place] sprouts, raw fruits/vegetables, beans; Caution with diary products unless they are low lactose or lactose free; Alcohol; Spicy foods; Acidic foods: coffee, tea, citrus, tomato; Avoid eating your favorite foods when you feel nauseous so you don't develop a dislike of those foods.     Follow Up Plan:  3/13/24 during infusion    Recommend Referral to Other Providers: none at this time

## 2024-03-01 NOTE — PROGRESS NOTES
Patient here today for hydration and zofran.  Pt states she was in the ED on Monday because the glue fell off the right side incision of her port and it would not stop bleeding.  The right side of the port incision is scabbed over but there is a small hole that is not closed.  Reached out to Claire Leong RN and a stat consult to IR was made for the port.  Hydration infused through PIV today.

## 2024-03-02 LAB
BACTERIA BLD CULT: NORMAL
BACTERIA BLD CULT: NORMAL

## 2024-03-04 ENCOUNTER — PATIENT OUTREACH (OUTPATIENT)
Dept: CASE MANAGEMENT | Facility: HOSPITAL | Age: 34
End: 2024-03-04

## 2024-03-04 ENCOUNTER — HOSPITAL ENCOUNTER (OUTPATIENT)
Dept: NON INVASIVE DIAGNOSTICS | Facility: HOSPITAL | Age: 34
Discharge: HOME | End: 2024-03-04
Attending: INTERNAL MEDICINE

## 2024-03-04 ENCOUNTER — HOSPITAL ENCOUNTER (OUTPATIENT)
Dept: INFUSION CENTER | Facility: CLINIC | Age: 34
Discharge: HOME | End: 2024-03-04
Payer: COMMERCIAL

## 2024-03-04 VITALS
HEART RATE: 92 BPM | TEMPERATURE: 97.1 F | RESPIRATION RATE: 18 BRPM | DIASTOLIC BLOOD PRESSURE: 58 MMHG | SYSTOLIC BLOOD PRESSURE: 121 MMHG

## 2024-03-04 DIAGNOSIS — E86.0 DEHYDRATION: Primary | ICD-10-CM

## 2024-03-04 DIAGNOSIS — Z17.0 MALIGNANT NEOPLASM OF OVERLAPPING SITES OF LEFT BREAST IN FEMALE, ESTROGEN RECEPTOR POSITIVE: ICD-10-CM

## 2024-03-04 DIAGNOSIS — C50.812 MALIGNANT NEOPLASM OF OVERLAPPING SITES OF LEFT BREAST IN FEMALE, ESTROGEN RECEPTOR POSITIVE: ICD-10-CM

## 2024-03-04 PROCEDURE — 96361 HYDRATE IV INFUSION ADD-ON: CPT

## 2024-03-04 PROCEDURE — 96374 THER/PROPH/DIAG INJ IV PUSH: CPT

## 2024-03-04 RX ADMIN — ONDANSETRON 8 MG: 2 INJECTION INTRAMUSCULAR; INTRAVENOUS at 09:12

## 2024-03-04 RX ADMIN — SODIUM CHLORIDE 1000 ML: 0.9 INJECTION, SOLUTION INTRAVENOUS at 09:37

## 2024-03-04 NOTE — PROGRESS NOTES
Pt presents to clinic for hydration. Pt offers no complaints. Tolerated infusion without complications. Port de-accessed. Pt aware of next appointment on 3/6/24 at 1600. AVS declined.

## 2024-03-04 NOTE — QUICK NOTE
Interventional Radiology Quick Note:    33-year-old female with left breast cancer currently undergoing chemotherapy status post insertion of right chest port 2/7/2024.  She presents to interventional radiology this morning with complaints of occasional bleeding from the lateral end of the RCW port incision site x 2. Upon evaluation, previous redness and irritation resolved. There is a small pinhole opening at the lateral aspect of the incision. The port was accessed today and used without difficulty. She denies pain or discomfort with the port. She denies fever and chills. Dr. Cortes at bedside for patient consultation.      Plan:  Steri-strip  x 1 applied to lateral aspect of RCW port incision.  Site incision care instructions provided   Discussed ER precautions   Aware to call IR for concerns regarding infection, bleeding   Remainder of care per primary service team    JOSE Foote

## 2024-03-04 NOTE — PROGRESS NOTES
"ALISSA met with pt in the infusion center while she was receiving hydration this morning.  We have spoken on the phone a few times but this is the first opportunity to meet in person.  Pt was appreciative of the visit and opportunity to have someone to talk to.  She is still struggling to manage both pregnancy and chemo symptoms, most notably N/V/D.  This has caused her weight loss during pregnancy.  She is struggling to find foods that she can keep down and is worried about her nutrition for her baby.  We talked about a few things that are working for her and maybe a few other suggestions to try as well.  She has seen our RD and is in close contact with her OB office as well.    Pt shared that this has been a really tough way to experience her first pregnancy, and that she feels \"jipped\" of the experience.  She notes that she's so sick most days that she's unable to do much of anything, let alone plan for a new baby.  On the days that she does feel somewhat decent, she is then consumed with worry for what treatment and this much sickness are doing to her baby.  She notes that she has significant support from her mother (lives 4 hours away), her sister (1 hour away), and her mother and sister in law, who live locally.  Her mother in law has attended almost all of her appointments with her but is out of town for today's hydration.  She says that she feels guilty taking up so much of her time when she could be enjoying her halfway more, but she does admit that she's grateful for their close relationship and all of her support.    Pt shared that her  really does not address any of his feelings or emotions around all of this, and spends most of his energy staying positive.  We agreed that he likely feels like he has no other option.  She notes that he has a lifelong group of friends who recently got together with him to celebrate the pregnancy, and she hopes that this was enjoyable for him.  I suggested that she " "reach out to one or two of them directly and ask that they try to get him to talk if he's willing, and she agreed this would be a good idea.  We also discussed support groups for them both, and she will let me know if they would like this information in the future.  They also have a friend around their age who just lost his wife to colon cancer 2 months ago, and he has offered pt to be supportive to her .  She says that she has pushed him to follow up on this but he has not yet done so.    Pt has applied for SSD benefits online and has a phone call scheduled with them in the next week.  She is hopeful that this will be approved and we talked about the appeals process if it is not.  She spoke about the \"income protection\" plan that she is on through her employer and her confusion about this policy.  She says that this was just set up for her, she was not consulted on the pros and cons, but from her point of view it is not ideal.  She is out of work until January 2025, however she is at a significantly reduced income and will return to work with zero PTO or sick time to use.  She will not even be eligible to start accruing any PTO or sick time again until she's worked 187 days.  We both agree that this is highly unlikely to work out well for her as she will have an infant and have her own follow up appointments to attend without any other illness happening on top of all of that.    Pt's hydration was almost finished and she expressed her appreciation for the visit today.  I encouraged her to reach out via email or phone as needed and asked her to let me know about the SSD outcome.  I will continue to check in with her and assist or support her as needed moving forward, no other needs noted at this time.  "

## 2024-03-06 ENCOUNTER — HOSPITAL ENCOUNTER (OUTPATIENT)
Dept: INFUSION CENTER | Facility: CLINIC | Age: 34
Discharge: HOME | End: 2024-03-06
Payer: COMMERCIAL

## 2024-03-06 VITALS
DIASTOLIC BLOOD PRESSURE: 58 MMHG | HEART RATE: 82 BPM | TEMPERATURE: 98.3 F | SYSTOLIC BLOOD PRESSURE: 111 MMHG | RESPIRATION RATE: 18 BRPM

## 2024-03-06 DIAGNOSIS — E86.0 DEHYDRATION: Primary | ICD-10-CM

## 2024-03-06 RX ADMIN — SODIUM CHLORIDE 1000 ML: 0.9 INJECTION, SOLUTION INTRAVENOUS at 16:11

## 2024-03-06 NOTE — PROGRESS NOTES
Patient arrives to infusion center for IV hydration. Patient's port healing well, PIV established. Patient tolerating infusion at this time. Care handoff given to Irene Quevedo RN     Next appointment: 3/8/24 @ 1200

## 2024-03-06 NOTE — PROGRESS NOTES
Pt tolerated remainder of infusion without any incidents. PIV removed. Pt aware of next appt. Pt ambulated out of department safely.

## 2024-03-08 ENCOUNTER — APPOINTMENT (OUTPATIENT)
Dept: LAB | Facility: HOSPITAL | Age: 34
End: 2024-03-08
Attending: STUDENT IN AN ORGANIZED HEALTH CARE EDUCATION/TRAINING PROGRAM
Payer: COMMERCIAL

## 2024-03-08 ENCOUNTER — ROUTINE PRENATAL (OUTPATIENT)
Dept: OBGYN CLINIC | Facility: MEDICAL CENTER | Age: 34
End: 2024-03-08
Payer: COMMERCIAL

## 2024-03-08 ENCOUNTER — TELEPHONE (OUTPATIENT)
Dept: INTERVENTIONAL RADIOLOGY/VASCULAR | Facility: CLINIC | Age: 34
End: 2024-03-08

## 2024-03-08 ENCOUNTER — HOSPITAL ENCOUNTER (OUTPATIENT)
Dept: INFUSION CENTER | Facility: CLINIC | Age: 34
End: 2024-03-08
Payer: COMMERCIAL

## 2024-03-08 VITALS
SYSTOLIC BLOOD PRESSURE: 106 MMHG | DIASTOLIC BLOOD PRESSURE: 76 MMHG | HEIGHT: 67 IN | WEIGHT: 167.6 LBS | BODY MASS INDEX: 26.3 KG/M2 | HEART RATE: 84 BPM

## 2024-03-08 VITALS
SYSTOLIC BLOOD PRESSURE: 121 MMHG | HEART RATE: 88 BPM | RESPIRATION RATE: 18 BRPM | DIASTOLIC BLOOD PRESSURE: 59 MMHG | TEMPERATURE: 97.5 F

## 2024-03-08 DIAGNOSIS — Z17.0 MALIGNANT NEOPLASM OF OVERLAPPING SITES OF LEFT BREAST IN FEMALE, ESTROGEN RECEPTOR POSITIVE: ICD-10-CM

## 2024-03-08 DIAGNOSIS — E86.0 DEHYDRATION: Primary | ICD-10-CM

## 2024-03-08 DIAGNOSIS — Z36.9 UNSPECIFIED ANTENATAL SCREENING: ICD-10-CM

## 2024-03-08 DIAGNOSIS — O22.31 DVT COMPLICATING PREGNANCY, FIRST TRIMESTER: ICD-10-CM

## 2024-03-08 DIAGNOSIS — Z34.82 PRENATAL CARE, SUBSEQUENT PREGNANCY, SECOND TRIMESTER: Primary | ICD-10-CM

## 2024-03-08 DIAGNOSIS — O9A.111: ICD-10-CM

## 2024-03-08 DIAGNOSIS — Z3A.17 17 WEEKS GESTATION OF PREGNANCY: ICD-10-CM

## 2024-03-08 DIAGNOSIS — C50.812 MALIGNANT NEOPLASM OF OVERLAPPING SITES OF LEFT BREAST IN FEMALE, ESTROGEN RECEPTOR POSITIVE: ICD-10-CM

## 2024-03-08 PROBLEM — O20.9 VAGINAL BLEEDING AFFECTING EARLY PREGNANCY: Status: RESOLVED | Noted: 2024-01-18 | Resolved: 2024-03-08

## 2024-03-08 LAB
ALBUMIN SERPL BCP-MCNC: 3.7 G/DL (ref 3.5–5)
ALP SERPL-CCNC: 73 U/L (ref 34–104)
ALT SERPL W P-5'-P-CCNC: 44 U/L (ref 7–52)
ANION GAP SERPL CALCULATED.3IONS-SCNC: 8 MMOL/L
AST SERPL W P-5'-P-CCNC: 24 U/L (ref 13–39)
BASOPHILS # BLD MANUAL: 0 THOUSAND/UL (ref 0–0.1)
BASOPHILS NFR MAR MANUAL: 0 % (ref 0–1)
BILIRUB SERPL-MCNC: 0.16 MG/DL (ref 0.2–1)
BUN SERPL-MCNC: 7 MG/DL (ref 5–25)
CALCIUM SERPL-MCNC: 8.8 MG/DL (ref 8.4–10.2)
CHLORIDE SERPL-SCNC: 107 MMOL/L (ref 96–108)
CO2 SERPL-SCNC: 23 MMOL/L (ref 21–32)
CREAT SERPL-MCNC: 0.49 MG/DL (ref 0.6–1.3)
EOSINOPHIL # BLD MANUAL: 0 THOUSAND/UL (ref 0–0.4)
EOSINOPHIL NFR BLD MANUAL: 0 % (ref 0–6)
ERYTHROCYTE [DISTWIDTH] IN BLOOD BY AUTOMATED COUNT: 14.6 % (ref 11.6–15.1)
GFR SERPL CREATININE-BSD FRML MDRD: 126 ML/MIN/1.73SQ M
GLUCOSE SERPL-MCNC: 108 MG/DL (ref 65–140)
HCT VFR BLD AUTO: 28.7 % (ref 34.8–46.1)
HGB BLD-MCNC: 9.4 G/DL (ref 11.5–15.4)
LYMPHOCYTES # BLD AUTO: 1.02 THOUSAND/UL (ref 0.6–4.47)
LYMPHOCYTES # BLD AUTO: 26 % (ref 14–44)
MCH RBC QN AUTO: 27.3 PG (ref 26.8–34.3)
MCHC RBC AUTO-ENTMCNC: 32.8 G/DL (ref 31.4–37.4)
MCV RBC AUTO: 83 FL (ref 82–98)
MONOCYTES # BLD AUTO: 0.39 THOUSAND/UL (ref 0–1.22)
MONOCYTES NFR BLD: 10 % (ref 4–12)
NEUTROPHILS # BLD MANUAL: 2.52 THOUSAND/UL (ref 1.85–7.62)
NEUTS BAND NFR BLD MANUAL: 4 % (ref 0–8)
NEUTS SEG NFR BLD AUTO: 60 % (ref 43–75)
PLATELET # BLD AUTO: 223 THOUSANDS/UL (ref 149–390)
PLATELET BLD QL SMEAR: ADEQUATE
PMV BLD AUTO: 11 FL (ref 8.9–12.7)
POTASSIUM SERPL-SCNC: 3.4 MMOL/L (ref 3.5–5.3)
PROT SERPL-MCNC: 6.8 G/DL (ref 6.4–8.4)
RBC # BLD AUTO: 3.44 MILLION/UL (ref 3.81–5.12)
RBC MORPH BLD: NORMAL
SL AMB  POCT GLUCOSE, UA: NORMAL
SL AMB POCT URINE PROTEIN: NORMAL
SODIUM SERPL-SCNC: 138 MMOL/L (ref 135–147)
WBC # BLD AUTO: 3.94 THOUSAND/UL (ref 4.31–10.16)

## 2024-03-08 PROCEDURE — 85007 BL SMEAR W/DIFF WBC COUNT: CPT | Performed by: INTERNAL MEDICINE

## 2024-03-08 PROCEDURE — PNV: Performed by: STUDENT IN AN ORGANIZED HEALTH CARE EDUCATION/TRAINING PROGRAM

## 2024-03-08 PROCEDURE — 96374 THER/PROPH/DIAG INJ IV PUSH: CPT

## 2024-03-08 PROCEDURE — 36415 COLL VENOUS BLD VENIPUNCTURE: CPT

## 2024-03-08 PROCEDURE — 85027 COMPLETE CBC AUTOMATED: CPT | Performed by: INTERNAL MEDICINE

## 2024-03-08 PROCEDURE — 82105 ALPHA-FETOPROTEIN SERUM: CPT

## 2024-03-08 PROCEDURE — 81002 URINALYSIS NONAUTO W/O SCOPE: CPT | Performed by: STUDENT IN AN ORGANIZED HEALTH CARE EDUCATION/TRAINING PROGRAM

## 2024-03-08 PROCEDURE — 80053 COMPREHEN METABOLIC PANEL: CPT | Performed by: INTERNAL MEDICINE

## 2024-03-08 PROCEDURE — 96361 HYDRATE IV INFUSION ADD-ON: CPT

## 2024-03-08 RX ADMIN — SODIUM CHLORIDE 1000 ML: 0.9 INJECTION, SOLUTION INTRAVENOUS at 12:10

## 2024-03-08 RX ADMIN — ONDANSETRON 8 MG: 2 INJECTION INTRAMUSCULAR; INTRAVENOUS at 12:17

## 2024-03-08 NOTE — PROGRESS NOTES
Patient here for hydration and zofran, is feeling ok. Tolerated these without incident. Next appt Monday at 2:30 for hydration. Labs also drawn today for treatment next week. Declined AVS.

## 2024-03-08 NOTE — ASSESSMENT & PLAN NOTE
-post operative course of left mastectomy complicated by left DVT.   -continue lovenox 75mg BID. Initially, there was some confusion with appropriate therapeutic dose. She was instructed by heme onc to continue 75mg BID for now, has appointment with them on 3/12/24

## 2024-03-08 NOTE — PROGRESS NOTES
17 weeks gestation of pregnancy  32yo  at 17.3wks presents for routine prenatal visit     Pt denies pelvic cramping, vaginal bleeding, leakage of fluid.     -continue PNVs   -AFP ordered today   -repeat growth US scheduled 3/28/24  - labor precautions provided   -return in 3 weeks     Cancer complicating pregnancy in first trimester  -breast cancer diagnosed early in pregnancy. 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 Pt tolerating chemotherapy well. Has port placed in upper right chest. Has had some issues with blisters due to tape around port, she has appointment to get it checked today. No overlying erythema or signs of infection on exam.   -pt has lost about 25lbs, has been going to infusion center for hydration treatments. Per MFM, can consider TPN if pt continues to be unable to sustain weight.    DVT complicating pregnancy, first trimester  -post operative course of left mastectomy complicated by left DVT.   -continue lovenox 75mg BID. Initially, there was some confusion with appropriate therapeutic dose. She was instructed by heme onc to continue 75mg BID for now, has appointment with them on 3/12/24

## 2024-03-08 NOTE — TELEPHONE ENCOUNTER
Patient seen again today for follow-up regarding port incision site irritation and pinhole on lateral aspect of the incision.    Patient seen on 3/4/24. Steri-strip was applied. Patient reported yesterday that she developed a blister by the bottom of steri-strip. Patient advised yesterday to either partially remove or fully remove the steri-strip.    Patient removed the bottom portion of the steri-strip yesterday. Patient noted to have a dried scabby area underneath a bandaid.    Steri-strip was removed, area cleansed. A very small steri-strip was applied to lateral aspect of incision site.    Patient advised that if she develops any irritation to completely remove the steri-strip and just cover with a guaze. Supplies given to patient. Patient also advised to remove the band-aid later today to decrease irritation.    Patient will follow-up if no or worsening improvement.

## 2024-03-08 NOTE — ASSESSMENT & PLAN NOTE
-breast cancer diagnosed early in pregnancy. 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 Pt tolerating chemotherapy well. Has port placed in upper right chest. Has had some issues with blisters due to tape around port, she has appointment to get it checked today. No overlying erythema or signs of infection on exam.   -pt has lost about 25lbs, has been going to infusion center for hydration treatments. Per MFM, can consider TPN if pt continues to be unable to sustain weight.

## 2024-03-08 NOTE — ASSESSMENT & PLAN NOTE
32yo  at 17.3wks presents for routine prenatal visit     Pt denies pelvic cramping, vaginal bleeding, leakage of fluid.     -continue PNVs   -AFP ordered today   -repeat growth US scheduled 3/28/24  - labor precautions provided   -return in 3 weeks

## 2024-03-10 LAB
2ND TRIMESTER 4 SCREEN SERPL-IMP: NORMAL
AFP ADJ MOM SERPL: 1.05
AFP INTERP AMN-IMP: NORMAL
AFP INTERP SERPL-IMP: NORMAL
AFP INTERP SERPL-IMP: NORMAL
AFP SERPL-MCNC: 39.7 NG/ML
AGE AT DELIVERY: 35.8 YR
GA METHOD: NORMAL
GA: 17.4 WEEKS
IDDM PATIENT QL: NO
MULTIPLE PREGNANCY: NO
NEURAL TUBE DEFECT RISK FETUS: NORMAL %

## 2024-03-11 ENCOUNTER — HOSPITAL ENCOUNTER (OUTPATIENT)
Dept: INFUSION CENTER | Facility: CLINIC | Age: 34
Discharge: HOME | End: 2024-03-11
Payer: COMMERCIAL

## 2024-03-11 ENCOUNTER — TELEPHONE (OUTPATIENT)
Dept: OBGYN CLINIC | Facility: MEDICAL CENTER | Age: 34
End: 2024-03-11

## 2024-03-11 VITALS
RESPIRATION RATE: 18 BRPM | HEART RATE: 86 BPM | SYSTOLIC BLOOD PRESSURE: 117 MMHG | TEMPERATURE: 98.6 F | DIASTOLIC BLOOD PRESSURE: 56 MMHG

## 2024-03-11 DIAGNOSIS — R13.10 DYSPHAGIA, UNSPECIFIED TYPE: ICD-10-CM

## 2024-03-11 DIAGNOSIS — E86.0 DEHYDRATION: Primary | ICD-10-CM

## 2024-03-11 DIAGNOSIS — K21.9 GASTROESOPHAGEAL REFLUX DISEASE WITHOUT ESOPHAGITIS: ICD-10-CM

## 2024-03-11 PROCEDURE — 96361 HYDRATE IV INFUSION ADD-ON: CPT

## 2024-03-11 PROCEDURE — 96374 THER/PROPH/DIAG INJ IV PUSH: CPT

## 2024-03-11 RX ORDER — OMEPRAZOLE 40 MG/1
40 CAPSULE, DELAYED RELEASE ORAL DAILY
Qty: 90 CAPSULE | Refills: 2 | Status: SHIPPED | OUTPATIENT
Start: 2024-03-11

## 2024-03-11 RX ADMIN — ONDANSETRON 8 MG: 2 INJECTION INTRAMUSCULAR; INTRAVENOUS at 14:42

## 2024-03-11 RX ADMIN — SODIUM CHLORIDE 1000 ML: 0.9 INJECTION, SOLUTION INTRAVENOUS at 14:58

## 2024-03-11 NOTE — PROGRESS NOTES
Patient arrives to infusion center for IV hydration. Patient offers no complaints, reports that her port was used at her last appointment without incident. Port accessed, patient tolerated entire infusion without incident. Port de-accessed, AVS offered.     Next appointment: 3/13/24 @ 0800

## 2024-03-11 NOTE — TELEPHONE ENCOUNTER
LM for patient to call back and discuss Spouse FMLA. Please forward to Kathy martinez [place] [employer]. Thanks!

## 2024-03-12 ENCOUNTER — OFFICE VISIT (OUTPATIENT)
Dept: HEMATOLOGY ONCOLOGY | Facility: CLINIC | Age: 34
End: 2024-03-12
Payer: COMMERCIAL

## 2024-03-12 VITALS
OXYGEN SATURATION: 100 % | BODY MASS INDEX: 25.74 KG/M2 | HEIGHT: 67 IN | WEIGHT: 164 LBS | HEART RATE: 105 BPM | DIASTOLIC BLOOD PRESSURE: 70 MMHG | TEMPERATURE: 97.7 F | RESPIRATION RATE: 17 BRPM | SYSTOLIC BLOOD PRESSURE: 124 MMHG

## 2024-03-12 DIAGNOSIS — Z17.0 MALIGNANT NEOPLASM OF OVERLAPPING SITES OF LEFT BREAST IN FEMALE, ESTROGEN RECEPTOR POSITIVE: Primary | ICD-10-CM

## 2024-03-12 DIAGNOSIS — C50.812 MALIGNANT NEOPLASM OF OVERLAPPING SITES OF LEFT BREAST IN FEMALE, ESTROGEN RECEPTOR POSITIVE: Primary | ICD-10-CM

## 2024-03-12 PROCEDURE — 99215 OFFICE O/P EST HI 40 MIN: CPT | Performed by: INTERNAL MEDICINE

## 2024-03-12 RX ORDER — SODIUM CHLORIDE 9 MG/ML
20 INJECTION, SOLUTION INTRAVENOUS ONCE
Status: CANCELLED | OUTPATIENT
Start: 2024-03-13

## 2024-03-12 RX ORDER — DOXORUBICIN HYDROCHLORIDE 2 MG/ML
50 INJECTION, SOLUTION INTRAVENOUS ONCE
Status: CANCELLED | OUTPATIENT
Start: 2024-03-13

## 2024-03-12 NOTE — LETTER
March 12, 2024     Patient: Jillian Ch  YOB: 1990  Date of Visit: 3/12/2024      To Whom it May Concern:    Jillian Ch is under the professional care of Dr. Simran DO. Jillian was seen in the office on 3/12/2024.     If you have any questions or concerns, please don't hesitate to call.         Sincerely,          JOSE Allen  [facility]        CC: No Recipients

## 2024-03-13 ENCOUNTER — HOSPITAL ENCOUNTER (OUTPATIENT)
Dept: INFUSION CENTER | Facility: CLINIC | Age: 34
Discharge: HOME | End: 2024-03-13
Payer: COMMERCIAL

## 2024-03-13 ENCOUNTER — NUTRITION (OUTPATIENT)
Dept: NUTRITION | Facility: CLINIC | Age: 34
End: 2024-03-13

## 2024-03-13 ENCOUNTER — TELEPHONE (OUTPATIENT)
Age: 34
End: 2024-03-13

## 2024-03-13 ENCOUNTER — TELEPHONE (OUTPATIENT)
Dept: HEMATOLOGY ONCOLOGY | Facility: CLINIC | Age: 34
End: 2024-03-13

## 2024-03-13 VITALS
HEIGHT: 67 IN | WEIGHT: 167 LBS | HEART RATE: 83 BPM | TEMPERATURE: 98.1 F | DIASTOLIC BLOOD PRESSURE: 78 MMHG | SYSTOLIC BLOOD PRESSURE: 120 MMHG | BODY MASS INDEX: 26.21 KG/M2 | RESPIRATION RATE: 18 BRPM

## 2024-03-13 DIAGNOSIS — C50.812 MALIGNANT NEOPLASM OF OVERLAPPING SITES OF LEFT BREAST IN FEMALE, ESTROGEN RECEPTOR POSITIVE: Primary | ICD-10-CM

## 2024-03-13 DIAGNOSIS — Z17.0 MALIGNANT NEOPLASM OF OVERLAPPING SITES OF LEFT BREAST IN FEMALE, ESTROGEN RECEPTOR POSITIVE: Primary | ICD-10-CM

## 2024-03-13 DIAGNOSIS — C50.812 MALIGNANT NEOPLASM OF OVERLAPPING SITES OF LEFT BREAST IN FEMALE, ESTROGEN RECEPTOR POSITIVE (HCC): Primary | ICD-10-CM

## 2024-03-13 DIAGNOSIS — Z71.3 NUTRITIONAL COUNSELING: Primary | ICD-10-CM

## 2024-03-13 DIAGNOSIS — E86.0 DEHYDRATION: ICD-10-CM

## 2024-03-13 DIAGNOSIS — Z17.0 MALIGNANT NEOPLASM OF OVERLAPPING SITES OF LEFT BREAST IN FEMALE, ESTROGEN RECEPTOR POSITIVE (HCC): Primary | ICD-10-CM

## 2024-03-13 PROCEDURE — 96361 HYDRATE IV INFUSION ADD-ON: CPT

## 2024-03-13 PROCEDURE — 96367 TX/PROPH/DG ADDL SEQ IV INF: CPT

## 2024-03-13 PROCEDURE — 96411 CHEMO IV PUSH ADDL DRUG: CPT

## 2024-03-13 PROCEDURE — 96413 CHEMO IV INFUSION 1 HR: CPT

## 2024-03-13 RX ORDER — DOXORUBICIN HYDROCHLORIDE 2 MG/ML
50 INJECTION, SOLUTION INTRAVENOUS ONCE
Status: COMPLETED | OUTPATIENT
Start: 2024-03-13 | End: 2024-03-13

## 2024-03-13 RX ORDER — SODIUM CHLORIDE 9 MG/ML
20 INJECTION, SOLUTION INTRAVENOUS ONCE
Status: COMPLETED | OUTPATIENT
Start: 2024-03-13 | End: 2024-03-13

## 2024-03-13 RX ADMIN — DEXAMETHASONE SODIUM PHOSPHATE: 10 INJECTION, SOLUTION INTRAMUSCULAR; INTRAVENOUS at 08:45

## 2024-03-13 RX ADMIN — SODIUM CHLORIDE 1000 ML: 0.9 INJECTION, SOLUTION INTRAVENOUS at 11:19

## 2024-03-13 RX ADMIN — DOXORUBICIN HYDROCHLORIDE 94 MG: 2 INJECTION, SOLUTION INTRAVENOUS at 10:34

## 2024-03-13 RX ADMIN — FOSAPREPITANT 150 MG: 150 INJECTION, POWDER, LYOPHILIZED, FOR SOLUTION INTRAVENOUS at 09:44

## 2024-03-13 RX ADMIN — CYCLOPHOSPHAMIDE 1000 MG: 1 INJECTION, POWDER, FOR SOLUTION INTRAVENOUS; ORAL at 10:44

## 2024-03-13 RX ADMIN — SODIUM CHLORIDE 20 ML/HR: 0.9 INJECTION, SOLUTION INTRAVENOUS at 08:45

## 2024-03-13 NOTE — PROGRESS NOTES
Outpatient Oncology Nutrition Consultation   Type of Consult: Follow Up  Care Location: [facility]    Reason for referral: Notification from RN Navigator Kamla HAYWOOD on 12/7/23 that pt has triggered for oncology nutrition care (reason for referral: Patient is pregnant and newly diagnosed with breast cancer. Had questions regarding nutrition and sugar intake, etc).     Nutrition Assessment:   Oncology Diagnosis & Treatments:   Diagnosed with left beast cancer, ER positive, 12/2/23.   S/p left mastectomy 1/2/24.   Chemotherapy (doxorubicin, cytoxan, emend) start 2/21/24.   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 Breast, Left, US Guided Left Breast Biopsy 12:00 6cmfn:  Invasive breast carcinoma of no special type (ductal) with osteoclast-like stromal giant cells  Grade 2  ER 85  AZ 95  HER2 1+     B. Breast, Right, US Guided Right Breast Biopsy 10:00 9cmfn:  Benign breast tissue.    In review of the patient’s recent imaging, the left malignancy appears unifocal.  The biopsy-proven carcinoma measured 2.1 cm on ultrasound. Ultrasound of the left axilla was performed on 11/24/2023 and showed no suspicious adenopathy.  Recent imaging of the contralateral right breast dated 12/2/2023 and 11/24/2023 was reviewed and shows no suspicious findings.  The pathology results for the ultrasound-guided core needle biopsy (right breast 10:00, 9 cm from the nipple) are benign and concordant with imaging.     12/2/2023 Genetic Testing    [place]  A total of 36 genes  were evaluated, including: APC, TOPHER, BARD 1, BMPR1A, BRCA1, BRCA2, BRIP1, CDH1, CDK4, CKDN2A, CHEK2, DICER1, MLH1, MSH2, MSH6, MUTYH, NBN, NF1, NTHL1, PALB2, PMS2, PTEN, RAD51C, RECQL, SMAD4, SMARCA4, STK11, TP53, AXIN2, HOXB13, MSH3, POLD1, AND POLE, EPCAM, AND GREM1   Negative result. No pathogenic sequence variants or deletions/duplications identified       12/2/2023 -  Cancer Staged    Staging form: Breast, AJCC 8th Edition  - Clinical stage from 12/2/2023: Stage IB (cT2, cN0, cM0, G2, ER+, IA+, HER2-) - Signed by Elena Cornell MD on 12/13/2023  Stage prefix: Initial diagnosis  Histologic grading system: 3 grade system       1/2/2024 Surgery    Left breast mastectomy with sentinel lymph node biopsy  Invasive Mammary carcinoma of no special type (ductal)   Grade 2  25 mm  Margins negative  0/2 Lymph nodes  Anatomic stage IIA  Prognostic stage IA      2/21/2024 -  Chemotherapy    DOXOrubicin (ADRIAMYCIN), 56.25 mg/m2 = 106 mg (93.8 % of original dose 60 mg/m2), Intravenous, Once, 2 of 4 cycles  Dose modification: 56.25 mg/m2 (original dose 60 mg/m2, Cycle 1, Reason: Other (Must fill in a comment), Comment: max recommended dose), 50 mg/m2 (original dose 60 mg/m2, Cycle 2, Reason: Nausea/Vomiting, Comment: dose reduced to 50 mg/m2 due to n/v)  Administration: 106 mg (2/21/2024)  alteplase (CATHFLO), 2 mg, Intracatheter, Every 1 Minute as needed, 2 of 4 cycles  cyclophosphamide (CYTOXAN) IVPB, 600 mg/m2 = 1,128 mg, Intravenous, Once, 2 of 4 cycles  Dose modification: 540 mg/m2 (original dose 600 mg/m2, Cycle 2, Reason: Nausea/Vomiting, Comment: 10% dose reduction due to n/v)  Administration: 1,128 mg (2/21/2024)  fosaprepitant (EMEND) IVPB, 150 mg, Intravenous, Once, 2 of 4 cycles  Administration: 150 mg (2/21/2024)       Past Medical & Surgical Hx:   Patient Active Problem List   Diagnosis    Mild persistent asthma without complication    Axillary hidradenitis suppurativa    Anxiety    Chest wall pain     Gastroesophageal reflux disease without esophagitis    Depression with anxiety    Chronic fatigue    Myalgia    Chronic left shoulder pain    Cervical disc disorder at C5-C6 level with radiculopathy    Atypical squamous cells of undetermined significance (ASCUS) on Papanicolaou smear of cervix    Hypertrophic and atrophic condition of skin    Migraine headache    Shoulder impingement syndrome, left    Vitamin D deficiency    Close exposure to COVID-19 virus    Thoracic outlet syndrome    Palpitations    Post-operative pain    Transaminitis    Right middle lobe pulmonary nodule    Reversible airway obstruction    SOB (shortness of breath)    Musculoskeletal chest pain    COVID-19 virus infection    Unexplained weight gain    Left ovarian cyst    Dysphagia    Malignant neoplasm of overlapping sites of left breast in female, estrogen receptor positive     Cancer complicating pregnancy in first trimester    17 weeks gestation of pregnancy    Status post left mastectomy    Multigravida of advanced maternal age in first trimester    DVT complicating pregnancy, first trimester    Dehydration     Past Medical History:   Diagnosis Date    Anesthesia complication     gait dysfunction/ urinary retention after nerve block,    Anxiety     Asthma     CRPS (complex regional pain syndrome), upper limb     left chest/arm/hand    Deep vein thrombosis (HCC) 01/05/2024    post op from mastectomy    GERD (gastroesophageal reflux disease)     Heart murmur     HPV (human papilloma virus) infection 2012    unsure of 16, 18, or other    Invasive ductal carcinoma of breast, female, left (HCC) 2023    Kidney stone     Miscarriage 3/15/2015    7 weeks along.    Neck pain     Ovarian cyst     Left    PONV (postoperative nausea and vomiting) 01/02/2024     Past Surgical History:   Procedure Laterality Date    COLPOSCOPY  2012    HPV    EGD      IR PORT PLACEMENT  2/7/2024    IR UPPER EXTREMITY VENOGRAM- DIAGNOSTIC  05/28/2021    WA BX/EXC LYMPH  NODE OPEN SUPERFICIAL Left 01/02/2024    Procedure: LEFT BREAST MASTECTOMY, LYMPHATIC MAPPING WITH RADIOACTIVE DYE, SENTINEL LYMPH NODE BIOPSY, ZAHEER LEFT BREAST, INJECTION IN OR AT 0800 BY DR CORNELL;  Surgeon: Elena Cornell MD;  Location: [facility];  Service: Surgical Oncology    MS EXCISION 1ST &/CERVICAL RIB Left 08/12/2021    Procedure: FIRST RIB RESECTION;  Surgeon: Jonathan Schaffer MD;  Location: [facility];  Service: Thoracic    MS INJ RADIOACTIVE TRACER FOR ID OF SENTINEL NODE Left 01/02/2024    Procedure: LEFT BREAST MASTECTOMY, LYMPHATIC MAPPING WITH RADIOACTIVE DYE, SENTINEL LYMPH NODE BIOPSY, ZAHEER LEFT BREAST, INJECTION IN OR AT 0800 BY DR CORNELL;  Surgeon: Elena Cornell MD;  [facility]: [facility];  Service: Surgical Oncology    MS INTRAOP SENTINEL LYMPH NODE ID W/DYE INJECTION Left 01/02/2024    Procedure: LEFT BREAST MASTECTOMY, LYMPHATIC MAPPING WITH RADIOACTIVE DYE, SENTINEL LYMPH NODE BIOPSY, ZAHEER LEFT BREAST, INJECTION IN OR AT 0800 BY DR CORNELL;  Surgeon: Elena Cornell MD;  [facility];  Service: Surgical Oncology    MS MASTECTOMY SIMPLE COMPLETE Left 01/02/2024    Procedure: LEFT BREAST MASTECTOMY, LYMPHATIC MAPPING WITH RADIOACTIVE DYE, SENTINEL LYMPH NODE BIOPSY, ZAHEER LEFT BREAST, INJECTION IN OR AT 0800 BY DR CORNELL;  Surgeon: Elena Cornell MD;  [facility]: [facility];  Service: Surgical Oncology    THORACOSCOPY VIDEO ASSISTED SURGERY (VATS) Left 08/12/2021    Procedure: THORACOSCOPY VIDEO ASSISTED SURGERY (VATS);  Surgeon: Jonathan Schaffer MD;  [facility]: [facility];  Service: Thoracic    US GUIDANCE BREAST BIOPSY LEFT EACH ADDITIONAL Left 12/02/2023    US GUIDED BREAST BIOPSY RIGHT COMPLETE Right 12/02/2023    WISDOM TOOTH EXTRACTION  2012       Review of Medications:   Vitamins, Supplements and Herbals: Med List Reviewed & pt is only taking: Prenatal MVI                                                                                                                                                                                                                                                                                                                                                                                                                                                                                                                              Current Outpatient Medications:     albuterol (PROVENTIL HFA,VENTOLIN HFA) 90 mcg/act inhaler, TAKE 2 PUFFS BY MOUTH EVERY 6 HOURS AS NEEDED FOR WHEEZE OR FOR SHORTNESS OF BREATH, Disp: 18 g, Rfl: 3    cetirizine (ZyrTEC) 10 mg tablet, TAKE 1 TABLET BY MOUTH EVERY DAY, Disp: 90 tablet, Rfl: 0    diphenhydramine, lidocaine, Al/Mg hydroxide, simethicone (Magic Mouthwash) SUSP, SWISH AND SPIT 10 ML EVERY 4 (FOUR) HOURS AS NEEDED FOR MOUTH PAIN OR DISCOMFORT (Patient not taking: Reported on 3/8/2024), Disp: 237 mL, Rfl: 1    enoxaparin (LOVENOX) 150 mg/mL injection, Inject 0.5 mL (75 mg total) under the skin every 12 (twelve) hours, Disp: 180 mL, Rfl: 0    lidocaine-prilocaine (EMLA) cream, Apply to port 30 to 60 min prior to use, Disp: 30 g, Rfl: 2    omeprazole (PriLOSEC) 40 MG capsule, Take 1 capsule (40 mg total) by mouth daily, Disp: 90 capsule, Rfl: 2    ondansetron (ZOFRAN) 8 mg tablet, Take 1 tablet (8 mg total) by mouth every 8 (eight) hours as needed for nausea or vomiting, Disp: 30 tablet, Rfl: 3    Prenatal Multivit-Min-Fe-FA (PRE- PO), Take 1 tablet by mouth in the morning, Disp: , Rfl:   No current facility-administered medications for this visit.    Facility-Administered Medications Ordered in Other Visits:     alteplase (CATHFLO) injection 2 mg, 2 mg, Intracatheter, Q1MIN PRN, Nemo Agostino, DO    alteplase (CATHFLO) injection 2 mg, 2 mg, Intracatheter, Q1MIN PRN, Nemo Agostino, DO    alteplase (CATHFLO) injection 2 mg, 2 mg, Intracatheter, Q1MIN PRN, Nemo Agostino, DO     "cyclophosphamide (CYTOXAN) 1,000 mg in sodium chloride 0.9 % 250 mL IVPB, 1,000 mg, Intravenous, Once, Nemo Agostino, DO    DOXOrubicin (ADRIAMYCIN) injection 94 mg, 50 mg/m2 (Treatment Plan Recorded), Intravenous, Once, Nemo Agostino, DO    fosaprepitant (EMEND) 150 mg in sodium chloride 0.9 % 250 mL IVPB, 150 mg, Intravenous, Once, Nemo Agostino, DO, Last Rate: 500 mL/hr at 03/13/24 0944, 150 mg at 03/13/24 0944    ondansetron (ZOFRAN) 8 mg in sodium chloride 0.9 % 50 mL IVPB, 8 mg, Intravenous, Once, Nemo Agostino, DO    sodium chloride 0.9 % bolus 1,000 mL, 1,000 mL, Intravenous, Once, Nemo Agostino, DO    Most Recent Lab Results:   Lab Results   Component Value Date    WBC 3.94 (L) 03/08/2024    NEUTROABS 5.24 02/16/2024    IRON 83 07/29/2020    CHOLESTEROL 127 12/02/2023    TRIG 56 12/02/2023    HDL 43 (L) 12/02/2023    LDLCALC 73 12/02/2023    ALT 44 03/08/2024    AST 24 03/08/2024    ALB 3.7 03/08/2024    SODIUM 138 03/08/2024    SODIUM 134 (L) 02/26/2024    K 3.4 (L) 03/08/2024    K 3.5 02/26/2024     03/08/2024    BUN 7 03/08/2024    BUN 8 02/26/2024    CREATININE 0.49 (L) 03/08/2024    CREATININE 0.55 (L) 02/26/2024    EGFR 126 03/08/2024    PHOS 3.5 02/26/2024    GLUF 87 02/07/2024    GLUF 79 12/09/2023    GLUC 108 03/08/2024    HGBA1C 5.4 12/02/2023    HGBA1C 5.1 02/17/2023    CALCIUM 8.8 03/08/2024    MG 1.8 (L) 02/26/2024       Anthropometric Measurements:   Height: 67\"  Ht Readings from Last 1 Encounters:   03/13/24 5' 6.73\" (1.695 m)     Wt Readings from Last 30 Encounters:   03/13/24 75.8 kg (167 lb)   03/12/24 74.4 kg (164 lb)   03/08/24 76 kg (167 lb 9.6 oz)   02/29/24 73.7 kg (162 lb 6.4 oz)   02/21/24 74.8 kg (165 lb)   02/09/24 77.1 kg (170 lb)   02/08/24 73.9 kg (163 lb)   02/07/24 74 kg (163 lb 2.3 oz)   01/30/24 77.5 kg (170 lb 12.8 oz)   01/24/24 76.7 kg (169 lb)   01/23/24 76.7 kg (169 lb)   01/18/24 77.6 kg (171 lb)   01/17/24 77.8 kg (171 lb 8 oz)   01/11/24 77.6 kg (171 " lb)   01/11/24 76.9 kg (169 lb 9.6 oz)   01/09/24 78 kg (172 lb)   01/02/24 78.2 kg (172 lb 6.4 oz)   12/29/23 78.8 kg (173 lb 12.8 oz)   12/27/23 78.8 kg (173 lb 12.8 oz)   12/21/23 81.1 kg (178 lb 12.8 oz)   12/20/23 80.3 kg (177 lb)   12/15/23 81.6 kg (180 lb)   12/14/23 81.3 kg (179 lb 3.2 oz)   12/13/23 80.3 kg (177 lb)   12/06/23 81.6 kg (180 lb)   11/24/23 82.1 kg (181 lb)   10/19/23 82.1 kg (181 lb)   10/13/23 85.3 kg (188 lb)   09/26/23 85.3 kg (188 lb)   08/22/23 80.7 kg (178 lb)     Weight History:   Usual Weight: 130-145#  Varian: n/a  Home Scale: none    Oncology Nutrition-Anthropometrics      [facility]sheet [facility] 3/1/2024 in [facility] [place]   Patient [age] (years): 33 years 33 years   Patient (female) height (in): 67 in 67 in   Current Weight to be used for anthropometric calculations (lbs) 167 lbs 162.2 lbs   Current Weight to be used for anthropometric calculations (kg) 75.9 kg 73.7 kg   BMI: 26.2 25.4   IBW female: 135 lbs 135 lbs   IBW (kg) female: 61.4 kg 61.4 kg   IBW % (female) 123.7 % 120.1 %   Adjusted BW (female): 143 lbs 141.8 lbs   Adjusted BW kg (female): 65 kg 64.5 kg   % weight change after 1 week: -0.4 % -1.7 %   Weight change after 1 week (lbs) -0.6 lbs -2.8 lbs   % weight change after 1 month: -1.8 % -5 %   Weight change after 1 month (lbs) -3 lbs -8.6 lbs   % weight change after 3 months: -5.6 % -10.4 %   Weight change after 3 months (lbs) -10 lbs -18.8 lbs   % weight change after 6 months: -11.2 % -8.9 %   Weight change after 6 months (lbs) -21 lbs -15.8 lbs   % weight change after 1 year: -- -6.5 %   Weight change after 1 year (lbs) -- -11.2 lbs            Nutrition-Focused Physical Findings: none observed    Food/Nutrition-Related History & Client/Social History:    Current Nutrition Impact Symptoms:  [x] Nausea   -using zofran  [x] Reduced Appetite  [] Acid Reflux    [] Vomiting   [x] Unintended Wt Loss   -significant x6 months  [] Malabsorption    [x] Diarrhea   -taking imodium  [] Unintended Wt Gain  [] Dumping Syndrome    [] Constipation  [] Thick Mucous/Secretions  [] Abdominal Pain    [] Dysgeusia (Altered Taste)  [] Xerostomia (Dry Mouth)  [] Gas    [] Dysosmia (Altered Smell)  [] Thrush  [] Difficulty Chewing    [] Oral Mucositis (Sore Mouth)  [x] Fatigue  [] Hyperglycemia   Lab Results   Component Value Date    HGBA1C 5.4 2023      [] Odynophagia  [] Esophagitis  [] Other:    [] Dysphagia  [] Early Satiety  [] No Problems Eating      Food Allergies & Intolerances: yes: lactose intolerant     Current Diet: Lactose-Free and No red meat   Current Nutrition Intake: Unchanged from last visit  Appetite: Poor  Nutrition Route: PO  Oral Care: brushes QD  Activity level: Dizzy often in the morning. Degenerative disc disease limits physical activity.     24 Hr Diet Recall:   Breakfast: bagel or bread; eggs    Dinner: chicken wrap - took the chicken out     Beverages: water with Pedialyte (32oz x1); ginger ale (12oz x1),  IV hydration 3x weekly  Supplements:   None    Oncology Nutrition-Estimated Needs      Flowsheet Row Nutrition from 3/13/2024 in [facility] Oncology Dietitian [place] Nutrition from 3/1/2024 in [facility] Oncology Dietitian [place]   Weight type used Actual weight Actual weight   Weight in kilograms (kg) used for estimated needs 75.9 kg --   Energy needs formula:  Other (single)  [Estimated Energy Requirements = nonpregnant EER + 340kcal for 2nd trimester pregnancy] Other (single)  [Estimated Energy Requirements = Nonpregnant EER + 340kcal for 2nd trimester pregnancy]   Single value (kcal/kg): 2594 2608   Energy needs based on single value: 257117 kcal --   Protein needs formula: 1.2-1.5 g/kg 1.2-1.5 g/kg   Protein needs based on 1.2 g/k g 88 g   Protein needs based on 1.5 g/kg 114 g 111 g   Fluid needs formula: 35-40 mL/kg 35-40 mL/kg   Fluid needs based on  35 mL/kg 2657 mL 2576 mL   Fluid needs in ounces 90 oz 87 oz   Fluid needs based on 40 mL/kg 3036 mL 2944 mL   Fluid needs in ounces 103 oz 100 oz          **Estimated nutrition needs are based on comparative standards for 2nd trimester pregnancy.     Discussion & Intervention:   Jillian was evaluated today for an RD follow up regarding poor po intake and pt request for diet guidance throughout treatment .  Jillian is currently undergoing tx for breast cancer . She continues to struggle with nausea and poor appetite. She does start to feel better in between chemo infusions. She explains today that her chemo dose may be reduced due to her weight loss. She continues her efforts to eat and hydrate as much as she can. She is struggling with protein sources as she cannot tolerate eating many foods that contain protein. She is tolerating eggs well. She has been drinking Pedialyte to help with hydration but it is very sweet so she adds more water. Suggested making ice cubes using Pedialyte for her water.   Based on today's assessment, discussion included: MNT for: breast cancer, nausea/vomiting, weight management, diarrhea, a bland/easy on the stomach diet & food choices to include at all meals & snacks, adequate hydration & fluid choices, eating smaller more frequent meals every 2-3 hours (5-6 small meals/day), keeping non-perishable foods nearby to snack on, and eating when feeling most hungry.   Moving forward, Jillian will continue current nutrition plan of care.  Materials Provided: not applicable   All questions and concerns addressed during today’s visit.  Jillian has RD contact information.    Nutrition Diagnosis:   Inadequate Energy Intake related to physiological causes, disease state and treatment related issues as evidenced by food recall, wt loss and discussion with pt and/or family.  Increased Nutrient Needs (kcal & pro) related to increased demand for nutrients and disease state as evidenced by cancer dx and pt  undergoing tx for cancer.  Patient has clinical indicators (or ASPEN criteria) consistent with severe protein-calorie malnutrition in the context of Chronic Illness as evidenced by >10% wt loss in 6 months and </=75% energy intake vs. Estimated needs for >/=1 month.  Monitoring & Evaluation:   Goals:  weight maintenance/stabilization  adequate nutrition impact symptom management  pt to meet >/=75% estimated nutrition needs daily  increase protein intake  increase fluid intake  increase calorie intake    Progress Towards Goals: Not Progressing    Nutrition Rx & Recommendations:  Diet: High Calorie, High Protein (for high calorie foods see pages 52-53, and for high protein foods see pages 49-51 in your Eating Hints book)  Small, frequent meals/snacks may be easier to tolerate than 3 large daily meals.  Aim for 5-6 small meals per day (every 2-3 hours).  Include protein at all meals/snacks.  Include a variety of foods (as tolerated/allowed by diet).  Follow a Cancer Preventative Nutrition Pattern: colorful, plant-based, low-fat, avoid added sugars, limit alcohol, include high fiber foods, limit processed meats, limit red meat, choose lean protein sources, use low-fat cooking methods, balance calories with physical activity, avoid excessive weight gain throughout life.  Incorporate physical activity as able/allowed.  Stay hydrated by sipping fluids of choice/tolerance throughout the day.  Liquid nutrition may be better tolerated than solids at times.  Alter food choices and eating patterns to accommodate changing needs.  Light physical activity (such as walking) is encouraged, as able/tolerated.  For diarrhea: drink plenty of fluids (>64 oz/day), avoid carbonation; eat 5-6 small meals/day; include high sodium & potassium foods/liquids (Sodium: soup/broth;  Potassium: bananas, canned apricots, potatoes); eat low-fiber foods; choose foods/liquids that are room temperature; avoid foods/drinks that can make diarrhea worse  (ex. high fiber, high sugar, hot/cold drinks, greasy/fatty foods, gas forming foods such as beans, raw produce, milk products, alcohol, spicy foods, caffeine, sugar alcohols (xylitol, sorbitol), and apple juice (high in sorbitol) (see pages 15-16 in your Eating Hints book).  If diarrhea is severe, consider adding Banatrol (banana flakes) 1-3 times per day mixed in applesauce (can be purchased online from [employer]).  Try adding soluble fiber: applesauce, banana, oatmeal, potatoes, rice, etc. to help thicken stools.  Weigh yourself regularly. If you notice weight loss, make an effort to increase your daily food/calorie intake. If you continue to notice loss after these efforts, reach out to your dietitian to establish a plan to stabilize weight.    For nausea/vomiting, suggestions include:  Eat 5-6 smaller meals throughout the day instead of 3 large meals.   Sip on calorie containing fluids throughout the day: 100% fruit juice, diluted juice, bone broth (higher protein broth), creamy soups, sports drinks (Gatorade, Poweraide, Pedialyte, etc.), Italian ice, popsicles, milkshakes, smoothies, oral nutrition supplements (Ensure, Boost, Glucerna etc.), gelatin/Jello, etc. (see page 41 of Eating Hints Book for other examples).  Continue gatorade with water. Try Pedialyte samples. Try making ice cubes with Pedialyte.   Can try alkaline water   Eat bland foods and foods easy on the stomach: clear broth (chicken, vegetable, bone, mushroom, beef), juice (caution with acidic juices), potatoes, pretzels, crackers, cereal (Corn Flakes, Rice Chex, Rice Crispies, Cheerios), oatmeal or cream of wheat, white bread or toast, white rice, cooked vegetables (caution with gas producing vegetables), plain pasta, eggs, peanut butter, boiled chicken or turkey, soft cheeses.  Consume foods and drinks at room temperature. Try to avoid eating/drinking anything too hot or cold.   Suck on tart candies such as lemon drops or julio chews to help  relieve nausea.   Ginger tea or flat ginger ale.   Foods to avoid: Foods with strong odors; High sugar foods such as soda, candies, desserts; Greasy/fried foods; Gas producing foods such as broccoli, cabbage, [place] sprouts, raw fruits/vegetables, beans; Caution with diary products unless they are low lactose or lactose free; Alcohol; Spicy foods; Acidic foods: coffee, tea, citrus, tomato; Avoid eating your favorite foods when you feel nauseous so you don't develop a dislike of those foods.     Follow Up Plan:  3/29/24 during infusion    Recommend Referral to Other Providers: none at this time

## 2024-03-13 NOTE — PATIENT INSTRUCTIONS
Nutrition Rx & Recommendations:  Diet: High Calorie, High Protein (for high calorie foods see pages 52-53, and for high protein foods see pages 49-51 in your Eating Hints book)  Small, frequent meals/snacks may be easier to tolerate than 3 large daily meals.  Aim for 5-6 small meals per day (every 2-3 hours).  Include protein at all meals/snacks.  Include a variety of foods (as tolerated/allowed by diet).  Follow a Cancer Preventative Nutrition Pattern: colorful, plant-based, low-fat, avoid added sugars, limit alcohol, include high fiber foods, limit processed meats, limit red meat, choose lean protein sources, use low-fat cooking methods, balance calories with physical activity, avoid excessive weight gain throughout life.  Incorporate physical activity as able/allowed.  Stay hydrated by sipping fluids of choice/tolerance throughout the day.  Liquid nutrition may be better tolerated than solids at times.  Alter food choices and eating patterns to accommodate changing needs.  Light physical activity (such as walking) is encouraged, as able/tolerated.  For diarrhea: drink plenty of fluids (>64 oz/day), avoid carbonation; eat 5-6 small meals/day; include high sodium & potassium foods/liquids (Sodium: soup/broth;  Potassium: bananas, canned apricots, potatoes); eat low-fiber foods; choose foods/liquids that are room temperature; avoid foods/drinks that can make diarrhea worse (ex. high fiber, high sugar, hot/cold drinks, greasy/fatty foods, gas forming foods such as beans, raw produce, milk products, alcohol, spicy foods, caffeine, sugar alcohols (xylitol, sorbitol), and apple juice (high in sorbitol) (see pages 15-16 in your Eating Hints book).  If diarrhea is severe, consider adding Banatrol (banana flakes) 1-3 times per day mixed in applesauce (can be purchased online from [employer]).  Try adding soluble fiber: applesauce, banana, oatmeal, potatoes, rice, etc. to help thicken stools.  Weigh yourself regularly. If  you notice weight loss, make an effort to increase your daily food/calorie intake. If you continue to notice loss after these efforts, reach out to your dietitian to establish a plan to stabilize weight.    For nausea/vomiting, suggestions include:  Eat 5-6 smaller meals throughout the day instead of 3 large meals.   Sip on calorie containing fluids throughout the day: 100% fruit juice, diluted juice, bone broth (higher protein broth), creamy soups, sports drinks (Gatorade, Poweraide, Pedialyte, etc.), Italian ice, popsicles, milkshakes, smoothies, oral nutrition supplements (Ensure, Boost, Glucerna etc.), gelatin/Jello, etc. (see page 41 of Eating Hints Book for other examples).  Continue gatorade with water. Try Pedialyte samples. Try making ice cubes with Pedialyte.   Can try alkaline water   Eat bland foods and foods easy on the stomach: clear broth (chicken, vegetable, bone, mushroom, beef), juice (caution with acidic juices), potatoes, pretzels, crackers, cereal (Corn Flakes, Rice Chex, Rice Crispies, Cheerios), oatmeal or cream of wheat, white bread or toast, white rice, cooked vegetables (caution with gas producing vegetables), plain pasta, eggs, peanut butter, boiled chicken or turkey, soft cheeses.  Consume foods and drinks at room temperature. Try to avoid eating/drinking anything too hot or cold.   Suck on tart candies such as lemon drops or ginger chews to help relieve nausea.   Ginger tea or flat ginger ale.   Foods to avoid: Foods with strong odors; High sugar foods such as soda, candies, desserts; Greasy/fried foods; Gas producing foods such as broccoli, cabbage, [place] sprouts, raw fruits/vegetables, beans; Caution with diary products unless they are low lactose or lactose free; Alcohol; Spicy foods; Acidic foods: coffee, tea, citrus, tomato; Avoid eating your favorite foods when you feel nauseous so you don't develop a dislike of those foods.     Follow Up Plan: 3/29/24 during infusion    Recommend Referral to Other Providers: none at this time

## 2024-03-13 NOTE — PROGRESS NOTES
Pt into clinic for adriamycin and cytoxan. Pt offers complaints n/v/d, loss of appetitie, low grade fever, ab pain, fatigue, acid in stomach. Pt went to physicians office 3/12/24, MD aware. Pt also given hydration per therapy plan. Pt completed paxman treatment with a 1 hour and 30 min post cool. Tolerated infusions without reaction.  Port de-accessed. Pt aware of next appointment on 3/15/24 at 11am. AVS declined.    Patient was covered in wounds lives home alone and has difficulty walking around hx falls. patient had maggots to B/L leg wounds. maggots noted on admission assessment to [facility]. patient was covered in fecal matter on admission.

## 2024-03-13 NOTE — TELEPHONE ENCOUNTER
Title: dose reduction     Date patient scheduled: 3/13/24    Adriamycin dose reduction from 56.25mg/m2 to 50mg/m2 due to nausea and vomiting    Cytoxan dose reduced from 600mg/m2 to 540mg/m2 due to nausea/vomiting    Office RN notified patient?? Yes patient in office when changes made.     Is the patient scheduled within 24 hours?? If yes, follow up with verbal telephone call.  Spoke with AWILDA Peña at [facility]    Office RN to route to INF  pool. Infusion  pool routes to INF TECH POOL.    Infusion tech to receive message, confirm scheduled treatment duration matches ordered treatment duration or adjust accordingly, and re-link appointment request orders.    Infusion tech to notify pharmacy and finance.

## 2024-03-13 NOTE — TELEPHONE ENCOUNTER
Per Claire [facility]. A voice message was left for Jillian. An appt has been scheduled on 4/2/24 3pm to see Dr Khalil. The office is aware you are scheduled for a 230pm U/S. Is it possible to r/s the U/S to another time? Please call the office to confirm you received the message and if the u/s can be rescheduled.

## 2024-03-13 NOTE — PROGRESS NOTES
TIMEOUT with Claire RN:  Adriamycin dose reduced from 56.25mg/m2 to 50mg/m2  Cytoxan dose reduced from 600mg/m2 to 540mg/m2  Both due to N/V.

## 2024-03-15 ENCOUNTER — HOSPITAL ENCOUNTER (OUTPATIENT)
Dept: INFUSION CENTER | Facility: CLINIC | Age: 34
End: 2024-03-15
Payer: COMMERCIAL

## 2024-03-15 VITALS
SYSTOLIC BLOOD PRESSURE: 110 MMHG | DIASTOLIC BLOOD PRESSURE: 58 MMHG | RESPIRATION RATE: 17 BRPM | TEMPERATURE: 96.5 F | HEART RATE: 81 BPM | WEIGHT: 166 LBS | BODY MASS INDEX: 26.21 KG/M2

## 2024-03-15 DIAGNOSIS — E86.0 DEHYDRATION: Primary | ICD-10-CM

## 2024-03-15 PROCEDURE — 96374 THER/PROPH/DIAG INJ IV PUSH: CPT

## 2024-03-15 PROCEDURE — 96361 HYDRATE IV INFUSION ADD-ON: CPT

## 2024-03-15 RX ADMIN — SODIUM CHLORIDE 1000 ML: 0.9 INJECTION, SOLUTION INTRAVENOUS at 11:30

## 2024-03-15 RX ADMIN — ONDANSETRON 8 MG: 2 INJECTION INTRAMUSCULAR; INTRAVENOUS at 11:29

## 2024-03-15 NOTE — PROGRESS NOTES
Pt here for hydration and zofran infusion, offering no complaints. Port accessed and flushed with positive blood return noted. Tolerated infusion without incident. Port flushed positive blood return noted. AVS declined. Walked out in stable condition. Next appointment on 3/18/24 at 12pm.

## 2024-03-16 NOTE — PROGRESS NOTES
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 Breast, Left, US Guided Left Breast Biopsy 12:00 6cmfn:  Invasive breast carcinoma of no special type (ductal) with osteoclast-like stromal giant cells  Grade 2  ER 85  KY 95  HER2 1+     B. Breast, Right, US Guided Right Breast Biopsy 10:00 9cmfn:  Benign breast tissue.    In review of the patient’s recent imaging, the left malignancy appears unifocal.  The biopsy-proven carcinoma measured 2.1 cm on ultrasound. Ultrasound of the left axilla was performed on 2023 and showed no suspicious adenopathy.  Recent imaging of the contralateral right breast dated 2023 and 2023 was reviewed and shows no suspicious findings.  The pathology results for the ultrasound-guided core needle biopsy (right breast 10:00, 9 cm from the nipple) are benign and concordant with imaging.     2023 Genetic Testing    [facility]  A total of 36 genes were evaluated, including: APC, TOPHER, BARD 1, BMPR1A, BRCA1, BRCA2, BRIP1, CDH1, CDK4, CKDN2A, CHEK2, DICER1, MLH1, MSH2, MSH6, MUTYH, NBN, NF1, NTHL1, PALB2, PMS2, PTEN, RAD51C, RECQL, SMAD4, SMARCA4, STK11, TP53, AXIN2, HOXB13, MSH3, POLD1,  AND POLE, EPCAM, AND GREM1   Negative result. No pathogenic sequence variants or deletions/duplications identified       12/2/2023 -  Cancer Staged    Staging form: Breast, AJCC 8th Edition  - Clinical stage from 12/2/2023: Stage IB (cT2, cN0, cM0, G2, ER+, MT+, HER2-) - Signed by Elena Cornell MD on 12/13/2023  Stage prefix: Initial diagnosis  Histologic grading system: 3 grade system       1/2/2024 Surgery    Left breast mastectomy with sentinel lymph node biopsy  Invasive Mammary carcinoma of no special type (ductal)   Grade 2  25 mm  Margins negative  0/2 Lymph nodes  Anatomic stage IIA  Prognostic stage IA      2/21/2024 -  Chemotherapy    DOXOrubicin (ADRIAMYCIN), 56.25 mg/m2 = 106 mg (93.8 % of original dose 60 mg/m2), Intravenous, Once, 2 of 4 cycles  Dose modification: 56.25 mg/m2 (original dose 60 mg/m2, Cycle 1, Reason: Other (Must fill in a comment), Comment: max recommended dose), 50 mg/m2 (original dose 60 mg/m2, Cycle 2, Reason: Nausea/Vomiting, Comment: dose reduced to 50 mg/m2 due to n/v)  Administration: 106 mg (2/21/2024), 94 mg (3/13/2024)  alteplase (CATHFLO), 2 mg, Intracatheter, Every 1 Minute as needed, 2 of 4 cycles  cyclophosphamide (CYTOXAN) IVPB, 600 mg/m2 = 1,128 mg, Intravenous, Once, 2 of 4 cycles  Dose modification: 540 mg/m2 (original dose 600 mg/m2, Cycle 2, Reason: Nausea/Vomiting, Comment: 10% dose reduction due to n/v)  Administration: 1,128 mg (2/21/2024), 1,000 mg (3/13/2024)  fosaprepitant (EMEND) IVPB, 150 mg, Intravenous, Once, 2 of 4 cycles  Administration: 150 mg (2/21/2024), 150 mg (3/13/2024)         Interval History: Patient presents for follow up of her ER positive breast cancer, T2 N0, with Oncotype of 23.  She is due for cycle 2 of Adriamycin and Cytoxan being given every 3 weeks.  She was having issues with bleeding from her port site and was accidentally giving herself a double dose of Lovenox at 150 mg twice daily when the correct dose was 75 mg twice  daily.  She has not had any further problems with bleeding since correcting the administration of the Lovenox.  She had a upper respiratory infection 1 week after chemotherapy.  She had so much cough that she vomited at times.  She had a lot of postnasal drip.  This is improved and she has not had a fever.  She is coming in 3 times a week for fluids and IV Zofran to try to control her nausea.  She is having a lot of acid reflux and has been started on omeprazole 20 mg daily by her GYN.  She is not able to eat a lot of different foods.  She has the things that are very easy on her stomach like bread.  Sometimes she is so nauseous that she will vomit when she brushes her teeth.  Despite using the Paxman device she is starting to have some significant hair loss.         REVIEW OF SYSTEMS:  Please note that a 14-point review of systems was performed to include Constitutional, HEENT, Respiratory, CVS, GI, , Musculoskeletal, Integumentary, Neurologic, Rheumatologic, Endocrinologic, Psychiatric, Lymphatic, and Hematologic/Oncologic systems were reviewed and are negative unless otherwise stated in HPI. Positive and negative findings pertinent to this evaluation are incorporated into the history of present illness.      ECOG PS: 0    PROBLEM LIST:  Patient Active Problem List   Diagnosis    Mild persistent asthma without complication    Axillary hidradenitis suppurativa    Anxiety    Chest wall pain    Gastroesophageal reflux disease without esophagitis    Depression with anxiety    Chronic fatigue    Myalgia    Chronic left shoulder pain    Cervical disc disorder at C5-C6 level with radiculopathy    Atypical squamous cells of undetermined significance (ASCUS) on Papanicolaou smear of cervix    Hypertrophic and atrophic condition of skin    Migraine headache    Shoulder impingement syndrome, left    Vitamin D deficiency    Close exposure to COVID-19 virus    Thoracic outlet syndrome    Palpitations    Post-operative pain     Transaminitis    Right middle lobe pulmonary nodule    Reversible airway obstruction    SOB (shortness of breath)    Musculoskeletal chest pain    COVID-19 virus infection    Unexplained weight gain    Left ovarian cyst    Dysphagia    Malignant neoplasm of overlapping sites of left breast in female, estrogen receptor positive     Cancer complicating pregnancy in first trimester    17 weeks gestation of pregnancy    Status post left mastectomy    Multigravida of advanced maternal age in first trimester    DVT complicating pregnancy, first trimester    Dehydration       Assessment / Plan: 33-year-old female who is in her second trimester of pregnancy due for cycle 2 of Adriamycin and Cytoxan given every 3 weeks.  She had a significant amount of hyperemesis gravidarum and adding chemotherapy to it has been miserable.  We are keeping her from getting dehydrated by bring her in for fluids and Zofran IV 3 times a week.  If she needs to do it more than that we will accommodate what ever she needs to try to keep her out of the emergency room.  I am also mary dose reduce her Adriamycin to 50 mg/m² and her Cytoxan by 10%.  I reassured her that we have to be careful with chemotherapy because I do not want to cause any pregnancy complications and we may need to continue to do dose reductions depending on how she tolerates the next 3 cycles.  Will plan on seeing her in 3 weeks prior to the next cycle of chemotherapy.  She is mary use the Paxman device for 1 more cycle but if she continues to experience significant hair loss will likely not continue with it.  She knows to call me in the interim with any questions or concerns.       I spent 45 minutes on chart review, face to face counseling time, coordination of care and documentation.    Past Medical History:   has a past medical history of Anesthesia complication, Anxiety, Asthma, CRPS (complex regional pain syndrome), upper limb, Deep vein thrombosis (HCC) (01/05/2024),  GERD (gastroesophageal reflux disease), Heart murmur, HPV (human papilloma virus) infection (2012), Invasive ductal carcinoma of breast, female, left (HCC) (2023), Kidney stone, Miscarriage (3/15/2015), Neck pain, Ovarian cyst, and PONV (postoperative nausea and vomiting) (01/02/2024).    PAST SURGICAL HISTORY:   has a past surgical history that includes [place] tooth extraction (2012); IR upper extremity venogram- Diagnostic (05/28/2021); pr excision 1st &/cervical rib (Left, 08/12/2021); THORACOSCOPY VIDEO ASSISTED SURGERY (VATS) (Left, 08/12/2021); EGD; US guided breast biopsy right complete (Right, 12/02/2023); US guidance breast biopsy left each additional (Left, 12/02/2023); pr mastectomy simple complete (Left, 01/02/2024); pr bx/exc lymph node open superficial (Left, 01/02/2024); pr intraop sentinel lymph node id w/dye injection (Left, 01/02/2024); pr inj radioactive tracer for id of sentinel node (Left, 01/02/2024); Colposcopy (2012); and IR port placement (2/7/2024).    CURRENT MEDICATIONS  Current Outpatient Medications   Medication Sig Dispense Refill    albuterol (PROVENTIL HFA,VENTOLIN HFA) 90 mcg/act inhaler TAKE 2 PUFFS BY MOUTH EVERY 6 HOURS AS NEEDED FOR WHEEZE OR FOR SHORTNESS OF BREATH 18 g 3    cetirizine (ZyrTEC) 10 mg tablet TAKE 1 TABLET BY MOUTH EVERY DAY 90 tablet 0    diphenhydramine, lidocaine, Al/Mg hydroxide, simethicone (Magic Mouthwash) SUSP SWISH AND SPIT 10 ML EVERY 4 (FOUR) HOURS AS NEEDED FOR MOUTH PAIN OR DISCOMFORT (Patient not taking: Reported on 3/8/2024) 237 mL 1    enoxaparin (LOVENOX) 150 mg/mL injection Inject 0.5 mL (75 mg total) under the skin every 12 (twelve) hours 180 mL 0    lidocaine-prilocaine (EMLA) cream Apply to port 30 to 60 min prior to use 30 g 2    omeprazole (PriLOSEC) 40 MG capsule Take 1 capsule (40 mg total) by mouth daily 90 capsule 2    ondansetron (ZOFRAN) 8 mg tablet Take 1 tablet (8 mg total) by mouth every 8 (eight) hours as needed for nausea or  "vomiting 30 tablet 3    Prenatal Multivit-Min-Fe-FA (PRE-SONIA PO) Take 1 tablet by mouth in the morning       No current facility-administered medications for this visit.     Facility-Administered Medications Ordered in Other Visits   Medication Dose Route Frequency Provider Last Rate Last Admin    alteplase (CATHFLO) injection 2 mg  2 mg Intracatheter Q1MIN PRN Nemo Khalil DO         [unfilled]    SOCIAL HISTORY:   reports that she has never smoked. She has never used smokeless tobacco. She reports that she does not currently use alcohol. She reports that she does not use drugs.     FAMILY HISTORY:  family history includes Bone cancer in her maternal grandmother; Coronary artery disease in her mother; Heart disease in her mother; Miscarriages / Stillbirths in her half-sister and mother; No Known Problems in her father and half-brother.     ALLERGIES:  is allergic to nsaids, formic acid, and latex.      Physical Exam:  Vital Signs:   Visit Vitals  /70 (BP Location: Right arm, Patient Position: Sitting, Cuff Size: Adult)   Pulse 105   Temp 97.7 °F (36.5 °C)   Resp 17   Ht 5' 7\" (1.702 m)   Wt 74.4 kg (164 lb)   LMP 10/31/2023   SpO2 100%   BMI 25.69 kg/m²   OB Status Pregnant   Smoking Status Never   BSA 1.86 m²     Body mass index is 25.69 kg/m².  Body surface area is 1.86 meters squared.    GEN: Alert, awake oriented x3, in no acute distress  HEENT- No pallor, icterus, cyanosis, no oral mucosal lesions,   LAD - no palpable cervical, clavicle, axillary, inguinal LAD  Heart- normal S1 S2, regular rate and rhythm, No murmur, rubs.   Lungs- clear breathing sound bilateral.   Abdomen- soft, Non tender, bowel sounds present  Extremities- No cyanosis, clubbing, edema  Neuro- No focal neurological deficit    Labs:  Lab Results   Component Value Date    WBC 3.94 (L) 2024    HGB 9.4 (L) 2024    HCT 28.7 (L) 2024    MCV 83 2024     2024     Lab Results   Component Value Date    " SODIUM 138 03/08/2024    K 3.4 (L) 03/08/2024     03/08/2024    CO2 23 03/08/2024    AGAP 8 03/08/2024    BUN 7 03/08/2024    CREATININE 0.49 (L) 03/08/2024    GLUC 108 03/08/2024    GLUF 87 02/07/2024    CALCIUM 8.8 03/08/2024    AST 24 03/08/2024    ALT 44 03/08/2024    ALKPHOS 73 03/08/2024    TP 6.8 03/08/2024    TBILI 0.16 (L) 03/08/2024    EGFR 126 03/08/2024

## 2024-03-18 ENCOUNTER — PATIENT OUTREACH (OUTPATIENT)
Dept: CASE MANAGEMENT | Facility: HOSPITAL | Age: 34
End: 2024-03-18

## 2024-03-18 ENCOUNTER — HOSPITAL ENCOUNTER (OUTPATIENT)
Dept: INFUSION CENTER | Facility: CLINIC | Age: 34
Discharge: HOME | End: 2024-03-18
Payer: COMMERCIAL

## 2024-03-18 VITALS
SYSTOLIC BLOOD PRESSURE: 129 MMHG | TEMPERATURE: 98.2 F | RESPIRATION RATE: 17 BRPM | HEART RATE: 98 BPM | DIASTOLIC BLOOD PRESSURE: 61 MMHG

## 2024-03-18 DIAGNOSIS — E86.0 DEHYDRATION: Primary | ICD-10-CM

## 2024-03-18 PROCEDURE — 96374 THER/PROPH/DIAG INJ IV PUSH: CPT

## 2024-03-18 PROCEDURE — 96361 HYDRATE IV INFUSION ADD-ON: CPT

## 2024-03-18 RX ADMIN — SODIUM CHLORIDE 1000 ML: 0.9 INJECTION, SOLUTION INTRAVENOUS at 12:23

## 2024-03-18 RX ADMIN — ONDANSETRON 8 MG: 2 INJECTION INTRAMUSCULAR; INTRAVENOUS at 12:23

## 2024-03-18 NOTE — PROGRESS NOTES
MSBILL sat with pt in the infusion center today while she had her hydration, she did have her interview with [employer] and says that it went well.  She is just now waiting to hear back and is obviously hoping for approval with no issues.  She is still feeling pretty sick and we talked about things that are and more so things that are not working for her.  She is also lactose intolerant which complicates what she is able to even try, but she says that she slowly drank a smoothie this morning and that has so far stayed down.  She and her  are working on the nursery and she is happy to show me that she has a small belly now.  She will have a 20 week anatomy scan next week and is looking forward to that.  They are also talking about baby names and her family is planning a baby shower for her.  I did encourage her, though it may be hard, to document her cancer journey in her baby book as well so that someday she and this child can look back on everything they both went through.  She says that she's been doing some blogging to document everything.  She talked about her diagnosis and then a week later realizing how late her period was, taking the test and how quickly it turned positive, etc.  She has also lost the majority of her hair even with the use of the cold cap.    Pt has no practical needs at this time but does benefit from having someone to talk to, I will continue to check in and provide support as I am able moving forward.

## 2024-03-18 NOTE — PROGRESS NOTES
Pt here for hydration infusion, offering no complaints. Port accessed without difficulty positive blood return noted. Tolerated infusion without incident. Port flushed with positive blood return noted, de accessed without difficulty. AVS declined. Walked out in stable condition. Next appointment on 3/20/24 at 3:30pm.

## 2024-03-20 ENCOUNTER — HOSPITAL ENCOUNTER (OUTPATIENT)
Dept: INFUSION CENTER | Facility: CLINIC | Age: 34
Discharge: HOME | End: 2024-03-20
Payer: COMMERCIAL

## 2024-03-20 DIAGNOSIS — E86.0 DEHYDRATION: Primary | ICD-10-CM

## 2024-03-20 PROCEDURE — 96374 THER/PROPH/DIAG INJ IV PUSH: CPT

## 2024-03-20 PROCEDURE — 96361 HYDRATE IV INFUSION ADD-ON: CPT

## 2024-03-20 RX ADMIN — SODIUM CHLORIDE 1000 ML: 9 INJECTION, SOLUTION INTRAVENOUS at 16:04

## 2024-03-20 RX ADMIN — ONDANSETRON 8 MG: 2 INJECTION INTRAMUSCULAR; INTRAVENOUS at 16:03

## 2024-03-22 ENCOUNTER — HOSPITAL ENCOUNTER (OUTPATIENT)
Dept: INFUSION CENTER | Facility: CLINIC | Age: 34
End: 2024-03-22
Payer: COMMERCIAL

## 2024-03-22 VITALS
SYSTOLIC BLOOD PRESSURE: 114 MMHG | TEMPERATURE: 96 F | HEART RATE: 105 BPM | RESPIRATION RATE: 18 BRPM | DIASTOLIC BLOOD PRESSURE: 79 MMHG

## 2024-03-22 DIAGNOSIS — E86.0 DEHYDRATION: Primary | ICD-10-CM

## 2024-03-22 PROCEDURE — 96361 HYDRATE IV INFUSION ADD-ON: CPT

## 2024-03-22 PROCEDURE — 96374 THER/PROPH/DIAG INJ IV PUSH: CPT

## 2024-03-22 RX ADMIN — ONDANSETRON 8 MG: 2 INJECTION INTRAMUSCULAR; INTRAVENOUS at 11:04

## 2024-03-22 RX ADMIN — SODIUM CHLORIDE 1000 ML: 0.9 INJECTION, SOLUTION INTRAVENOUS at 10:58

## 2024-03-22 NOTE — PROGRESS NOTES
Pt here for hydration and zofran infusion, states she is feeling ok today.  Right port accessed with positive blood return noted.  Tolerated infusion without incident.  Port flushed and de-accessed.  AVS declined.  Aware of next appt 3/25 @ 830 am.  Walked out in stable condition.

## 2024-03-23 DIAGNOSIS — O22.30 DVT (DEEP VEIN THROMBOSIS) IN PREGNANCY: ICD-10-CM

## 2024-03-25 ENCOUNTER — HOSPITAL ENCOUNTER (OUTPATIENT)
Dept: INFUSION CENTER | Facility: CLINIC | Age: 34
Discharge: HOME | End: 2024-03-25
Payer: COMMERCIAL

## 2024-03-25 VITALS
DIASTOLIC BLOOD PRESSURE: 74 MMHG | HEART RATE: 84 BPM | SYSTOLIC BLOOD PRESSURE: 114 MMHG | RESPIRATION RATE: 18 BRPM | TEMPERATURE: 98.9 F

## 2024-03-25 DIAGNOSIS — E86.0 DEHYDRATION: Primary | ICD-10-CM

## 2024-03-25 PROCEDURE — 96361 HYDRATE IV INFUSION ADD-ON: CPT

## 2024-03-25 PROCEDURE — 96374 THER/PROPH/DIAG INJ IV PUSH: CPT

## 2024-03-25 RX ADMIN — ONDANSETRON 8 MG: 2 INJECTION INTRAMUSCULAR; INTRAVENOUS at 08:53

## 2024-03-25 RX ADMIN — SODIUM CHLORIDE 1000 ML: 0.9 INJECTION, SOLUTION INTRAVENOUS at 08:53

## 2024-03-25 NOTE — PROGRESS NOTES
Pt into clinic for hydration and zofran. Pt offers complaints of nausea and diarrhea. Pt stated she has medications from physician to assist with both diarrhea and constipation. Tolerated infusion. Port de-accessed. Pt aware of next appointment on 3/27/24 at 9:30am. AVS declined.

## 2024-03-26 ENCOUNTER — TELEPHONE (OUTPATIENT)
Age: 34
End: 2024-03-26

## 2024-03-26 ENCOUNTER — PATIENT OUTREACH (OUTPATIENT)
Dept: HEMATOLOGY ONCOLOGY | Facility: CLINIC | Age: 34
End: 2024-03-26

## 2024-03-26 RX ORDER — ENOXAPARIN SODIUM 150 MG/ML
1 INJECTION SUBCUTANEOUS EVERY 12 HOURS
Qty: 180 ML | Refills: 0 | Status: SHIPPED | OUTPATIENT
Start: 2024-03-26 | End: 2024-03-27

## 2024-03-26 NOTE — PROGRESS NOTES
"Voicemail received from patient this morning.  \"Hi Beata, this is Josefina Ch calling. I tried calling Katy's line but there's like no voicemail set up right now. Anyways, I'm out of my Lovenox. I have one syringe left and I put in a request for [facility] to do a refill, but I got a notification today. Excuse me that my result so sorry that my refill is not available through my chart right now. So I don't know if Doctor Schumacher needs to verbally like call them and tell them to put it in. But I my pharmacy closes early due to short staff. So if I don't get this in soon, I'm not going to have my work stocks for tonight. So if someone could put a rush on that, that'd be great. If you can give me a call back and let me know how everything's going, that'd be awesome. Thank you so much. My phone number is [phone]. Thank you.\"    "

## 2024-03-26 NOTE — TELEPHONE ENCOUNTER
Received call from Iam with [facility] pharmacy in [place]. [facility]ox was sent to, does not have it so script was sent to him. States pt will need to pay $2000 out of pocket for the script. However if it is ordered as an 80 mg syringe, it will cost pt $35. Asking if new script can be sent to [facility] in [place]. Phone number for Iam is .

## 2024-03-26 NOTE — PROGRESS NOTES
Returned patient's call and notified that Dr. Granda sent her refill of her Lovenox to [facility]. Patient confirmed she just received a Mychart from [facility] notifying her of the same. She is aware to contact Dr. Granda's office with any questions or concerns regarding her Lovenox.

## 2024-03-27 ENCOUNTER — HOSPITAL ENCOUNTER (OUTPATIENT)
Dept: INFUSION CENTER | Facility: CLINIC | Age: 34
Discharge: HOME | End: 2024-03-27
Payer: COMMERCIAL

## 2024-03-27 VITALS
DIASTOLIC BLOOD PRESSURE: 54 MMHG | SYSTOLIC BLOOD PRESSURE: 106 MMHG | HEART RATE: 76 BPM | RESPIRATION RATE: 18 BRPM | TEMPERATURE: 97.6 F

## 2024-03-27 DIAGNOSIS — O22.30 DVT (DEEP VEIN THROMBOSIS) IN PREGNANCY: ICD-10-CM

## 2024-03-27 DIAGNOSIS — O22.31 DVT COMPLICATING PREGNANCY, FIRST TRIMESTER: Primary | ICD-10-CM

## 2024-03-27 DIAGNOSIS — E86.0 DEHYDRATION: Primary | ICD-10-CM

## 2024-03-27 PROCEDURE — 96374 THER/PROPH/DIAG INJ IV PUSH: CPT

## 2024-03-27 PROCEDURE — 96361 HYDRATE IV INFUSION ADD-ON: CPT

## 2024-03-27 RX ORDER — ENOXAPARIN SODIUM 100 MG/ML
INJECTION SUBCUTANEOUS
Qty: 48 ML | Refills: 2 | Status: SHIPPED | OUTPATIENT
Start: 2024-03-27 | End: 2024-03-29 | Stop reason: SDUPTHER

## 2024-03-27 RX ADMIN — ONDANSETRON 8 MG: 2 INJECTION INTRAMUSCULAR; INTRAVENOUS at 09:50

## 2024-03-27 RX ADMIN — SODIUM CHLORIDE 1000 ML: 0.9 INJECTION, SOLUTION INTRAVENOUS at 10:15

## 2024-03-27 NOTE — PROGRESS NOTES
Patient arrives to infusion center for IV hydration. Patient offers complaints of diarrhea and constipation. Office notified. Port accessed, patient tolerated entire infusion without complication. Port de-accessed. AVS declined.     Next appointment: 4/1/24 @ [id number]

## 2024-03-28 ENCOUNTER — ROUTINE PRENATAL (OUTPATIENT)
Dept: PERINATAL CARE | Facility: OTHER | Age: 34
End: 2024-03-28
Payer: COMMERCIAL

## 2024-03-28 ENCOUNTER — TELEPHONE (OUTPATIENT)
Dept: PERINATAL CARE | Facility: OTHER | Age: 34
End: 2024-03-28

## 2024-03-28 VITALS
BODY MASS INDEX: 26.3 KG/M2 | HEART RATE: 86 BPM | DIASTOLIC BLOOD PRESSURE: 60 MMHG | HEIGHT: 67 IN | SYSTOLIC BLOOD PRESSURE: 114 MMHG | WEIGHT: 167.6 LBS

## 2024-03-28 DIAGNOSIS — O9A.112 CANCER COMPLICATING PREGNANCY IN SECOND TRIMESTER: Primary | ICD-10-CM

## 2024-03-28 DIAGNOSIS — Z17.0 MALIGNANT NEOPLASM OF OVERLAPPING SITES OF LEFT BREAST IN FEMALE, ESTROGEN RECEPTOR POSITIVE (HCC): ICD-10-CM

## 2024-03-28 DIAGNOSIS — Z36.86 ENCOUNTER FOR ANTENATAL SCREENING FOR CERVICAL LENGTH: ICD-10-CM

## 2024-03-28 DIAGNOSIS — C50.812 MALIGNANT NEOPLASM OF OVERLAPPING SITES OF LEFT BREAST IN FEMALE, ESTROGEN RECEPTOR POSITIVE (HCC): ICD-10-CM

## 2024-03-28 DIAGNOSIS — Z3A.20 20 WEEKS GESTATION OF PREGNANCY: ICD-10-CM

## 2024-03-28 DIAGNOSIS — O22.31 DVT COMPLICATING PREGNANCY, FIRST TRIMESTER: ICD-10-CM

## 2024-03-28 PROCEDURE — 76817 TRANSVAGINAL US OBSTETRIC: CPT | Performed by: OBSTETRICS & GYNECOLOGY

## 2024-03-28 PROCEDURE — 76811 OB US DETAILED SNGL FETUS: CPT | Performed by: OBSTETRICS & GYNECOLOGY

## 2024-03-28 PROCEDURE — 99214 OFFICE O/P EST MOD 30 MIN: CPT | Performed by: OBSTETRICS & GYNECOLOGY

## 2024-03-28 NOTE — PROGRESS NOTES
Jillian Ch  has no complaints today at 20w2d. She reports fetal movements and does not report any vaginal bleeding or signs of labor.  Her recently completed fetal testing revealed a nips inconclusive and MSAFP is normal. She is here today for an ultrasound for fetal anatomy.    Problem list:  Maternal diagnosis of breast cancer early in pregnancy on Adriamycin and Cytoxan every 3 weeks for 4 cycles  Maternal history of DVT early in pregnancy on Lovenox 75 mg subcu twice daily  Marginal PCI seen on a prior ultrasound  Advanced maternal age    Ultrasound findings:  The ultrasound today shows normal interval fetal growth and fluid, normal cervical length, and no malformations were detected.  Anatomical survey was not completed today secondary to fetal position.  The placental cord insertion views were limited secondary to fetal position.  She is unaware of the fetal gender.    Pregnancy ultrasound has limitations and is unable to detect all forms of fetal congenital abnormalities.      Follow up recommended:   Scan for growth and missed anatomy in 8 weeks.      Pre visit time reviewing her records   10 minutes  Face to face time 10 minutes  Post visit time on documentation of note, updating her problem list, adding orders and prescriptions 10 minutes.  Procedures that were completed today were charged separately.   The level of decision making was moderate complexity.    Celeste Ortiz MD

## 2024-03-28 NOTE — TELEPHONE ENCOUNTER
LVM for patient 3/28 at 0830. Notified patient of scheduling change for her 3/28 appt and that her appt time changed to 3/28 at 1245pm. # provided to patient to confirm call and changes

## 2024-03-28 NOTE — LETTER
March 28, 2024     Nemo Rose DO  [address] [facility] [id number]    Patient: Jillian Ch   YOB: 1990   Date of Visit: 3/28/2024       Dear Dr. Rose:    Thank you for referring Jillian Ch to me for evaluation. Below are my notes for this consultation.    If you have questions, please do not hesitate to call me. I look forward to following your patient along with you.         Sincerely,        Celeste Ortiz MD        CC: No Recipients    Celeste Ortiz MD  3/28/2024 10:19 PM  Sign when Signing Visit  Jillian Ch  has no complaints today at 20w2d. She reports fetal movements and does not report any vaginal bleeding or signs of labor.  Her recently completed fetal testing revealed a nips inconclusive and MSAFP is normal. She is here today for an ultrasound for fetal anatomy.    Problem list:  Maternal diagnosis of breast cancer early in pregnancy on Adriamycin and Cytoxan every 3 weeks for 4 cycles  Maternal history of DVT early in pregnancy on Lovenox 75 mg subcu twice daily  Marginal PCI seen on a prior ultrasound  Advanced maternal age    Ultrasound findings:  The ultrasound today shows normal interval fetal growth and fluid, normal cervical length, and no malformations were detected.  Anatomical survey was not completed today secondary to fetal position.  The placental cord insertion views were limited secondary to fetal position.  She is unaware of the fetal gender.    Pregnancy ultrasound has limitations and is unable to detect all forms of fetal congenital abnormalities.      Follow up recommended:   Scan for growth and missed anatomy in 8 weeks.      Pre visit time reviewing her records   10 minutes  Face to face time 10 minutes  Post visit time on documentation of note, updating her problem list, adding orders and prescriptions 10 minutes.  Procedures that were completed today were charged separately.   The level of  decision making was moderate complexity.    Celeste Ortiz MD

## 2024-03-29 ENCOUNTER — PATIENT MESSAGE (OUTPATIENT)
Dept: PERINATAL CARE | Facility: OTHER | Age: 34
End: 2024-03-29

## 2024-03-29 ENCOUNTER — TELEPHONE (OUTPATIENT)
Dept: NUTRITION | Facility: CLINIC | Age: 34
End: 2024-03-29

## 2024-03-29 ENCOUNTER — HOSPITAL ENCOUNTER (OUTPATIENT)
Dept: INFUSION CENTER | Facility: CLINIC | Age: 34
End: 2024-03-29

## 2024-03-29 ENCOUNTER — CLINICAL SUPPORT (OUTPATIENT)
Dept: PERINATAL CARE | Facility: OTHER | Age: 34
End: 2024-03-29

## 2024-03-29 ENCOUNTER — TELEPHONE (OUTPATIENT)
Age: 34
End: 2024-03-29

## 2024-03-29 DIAGNOSIS — Z33.1 PREGNANT STATE, INCIDENTAL: Primary | ICD-10-CM

## 2024-03-29 DIAGNOSIS — O22.30 DVT (DEEP VEIN THROMBOSIS) IN PREGNANCY: ICD-10-CM

## 2024-03-29 PROCEDURE — NC001 PR NO CHARGE

## 2024-03-29 RX ORDER — ENOXAPARIN SODIUM 100 MG/ML
INJECTION SUBCUTANEOUS
Qty: 48 ML | Refills: 2 | Status: SHIPPED | OUTPATIENT
Start: 2024-03-29

## 2024-03-29 NOTE — TELEPHONE ENCOUNTER
Pt returning a call from [facility] regarding MyC question sent earlier today about syringe. RN warm transferred to [facility] office. No further questions.

## 2024-03-29 NOTE — PROGRESS NOTES
Pt. Came in to be instructed how to inject a .75 ml of Lovenox out of a .80 prefilled syringe. I used the magnifier cynthia from her smart phone to show her where the .75 ml juan should be and showed her how to prepare the syringe so that she would give herself a .75 ml dose. She verbalized understandings and we also went over the tip to avoid bruise and burning. We plan to touch base when she comes back for her future visit.

## 2024-03-29 NOTE — TELEPHONE ENCOUNTER
Jillian was scheduled for oncology nutrition follow up during hydration today. Per chart review, infusion schedule has changed. Nutrition follow up rescheduled to Monday 4/1 during hydration.

## 2024-04-01 ENCOUNTER — NUTRITION (OUTPATIENT)
Dept: NUTRITION | Facility: CLINIC | Age: 34
End: 2024-04-01

## 2024-04-01 ENCOUNTER — HOSPITAL ENCOUNTER (OUTPATIENT)
Dept: INFUSION CENTER | Facility: CLINIC | Age: 34
Discharge: HOME | End: 2024-04-01
Payer: COMMERCIAL

## 2024-04-01 ENCOUNTER — PATIENT OUTREACH (OUTPATIENT)
Dept: CASE MANAGEMENT | Facility: HOSPITAL | Age: 34
End: 2024-04-01

## 2024-04-01 VITALS
DIASTOLIC BLOOD PRESSURE: 74 MMHG | HEART RATE: 88 BPM | SYSTOLIC BLOOD PRESSURE: 114 MMHG | TEMPERATURE: 96.7 F | RESPIRATION RATE: 18 BRPM | OXYGEN SATURATION: 98 %

## 2024-04-01 DIAGNOSIS — C50.812 MALIGNANT NEOPLASM OF OVERLAPPING SITES OF LEFT BREAST IN FEMALE, ESTROGEN RECEPTOR POSITIVE (HCC): ICD-10-CM

## 2024-04-01 DIAGNOSIS — Z71.3 NUTRITIONAL COUNSELING: Primary | ICD-10-CM

## 2024-04-01 DIAGNOSIS — Z17.0 MALIGNANT NEOPLASM OF OVERLAPPING SITES OF LEFT BREAST IN FEMALE, ESTROGEN RECEPTOR POSITIVE (HCC): ICD-10-CM

## 2024-04-01 DIAGNOSIS — E86.0 DEHYDRATION: Primary | ICD-10-CM

## 2024-04-01 LAB
ALBUMIN SERPL BCP-MCNC: 3.7 G/DL (ref 3.5–5)
ALP SERPL-CCNC: 69 U/L (ref 34–104)
ALT SERPL W P-5'-P-CCNC: 21 U/L (ref 7–52)
ANION GAP SERPL CALCULATED.3IONS-SCNC: 10 MMOL/L (ref 4–13)
ANISOCYTOSIS BLD QL SMEAR: PRESENT
AST SERPL W P-5'-P-CCNC: 15 U/L (ref 13–39)
BASOPHILS # BLD MANUAL: 0 THOUSAND/UL (ref 0–0.1)
BASOPHILS NFR MAR MANUAL: 0 % (ref 0–1)
BILIRUB SERPL-MCNC: 0.21 MG/DL (ref 0.2–1)
BUN SERPL-MCNC: 6 MG/DL (ref 5–25)
CALCIUM SERPL-MCNC: 9.1 MG/DL (ref 8.4–10.2)
CHLORIDE SERPL-SCNC: 104 MMOL/L (ref 96–108)
CO2 SERPL-SCNC: 21 MMOL/L (ref 21–32)
CREAT SERPL-MCNC: 0.4 MG/DL (ref 0.6–1.3)
EOSINOPHIL # BLD MANUAL: 0 THOUSAND/UL (ref 0–0.4)
EOSINOPHIL NFR BLD MANUAL: 0 % (ref 0–6)
ERYTHROCYTE [DISTWIDTH] IN BLOOD BY AUTOMATED COUNT: 16.6 % (ref 11.6–15.1)
FERRITIN SERPL-MCNC: 24 NG/ML (ref 11–307)
GFR SERPL CREATININE-BSD FRML MDRD: 135 ML/MIN/1.73SQ M
GLUCOSE SERPL-MCNC: 116 MG/DL (ref 65–140)
HCT VFR BLD AUTO: 28.5 % (ref 34.8–46.1)
HGB BLD-MCNC: 9.1 G/DL (ref 11.5–15.4)
IRON SATN MFR SERPL: 18 % (ref 15–50)
IRON SERPL-MCNC: 82 UG/DL (ref 50–212)
LYMPHOCYTES # BLD AUTO: 0.88 THOUSAND/UL (ref 0.6–4.47)
LYMPHOCYTES # BLD AUTO: 14 % (ref 14–44)
MAGNESIUM SERPL-MCNC: 1.8 MG/DL (ref 1.9–2.7)
MCH RBC QN AUTO: 27.2 PG (ref 26.8–34.3)
MCHC RBC AUTO-ENTMCNC: 31.9 G/DL (ref 31.4–37.4)
MCV RBC AUTO: 85 FL (ref 82–98)
MONOCYTES # BLD AUTO: 0.63 THOUSAND/UL (ref 0–1.22)
MONOCYTES NFR BLD: 10 % (ref 4–12)
NEUTROPHILS # BLD MANUAL: 4.77 THOUSAND/UL (ref 1.85–7.62)
NEUTS BAND NFR BLD MANUAL: 1 % (ref 0–8)
NEUTS SEG NFR BLD AUTO: 75 % (ref 43–75)
PLATELET # BLD AUTO: 186 THOUSANDS/UL (ref 149–390)
PLATELET BLD QL SMEAR: ADEQUATE
PMV BLD AUTO: 11.4 FL (ref 8.9–12.7)
POLYCHROMASIA BLD QL SMEAR: PRESENT
POTASSIUM SERPL-SCNC: 3.4 MMOL/L (ref 3.5–5.3)
PROT SERPL-MCNC: 6.9 G/DL (ref 6.4–8.4)
RBC # BLD AUTO: 3.35 MILLION/UL (ref 3.81–5.12)
RBC MORPH BLD: PRESENT
SODIUM SERPL-SCNC: 135 MMOL/L (ref 135–147)
TIBC SERPL-MCNC: 464 UG/DL (ref 250–450)
UIBC SERPL-MCNC: 382 UG/DL (ref 155–355)
WBC # BLD AUTO: 6.27 THOUSAND/UL (ref 4.31–10.16)

## 2024-04-01 PROCEDURE — 83540 ASSAY OF IRON: CPT | Performed by: INTERNAL MEDICINE

## 2024-04-01 PROCEDURE — 83550 IRON BINDING TEST: CPT | Performed by: INTERNAL MEDICINE

## 2024-04-01 PROCEDURE — 82728 ASSAY OF FERRITIN: CPT | Performed by: INTERNAL MEDICINE

## 2024-04-01 PROCEDURE — 83735 ASSAY OF MAGNESIUM: CPT | Performed by: INTERNAL MEDICINE

## 2024-04-01 PROCEDURE — 85027 COMPLETE CBC AUTOMATED: CPT | Performed by: INTERNAL MEDICINE

## 2024-04-01 PROCEDURE — 80053 COMPREHEN METABOLIC PANEL: CPT | Performed by: INTERNAL MEDICINE

## 2024-04-01 PROCEDURE — 96361 HYDRATE IV INFUSION ADD-ON: CPT

## 2024-04-01 PROCEDURE — 85007 BL SMEAR W/DIFF WBC COUNT: CPT | Performed by: INTERNAL MEDICINE

## 2024-04-01 PROCEDURE — 96374 THER/PROPH/DIAG INJ IV PUSH: CPT

## 2024-04-01 RX ADMIN — ONDANSETRON 8 MG: 2 INJECTION INTRAMUSCULAR; INTRAVENOUS at 14:30

## 2024-04-01 RX ADMIN — SODIUM CHLORIDE 1000 ML: 0.9 INJECTION, SOLUTION INTRAVENOUS at 14:30

## 2024-04-01 NOTE — PROGRESS NOTES
Outpatient Oncology Nutrition Consultation   Type of Consult: Follow Up  Care Location: [facility]    Reason for referral: Notification from RN Navigator Kamla HAYWOOD on 12/7/23 that pt has triggered for oncology nutrition care (reason for referral: Patient is pregnant and newly diagnosed with breast cancer. Had questions regarding nutrition and sugar intake, etc).     Nutrition Assessment:   Oncology Diagnosis & Treatments:   Diagnosed with left beast cancer, ER positive, 12/2/23.   S/p left mastectomy 1/2/24.   Chemotherapy (doxorubicin, cytoxan, emend) start 2/21/24.   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 Breast, Left, US Guided Left Breast Biopsy 12:00 6cmfn:  Invasive breast carcinoma of no special type (ductal) with osteoclast-like stromal giant cells  Grade 2  ER 85  AL 95  HER2 1+     B. Breast, Right, US Guided Right Breast Biopsy 10:00 9cmfn:  Benign breast tissue.    In review of the patient’s recent imaging, the left malignancy appears unifocal.  The biopsy-proven carcinoma measured 2.1 cm on ultrasound. Ultrasound of the left axilla was performed on 11/24/2023 and showed no suspicious adenopathy.  Recent imaging of the contralateral right breast dated 12/2/2023 and 11/24/2023 was reviewed and shows no suspicious findings.  The pathology results for the ultrasound-guided core needle biopsy  (right breast 10:00, 9 cm from the nipple) are benign and concordant with imaging.     12/2/2023 Genetic Testing    [facility]  A total of 36 genes were evaluated, including: APC, TOPHER, BARD 1, BMPR1A, BRCA1, BRCA2, BRIP1, CDH1, CDK4, CKDN2A, CHEK2, DICER1, MLH1, MSH2, MSH6, MUTYH, NBN, NF1, NTHL1, PALB2, PMS2, PTEN, RAD51C, RECQL, SMAD4, SMARCA4, STK11, TP53, AXIN2, HOXB13, MSH3, POLD1, AND POLE, EPCAM, AND GREM1   Negative result. No pathogenic sequence variants or deletions/duplications identified       12/2/2023 -  Cancer Staged    Staging form: Breast, AJCC 8th Edition  - Clinical stage from 12/2/2023: Stage IB (cT2, cN0, cM0, G2, ER+, MI+, HER2-) - Signed by Elena Cornell MD on 12/13/2023  Stage prefix: Initial diagnosis  Histologic grading system: 3 grade system       1/2/2024 Surgery    Left breast mastectomy with sentinel lymph node biopsy  Invasive Mammary carcinoma of no special type (ductal)   Grade 2  25 mm  Margins negative  0/2 Lymph nodes  Anatomic stage IIA  Prognostic stage IA      2/21/2024 -  Chemotherapy    DOXOrubicin (ADRIAMYCIN), 56.25 mg/m2 = 106 mg (93.8 % of original dose 60 mg/m2), Intravenous, Once, 2 of 4 cycles  Dose modification: 56.25 mg/m2 (original dose 60 mg/m2, Cycle 1, Reason: Other (Must fill in a comment), Comment: max recommended dose), 50 mg/m2 (original dose 60 mg/m2, Cycle 2, Reason: Nausea/Vomiting, Comment: dose reduced to 50 mg/m2 due to n/v)  Administration: 106 mg (2/21/2024), 94 mg (3/13/2024)  alteplase (CATHFLO), 2 mg, Intracatheter, Every 1 Minute as needed, 2 of 4 cycles  cyclophosphamide (CYTOXAN) IVPB, 600 mg/m2 = 1,128 mg, Intravenous, Once, 2 of 4 cycles  Dose modification: 540 mg/m2 (original dose 600 mg/m2, Cycle 2, Reason: Nausea/Vomiting, Comment: 10% dose reduction due to n/v)  Administration: 1,128 mg (2/21/2024), 1,000 mg (3/13/2024)  fosaprepitant (EMEND) IVPB, 150 mg, Intravenous, Once, 2 of 4 cycles  Administration: 150 mg (2/21/2024), 150 mg  (3/13/2024)       Past Medical & Surgical Hx:   Patient Active Problem List   Diagnosis    Mild persistent asthma without complication    Axillary hidradenitis suppurativa    Anxiety    Chest wall pain    Gastroesophageal reflux disease without esophagitis    Depression with anxiety    Chronic fatigue    Myalgia    Chronic left shoulder pain    Cervical disc disorder at C5-C6 level with radiculopathy    Atypical squamous cells of undetermined significance (ASCUS) on Papanicolaou smear of cervix    Hypertrophic and atrophic condition of skin    Migraine headache    Shoulder impingement syndrome, left    Vitamin D deficiency    Close exposure to COVID-19 virus    Thoracic outlet syndrome    Palpitations    Post-operative pain    Transaminitis    Right middle lobe pulmonary nodule    Reversible airway obstruction    SOB (shortness of breath)    Musculoskeletal chest pain    COVID-19 virus infection    Unexplained weight gain    Left ovarian cyst    Dysphagia    Malignant neoplasm of overlapping sites of left breast in female, estrogen receptor positive (HCC)    Cancer complicating pregnancy in second trimester    17 weeks gestation of pregnancy    Status post left mastectomy    Multigravida of advanced maternal age in first trimester    DVT complicating pregnancy, first trimester    Dehydration     Past Medical History:   Diagnosis Date    Anesthesia complication     gait dysfunction/ urinary retention after nerve block,    Anxiety     Asthma     CRPS (complex regional pain syndrome), upper limb     left chest/arm/hand    Deep vein thrombosis (HCC) 01/05/2024    post op from mastectomy    GERD (gastroesophageal reflux disease)     Heart murmur     HPV (human papilloma virus) infection 2012    unsure of 16, 18, or other    Invasive ductal carcinoma of breast, female, left (HCC) 2023    Kidney stone     Miscarriage 3/15/2015    7 weeks along.    Neck pain     Ovarian cyst     Left    PONV (postoperative nausea and  vomiting) 01/02/2024     Past Surgical History:   Procedure Laterality Date    COLPOSCOPY  2012    HPV    EGD      IR PORT PLACEMENT  2/7/2024    IR UPPER EXTREMITY VENOGRAM- DIAGNOSTIC  05/28/2021    SC BX/EXC LYMPH NODE OPEN SUPERFICIAL Left 01/02/2024    Procedure: LEFT BREAST MASTECTOMY, LYMPHATIC MAPPING WITH RADIOACTIVE DYE, SENTINEL LYMPH NODE BIOPSY, ZAHEER LEFT BREAST, INJECTION IN OR AT 0800 BY DR CORNELL;  Surgeon: Elena Cornell MD;  Location: [facility];  Service: Surgical Oncology    SC EXCISION 1ST &/CERVICAL RIB Left 08/12/2021    Procedure: FIRST RIB RESECTION;  Surgeon: Jonathan Schaffer MD;  [facility]: [facility];  Service: Thoracic    SC INJ RADIOACTIVE TRACER FOR ID OF SENTINEL NODE Left 01/02/2024    Procedure: LEFT BREAST MASTECTOMY, LYMPHATIC MAPPING WITH RADIOACTIVE DYE, SENTINEL LYMPH NODE BIOPSY, ZAHEER LEFT BREAST, INJECTION IN OR AT 0800 BY DR CORNELL;  Surgeon: Elena Cornell MD;  [facility]: [facility];  Service: Surgical Oncology    SC INTRAOP SENTINEL LYMPH NODE ID W/DYE INJECTION Left 01/02/2024    Procedure: LEFT BREAST MASTECTOMY, LYMPHATIC MAPPING WITH RADIOACTIVE DYE, SENTINEL LYMPH NODE BIOPSY, ZAHEER LEFT BREAST, INJECTION IN OR AT 0800 BY DR CORNELL;  Surgeon: Elena Cornell MD;  [facility];  Service: Surgical Oncology    SC MASTECTOMY SIMPLE COMPLETE Left 01/02/2024    Procedure: LEFT BREAST MASTECTOMY, LYMPHATIC MAPPING WITH RADIOACTIVE DYE, SENTINEL LYMPH NODE BIOPSY, ZAHEER LEFT BREAST, INJECTION IN OR AT 0800 BY DR CORNELL;  Surgeon: Elena Cornell MD;  [facility];  Service: Surgical Oncology    THORACOSCOPY VIDEO ASSISTED SURGERY (VATS) Left 08/12/2021    Procedure: THORACOSCOPY VIDEO ASSISTED SURGERY (VATS);  Surgeon: Jonathan Schaffer MD;  [facility];  Service: Thoracic    US GUIDANCE BREAST BIOPSY LEFT EACH ADDITIONAL Left 12/02/2023    US GUIDED BREAST BIOPSY RIGHT COMPLETE Right 12/02/2023    WISDOM TOOTH  EXTRACTION         Review of Medications:   Vitamins, Supplements and Herbals: Med List Reviewed & pt is only taking: Prenatal MVI                                                                                                                                                                                                                                                                                                                                                                                                                                                                                                                             Current Outpatient Medications:     albuterol (PROVENTIL HFA,VENTOLIN HFA) 90 mcg/act inhaler, TAKE 2 PUFFS BY MOUTH EVERY 6 HOURS AS NEEDED FOR WHEEZE OR FOR SHORTNESS OF BREATH, Disp: 18 g, Rfl: 3    cetirizine (ZyrTEC) 10 mg tablet, TAKE 1 TABLET BY MOUTH EVERY DAY, Disp: 90 tablet, Rfl: 0    diphenhydramine, lidocaine, Al/Mg hydroxide, simethicone (Magic Mouthwash) SUSP, SWISH AND SPIT 10 ML EVERY 4 (FOUR) HOURS AS NEEDED FOR MOUTH PAIN OR DISCOMFORT (Patient not taking: Reported on 3/8/2024), Disp: 237 mL, Rfl: 1    enoxaparin (LOVENOX) 80 mg/0.8 mL, Inject 75mg SC every 12 hours, Disp: 48 mL, Rfl: 2    lidocaine-prilocaine (EMLA) cream, Apply to port 30 to 60 min prior to use, Disp: 30 g, Rfl: 2    omeprazole (PriLOSEC) 40 MG capsule, Take 1 capsule (40 mg total) by mouth daily, Disp: 90 capsule, Rfl: 2    ondansetron (ZOFRAN) 8 mg tablet, Take 1 tablet (8 mg total) by mouth every 8 (eight) hours as needed for nausea or vomiting (Patient not taking: Reported on 3/28/2024), Disp: 30 tablet, Rfl: 3    Prenatal Multivit-Min-Fe-FA (PRE-SONIA PO), Take 1 tablet by mouth in the morning, Disp: , Rfl:     Most Recent Lab Results:   Lab Results   Component Value Date    WBC 3.94 (L) 2024    NEUTROABS 5.24 2024    IRON 83 2020    CHOLESTEROL 127 2023    TRIG 56  "12/02/2023    HDL 43 (L) 12/02/2023    LDLCALC 73 12/02/2023    ALT 44 03/08/2024    AST 24 03/08/2024    ALB 3.7 03/08/2024    SODIUM 138 03/08/2024    SODIUM 134 (L) 02/26/2024    K 3.4 (L) 03/08/2024    K 3.5 02/26/2024     03/08/2024    BUN 7 03/08/2024    BUN 8 02/26/2024    CREATININE 0.49 (L) 03/08/2024    CREATININE 0.55 (L) 02/26/2024    EGFR 126 03/08/2024    PHOS 3.5 02/26/2024    GLUF 87 02/07/2024    GLUF 79 12/09/2023    GLUC 108 03/08/2024    HGBA1C 5.4 12/02/2023    HGBA1C 5.1 02/17/2023    CALCIUM 8.8 03/08/2024    MG 1.8 (L) 02/26/2024       Anthropometric Measurements:   Height: 67\"  Ht Readings from Last 1 Encounters:   03/28/24 5' 7\" (1.702 m)     Wt Readings from Last 35 Encounters:   03/28/24 76 kg (167 lb 9.6 oz)   03/15/24 75.3 kg (166 lb)   03/13/24 75.8 kg (167 lb)   03/12/24 74.4 kg (164 lb)   03/08/24 76 kg (167 lb 9.6 oz)   02/29/24 73.7 kg (162 lb 6.4 oz)   02/21/24 74.8 kg (165 lb)   02/09/24 77.1 kg (170 lb)   02/08/24 73.9 kg (163 lb)   02/07/24 74 kg (163 lb 2.3 oz)   01/30/24 77.5 kg (170 lb 12.8 oz)   01/24/24 76.7 kg (169 lb)   01/23/24 76.7 kg (169 lb)   01/18/24 77.6 kg (171 lb)   01/17/24 77.8 kg (171 lb 8 oz)   01/11/24 77.6 kg (171 lb)   01/11/24 76.9 kg (169 lb 9.6 oz)   01/09/24 78 kg (172 lb)   01/02/24 78.2 kg (172 lb 6.4 oz)   12/29/23 78.8 kg (173 lb 12.8 oz)   12/27/23 78.8 kg (173 lb 12.8 oz)   12/21/23 81.1 kg (178 lb 12.8 oz)   12/20/23 80.3 kg (177 lb)   12/15/23 81.6 kg (180 lb)   12/14/23 81.3 kg (179 lb 3.2 oz)   12/13/23 80.3 kg (177 lb)   12/06/23 81.6 kg (180 lb)   11/24/23 82.1 kg (181 lb)   10/19/23 82.1 kg (181 lb)   10/13/23 85.3 kg (188 lb)   09/26/23 85.3 kg (188 lb)   08/22/23 80.7 kg (178 lb)   06/08/23 78.9 kg (174 lb)   04/04/23 77.3 kg (170 lb 6.4 oz)   02/21/23 78.7 kg (173 lb 6.4 oz)     Weight History:   Usual Weight: 130-145#  Varian: n/a  Home Scale: none    Oncology Nutrition-Anthropometrics      Flowsheet Row Nutrition from 4/1/2024 " in [facility] Dietitian [place] Nutrition from 3/13/2024 in [facility] Oncology Dietitian [place]   Patient [age] (years): 33 years 33 years   Patient (female) height (in): 67 in 67 in   Current Weight to be used for anthropometric calculations (lbs) 167.6 lbs 167 lbs   Current Weight to be used for anthropometric calculations (kg) 76.2 kg 75.9 kg   BMI: 26.2 26.2   IBW female: 135 lbs 135 lbs   IBW (kg) female: 61.4 kg 61.4 kg   IBW % (female) 124.1 % 123.7 %   Adjusted BW (female): 143.2 lbs 143 lbs   Adjusted BW kg (female): 65.1 kg 65 kg   % weight change after 1 week: -- -0.4 %   Weight change after 1 week (lbs) -- -0.6 lbs   % weight change after 1 month: 3.2 % -1.8 %   Weight change after 1 month (lbs) 5.2 lbs -3 lbs   % weight change after 3 months: -3.6 % -5.6 %   Weight change after 3 months (lbs) -6.2 lbs -10 lbs   % weight change after 6 months: -10.9 % -11.2 %   Weight change after 6 months (lbs) -20.4 lbs -21 lbs            Nutrition-Focused Physical Findings: none observed    Food/Nutrition-Related History & Client/Social History:    Current Nutrition Impact Symptoms:  [x] Nausea   -using zofran  [x] Reduced Appetite  [] Acid Reflux    [] Vomiting  [x] Unintended Wt Loss   -significant x6 months  [] Malabsorption    [] Diarrhea   -imodium prn  [] Unintended Wt Gain  [] Dumping Syndrome    [x] Constipation   -miralax and colace are helping  [] Thick Mucous/Secretions  [] Abdominal Pain    [] Dysgeusia (Altered Taste)  [] Xerostomia (Dry Mouth)  [] Gas    [] Dysosmia (Altered Smell)  [] Thrush  [] Difficulty Chewing    [] Oral Mucositis (Sore Mouth)  [x] Fatigue  [] Hyperglycemia   Lab Results   Component Value Date    HGBA1C 5.4 12/02/2023      [] Odynophagia  [] Esophagitis  [] Other:    [] Dysphagia  [] Early Satiety  [] No Problems Eating      Food Allergies & Intolerances: yes: lactose intolerant     Current Diet: Lactose-Free and No red meat   Current Nutrition Intake: Unchanged  from last visit  Appetite: Poor  Nutrition Route: PO  Oral Care: brushes QD  Activity level: Dizzy often in the morning. Degenerative disc disease limits physical activity. Weakness and SOB recently.     24 Hr Diet Recall:   Breakfast: bread; eggs; cereal with almond milk   Snacks: cheese, grapes or other fruit   Dinner: egg pie     Beverages: water with Pedialyte (32oz x1); ginger ale (12oz x0-1), almond milk (8oz x1),  IV hydration 3x weekly  Supplements:   None    Oncology Nutrition-Estimated Needs      Flowsheet Row Nutrition from 4/1/2024 in [facility] Dietitian [place] [facility]   Weight type used Actual weight Actual weight   Weight in kilograms (kg) used for estimated needs 76.2 kg 75.9 kg   Energy needs formula:  Other (single)  [Estimated Energy Requirements = nonpregnant EER +340kcal for 2nd trimester pregnancy] Other (single)  [Estimated Energy Requirements = nonpregnant EER + 340kcal for 2nd trimester pregnancy]   Single value (kcal/kg): 2632 2594   Energy needs based on single value: 711282 kcal 195004 kcal   Protein needs formula: 1.5-2 g/kg 1.2-1.5 g/kg   Protein needs based on 1.2 g/kg: -- 91 g   Protein needs based on 1.5 g/kg -- 114 g   Protein needs based on 1.5 g/kg 114 g --   Protein needs based on 2 g/kg 152 g --   Fluid needs formula: 35-40 mL/kg 35-40 mL/kg   Fluid needs based on 35 mL/kg 2674 mL 2657 mL   Fluid needs in ounces 90 oz 90 oz   Fluid needs based on 40 mL/kg 3056 mL 3036 mL   Fluid needs in ounces 103 oz 103 oz          **Estimated nutrition needs are based on comparative standards for 2nd trimester pregnancy.     Discussion & Intervention:   Jillian was evaluated today for an RD follow up regarding poor po intake and pt request for diet guidance throughout treatment .  Jillian is currently undergoing tx for breast cancer . She continues to struggle with nausea and poor appetite. She also recently struggled  with constipation, which is controlled now with miralax and colace. She has continued eating eggs as her main protein source as she can tolerate eggs well. She can also tolerate fruit, bread, cereal with almond milk, and cheese. She has also been making efforts to drink Pedialyte to help rehydrate w/ diarrhea.      Based on today's assessment, discussion included: MNT for: breast cancer, nausea/vomiting, weight management, diarrhea, a bland/easy on the stomach diet & food choices to include at all meals & snacks, adequate hydration & fluid choices, eating smaller more frequent meals every 2-3 hours (5-6 small meals/day), and eating when feeling most hungry.   Moving forward, Jillian will continue current nutrition plan of care.  Materials Provided: not applicable   All questions and concerns addressed during today’s visit.  Jillian has RD contact information.    Nutrition Diagnosis:   Inadequate Energy Intake related to physiological causes, disease state and treatment related issues as evidenced by food recall, wt loss and discussion with pt and/or family.  Increased Nutrient Needs (kcal & pro) related to increased demand for nutrients and disease state as evidenced by cancer dx and pt undergoing tx for cancer.  Patient has clinical indicators (or ASPEN criteria) consistent with severe protein-calorie malnutrition in the context of Chronic Illness as evidenced by >10% wt loss in 6 months and </=75% energy intake vs. Estimated needs for >/=1 month.  Monitoring & Evaluation:   Goals:  weight maintenance/stabilization  adequate nutrition impact symptom management  pt to meet >/=75% estimated nutrition needs daily  increase protein intake  increase fluid intake  increase calorie intake    Progress Towards Goals: Progressing    Nutrition Rx & Recommendations:  Diet: high calorie, high protein, easy on the stomach   Small, frequent meals/snacks may be easier to tolerate than 3 large daily meals.  Aim for 5-6 small meals per  day (every 2-3 hours).  Include protein at all meals/snacks.  Include a variety of foods (as tolerated/allowed by diet).  Incorporate physical activity as able/allowed.  Stay hydrated by sipping fluids of choice/tolerance throughout the day.  Liquid nutrition may be better tolerated than solids at times.  Alter food choices and eating patterns to accommodate changing needs.  Light physical activity (such as walking) is encouraged, as able/tolerated.  Weigh yourself regularly. If you notice weight loss, make an effort to increase your daily food/calorie intake. If you continue to notice loss after these efforts, reach out to your dietitian to establish a plan to stabilize weight.  Food and drink that are easy on the stomach: clear broth (chicken, vegetable, bone, mushroom, beef), juice (caution with acidic juices), potatoes, pretzels, crackers, cereal (Corn Flakes, Rice Chex, Rice Crispies, Cheerios), oatmeal or cream of wheat, white bread or toast, white rice, cooked vegetables (caution with gas producing vegetables), plain pasta, eggs, peanut butter, boiled chicken or turkey, soft cheeses. Foods to avoid: spicy, fried/greasy, high in added sugar, acidic.   Keep a log of foods that you can tolerate and ones that you cannot tolerate.   Continue Pedialyte to aid in hydration.     Follow Up Plan:  4/15/24 during hydration    Recommend Referral to Other Providers: none at this time

## 2024-04-01 NOTE — PROGRESS NOTES
ALISSA met with pt in the infusion center this afternoon to check in since our last conversation.  She has had an ultrasound for the baby since we last met and tells me that while some things were not able to be measured due to the positioning, overall the growth all looks great and she is even measuring about 6 days ahead of her actual due date.  She showed me some pictures of the nursery, which she is very happy with the progress.  Her mother seems to have decided to move here after all, but now there are logistics to coordinate with cleaning out and then selling her home, so they are trying to work all of that out as a family.     Pt's last chemo may potentially be this week, she does have another one scheduled for the end of the month but it's unclear at this point if she will need to have that one or not.  She is obviously hoping for not.  She will s/w her oncologist after this week's blood work to decide next steps.  She is scheduled for hydration through at least May and plans to keep all of those appointments, as she's still having really sick days.  She does note that she's also having days where she feels reasonably good, but they never last more than 1-2 days at a time.  She spoke about her upcoming appointments, and says that she's unsure if she will go through breast reconstruction or not.  At this point she's not even considering it as she has so much else going on.  She also says that she will likely not breastfeed after having the left side mastectomy, she is worried about her supply due to the effects of chemo and being sick for so long as well as only having one side that may possibly produce breast milk.  She says that if it happens to work, great, but she will likely plan on using formula.    At this point pt denies any needs or concerns.  I will continue to check in and provide support as needed moving forward.

## 2024-04-01 NOTE — PROGRESS NOTES
"Pt to clinic for IV hydration with Zofran and lab draw via port, c/o increase SOB and palpitations with simple activities and occasional \"greg horses\" in L ankle over the last 2 days. Denies swelling in lower extremities and chest pain. Discussed complaints with Claire Leong RN who stated per Dr. Khalil, pt okay to proceed with labs and hydration today and will be seen in office tomorrow. Informed pt to call MD office or report to ER if symptoms worsen overnight, pt verbalized understanding. Pt tolerated lab draw and infusions without complications, aware of next appointment on 4/3/24 at 0730, port de-accessed, avs declined.    "

## 2024-04-02 ENCOUNTER — TELEPHONE (OUTPATIENT)
Dept: HEMATOLOGY ONCOLOGY | Facility: CLINIC | Age: 34
End: 2024-04-02

## 2024-04-02 ENCOUNTER — OFFICE VISIT (OUTPATIENT)
Dept: HEMATOLOGY ONCOLOGY | Facility: CLINIC | Age: 34
End: 2024-04-02
Payer: COMMERCIAL

## 2024-04-02 VITALS
HEART RATE: 111 BPM | HEIGHT: 67 IN | RESPIRATION RATE: 17 BRPM | OXYGEN SATURATION: 100 % | BODY MASS INDEX: 25.9 KG/M2 | WEIGHT: 165 LBS | SYSTOLIC BLOOD PRESSURE: 120 MMHG | DIASTOLIC BLOOD PRESSURE: 74 MMHG | TEMPERATURE: 97.6 F

## 2024-04-02 DIAGNOSIS — D50.8 IRON DEFICIENCY ANEMIA SECONDARY TO INADEQUATE DIETARY IRON INTAKE: Primary | ICD-10-CM

## 2024-04-02 PROCEDURE — 99215 OFFICE O/P EST HI 40 MIN: CPT | Performed by: INTERNAL MEDICINE

## 2024-04-02 RX ORDER — MAGNESIUM SULFATE 1 G/100ML
1 INJECTION INTRAVENOUS ONCE
Status: CANCELLED
Start: 2024-04-02

## 2024-04-02 RX ORDER — POTASSIUM CHLORIDE 14.9 MG/ML
20 INJECTION INTRAVENOUS ONCE
Status: CANCELLED
Start: 2024-04-02

## 2024-04-02 RX ORDER — DOXORUBICIN HYDROCHLORIDE 2 MG/ML
50 INJECTION, SOLUTION INTRAVENOUS ONCE
Status: CANCELLED | OUTPATIENT
Start: 2024-04-03

## 2024-04-02 RX ORDER — SODIUM CHLORIDE 9 MG/ML
20 INJECTION, SOLUTION INTRAVENOUS ONCE
Status: CANCELLED | OUTPATIENT
Start: 2024-04-03

## 2024-04-02 RX ORDER — SODIUM CHLORIDE 9 MG/ML
20 INJECTION, SOLUTION INTRAVENOUS ONCE
Status: CANCELLED | OUTPATIENT
Start: 2024-04-02

## 2024-04-02 NOTE — TELEPHONE ENCOUNTER
Please cancel cycle 4. Magnesium and potassium added to patient's hydration on 4/3/24. Please schedule patient for venofer.       [NL] : warm

## 2024-04-03 ENCOUNTER — HOSPITAL ENCOUNTER (OUTPATIENT)
Dept: INFUSION CENTER | Facility: CLINIC | Age: 34
Discharge: HOME | End: 2024-04-03
Payer: COMMERCIAL

## 2024-04-03 VITALS
TEMPERATURE: 98.5 F | HEART RATE: 99 BPM | HEIGHT: 67 IN | BODY MASS INDEX: 26.09 KG/M2 | SYSTOLIC BLOOD PRESSURE: 123 MMHG | RESPIRATION RATE: 17 BRPM | DIASTOLIC BLOOD PRESSURE: 64 MMHG | WEIGHT: 166.2 LBS

## 2024-04-03 DIAGNOSIS — C50.812 MALIGNANT NEOPLASM OF OVERLAPPING SITES OF LEFT BREAST IN FEMALE, ESTROGEN RECEPTOR POSITIVE (HCC): Primary | ICD-10-CM

## 2024-04-03 DIAGNOSIS — Z17.0 MALIGNANT NEOPLASM OF OVERLAPPING SITES OF LEFT BREAST IN FEMALE, ESTROGEN RECEPTOR POSITIVE (HCC): Primary | ICD-10-CM

## 2024-04-03 DIAGNOSIS — D50.8 IRON DEFICIENCY ANEMIA SECONDARY TO INADEQUATE DIETARY IRON INTAKE: Primary | ICD-10-CM

## 2024-04-03 DIAGNOSIS — E86.0 DEHYDRATION: ICD-10-CM

## 2024-04-03 PROCEDURE — 96366 THER/PROPH/DIAG IV INF ADDON: CPT

## 2024-04-03 PROCEDURE — 96411 CHEMO IV PUSH ADDL DRUG: CPT

## 2024-04-03 PROCEDURE — 96368 THER/DIAG CONCURRENT INF: CPT

## 2024-04-03 PROCEDURE — 96413 CHEMO IV INFUSION 1 HR: CPT

## 2024-04-03 PROCEDURE — 96367 TX/PROPH/DG ADDL SEQ IV INF: CPT

## 2024-04-03 RX ORDER — SODIUM CHLORIDE 9 MG/ML
20 INJECTION, SOLUTION INTRAVENOUS ONCE
Status: COMPLETED | OUTPATIENT
Start: 2024-04-03 | End: 2024-04-03

## 2024-04-03 RX ORDER — SODIUM CHLORIDE 9 MG/ML
20 INJECTION, SOLUTION INTRAVENOUS ONCE
Status: CANCELLED | OUTPATIENT
Start: 2024-04-05

## 2024-04-03 RX ORDER — POTASSIUM CHLORIDE 14.9 MG/ML
20 INJECTION INTRAVENOUS ONCE
Status: COMPLETED | OUTPATIENT
Start: 2024-04-03 | End: 2024-04-03

## 2024-04-03 RX ORDER — DOXORUBICIN HYDROCHLORIDE 2 MG/ML
50 INJECTION, SOLUTION INTRAVENOUS ONCE
Status: COMPLETED | OUTPATIENT
Start: 2024-04-03 | End: 2024-04-03

## 2024-04-03 RX ORDER — POTASSIUM CHLORIDE 14.9 MG/ML
20 INJECTION INTRAVENOUS ONCE
Status: CANCELLED
Start: 2024-04-05 | End: 2024-04-05

## 2024-04-03 RX ADMIN — FOSAPREPITANT 150 MG: 150 INJECTION, POWDER, LYOPHILIZED, FOR SOLUTION INTRAVENOUS at 09:18

## 2024-04-03 RX ADMIN — SODIUM CHLORIDE 20 ML/HR: 9 INJECTION, SOLUTION INTRAVENOUS at 08:28

## 2024-04-03 RX ADMIN — POTASSIUM CHLORIDE 20 MEQ: 14.9 INJECTION, SOLUTION INTRAVENOUS at 11:34

## 2024-04-03 RX ADMIN — DEXAMETHASONE SODIUM PHOSPHATE: 10 INJECTION, SOLUTION INTRAMUSCULAR; INTRAVENOUS at 08:32

## 2024-04-03 RX ADMIN — SODIUM CHLORIDE 1000 ML: 0.9 INJECTION, SOLUTION INTRAVENOUS at 11:34

## 2024-04-03 RX ADMIN — CYCLOPHOSPHAMIDE 1000 MG: 1 INJECTION, POWDER, FOR SOLUTION INTRAVENOUS; ORAL at 10:47

## 2024-04-03 RX ADMIN — MAGNESIUM SULFATE HEPTAHYDRATE 1 G: 500 INJECTION, SOLUTION INTRAMUSCULAR; INTRAVENOUS at 11:39

## 2024-04-03 RX ADMIN — DOXORUBICIN HYDROCHLORIDE 94 MG: 2 INJECTION, SOLUTION INTRAVENOUS at 10:28

## 2024-04-03 NOTE — PROGRESS NOTES
Pt to clinic for Adriamycin and Cytoxan. Offers no complaints today. Paxman cold cap utilized. Pt tolerated infusion without complications . Tolerated paxman cool down for 45 minutes . Port de-accessed and flushed.  Aware of next appointment on 4/5/24 at 1pm. AVS given

## 2024-04-05 ENCOUNTER — HOSPITAL ENCOUNTER (OUTPATIENT)
Dept: INFUSION CENTER | Facility: CLINIC | Age: 34
End: 2024-04-05
Payer: COMMERCIAL

## 2024-04-05 ENCOUNTER — ROUTINE PRENATAL (OUTPATIENT)
Dept: OBGYN CLINIC | Facility: MEDICAL CENTER | Age: 34
End: 2024-04-05
Payer: COMMERCIAL

## 2024-04-05 VITALS
HEART RATE: 96 BPM | RESPIRATION RATE: 18 BRPM | DIASTOLIC BLOOD PRESSURE: 68 MMHG | SYSTOLIC BLOOD PRESSURE: 108 MMHG | TEMPERATURE: 96.7 F

## 2024-04-05 VITALS
HEART RATE: 89 BPM | HEIGHT: 67 IN | WEIGHT: 167.6 LBS | SYSTOLIC BLOOD PRESSURE: 110 MMHG | DIASTOLIC BLOOD PRESSURE: 72 MMHG | BODY MASS INDEX: 26.3 KG/M2

## 2024-04-05 DIAGNOSIS — Z34.82 PRENATAL CARE, SUBSEQUENT PREGNANCY, SECOND TRIMESTER: Primary | ICD-10-CM

## 2024-04-05 DIAGNOSIS — E86.0 DEHYDRATION: Primary | ICD-10-CM

## 2024-04-05 DIAGNOSIS — O9A.112 CANCER COMPLICATING PREGNANCY IN SECOND TRIMESTER: ICD-10-CM

## 2024-04-05 DIAGNOSIS — O43.199 MARGINAL INSERTION OF UMBILICAL CORD AFFECTING MANAGEMENT OF MOTHER: ICD-10-CM

## 2024-04-05 DIAGNOSIS — O22.32 DVT COMPLICATING PREGNANCY, SECOND TRIMESTER: ICD-10-CM

## 2024-04-05 DIAGNOSIS — Z3A.21 21 WEEKS GESTATION OF PREGNANCY: ICD-10-CM

## 2024-04-05 DIAGNOSIS — D50.8 IRON DEFICIENCY ANEMIA SECONDARY TO INADEQUATE DIETARY IRON INTAKE: ICD-10-CM

## 2024-04-05 LAB
SL AMB  POCT GLUCOSE, UA: NORMAL
SL AMB POCT URINE PROTEIN: NORMAL

## 2024-04-05 PROCEDURE — 81002 URINALYSIS NONAUTO W/O SCOPE: CPT | Performed by: STUDENT IN AN ORGANIZED HEALTH CARE EDUCATION/TRAINING PROGRAM

## 2024-04-05 PROCEDURE — PNV: Performed by: STUDENT IN AN ORGANIZED HEALTH CARE EDUCATION/TRAINING PROGRAM

## 2024-04-05 RX ORDER — SODIUM CHLORIDE 9 MG/ML
20 INJECTION, SOLUTION INTRAVENOUS ONCE
Status: CANCELLED | OUTPATIENT
Start: 2024-04-12

## 2024-04-05 RX ORDER — SODIUM CHLORIDE 9 MG/ML
20 INJECTION, SOLUTION INTRAVENOUS ONCE
Status: COMPLETED | OUTPATIENT
Start: 2024-04-05 | End: 2024-04-05

## 2024-04-05 RX ADMIN — IRON SUCROSE 300 MG: 20 INJECTION, SOLUTION INTRAVENOUS at 13:52

## 2024-04-05 RX ADMIN — SODIUM CHLORIDE 1000 ML: 0.9 INJECTION, SOLUTION INTRAVENOUS at 13:41

## 2024-04-05 RX ADMIN — SODIUM CHLORIDE 20 ML/HR: 9 INJECTION, SOLUTION INTRAVENOUS at 13:45

## 2024-04-05 RX ADMIN — ONDANSETRON 8 MG: 2 INJECTION INTRAMUSCULAR; INTRAVENOUS at 13:21

## 2024-04-05 NOTE — ASSESSMENT & PLAN NOTE
-breast cancer diagnosed early in pregnancy. 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 Has port placed in upper right chest.   -pt has lost about 25lbs, has been going to infusion center for hydration treatments. Per MFM, can consider TPN if pt continues to be unable to sustain weight. Pt increasing intake.

## 2024-04-05 NOTE — PROGRESS NOTES
Pt presents to clinic for hydration. Pt offers no complaints. Tolerated infusion without complications. Port de-accessed. Pt aware of next appointment on 4/8/24 at 830. AVS declined.

## 2024-04-05 NOTE — PROGRESS NOTES
21 weeks gestation of pregnancy  32yo  at 21.3wks presents for routine prenatal visit     Pt denies contractions, vaginal bleeding, leakage of fluid. Reports increased clear discharge. Endorses fetal movement.    -continue PNVs   -SSE wnl, negative pooling   -AFP neg   - labor precautions provided  -follow up 3 weeks     Cancer complicating pregnancy in second trimester  -breast cancer diagnosed early in pregnancy. 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 Has port placed in upper right chest.   -pt has lost about 25lbs, has been going to infusion center for hydration treatments. Per MFM, can consider TPN if pt continues to be unable to sustain weight. Pt increasing intake.     DVT complicating pregnancy, second trimester  -post operative course of left mastectomy complicated by left DVT.   -continue lovenox 75mg BID    Marginal insertion of umbilical cord affecting management of mother  -growth US and missed anatomy scheduled 24

## 2024-04-05 NOTE — ASSESSMENT & PLAN NOTE
32yo  at 21.3wks presents for routine prenatal visit     Pt denies contractions, vaginal bleeding, leakage of fluid. Reports increased clear discharge. Endorses fetal movement.    -continue PNVs   -SSE wnl, negative pooling   -AFP neg   - labor precautions provided  -follow up 3 weeks

## 2024-04-06 NOTE — PROGRESS NOTES
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, ER 80-85% MS 90-95% Her2 1+, han 2, cT2(2.1 cm)N0M0    2023 - left sided mastectomy with SLNBX - [id number] - oncotype 23    2024 - start AC q 3 weeks    3/13/2024 - cycle 2 adriamycin dose reduced to 50 mg/m2 and cytoxan dose reduced by 10% due to N/V    2024 - plan to stop after 3 cycles of AC due to severe nausea and dehydration with coexisting hyperemesis gravidarum     Malignant neoplasm of overlapping sites of left breast in female, estrogen receptor positive (HCC)   2023 Initial Diagnosis    Malignant neoplasm of overlapping sites of left female breast (HCC)     2023 Biopsy    Bilateral breast ultrasound guided biopsy  A. Breast, Left, US Guided Left Breast Biopsy 12:00 6cmfn:  Invasive breast carcinoma of no special type (ductal) with osteoclast-like stromal giant cells  Grade 2  ER 85  MS 95  HER2 1+     B. Breast, Right, US Guided Right Breast Biopsy 10:00 9cmfn:  Benign breast tissue.    In review of the patient’s recent imaging, the left malignancy appears unifocal.  The biopsy-proven carcinoma measured 2.1 cm on ultrasound. Ultrasound of the left axilla was performed on 2023 and showed no suspicious adenopathy.  Recent imaging of the contralateral right breast dated 2023 and 2023 was reviewed and shows no suspicious findings.  The pathology results for the ultrasound-guided core needle biopsy (right breast 10:00, 9 cm from the nipple) are benign and concordant with imaging.     2023 Genetic Testing    [facility]  A total of 36 genes were evaluated, including: APC, TOPHER, BARD 1, BMPR1A, BRCA1, BRCA2, BRIP1, CDH1, CDK4, CKDN2A, CHEK2, DICER1, MLH1,  MSH2, MSH6, MUTYH, NBN, NF1, NTHL1, PALB2, PMS2, PTEN, RAD51C, RECQL, SMAD4, SMARCA4, STK11, TP53, AXIN2, HOXB13, MSH3, POLD1, AND POLE, EPCAM, AND GREM1   Negative result. No pathogenic sequence variants or deletions/duplications identified       12/2/2023 -  Cancer Staged    Staging form: Breast, AJCC 8th Edition  - Clinical stage from 12/2/2023: Stage IB (cT2, cN0, cM0, G2, ER+, MN+, HER2-) - Signed by Elena Cornell MD on 12/13/2023  Stage prefix: Initial diagnosis  Histologic grading system: 3 grade system       1/2/2024 Surgery    Left breast mastectomy with sentinel lymph node biopsy  Invasive Mammary carcinoma of no special type (ductal)   Grade 2  25 mm  Margins negative  0/2 Lymph nodes  Anatomic stage IIA  Prognostic stage IA      2/21/2024 -  Chemotherapy    DOXOrubicin (ADRIAMYCIN), 56.25 mg/m2 = 106 mg (93.8 % of original dose 60 mg/m2), Intravenous, Once, 3 of 3 cycles  Dose modification: 56.25 mg/m2 (original dose 60 mg/m2, Cycle 1, Reason: Other (Must fill in a comment), Comment: max recommended dose), 50 mg/m2 (original dose 60 mg/m2, Cycle 2, Reason: Nausea/Vomiting, Comment: dose reduced to 50 mg/m2 due to n/v)  Administration: 106 mg (2/21/2024), 94 mg (3/13/2024), 94 mg (4/3/2024)  alteplase (CATHFLO), 2 mg, Intracatheter, Every 1 Minute as needed, 3 of 3 cycles  cyclophosphamide (CYTOXAN) IVPB, 600 mg/m2 = 1,128 mg, Intravenous, Once, 3 of 3 cycles  Dose modification: 540 mg/m2 (original dose 600 mg/m2, Cycle 2, Reason: Nausea/Vomiting, Comment: 10% dose reduction due to n/v)  Administration: 1,128 mg (2/21/2024), 1,000 mg (3/13/2024), 1,000 mg (4/3/2024)  fosaprepitant (EMEND) IVPB, 150 mg, Intravenous, Once, 3 of 3 cycles  Administration: 150 mg (2/21/2024), 150 mg (3/13/2024), 150 mg (4/3/2024)     Cancer complicating pregnancy in second trimester   12/14/2023 Initial Diagnosis    Cancer complicating pregnancy in second trimester         Interval History: Patient presents for  follow up of her ER positive breast cancer.  He is now under 21st week of pregnancy.  She is due for cycle 3 of Adriamycin and Cytoxan.  She is coming in 3 times a week for IV fluids.  I dose reduced her last cycle and nausea was slightly better but she is still struggling with it.  She is also having some constipation and diarrhea alternating.  She took MiraLAX and Colace and now is having loose stools.  Her appetite is okay but she can only eat certain things like bread cereal and eggs.  Labs prior to this visit show hemoglobin of 9.1 with an ANC of 4.7 potassium was 3.7, mag 1.8, ferritin 24.  She denies any fevers or signs of infections.  She has been having intermittent heart palpitations that have not translated into any cardiac arrhythmias.  She is also having a lot of GERD symptoms.  The Paxman device unfortunately did not work well for her and she ended up cutting her hair.         REVIEW OF SYSTEMS:  Please note that a 14-point review of systems was performed to include Constitutional, HEENT, Respiratory, CVS, GI, , Musculoskeletal, Integumentary, Neurologic, Rheumatologic, Endocrinologic, Psychiatric, Lymphatic, and Hematologic/Oncologic systems were reviewed and are negative unless otherwise stated in HPI. Positive and negative findings pertinent to this evaluation are incorporated into the history of present illness.      ECOG PS: 1    PROBLEM LIST:  Patient Active Problem List   Diagnosis    Mild persistent asthma without complication    Axillary hidradenitis suppurativa    Anxiety    Chest wall pain    Gastroesophageal reflux disease without esophagitis    Depression with anxiety    Chronic fatigue    Myalgia    Chronic left shoulder pain    Cervical disc disorder at C5-C6 level with radiculopathy    Atypical squamous cells of undetermined significance (ASCUS) on Papanicolaou smear of cervix    Hypertrophic and atrophic condition of skin    Migraine headache    Shoulder impingement syndrome, left     Vitamin D deficiency    Close exposure to COVID-19 virus    Thoracic outlet syndrome    Palpitations    Post-operative pain    Transaminitis    Right middle lobe pulmonary nodule    Reversible airway obstruction    SOB (shortness of breath)    Musculoskeletal chest pain    COVID-19 virus infection    Unexplained weight gain    Left ovarian cyst    Dysphagia    Malignant neoplasm of overlapping sites of left breast in female, estrogen receptor positive (HCC)    Cancer complicating pregnancy in second trimester    21 weeks gestation of pregnancy    Status post left mastectomy    Multigravida of advanced maternal age in first trimester    DVT complicating pregnancy, second trimester    Dehydration    Iron deficiency anemia secondary to inadequate dietary iron intake    Marginal insertion of umbilical cord affecting management of mother       Assessment / Plan: 33-year-old female with an early-stage ER positive breast cancer with an Oncotype of 25.  We will proceed with cycle 3 of Adriamycin and Cytoxan without further dose reduction.  In the setting of hyperemesis gravidarum adjuvant chemotherapy has been very difficult for her.  I have been bringing her in 3 times a week for fluids and will give her electrolyte replacement for potassium and magnesium at her next infusion.  She is anemic and her iron levels are on the lower side of normal.  She will not tolerate oral iron because of constipation and I am going to give her a course of Venofer.  After extensive discussion we have decided to forego cycle 4 of adjuvant chemotherapy.  Her Oncotype was in the low range but there still is some benefit at her age.  I do not think the small amount of benefit we would gain from the fourth cycle of chemotherapy is worth the risks.  This has been such a stress on her body in terms of dehydration nausea and ability to eat that I am getting a little nervous that I am putting her at risk of miscarriage.  She is in agreement and was  not sure how she was going to get through all 4 cycles either.  Keep her IV fluids and IV nausea medications going as long as she needs them be on chemotherapy.  I will plan on seeing her back in 1 month to make sure she is recovering from treatment okay.  She knows to call me in the interim with any questions or concerns.       I spent 50 minutes on chart review, face to face counseling time, coordination of care and documentation.    Past Medical History:   has a past medical history of Anesthesia complication, Anxiety, Asthma, CRPS (complex regional pain syndrome), upper limb, Deep vein thrombosis (HCC) (01/05/2024), GERD (gastroesophageal reflux disease), Heart murmur, HPV (human papilloma virus) infection (2012), Invasive ductal carcinoma of breast, female, left (HCC) (2023), Kidney stone, Miscarriage (3/15/2015), Neck pain, Ovarian cyst, and PONV (postoperative nausea and vomiting) (01/02/2024).    PAST SURGICAL HISTORY:   has a past surgical history that includes [place] tooth extraction (2012); IR upper extremity venogram- Diagnostic (05/28/2021); pr excision 1st &/cervical rib (Left, 08/12/2021); THORACOSCOPY VIDEO ASSISTED SURGERY (VATS) (Left, 08/12/2021); EGD; US guided breast biopsy right complete (Right, 12/02/2023); US guidance breast biopsy left each additional (Left, 12/02/2023); pr mastectomy simple complete (Left, 01/02/2024); pr bx/exc lymph node open superficial (Left, 01/02/2024); pr intraop sentinel lymph node id w/dye injection (Left, 01/02/2024); pr inj radioactive tracer for id of sentinel node (Left, 01/02/2024); Colposcopy (2012); and IR port placement (2/7/2024).    CURRENT MEDICATIONS  Current Outpatient Medications   Medication Sig Dispense Refill    albuterol (PROVENTIL HFA,VENTOLIN HFA) 90 mcg/act inhaler TAKE 2 PUFFS BY MOUTH EVERY 6 HOURS AS NEEDED FOR WHEEZE OR FOR SHORTNESS OF BREATH 18 g 3    cetirizine (ZyrTEC) 10 mg tablet TAKE 1 TABLET BY MOUTH EVERY DAY 90 tablet 0     "enoxaparin (LOVENOX) 80 mg/0.8 mL Inject 75mg SC every 12 hours 48 mL 2    lidocaine-prilocaine (EMLA) cream Apply to port 30 to 60 min prior to use 30 g 2    omeprazole (PriLOSEC) 40 MG capsule Take 1 capsule (40 mg total) by mouth daily 90 capsule 2    Prenatal Multivit-Min-Fe-FA (PRE-SONIA PO) Take 1 tablet by mouth in the morning      diphenhydramine, lidocaine, Al/Mg hydroxide, simethicone (Magic Mouthwash) SUSP SWISH AND SPIT 10 ML EVERY 4 (FOUR) HOURS AS NEEDED FOR MOUTH PAIN OR DISCOMFORT (Patient not taking: Reported on 3/8/2024) 237 mL 1    ondansetron (ZOFRAN) 8 mg tablet Take 1 tablet (8 mg total) by mouth every 8 (eight) hours as needed for nausea or vomiting 30 tablet 3     No current facility-administered medications for this visit.     Facility-Administered Medications Ordered in Other Visits   Medication Dose Route Frequency Provider Last Rate Last Admin    alteplase (CATHFLO) injection 2 mg  2 mg Intracatheter Q1MIN PRN Nemo Khalil DO         [unfilled]    SOCIAL HISTORY:   reports that she has never smoked. She has never used smokeless tobacco. She reports that she does not currently use alcohol. She reports that she does not use drugs.     FAMILY HISTORY:  family history includes Bone cancer in her maternal grandmother; Coronary artery disease in her mother; Heart disease in her mother; Miscarriages / Stillbirths in her half-sister and mother; No Known Problems in her father and half-brother.     ALLERGIES:  is allergic to nsaids, formic acid, and latex.      Physical Exam:  Vital Signs:   Visit Vitals  /74 (BP Location: Right arm, Patient Position: Sitting, Cuff Size: Adult)   Pulse (!) 111   Temp 97.6 °F (36.4 °C)   Resp 17   Ht 5' 7\" (1.702 m)   Wt 74.8 kg (165 lb)   LMP 10/31/2023   SpO2 100%   BMI 25.84 kg/m²   OB Status Pregnant   Smoking Status Never   BSA 1.86 m²     Body mass index is 25.84 kg/m².  Body surface area is 1.86 meters squared.    GEN: Alert, awake oriented x3, in no " acute distress  HEENT- No pallor, icterus, cyanosis, no oral mucosal lesions,   LAD - no palpable cervical, clavicle, axillary, inguinal LAD  Heart- normal S1 S2, regular rate and rhythm, No murmur, rubs.   Lungs- clear breathing sound bilateral.   Abdomen- soft, Non tender, bowel sounds present  Extremities- No cyanosis, clubbing, edema  Neuro- No focal neurological deficit    Labs:  Lab Results   Component Value Date    WBC 6.27 04/01/2024    HGB 9.1 (L) 04/01/2024    HCT 28.5 (L) 04/01/2024    MCV 85 04/01/2024     04/01/2024     Lab Results   Component Value Date    SODIUM 135 04/01/2024    K 3.4 (L) 04/01/2024     04/01/2024    CO2 21 04/01/2024    AGAP 10 04/01/2024    BUN 6 04/01/2024    CREATININE 0.40 (L) 04/01/2024    GLUC 116 04/01/2024    GLUF 87 02/07/2024    CALCIUM 9.1 04/01/2024    AST 15 04/01/2024    ALT 21 04/01/2024    ALKPHOS 69 04/01/2024    TP 6.9 04/01/2024    TBILI 0.21 04/01/2024    EGFR 135 04/01/2024

## 2024-04-08 ENCOUNTER — HOSPITAL ENCOUNTER (OUTPATIENT)
Dept: INFUSION CENTER | Facility: CLINIC | Age: 34
Discharge: HOME | End: 2024-04-08
Payer: COMMERCIAL

## 2024-04-08 VITALS
SYSTOLIC BLOOD PRESSURE: 127 MMHG | HEART RATE: 110 BPM | DIASTOLIC BLOOD PRESSURE: 63 MMHG | RESPIRATION RATE: 18 BRPM | TEMPERATURE: 96.9 F

## 2024-04-08 DIAGNOSIS — E86.0 DEHYDRATION: Primary | ICD-10-CM

## 2024-04-08 RX ADMIN — SODIUM CHLORIDE 1000 ML: 0.9 INJECTION, SOLUTION INTRAVENOUS at 08:51

## 2024-04-08 RX ADMIN — ONDANSETRON 8 MG: 2 INJECTION INTRAMUSCULAR; INTRAVENOUS at 08:58

## 2024-04-08 NOTE — PROGRESS NOTES
Jillian Ch  tolerated treatment well with no complications.      Jillian Ch is aware of future appt on 4/10 at 1230.     AVS   No (Declined by Jillian Ch) d/c from unit stable

## 2024-04-10 ENCOUNTER — HOSPITAL ENCOUNTER (OUTPATIENT)
Dept: INFUSION CENTER | Facility: CLINIC | Age: 34
Discharge: HOME | End: 2024-04-10
Payer: COMMERCIAL

## 2024-04-10 VITALS
HEART RATE: 111 BPM | DIASTOLIC BLOOD PRESSURE: 60 MMHG | SYSTOLIC BLOOD PRESSURE: 107 MMHG | TEMPERATURE: 96.7 F | RESPIRATION RATE: 18 BRPM

## 2024-04-10 DIAGNOSIS — E86.0 DEHYDRATION: Primary | ICD-10-CM

## 2024-04-10 PROCEDURE — 96374 THER/PROPH/DIAG INJ IV PUSH: CPT

## 2024-04-10 PROCEDURE — 96361 HYDRATE IV INFUSION ADD-ON: CPT

## 2024-04-10 RX ADMIN — SODIUM CHLORIDE 1000 ML: 0.9 INJECTION, SOLUTION INTRAVENOUS at 12:34

## 2024-04-10 RX ADMIN — ONDANSETRON 8 MG: 2 INJECTION INTRAMUSCULAR; INTRAVENOUS at 12:43

## 2024-04-10 NOTE — PROGRESS NOTES
Pt presents to clinic for hydration. Pt offers no complaints. Tolerated infusion without complications. Port de-accessed. Pt aware of next appointment on 4/12/24 at 1100. AVS declined.

## 2024-04-12 ENCOUNTER — HOSPITAL ENCOUNTER (OUTPATIENT)
Dept: INFUSION CENTER | Facility: CLINIC | Age: 34
End: 2024-04-12
Payer: COMMERCIAL

## 2024-04-12 VITALS
SYSTOLIC BLOOD PRESSURE: 130 MMHG | DIASTOLIC BLOOD PRESSURE: 70 MMHG | TEMPERATURE: 96.8 F | HEART RATE: 105 BPM | RESPIRATION RATE: 18 BRPM

## 2024-04-12 DIAGNOSIS — E86.0 DEHYDRATION: Primary | ICD-10-CM

## 2024-04-12 DIAGNOSIS — D50.8 IRON DEFICIENCY ANEMIA SECONDARY TO INADEQUATE DIETARY IRON INTAKE: ICD-10-CM

## 2024-04-12 PROCEDURE — 96375 TX/PRO/DX INJ NEW DRUG ADDON: CPT

## 2024-04-12 PROCEDURE — 96365 THER/PROPH/DIAG IV INF INIT: CPT

## 2024-04-12 RX ORDER — SODIUM CHLORIDE 9 MG/ML
20 INJECTION, SOLUTION INTRAVENOUS ONCE
Status: CANCELLED | OUTPATIENT
Start: 2024-04-19

## 2024-04-12 RX ORDER — SODIUM CHLORIDE 9 MG/ML
20 INJECTION, SOLUTION INTRAVENOUS ONCE
Status: COMPLETED | OUTPATIENT
Start: 2024-04-12 | End: 2024-04-12

## 2024-04-12 RX ADMIN — SODIUM CHLORIDE 1000 ML: 0.9 INJECTION, SOLUTION INTRAVENOUS at 11:24

## 2024-04-12 RX ADMIN — IRON SUCROSE 300 MG: 20 INJECTION, SOLUTION INTRAVENOUS at 11:55

## 2024-04-12 RX ADMIN — SODIUM CHLORIDE 20 ML/HR: 0.9 INJECTION, SOLUTION INTRAVENOUS at 13:51

## 2024-04-12 RX ADMIN — ONDANSETRON 8 MG: 2 INJECTION INTRAMUSCULAR; INTRAVENOUS at 11:24

## 2024-04-12 NOTE — PROGRESS NOTES
Pt presents for venofer infusion and hydration infusion offering no complaints. Port a cath accessed positive blood return noted. Pt tolerated treatment without incident. Port a cath flushed with positive blood return noted, de accessed without difficulty. AVS declined, next appointment 4/17/24 1pm.

## 2024-04-15 ENCOUNTER — TELEPHONE (OUTPATIENT)
Dept: NUTRITION | Facility: CLINIC | Age: 34
End: 2024-04-15

## 2024-04-15 NOTE — TELEPHONE ENCOUNTER
Jillian was scheduled for oncology nutrition follow up during infusion today. Per chart review, infusion schedule has changed. Nutrition appointment rescheduled to this weeks hydration on 4/19.

## 2024-04-17 ENCOUNTER — HOSPITAL ENCOUNTER (OUTPATIENT)
Dept: INFUSION CENTER | Facility: CLINIC | Age: 34
Discharge: HOME | End: 2024-04-17
Payer: COMMERCIAL

## 2024-04-17 VITALS
RESPIRATION RATE: 18 BRPM | HEART RATE: 99 BPM | SYSTOLIC BLOOD PRESSURE: 102 MMHG | TEMPERATURE: 97 F | DIASTOLIC BLOOD PRESSURE: 58 MMHG

## 2024-04-17 DIAGNOSIS — E86.0 DEHYDRATION: Primary | ICD-10-CM

## 2024-04-17 PROCEDURE — 96374 THER/PROPH/DIAG INJ IV PUSH: CPT

## 2024-04-17 PROCEDURE — 96361 HYDRATE IV INFUSION ADD-ON: CPT

## 2024-04-17 RX ADMIN — SODIUM CHLORIDE 1000 ML: 0.9 INJECTION, SOLUTION INTRAVENOUS at 13:36

## 2024-04-17 RX ADMIN — ONDANSETRON 8 MG: 2 INJECTION INTRAMUSCULAR; INTRAVENOUS at 13:45

## 2024-04-17 NOTE — PROGRESS NOTES
Pt here for hydration infusion, offering no complaints. Port a cath accessed with positive blood return noted. Tolerated infusion without incident. Port a cath flushed with positive blood return de accessed without difficulty removed. AVS declined. Walked out in stable condition. Next appointment on 4/19/24 at 11am.

## 2024-04-19 ENCOUNTER — NUTRITION (OUTPATIENT)
Dept: NUTRITION | Facility: CLINIC | Age: 34
End: 2024-04-19

## 2024-04-19 ENCOUNTER — HOSPITAL ENCOUNTER (OUTPATIENT)
Dept: INFUSION CENTER | Facility: CLINIC | Age: 34
Discharge: HOME | End: 2024-04-19
Payer: COMMERCIAL

## 2024-04-19 VITALS
SYSTOLIC BLOOD PRESSURE: 121 MMHG | TEMPERATURE: 96.9 F | HEART RATE: 98 BPM | RESPIRATION RATE: 18 BRPM | DIASTOLIC BLOOD PRESSURE: 71 MMHG

## 2024-04-19 DIAGNOSIS — D50.8 IRON DEFICIENCY ANEMIA SECONDARY TO INADEQUATE DIETARY IRON INTAKE: ICD-10-CM

## 2024-04-19 DIAGNOSIS — Z71.3 NUTRITIONAL COUNSELING: Primary | ICD-10-CM

## 2024-04-19 DIAGNOSIS — E86.0 DEHYDRATION: Primary | ICD-10-CM

## 2024-04-19 PROCEDURE — 96365 THER/PROPH/DIAG IV INF INIT: CPT

## 2024-04-19 PROCEDURE — 96375 TX/PRO/DX INJ NEW DRUG ADDON: CPT

## 2024-04-19 RX ORDER — SODIUM CHLORIDE 9 MG/ML
20 INJECTION, SOLUTION INTRAVENOUS ONCE
Status: CANCELLED | OUTPATIENT
Start: 2024-04-26

## 2024-04-19 RX ORDER — SODIUM CHLORIDE 9 MG/ML
20 INJECTION, SOLUTION INTRAVENOUS ONCE
Status: DISCONTINUED | OUTPATIENT
Start: 2024-04-19 | End: 2024-04-22 | Stop reason: HOSPADM

## 2024-04-19 RX ADMIN — IRON SUCROSE 300 MG: 20 INJECTION, SOLUTION INTRAVENOUS at 11:51

## 2024-04-19 RX ADMIN — ONDANSETRON 8 MG: 2 INJECTION INTRAMUSCULAR; INTRAVENOUS at 11:24

## 2024-04-19 RX ADMIN — SODIUM CHLORIDE 1000 ML: 0.9 INJECTION, SOLUTION INTRAVENOUS at 11:24

## 2024-04-19 NOTE — PROGRESS NOTES
Outpatient Oncology Nutrition Consultation   Type of Consult: Follow Up  Care Location: [facility]    Reason for referral: Notification from RN Navigator Kamla HAYWOOD on 12/7/23 that pt has triggered for oncology nutrition care (reason for referral: Patient is pregnant and newly diagnosed with breast cancer. Had questions regarding nutrition and sugar intake, etc).     Nutrition Assessment:   Oncology Diagnosis & Treatments:   Diagnosed with left beast cancer, ER positive, 12/2/23.   S/p left mastectomy 1/2/24.   Chemotherapy (doxorubicin, cytoxan, emend) 2/21/24-4/3/24.   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, ER 80-85% UT 90-95% Her2 1+, han 2, cT2(2.1 cm)N0M0    1/2/2023 - left sided mastectomy with SLNBX - [id number] - oncotype 23    2/21/2024 - start AC q 3 weeks    3/13/2024 - cycle 2 adriamycin dose reduced to 50 mg/m2 and cytoxan dose reduced by 10% due to N/V    4/2024 - plan to stop after 3 cycles of AC due to severe nausea and dehydration with coexisting hyperemesis gravidarum     Malignant neoplasm of overlapping sites of left breast in female, estrogen receptor positive (HCC)   12/2/2023 Initial Diagnosis    Malignant neoplasm of overlapping sites of left female breast (HCC)     12/2/2023 Biopsy    Bilateral breast ultrasound guided biopsy  A. Breast, Left, US Guided Left Breast Biopsy 12:00 6cmfn:  Invasive breast carcinoma of no special type (ductal) with osteoclast-like stromal giant cells  Grade 2  ER 85  UT 95  HER2 1+     B. Breast, Right, US Guided Right Breast Biopsy 10:00 9cmfn:  Benign breast tissue.    In review of the patient’s recent imaging, the left malignancy appears unifocal.  The biopsy-proven carcinoma measured 2.1 cm on ultrasound. Ultrasound of the left axilla was performed on 11/24/2023 and showed no suspicious adenopathy.  Recent imaging of the contralateral right breast dated 12/2/2023 and  11/24/2023 was reviewed and shows no suspicious findings.  The pathology results for the ultrasound-guided core needle biopsy (right breast 10:00, 9 cm from the nipple) are benign and concordant with imaging.     12/2/2023 Genetic Testing    [facility]  A total of 36 genes were evaluated, including: APC, TOPHER, BARD 1, BMPR1A, BRCA1, BRCA2, BRIP1, CDH1, CDK4, CKDN2A, CHEK2, DICER1, MLH1, MSH2, MSH6, MUTYH, NBN, NF1, NTHL1, PALB2, PMS2, PTEN, RAD51C, RECQL, SMAD4, SMARCA4, STK11, TP53, AXIN2, HOXB13, MSH3, POLD1, AND POLE, EPCAM, AND GREM1   Negative result. No pathogenic sequence variants or deletions/duplications identified       12/2/2023 -  Cancer Staged    Staging form: Breast, AJCC 8th Edition  - Clinical stage from 12/2/2023: Stage IB (cT2, cN0, cM0, G2, ER+, OH+, HER2-) - Signed by Elena Cornell MD on 12/13/2023  Stage prefix: Initial diagnosis  Histologic grading system: 3 grade system       1/2/2024 Surgery    Left breast mastectomy with sentinel lymph node biopsy  Invasive Mammary carcinoma of no special type (ductal)   Grade 2  25 mm  Margins negative  0/2 Lymph nodes  Anatomic stage IIA  Prognostic stage IA      2/21/2024 -  Chemotherapy    DOXOrubicin (ADRIAMYCIN), 56.25 mg/m2 = 106 mg (93.8 % of original dose 60 mg/m2), Intravenous, Once, 3 of 3 cycles  Dose modification: 56.25 mg/m2 (original dose 60 mg/m2, Cycle 1, Reason: Other (Must fill in a comment), Comment: max recommended dose), 50 mg/m2 (original dose 60 mg/m2, Cycle 2, Reason: Nausea/Vomiting, Comment: dose reduced to 50 mg/m2 due to n/v)  Administration: 106 mg (2/21/2024), 94 mg (3/13/2024), 94 mg (4/3/2024)  alteplase (CATHFLO), 2 mg, Intracatheter, Every 1 Minute as needed, 3 of 3 cycles  cyclophosphamide (CYTOXAN) IVPB, 600 mg/m2 = 1,128 mg, Intravenous, Once, 3 of 3 cycles  Dose modification: 540 mg/m2 (original dose 600 mg/m2, Cycle 2, Reason: Nausea/Vomiting, Comment: 10% dose reduction due to n/v)  Administration: 1,128 mg  (2/21/2024), 1,000 mg (3/13/2024), 1,000 mg (4/3/2024)  fosaprepitant (EMEND) IVPB, 150 mg, Intravenous, Once, 3 of 3 cycles  Administration: 150 mg (2/21/2024), 150 mg (3/13/2024), 150 mg (4/3/2024)     Cancer complicating pregnancy in second trimester   12/14/2023 Initial Diagnosis    Cancer complicating pregnancy in second trimester       Past Medical & Surgical Hx:   Patient Active Problem List   Diagnosis    Mild persistent asthma without complication    Axillary hidradenitis suppurativa    Anxiety    Chest wall pain    Gastroesophageal reflux disease without esophagitis    Depression with anxiety    Chronic fatigue    Myalgia    Chronic left shoulder pain    Cervical disc disorder at C5-C6 level with radiculopathy    Atypical squamous cells of undetermined significance (ASCUS) on Papanicolaou smear of cervix    Hypertrophic and atrophic condition of skin    Migraine headache    Shoulder impingement syndrome, left    Vitamin D deficiency    Close exposure to COVID-19 virus    Thoracic outlet syndrome    Palpitations    Post-operative pain    Transaminitis    Right middle lobe pulmonary nodule    Reversible airway obstruction    SOB (shortness of breath)    Musculoskeletal chest pain    COVID-19 virus infection    Unexplained weight gain    Left ovarian cyst    Dysphagia    Malignant neoplasm of overlapping sites of left breast in female, estrogen receptor positive (HCC)    Cancer complicating pregnancy in second trimester    21 weeks gestation of pregnancy    Status post left mastectomy    Multigravida of advanced maternal age in first trimester    DVT complicating pregnancy, second trimester    Dehydration    Iron deficiency anemia secondary to inadequate dietary iron intake    Marginal insertion of umbilical cord affecting management of mother     Past Medical History:   Diagnosis Date    Anesthesia complication     gait dysfunction/ urinary retention after nerve block,    Anxiety     Asthma     CRPS (complex  regional pain syndrome), upper limb     left chest/arm/hand    Deep vein thrombosis (HCC) 01/05/2024    post op from mastectomy    GERD (gastroesophageal reflux disease)     Heart murmur     HPV (human papilloma virus) infection 2012    unsure of 16, 18, or other    Invasive ductal carcinoma of breast, female, left (HCC) 2023    Kidney stone     Miscarriage 3/15/2015    7 weeks along.    Neck pain     Ovarian cyst     Left    PONV (postoperative nausea and vomiting) 01/02/2024     Past Surgical History:   Procedure Laterality Date    COLPOSCOPY  2012    HPV    EGD      IR PORT PLACEMENT  2/7/2024    IR UPPER EXTREMITY VENOGRAM- DIAGNOSTIC  05/28/2021    IL BX/EXC LYMPH NODE OPEN SUPERFICIAL Left 01/02/2024    Procedure: LEFT BREAST MASTECTOMY, LYMPHATIC MAPPING WITH RADIOACTIVE DYE, SENTINEL LYMPH NODE BIOPSY, ZAHEER LEFT BREAST, INJECTION IN OR AT 0800 BY DR CORNELL;  Surgeon: Elena Cornell MD;  [facility]: [facility];  Service: Surgical Oncology    IL EXCISION 1ST &/CERVICAL RIB Left 08/12/2021    Procedure: FIRST RIB RESECTION;  Surgeon: Jonathan Schaffer MD;  [facility]: [facility];  Service: Thoracic    IL INJ RADIOACTIVE TRACER FOR ID OF SENTINEL NODE Left 01/02/2024    Procedure: LEFT BREAST MASTECTOMY, LYMPHATIC MAPPING WITH RADIOACTIVE DYE, SENTINEL LYMPH NODE BIOPSY, ZAHEER LEFT BREAST, INJECTION IN OR AT 0800 BY DR CORNELL;  Surgeon: Elena Cornell MD;  [facility];  Service: Surgical Oncology    IL INTRAOP SENTINEL LYMPH NODE ID W/DYE INJECTION Left 01/02/2024    Procedure: LEFT BREAST MASTECTOMY, LYMPHATIC MAPPING WITH RADIOACTIVE DYE, SENTINEL LYMPH NODE BIOPSY, ZAHEER LEFT BREAST, INJECTION IN OR AT 0800 BY DR CORNELL;  Surgeon: Elena Cornell MD;  [facility];  Service: Surgical Oncology    IL MASTECTOMY SIMPLE COMPLETE Left 01/02/2024    Procedure: LEFT BREAST MASTECTOMY, LYMPHATIC MAPPING WITH RADIOACTIVE DYE, SENTINEL LYMPH NODE BIOPSY, ZAHEER LEFT BREAST,  INJECTION IN OR AT 0800 BY DR CORNELL;  Surgeon: Elena Cornell MD;  [facility];  Service: Surgical Oncology    THORACOSCOPY VIDEO ASSISTED SURGERY (VATS) Left 08/12/2021    Procedure: THORACOSCOPY VIDEO ASSISTED SURGERY (VATS);  Surgeon: Jonathan Schaffer MD;  [facility]: [facility];  Service: Thoracic    US GUIDANCE BREAST BIOPSY LEFT EACH ADDITIONAL Left 12/02/2023    US GUIDED BREAST BIOPSY RIGHT COMPLETE Right 12/02/2023    WISDOM TOOTH EXTRACTION  2012       Review of Medications:   Vitamins, Supplements and Herbals: Med List Reviewed & pt is only taking: Prenatal MVI ; venofer w/ infusion                                                                                                                                                                                                                                                                                                                                                                                                                                                                                                                              Current Outpatient Medications:     albuterol (PROVENTIL HFA,VENTOLIN HFA) 90 mcg/act inhaler, TAKE 2 PUFFS BY MOUTH EVERY 6 HOURS AS NEEDED FOR WHEEZE OR FOR SHORTNESS OF BREATH, Disp: 18 g, Rfl: 3    cetirizine (ZyrTEC) 10 mg tablet, TAKE 1 TABLET BY MOUTH EVERY DAY, Disp: 90 tablet, Rfl: 0    diphenhydramine, lidocaine, Al/Mg hydroxide, simethicone (Magic Mouthwash) SUSP, SWISH AND SPIT 10 ML EVERY 4 (FOUR) HOURS AS NEEDED FOR MOUTH PAIN OR DISCOMFORT (Patient not taking: Reported on 3/8/2024), Disp: 237 mL, Rfl: 1    enoxaparin (LOVENOX) 80 mg/0.8 mL, Inject 75mg SC every 12 hours, Disp: 48 mL, Rfl: 2    lidocaine-prilocaine (EMLA) cream, Apply to port 30 to 60 min prior to use, Disp: 30 g, Rfl: 2    omeprazole (PriLOSEC) 40 MG capsule, Take 1 capsule (40 mg total) by mouth daily, Disp: 90 capsule, Rfl:  "2    ondansetron (ZOFRAN) 8 mg tablet, Take 1 tablet (8 mg total) by mouth every 8 (eight) hours as needed for nausea or vomiting, Disp: 30 tablet, Rfl: 3    Prenatal Multivit-Min-Fe-FA (PRE- PO), Take 1 tablet by mouth in the morning, Disp: , Rfl:   No current facility-administered medications for this visit.    Facility-Administered Medications Ordered in Other Visits:     alteplase (CATHFLO) injection 2 mg, 2 mg, Intracatheter, Q1MIN PRN, Nemo Agostino, DO    alteplase (CATHFLO) injection 2 mg, 2 mg, Intracatheter, Q1MIN PRN, Nemo Agostino, DO    iron sucrose (VENOFER) 300 mg in sodium chloride 0.9% 250 ml IVPB 300 mg, 300 mg, Intravenous, Once, Nemo Agostino, DO, Last Rate: 166.7 mL/hr at 24 1151, 300 mg at 24 1151    sodium chloride 0.9 % infusion, 20 mL/hr, Intravenous, Once, Nemo Agostino, DO    Most Recent Lab Results:   Lab Results   Component Value Date    WBC 6.27 2024    NEUTROABS 5.24 2024    IRON 82 2024    TIBC 464 (H) 2024    FERRITIN 24 2024    CHOLESTEROL 127 2023    TRIG 56 2023    HDL 43 (L) 2023    LDLCALC 73 2023    ALT 21 2024    AST 15 2024    ALB 3.7 2024    SODIUM 135 2024    SODIUM 138 2024    K 3.4 (L) 2024    K 3.4 (L) 2024     2024    BUN 6 2024    BUN 7 2024    CREATININE 0.40 (L) 2024    CREATININE 0.49 (L) 2024    EGFR 135 2024    PHOS 3.5 2024    GLUF 87 2024    GLUF 79 2023    GLUC 116 2024    HGBA1C 5.4 2023    HGBA1C 5.1 2023    CALCIUM 9.1 2024    MG 1.8 (L) 2024       Anthropometric Measurements:   Height: 67\"  Ht Readings from Last 1 Encounters:   24 5' 7\" (1.702 m)     Wt Readings from Last 35 Encounters:   24 76 kg (167 lb 9.6 oz)   24 75.4 kg (166 lb 3.2 oz)   24 74.8 kg (165 lb)   24 76 kg (167 lb 9.6 oz)   03/15/24 75.3 kg (166 lb) "   03/13/24 75.8 kg (167 lb)   03/12/24 74.4 kg (164 lb)   03/08/24 76 kg (167 lb 9.6 oz)   02/29/24 73.7 kg (162 lb 6.4 oz)   02/21/24 74.8 kg (165 lb)   02/09/24 77.1 kg (170 lb)   02/08/24 73.9 kg (163 lb)   02/07/24 74 kg (163 lb 2.3 oz)   01/30/24 77.5 kg (170 lb 12.8 oz)   01/24/24 76.7 kg (169 lb)   01/23/24 76.7 kg (169 lb)   01/18/24 77.6 kg (171 lb)   01/17/24 77.8 kg (171 lb 8 oz)   01/11/24 77.6 kg (171 lb)   01/11/24 76.9 kg (169 lb 9.6 oz)   01/09/24 78 kg (172 lb)   01/02/24 78.2 kg (172 lb 6.4 oz)   12/29/23 78.8 kg (173 lb 12.8 oz)   12/27/23 78.8 kg (173 lb 12.8 oz)   12/21/23 81.1 kg (178 lb 12.8 oz)   12/20/23 80.3 kg (177 lb)   12/15/23 81.6 kg (180 lb)   12/14/23 81.3 kg (179 lb 3.2 oz)   12/13/23 80.3 kg (177 lb)   12/06/23 81.6 kg (180 lb)   11/24/23 82.1 kg (181 lb)   10/19/23 82.1 kg (181 lb)   10/13/23 85.3 kg (188 lb)   09/26/23 85.3 kg (188 lb)   08/22/23 80.7 kg (178 lb)     Weight History:   Usual Weight: 130-145#  Varian: n/a  Home Scale: none    Oncology Nutrition-Anthropometrics      Flowsheet Row Nutrition from 4/19/2024 in [facility] [place] Nutrition from 4/1/2024 in [facility] [place]   Patient age (years): 33 years 33 years   Patient (female) height (in): 67 in 67 in   Current Weight to be used for anthropometric calculations (lbs) 167.6 lbs 167.6 lbs   Current Weight to be used for anthropometric calculations (kg) 76.2 kg 76.2 kg   BMI: 26.2 26.2   IBW female: 135 lbs 135 lbs   IBW (kg) female: 61.4 kg 61.4 kg   IBW % (female) 124.1 % 124.1 %   Adjusted BW (female): 143.2 lbs 143.2 lbs   Adjusted BW kg (female): 65.1 kg 65.1 kg   % weight change after 1 week: 0 % --   Weight change after 1 week (lbs) 0 lbs --   % weight change after 1 month: 0 % 3.2 %   Weight change after 1 month (lbs) 0 lbs 5.2 lbs   % weight change after 3 months: -2.8 % -3.6 %   Weight change after 3 months (lbs) -4.8 lbs -6.2 lbs   % weight change after 6  months: -10.9 % -10.9 %   Weight change after 6 months (lbs) -20.4 lbs -20.4 lbs            Nutrition-Focused Physical Findings: none observed    Food/Nutrition-Related History & Client/Social History:    Current Nutrition Impact Symptoms:  [x] Nausea   -using zofran  [x] Reduced Appetite  [] Acid Reflux    [] Vomiting  [x] Unintended Wt Loss   -significant x6 months  [] Malabsorption    [x] Diarrhea   -imodium prn   -with intake of certain foods: sauces, some vegetables, meats  [] Unintended Wt Gain  [] Dumping Syndrome    [] Constipation   -miralax and colace are helping  [] Thick Mucous/Secretions  [] Abdominal Pain    [] Dysgeusia (Altered Taste)  [] Xerostomia (Dry Mouth)  [] Gas    [] Dysosmia (Altered Smell)  [] Thrush  [] Difficulty Chewing    [] Oral Mucositis (Sore Mouth)  [x] Fatigue  [] Hyperglycemia   Lab Results   Component Value Date    HGBA1C 5.4 12/02/2023      [] Odynophagia  [] Esophagitis  [] Other: hypokalemia 4/1   [] Dysphagia  [] Early Satiety  [] No Problems Eating      Food Allergies & Intolerances: yes: lactose intolerant     Current Diet: Lactose-Free and No red meat   Current Nutrition Intake: Increased since last visit  Appetite: Poor  Nutrition Route: PO  Oral Care: brushes QD  Activity level: Dizzy often in the morning. Degenerative disc disease limits physical activity. Weakness and SOB recently.     24 Hr Diet Recall:   Breakfast: bread; eggs; cereal with almond milk   Snacks: cheese, grapes or other fruit   Dinner: tofu     Beverages: water with Pedialyte (32oz x1); ginger ale (12oz x0-1), almond milk (8oz x1),  IV hydration 3x weekly  Supplements:   Plant based protein shake 1x daily     Oncology Nutrition-Estimated Needs      [facility]   Weight type used Actual weight Actual weight   Weight in kilograms (kg) used for estimated needs 76.2  kg 76.2 kg   Energy needs formula:  Other (single)  [Estimated Energy Requirements = nonpregnant EER + 340kcal for 2nd trimester pregnancy] Other (single)  [Estimated Energy Requirements = nonpregnant EER +340kcal for 2nd trimester pregnancy]   Single value (kcal/kg): 2632 2632   Energy needs based on single value: 455097 kcal 528076 kcal   Protein needs formula: 1.5-2 g/kg 1.5-2 g/kg   Protein needs based on 1.5 g/kg 114 g 114 g   Protein needs based on 2 g/kg 152 g 152 g   Fluid needs formula: 35-40 mL/kg 35-40 mL/kg   Fluid needs based on 35 mL/kg 2674 mL 2674 mL   Fluid needs in ounces 90 oz 90 oz   Fluid needs based on 40 mL/kg 3056 mL 3056 mL   Fluid needs in ounces 103 oz 103 oz          **Estimated nutrition needs are based on comparative standards for 2nd trimester pregnancy.     Discussion & Intervention:   Jillian was evaluated today for an RD follow up regarding poor po intake and pt request for diet guidance throughout treatment .  Jillian is currently undergoing tx for breast cancer . She has discontinued chemotherapy. She explains that her ability to eat is slightly improving. She continues to struggle with certain foods causing diarrhea, but she is trying more variety of foods as she is feeling a little bit better since stopping chemo. She continues to use eggs and plant based protein shake as her main source of protein intake.   Based on today's assessment, discussion included: MNT for: breast cancer, weight management, diarrhea, adequate hydration & fluid choices, eating smaller more frequent meals every 2-3 hours (5-6 small meals/day), eating when feeling most hungry, and continuing oral nutrition supplements.   Moving forward, Jillian will continue current nutrition plan of care.  Materials Provided: not applicable   All questions and concerns addressed during today’s visit.  Jillian has RD contact information.    Nutrition Diagnosis:   Inadequate Energy Intake related to physiological causes, disease  state and treatment related issues as evidenced by food recall, wt loss and discussion with pt and/or family.  Increased Nutrient Needs (kcal & pro) related to increased demand for nutrients and disease state as evidenced by cancer dx and pt undergoing tx for cancer.  Patient has clinical indicators (or ASPEN criteria) consistent with severe protein-calorie malnutrition in the context of Chronic Illness as evidenced by >10% wt loss in 6 months and </=75% energy intake vs. Estimated needs for >/=1 month.  Monitoring & Evaluation:   Goals:  weight maintenance/stabilization  adequate nutrition impact symptom management  pt to meet >/=75% estimated nutrition needs daily  increase protein intake  increase fluid intake  increase calorie intake    Progress Towards Goals: Progressing    Nutrition Rx & Recommendations:  Diet: high calorie, high protein, easy on the stomach   Small, frequent meals/snacks may be easier to tolerate than 3 large daily meals.  Aim for 5-6 small meals per day (every 2-3 hours).  Include protein at all meals/snacks.  Include a variety of foods (as tolerated/allowed by diet).  Incorporate physical activity as able/allowed.  Stay hydrated by sipping fluids of choice/tolerance throughout the day.  Liquid nutrition may be better tolerated than solids at times.  Alter food choices and eating patterns to accommodate changing needs.  Light physical activity (such as walking) is encouraged, as able/tolerated.  Weigh yourself regularly. If you notice weight loss, make an effort to increase your daily food/calorie intake. If you continue to notice loss after these efforts, reach out to your dietitian to establish a plan to stabilize weight.  Food and drink that are easy on the stomach: clear broth (chicken, vegetable, bone, mushroom, beef), juice (caution with acidic juices), potatoes, pretzels, crackers, cereal (Corn Flakes, Rice Chex, Rice Crispies, Cheerios), oatmeal or cream of wheat, white bread or  toast, white rice, cooked vegetables (caution with gas producing vegetables), plain pasta, eggs, peanut butter, boiled chicken or turkey, soft cheeses. Foods to avoid: spicy, fried/greasy, high in added sugar, acidic.   Keep a log of foods that you can tolerate and ones that you cannot tolerate.   Continue Pedialyte to aid in hydration.   Protein sources: eggs, tofu, yogurt, cheese, protein shake     Follow Up Plan:  5/6/24 during hydration    Recommend Referral to Other Providers: none at this time

## 2024-04-19 NOTE — PROGRESS NOTES
Patient tolerated treatment today without complications. Patient confirmed next appointment for 4/22 at 1200. Print out declined.

## 2024-04-19 NOTE — PATIENT INSTRUCTIONS
Nutrition Rx & Recommendations:  Diet: high calorie, high protein, easy on the stomach   Small, frequent meals/snacks may be easier to tolerate than 3 large daily meals.  Aim for 5-6 small meals per day (every 2-3 hours).  Include protein at all meals/snacks.  Include a variety of foods (as tolerated/allowed by diet).  Incorporate physical activity as able/allowed.  Stay hydrated by sipping fluids of choice/tolerance throughout the day.  Liquid nutrition may be better tolerated than solids at times.  Alter food choices and eating patterns to accommodate changing needs.  Light physical activity (such as walking) is encouraged, as able/tolerated.  Weigh yourself regularly. If you notice weight loss, make an effort to increase your daily food/calorie intake. If you continue to notice loss after these efforts, reach out to your dietitian to establish a plan to stabilize weight.  Food and drink that are easy on the stomach: clear broth (chicken, vegetable, bone, mushroom, beef), juice (caution with acidic juices), potatoes, pretzels, crackers, cereal (Corn Flakes, Rice Chex, Rice Crispies, Cheerios), oatmeal or cream of wheat, white bread or toast, white rice, cooked vegetables (caution with gas producing vegetables), plain pasta, eggs, peanut butter, boiled chicken or turkey, soft cheeses. Foods to avoid: spicy, fried/greasy, high in added sugar, acidic.   Keep a log of foods that you can tolerate and ones that you cannot tolerate.   Continue Pedialyte to aid in hydration.   Protein sources: eggs, tofu, yogurt, cheese, protein shake     Follow Up Plan: 5/6/24 during hydration   Recommend Referral to Other Providers: none at this time

## 2024-04-22 ENCOUNTER — HOSPITAL ENCOUNTER (OUTPATIENT)
Dept: INFUSION CENTER | Facility: CLINIC | Age: 34
Discharge: HOME | End: 2024-04-22
Payer: COMMERCIAL

## 2024-04-22 VITALS
SYSTOLIC BLOOD PRESSURE: 128 MMHG | TEMPERATURE: 97 F | DIASTOLIC BLOOD PRESSURE: 70 MMHG | RESPIRATION RATE: 18 BRPM | HEART RATE: 132 BPM

## 2024-04-22 DIAGNOSIS — E86.0 DEHYDRATION: Primary | ICD-10-CM

## 2024-04-22 DIAGNOSIS — Z91.09 ENVIRONMENTAL ALLERGIES: ICD-10-CM

## 2024-04-22 PROCEDURE — 96361 HYDRATE IV INFUSION ADD-ON: CPT

## 2024-04-22 PROCEDURE — 96374 THER/PROPH/DIAG INJ IV PUSH: CPT

## 2024-04-22 RX ORDER — CETIRIZINE HYDROCHLORIDE 10 MG/1
10 TABLET ORAL DAILY
Qty: 90 TABLET | Refills: 0 | Status: SHIPPED | OUTPATIENT
Start: 2024-04-22

## 2024-04-22 RX ADMIN — SODIUM CHLORIDE 1000 ML: 0.9 INJECTION, SOLUTION INTRAVENOUS at 12:53

## 2024-04-22 RX ADMIN — ONDANSETRON 8 MG: 2 INJECTION INTRAMUSCULAR; INTRAVENOUS at 12:24

## 2024-04-22 NOTE — PROGRESS NOTES
Pt to clinic for Hydration. Offers no complaints today.Port accessed with positive blood return noted throughout treatment.  Tolerated infusion without complications. Port De-accessed and flushed.  Aware of next appointment on 4/26/24 at 11 am  AVS declined.

## 2024-04-23 ENCOUNTER — TELEPHONE (OUTPATIENT)
Dept: HEMATOLOGY ONCOLOGY | Facility: CLINIC | Age: 34
End: 2024-04-23

## 2024-04-23 NOTE — TELEPHONE ENCOUNTER
Patient Call    Who are you speaking with? Patient    If it is not the patient, are they listed on an active communication consent form? N/A   What is the reason for this call? Patient called in stating she received a message in regards to her appointment tomorrow and was told to call back, but unsure why she was to call back    Does this require a call back? Yes   If a call back is required, please list [facility] call back number [phone]   If a call back is required, advise that a message will be forwarded to their care team and someone will return their call as soon as possible.   Did you relay this information to the patient? Yes

## 2024-04-24 ENCOUNTER — OFFICE VISIT (OUTPATIENT)
Dept: SURGICAL ONCOLOGY | Facility: CLINIC | Age: 34
End: 2024-04-24
Payer: COMMERCIAL

## 2024-04-24 VITALS
OXYGEN SATURATION: 98 % | HEIGHT: 67 IN | DIASTOLIC BLOOD PRESSURE: 70 MMHG | SYSTOLIC BLOOD PRESSURE: 110 MMHG | WEIGHT: 167 LBS | BODY MASS INDEX: 26.21 KG/M2 | HEART RATE: 105 BPM

## 2024-04-24 DIAGNOSIS — Z08 ENCOUNTER FOR FOLLOW-UP SURVEILLANCE OF BREAST CANCER: ICD-10-CM

## 2024-04-24 DIAGNOSIS — Z85.3 ENCOUNTER FOR FOLLOW-UP SURVEILLANCE OF BREAST CANCER: ICD-10-CM

## 2024-04-24 DIAGNOSIS — Z17.0 MALIGNANT NEOPLASM OF OVERLAPPING SITES OF LEFT BREAST IN FEMALE, ESTROGEN RECEPTOR POSITIVE (HCC): Primary | ICD-10-CM

## 2024-04-24 DIAGNOSIS — Z90.12 HISTORY OF LEFT MASTECTOMY: ICD-10-CM

## 2024-04-24 DIAGNOSIS — C50.812 MALIGNANT NEOPLASM OF OVERLAPPING SITES OF LEFT BREAST IN FEMALE, ESTROGEN RECEPTOR POSITIVE (HCC): Primary | ICD-10-CM

## 2024-04-24 PROCEDURE — 99205 OFFICE O/P NEW HI 60 MIN: CPT | Performed by: SURGERY

## 2024-04-24 NOTE — PROGRESS NOTES
Surgical Oncology Follow Up  [facility]  [facility] [place]  [address] [facility] [id number]    Jillian Ch  1990  [id number]      Chief Complaint   Patient presents with    Follow-up     3 Month Follow up - Left breast mastectomy        Assessment & Plan:   Is a 33-year-old female who is pregnant with 24 weeks.  She was diagnosed with breast cancer at first trimester.  She underwent left mastectomy at first trimester she recovered well.  She developed DVT and on Lovenox.  She also receiving adjuvant chemotherapy Adriamycin and Cytoxan every 3 weeks starting February.  Cycle 2 was reduced the dose due to nausea and vomiting.  She is supposed to get 3 cycles.  She is at the moment 24-week pregnancy and doing well as per patient.  Her next mammogram should be around November for contralateral breast.  At today's visit left breast mastectomy flaps are clean intact no evidence of local regional recurrence.  We will see her in 6 months.  She also had a question about nosebleed as well as port site bleed when they withdrew the needle.  She has an upcoming appointment with medical oncologist I have emphasized her to discuss with medical oncologist and she understand and agreed to do so.    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, ER 80-85% MD 90-95% Her2 1+, han 2, cT2(2.1 cm)N0M0    1/2/2023 - left sided mastectomy with SLNBX - [id number] - oncotype 23    2/21/2024 - start AC q 3 weeks    3/13/2024 - cycle 2 adriamycin dose reduced to 50 mg/m2 and cytoxan dose reduced by 10% due to N/V    4/2024 - plan to stop after 3 cycles of AC due to severe nausea and dehydration with coexisting hyperemesis gravidarum     Malignant neoplasm of overlapping sites of left breast in female, estrogen receptor positive (HCC)   12/2/2023 Initial Diagnosis    Malignant  neoplasm of overlapping sites of left female breast (HCC)     12/2/2023 Biopsy    Bilateral breast ultrasound guided biopsy  A. Breast, Left, US Guided Left Breast Biopsy 12:00 6cmfn:  Invasive breast carcinoma of no special type (ductal) with osteoclast-like stromal giant cells  Grade 2  ER 85  HI 95  HER2 1+     B. Breast, Right, US Guided Right Breast Biopsy 10:00 9cmfn:  Benign breast tissue.    In review of the patient’s recent imaging, the left malignancy appears unifocal.  The biopsy-proven carcinoma measured 2.1 cm on ultrasound. Ultrasound of the left axilla was performed on 11/24/2023 and showed no suspicious adenopathy.  Recent imaging of the contralateral right breast dated 12/2/2023 and 11/24/2023 was reviewed and shows no suspicious findings.  The pathology results for the ultrasound-guided core needle biopsy (right breast 10:00, 9 cm from the nipple) are benign and concordant with imaging.     12/2/2023 Genetic Testing    [facility]  A total of 36 genes were evaluated, including: APC, TOPHER, BARD 1, BMPR1A, BRCA1, BRCA2, BRIP1, CDH1, CDK4, CKDN2A, CHEK2, DICER1, MLH1, MSH2, MSH6, MUTYH, NBN, NF1, NTHL1, PALB2, PMS2, PTEN, RAD51C, RECQL, SMAD4, SMARCA4, STK11, TP53, AXIN2, HOXB13, MSH3, POLD1, AND POLE, EPCAM, AND GREM1   Negative result. No pathogenic sequence variants or deletions/duplications identified       12/2/2023 -  Cancer Staged    Staging form: Breast, AJCC 8th Edition  - Clinical stage from 12/2/2023: Stage IB (cT2, cN0, cM0, G2, ER+, HI+, HER2-) - Signed by Elena Cornell MD on 12/13/2023  Stage prefix: Initial diagnosis  Histologic grading system: 3 grade system       1/2/2024 Surgery    Left breast mastectomy with sentinel lymph node biopsy  Invasive Mammary carcinoma of no special type (ductal)   Grade 2  25 mm  Margins negative  0/2 Lymph nodes  Anatomic stage IIA  Prognostic stage IA      2/21/2024 -  Chemotherapy    DOXOrubicin (ADRIAMYCIN), 56.25 mg/m2 = 106 mg (93.8 % of  original dose 60 mg/m2), Intravenous, Once, 3 of 3 cycles  Dose modification: 56.25 mg/m2 (original dose 60 mg/m2, Cycle 1, Reason: Other (Must fill in a comment), Comment: max recommended dose), 50 mg/m2 (original dose 60 mg/m2, Cycle 2, Reason: Nausea/Vomiting, Comment: dose reduced to 50 mg/m2 due to n/v)  Administration: 106 mg (2/21/2024), 94 mg (3/13/2024), 94 mg (4/3/2024)  alteplase (CATHFLO), 2 mg, Intracatheter, Every 1 Minute as needed, 3 of 3 cycles  cyclophosphamide (CYTOXAN) IVPB, 600 mg/m2 = 1,128 mg, Intravenous, Once, 3 of 3 cycles  Dose modification: 540 mg/m2 (original dose 600 mg/m2, Cycle 2, Reason: Nausea/Vomiting, Comment: 10% dose reduction due to n/v)  Administration: 1,128 mg (2/21/2024), 1,000 mg (3/13/2024), 1,000 mg (4/3/2024)  fosaprepitant (EMEND) IVPB, 150 mg, Intravenous, Once, 3 of 3 cycles  Administration: 150 mg (2/21/2024), 150 mg (3/13/2024), 150 mg (4/3/2024)     Cancer complicating pregnancy in second trimester   12/14/2023 Initial Diagnosis    Cancer complicating pregnancy in second trimester           Interval History:   Follow-up left breast cancer    Review of Systems:   Review of Systems   Constitutional:  Negative for chills and fever.   HENT:  Positive for nosebleeds. Negative for ear pain and sore throat.    Eyes:  Negative for pain and visual disturbance.   Respiratory:  Negative for cough and shortness of breath.    Cardiovascular:  Negative for chest pain and palpitations.   Gastrointestinal:  Negative for abdominal pain and vomiting.   Genitourinary:  Negative for dysuria and hematuria.   Musculoskeletal:  Negative for arthralgias and back pain.   Skin:  Negative for color change and rash.   Neurological:  Negative for seizures and syncope.   Hematological:  Negative for adenopathy.   All other systems reviewed and are negative.      Past Medical History     Patient Active Problem List   Diagnosis    Mild persistent asthma without complication    Axillary  hidradenitis suppurativa    Anxiety    Chest wall pain    Gastroesophageal reflux disease without esophagitis    Depression with anxiety    Chronic fatigue    Myalgia    Chronic left shoulder pain    Cervical disc disorder at C5-C6 level with radiculopathy    Atypical squamous cells of undetermined significance (ASCUS) on Papanicolaou smear of cervix    Hypertrophic and atrophic condition of skin    Migraine headache    Shoulder impingement syndrome, left    Vitamin D deficiency    Close exposure to COVID-19 virus    Thoracic outlet syndrome    Palpitations    Post-operative pain    Transaminitis    Right middle lobe pulmonary nodule    Reversible airway obstruction    SOB (shortness of breath)    Musculoskeletal chest pain    COVID-19 virus infection    Unexplained weight gain    Left ovarian cyst    Dysphagia    Malignant neoplasm of overlapping sites of left breast in female, estrogen receptor positive (HCC)    Cancer complicating pregnancy in second trimester    21 weeks gestation of pregnancy    History of left mastectomy    Multigravida of advanced maternal age in first trimester    DVT complicating pregnancy, second trimester    Dehydration    Iron deficiency anemia secondary to inadequate dietary iron intake    Marginal insertion of umbilical cord affecting management of mother     Past Medical History:   Diagnosis Date    Anesthesia complication     gait dysfunction/ urinary retention after nerve block,    Anxiety     Asthma     CRPS (complex regional pain syndrome), upper limb     left chest/arm/hand    Deep vein thrombosis (HCC) 01/05/2024    post op from mastectomy    GERD (gastroesophageal reflux disease)     Heart murmur     HPV (human papilloma virus) infection 2012    unsure of 16, 18, or other    Invasive ductal carcinoma of breast, female, left (HCC) 2023    Kidney stone     Miscarriage 3/15/2015    7 weeks along.    Neck pain     Ovarian cyst     Left    PONV (postoperative nausea and vomiting)  01/02/2024     Past Surgical History:   Procedure Laterality Date    COLPOSCOPY  2012    HPV    EGD      IR PORT PLACEMENT  2/7/2024    IR UPPER EXTREMITY VENOGRAM- DIAGNOSTIC  05/28/2021    DE BX/EXC LYMPH NODE OPEN SUPERFICIAL Left 01/02/2024    Procedure: LEFT BREAST MASTECTOMY, LYMPHATIC MAPPING WITH RADIOACTIVE DYE, SENTINEL LYMPH NODE BIOPSY, ZAHEER LEFT BREAST, INJECTION IN OR AT 0800 BY DR CORNELL;  Surgeon: Elena Cornell MD;  Location: [facility];  Service: Surgical Oncology    DE EXCISION 1ST &/CERVICAL RIB Left 08/12/2021    Procedure: FIRST RIB RESECTION;  Surgeon: Jonathan Schaffer MD;  Location: [facility];  Service: Thoracic    DE INJ RADIOACTIVE TRACER FOR ID OF SENTINEL NODE Left 01/02/2024    Procedure: LEFT BREAST MASTECTOMY, LYMPHATIC MAPPING WITH RADIOACTIVE DYE, SENTINEL LYMPH NODE BIOPSY, ZAHEER LEFT BREAST, INJECTION IN OR AT 0800 BY DR CORNELL;  Surgeon: Elena Cornell MD;  Location[facility];  Service: Surgical Oncology    DE INTRAOP SENTINEL LYMPH NODE ID W/DYE INJECTION Left 01/02/2024    Procedure: LEFT BREAST MASTECTOMY, LYMPHATIC MAPPING WITH RADIOACTIVE DYE, SENTINEL LYMPH NODE BIOPSY, ZAHEER LEFT BREAST, INJECTION IN OR AT 0800 BY DR CORNELL;  Surgeon: Elena Cornell MD;  [facility];  Service: Surgical Oncology    DE MASTECTOMY SIMPLE COMPLETE Left 01/02/2024    Procedure: LEFT BREAST MASTECTOMY, LYMPHATIC MAPPING WITH RADIOACTIVE DYE, SENTINEL LYMPH NODE BIOPSY, ZAHEER LEFT BREAST, INJECTION IN OR AT 0800 BY DR CORNELL;  Surgeon: Elena Cornell MD;  [facility];  Service: Surgical Oncology    THORACOSCOPY VIDEO ASSISTED SURGERY (VATS) Left 08/12/2021    Procedure: THORACOSCOPY VIDEO ASSISTED SURGERY (VATS);  Surgeon: Jonathan Schaffer MD;  [facility];  Service: Thoracic    US GUIDANCE BREAST BIOPSY LEFT EACH ADDITIONAL Left 12/02/2023    US GUIDED BREAST BIOPSY RIGHT COMPLETE Right 12/02/2023    WISDOM TOOTH EXTRACTION   2012     Family History   Problem Relation Age of Onset    Heart disease Mother     Miscarriages / Stillbirths Mother     Coronary artery disease Mother     No Known Problems Father     No Known Problems Half-Brother     Bone cancer Maternal Grandmother         hilda to liver and spine    Miscarriages / Stillbirths Half-Sister     Breast cancer Neg Hx     Ovarian cancer Neg Hx     Uterine cancer Neg Hx     Cervical cancer Neg Hx      Social History     Socioeconomic History    Marital status: /Civil Union     Spouse name: Not on file    Number of children: Not on file    Years of education: Not on file    Highest education level: Not on file   Occupational History    Not on file   Tobacco Use    Smoking status: Never    Smokeless tobacco: Never   Vaping Use    Vaping status: Never Used   Substance and Sexual Activity    Alcohol use: Not Currently     Comment: social    Drug use: Never    Sexual activity: Yes     Partners: Male     Birth control/protection: None   Other Topics Concern    Not on file   Social History Narrative    Not on file     Social Determinants of Health     Financial Resource Strain: Not on file   Food Insecurity: Not on file   Transportation Needs: No Transportation Needs (8/19/2021)    PRAPARE - Transportation     Lack of Transportation (Medical): No     Lack of Transportation (Non-Medical): No   Physical Activity: Not on file   Stress: Not on file   Social Connections: Not on file   Intimate Partner Violence: Not on file   Housing Stability: Not on file       Current Outpatient Medications:     albuterol (PROVENTIL HFA,VENTOLIN HFA) 90 mcg/act inhaler, TAKE 2 PUFFS BY MOUTH EVERY 6 HOURS AS NEEDED FOR WHEEZE OR FOR SHORTNESS OF BREATH, Disp: 18 g, Rfl: 3    cetirizine (ZyrTEC) 10 mg tablet, TAKE 1 TABLET BY MOUTH EVERY DAY, Disp: 90 tablet, Rfl: 0    enoxaparin (LOVENOX) 80 mg/0.8 mL, Inject 75mg SC every 12 hours, Disp: 48 mL, Rfl: 2    lidocaine-prilocaine (EMLA) cream, Apply to port  30 to 60 min prior to use, Disp: 30 g, Rfl: 2    omeprazole (PriLOSEC) 40 MG capsule, Take 1 capsule (40 mg total) by mouth daily, Disp: 90 capsule, Rfl: 2    ondansetron (ZOFRAN) 8 mg tablet, Take 1 tablet (8 mg total) by mouth every 8 (eight) hours as needed for nausea or vomiting, Disp: 30 tablet, Rfl: 3    Prenatal Multivit-Min-Fe-FA (PRE-SONIA PO), Take 1 tablet by mouth in the morning, Disp: , Rfl:     diphenhydramine, lidocaine, Al/Mg hydroxide, simethicone (Magic Mouthwash) SUSP, SWISH AND SPIT 10 ML EVERY 4 (FOUR) HOURS AS NEEDED FOR MOUTH PAIN OR DISCOMFORT (Patient not taking: Reported on 3/8/2024), Disp: 237 mL, Rfl: 1  No current facility-administered medications for this visit.    Facility-Administered Medications Ordered in Other Visits:     alteplase (CATHFLO) injection 2 mg, 2 mg, Intracatheter, Q1MIN PRN, Nemo Agostino, DO  Allergies   Allergen Reactions    Nsaids GI Intolerance    Formic Acid Swelling and Rash     (Severe reactions to Bug bites)    Latex Rash and Blisters       Physical Exam:     Vitals:    24 1354   BP: 110/70   Pulse: 105   SpO2: 98%     Physical Exam  Constitutional:       Appearance: Normal appearance.   HENT:      Head: Normocephalic and atraumatic.      Nose: Nose normal.      Mouth/Throat:      Mouth: Mucous membranes are moist.   Eyes:      Pupils: Pupils are equal, round, and reactive to light.   Cardiovascular:      Rate and Rhythm: Normal rate.      Pulses: Normal pulses.      Heart sounds: Normal heart sounds.   Pulmonary:      Effort: Pulmonary effort is normal.      Breath sounds: Normal breath sounds.   Chest:          Comments: Left mastectomy well-healed flap.  No evidence of local recurrence left axillary and supraclavicular examination no palpable adenopathy  Right breast no palpable mass masses nipple discharge nipple retraction or skin changes right axillary and supraclavicular examination no palpable adenopathy.  Abdominal:      General: Bowel sounds  are normal. There is distension.      Palpations: Abdomen is soft.      Comments: Pregnancy   Musculoskeletal:         General: Normal range of motion.      Cervical back: Normal range of motion and neck supple.   Skin:     General: Skin is warm.   Neurological:      General: No focal deficit present.      Mental Status: She is alert and oriented to person, place, and time.   Psychiatric:         Mood and Affect: Mood normal.         Behavior: Behavior normal.         Thought Content: Thought content normal.         Judgment: Judgment normal.           Results & Discussion:   I did review my clinical findings and no evidence of local regional recurrence.  Her left axillary swelling is known given prior to mastectomy and persist she does wear a sleeve.  3I did discussed in detail nature of breast cancer and pregnancy and follow-up and surveillance.  We will continue to follow in 6 months time we will also obtain contralateral breast mammogram.  We also discussed possible reconstruction postpartum at her convenience.  Her she understands and  agrees . All patient questions were answered.       Advance Care Planning/Advance Directives:  I Elena Cornell MD discussed the disease status with Jillian Ch  today 04/24/24  treatment plans and follow-up with the patient.

## 2024-04-26 ENCOUNTER — HOSPITAL ENCOUNTER (OUTPATIENT)
Dept: INFUSION CENTER | Facility: CLINIC | Age: 34
End: 2024-04-26
Payer: COMMERCIAL

## 2024-04-26 VITALS
HEART RATE: 111 BPM | RESPIRATION RATE: 16 BRPM | SYSTOLIC BLOOD PRESSURE: 106 MMHG | TEMPERATURE: 97.4 F | DIASTOLIC BLOOD PRESSURE: 64 MMHG

## 2024-04-26 DIAGNOSIS — D50.8 IRON DEFICIENCY ANEMIA SECONDARY TO INADEQUATE DIETARY IRON INTAKE: ICD-10-CM

## 2024-04-26 DIAGNOSIS — E86.0 DEHYDRATION: Primary | ICD-10-CM

## 2024-04-26 PROCEDURE — 96375 TX/PRO/DX INJ NEW DRUG ADDON: CPT

## 2024-04-26 PROCEDURE — 96366 THER/PROPH/DIAG IV INF ADDON: CPT

## 2024-04-26 PROCEDURE — 96365 THER/PROPH/DIAG IV INF INIT: CPT

## 2024-04-26 RX ORDER — SODIUM CHLORIDE 9 MG/ML
20 INJECTION, SOLUTION INTRAVENOUS ONCE
Status: CANCELLED | OUTPATIENT
Start: 2024-04-26

## 2024-04-26 RX ORDER — SODIUM CHLORIDE 9 MG/ML
20 INJECTION, SOLUTION INTRAVENOUS ONCE
Status: DISCONTINUED | OUTPATIENT
Start: 2024-04-26 | End: 2024-04-29 | Stop reason: HOSPADM

## 2024-04-26 RX ADMIN — SODIUM CHLORIDE 1000 ML: 0.9 INJECTION, SOLUTION INTRAVENOUS at 11:31

## 2024-04-26 RX ADMIN — ONDANSETRON 8 MG: 2 INJECTION INTRAMUSCULAR; INTRAVENOUS at 11:36

## 2024-04-26 RX ADMIN — IRON SUCROSE 300 MG: 20 INJECTION, SOLUTION INTRAVENOUS at 11:54

## 2024-04-26 NOTE — PROGRESS NOTES
Pt here today for hydration and Venofer, offers no complaints, port accessed w/ good blood reurn, Hydration started and Zofran given. Zofran completed and Venofer infusing along w/ the hydration. Pt tolerating well. Infusions completed w/o complications, aware of her next appt on Monday at 12 noon, port deaccessed, pt declined AVS and pt D/C home

## 2024-04-29 ENCOUNTER — ROUTINE PRENATAL (OUTPATIENT)
Dept: OBGYN CLINIC | Facility: MEDICAL CENTER | Age: 34
End: 2024-04-29
Payer: COMMERCIAL

## 2024-04-29 ENCOUNTER — APPOINTMENT (OUTPATIENT)
Dept: LAB | Facility: MEDICAL CENTER | Age: 34
End: 2024-04-29
Payer: COMMERCIAL

## 2024-04-29 ENCOUNTER — PATIENT OUTREACH (OUTPATIENT)
Dept: CASE MANAGEMENT | Facility: HOSPITAL | Age: 34
End: 2024-04-29

## 2024-04-29 ENCOUNTER — TELEPHONE (OUTPATIENT)
Dept: OBGYN CLINIC | Facility: MEDICAL CENTER | Age: 34
End: 2024-04-29

## 2024-04-29 ENCOUNTER — HOSPITAL ENCOUNTER (OUTPATIENT)
Dept: INFUSION CENTER | Facility: CLINIC | Age: 34
Discharge: HOME | End: 2024-04-29
Payer: COMMERCIAL

## 2024-04-29 VITALS
SYSTOLIC BLOOD PRESSURE: 110 MMHG | DIASTOLIC BLOOD PRESSURE: 82 MMHG | HEIGHT: 67 IN | BODY MASS INDEX: 26.15 KG/M2 | WEIGHT: 166.6 LBS | HEART RATE: 91 BPM

## 2024-04-29 VITALS
TEMPERATURE: 96.9 F | SYSTOLIC BLOOD PRESSURE: 122 MMHG | HEART RATE: 94 BPM | DIASTOLIC BLOOD PRESSURE: 88 MMHG | RESPIRATION RATE: 28 BRPM

## 2024-04-29 DIAGNOSIS — R10.13 EPIGASTRIC PAIN: ICD-10-CM

## 2024-04-29 DIAGNOSIS — E86.0 DEHYDRATION: Primary | ICD-10-CM

## 2024-04-29 DIAGNOSIS — O22.32 DVT COMPLICATING PREGNANCY, SECOND TRIMESTER: ICD-10-CM

## 2024-04-29 DIAGNOSIS — Z3A.24 24 WEEKS GESTATION OF PREGNANCY: ICD-10-CM

## 2024-04-29 DIAGNOSIS — O9A.112 CANCER COMPLICATING PREGNANCY IN SECOND TRIMESTER: ICD-10-CM

## 2024-04-29 DIAGNOSIS — O43.199 MARGINAL INSERTION OF UMBILICAL CORD AFFECTING MANAGEMENT OF MOTHER: ICD-10-CM

## 2024-04-29 DIAGNOSIS — Z34.82 PRENATAL CARE, SUBSEQUENT PREGNANCY, SECOND TRIMESTER: Primary | ICD-10-CM

## 2024-04-29 DIAGNOSIS — Z11.3 SCREEN FOR STD (SEXUALLY TRANSMITTED DISEASE): ICD-10-CM

## 2024-04-29 LAB
ALBUMIN SERPL BCP-MCNC: 4.1 G/DL (ref 3.5–5)
ALP SERPL-CCNC: 74 U/L (ref 34–104)
ALT SERPL W P-5'-P-CCNC: 70 U/L (ref 7–52)
ANION GAP SERPL CALCULATED.3IONS-SCNC: 11 MMOL/L (ref 4–13)
AST SERPL W P-5'-P-CCNC: 46 U/L (ref 13–39)
BILIRUB SERPL-MCNC: 0.32 MG/DL (ref 0.2–1)
BUN SERPL-MCNC: 6 MG/DL (ref 5–25)
CALCIUM SERPL-MCNC: 9.3 MG/DL (ref 8.4–10.2)
CHLORIDE SERPL-SCNC: 104 MMOL/L (ref 96–108)
CO2 SERPL-SCNC: 23 MMOL/L (ref 21–32)
CREAT SERPL-MCNC: 0.55 MG/DL (ref 0.6–1.3)
GFR SERPL CREATININE-BSD FRML MDRD: 121 ML/MIN/1.73SQ M
GLUCOSE P FAST SERPL-MCNC: 76 MG/DL (ref 65–99)
POTASSIUM SERPL-SCNC: 4 MMOL/L (ref 3.5–5.3)
PROT SERPL-MCNC: 7.3 G/DL (ref 6.4–8.4)
SL AMB  POCT GLUCOSE, UA: NORMAL
SL AMB POCT URINE PROTEIN: NORMAL
SODIUM SERPL-SCNC: 138 MMOL/L (ref 135–147)

## 2024-04-29 PROCEDURE — 96374 THER/PROPH/DIAG INJ IV PUSH: CPT

## 2024-04-29 PROCEDURE — PNV: Performed by: STUDENT IN AN ORGANIZED HEALTH CARE EDUCATION/TRAINING PROGRAM

## 2024-04-29 PROCEDURE — 36415 COLL VENOUS BLD VENIPUNCTURE: CPT

## 2024-04-29 PROCEDURE — 81002 URINALYSIS NONAUTO W/O SCOPE: CPT | Performed by: STUDENT IN AN ORGANIZED HEALTH CARE EDUCATION/TRAINING PROGRAM

## 2024-04-29 PROCEDURE — 80053 COMPREHEN METABOLIC PANEL: CPT

## 2024-04-29 RX ADMIN — SODIUM CHLORIDE 1000 ML: 0.9 INJECTION, SOLUTION INTRAVENOUS at 12:18

## 2024-04-29 RX ADMIN — ONDANSETRON 8 MG: 2 INJECTION INTRAMUSCULAR; INTRAVENOUS at 12:44

## 2024-04-29 NOTE — PROGRESS NOTES
MSW sat with pt in the infusion center this afternoon during her hydration, she said that she's been feeling somewhat better after finishing chemo, not great but better, until this morning when she had an episode of stomach pain, hot flashes, sweating, feeling clammy, etc.  She did see her OB this morning who ordered some blood work to assess.  She says that she's feeling better now that it's later in the day but she's afraid to eat anything.  She gave me some general updates, she is hoping after her next anatomy scan at the end of May there will be a definitive plan in place for her delivery, which will most likely be a scheduled C section.  Her mother is continuing to plan to move here locally and it seems as though they will put a container home or tiny home on their property for her, but they are evaluating the options before committing.  She will likely come to the area and live with pt in her home until they've made final decisions on her own house.  Pt was happy for the time spent talking and is otherwise doing well, she is happy to show her baby bump as well.  I will continue to check in and support her as needed moving forward.

## 2024-04-29 NOTE — ASSESSMENT & PLAN NOTE
Pt had episode this morning that lasted ~15 min where she felt faint and diaphoretic. She reports feeling intense epigastric pain. This occurred randomly, has not eaten today. Pt reports symptoms have resolved but still has some residual nausea and epigastric pressure. On exam, pt has tenderness to RUQ.     -CBC CMP ordered   -pt has infusion appointment later today which she receives hydration and zofran, she does not want to miss this appointment   -encouraged to go home and take prilosec and eat a meal. If pain does not subside or continues to worsen, she should present to triage for persistent RUQ Pain.

## 2024-04-29 NOTE — ASSESSMENT & PLAN NOTE
-breast cancer diagnosed early in pregnancy. S/p left breast mastectomy with sentinel LN biopsy 1/2/24  -diagnosed with invasive DCIS, ER [place] Her2 pos, negative lymph node   -s/p 3 cycles chemo, per onc will not complete 4th cycle as initially planned due to side effects. Has port placed in upper right chest.   -pt has lost about 25lbs, has been going to infusion center for hydration treatments. Per MFM, can consider TPN if pt continues to be unable to sustain weight. Pt increasing intake.

## 2024-04-29 NOTE — ASSESSMENT & PLAN NOTE
32yo  at 24.6wks presents for routine prenatal visit     Pt denies contractions, vaginal bleeding, leakage of fluid. Endorses fetal movement.    -continue PNVs   -28wk labs ordered today, MFM referral sent for 1 week glucose testing per pt request

## 2024-04-29 NOTE — PROGRESS NOTES
Pt presents to clinic for hydration. Pt offers no complaints. Tolerated infusion without complications. Port de-accessed. Pt aware of next appointment on 5/1/24 at 800. AVS declined.

## 2024-04-29 NOTE — TELEPHONE ENCOUNTER
CMP drawn today with mild transaminitis, may be secondary from chemotherapy vs preeclampsia. Lab did not draw CBC today, BP today wnl.     Discussed results with patient over the phone. Pt reports she still has lingering epigastric pain, but is improved from before. Was able to go to hydration today, and was also able to tolerate cereal as a meal. Discussed option of going to triage for further evaluation tonight vs waiting until the morning as the ultrasonographers for RUQ will not be there until the morning. Pt would like to stay home tonight and go to triage in the AM. If symptoms worsen overnight or experiences preeclamptic symptoms as discussed, pt to present to triage ASAP. Otherwise, pt will present to triage in the AM for repeat HELLP labs, including CBC, and RUQ US.

## 2024-04-29 NOTE — ASSESSMENT & PLAN NOTE
-post operative course of left mastectomy complicated by left DVT.   -continue lovenox 75mg BID

## 2024-04-29 NOTE — PROGRESS NOTES
24 weeks gestation of pregnancy  34yo  at 24.6wks presents for routine prenatal visit     Pt denies contractions, vaginal bleeding, leakage of fluid. Endorses fetal movement.    -continue PNVs   -28wk labs ordered today, [facility] referral sent for 1 week glucose testing per pt request     Epigastric pain  Pt had episode this morning that lasted ~15 min where she felt faint and diaphoretic. She reports feeling intense epigastric pain. This occurred randomly, has not eaten today. Pt reports symptoms have resolved but still has some residual nausea and epigastric pressure. On exam, pt has tenderness to RUQ.     -CBC CMP ordered   -pt has infusion appointment later today which she receives hydration and zofran, she does not want to miss this appointment   -encouraged to go home and take prilosec and eat a meal. If pain does not subside or continues to worsen, she should present to triage for persistent RUQ Pain.     Marginal insertion of umbilical cord affecting management of mother  -growth US and missed anatomy scheduled 24     Cancer complicating pregnancy in second trimester  -breast cancer diagnosed early in pregnancy. S/p left breast mastectomy with sentinel LN biopsy 24  -diagnosed with invasive DCIS, ER [place] Her2 pos, negative lymph node   -s/p 3 cycles chemo, per onc will not complete 4th cycle as initially planned due to side effects. Has port placed in upper right chest.   -pt has lost about 25lbs, has been going to infusion center for hydration treatments. Per [facility], can consider TPN if pt continues to be unable to sustain weight. Pt increasing intake.        DVT complicating pregnancy, second trimester  -post operative course of left mastectomy complicated by left DVT.   -continue lovenox 75mg BID

## 2024-04-30 ENCOUNTER — HOSPITAL ENCOUNTER (OUTPATIENT)
Facility: HOSPITAL | Age: 34
Discharge: HOME | End: 2024-04-30
Attending: STUDENT IN AN ORGANIZED HEALTH CARE EDUCATION/TRAINING PROGRAM | Admitting: STUDENT IN AN ORGANIZED HEALTH CARE EDUCATION/TRAINING PROGRAM
Payer: COMMERCIAL

## 2024-04-30 ENCOUNTER — APPOINTMENT (OUTPATIENT)
Dept: ULTRASOUND IMAGING | Facility: HOSPITAL | Age: 34
End: 2024-04-30
Payer: COMMERCIAL

## 2024-04-30 VITALS
SYSTOLIC BLOOD PRESSURE: 106 MMHG | HEIGHT: 67 IN | TEMPERATURE: 98.2 F | WEIGHT: 166.6 LBS | DIASTOLIC BLOOD PRESSURE: 55 MMHG | HEART RATE: 65 BPM | RESPIRATION RATE: 16 BRPM | BODY MASS INDEX: 26.15 KG/M2

## 2024-04-30 PROBLEM — Z3A.25 25 WEEKS GESTATION OF PREGNANCY: Status: ACTIVE | Noted: 2023-12-29

## 2024-04-30 PROBLEM — D69.6 THROMBOCYTOPENIA AFFECTING PREGNANCY, ANTEPARTUM (HCC): Status: ACTIVE | Noted: 2024-04-30

## 2024-04-30 PROBLEM — O99.119 THROMBOCYTOPENIA AFFECTING PREGNANCY, ANTEPARTUM (HCC): Status: ACTIVE | Noted: 2024-04-30

## 2024-04-30 LAB
ANISOCYTOSIS BLD QL SMEAR: PRESENT
BASOPHILS # BLD AUTO: 0.02 THOUSANDS/ÂΜL (ref 0–0.1)
BASOPHILS NFR BLD AUTO: 0 % (ref 0–1)
CREAT UR-MCNC: 139.5 MG/DL
EOSINOPHIL # BLD AUTO: 0.12 THOUSAND/ÂΜL (ref 0–0.61)
EOSINOPHIL NFR BLD AUTO: 2 % (ref 0–6)
ERYTHROCYTE [DISTWIDTH] IN BLOOD BY AUTOMATED COUNT: 19.7 % (ref 11.6–15.1)
GLUCOSE SERPL-MCNC: 71 MG/DL (ref 65–140)
HCT VFR BLD AUTO: 28.1 % (ref 34.8–46.1)
HGB BLD-MCNC: 9 G/DL (ref 11.5–15.4)
IMM GRANULOCYTES # BLD AUTO: 0.14 THOUSAND/UL (ref 0–0.2)
IMM GRANULOCYTES NFR BLD AUTO: 2 % (ref 0–2)
LYMPHOCYTES # BLD AUTO: 0.77 THOUSANDS/ÂΜL (ref 0.6–4.47)
LYMPHOCYTES NFR BLD AUTO: 12 % (ref 14–44)
MCH RBC QN AUTO: 29.1 PG (ref 26.8–34.3)
MCHC RBC AUTO-ENTMCNC: 32 G/DL (ref 31.4–37.4)
MCV RBC AUTO: 91 FL (ref 82–98)
MONOCYTES # BLD AUTO: 0.55 THOUSAND/ÂΜL (ref 0.17–1.22)
MONOCYTES NFR BLD AUTO: 9 % (ref 4–12)
NEUTROPHILS # BLD AUTO: 4.77 THOUSANDS/ÂΜL (ref 1.85–7.62)
NEUTS SEG NFR BLD AUTO: 75 % (ref 43–75)
NRBC BLD AUTO-RTO: 0 /100 WBCS
PLATELET # BLD AUTO: 99 THOUSANDS/UL (ref 149–390)
PLATELET BLD QL SMEAR: ABNORMAL
PMV BLD AUTO: 11.4 FL (ref 8.9–12.7)
PROT UR-MCNC: 13 MG/DL
PROT/CREAT UR: 0.09 MG/G{CREAT} (ref 0–0.1)
RBC # BLD AUTO: 3.09 MILLION/UL (ref 3.81–5.12)
RBC MORPH BLD: PRESENT
WBC # BLD AUTO: 6.37 THOUSAND/UL (ref 4.31–10.16)

## 2024-04-30 PROCEDURE — 84156 ASSAY OF PROTEIN URINE: CPT

## 2024-04-30 PROCEDURE — 82948 REAGENT STRIP/BLOOD GLUCOSE: CPT

## 2024-04-30 PROCEDURE — 85025 COMPLETE CBC W/AUTO DIFF WBC: CPT

## 2024-04-30 PROCEDURE — 99213 OFFICE O/P EST LOW 20 MIN: CPT

## 2024-04-30 PROCEDURE — 76705 ECHO EXAM OF ABDOMEN: CPT

## 2024-04-30 PROCEDURE — 80053 COMPREHEN METABOLIC PANEL: CPT

## 2024-04-30 PROCEDURE — NC001 PR NO CHARGE: Performed by: OBSTETRICS & GYNECOLOGY

## 2024-04-30 PROCEDURE — G0463 HOSPITAL OUTPT CLINIC VISIT: HCPCS

## 2024-04-30 PROCEDURE — 82570 ASSAY OF URINE CREATININE: CPT

## 2024-04-30 NOTE — QUICK NOTE
Spoke with Dr. Khalil, patient's medical oncologist. Per discussion, larger LFT trend is reassuring. Mild transaminitis and thrombocytopenia are expected in the setting of patient's recent chemotherapy at the beginning of the month. Recommended trending weekly CBC, CMP for a month to ensure down trend. Patient should be followed closely to ensure she is not developing ITP. But HELLP is much lower on the differential. Plan for weekly CBC, CMP. Patient will see Dr. Khalil on 5/10.     Nae Johnson MD  PGY-1 OB/GYN

## 2024-04-30 NOTE — PROGRESS NOTES
L&D Triage Note - OB/GYN  Jillian Ch 33 y.o. female MRN: [id number]  Unit/Bed#: LD TRIAGE 2 Encounter: [id number]      Patient is seen by [facility]    ASSESSMENT/PLAN  Jillian Ch is a 33 y.o.  at 25w0d here for evaluation of RUQ pain and mild transaminitis. Found to be newly thrombocytopenic today. Per discussion with Dr. Khalil, these are expected findings s/p chemo. Pre-E work up negative - see thrombocytopenia comments. RUQ US neg for acute cholecystitis, but findings of adenomyomatosis which can be associated with RUQ pain. Will trend CBC, CMP weekly x1 month. Patient to f/u with Dr. Khalil on 5/10. Patient to be followed closely in outpatient setting to r/o ITP or other abnormalities. Patient is stable for discharge home at this time.       1) RUQ pain   - RUQ US  - CBC, CMP, P:C to r/o pre-E  - discussion with heme/onc regarding chemotherapy regimen and possible side effects    SVE:  Deferred, no obstetric complaints  No cx on toco    FHT:  Baseline Rate (FHR): 130 bpm  Variability: Moderate  Accelerations: 10 x 10 (<32 weeks)  Decelerations: None  FHR Category: Category I  NST reactive    TOCO:   Contraction Frequency (minutes): 0    2) Breast Cancer  - diagnosed early this pregnancy   - s/p L mastectomy  - s/p 3 cycles of chemotherapy. Stopped before the 4th due to side effects  - patient seen in the infusion center for treatment of dehydration     3)  Discharge instructions  - Patient instructed to call if experiencing worsening contractions, vaginal bleeding, loss of fluid or decreased fetal movement.  - Will follow up with OBGYN and Medical Oncology in office    D/w Dr. Joseph  ______________    SUBJECTIVE    BALTAZAR: Estimated Date of Delivery: 24    HPI:  33 y.o.  25w0d presents with complaint of acute onset severe right upper quadrant pain yesterday morning.  She has been dealing with epigastric burning pain every night eats that  "is not improved with antacids.  But this episode was different.  She suddenly had terrible right upper quadrant pain and associated chills, sweats, palpitations. This spontaneously resolved after a few hours. The patient only ate a bowl of cereal last night. She denies recurrence of that pain. She is still having epigastric burning.     Contractions: denies  Leakage of fluid: denies  Vaginal Bleeding: denies  Fetal movement: present    Her obstetrical history is significant for breast cancer and a post-op DVT    ROS:  Constitutional: Negative  Respiratory: Negative  Cardiovascular: Negative    Gastrointestinal: Negative    Physical Exam  GEN: Well appearing, no apparent distress   CARD: RRR  RESP: CTAB, no increased WOB  ABD: Gravid, soft, TTP in the RUQ, negative mccullough's sign, negative guarding, negative rebound  SVE: deferred  Extremities: negative edema and erythema bilaterally    OBJECTIVE:  /55   Pulse 65   Temp 98.2 °F (36.8 °C)   Resp 16   Ht 5' 7\" (1.702 m)   Wt 75.6 kg (166 lb 9.6 oz)   LMP 10/31/2023   BMI 26.09 kg/m²   Body mass index is 26.09 kg/m².  Labs:   Recent Results (from the past 24 hour(s))   CBC and differential    Collection Time: 04/30/24  9:55 AM   Result Value Ref Range    WBC 6.37 4.31 - 10.16 Thousand/uL    RBC 3.09 (L) 3.81 - 5.12 Million/uL    Hemoglobin 9.0 (L) 11.5 - 15.4 g/dL    Hematocrit 28.1 (L) 34.8 - 46.1 %    MCV 91 82 - 98 fL    MCH 29.1 26.8 - 34.3 pg    MCHC 32.0 31.4 - 37.4 g/dL    RDW 19.7 (H) 11.6 - 15.1 %    MPV 11.4 8.9 - 12.7 fL    Platelets 99 (L) 149 - 390 Thousands/uL    nRBC 0 /100 WBCs    Segmented % 75 43 - 75 %    Immature Grans % 2 0 - 2 %    Lymphocytes % 12 (L) 14 - 44 %    Monocytes % 9 4 - 12 %    Eosinophils Relative 2 0 - 6 %    Basophils Relative 0 0 - 1 %    Absolute Neutrophils 4.77 1.85 - 7.62 Thousands/µL    Absolute Immature Grans 0.14 0.00 - 0.20 Thousand/uL    Absolute Lymphocytes 0.77 0.60 - 4.47 Thousands/µL    Absolute Monocytes " "0.55 0.17 - 1.22 Thousand/µL    Eosinophils Absolute 0.12 0.00 - 0.61 Thousand/µL    Basophils Absolute 0.02 0.00 - 0.10 Thousands/µL   Comprehensive metabolic panel    Collection Time: 04/30/24  9:55 AM   Result Value Ref Range    Sodium 136 135 - 147 mmol/L    Potassium 3.9 3.5 - 5.3 mmol/L    Chloride 107 96 - 108 mmol/L    CO2 22 21 - 32 mmol/L    ANION GAP 7 4 - 13 mmol/L    BUN 5 5 - 25 mg/dL    Creatinine 0.46 (L) 0.60 - 1.30 mg/dL    Glucose 79 65 - 140 mg/dL    Glucose, Fasting 79 65 - 99 mg/dL    Calcium 8.7 8.4 - 10.2 mg/dL    AST 32 13 - 39 U/L    ALT 53 (H) 7 - 52 U/L    Alkaline Phosphatase 64 34 - 104 U/L    Total Protein 6.6 6.4 - 8.4 g/dL    Albumin 3.6 3.5 - 5.0 g/dL    Total Bilirubin 0.24 mg/dL    eGFR 129 ml/min/1.73sq m   Protein / creatinine ratio, urine    Collection Time: 04/30/24  9:55 AM   Result Value Ref Range    Creatinine, Ur 139.5 Reference range not established. mg/dL    Protein Urine Random 13 Reference range not established. mg/dL    Prot/Creat Ratio, Ur 0.09 0.00 - 0.10   Smear Review(Phlebs Do Not Order)    Collection Time: 04/30/24  9:55 AM   Result Value Ref Range    RBC Morphology Present     Platelet Estimate Decreased (A) Adequate    Anisocytosis Present    Fingerstick Glucose (POCT)    Collection Time: 04/30/24  1:01 PM   Result Value Ref Range    POC Glucose 71 65 - 140 mg/dl         Nae Johnson MD  OB/GYN PGY-1  4/30/2024        Portions of the record may have been created with voice recognition software.  Occasional wrong word or \"sound a like\" substitutions may have occurred due to the inherent limitations of voice recognition software.  Read the chart carefully and recognize, using context, where substitutions have occurred    "

## 2024-05-01 ENCOUNTER — HOSPITAL ENCOUNTER (OUTPATIENT)
Dept: INFUSION CENTER | Facility: CLINIC | Age: 34
Discharge: HOME | End: 2024-05-01
Payer: COMMERCIAL

## 2024-05-01 VITALS
SYSTOLIC BLOOD PRESSURE: 112 MMHG | HEART RATE: 81 BPM | TEMPERATURE: 97.6 F | RESPIRATION RATE: 17 BRPM | DIASTOLIC BLOOD PRESSURE: 68 MMHG

## 2024-05-01 DIAGNOSIS — R74.01 TRANSAMINITIS: Primary | ICD-10-CM

## 2024-05-01 DIAGNOSIS — E86.0 DEHYDRATION: Primary | ICD-10-CM

## 2024-05-01 DIAGNOSIS — O99.119 THROMBOCYTOPENIA AFFECTING PREGNANCY, ANTEPARTUM (HCC): ICD-10-CM

## 2024-05-01 DIAGNOSIS — D69.6 THROMBOCYTOPENIA AFFECTING PREGNANCY, ANTEPARTUM (HCC): ICD-10-CM

## 2024-05-01 PROCEDURE — 96360 HYDRATION IV INFUSION INIT: CPT

## 2024-05-01 RX ADMIN — SODIUM CHLORIDE 1000 ML: 0.9 INJECTION, SOLUTION INTRAVENOUS at 08:37

## 2024-05-01 NOTE — PROGRESS NOTES
Standing orders for CBC/CMP placed- once per week for 4 weeks.     Patient aware of testing frequency, verbalized understanding.

## 2024-05-01 NOTE — PROGRESS NOTES
Patient to clinic for Hydration. Offers no complaints today.Port accessed with positive blood return noted throughout treatment.  Tolerated infusion without complications. Port De-accessed and flushed.  Aware of next appointment on 5/3/24 at 9:30 am.  AVS declined.

## 2024-05-03 ENCOUNTER — HOSPITAL ENCOUNTER (OUTPATIENT)
Dept: INFUSION CENTER | Facility: CLINIC | Age: 34
Discharge: HOME | End: 2024-05-03
Payer: COMMERCIAL

## 2024-05-03 VITALS
DIASTOLIC BLOOD PRESSURE: 64 MMHG | RESPIRATION RATE: 18 BRPM | SYSTOLIC BLOOD PRESSURE: 106 MMHG | HEART RATE: 87 BPM | TEMPERATURE: 96.5 F

## 2024-05-03 DIAGNOSIS — E86.0 DEHYDRATION: Primary | ICD-10-CM

## 2024-05-03 PROCEDURE — 96360 HYDRATION IV INFUSION INIT: CPT

## 2024-05-03 RX ADMIN — SODIUM CHLORIDE 1000 ML: 0.9 INJECTION, SOLUTION INTRAVENOUS at 09:55

## 2024-05-03 NOTE — PROGRESS NOTES
Pt presents for IV hydration, offering no complaints. Pt tolerated treatment without incident. AVS declined, next appointment reviewed on 5/6 at 10am.

## 2024-05-06 ENCOUNTER — TELEPHONE (OUTPATIENT)
Dept: NUTRITION | Facility: CLINIC | Age: 34
End: 2024-05-06

## 2024-05-06 ENCOUNTER — NURSE TRIAGE (OUTPATIENT)
Age: 34
End: 2024-05-06

## 2024-05-06 ENCOUNTER — APPOINTMENT (OUTPATIENT)
Dept: MRI IMAGING | Facility: HOSPITAL | Age: 34
End: 2024-05-06
Payer: COMMERCIAL

## 2024-05-06 ENCOUNTER — HOSPITAL ENCOUNTER (OUTPATIENT)
Facility: HOSPITAL | Age: 34
Discharge: HOME | End: 2024-05-06
Attending: OBSTETRICS & GYNECOLOGY | Admitting: OBSTETRICS & GYNECOLOGY
Payer: COMMERCIAL

## 2024-05-06 ENCOUNTER — APPOINTMENT (OUTPATIENT)
Dept: ULTRASOUND IMAGING | Facility: HOSPITAL | Age: 34
End: 2024-05-06
Payer: COMMERCIAL

## 2024-05-06 VITALS
RESPIRATION RATE: 16 BRPM | HEART RATE: 72 BPM | DIASTOLIC BLOOD PRESSURE: 56 MMHG | SYSTOLIC BLOOD PRESSURE: 101 MMHG | TEMPERATURE: 98.9 F

## 2024-05-06 PROBLEM — R10.31 RLQ ABDOMINAL PAIN: Status: ACTIVE | Noted: 2024-05-06

## 2024-05-06 LAB
ALBUMIN SERPL BCP-MCNC: 3.8 G/DL (ref 3.5–5)
ALP SERPL-CCNC: 71 U/L (ref 34–104)
ALT SERPL W P-5'-P-CCNC: 67 U/L (ref 7–52)
ANION GAP SERPL CALCULATED.3IONS-SCNC: 10 MMOL/L (ref 4–13)
AST SERPL W P-5'-P-CCNC: 43 U/L (ref 13–39)
BACTERIA UR QL AUTO: ABNORMAL /HPF
BASOPHILS # BLD AUTO: 0.02 THOUSANDS/ÂΜL (ref 0–0.1)
BASOPHILS NFR BLD AUTO: 0 % (ref 0–1)
BILIRUB SERPL-MCNC: 0.28 MG/DL (ref 0.2–1)
BILIRUB UR QL STRIP: NEGATIVE
BUN SERPL-MCNC: 5 MG/DL (ref 5–25)
CALCIUM SERPL-MCNC: 8.9 MG/DL (ref 8.4–10.2)
CHLORIDE SERPL-SCNC: 104 MMOL/L (ref 96–108)
CLARITY UR: CLEAR
CO2 SERPL-SCNC: 21 MMOL/L (ref 21–32)
COLOR UR: ABNORMAL
CREAT SERPL-MCNC: 0.47 MG/DL (ref 0.6–1.3)
EOSINOPHIL # BLD AUTO: 0.69 THOUSAND/ÂΜL (ref 0–0.61)
EOSINOPHIL NFR BLD AUTO: 8 % (ref 0–6)
ERYTHROCYTE [DISTWIDTH] IN BLOOD BY AUTOMATED COUNT: 19.8 % (ref 11.6–15.1)
FIBRONECTIN FETAL VAG QL: NEGATIVE
GFR SERPL CREATININE-BSD FRML MDRD: 128 ML/MIN/1.73SQ M
GLUCOSE SERPL-MCNC: 81 MG/DL (ref 65–140)
GLUCOSE UR STRIP-MCNC: NEGATIVE MG/DL
HCT VFR BLD AUTO: 30.5 % (ref 34.8–46.1)
HGB BLD-MCNC: 9.8 G/DL (ref 11.5–15.4)
HGB UR QL STRIP.AUTO: NEGATIVE
IMM GRANULOCYTES # BLD AUTO: 0.16 THOUSAND/UL (ref 0–0.2)
IMM GRANULOCYTES NFR BLD AUTO: 2 % (ref 0–2)
KETONES UR STRIP-MCNC: NEGATIVE MG/DL
LEUKOCYTE ESTERASE UR QL STRIP: ABNORMAL
LYMPHOCYTES # BLD AUTO: 0.72 THOUSANDS/ÂΜL (ref 0.6–4.47)
LYMPHOCYTES NFR BLD AUTO: 9 % (ref 14–44)
MCH RBC QN AUTO: 29.3 PG (ref 26.8–34.3)
MCHC RBC AUTO-ENTMCNC: 32.1 G/DL (ref 31.4–37.4)
MCV RBC AUTO: 91 FL (ref 82–98)
MONOCYTES # BLD AUTO: 0.58 THOUSAND/ÂΜL (ref 0.17–1.22)
MONOCYTES NFR BLD AUTO: 7 % (ref 4–12)
NEUTROPHILS # BLD AUTO: 6.04 THOUSANDS/ÂΜL (ref 1.85–7.62)
NEUTS SEG NFR BLD AUTO: 74 % (ref 43–75)
NITRITE UR QL STRIP: NEGATIVE
NON-SQ EPI CELLS URNS QL MICRO: ABNORMAL /HPF
NRBC BLD AUTO-RTO: 0 /100 WBCS
PH UR STRIP.AUTO: 6.5 [PH]
PLATELET # BLD AUTO: 121 THOUSANDS/UL (ref 149–390)
PMV BLD AUTO: 12.3 FL (ref 8.9–12.7)
POTASSIUM SERPL-SCNC: 3.7 MMOL/L (ref 3.5–5.3)
PROT SERPL-MCNC: 7 G/DL (ref 6.4–8.4)
PROT UR STRIP-MCNC: NEGATIVE MG/DL
RBC # BLD AUTO: 3.35 MILLION/UL (ref 3.81–5.12)
RBC #/AREA URNS AUTO: ABNORMAL /HPF
SODIUM SERPL-SCNC: 135 MMOL/L (ref 135–147)
SP GR UR STRIP.AUTO: 1.01 (ref 1–1.03)
UROBILINOGEN UR STRIP-ACNC: <2 MG/DL
WBC # BLD AUTO: 8.21 THOUSAND/UL (ref 4.31–10.16)
WBC #/AREA URNS AUTO: ABNORMAL /HPF

## 2024-05-06 PROCEDURE — 76705 ECHO EXAM OF ABDOMEN: CPT

## 2024-05-06 PROCEDURE — 76817 TRANSVAGINAL US OBSTETRIC: CPT | Performed by: STUDENT IN AN ORGANIZED HEALTH CARE EDUCATION/TRAINING PROGRAM

## 2024-05-06 PROCEDURE — 85025 COMPLETE CBC W/AUTO DIFF WBC: CPT

## 2024-05-06 PROCEDURE — G0463 HOSPITAL OUTPT CLINIC VISIT: HCPCS

## 2024-05-06 PROCEDURE — 99214 OFFICE O/P EST MOD 30 MIN: CPT | Performed by: STUDENT IN AN ORGANIZED HEALTH CARE EDUCATION/TRAINING PROGRAM

## 2024-05-06 PROCEDURE — 99214 OFFICE O/P EST MOD 30 MIN: CPT

## 2024-05-06 PROCEDURE — 87086 URINE CULTURE/COLONY COUNT: CPT

## 2024-05-06 PROCEDURE — 80053 COMPREHEN METABOLIC PANEL: CPT

## 2024-05-06 PROCEDURE — 72148 MRI LUMBAR SPINE W/O DYE: CPT

## 2024-05-06 PROCEDURE — 82731 ASSAY OF FETAL FIBRONECTIN: CPT | Performed by: STUDENT IN AN ORGANIZED HEALTH CARE EDUCATION/TRAINING PROGRAM

## 2024-05-06 PROCEDURE — 76817 TRANSVAGINAL US OBSTETRIC: CPT

## 2024-05-06 PROCEDURE — 96360 HYDRATION IV INFUSION INIT: CPT

## 2024-05-06 PROCEDURE — 81001 URINALYSIS AUTO W/SCOPE: CPT

## 2024-05-06 RX ORDER — ONDANSETRON 2 MG/ML
4 INJECTION INTRAMUSCULAR; INTRAVENOUS EVERY 6 HOURS PRN
Status: DISCONTINUED | OUTPATIENT
Start: 2024-05-06 | End: 2024-05-06 | Stop reason: HOSPADM

## 2024-05-06 RX ORDER — ACETAMINOPHEN 325 MG/1
975 TABLET ORAL EVERY 6 HOURS PRN
Status: DISCONTINUED | OUTPATIENT
Start: 2024-05-06 | End: 2024-05-06 | Stop reason: HOSPADM

## 2024-05-06 RX ADMIN — ACETAMINOPHEN 975 MG: 325 TABLET, FILM COATED ORAL at 18:53

## 2024-05-06 RX ADMIN — SODIUM CHLORIDE, SODIUM LACTATE, POTASSIUM CHLORIDE, AND CALCIUM CHLORIDE 1000 ML: .6; .31; .03; .02 INJECTION, SOLUTION INTRAVENOUS at 12:13

## 2024-05-06 RX ADMIN — CEFTRIAXONE SODIUM 1000 MG: 10 INJECTION, POWDER, FOR SOLUTION INTRAVENOUS at 14:59

## 2024-05-06 RX ADMIN — ONDANSETRON 4 MG: 2 INJECTION INTRAMUSCULAR; INTRAVENOUS at 12:13

## 2024-05-06 NOTE — PROCEDURES
Jillian Arellano Dima, gretchen  at 25w6d with an BALTAZAR of 2024, by Ultrasound, was seen at [facility] for the following procedure(s): $Procedure Type: US - Transvaginal]                   Ultrasound Other  Fetal Presentation: Vertex  Cervical Length: 2.34  Funnel: No  Placenta Previa: No  Vasa Previa: No           Emily Vick MD  24  6:41 PM

## 2024-05-06 NOTE — PROGRESS NOTES
L&D Triage Note - OB/GYN  Jillian Ch 33 y.o. female MRN: [id number]  Unit/Bed#: LD TRIAGE  Encounter: [id number]      ASSESSMENT:    Jillian Ch is a 33 y.o.  at 25w6d presenting with right lower quadrant pain.   labor workup negative, cervical length 2.3 cm, FFN negative, cervix closed, thick, high.  Urinalysis with large leukocytes, empiric treatment initiated with 1 g ceftriaxone.  Right lower quadrant ultrasound completed with no secondary signs of appendicitis.  Differential of pain includes intra-abdominal process such as acute appendicitis, gastroenteritis, cystitis/pyelonephritis,  labor.  Plan to obtain MRI imaging to better elucidate source of patient's acute pain.  Pain medications including morphine offered, patient declines at this time.  Fetal monitoring reassuring for gestational age. Sign out to night team for ultimate disposition.    PLAN:    1) RLQ abdominal pain  - labor workup negative; cervix marginally short, FFN negative  -SVE 0/0/-5  -Urinalysis with large leukocytes; treat with ceftriaxone  -Urine culture pending  -CBC with WBC 8, stable anemia, stable mild transaminitis  -Ultrasound with no visualization of appendix, though no secondary signs  -Plan MRI A/P    Discussed with Dr. Donaldson    Sign out to night team for disposition    SUBJECTIVE:    Jillian Ch 33 y.o.  at 25w6d with an Estimated Date of Delivery: 24 presenting with acute onset right lower quadrant abdominal pain, nausea, loss of appetite, subjective chills.  She has had several other episodes of pain during this pregnancy that have spontaneously resolved, but she reports this pain is currently severe.  She describes the pain as sharp, constant, and does not feel that it radiates anywhere.  Of note she has a history of breast cancer and received several cycles of treatment during her pregnancy which was discontinued until she is  postpartum.  She also had a mastectomy in early January and had a DVT 3 days after the surgery for which she is currently on therapeutic Lovenox.  She denies dysuria, change in vaginal discharge, constipation, diarrhea.    Her current obstetrical history is significant for breast cancer s/p mastectomy and chemotherapy    Her past obstetrical history is significant for SAB    Contractions: absent  Leakage of fluid: absent  Vaginal Bleeding: absent  Fetal movement: present    OBJECTIVE:    Vitals:    05/06/24 1056   BP: 114/71   Pulse: 100   Resp: 16   Temp: 98.6 °F (37 °C)       ROS:  Constitutional: Loss of appetite, chills  Respiratory: Negative  Cardiovascular: Negative    Gastrointestinal: Nausea    General Physical Exam:  General: uncomfortable, ill appearing  Cardiovascular: regular rate and rhythm  Lungs: non-labored breathing, lungs clear to auscultation bilaterally  Abdomen: abdomen is soft, very tender to palpation in right lower quadrant, voluntary guarding present, no rebound tenderness, negative straight leg raise, negative Rosvig's sign, negative CVA tenderness    Cervical Exam  Speculum: Cervical os is close  SVE: 0 / 0% / -5    Fetal monitoring:  FHT:  150 bpm/ Moderate 6 - 25 bpm / 10 x 10 accelerations present, absent decelerations  [place]: contractions absent     KOH/WTMT:     Infection:   - negative clue cells    - negative hyphae   - negative trichomonads present    Membrane status   - negative ferning   - negative nitrazine   - negative pooling     Urine Dip    - large leukocytes    Imaging:       TVUS   - Cervical length    - 2.28cm    - 2.34cm    - 2.71cm   - Presentation: cephalic    RLQ ultrasound: Appendix not viewed, no secondary signs of appendicitis visualized    Emily Vick MD  OBGYN PGY-1  5/6/2024 11:54 AM

## 2024-05-06 NOTE — QUICK NOTE
Patient MRI came back showing normal appendix. Patient reports IV fluids helped her pain earlier and she has not taken anything for her pain yet. Amenable to trying Tylenol. Discussed with patient that possible cause of her pain could be round ligament pain and that we are reassured it is not appendicitis or  labor. Suggested rest, warm bath, abdominal band for symptom relief. Return precautions given including worsening of her pain, contractions, leakage of fluid. Safe for discharge to home with routine follow-up.     Discussed with Dr. Lujan

## 2024-05-06 NOTE — TELEPHONE ENCOUNTER
"Patient is 25w6d .  She reports having right sided lower abdomen pain that woke her up this morning.  She denies any fevers, bleeding.  Confirms +FM.  She states a month ago she had the same pain at 16 weeks as well.   Advised the patient to proceed to the L&D unit of [facility] to be further evaluated.      Tiger text sent to on call provider, Dr. Donalsdon and agrees with above recommendations.      Notified patient to confirm that she should report to L&D unit. She verbalized understanding.      Tiger text sent to charge nurse of L&D unit of [facility] to make aware of patient's arrival.      Reason for Disposition   MODERATE-SEVERE abdominal pain (e.g., interferes with normal activities, awakens from sleep)    Answer Assessment - Initial Assessment Questions  1. LOCATION: \"Where does it hurt?\"       Lower right sided abdomen pain   2. RADIATION: \"Does the pain shoot anywhere else?\" (e.g., chest, back)      denies  3. ONSET: \"When did the pain begin?\" (Minutes, hours or days ago)       Woke up with pain this morning   4. ONSET: \"Gradual or sudden onset?\"      Gradual   5. PATTERN: \"Does the pain come and go, or has it been constant since it started?\"       Constant   6. SEVERITY: \"How bad is the pain?\" \"What does it keep you from doing?\"  (e.g., Scale 1-10; mild, moderate, or severe)    - MILD (1-3): doesn't interfere with normal activities, abdomen soft and not tender to touch     - MODERATE (4-7): interferes with normal activities or awakens from sleep, tender to touch     - SEVERE (8-10): excruciating pain, doubled over, unable to do any normal activities      5/10  7. RECURRENT SYMPTOM: \"Have you ever had this type of stomach pain before?\" If Yes, ask: \"When was the last time?\" and \"What happened that time?\"       A month ago had the same kind of pain on right side   8. CAUSE: \"What do you think is causing the stomach pain?      Unknown   9. RELIEVING/AGGRAVATING FACTORS: \"What makes it better or worse?\" " "(e.g., antacids, bowel movement, movement)      Movement makes it worse   10. FETAL MOVEMENT: \"Has the baby's movement decreased or changed significantly from normal?\"        +Fm   11. OTHER SYMPTOMS: \"Has there been any vaginal bleeding, fever, vomiting, diarrhea, or urine problems?\"        Denies   12. BALTAZAR: \"What date are you expecting to deliver?\"        8/13/24    Protocols used: Pregnancy - Abdominal Pain Greater Than 20 Weeks EGA-ADULT-OH    "

## 2024-05-06 NOTE — TELEPHONE ENCOUNTER
Jillian was scheduled for oncology nutrition follow up today during infusion. Apt cancelled due to hospitalization.

## 2024-05-07 ENCOUNTER — OFFICE VISIT (OUTPATIENT)
Facility: HOSPITAL | Age: 34
End: 2024-05-07
Attending: STUDENT IN AN ORGANIZED HEALTH CARE EDUCATION/TRAINING PROGRAM
Payer: COMMERCIAL

## 2024-05-07 VITALS
HEIGHT: 67 IN | HEART RATE: 92 BPM | BODY MASS INDEX: 26.63 KG/M2 | WEIGHT: 169.7 LBS | DIASTOLIC BLOOD PRESSURE: 62 MMHG | SYSTOLIC BLOOD PRESSURE: 110 MMHG

## 2024-05-07 DIAGNOSIS — Z34.82 PRENATAL CARE, SUBSEQUENT PREGNANCY, SECOND TRIMESTER: Primary | ICD-10-CM

## 2024-05-07 DIAGNOSIS — Z3A.26 26 WEEKS GESTATION OF PREGNANCY: ICD-10-CM

## 2024-05-07 PROCEDURE — G0108 DIAB MANAGE TRN  PER INDIV: HCPCS | Performed by: DIETITIAN, REGISTERED

## 2024-05-07 RX ORDER — LANCETS 33 GAUGE
EACH MISCELLANEOUS
Qty: 100 EACH | Refills: 0 | Status: SHIPPED | OUTPATIENT
Start: 2024-05-07 | End: 2024-08-13

## 2024-05-07 RX ORDER — BLOOD SUGAR DIAGNOSTIC
STRIP MISCELLANEOUS
Qty: 100 STRIP | Refills: 0 | Status: SHIPPED | OUTPATIENT
Start: 2024-05-07 | End: 2024-08-13

## 2024-05-07 RX ORDER — BLOOD-GLUCOSE METER
EACH MISCELLANEOUS
Qty: 1 KIT | Refills: 0 | Status: SHIPPED | OUTPATIENT
Start: 2024-05-07

## 2024-05-07 NOTE — PROGRESS NOTES
"Meter Education  (in-person office visit )    Thank you for referring your patient to [facility] Diabetes and Pregnancy Program.     Jillian Ch is a  33 y.o. female who presents today alone for Meter Education.    Patient is at 25w6d gestation, Estimated Date of Delivery: 24. History significant for breast cancer s/p mastectomy (24), chemotherapy - meets with oncology dietitian for nutrition counseling; degenerative Disc Disease    Reviewed and updated the following from patients medical record: Allergies, and Current Medications.    Visit Diagnosis:  Encounter Diagnosis     ICD-10-CM    1. Prenatal care, subsequent pregnancy, second trimester  Z34.82 Ambulatory Referral to Maternal Fetal Medicine      2. 26 weeks gestation of pregnancy  Z3A.26            Reason for meter education: 1 Week of SMBG to Rule Out GDM; Patient states \"My doctors don't want me to drink Glucola after undergoing chemotherapy.\"    Labs  GDM LABS:  1 Hour GTT:   No results found for: \"[id number]\"   3 Hour GTT:   Lab Results   Component Value Date/Time    GLUF 79 2024 09:55 AM        A1C:  Lab Results   Component Value Date/Time    HGBA1C 5.4 2023 09:02 AM    HGBA1C 5.1 2023 11:24 AM        Labs Ordered This Visit: None    Current Medications  Current Outpatient Medications   Medication Instructions    albuterol (PROVENTIL HFA,VENTOLIN HFA) 90 mcg/act inhaler TAKE 2 PUFFS BY MOUTH EVERY 6 HOURS AS NEEDED FOR WHEEZE OR FOR SHORTNESS OF BREATH    cetirizine (ZYRTEC) 10 mg, Oral, Daily    enoxaparin (LOVENOX) 80 mg/0.8 mL Inject 75mg SC every 12 hours    lidocaine-prilocaine (EMLA) cream Apply to port 30 to 60 min prior to use    omeprazole (PRILOSEC) 40 mg, Oral, Daily    ondansetron (ZOFRAN) 8 mg, Oral, Every 8 hours PRN    Prenatal Multivit-Min-Fe-FA (PRE-SONIA PO) 1 tablet, Oral, Daily        Preferred Pharmacy:   [id number] [address] [address]  [facility] [address]  Phone: [phone] Fax: [phone]    [facility] - [place], [place] - [address]  [address] [address]  Phone: [phone] Fax: [phone]       Anthropometrics:  Ht Readings from Last 1 Encounters:   05/07/24 5' 7\" (1.702 m)      Wt Readings from Last 3 Encounters:   05/07/24 77 kg (169 lb 11.2 oz)   05/06/24 75.3 kg (166 lb)   04/30/24 75.6 kg (166 lb 9.6 oz)   * Pregravid Weight/BMI: 78 kg (172 lb) (BMI 26.93)     Pre-Gravid Wt Pre-Gravid BMI TWG   78 kg (172 lb) 26.93 -1.043 kg (-2 lb 4.8 oz)     Total Pregnancy Weight Gain Recommendations: BMI (25-29.9) 15-25 lbs  Current Wt Status Compared to Recommendations: Less Than -- Recommended to avoid additional weight loss till delivery    Most Recent Ultrasound Results:  Findings: NML Growth/VICTORINO - 3/28/24  Further Fetal Surveillance: None  Next US date: Scheduled Appropriately - 5/23/24    Blood Glucose Monitoring:     Glucose Meter: OneTouch Verio Flex  *orders for supplies (meter, lancets, strips) based on patients needs pended/routed to appropriate provider after today's visit for review/approval     Instructed on Testing Blood Sugars: 4 x per day (Fasting, 2 hour after start of each meal) - Wakes up at 6:30-7:30am; Typically goes to bed around 8-9pm (Last meal typically 5-6pm)  Goals: (Fasting) 60-90mg/dl // (1hr PP) <140mg/dl // (2hr PP) <120mg/dl  Meter Teaching: Gave instruction on site selection, skin preparation, loading strips and lancet device, meter activation, obtaining blood sample, test strip and lancet disposal and storage, and recording log book entries.   Blood Sugar Tested in Office? No due to no test strips available  Instructed to write down blood sugars and report blood sugars after 1 week via MyChart message to the OB to determine possible GDM Dx     Date to Report Blood Sugars: 1 Week   *Pt was instructed to report blood " glucose log to OB to determine possible GDM dx via KienVe Message    Follow-Up: Return Pending review of 7 day blood sugar log and diagnosis of Gestational Diabetes.     Begin Time: 8:29am  End Time: 8:55am    It was a pleasure working with them today. Please feel free to call with any questions or concerns.    Jen Hoffmann   Diabetes Educator  [facility]aternal Fetal Medicine  Diabetes and Pregnancy Program

## 2024-05-07 NOTE — PATIENT INSTRUCTIONS
Meter Education  Diabetes and Pregnancy Program   [facility] - Maternal Fetal Medicine    Diabetes Team:   JOSE Hadley) KEVIN Jauregui CDE MS Vanessa (Eleonora) KEVIN Pleitez MS  Jen Hoffmann, MPH, RDN, LDN, CDCES    Purpose of Testing: Rule out gestational diabetes  Report blood sugars in 1 week to your OBGYN for review and final determination of GDM.   If you are gestational diabetic, your OBGYN will place a referral for our program.   Continue to check your blood sugars until otherwise instructed by your OBGYN and/or diabetes team.     Prescription for Glucometer, Testing Strips, and Lancets:  A request for a glucose meter, test strips and lancets was sent to the provider for approval.   Once your prescriptions are approved by the provider, your prescription will be sent electronically to your pharmacy.   Be mindful the provider is seeing patients in the office today so allow ample time for your prescriptions to be approved.   Call your pharmacy to verify your medication was received electronically and is ready for pick-up before going to pharmacy.   If you have any issues with coverage or your meter is unavailable, please reach out to our office at [phone].     CHECKING BLOOD SUGARS    Always carry your meter and testing supplies with you at all times.     How to Check Blood Sugars:  Wash your hands with soap and water or use waterless  to clean your hands.  Alcohol can dry your fingers and is not recommended.  Alcohol can also cause false, elevated glucose readings.  AVOID scented soaps and hand sanitizers - scented soaps may include sugar which can cause inaccurate/elevated readings   Gather your glucose meter kit and supplies.  Prepare your meter by inserting a new test strip.   This will automatically turn on your meter  Make sure test strips are not .  Use a new test strip each time.   Prepare your lancing device by inserting the lancet               After the  "lancet is securely in place, twist off the top to reveal needle.   Reattach lancing device top   Once lancing device top is reattached, you are now ready to prick the side of your fingertip to get a blood sample.  You can adjust the depth setting as needed. We recommend to start at \"4\".   Apply the blood sample to test strip according to instructions.   Make sure your sharp container is: heavy duty plastic, puncture-resistant lid, upright and stable, leak resistant, properly labeled.   Record your blood sugar     Additional References and Video: https://www.Say-Hey/products/glucose-meters/onetouch-verio-flex    Frequency: 4 Times Daily     Timing:   Fasting   Test when you wake up before you have anything to eat or drink  At least 8hrs but no more than 10hrs from your bedtime snack  Exceeding 10hrs may result in inaccurate readings  2 hrs after the first bite of your meal (breakfast, lunch, dinner) **do not test for snacks    Goals:    Fasting  60-95   2 Hours   After Meals <120   *please inform member of diabetes team if you begin testing 1hr blood sugars    REPORTING BLOOD SUGARS:   Report blood sugars in 1 week to your OBGYN for review and final determination of GDM.   Keep written log of blood sugars:     Date Fasting After Breakfast After Lunch After Dinner                                                       If you are gestational diabetic, your OBGYN will place a referral for our program (Diabetes and Pregnancy Program).   Continue to check your blood sugars until otherwise instructed by your OBGYN and/or diabetes team.     Any questions give us a call at [phone] or send us a Adex[employer] message to JOSE Hadley.     Jen Hoffmann   Diabetes Educator   The Diabetes and Pregnancy Program  At [facility] - Maternal Fetal Medicine   "

## 2024-05-08 ENCOUNTER — HOSPITAL ENCOUNTER (OUTPATIENT)
Dept: INFUSION CENTER | Facility: CLINIC | Age: 34
Discharge: HOME | End: 2024-05-08
Payer: COMMERCIAL

## 2024-05-08 VITALS — TEMPERATURE: 98.5 F | RESPIRATION RATE: 15 BRPM | HEART RATE: 88 BPM

## 2024-05-08 DIAGNOSIS — E86.0 DEHYDRATION: Primary | ICD-10-CM

## 2024-05-08 LAB
ALBUMIN SERPL BCP-MCNC: 3.6 G/DL (ref 3.5–5)
ALP SERPL-CCNC: 64 U/L (ref 34–104)
ALT SERPL W P-5'-P-CCNC: 53 U/L (ref 7–52)
ANION GAP SERPL CALCULATED.3IONS-SCNC: 7 MMOL/L (ref 4–13)
AST SERPL W P-5'-P-CCNC: 32 U/L (ref 13–39)
BACTERIA UR CULT: ABNORMAL
BILIRUB SERPL-MCNC: 0.24 MG/DL (ref 0.2–1)
BUN SERPL-MCNC: 5 MG/DL (ref 5–25)
CALCIUM SERPL-MCNC: 8.7 MG/DL (ref 8.4–10.2)
CHLORIDE SERPL-SCNC: 107 MMOL/L (ref 96–108)
CO2 SERPL-SCNC: 22 MMOL/L (ref 21–32)
CREAT SERPL-MCNC: 0.46 MG/DL (ref 0.6–1.3)
GFR SERPL CREATININE-BSD FRML MDRD: 129 ML/MIN/1.73SQ M
GLUCOSE P FAST SERPL-MCNC: 79 MG/DL (ref 65–99)
GLUCOSE SERPL-MCNC: 79 MG/DL (ref 65–140)
POTASSIUM SERPL-SCNC: 3.9 MMOL/L (ref 3.5–5.3)
PROT SERPL-MCNC: 6.6 G/DL (ref 6.4–8.4)
SODIUM SERPL-SCNC: 136 MMOL/L (ref 135–147)

## 2024-05-08 RX ADMIN — SODIUM CHLORIDE 1000 ML: 0.9 INJECTION, SOLUTION INTRAVENOUS at 09:52

## 2024-05-08 RX ADMIN — ONDANSETRON 8 MG: 2 INJECTION INTRAMUSCULAR; INTRAVENOUS at 09:59

## 2024-05-08 NOTE — PROGRESS NOTES
Pt here for hydration, offering no complaints.  R PAC accessed with positive blood return noted.  Tolerated infusion without incident.  R PAC de accessed.  AVS declined. Next appt 5/10 8:30. Walked out in stable condition.

## 2024-05-10 ENCOUNTER — HOSPITAL ENCOUNTER (OUTPATIENT)
Dept: INFUSION CENTER | Facility: CLINIC | Age: 34
End: 2024-05-10
Payer: COMMERCIAL

## 2024-05-10 ENCOUNTER — OFFICE VISIT (OUTPATIENT)
Dept: HEMATOLOGY ONCOLOGY | Facility: CLINIC | Age: 34
End: 2024-05-10
Payer: COMMERCIAL

## 2024-05-10 VITALS
RESPIRATION RATE: 18 BRPM | SYSTOLIC BLOOD PRESSURE: 103 MMHG | DIASTOLIC BLOOD PRESSURE: 70 MMHG | HEART RATE: 84 BPM | TEMPERATURE: 96.9 F

## 2024-05-10 VITALS
DIASTOLIC BLOOD PRESSURE: 74 MMHG | OXYGEN SATURATION: 100 % | BODY MASS INDEX: 27.28 KG/M2 | WEIGHT: 173.8 LBS | HEIGHT: 67 IN | RESPIRATION RATE: 17 BRPM | SYSTOLIC BLOOD PRESSURE: 128 MMHG | TEMPERATURE: 97.3 F | HEART RATE: 109 BPM

## 2024-05-10 DIAGNOSIS — D50.8 IRON DEFICIENCY ANEMIA SECONDARY TO INADEQUATE DIETARY IRON INTAKE: Primary | ICD-10-CM

## 2024-05-10 DIAGNOSIS — E86.0 DEHYDRATION: Primary | ICD-10-CM

## 2024-05-10 PROCEDURE — 99214 OFFICE O/P EST MOD 30 MIN: CPT | Performed by: INTERNAL MEDICINE

## 2024-05-10 PROCEDURE — 96360 HYDRATION IV INFUSION INIT: CPT

## 2024-05-10 RX ADMIN — SODIUM CHLORIDE 1000 ML: 0.9 INJECTION, SOLUTION INTRAVENOUS at 08:47

## 2024-05-10 NOTE — PROGRESS NOTES
Pt presents for IV hydration, offering no complaints. Pt refused zofran today. Pt tolerated treatment without incident. AVS declined, next appointment reviewed on 5/13 at 8:30am.

## 2024-05-11 NOTE — PROGRESS NOTES
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, ER 80-85% CO 90-95% Her2 1+, han 2, cT2(2.1 cm)N0M0    2023 - left sided mastectomy with SLNBX - [id number] - oncotype 23    2024 - start AC q 3 weeks    3/13/2024 - cycle 2 adriamycin dose reduced to 50 mg/m2 and cytoxan dose reduced by 10% due to N/V    2024 - stop after 3 cycles of AC due to severe nausea and dehydration with coexisting hyperemesis gravidarum     Malignant neoplasm of overlapping sites of left breast in female, estrogen receptor positive (HCC)   2023 Initial Diagnosis    Malignant neoplasm of overlapping sites of left female breast (HCC)     2023 Biopsy    Bilateral breast ultrasound guided biopsy  A. Breast, Left, US Guided Left Breast Biopsy 12:00 6cmfn:  Invasive breast carcinoma of no special type (ductal) with osteoclast-like stromal giant cells  Grade 2  ER 85  CO 95  HER2 1+     B. Breast, Right, US Guided Right Breast Biopsy 10:00 9cmfn:  Benign breast tissue.    In review of the patient’s recent imaging, the left malignancy appears unifocal.  The biopsy-proven carcinoma measured 2.1 cm on ultrasound. Ultrasound of the left axilla was performed on 2023 and showed no suspicious adenopathy.  Recent imaging of the contralateral right breast dated 2023 and 2023 was reviewed and shows no suspicious findings.  The pathology results for the ultrasound-guided core needle biopsy (right breast 10:00, 9 cm from the nipple) are benign and concordant with imaging.     2023 Genetic Testing    [facility]  A total of 36 genes were evaluated, including: APC, TOPHER, BARD 1, BMPR1A, BRCA1, BRCA2, BRIP1, CDH1, CDK4, CKDN2A, CHEK2, DICER1, MLH1, MSH2, MSH6,  MUTYH, NBN, NF1, NTHL1, PALB2, PMS2, PTEN, RAD51C, RECQL, SMAD4, SMARCA4, STK11, TP53, AXIN2, HOXB13, MSH3, POLD1, AND POLE, EPCAM, AND GREM1   Negative result. No pathogenic sequence variants or deletions/duplications identified       12/2/2023 -  Cancer Staged    Staging form: Breast, AJCC 8th Edition  - Clinical stage from 12/2/2023: Stage IB (cT2, cN0, cM0, G2, ER+, MT+, HER2-) - Signed by Elena Cornell MD on 12/13/2023  Stage prefix: Initial diagnosis  Histologic grading system: 3 grade system       1/2/2024 Surgery    Left breast mastectomy with sentinel lymph node biopsy  Invasive Mammary carcinoma of no special type (ductal)   Grade 2  25 mm  Margins negative  0/2 Lymph nodes  Anatomic stage IIA  Prognostic stage IA      2/21/2024 -  Chemotherapy    DOXOrubicin (ADRIAMYCIN), 56.25 mg/m2 = 106 mg (93.8 % of original dose 60 mg/m2), Intravenous, Once, 3 of 3 cycles  Dose modification: 56.25 mg/m2 (original dose 60 mg/m2, Cycle 1, Reason: Other (Must fill in a comment), Comment: max recommended dose), 50 mg/m2 (original dose 60 mg/m2, Cycle 2, Reason: Nausea/Vomiting, Comment: dose reduced to 50 mg/m2 due to n/v)  Administration: 106 mg (2/21/2024), 94 mg (3/13/2024), 94 mg (4/3/2024)  alteplase (CATHFLO), 2 mg, Intracatheter, Every 1 Minute as needed, 3 of 3 cycles  cyclophosphamide (CYTOXAN) IVPB, 600 mg/m2 = 1,128 mg, Intravenous, Once, 3 of 3 cycles  Dose modification: 540 mg/m2 (original dose 600 mg/m2, Cycle 2, Reason: Nausea/Vomiting, Comment: 10% dose reduction due to n/v)  Administration: 1,128 mg (2/21/2024), 1,000 mg (3/13/2024), 1,000 mg (4/3/2024)  fosaprepitant (EMEND) IVPB, 150 mg, Intravenous, Once, 3 of 3 cycles  Administration: 150 mg (2/21/2024), 150 mg (3/13/2024), 150 mg (4/3/2024)     Cancer complicating pregnancy in second trimester   12/14/2023 Initial Diagnosis    Cancer complicating pregnancy in second trimester         Interval History: Patient presents for follow up of  her early-stage ER positive breast cancer with Oncotype of 25.  She is status post 3 cycles of AC.  It was very difficult for her to get through because of her hyperemesis gravidarum.  She is still getting fluids and IV nausea meds 3 times a week in  infusion center.  She is also status status post a course of Venofer for iron deficiency.  She is still having a lot of acid reflux.  She is in week 26 of her pregnancy and her due date is August 13.  She denies any fevers.  She was recently treated for UTI.  She was also found to have sludge in her gallbladder.  The patient has expressed a desire to freeze her eggs after she gives birth.  She is unsure how feasible breast-feeding will be after her left-sided mastectomy.  She is not sure if she will be able to make enough milk to sustain it.  She is eating and drinking a lot better and overall does feel better since stopping chemotherapy.         REVIEW OF SYSTEMS:  Please note that a 14-point review of systems was performed to include Constitutional, HEENT, Respiratory, CVS, GI, , Musculoskeletal, Integumentary, Neurologic, Rheumatologic, Endocrinologic, Psychiatric, Lymphatic, and Hematologic/Oncologic systems were reviewed and are negative unless otherwise stated in HPI. Positive and negative findings pertinent to this evaluation are incorporated into the history of present illness.      ECOG PS: 0    PROBLEM LIST:  Patient Active Problem List   Diagnosis    Mild persistent asthma without complication    Axillary hidradenitis suppurativa    Anxiety    Chest wall pain    Gastroesophageal reflux disease without esophagitis    Depression with anxiety    Chronic fatigue    Myalgia    Chronic left shoulder pain    Cervical disc disorder at C5-C6 level with radiculopathy    Atypical squamous cells of undetermined significance (ASCUS) on Papanicolaou smear of cervix    Hypertrophic and atrophic condition of skin    Migraine headache    Shoulder impingement syndrome,  left    Vitamin D deficiency    Close exposure to COVID-19 virus    Thoracic outlet syndrome    Palpitations    Post-operative pain    Transaminitis    Right middle lobe pulmonary nodule    Reversible airway obstruction    SOB (shortness of breath)    Musculoskeletal chest pain    COVID-19 virus infection    Unexplained weight gain    Left ovarian cyst    Dysphagia    Malignant neoplasm of overlapping sites of left breast in female, estrogen receptor positive (HCC)    Cancer complicating pregnancy in second trimester    25 weeks gestation of pregnancy    History of left mastectomy    Multigravida of advanced maternal age in first trimester    DVT complicating pregnancy, second trimester    Dehydration    Iron deficiency anemia secondary to inadequate dietary iron intake    Marginal insertion of umbilical cord affecting management of mother    Encounter for follow-up surveillance of breast cancer    Epigastric pain    Thrombocytopenia affecting pregnancy, antepartum (HCC)    RLQ abdominal pain       Assessment / Plan: 33-year-old female with an early-stage ER positive breast cancer with Oncotype 25.  We stopped after 3 cycles of adjuvant AC because of the amount of stress was putting on her body in the setting of her pregnancy and hyperemesis gravidarum.  I told her we can keep the 3 times a week for fluids and nausea medications going as long as she feels they are helping.  I gave her a letter today for her continuing absence from work.  I will touch base with Dr. Granda about her adjuvant endocrine therapy.  Ideally I would like to give her goserelin and aromatase inhibitor if she tolerates the goserelin.  She will obviously need to collect her eggs for her next desired pregnancy first.  I also want to see what effect the goserelin and AI may have on lactation.  There is not a lot of human data on this for obvious reasons, so I would like to involve her OB/GYN team in this discussion.  I am going to see her back in  early July with repeat CBC and iron studies.  If she needs another course of Venofer we can give her that before her due date.       I spent 30 minutes on chart review, face to face counseling time, coordination of care and documentation.    Past Medical History:   has a past medical history of Anesthesia complication, Anxiety, Asthma, CRPS (complex regional pain syndrome), upper limb, Deep vein thrombosis (HCC) (01/05/2024), GERD (gastroesophageal reflux disease), Heart murmur, HPV (human papilloma virus) infection (2012), Invasive ductal carcinoma of breast, female, left (HCC) (2023), Kidney stone, Miscarriage (3/15/2015), Neck pain, Ovarian cyst, and PONV (postoperative nausea and vomiting) (01/02/2024).    PAST SURGICAL HISTORY:   has a past surgical history that includes [place] tooth extraction (2012); IR upper extremity venogram- Diagnostic (05/28/2021); pr excision 1st &/cervical rib (Left, 08/12/2021); THORACOSCOPY VIDEO ASSISTED SURGERY (VATS) (Left, 08/12/2021); EGD; US guided breast biopsy right complete (Right, 12/02/2023); US guidance breast biopsy left each additional (Left, 12/02/2023); pr mastectomy simple complete (Left, 01/02/2024); pr bx/exc lymph node open superficial (Left, 01/02/2024); pr intraop sentinel lymph node id w/dye injection (Left, 01/02/2024); pr inj radioactive tracer for id of sentinel node (Left, 01/02/2024); Colposcopy (2012); and IR port placement (2/7/2024).    CURRENT MEDICATIONS  Current Outpatient Medications   Medication Sig Dispense Refill    albuterol (PROVENTIL HFA,VENTOLIN HFA) 90 mcg/act inhaler TAKE 2 PUFFS BY MOUTH EVERY 6 HOURS AS NEEDED FOR WHEEZE OR FOR SHORTNESS OF BREATH 18 g 3    Blood Glucose Monitoring Suppl (OneTouch Verio Flex System) w/Device KIT Test x4 Daily or as instructed 1 kit 0    cetirizine (ZyrTEC) 10 mg tablet TAKE 1 TABLET BY MOUTH EVERY DAY 90 tablet 0    enoxaparin (LOVENOX) 80 mg/0.8 mL Inject 75mg SC every 12 hours 48 mL 2     "lidocaine-prilocaine (EMLA) cream Apply to port 30 to 60 min prior to use 30 g 2    omeprazole (PriLOSEC) 40 MG capsule Take 1 capsule (40 mg total) by mouth daily 90 capsule 2    ondansetron (ZOFRAN) 8 mg tablet Take 1 tablet (8 mg total) by mouth every 8 (eight) hours as needed for nausea or vomiting 30 tablet 3    OneTouch Delica Lancets 33G MISC Use 4 a Day or as instructed 100 each 0    OneTouch Verio test strip Test 4 Times Daily or as instructed 100 strip 0    Prenatal Multivit-Min-Fe-FA (PRE- PO) Take 1 tablet by mouth in the morning       No current facility-administered medications for this visit.     Facility-Administered Medications Ordered in Other Visits   Medication Dose Route Frequency Provider Last Rate Last Admin    alteplase (CATHFLO) injection 2 mg  2 mg Intracatheter Q1MIN PRN Nemojessica Khalil DO        ondansetron (ZOFRAN) 8 mg in sodium chloride 0.9 % 50 mL IVPB  8 mg Intravenous Once Nemo Khalil DO         [unfilled]    SOCIAL HISTORY:   reports that she has never smoked. She has never used smokeless tobacco. She reports that she does not currently use alcohol. She reports that she does not use drugs.     FAMILY HISTORY:  family history includes Bone cancer in her maternal grandmother; Coronary artery disease in her mother; Heart disease in her mother; Miscarriages / Stillbirths in her half-sister and mother; No Known Problems in her father and half-brother.     ALLERGIES:  is allergic to formic acid, latex, and nsaids.      Physical Exam:  Vital Signs:   Visit Vitals  /74 (BP Location: Right arm, Patient Position: Sitting, Cuff Size: Adult)   Pulse (!) 109   Temp (!) 97.3 °F (36.3 °C)   Resp 17   Ht 5' 7\" (1.702 m)   Wt 78.8 kg (173 lb 12.8 oz)   LMP 10/31/2023   SpO2 100%   BMI 27.22 kg/m²   OB Status Pregnant   Smoking Status Never   BSA 1.9 m²     Body mass index is 27.22 kg/m².  Body surface area is 1.9 meters squared.    GEN: Alert, awake oriented x3, in no acute " distress  HEENT- No pallor, icterus, cyanosis, no oral mucosal lesions,   LAD - no palpable cervical, clavicle, axillary, inguinal LAD  Heart- normal S1 S2, regular rate and rhythm, No murmur, rubs.   Lungs- clear breathing sound bilateral.   Abdomen- soft, Non tender, bowel sounds present  Extremities- No cyanosis, clubbing, edema  Neuro- No focal neurological deficit    Labs:  Lab Results   Component Value Date    WBC 8.21 05/06/2024    HGB 9.8 (L) 05/06/2024    HCT 30.5 (L) 05/06/2024    MCV 91 05/06/2024     (L) 05/06/2024     Lab Results   Component Value Date    SODIUM 135 05/06/2024    K 3.7 05/06/2024     05/06/2024    CO2 21 05/06/2024    AGAP 10 05/06/2024    BUN 5 05/06/2024    CREATININE 0.47 (L) 05/06/2024    GLUC 81 05/06/2024    GLUF 79 04/30/2024    CALCIUM 8.9 05/06/2024    AST 43 (H) 05/06/2024    ALT 67 (H) 05/06/2024    ALKPHOS 71 05/06/2024    TP 7.0 05/06/2024    TBILI 0.28 05/06/2024    EGFR 128 05/06/2024

## 2024-05-13 ENCOUNTER — HOSPITAL ENCOUNTER (OUTPATIENT)
Dept: INFUSION CENTER | Facility: CLINIC | Age: 34
Discharge: HOME | End: 2024-05-13
Payer: COMMERCIAL

## 2024-05-13 ENCOUNTER — PATIENT OUTREACH (OUTPATIENT)
Dept: HEMATOLOGY ONCOLOGY | Facility: CLINIC | Age: 34
End: 2024-05-13

## 2024-05-13 ENCOUNTER — NUTRITION (OUTPATIENT)
Dept: NUTRITION | Facility: CLINIC | Age: 34
End: 2024-05-13

## 2024-05-13 VITALS
HEART RATE: 87 BPM | RESPIRATION RATE: 18 BRPM | DIASTOLIC BLOOD PRESSURE: 69 MMHG | TEMPERATURE: 96.7 F | SYSTOLIC BLOOD PRESSURE: 106 MMHG

## 2024-05-13 DIAGNOSIS — Z71.3 NUTRITIONAL COUNSELING: Primary | ICD-10-CM

## 2024-05-13 DIAGNOSIS — D69.6 THROMBOCYTOPENIA AFFECTING PREGNANCY, ANTEPARTUM (HCC): ICD-10-CM

## 2024-05-13 DIAGNOSIS — E86.0 DEHYDRATION: Primary | ICD-10-CM

## 2024-05-13 DIAGNOSIS — O99.119 THROMBOCYTOPENIA AFFECTING PREGNANCY, ANTEPARTUM (HCC): ICD-10-CM

## 2024-05-13 DIAGNOSIS — R74.01 TRANSAMINITIS: ICD-10-CM

## 2024-05-13 LAB
ALBUMIN SERPL BCP-MCNC: 3.6 G/DL (ref 3.5–5)
ALP SERPL-CCNC: 64 U/L (ref 34–104)
ALT SERPL W P-5'-P-CCNC: 34 U/L (ref 7–52)
ANION GAP SERPL CALCULATED.3IONS-SCNC: 9 MMOL/L (ref 4–13)
AST SERPL W P-5'-P-CCNC: 20 U/L (ref 13–39)
BASOPHILS # BLD AUTO: 0.02 THOUSANDS/ÂΜL (ref 0–0.1)
BASOPHILS NFR BLD AUTO: 0 % (ref 0–1)
BILIRUB SERPL-MCNC: 0.27 MG/DL (ref 0.2–1)
BUN SERPL-MCNC: 5 MG/DL (ref 5–25)
CALCIUM SERPL-MCNC: 8.8 MG/DL (ref 8.4–10.2)
CHLORIDE SERPL-SCNC: 105 MMOL/L (ref 96–108)
CO2 SERPL-SCNC: 21 MMOL/L (ref 21–32)
CREAT SERPL-MCNC: 0.46 MG/DL (ref 0.6–1.3)
EOSINOPHIL # BLD AUTO: 0.73 THOUSAND/ÂΜL (ref 0–0.61)
EOSINOPHIL NFR BLD AUTO: 9 % (ref 0–6)
ERYTHROCYTE [DISTWIDTH] IN BLOOD BY AUTOMATED COUNT: 19.1 % (ref 11.6–15.1)
GFR SERPL CREATININE-BSD FRML MDRD: 129 ML/MIN/1.73SQ M
GLUCOSE SERPL-MCNC: 84 MG/DL (ref 65–140)
HCT VFR BLD AUTO: 30.3 % (ref 34.8–46.1)
HGB BLD-MCNC: 9.7 G/DL (ref 11.5–15.4)
IMM GRANULOCYTES # BLD AUTO: 0.09 THOUSAND/UL (ref 0–0.2)
IMM GRANULOCYTES NFR BLD AUTO: 1 % (ref 0–2)
LYMPHOCYTES # BLD AUTO: 0.79 THOUSANDS/ÂΜL (ref 0.6–4.47)
LYMPHOCYTES NFR BLD AUTO: 10 % (ref 14–44)
MCH RBC QN AUTO: 29.7 PG (ref 26.8–34.3)
MCHC RBC AUTO-ENTMCNC: 32 G/DL (ref 31.4–37.4)
MCV RBC AUTO: 93 FL (ref 82–98)
MONOCYTES # BLD AUTO: 0.55 THOUSAND/ÂΜL (ref 0.17–1.22)
MONOCYTES NFR BLD AUTO: 7 % (ref 4–12)
NEUTROPHILS # BLD AUTO: 5.58 THOUSANDS/ÂΜL (ref 1.85–7.62)
NEUTS SEG NFR BLD AUTO: 73 % (ref 43–75)
NRBC BLD AUTO-RTO: 0 /100 WBCS
PLATELET # BLD AUTO: 136 THOUSANDS/UL (ref 149–390)
PMV BLD AUTO: 11.1 FL (ref 8.9–12.7)
POTASSIUM SERPL-SCNC: 3.9 MMOL/L (ref 3.5–5.3)
PROT SERPL-MCNC: 6.6 G/DL (ref 6.4–8.4)
RBC # BLD AUTO: 3.27 MILLION/UL (ref 3.81–5.12)
SODIUM SERPL-SCNC: 135 MMOL/L (ref 135–147)
WBC # BLD AUTO: 7.76 THOUSAND/UL (ref 4.31–10.16)

## 2024-05-13 PROCEDURE — 80053 COMPREHEN METABOLIC PANEL: CPT | Performed by: OBSTETRICS & GYNECOLOGY

## 2024-05-13 PROCEDURE — 85025 COMPLETE CBC W/AUTO DIFF WBC: CPT | Performed by: OBSTETRICS & GYNECOLOGY

## 2024-05-13 RX ADMIN — SODIUM CHLORIDE 1000 ML: 0.9 INJECTION, SOLUTION INTRAVENOUS at 08:54

## 2024-05-13 NOTE — PROGRESS NOTES
Outpatient Oncology Nutrition Consultation   Type of Consult: Follow Up  Care Location: [facility]    Reason for referral: Notification from RN Navigator Kamla HAYWOOD on 12/7/23 that pt has triggered for oncology nutrition care (reason for referral: Patient is pregnant and newly diagnosed with breast cancer. Had questions regarding nutrition and sugar intake, etc).     Nutrition Assessment:   Oncology Diagnosis & Treatments:   Diagnosed with left beast cancer, ER positive, 12/2/23.   S/p left mastectomy 1/2/24.   Chemotherapy (doxorubicin, cytoxan, emend) 2/21/24-4/3/24.   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, ER 80-85% NC 90-95% Her2 1+, han 2, cT2(2.1 cm)N0M0    1/2/2023 - left sided mastectomy with SLNBX - [id number] - oncotype 23    2/21/2024 - start AC q 3 weeks    3/13/2024 - cycle 2 adriamycin dose reduced to 50 mg/m2 and cytoxan dose reduced by 10% due to N/V    4/2024 - stop after 3 cycles of AC due to severe nausea and dehydration with coexisting hyperemesis gravidarum     Malignant neoplasm of overlapping sites of left breast in female, estrogen receptor positive (HCC)   12/2/2023 Initial Diagnosis    Malignant neoplasm of overlapping sites of left female breast (HCC)     12/2/2023 Biopsy    Bilateral breast ultrasound guided biopsy  A. Breast, Left, US Guided Left Breast Biopsy 12:00 6cmfn:  Invasive breast carcinoma of no special type (ductal) with osteoclast-like stromal giant cells  Grade 2  ER 85  NC 95  HER2 1+     B. Breast, Right, US Guided Right Breast Biopsy 10:00 9cmfn:  Benign breast tissue.    In review of the patient’s recent imaging, the left malignancy appears unifocal.  The biopsy-proven carcinoma measured 2.1 cm on ultrasound. Ultrasound of the left axilla was performed on 11/24/2023 and showed no suspicious adenopathy.  Recent imaging of the contralateral right breast dated 12/2/2023 and 11/24/2023 was  reviewed and shows no suspicious findings.  The pathology results for the ultrasound-guided core needle biopsy (right breast 10:00, 9 cm from the nipple) are benign and concordant with imaging.     12/2/2023 Genetic Testing    [facility]  A total of 36 genes were evaluated, including: APC, TOPHER, BARD 1, BMPR1A, BRCA1, BRCA2, BRIP1, CDH1, CDK4, CKDN2A, CHEK2, DICER1, MLH1, MSH2, MSH6, MUTYH, NBN, NF1, NTHL1, PALB2, PMS2, PTEN, RAD51C, RECQL, SMAD4, SMARCA4, STK11, TP53, AXIN2, HOXB13, MSH3, POLD1, AND POLE, EPCAM, AND GREM1   Negative result. No pathogenic sequence variants or deletions/duplications identified       12/2/2023 -  Cancer Staged    Staging form: Breast, AJCC 8th Edition  - Clinical stage from 12/2/2023: Stage IB (cT2, cN0, cM0, G2, ER+, UT+, HER2-) - Signed by Elena Cornell MD on 12/13/2023  Stage prefix: Initial diagnosis  Histologic grading system: 3 grade system       1/2/2024 Surgery    Left breast mastectomy with sentinel lymph node biopsy  Invasive Mammary carcinoma of no special type (ductal)   Grade 2  25 mm  Margins negative  0/2 Lymph nodes  Anatomic stage IIA  Prognostic stage IA      2/21/2024 -  Chemotherapy    DOXOrubicin (ADRIAMYCIN), 56.25 mg/m2 = 106 mg (93.8 % of original dose 60 mg/m2), Intravenous, Once, 3 of 3 cycles  Dose modification: 56.25 mg/m2 (original dose 60 mg/m2, Cycle 1, Reason: Other (Must fill in a comment), Comment: max recommended dose), 50 mg/m2 (original dose 60 mg/m2, Cycle 2, Reason: Nausea/Vomiting, Comment: dose reduced to 50 mg/m2 due to n/v)  Administration: 106 mg (2/21/2024), 94 mg (3/13/2024), 94 mg (4/3/2024)  alteplase (CATHFLO), 2 mg, Intracatheter, Every 1 Minute as needed, 3 of 3 cycles  cyclophosphamide (CYTOXAN) IVPB, 600 mg/m2 = 1,128 mg, Intravenous, Once, 3 of 3 cycles  Dose modification: 540 mg/m2 (original dose 600 mg/m2, Cycle 2, Reason: Nausea/Vomiting, Comment: 10% dose reduction due to n/v)  Administration: 1,128 mg (2/21/2024), 1,000  mg (3/13/2024), 1,000 mg (4/3/2024)  fosaprepitant (EMEND) IVPB, 150 mg, Intravenous, Once, 3 of 3 cycles  Administration: 150 mg (2/21/2024), 150 mg (3/13/2024), 150 mg (4/3/2024)     Cancer complicating pregnancy in second trimester   12/14/2023 Initial Diagnosis    Cancer complicating pregnancy in second trimester       Past Medical & Surgical Hx:   Patient Active Problem List   Diagnosis    Mild persistent asthma without complication    Axillary hidradenitis suppurativa    Anxiety    Chest wall pain    Gastroesophageal reflux disease without esophagitis    Depression with anxiety    Chronic fatigue    Myalgia    Chronic left shoulder pain    Cervical disc disorder at C5-C6 level with radiculopathy    Atypical squamous cells of undetermined significance (ASCUS) on Papanicolaou smear of cervix    Hypertrophic and atrophic condition of skin    Migraine headache    Shoulder impingement syndrome, left    Vitamin D deficiency    Close exposure to COVID-19 virus    Thoracic outlet syndrome    Palpitations    Post-operative pain    Transaminitis    Right middle lobe pulmonary nodule    Reversible airway obstruction    SOB (shortness of breath)    Musculoskeletal chest pain    COVID-19 virus infection    Unexplained weight gain    Left ovarian cyst    Dysphagia    Malignant neoplasm of overlapping sites of left breast in female, estrogen receptor positive (HCC)    Cancer complicating pregnancy in second trimester    25 weeks gestation of pregnancy    History of left mastectomy    Multigravida of advanced maternal age in first trimester    DVT complicating pregnancy, second trimester    Dehydration    Iron deficiency anemia secondary to inadequate dietary iron intake    Marginal insertion of umbilical cord affecting management of mother    Encounter for follow-up surveillance of breast cancer    Epigastric pain    Thrombocytopenia affecting pregnancy, antepartum (HCC)    RLQ abdominal pain     Past Medical History:    Diagnosis Date    Anesthesia complication     gait dysfunction/ urinary retention after nerve block,    Anxiety     Asthma     CRPS (complex regional pain syndrome), upper limb     left chest/arm/hand    Deep vein thrombosis (HCC) 01/05/2024    post op from mastectomy    GERD (gastroesophageal reflux disease)     Heart murmur     HPV (human papilloma virus) infection 2012    unsure of 16, 18, or other    Invasive ductal carcinoma of breast, female, left (HCC) 2023    Kidney stone     Miscarriage 3/15/2015    7 weeks along.    Neck pain     Ovarian cyst     Left    PONV (postoperative nausea and vomiting) 01/02/2024     Past Surgical History:   Procedure Laterality Date    COLPOSCOPY  2012    HPV    EGD      IR PORT PLACEMENT  2/7/2024    IR UPPER EXTREMITY VENOGRAM- DIAGNOSTIC  05/28/2021    IL BX/EXC LYMPH NODE OPEN SUPERFICIAL Left 01/02/2024    Procedure: LEFT BREAST MASTECTOMY, LYMPHATIC MAPPING WITH RADIOACTIVE DYE, SENTINEL LYMPH NODE BIOPSY, ZAHEER LEFT BREAST, INJECTION IN OR AT 0800 BY DR CORNELL;  Surgeon: Elena Cornell MD;  Location: [facility];  Service: Surgical Oncology    IL EXCISION 1ST &/CERVICAL RIB Left 08/12/2021    Procedure: FIRST RIB RESECTION;  Surgeon: Jonathan Schaffer MD;  [facility]: [facility];  Service: Thoracic    IL INJ RADIOACTIVE TRACER FOR ID OF SENTINEL NODE Left 01/02/2024    Procedure: LEFT BREAST MASTECTOMY, LYMPHATIC MAPPING WITH RADIOACTIVE DYE, SENTINEL LYMPH NODE BIOPSY, ZAHEER LEFT BREAST, INJECTION IN OR AT 0800 BY DR CORNELL;  Surgeon: Elena Cornell MD;  Location: [facility];  Service: Surgical Oncology    IL INTRAOP SENTINEL LYMPH NODE ID W/DYE INJECTION Left 01/02/2024    Procedure: LEFT BREAST MASTECTOMY, LYMPHATIC MAPPING WITH RADIOACTIVE DYE, SENTINEL LYMPH NODE BIOPSY, ZAHEER LEFT BREAST, INJECTION IN OR AT 0800 BY DR CORNELL;  Surgeon: Elena Cornell MD;  [facility];  Service: Surgical Oncology    IL MASTECTOMY SIMPLE COMPLETE  Left 01/02/2024    Procedure: LEFT BREAST MASTECTOMY, LYMPHATIC MAPPING WITH RADIOACTIVE DYE, SENTINEL LYMPH NODE BIOPSY, ZAHEER LEFT BREAST, INJECTION IN OR AT 0800 BY DR CORNELL;  Surgeon: Elena Cornell MD;  Location: [facility];  Service: Surgical Oncology    THORACOSCOPY VIDEO ASSISTED SURGERY (VATS) Left 08/12/2021    Procedure: THORACOSCOPY VIDEO ASSISTED SURGERY (VATS);  Surgeon: Jonathan Schaffer MD;  [facility]: [facility];  Service: Thoracic    US GUIDANCE BREAST BIOPSY LEFT EACH ADDITIONAL Left 12/02/2023    US GUIDED BREAST BIOPSY RIGHT COMPLETE Right 12/02/2023    WISDOM TOOTH EXTRACTION  2012       Review of Medications:   Vitamins, Supplements and Herbals: Med List Reviewed & pt is only taking: Prenatal MVI ; venofer w/ infusion 4/26/24                                                                                                                                                                                                                                                                                                                                                                                                                                                                                                                             Current Outpatient Medications:     albuterol (PROVENTIL HFA,VENTOLIN HFA) 90 mcg/act inhaler, TAKE 2 PUFFS BY MOUTH EVERY 6 HOURS AS NEEDED FOR WHEEZE OR FOR SHORTNESS OF BREATH, Disp: 18 g, Rfl: 3    Blood Glucose Monitoring Suppl (OneTouch Verio Flex System) w/Device KIT, Test x4 Daily or as instructed, Disp: 1 kit, Rfl: 0    cetirizine (ZyrTEC) 10 mg tablet, TAKE 1 TABLET BY MOUTH EVERY DAY, Disp: 90 tablet, Rfl: 0    enoxaparin (LOVENOX) 80 mg/0.8 mL, Inject 75mg SC every 12 hours, Disp: 48 mL, Rfl: 2    lidocaine-prilocaine (EMLA) cream, Apply to port 30 to 60 min prior to use, Disp: 30 g, Rfl: 2    omeprazole (PriLOSEC) 40 MG capsule, Take 1 capsule (40 mg  "total) by mouth daily, Disp: 90 capsule, Rfl: 2    ondansetron (ZOFRAN) 8 mg tablet, Take 1 tablet (8 mg total) by mouth every 8 (eight) hours as needed for nausea or vomiting, Disp: 30 tablet, Rfl: 3    OneTouch Delica Lancets 33G MISC, Use 4 a Day or as instructed, Disp: 100 each, Rfl: 0    OneTouch Verio test strip, Test 4 Times Daily or as instructed, Disp: 100 strip, Rfl: 0    Prenatal Multivit-Min-Fe-FA (PRE- PO), Take 1 tablet by mouth in the morning, Disp: , Rfl:   No current facility-administered medications for this visit.    Facility-Administered Medications Ordered in Other Visits:     alteplase (CATHFLO) injection 2 mg, 2 mg, Intracatheter, Q1MIN PRN, Nemo Agostino, DO    ondansetron (ZOFRAN) 8 mg in sodium chloride 0.9 % 50 mL IVPB, 8 mg, Intravenous, Once, Nemo Agostino, DO    sodium chloride 0.9 % bolus 1,000 mL, 1,000 mL, Intravenous, Once, Nemo Agostino, DO, Last Rate: 1,000 mL/hr at 24 0854, 1,000 mL at 24 0854    Most Recent Lab Results:   Lab Results   Component Value Date    WBC 7.76 2024    NEUTROABS 5.58 2024    IRON 82 2024    TIBC 464 (H) 2024    FERRITIN 24 2024    CHOLESTEROL 127 2023    TRIG 56 2023    HDL 43 (L) 2023    LDLCALC 73 2023    ALT 67 (H) 2024    AST 43 (H) 2024    ALB 3.8 2024    SODIUM 135 2024    SODIUM 136 2024    K 3.7 2024    K 3.9 2024     2024    BUN 5 2024    BUN 5 2024    CREATININE 0.47 (L) 2024    CREATININE 0.46 (L) 2024    EGFR 128 2024    PHOS 3.5 2024    POCGLU 71 2024    GLUF 79 2024    GLUF 76 2024    GLUC 81 2024    HGBA1C 5.4 2023    HGBA1C 5.1 2023    CALCIUM 8.9 2024    MG 1.8 (L) 2024       Anthropometric Measurements:   Height: 67\"  Ht Readings from Last 1 Encounters:   05/10/24 5' 7\" (1.702 m)     Wt Readings from Last 37 Encounters: "   05/10/24 78.8 kg (173 lb 12.8 oz)   05/07/24 77 kg (169 lb 11.2 oz)   05/06/24 75.3 kg (166 lb)   04/30/24 75.6 kg (166 lb 9.6 oz)   04/29/24 75.6 kg (166 lb 9.6 oz)   04/24/24 75.8 kg (167 lb)   04/05/24 76 kg (167 lb 9.6 oz)   04/03/24 75.4 kg (166 lb 3.2 oz)   04/02/24 74.8 kg (165 lb)   03/28/24 76 kg (167 lb 9.6 oz)   03/15/24 75.3 kg (166 lb)   03/13/24 75.8 kg (167 lb)   03/12/24 74.4 kg (164 lb)   03/08/24 76 kg (167 lb 9.6 oz)   02/29/24 73.7 kg (162 lb 6.4 oz)   02/21/24 74.8 kg (165 lb)   02/09/24 77.1 kg (170 lb)   02/08/24 73.9 kg (163 lb)   02/07/24 74 kg (163 lb 2.3 oz)   01/30/24 77.5 kg (170 lb 12.8 oz)   01/24/24 76.7 kg (169 lb)   01/23/24 76.7 kg (169 lb)   01/18/24 77.6 kg (171 lb)   01/17/24 77.8 kg (171 lb 8 oz)   01/11/24 77.6 kg (171 lb)   01/11/24 76.9 kg (169 lb 9.6 oz)   01/09/24 78 kg (172 lb)   01/02/24 78.2 kg (172 lb 6.4 oz)   12/29/23 78.8 kg (173 lb 12.8 oz)   12/27/23 78.8 kg (173 lb 12.8 oz)   12/21/23 81.1 kg (178 lb 12.8 oz)   12/20/23 80.3 kg (177 lb)   12/15/23 81.6 kg (180 lb)   12/14/23 81.3 kg (179 lb 3.2 oz)   12/13/23 80.3 kg (177 lb)   12/06/23 81.6 kg (180 lb)   11/24/23 82.1 kg (181 lb)     Weight History:   Usual Weight: 130-145#  Varian: n/a  Home Scale: none    Oncology Nutrition-Anthropometrics      [facility]/2024 in [facility] Dietitian [place]   Patient [age] (years): 33 years 33 years   Patient (female) height (in): 67 in 67 in   Current Weight to be used for anthropometric calculations (lbs) 173.8 lbs 167.6 lbs   Current Weight to be used for anthropometric calculations (kg) 79 kg 76.2 kg   BMI: 27.2 26.2   IBW female: 135 lbs 135 lbs   IBW (kg) female: 61.4 kg 61.4 kg   IBW % (female) 128.7 % 124.1 %   Adjusted BW (female): 144.7 lbs 143.2 lbs   Adjusted BW kg (female): 65.8 kg 65.1 kg   % weight change after 1 week: 4.3 % 0 %   Weight change after 1 week  (lbs) 7.2 lbs 0 lbs   % weight change after 1 month: 3.7 % 0 %   Weight change after 1 month (lbs) 6.2 lbs 0 lbs   % weight change after 3 months: 2.2 % -2.8 %   Weight change after 3 months (lbs) 3.8 lbs -4.8 lbs   % weight change after 6 months: -4 % -10.9 %   Weight change after 6 months (lbs) -7.2 lbs -20.4 lbs            Nutrition-Focused Physical Findings: none observed    Food/Nutrition-Related History & Client/Social History:    Current Nutrition Impact Symptoms:  [] Nausea   -improving; using zofran prn  [x] Reduced Appetite -continues  [] Acid Reflux    [] Vomiting  [x] Unintended Wt Loss   -significant x6 months  [] Malabsorption    [] Diarrhea   -imodium prn   -with intake of certain foods: sauces, some vegetables, meats  [] Unintended Wt Gain  [] Dumping Syndrome    [] Constipation   -miralax and colace are helping  [] Thick Mucous/Secretions  [] Abdominal Pain    [] Dysgeusia (Altered Taste)  [] Xerostomia (Dry Mouth)  [] Gas    [] Dysosmia (Altered Smell)  [] Thrush  [] Difficulty Chewing    [] Oral Mucositis (Sore Mouth)  [x] Fatigue  [] Hyperglycemia   Lab Results   Component Value Date    HGBA1C 5.4 12/02/2023      [] Odynophagia  [] Esophagitis  [] Other: hypokalemia 4/1   [] Dysphagia  [] Early Satiety  [] No Problems Eating      Food Allergies & Intolerances: yes: lactose intolerant     Current Diet: Lactose-Free and No red meat   Current Nutrition Intake: Unchanged from last visit  Appetite: Poor  Nutrition Route: PO  Oral Care: brushes QD  Activity level: Dizzy often in the morning. Degenerative disc disease limits physical activity.     24 Hr Diet Recall: pt is required to perform glucose testing for pregnancy 4x daily, 2hrs after eating   Breakfast: bread; eggs; cereal with almond milk   Snacks: cheese, grapes or other fruit   Dinner: tofu; tried pizza with chicken but could not tolerate chicken   Snack: almond milk     Beverages: water with Pedialyte or gatorade (32oz x1); ginger ale (12oz  x0-1), almond milk (8oz x1),  IV hydration 3x weekly  Supplements:   Plant based protein shake 1x daily     Oncology Nutrition-Estimated Needs      Flowsheet Row Nutrition from 5/13/2024 in [facility] Oncology Dietitian [place] Nutrition from 4/19/2024 in [facility] Oncology Dietitian [place]   Weight type used Actual weight Actual weight   Weight in kilograms (kg) used for estimated needs -- 76.2 kg   Energy needs formula:  Other (single)  [Estimated Energy Requirements = nonpregnant EER + 340 kcal for 2nd trimester pregnancy] Other (single)  [Estimated Energy Requirements = nonpregnant EER + 340kcal for 2nd trimester pregnancy]   Single value (kcal/kg): 2661 2632   Energy needs based on single value: -- 275301 kcal   Protein needs formula: 1.5-2 g/kg 1.5-2 g/kg   Protein needs based on 1.5 g/kg 119 g 114 g   Protein needs based on 2 g/kg 158 g 152 g   Fluid needs formula: 35-40 mL/kg 35-40 mL/kg   Fluid needs based on 35 mL/kg 2769 mL 2674 mL   Fluid needs in ounces 94 oz 90 oz   Fluid needs based on 40 mL/kg 3164 mL 3056 mL   Fluid needs in ounces 107 oz 103 oz          **Estimated nutrition needs are based on comparative standards for 2nd trimester pregnancy.     Discussion & Intervention:   Jillian was evaluated today for an RD follow up regarding poor po intake and pt request for diet guidance throughout treatment .  Jillian is currently undergoing tx for breast cancer . She has discontinued chemotherapy. Her nausea has improved somewhat, she is not requiring zofran as much as she was prior. Her appetite remains low. She is eating the same types of foods, and is still unable to tolerate meats or most sauces. She is required to eat more frequently and larger amounts of food for her pregnancy glucose testing, and is struggling with increasing her meal volume but she is doing the best that she can. She continues to hydrate with Pedialyte and Gatorade mixed with water.   Based on today's assessment, discussion  included: MNT for: breast cancer, weight management, appetite loss, adequate hydration & fluid choices, eating smaller more frequent meals every 2-3 hours (5-6 small meals/day), eating when feeling most hungry, and continuing oral nutrition supplements.   Moving forward, Jillian will continue current nutrition plan of care.  Materials Provided: not applicable   All questions and concerns addressed during today’s visit.  Jillian has RD contact information.    Nutrition Diagnosis:   Inadequate Energy Intake related to physiological causes, disease state and treatment related issues as evidenced by food recall, wt loss and discussion with pt and/or family.  Increased Nutrient Needs (kcal & pro) related to increased demand for nutrients and disease state as evidenced by recovering from cancer tx.  Increased Nutrient Needs related to increased demand for nutrients as evidenced by 2nd trimester pregnancy.  Monitoring & Evaluation:   Goals:  weight maintenance/stabilization  adequate nutrition impact symptom management  pt to meet >/=75% estimated nutrition needs daily  increase protein intake  increase fluid intake  increase calorie intake    Progress Towards Goals: Progressing    Nutrition Rx & Recommendations:  Diet: high calorie, high protein, easy on the stomach   Small, frequent meals/snacks may be easier to tolerate than 3 large daily meals.  Aim for 5-6 small meals per day (every 2-3 hours).  Include protein at all meals/snacks.  Include a variety of foods (as tolerated/allowed by diet).  Incorporate physical activity as able/allowed.  Stay hydrated by sipping fluids of choice/tolerance throughout the day.  Liquid nutrition may be better tolerated than solids at times.  Alter food choices and eating patterns to accommodate changing needs.  Light physical activity (such as walking) is encouraged, as able/tolerated.  Weigh yourself regularly. If you notice weight loss, make an effort to increase your daily food/calorie  intake. If you continue to notice loss after these efforts, reach out to your dietitian to establish a plan to stabilize weight.  Food and drink that are easy on the stomach: clear broth (chicken, vegetable, bone, mushroom, beef), juice (caution with acidic juices), potatoes, pretzels, crackers, cereal (Corn Flakes, Rice Chex, Rice Crispies, Cheerios), oatmeal or cream of wheat, white bread or toast, white rice, cooked vegetables (caution with gas producing vegetables), plain pasta, eggs, peanut butter, boiled chicken or turkey, soft cheeses. Foods to avoid: spicy, fried/greasy, high in added sugar, acidic.   Keep a log of foods that you can tolerate and ones that you cannot tolerate.   Continue Pedialyte and gatorade mixed with water to aid in hydration.   Protein sources: eggs, tofu, yogurt, cheese, protein shake     Follow Up Plan:   6/3/24 during hydration    Recommend Referral to Other Providers: none at this time

## 2024-05-13 NOTE — PROGRESS NOTES
Oncology Care Coordinator received a in basket message from Kamla Eason RN  to check in after recent med onc visit.  Patient is pregnant.  Had mastectomy, then chemo.  Having a lot of issues with N/V during pregnancy with chemo     Oncology Care Coordinator attempted to outreach patient to assess any questions or concerns regarding Medical Oncology visit. A voicemail was left stating a reason for my call and my contact information was provided . I requested a return call at patient's earliest convenience.

## 2024-05-13 NOTE — PROGRESS NOTES
Patient arrives to infusion center for IV Hydration w/labs. Patient offers no complaints today. Port accessed, patient tolerated entire infusion without complication. Port de-accessed. AVS offered.     Next appointment: 5/15/24 @ 0800

## 2024-05-13 NOTE — PATIENT INSTRUCTIONS
Nutrition Rx & Recommendations:  Diet: high calorie, high protein, easy on the stomach   Small, frequent meals/snacks may be easier to tolerate than 3 large daily meals.  Aim for 5-6 small meals per day (every 2-3 hours).  Include protein at all meals/snacks.  Include a variety of foods (as tolerated/allowed by diet).  Incorporate physical activity as able/allowed.  Stay hydrated by sipping fluids of choice/tolerance throughout the day.  Liquid nutrition may be better tolerated than solids at times.  Alter food choices and eating patterns to accommodate changing needs.  Light physical activity (such as walking) is encouraged, as able/tolerated.  Weigh yourself regularly. If you notice weight loss, make an effort to increase your daily food/calorie intake. If you continue to notice loss after these efforts, reach out to your dietitian to establish a plan to stabilize weight.  Food and drink that are easy on the stomach: clear broth (chicken, vegetable, bone, mushroom, beef), juice (caution with acidic juices), potatoes, pretzels, crackers, cereal (Corn Flakes, Rice Chex, Rice Crispies, Cheerios), oatmeal or cream of wheat, white bread or toast, white rice, cooked vegetables (caution with gas producing vegetables), plain pasta, eggs, peanut butter, boiled chicken or turkey, soft cheeses. Foods to avoid: spicy, fried/greasy, high in added sugar, acidic.   Keep a log of foods that you can tolerate and ones that you cannot tolerate.   Continue Pedialyte and gatorade mixed with water to aid in hydration.   Protein sources: eggs, tofu, yogurt, cheese, protein shake     Follow Up Plan:  6/3/24 during hydration   Recommend Referral to Other Providers: none at this time

## 2024-05-14 ENCOUNTER — TELEPHONE (OUTPATIENT)
Age: 34
End: 2024-05-14

## 2024-05-14 NOTE — TELEPHONE ENCOUNTER
Pt returned call regarding her results. Reviewed note from Dr. Joseph and pt verbalized understanding.

## 2024-05-15 ENCOUNTER — HOSPITAL ENCOUNTER (OUTPATIENT)
Dept: INFUSION CENTER | Facility: CLINIC | Age: 34
Discharge: HOME | End: 2024-05-15
Payer: COMMERCIAL

## 2024-05-15 VITALS
SYSTOLIC BLOOD PRESSURE: 105 MMHG | DIASTOLIC BLOOD PRESSURE: 68 MMHG | RESPIRATION RATE: 18 BRPM | HEART RATE: 87 BPM | TEMPERATURE: 96.6 F

## 2024-05-15 DIAGNOSIS — E86.0 DEHYDRATION: Primary | ICD-10-CM

## 2024-05-15 PROCEDURE — 96374 THER/PROPH/DIAG INJ IV PUSH: CPT

## 2024-05-15 PROCEDURE — 96361 HYDRATE IV INFUSION ADD-ON: CPT

## 2024-05-15 RX ADMIN — ONDANSETRON 8 MG: 2 INJECTION INTRAMUSCULAR; INTRAVENOUS at 08:20

## 2024-05-15 RX ADMIN — SODIUM CHLORIDE 1000 ML: 0.9 INJECTION, SOLUTION INTRAVENOUS at 08:20

## 2024-05-15 NOTE — PROGRESS NOTES
Pt presents to clinic for hydration. Pt offers no complaints. Tolerated infusion without complications. Port de-accessed. Pt aware of next appointment on 5/17/24 at 800. AVS declined.

## 2024-05-16 ENCOUNTER — TELEPHONE (OUTPATIENT)
Dept: OBGYN CLINIC | Facility: CLINIC | Age: 34
End: 2024-05-16

## 2024-05-17 ENCOUNTER — HOSPITAL ENCOUNTER (OUTPATIENT)
Dept: INFUSION CENTER | Facility: CLINIC | Age: 34
End: 2024-05-17
Payer: COMMERCIAL

## 2024-05-17 VITALS
TEMPERATURE: 96.9 F | SYSTOLIC BLOOD PRESSURE: 108 MMHG | DIASTOLIC BLOOD PRESSURE: 67 MMHG | HEART RATE: 81 BPM | RESPIRATION RATE: 18 BRPM

## 2024-05-17 DIAGNOSIS — E86.0 DEHYDRATION: Primary | ICD-10-CM

## 2024-05-17 RX ADMIN — ONDANSETRON 8 MG: 2 INJECTION INTRAMUSCULAR; INTRAVENOUS at 08:30

## 2024-05-17 RX ADMIN — SODIUM CHLORIDE 1000 ML: 0.9 INJECTION, SOLUTION INTRAVENOUS at 08:18

## 2024-05-17 NOTE — PROGRESS NOTES
Patient tolerated hydration and zofran without incident. Next appointment confirmed for 5/20 at 1130, declined AVS.

## 2024-05-20 ENCOUNTER — HOSPITAL ENCOUNTER (OUTPATIENT)
Dept: INFUSION CENTER | Facility: CLINIC | Age: 34
Discharge: HOME | End: 2024-05-20
Payer: COMMERCIAL

## 2024-05-20 ENCOUNTER — PATIENT OUTREACH (OUTPATIENT)
Dept: CASE MANAGEMENT | Facility: HOSPITAL | Age: 34
End: 2024-05-20

## 2024-05-20 VITALS
SYSTOLIC BLOOD PRESSURE: 107 MMHG | RESPIRATION RATE: 16 BRPM | TEMPERATURE: 96.7 F | DIASTOLIC BLOOD PRESSURE: 63 MMHG | HEART RATE: 90 BPM

## 2024-05-20 DIAGNOSIS — O99.119 THROMBOCYTOPENIA AFFECTING PREGNANCY, ANTEPARTUM (HCC): ICD-10-CM

## 2024-05-20 DIAGNOSIS — R74.01 TRANSAMINITIS: ICD-10-CM

## 2024-05-20 DIAGNOSIS — E86.0 DEHYDRATION: Primary | ICD-10-CM

## 2024-05-20 DIAGNOSIS — D69.6 THROMBOCYTOPENIA AFFECTING PREGNANCY, ANTEPARTUM (HCC): ICD-10-CM

## 2024-05-20 LAB
ALBUMIN SERPL BCP-MCNC: 3.5 G/DL (ref 3.5–5)
ALP SERPL-CCNC: 66 U/L (ref 34–104)
ALT SERPL W P-5'-P-CCNC: 25 U/L (ref 7–52)
ANION GAP SERPL CALCULATED.3IONS-SCNC: 10 MMOL/L (ref 4–13)
AST SERPL W P-5'-P-CCNC: 18 U/L (ref 13–39)
BASOPHILS # BLD AUTO: 0.01 THOUSANDS/ÂΜL (ref 0–0.1)
BASOPHILS NFR BLD AUTO: 0 % (ref 0–1)
BILIRUB SERPL-MCNC: 0.21 MG/DL (ref 0.2–1)
BUN SERPL-MCNC: 5 MG/DL (ref 5–25)
CALCIUM SERPL-MCNC: 8.8 MG/DL (ref 8.4–10.2)
CHLORIDE SERPL-SCNC: 106 MMOL/L (ref 96–108)
CO2 SERPL-SCNC: 21 MMOL/L (ref 21–32)
CREAT SERPL-MCNC: 0.43 MG/DL (ref 0.6–1.3)
EOSINOPHIL # BLD AUTO: 0.57 THOUSAND/ÂΜL (ref 0–0.61)
EOSINOPHIL NFR BLD AUTO: 8 % (ref 0–6)
ERYTHROCYTE [DISTWIDTH] IN BLOOD BY AUTOMATED COUNT: 18.3 % (ref 11.6–15.1)
GFR SERPL CREATININE-BSD FRML MDRD: 132 ML/MIN/1.73SQ M
GLUCOSE SERPL-MCNC: 104 MG/DL (ref 65–140)
HCT VFR BLD AUTO: 29.7 % (ref 34.8–46.1)
HGB BLD-MCNC: 9.6 G/DL (ref 11.5–15.4)
IMM GRANULOCYTES # BLD AUTO: 0.07 THOUSAND/UL (ref 0–0.2)
IMM GRANULOCYTES NFR BLD AUTO: 1 % (ref 0–2)
LYMPHOCYTES # BLD AUTO: 0.77 THOUSANDS/ÂΜL (ref 0.6–4.47)
LYMPHOCYTES NFR BLD AUTO: 11 % (ref 14–44)
MCH RBC QN AUTO: 30.1 PG (ref 26.8–34.3)
MCHC RBC AUTO-ENTMCNC: 32.3 G/DL (ref 31.4–37.4)
MCV RBC AUTO: 93 FL (ref 82–98)
MONOCYTES # BLD AUTO: 0.46 THOUSAND/ÂΜL (ref 0.17–1.22)
MONOCYTES NFR BLD AUTO: 7 % (ref 4–12)
NEUTROPHILS # BLD AUTO: 4.99 THOUSANDS/ÂΜL (ref 1.85–7.62)
NEUTS SEG NFR BLD AUTO: 73 % (ref 43–75)
NRBC BLD AUTO-RTO: 0 /100 WBCS
PLATELET # BLD AUTO: 123 THOUSANDS/UL (ref 149–390)
PMV BLD AUTO: 11.9 FL (ref 8.9–12.7)
POTASSIUM SERPL-SCNC: 3.4 MMOL/L (ref 3.5–5.3)
PROT SERPL-MCNC: 6.6 G/DL (ref 6.4–8.4)
RBC # BLD AUTO: 3.19 MILLION/UL (ref 3.81–5.12)
SODIUM SERPL-SCNC: 137 MMOL/L (ref 135–147)
WBC # BLD AUTO: 6.87 THOUSAND/UL (ref 4.31–10.16)

## 2024-05-20 PROCEDURE — 80053 COMPREHEN METABOLIC PANEL: CPT | Performed by: OBSTETRICS & GYNECOLOGY

## 2024-05-20 PROCEDURE — 85025 COMPLETE CBC W/AUTO DIFF WBC: CPT | Performed by: OBSTETRICS & GYNECOLOGY

## 2024-05-20 RX ADMIN — ONDANSETRON 8 MG: 2 INJECTION INTRAMUSCULAR; INTRAVENOUS at 12:07

## 2024-05-20 RX ADMIN — SODIUM CHLORIDE 1000 ML: 0.9 INJECTION, SOLUTION INTRAVENOUS at 12:02

## 2024-05-20 NOTE — PROGRESS NOTES
MSW checked in with pt in the infusion center this afternoon, she is here for hydration and says that she is starting to feel better now that's had time to recover from her chemo.  She is officially in her 3rd trimester now as well.  She is excited for her upcoming baby shower and maternity photos.  She plans to do a few photos for just herself to have without her wig on and showing her mastectomy scar, so that she can show her baby when they get older everything that they went through together.  She has the nursery pretty much ready to go and is happy that friends have been sharing some neutral baby clothes with her as well.  She has another anatomy scan coming up and is looking forward/hoping to see better images of the baby this time around.  She was appreciative of the visit and denies any other needs or concerns at this point.  I will continue to check in and support her as needed moving forward.

## 2024-05-20 NOTE — PROGRESS NOTES
Pt here today for hydration, offers no complaints , infusion completed w/o complications, pt aware of their next appt on 5/22 at 9am  AVS declined and pt D/C home.

## 2024-05-21 ENCOUNTER — ROUTINE PRENATAL (OUTPATIENT)
Dept: OBGYN CLINIC | Facility: MEDICAL CENTER | Age: 34
End: 2024-05-21
Payer: COMMERCIAL

## 2024-05-21 ENCOUNTER — APPOINTMENT (OUTPATIENT)
Dept: LAB | Facility: MEDICAL CENTER | Age: 34
End: 2024-05-21
Payer: COMMERCIAL

## 2024-05-21 VITALS — BODY MASS INDEX: 27.28 KG/M2 | SYSTOLIC BLOOD PRESSURE: 132 MMHG | WEIGHT: 174.2 LBS | DIASTOLIC BLOOD PRESSURE: 78 MMHG

## 2024-05-21 DIAGNOSIS — Z3A.28 28 WEEKS GESTATION OF PREGNANCY: ICD-10-CM

## 2024-05-21 DIAGNOSIS — D69.6 THROMBOCYTOPENIA AFFECTING PREGNANCY, ANTEPARTUM (HCC): ICD-10-CM

## 2024-05-21 DIAGNOSIS — O22.32 DVT COMPLICATING PREGNANCY, SECOND TRIMESTER: ICD-10-CM

## 2024-05-21 DIAGNOSIS — O99.119 THROMBOCYTOPENIA AFFECTING PREGNANCY, ANTEPARTUM (HCC): ICD-10-CM

## 2024-05-21 DIAGNOSIS — D50.8 IRON DEFICIENCY ANEMIA SECONDARY TO INADEQUATE DIETARY IRON INTAKE: ICD-10-CM

## 2024-05-21 DIAGNOSIS — Z34.82 PRENATAL CARE, SUBSEQUENT PREGNANCY, SECOND TRIMESTER: ICD-10-CM

## 2024-05-21 DIAGNOSIS — Z34.93 PRENATAL CARE, THIRD TRIMESTER: Primary | ICD-10-CM

## 2024-05-21 DIAGNOSIS — Z11.3 SCREEN FOR STD (SEXUALLY TRANSMITTED DISEASE): ICD-10-CM

## 2024-05-21 DIAGNOSIS — R74.01 TRANSAMINITIS: ICD-10-CM

## 2024-05-21 DIAGNOSIS — O9A.112 CANCER COMPLICATING PREGNANCY IN SECOND TRIMESTER: ICD-10-CM

## 2024-05-21 DIAGNOSIS — Z23 ENCOUNTER FOR IMMUNIZATION: ICD-10-CM

## 2024-05-21 PROBLEM — O22.33 DVT COMPLICATING PREGNANCY, THIRD TRIMESTER: Status: ACTIVE | Noted: 2024-01-11

## 2024-05-21 PROBLEM — O9A.113: Status: ACTIVE | Noted: 2023-12-14

## 2024-05-21 LAB
BASOPHILS # BLD AUTO: 0.02 THOUSANDS/ÂΜL (ref 0–0.1)
BASOPHILS NFR BLD AUTO: 0 % (ref 0–1)
EOSINOPHIL # BLD AUTO: 0.59 THOUSAND/ÂΜL (ref 0–0.61)
EOSINOPHIL NFR BLD AUTO: 9 % (ref 0–6)
ERYTHROCYTE [DISTWIDTH] IN BLOOD BY AUTOMATED COUNT: 18.4 % (ref 11.6–15.1)
HCT VFR BLD AUTO: 32.7 % (ref 34.8–46.1)
HGB BLD-MCNC: 10.4 G/DL (ref 11.5–15.4)
IMM GRANULOCYTES # BLD AUTO: 0.06 THOUSAND/UL (ref 0–0.2)
IMM GRANULOCYTES NFR BLD AUTO: 1 % (ref 0–2)
LYMPHOCYTES # BLD AUTO: 0.74 THOUSANDS/ÂΜL (ref 0.6–4.47)
LYMPHOCYTES NFR BLD AUTO: 11 % (ref 14–44)
MCH RBC QN AUTO: 30.2 PG (ref 26.8–34.3)
MCHC RBC AUTO-ENTMCNC: 31.8 G/DL (ref 31.4–37.4)
MCV RBC AUTO: 95 FL (ref 82–98)
MONOCYTES # BLD AUTO: 0.46 THOUSAND/ÂΜL (ref 0.17–1.22)
MONOCYTES NFR BLD AUTO: 7 % (ref 4–12)
NEUTROPHILS # BLD AUTO: 4.98 THOUSANDS/ÂΜL (ref 1.85–7.62)
NEUTS SEG NFR BLD AUTO: 72 % (ref 43–75)
NRBC BLD AUTO-RTO: 0 /100 WBCS
PLATELET # BLD AUTO: 118 THOUSANDS/UL (ref 149–390)
PMV BLD AUTO: 12.1 FL (ref 8.9–12.7)
RBC # BLD AUTO: 3.44 MILLION/UL (ref 3.81–5.12)
SL AMB  POCT GLUCOSE, UA: NORMAL
SL AMB POCT URINE PROTEIN: NORMAL
TREPONEMA PALLIDUM IGG+IGM AB [PRESENCE] IN SERUM OR PLASMA BY IMMUNOASSAY: NORMAL
WBC # BLD AUTO: 6.85 THOUSAND/UL (ref 4.31–10.16)

## 2024-05-21 PROCEDURE — 81002 URINALYSIS NONAUTO W/O SCOPE: CPT | Performed by: STUDENT IN AN ORGANIZED HEALTH CARE EDUCATION/TRAINING PROGRAM

## 2024-05-21 PROCEDURE — 36415 COLL VENOUS BLD VENIPUNCTURE: CPT

## 2024-05-21 PROCEDURE — 85025 COMPLETE CBC W/AUTO DIFF WBC: CPT

## 2024-05-21 PROCEDURE — PNV: Performed by: STUDENT IN AN ORGANIZED HEALTH CARE EDUCATION/TRAINING PROGRAM

## 2024-05-21 PROCEDURE — 86780 TREPONEMA PALLIDUM: CPT

## 2024-05-21 NOTE — ASSESSMENT & PLAN NOTE
34 yo here for ob visit. at 28+0 here for routine ob visit. No contractions, leaking or bleeding. Good fetal movement. Wants to review tdap with oncologist, plan next visit. Due to timing of this visit and other questions we briefly discussed delivery consent but it will need reviewed at a future visit in more detail and signed.

## 2024-05-21 NOTE — PROGRESS NOTES
Patient is here for prenatal ob visit today.    GA: 28w0d  BALTAZAR: 24    Urine: Protein NEG / Glucose NEG  Denies loss of fluid, vaginal bleeding or contractions.  Good fetal movement.   Patient is having a surprise!  28 week labs still active. Reminded to complete.  --Deferred gluc testing(bloodwork). Monitored sugars at home. Results attached to 5/15/24 phone encounter. Passed per Dr. Rose.  Blue folder given at PN1  Yellow folder given today  Delivery consent to be signed today  Breast pump ordered  Peds referral placed   Tdap considering. Possibly next visit.   Rhogam not needed. B positive.  Level 2 growth scheduled 24     Pt would like to discuss IOL around 38-39 weeks due to being on blood thinners. Recommended by MFM.     Wondering if safe to receive TDAP given her breast cancer hx and post chemo treatments.     OK to breast feed? L mastectomy.

## 2024-05-21 NOTE — PROGRESS NOTES
Transaminitis  LFT normalized    Thrombocytopenia affecting pregnancy, antepartum (HCC)  Plt stable at 123    DVT complicating pregnancy, second trimester  On lovenox. Will plan for scheduled delivery at 39 wks by IOL. Will request at future visit.    Cancer complicating pregnancy in second trimester  Discussed breast feeding from right breast. Reviewed that mammogram imaging will not be done until completed if desires to nurse. Encouraged she discuss with her breast surgeon and oncologist so she feels comfortable with the plan.    28 weeks gestation of pregnancy  32 yo here for ob visit. at 28+0 here for routine ob visit. No contractions, leaking or bleeding. Good fetal movement. Wants to review tdap with oncologist, plan next visit. Due to timing of this visit and other questions we briefly discussed delivery consent but it will need reviewed at a future visit in more detail and signed.

## 2024-05-21 NOTE — ASSESSMENT & PLAN NOTE
Discussed breast feeding from right breast. Reviewed that mammogram imaging will not be done until completed if desires to nurse. Encouraged she discuss with her breast surgeon and oncologist so she feels comfortable with the plan.

## 2024-05-21 NOTE — PROGRESS NOTES
Please refer to the [facility] ultrasound report in Ob Procedures for additional information regarding today's visit

## 2024-05-22 ENCOUNTER — HOSPITAL ENCOUNTER (OUTPATIENT)
Dept: INFUSION CENTER | Facility: CLINIC | Age: 34
Discharge: HOME | End: 2024-05-22
Payer: COMMERCIAL

## 2024-05-22 VITALS
HEART RATE: 55 BPM | DIASTOLIC BLOOD PRESSURE: 59 MMHG | SYSTOLIC BLOOD PRESSURE: 109 MMHG | TEMPERATURE: 96.4 F | RESPIRATION RATE: 18 BRPM

## 2024-05-22 DIAGNOSIS — E86.0 DEHYDRATION: Primary | ICD-10-CM

## 2024-05-22 PROCEDURE — 96374 THER/PROPH/DIAG INJ IV PUSH: CPT

## 2024-05-22 PROCEDURE — 96361 HYDRATE IV INFUSION ADD-ON: CPT

## 2024-05-22 RX ADMIN — SODIUM CHLORIDE 1000 ML: 0.9 INJECTION, SOLUTION INTRAVENOUS at 09:23

## 2024-05-22 RX ADMIN — ONDANSETRON 8 MG: 2 INJECTION INTRAMUSCULAR; INTRAVENOUS at 09:23

## 2024-05-22 NOTE — PROGRESS NOTES
Pt here for hydration, and zofran infusion, offering no complaints. Port a cath accessed with positive blood return noted. Tolerated infusion without incident. Port a cath flushed with positive blood return noted, de accessed without difficulty. AVS declined. Walked out in stable condition. Next appointment on 5/24/24 at 10:30am

## 2024-05-23 ENCOUNTER — TELEPHONE (OUTPATIENT)
Dept: HEMATOLOGY ONCOLOGY | Facility: CLINIC | Age: 34
End: 2024-05-23

## 2024-05-23 ENCOUNTER — ULTRASOUND (OUTPATIENT)
Dept: PERINATAL CARE | Facility: OTHER | Age: 34
End: 2024-05-23
Payer: COMMERCIAL

## 2024-05-23 VITALS
DIASTOLIC BLOOD PRESSURE: 62 MMHG | SYSTOLIC BLOOD PRESSURE: 106 MMHG | HEIGHT: 67 IN | HEART RATE: 94 BPM | WEIGHT: 173.2 LBS | BODY MASS INDEX: 27.18 KG/M2

## 2024-05-23 DIAGNOSIS — Z3A.28 28 WEEKS GESTATION OF PREGNANCY: ICD-10-CM

## 2024-05-23 DIAGNOSIS — O22.33 DVT COMPLICATING PREGNANCY, THIRD TRIMESTER: ICD-10-CM

## 2024-05-23 DIAGNOSIS — O9A.113 CANCER COMPLICATING PREGNANCY, THIRD TRIMESTER: Primary | ICD-10-CM

## 2024-05-23 DIAGNOSIS — D50.8 IRON DEFICIENCY ANEMIA SECONDARY TO INADEQUATE DIETARY IRON INTAKE: Primary | ICD-10-CM

## 2024-05-23 DIAGNOSIS — O09.523 ELDERLY MULTIGRAVIDA, THIRD TRIMESTER: ICD-10-CM

## 2024-05-23 PROCEDURE — 99214 OFFICE O/P EST MOD 30 MIN: CPT | Performed by: OBSTETRICS & GYNECOLOGY

## 2024-05-23 PROCEDURE — 76816 OB US FOLLOW-UP PER FETUS: CPT | Performed by: OBSTETRICS & GYNECOLOGY

## 2024-05-23 NOTE — TELEPHONE ENCOUNTER
"Rec'd callback from patient \"Akilah Burns, it's Josefina calling back. I went and got blood work done the other day but they wouldn't do the one for Doctor Winsome because it was put in there that it wasn't due until a certain time. If you are able to change the date then I can go get that done today at some point. Let me know if it goes to [facility] and I'm not a doctor's appointment. Alright, talk to you soon. Bye.\"    Spoke with patient and advised her that a new iron panel order has been placed and she can go at anytime. Patient will get this completed today and I informed her that I will call her with results. Patient verbalized understanding.  "

## 2024-05-23 NOTE — LETTER
May 23, 2024     Kathy Barillas MD  [address]  [address]  [facility] [address]    Patient: Jillian Ch   YOB: 1990   Date of Visit: 5/23/2024       Dear Dr. Le Barillas:    Thank you for referring Jillian Ch to me for evaluation. Below are my notes for this consultation.    If you have questions, please do not hesitate to call me. I look forward to following your patient along with you.         Sincerely,        Hari Alcocer MD        CC: No Recipients    Hari Alcocer MD  5/23/2024 10:28 AM  Sign when Signing Visit  Please refer to the [facility] ultrasound report in Ob Procedures for additional information regarding today's visit

## 2024-05-23 NOTE — TELEPHONE ENCOUNTER
Left detailed VM for patient to let her know that she needs to get a new iron panel completed so that we know if she needs more iron infusions. I left my direct teams number for her to call back if needed.

## 2024-05-24 ENCOUNTER — TELEPHONE (OUTPATIENT)
Dept: HEMATOLOGY ONCOLOGY | Facility: CLINIC | Age: 34
End: 2024-05-24

## 2024-05-24 ENCOUNTER — HOSPITAL ENCOUNTER (OUTPATIENT)
Dept: INFUSION CENTER | Facility: CLINIC | Age: 34
End: 2024-05-24
Payer: COMMERCIAL

## 2024-05-24 ENCOUNTER — PATIENT MESSAGE (OUTPATIENT)
Dept: HEMATOLOGY ONCOLOGY | Facility: CLINIC | Age: 34
End: 2024-05-24

## 2024-05-24 VITALS
HEART RATE: 80 BPM | TEMPERATURE: 96 F | SYSTOLIC BLOOD PRESSURE: 117 MMHG | DIASTOLIC BLOOD PRESSURE: 67 MMHG | RESPIRATION RATE: 18 BRPM

## 2024-05-24 DIAGNOSIS — D50.8 IRON DEFICIENCY ANEMIA SECONDARY TO INADEQUATE DIETARY IRON INTAKE: ICD-10-CM

## 2024-05-24 DIAGNOSIS — E86.0 DEHYDRATION: Primary | ICD-10-CM

## 2024-05-24 DIAGNOSIS — O22.30 DVT (DEEP VEIN THROMBOSIS) IN PREGNANCY: Primary | ICD-10-CM

## 2024-05-24 LAB
FERRITIN SERPL-MCNC: 146 NG/ML (ref 11–307)
IRON SATN MFR SERPL: 24 % (ref 15–50)
IRON SERPL-MCNC: 88 UG/DL (ref 50–212)
TIBC SERPL-MCNC: 363 UG/DL (ref 250–450)
UIBC SERPL-MCNC: 275 UG/DL (ref 155–355)

## 2024-05-24 PROCEDURE — 83550 IRON BINDING TEST: CPT | Performed by: INTERNAL MEDICINE

## 2024-05-24 PROCEDURE — 96361 HYDRATE IV INFUSION ADD-ON: CPT

## 2024-05-24 PROCEDURE — 96374 THER/PROPH/DIAG INJ IV PUSH: CPT

## 2024-05-24 PROCEDURE — 83540 ASSAY OF IRON: CPT | Performed by: INTERNAL MEDICINE

## 2024-05-24 PROCEDURE — 82728 ASSAY OF FERRITIN: CPT | Performed by: INTERNAL MEDICINE

## 2024-05-24 RX ADMIN — SODIUM CHLORIDE 1000 ML: 0.9 INJECTION, SOLUTION INTRAVENOUS at 10:57

## 2024-05-24 RX ADMIN — ONDANSETRON 8 MG: 2 INJECTION INTRAMUSCULAR; INTRAVENOUS at 10:57

## 2024-05-24 NOTE — TELEPHONE ENCOUNTER
Patient called and  left to inform patient that Dr Khalil ordered a venous duplex of her legs to r/o any blood clots. Informed pt to give us a call back to let us know dates and times that work for her so we could schedule. Also informed pt that a my chart message will be sent with this information as well. [facility] direct number left for pt to call back.

## 2024-05-24 NOTE — TELEPHONE ENCOUNTER
"Rec'd callback from patient \"Wojciech Burns, it's Josefina calling and Jose Luis's nurse Aaron called and she gave me your number to call back to schedule an appointment for an ultrasound to see about the blood clot in my leg. I'm not sure if you were the right person to call, but this was the number she gave me back the call. So if you give me a call back and I don't answer, I am at the infusion center and won't have any service like once I'm sitting in my chair. I'll call you back as soon as I'm done at infusion. OK, thanks. Bye.\"  "

## 2024-05-24 NOTE — PROGRESS NOTES
Pt here for hydration, and zofran infusion, offering no complaints. Port a cath accessed with positive blood return noted, labs collected. Tolerated infusion without incident. Port a cath flushed positive blood return noted, de accessed without difficulty. removed. AVS declined. Walked out in stable condition. Next appointment on 5/29/24 at 3pm.    
05-Mar-2024 15:00

## 2024-05-29 ENCOUNTER — HOSPITAL ENCOUNTER (OUTPATIENT)
Dept: INFUSION CENTER | Facility: CLINIC | Age: 34
Discharge: HOME | End: 2024-05-29
Payer: COMMERCIAL

## 2024-05-29 VITALS
TEMPERATURE: 96.9 F | HEART RATE: 76 BPM | DIASTOLIC BLOOD PRESSURE: 74 MMHG | SYSTOLIC BLOOD PRESSURE: 110 MMHG | RESPIRATION RATE: 16 BRPM

## 2024-05-29 DIAGNOSIS — E86.0 DEHYDRATION: Primary | ICD-10-CM

## 2024-05-29 RX ADMIN — ONDANSETRON 8 MG: 2 INJECTION INTRAMUSCULAR; INTRAVENOUS at 15:19

## 2024-05-29 RX ADMIN — SODIUM CHLORIDE 1000 ML: 0.9 INJECTION, SOLUTION INTRAVENOUS at 15:15

## 2024-05-29 NOTE — PROGRESS NOTES
Pt here for hydration and zofran infusion, offering no complaints. PAC accessed with positive blood return noted. Tolerated entire infusion without complications. PAC flushed and de-accessed. AVS declined. Next appt 5/31 at 3pm. Walked out in stable condition.

## 2024-05-30 ENCOUNTER — PATIENT OUTREACH (OUTPATIENT)
Dept: HEMATOLOGY ONCOLOGY | Facility: CLINIC | Age: 34
End: 2024-05-30

## 2024-05-30 DIAGNOSIS — O22.30 DVT (DEEP VEIN THROMBOSIS) IN PREGNANCY: Primary | ICD-10-CM

## 2024-05-30 DIAGNOSIS — Z3A.26 26 WEEKS GESTATION OF PREGNANCY: ICD-10-CM

## 2024-05-30 DIAGNOSIS — Z34.82 PRENATAL CARE, SUBSEQUENT PREGNANCY, SECOND TRIMESTER: ICD-10-CM

## 2024-05-30 DIAGNOSIS — Z17.0 MALIGNANT NEOPLASM OF OVERLAPPING SITES OF LEFT BREAST IN FEMALE, ESTROGEN RECEPTOR POSITIVE (HCC): ICD-10-CM

## 2024-05-30 DIAGNOSIS — C50.812 MALIGNANT NEOPLASM OF OVERLAPPING SITES OF LEFT BREAST IN FEMALE, ESTROGEN RECEPTOR POSITIVE (HCC): ICD-10-CM

## 2024-05-30 RX ORDER — BLOOD SUGAR DIAGNOSTIC
STRIP MISCELLANEOUS
Qty: 100 STRIP | Refills: 3 | Status: SHIPPED | OUTPATIENT
Start: 2024-05-30

## 2024-05-30 NOTE — PROGRESS NOTES
I reached out and spoke with Jillian now that consults have been completed with the oncology teams to review for any barriers to care and offer supportive services as needed. I reviewed and updated the members assigned to the care team in [facility].   She knows the members of the care team as well as how and when to contact them with any needs.   She verbalizes managing the schedules well.   They are currently driving themselves and deny any transportation needs.    She denies any uncontrolled symptoms. Discussed role of Palliative Care in symptom and side effect management. Declined referral at this time.  Patients states that they are eating and drinking as per usual with no unintentional weight loss.     Patient does not smoke.   Patient states she is well supported by family and friends.    Patient feels they have inadequate financial resources related to Medical bills. Patient has been working with with [employer].     Pt did express interest in having a script for a wig so I will forward that request to Dr Khalil's care team.  I will mail out some literature to the Pt on different locations where she can go to view the wigs that are available.    Discussed the plan for follow-up with the patient. Based on their individual needs I will follow up with them in about 4-6 weeks.   I have provided my direct contact information to the patient and welcome them to contact me if their needs as discussed above change. They were appreciative for the call.

## 2024-05-31 ENCOUNTER — TELEPHONE (OUTPATIENT)
Dept: HEMATOLOGY ONCOLOGY | Facility: CLINIC | Age: 34
End: 2024-05-31

## 2024-05-31 ENCOUNTER — HOSPITAL ENCOUNTER (OUTPATIENT)
Dept: INFUSION CENTER | Facility: CLINIC | Age: 34
End: 2024-05-31
Payer: COMMERCIAL

## 2024-05-31 VITALS
TEMPERATURE: 96.9 F | SYSTOLIC BLOOD PRESSURE: 132 MMHG | RESPIRATION RATE: 17 BRPM | HEART RATE: 89 BPM | DIASTOLIC BLOOD PRESSURE: 64 MMHG

## 2024-05-31 DIAGNOSIS — E86.0 DEHYDRATION: Primary | ICD-10-CM

## 2024-05-31 PROCEDURE — 96374 THER/PROPH/DIAG INJ IV PUSH: CPT

## 2024-05-31 PROCEDURE — 96361 HYDRATE IV INFUSION ADD-ON: CPT

## 2024-05-31 RX ADMIN — ONDANSETRON 8 MG: 2 INJECTION INTRAMUSCULAR; INTRAVENOUS at 15:16

## 2024-05-31 RX ADMIN — SODIUM CHLORIDE 1000 ML: 0.9 INJECTION, SOLUTION INTRAVENOUS at 15:15

## 2024-05-31 NOTE — PROGRESS NOTES
Pt here for hydration infusion, offering no complaints. Port a cath accessed with positive blood return noted. Tolerated infusion without incident. Port a cath flushed positive blood return noted de accessed without difficulty. AVS declined. Walked out in stable condition. Next appointment on 6/3/24 at 12:30pm.

## 2024-06-02 PROBLEM — U07.1 COVID-19 VIRUS INFECTION: Status: RESOLVED | Noted: 2022-07-18 | Resolved: 2024-06-02

## 2024-06-02 PROBLEM — G89.18 POST-OPERATIVE PAIN: Status: RESOLVED | Noted: 2021-08-18 | Resolved: 2024-06-02

## 2024-06-02 PROBLEM — R13.10 DYSPHAGIA: Status: RESOLVED | Noted: 2023-08-22 | Resolved: 2024-06-02

## 2024-06-02 PROBLEM — R10.31 RLQ ABDOMINAL PAIN: Status: RESOLVED | Noted: 2024-05-06 | Resolved: 2024-06-02

## 2024-06-02 PROBLEM — F41.9 ANXIETY: Status: RESOLVED | Noted: 2019-08-14 | Resolved: 2024-06-02

## 2024-06-02 PROBLEM — R07.89 CHEST WALL PAIN: Status: RESOLVED | Noted: 2019-09-05 | Resolved: 2024-06-02

## 2024-06-02 PROBLEM — R07.89 MUSCULOSKELETAL CHEST PAIN: Status: RESOLVED | Noted: 2022-06-21 | Resolved: 2024-06-02

## 2024-06-02 PROBLEM — R10.13 EPIGASTRIC PAIN: Status: RESOLVED | Noted: 2024-04-29 | Resolved: 2024-06-02

## 2024-06-02 PROBLEM — Z20.822 CLOSE EXPOSURE TO COVID-19 VIRUS: Status: RESOLVED | Noted: 2021-04-21 | Resolved: 2024-06-02

## 2024-06-02 PROBLEM — M79.10 MYALGIA: Status: RESOLVED | Noted: 2019-10-18 | Resolved: 2024-06-02

## 2024-06-03 ENCOUNTER — NUTRITION (OUTPATIENT)
Dept: NUTRITION | Facility: CLINIC | Age: 34
End: 2024-06-03

## 2024-06-03 ENCOUNTER — HOSPITAL ENCOUNTER (OUTPATIENT)
Dept: INFUSION CENTER | Facility: CLINIC | Age: 34
Discharge: HOME | End: 2024-06-03
Payer: COMMERCIAL

## 2024-06-03 VITALS
TEMPERATURE: 96.4 F | HEART RATE: 90 BPM | RESPIRATION RATE: 20 BRPM | DIASTOLIC BLOOD PRESSURE: 72 MMHG | SYSTOLIC BLOOD PRESSURE: 106 MMHG

## 2024-06-03 DIAGNOSIS — Z71.3 NUTRITIONAL COUNSELING: Primary | ICD-10-CM

## 2024-06-03 DIAGNOSIS — Z3A.26 26 WEEKS GESTATION OF PREGNANCY: ICD-10-CM

## 2024-06-03 DIAGNOSIS — Z34.82 PRENATAL CARE, SUBSEQUENT PREGNANCY, SECOND TRIMESTER: ICD-10-CM

## 2024-06-03 DIAGNOSIS — E86.0 DEHYDRATION: Primary | ICD-10-CM

## 2024-06-03 RX ADMIN — SODIUM CHLORIDE 1000 ML: 0.9 INJECTION, SOLUTION INTRAVENOUS at 13:09

## 2024-06-03 RX ADMIN — ONDANSETRON 8 MG: 2 INJECTION INTRAMUSCULAR; INTRAVENOUS at 13:09

## 2024-06-03 NOTE — PROGRESS NOTES
Outpatient Oncology Nutrition Consultation   Type of Consult: Follow Up  Care Location: [facility]    Reason for referral: Notification from RN Navigator Kamla HAYWOOD on 12/7/23 that pt has triggered for oncology nutrition care (reason for referral: Patient is pregnant and newly diagnosed with breast cancer. Had questions regarding nutrition and sugar intake, etc).     Nutrition Assessment:   Oncology Diagnosis & Treatments:   Diagnosed with left beast cancer, ER positive, 12/2/23.   S/p left mastectomy 1/2/24.   Chemotherapy (doxorubicin, cytoxan) 2/21/24-4/3/24.   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, ER 80-85% IN 90-95% Her2 1+, han 2, cT2(2.1 cm)N0M0    1/2/2023 - left sided mastectomy with SLNBX - [id number] - oncotype 23    2/21/2024 - start AC q 3 weeks    3/13/2024 - cycle 2 adriamycin dose reduced to 50 mg/m2 and cytoxan dose reduced by 10% due to N/V    4/2024 - stop after 3 cycles of AC due to severe nausea and dehydration with coexisting hyperemesis gravidarum     Malignant neoplasm of overlapping sites of left breast in female, estrogen receptor positive (HCC)   12/2/2023 Initial Diagnosis    Malignant neoplasm of overlapping sites of left female breast (HCC)     12/2/2023 Biopsy    Bilateral breast ultrasound guided biopsy  A. Breast, Left, US Guided Left Breast Biopsy 12:00 6cmfn:  Invasive breast carcinoma of no special type (ductal) with osteoclast-like stromal giant cells  Grade 2  ER 85  IN 95  HER2 1+     B. Breast, Right, US Guided Right Breast Biopsy 10:00 9cmfn:  Benign breast tissue.    In review of the patient’s recent imaging, the left malignancy appears unifocal.  The biopsy-proven carcinoma measured 2.1 cm on ultrasound. Ultrasound of the left axilla was performed on 11/24/2023 and showed no suspicious adenopathy.  Recent imaging of the contralateral right breast dated 12/2/2023 and 11/24/2023 was  reviewed and shows no suspicious findings.  The pathology results for the ultrasound-guided core needle biopsy (right breast 10:00, 9 cm from the nipple) are benign and concordant with imaging.     12/2/2023 Genetic Testing    [facility]  A total of 36 genes were evaluated, including: APC, TOPHER, BARD 1, BMPR1A, BRCA1, BRCA2, BRIP1, CDH1, CDK4, CKDN2A, CHEK2, DICER1, MLH1, MSH2, MSH6, MUTYH, NBN, NF1, NTHL1, PALB2, PMS2, PTEN, RAD51C, RECQL, SMAD4, SMARCA4, STK11, TP53, AXIN2, HOXB13, MSH3, POLD1, AND POLE, EPCAM, AND GREM1   Negative result. No pathogenic sequence variants or deletions/duplications identified       12/2/2023 -  Cancer Staged    Staging form: Breast, AJCC 8th Edition  - Clinical stage from 12/2/2023: Stage IB (cT2, cN0, cM0, G2, ER+, RI+, HER2-) - Signed by Elena Cornell MD on 12/13/2023  Stage prefix: Initial diagnosis  Histologic grading system: 3 grade system       1/2/2024 Surgery    Left breast mastectomy with sentinel lymph node biopsy  Invasive Mammary carcinoma of no special type (ductal)   Grade 2  25 mm  Margins negative  0/2 Lymph nodes  Anatomic stage IIA  Prognostic stage IA      2/21/2024 - 4/3/2024 Chemotherapy    DOXOrubicin (ADRIAMYCIN), 56.25 mg/m2 = 106 mg (93.8 % of original dose 60 mg/m2), Intravenous, Once, 3 of 3 cycles  Dose modification: 56.25 mg/m2 (original dose 60 mg/m2, Cycle 1, Reason: Other (Must fill in a comment), Comment: max recommended dose), 50 mg/m2 (original dose 60 mg/m2, Cycle 2, Reason: Nausea/Vomiting, Comment: dose reduced to 50 mg/m2 due to n/v)  Administration: 106 mg (2/21/2024), 94 mg (3/13/2024), 94 mg (4/3/2024)  alteplase (CATHFLO), 2 mg, Intracatheter, Every 1 Minute as needed, 3 of 3 cycles  cyclophosphamide (CYTOXAN) IVPB, 600 mg/m2 = 1,128 mg, Intravenous, Once, 3 of 3 cycles  Dose modification: 540 mg/m2 (original dose 600 mg/m2, Cycle 2, Reason: Nausea/Vomiting, Comment: 10% dose reduction due to n/v)  Administration: 1,128 mg  (2/21/2024), 1,000 mg (3/13/2024), 1,000 mg (4/3/2024)  fosaprepitant (EMEND) IVPB, 150 mg, Intravenous, Once, 3 of 3 cycles  Administration: 150 mg (2/21/2024), 150 mg (3/13/2024), 150 mg (4/3/2024)     Cancer complicating pregnancy, third trimester   12/14/2023 Initial Diagnosis    Cancer complicating pregnancy in second trimester       Past Medical & Surgical Hx:   Patient Active Problem List   Diagnosis    Mild persistent asthma without complication    Axillary hidradenitis suppurativa    Gastroesophageal reflux disease without esophagitis    Depression with anxiety    Chronic fatigue    Chronic left shoulder pain    Cervical disc disorder at C5-C6 level with radiculopathy    Atypical squamous cells of undetermined significance (ASCUS) on Papanicolaou smear of cervix    Hypertrophic and atrophic condition of skin    Migraine headache    Shoulder impingement syndrome, left    Vitamin D deficiency    Thoracic outlet syndrome    Palpitations    Transaminitis    Right middle lobe pulmonary nodule    Reversible airway obstruction    SOB (shortness of breath)    Unexplained weight gain    Left ovarian cyst    Malignant neoplasm of overlapping sites of left breast in female, estrogen receptor positive (HCC)    Cancer complicating pregnancy, third trimester    28 weeks gestation of pregnancy    History of left mastectomy    Multigravida of advanced maternal age in first trimester    DVT complicating pregnancy, third trimester    Dehydration    Iron deficiency anemia secondary to inadequate dietary iron intake    Marginal insertion of umbilical cord affecting management of mother    Encounter for follow-up surveillance of breast cancer    Thrombocytopenia affecting pregnancy, antepartum (HCC)     Past Medical History:   Diagnosis Date    Anesthesia complication     gait dysfunction/ urinary retention after nerve block,    Anxiety     Asthma     CRPS (complex regional pain syndrome), upper limb     left chest/arm/hand     Deep vein thrombosis (HCC) 01/05/2024    post op from mastectomy    GERD (gastroesophageal reflux disease)     Heart murmur     HPV (human papilloma virus) infection 2012    unsure of 16, 18, or other    Invasive ductal carcinoma of breast, female, left (HCC) 2023    Kidney stone     Miscarriage 3/15/2015    7 weeks along.    Ovarian cyst     Left    PONV (postoperative nausea and vomiting) 01/02/2024     Past Surgical History:   Procedure Laterality Date    COLPOSCOPY  2012    HPV    EGD      IR PORT PLACEMENT  2/7/2024    IR UPPER EXTREMITY VENOGRAM- DIAGNOSTIC  05/28/2021    NV BX/EXC LYMPH NODE OPEN SUPERFICIAL Left 01/02/2024    Procedure: LEFT BREAST MASTECTOMY, LYMPHATIC MAPPING WITH RADIOACTIVE DYE, SENTINEL LYMPH NODE BIOPSY, ZAHEER LEFT BREAST, INJECTION IN OR AT 0800 BY DR CORNELL;  Surgeon: Elena Cornell MD;  Location: [facility];  Service: Surgical Oncology    NV EXCISION 1ST &/CERVICAL RIB Left 08/12/2021    Procedure: FIRST RIB RESECTION;  Surgeon: Jonathan Schaffer MD;  Location: [facility];  Service: Thoracic    NV INJ RADIOACTIVE TRACER FOR ID OF SENTINEL NODE Left 01/02/2024    Procedure: LEFT BREAST MASTECTOMY, LYMPHATIC MAPPING WITH RADIOACTIVE DYE, SENTINEL LYMPH NODE BIOPSY, ZAHEER LEFT BREAST, INJECTION IN OR AT 0800 BY DR CORNELL;  Surgeon: Elena Cornell MD;  Location: [facility];  Service: Surgical Oncology    NV INTRAOP SENTINEL LYMPH NODE ID W/DYE INJECTION Left 01/02/2024    Procedure: LEFT BREAST MASTECTOMY, LYMPHATIC MAPPING WITH RADIOACTIVE DYE, SENTINEL LYMPH NODE BIOPSY, ZAHEER LEFT BREAST, INJECTION IN OR AT 0800 BY DR CORNELL;  Surgeon: Elena Cornell MD;  [facility];  Service: Surgical Oncology    NV MASTECTOMY SIMPLE COMPLETE Left 01/02/2024    Procedure: LEFT BREAST MASTECTOMY, LYMPHATIC MAPPING WITH RADIOACTIVE DYE, SENTINEL LYMPH NODE BIOPSY, ZAHEER LEFT BREAST, INJECTION IN OR AT 0800 BY DR CORNELL;  Surgeon: Elena Cornell MD;   Location: [facility];  Service: Surgical Oncology    THORACOSCOPY VIDEO ASSISTED SURGERY (VATS) Left 08/12/2021    Procedure: THORACOSCOPY VIDEO ASSISTED SURGERY (VATS);  Surgeon: Jonathan Schaffer MD;  Location: [facility];  Service: Thoracic    US GUIDANCE BREAST BIOPSY LEFT EACH ADDITIONAL Left 12/02/2023    US GUIDED BREAST BIOPSY RIGHT COMPLETE Right 12/02/2023    WISDOM TOOTH EXTRACTION  2012       Review of Medications:   Vitamins, Supplements and Herbals: Med List Reviewed & pt is only taking: Prenatal MVI                                                                                                                                                                                                                                                                                                                                                                                                                                                                                                                             Current Outpatient Medications:     albuterol (PROVENTIL HFA,VENTOLIN HFA) 90 mcg/act inhaler, TAKE 2 PUFFS BY MOUTH EVERY 6 HOURS AS NEEDED FOR WHEEZE OR FOR SHORTNESS OF BREATH, Disp: 18 g, Rfl: 3    Blood Glucose Monitoring Suppl (OneTouch Verio Flex System) w/Device KIT, Test x4 Daily or as instructed (Patient not taking: Reported on 5/21/2024), Disp: 1 kit, Rfl: 0    cetirizine (ZyrTEC) 10 mg tablet, TAKE 1 TABLET BY MOUTH EVERY DAY, Disp: 90 tablet, Rfl: 0    CRANIAL PROSTHESIS, RX,, Place one on head as needed, Disp: 1 each, Rfl: 0    enoxaparin (LOVENOX) 80 mg/0.8 mL, Inject 75mg SC every 12 hours, Disp: 48 mL, Rfl: 2    lidocaine-prilocaine (EMLA) cream, Apply to port 30 to 60 min prior to use, Disp: 30 g, Rfl: 2    omeprazole (PriLOSEC) 40 MG capsule, Take 1 capsule (40 mg total) by mouth daily, Disp: 90 capsule, Rfl: 2    ondansetron (ZOFRAN) 8 mg tablet, Take 1 tablet (8 mg total) by mouth every  "8 (eight) hours as needed for nausea or vomiting, Disp: 30 tablet, Rfl: 3    OneTouch Delica Lancets 33G MISC, Use 4 a Day or as instructed (Patient not taking: Reported on 2024), Disp: 100 each, Rfl: 0    OneTouch Verio test strip, TEST 4 TIMES DAILY OR AS INSTRUCTED, Disp: 100 strip, Rfl: 3    Prenatal Multivit-Min-Fe-FA (PRE-SONIA PO), Take 1 tablet by mouth in the morning, Disp: , Rfl:   No current facility-administered medications for this visit.    Facility-Administered Medications Ordered in Other Visits:     alteplase (CATHFLO) injection 2 mg, 2 mg, Intracatheter, Q1MIN PRN, Nemo Khalil,     Most Recent Lab Results:   Lab Results   Component Value Date    WBC 6.85 2024    NEUTROABS 4.98 2024    IRON 88 2024    TIBC 363 2024    FERRITIN 146 2024    CHOLESTEROL 127 2023    TRIG 56 2023    HDL 43 (L) 2023    LDLCALC 73 2023    ALT 25 2024    AST 18 2024    ALB 3.5 2024    SODIUM 137 2024    SODIUM 135 2024    K 3.4 (L) 2024    K 3.9 2024     2024    BUN 5 2024    BUN 5 2024    CREATININE 0.43 (L) 2024    CREATININE 0.46 (L) 2024    EGFR 132 2024    PHOS 3.5 2024    POCGLU 71 2024    GLUF 79 2024    GLUF 76 2024    GLUC 104 2024    HGBA1C 5.4 2023    HGBA1C 5.1 2023    CALCIUM 8.8 2024    MG 1.8 (L) 2024       Anthropometric Measurements:   Height: 67\"  Ht Readings from Last 1 Encounters:   24 5' 7\" (1.702 m)     Wt Readings from Last 37 Encounters:   24 78.6 kg (173 lb 3.2 oz)   24 79 kg (174 lb 3.2 oz)   05/10/24 78.8 kg (173 lb 12.8 oz)   24 77 kg (169 lb 11.2 oz)   24 75.3 kg (166 lb)   24 75.6 kg (166 lb 9.6 oz)   24 75.6 kg (166 lb 9.6 oz)   24 75.8 kg (167 lb)   24 76 kg (167 lb 9.6 oz)   24 75.4 kg (166 lb 3.2 oz)   24 74.8 kg (165 lb) "   03/28/24 76 kg (167 lb 9.6 oz)   03/15/24 75.3 kg (166 lb)   03/13/24 75.8 kg (167 lb)   03/12/24 74.4 kg (164 lb)   03/08/24 76 kg (167 lb 9.6 oz)   02/29/24 73.7 kg (162 lb 6.4 oz)   02/21/24 74.8 kg (165 lb)   02/09/24 77.1 kg (170 lb)   02/08/24 73.9 kg (163 lb)   02/07/24 74 kg (163 lb 2.3 oz)   01/30/24 77.5 kg (170 lb 12.8 oz)   01/24/24 76.7 kg (169 lb)   01/23/24 76.7 kg (169 lb)   01/18/24 77.6 kg (171 lb)   01/17/24 77.8 kg (171 lb 8 oz)   01/11/24 77.6 kg (171 lb)   01/11/24 76.9 kg (169 lb 9.6 oz)   01/09/24 78 kg (172 lb)   01/02/24 78.2 kg (172 lb 6.4 oz)   12/29/23 78.8 kg (173 lb 12.8 oz)   12/27/23 78.8 kg (173 lb 12.8 oz)   12/21/23 81.1 kg (178 lb 12.8 oz)   12/20/23 80.3 kg (177 lb)   12/15/23 81.6 kg (180 lb)   12/14/23 81.3 kg (179 lb 3.2 oz)   12/13/23 80.3 kg (177 lb)     Weight History:   Usual Weight: 130-145#  Varian: n/a  Home Scale: none    Oncology Nutrition-Anthropometrics      Flowsheet Row Nutrition from 6/3/2024 in [facility] Dietitian [place] Nutrition from 5/13/2024 in [facility] Oncology Dietitian [place]   Patient [age] (years): 33 years 33 years   Patient (female) height (in): 67 in 67 in   Current Weight to be used for anthropometric calculations (lbs) 173.2 lbs 173.8 lbs   Current Weight to be used for anthropometric calculations (kg) 78.7 kg 79 kg   BMI: 27.1 27.2   IBW female: 135 lbs 135 lbs   IBW (kg) female: 61.4 kg 61.4 kg   IBW % (female) 128.3 % 128.7 %   Adjusted BW (female): 144.6 lbs 144.7 lbs   Adjusted BW kg (female): 65.7 kg 65.8 kg   % weight change after 1 week: -0.3 % 4.3 %   Weight change after 1 week (lbs) -0.6 lbs 7.2 lbs   % weight change after 1 month: 3.7 % 3.7 %   Weight change after 1 month (lbs) 6.2 lbs 6.2 lbs   % weight change after 3 months: 5 % 2.2 %   Weight change after 3 months (lbs) 8.2 lbs 3.8 lbs   % weight change after 6 months: -- -4 %   Weight change after 6 months (lbs) -- -7.2 lbs            Nutrition-Focused Physical  Findings: none observed    Food/Nutrition-Related History & Client/Social History:    Current Nutrition Impact Symptoms:  [] Nausea   -improving; using zofran prn  [x] Reduced Appetite -continues but improving  [] Acid Reflux    [] Vomiting  [] Unintended Wt Loss   -weigh has stabilized  [] Malabsorption    [] Diarrhea   -imodium prn   -with intake of certain foods: sauces, some vegetables, meats  [] Unintended Wt Gain  [] Dumping Syndrome    [] Constipation   -miralax and colace are helping  [] Thick Mucous/Secretions  [] Abdominal Pain    [] Dysgeusia (Altered Taste)  [] Xerostomia (Dry Mouth)  [] Gas    [] Dysosmia (Altered Smell)  [] Thrush  [] Difficulty Chewing    [] Oral Mucositis (Sore Mouth)  [x] Fatigue  [] Hyperglycemia   Lab Results   Component Value Date    HGBA1C 5.4 12/02/2023      [] Odynophagia  [] Esophagitis  [] Other: hypokalemia 5/20   [] Dysphagia  [] Early Satiety  [] No Problems Eating      Food Allergies & Intolerances: yes: lactose intolerant     Current Diet: Lactose-Free and No red meat   Current Nutrition Intake: Increased since last visit  Appetite: Fair   Nutrition Route: PO  Oral Care: brushes QD  Activity level: Dizzy often in the morning. Degenerative disc disease limits physical activity.     24 Hr Diet Recall: has been increasing the volume of food that she is eating   Breakfast: bread; eggs; cereal with almond milk   Snacks: cheese, grapes, banana  Dinner: tofu; potatoes, squash   Snack: almond milk cottage cheese; or coconut milk yogurt      Beverages: water with Pedialyte or gatorade (32oz x1); water (32oz x2-4), ginger ale (12oz x0-1), IV hydration 3x weekly.   Supplements:   Plant based protein shake 1x daily     Oncology Nutrition-Estimated Needs      [facility]   Weight type used Actual weight Actual weight   Weight in kilograms (kg)  used for estimated needs 78.7 kg --   Energy needs formula:  Other (single)  [Estimated Energy Requirements (EER) = nonpregnant EER +452kcal for 3rd trimester pregnancy] Other (single)  [Estimated Energy Requirements = nonpregnant EER + 340 kcal for 2nd trimester pregnancy]   Single value (kcal/kg): 3066 2661   Energy needs based on single value: 082757 kcal --   Protein needs formula: 1.5-2 g/kg 1.5-2 g/kg   Protein needs based on 1.5 g/kg 118 g 119 g   Protein needs based on 2 g/kg 158 g 158 g   Fluid needs formula: 35-40 mL/kg 35-40 mL/kg   Fluid needs based on 35 mL/kg 2751 mL 2769 mL   Fluid needs in ounces 93 oz 94 oz   Fluid needs based on 40 mL/kg 3144 mL 3164 mL   Fluid needs in ounces 106 oz 107 oz          **Estimated nutrition needs are based on comparative standards for 3rd trimester pregnancy.     Discussion & Intervention:   Jillian was evaluated today for an RD follow up regarding poor po intake and pt request for diet guidance throughout treatment .  Jillian is currently undergoing tx for breast cancer . She has discontinued chemotherapy. She is doing okay today. Her appetite has slightly improved. She has not been able to tolerate eating any new foods, but she has been able to increase the volume of food that she is eating at meals. She has also been hydrating, and continues to use Pedialyte to hydrate. Based on today's assessment, discussion included: MNT for: breast cancer  recovery, weight management, appetite loss, adequate hydration & fluid choices, eating smaller more frequent meals every 2-3 hours (5-6 small meals/day), eating when feeling most hungry, and continuing oral nutrition supplements.   Moving forward, Jillian will continue current nutrition plan of care.  Materials Provided: not applicable   All questions and concerns addressed during today’s visit.  Jillian has RD contact information.    Nutrition Diagnosis:   Inadequate Energy Intake related to physiological causes, disease state and  treatment related issues as evidenced by food recall, wt loss and discussion with pt and/or family.  Increased Nutrient Needs (kcal & pro) related to increased demand for nutrients and disease state as evidenced by recovering from cancer tx.  Increased Nutrient Needs related to increased demand for nutrients as evidenced by 3rd trimester pregnancy.  Monitoring & Evaluation:   Goals:  weight maintenance/stabilization  adequate nutrition impact symptom management  pt to meet >/=75% estimated nutrition needs daily  increase protein intake  increase fluid intake  increase calorie intake    Progress Towards Goals: Progressing    Nutrition Rx & Recommendations:  Diet: high calorie, high protein, easy on the stomach   Small, frequent meals/snacks may be easier to tolerate than 3 large daily meals.  Aim for 5-6 small meals per day (every 2-3 hours).  Include protein at all meals/snacks.  Include a variety of foods (as tolerated/allowed by diet).  Incorporate physical activity as able/allowed.  Stay hydrated by sipping fluids of choice/tolerance throughout the day.  Liquid nutrition may be better tolerated than solids at times.  Alter food choices and eating patterns to accommodate changing needs.  Light physical activity (such as walking) is encouraged, as able/tolerated.  Weigh yourself regularly. If you notice weight loss, make an effort to increase your daily food/calorie intake. If you continue to notice loss after these efforts, reach out to your dietitian to establish a plan to stabilize weight.  Food and drink that are easy on the stomach: clear broth (chicken, vegetable, bone, mushroom, beef), juice (caution with acidic juices), potatoes, pretzels, crackers, cereal (Corn Flakes, Rice Chex, Rice Crispies, Cheerios), oatmeal or cream of wheat, white bread or toast, white rice, cooked vegetables (caution with gas producing vegetables), plain pasta, eggs, peanut butter, boiled chicken or turkey, soft cheeses. Foods to  avoid: spicy, fried/greasy, high in added sugar, acidic.   Keep a log of foods that you can tolerate and ones that you cannot tolerate.   Continue Pedialyte and gatorade mixed with water to aid in hydration.   Protein sources: eggs, tofu, yogurt, cottage cheese, cheese, protein shake     Follow Up Plan:   6/24/24 during hydration    Recommend Referral to Other Providers: none at this time

## 2024-06-03 NOTE — PATIENT INSTRUCTIONS
Nutrition Rx & Recommendations:  Diet: high calorie, high protein, easy on the stomach   Small, frequent meals/snacks may be easier to tolerate than 3 large daily meals.  Aim for 5-6 small meals per day (every 2-3 hours).  Include protein at all meals/snacks.  Include a variety of foods (as tolerated/allowed by diet).  Incorporate physical activity as able/allowed.  Stay hydrated by sipping fluids of choice/tolerance throughout the day.  Liquid nutrition may be better tolerated than solids at times.  Alter food choices and eating patterns to accommodate changing needs.  Light physical activity (such as walking) is encouraged, as able/tolerated.  Weigh yourself regularly. If you notice weight loss, make an effort to increase your daily food/calorie intake. If you continue to notice loss after these efforts, reach out to your dietitian to establish a plan to stabilize weight.  Food and drink that are easy on the stomach: clear broth (chicken, vegetable, bone, mushroom, beef), juice (caution with acidic juices), potatoes, pretzels, crackers, cereal (Corn Flakes, Rice Chex, Rice Crispies, Cheerios), oatmeal or cream of wheat, white bread or toast, white rice, cooked vegetables (caution with gas producing vegetables), plain pasta, eggs, peanut butter, boiled chicken or turkey, soft cheeses. Foods to avoid: spicy, fried/greasy, high in added sugar, acidic.   Keep a log of foods that you can tolerate and ones that you cannot tolerate.   Continue Pedialyte and gatorade mixed with water to aid in hydration.   Protein sources: eggs, tofu, yogurt, cottage cheese, cheese, protein shake     Follow Up Plan:  6/24/24 during hydration   Recommend Referral to Other Providers: none at this time

## 2024-06-03 NOTE — PROGRESS NOTES
Pt presents for IV fluids offering no complaints. Pt tolerated fluids well. AVS declined, next appointment 6/5 12pm.

## 2024-06-04 RX ORDER — LANCETS 33 GAUGE
EACH MISCELLANEOUS
Qty: 100 EACH | Refills: 0 | Status: SHIPPED | OUTPATIENT
Start: 2024-06-04

## 2024-06-05 ENCOUNTER — DOCUMENTATION (OUTPATIENT)
Dept: PERINATAL CARE | Facility: OTHER | Age: 34
End: 2024-06-05

## 2024-06-05 ENCOUNTER — HOSPITAL ENCOUNTER (OUTPATIENT)
Dept: INFUSION CENTER | Facility: CLINIC | Age: 34
Discharge: HOME | End: 2024-06-05
Payer: COMMERCIAL

## 2024-06-05 VITALS
RESPIRATION RATE: 18 BRPM | SYSTOLIC BLOOD PRESSURE: 116 MMHG | DIASTOLIC BLOOD PRESSURE: 69 MMHG | TEMPERATURE: 96.9 F | HEART RATE: 92 BPM

## 2024-06-05 DIAGNOSIS — O9A.113 CANCER COMPLICATING PREGNANCY, THIRD TRIMESTER: Primary | ICD-10-CM

## 2024-06-05 DIAGNOSIS — E86.0 DEHYDRATION: Primary | ICD-10-CM

## 2024-06-05 DIAGNOSIS — O22.33 DVT COMPLICATING PREGNANCY, THIRD TRIMESTER: ICD-10-CM

## 2024-06-05 PROCEDURE — 96361 HYDRATE IV INFUSION ADD-ON: CPT

## 2024-06-05 PROCEDURE — 96374 THER/PROPH/DIAG INJ IV PUSH: CPT

## 2024-06-05 RX ADMIN — ONDANSETRON 8 MG: 2 INJECTION INTRAMUSCULAR; INTRAVENOUS at 12:23

## 2024-06-05 RX ADMIN — SODIUM CHLORIDE 1000 ML: 0.9 INJECTION, SOLUTION INTRAVENOUS at 12:20

## 2024-06-05 NOTE — PROGRESS NOTES
"Complex Mother Meeting:  Jillian DIANA Ch was discussed in a multi-disciplinary conference this morning, with specialties in attendance including obstetrics, maternal-fetal medicine, anesthesiology, nursing, and neonatology. We discussed her antepartum, delivery, and postpartum care in detail. Recommendations are documented in her problem list under \"cancer complicating pregnancy.\"    34 yo  at 30w1d, with ER positive breast cancer and left mastectomy with SLN biopsy on 24. She is s/p 3 cycles of AC and was stopped from side effects of nausea/vomiting. Her postop course was complicated by a DVT for which she is now maintained on therapeutic lovenox    In summary, recommendations are as follows:    Delivery timing currently recommended per usual obstetric indications. Currently planned for 39 weeks, can offer 37-39 weeks to facilitate starting postpartum adjuvant endocrine therapy  Planning for goserelin and aromatase inhibitor postpartum, breastfeeding is not recommended while on these medications due to lack of any safety data  Continuing IV fluids and IV venofer for treatment of anemia and nausea/vomiting of pregnancy  Repeat duplex study pending on 24 at 30w4d. If DVT resolved, plan for discontinuation of therapeutic lovenox 24 hours prior to planned IOL.  (If unresolved, will update this note with recommendations)    Resume 4-6 hours after  or 12 hours after . Defer to anesthesia in regards to timing of resumption after neuraxial blockade and catheter removal (Per ACOG  - wait 24 hours after blockade AND at least 4 hours after catheter removal)  Anesthesia consult recommended - order placed 6.5.24      Zina Cummings MD  Attending Physician, Maternal-Fetal Medicine  [facility]      "

## 2024-06-05 NOTE — PROGRESS NOTES
Jillian Ch  tolerated treatment well with no complications.      Jillian Ch is aware of future appt on 6/7 at 0830.     AVS  No (Declined by Jillian Ch) d/c from unit stable

## 2024-06-06 DIAGNOSIS — O99.119 THROMBOCYTOPENIA AFFECTING PREGNANCY, ANTEPARTUM (HCC): Primary | ICD-10-CM

## 2024-06-06 DIAGNOSIS — O9A.113 CANCER COMPLICATING PREGNANCY, THIRD TRIMESTER: ICD-10-CM

## 2024-06-06 DIAGNOSIS — D69.6 THROMBOCYTOPENIA AFFECTING PREGNANCY, ANTEPARTUM (HCC): Primary | ICD-10-CM

## 2024-06-07 ENCOUNTER — HOSPITAL ENCOUNTER (OUTPATIENT)
Dept: INFUSION CENTER | Facility: CLINIC | Age: 34
End: 2024-06-07
Payer: COMMERCIAL

## 2024-06-07 ENCOUNTER — ROUTINE PRENATAL (OUTPATIENT)
Dept: OBGYN CLINIC | Facility: MEDICAL CENTER | Age: 34
End: 2024-06-07
Payer: COMMERCIAL

## 2024-06-07 VITALS
SYSTOLIC BLOOD PRESSURE: 111 MMHG | TEMPERATURE: 96.2 F | DIASTOLIC BLOOD PRESSURE: 72 MMHG | RESPIRATION RATE: 17 BRPM | HEART RATE: 86 BPM

## 2024-06-07 VITALS
SYSTOLIC BLOOD PRESSURE: 118 MMHG | DIASTOLIC BLOOD PRESSURE: 76 MMHG | HEART RATE: 81 BPM | WEIGHT: 180 LBS | HEIGHT: 67 IN | BODY MASS INDEX: 28.25 KG/M2

## 2024-06-07 DIAGNOSIS — D69.6 THROMBOCYTOPENIA AFFECTING PREGNANCY, ANTEPARTUM (HCC): ICD-10-CM

## 2024-06-07 DIAGNOSIS — O9A.113 CANCER COMPLICATING PREGNANCY, THIRD TRIMESTER: ICD-10-CM

## 2024-06-07 DIAGNOSIS — O43.199 MARGINAL INSERTION OF UMBILICAL CORD AFFECTING MANAGEMENT OF MOTHER: ICD-10-CM

## 2024-06-07 DIAGNOSIS — Z34.83 PRENATAL CARE, SUBSEQUENT PREGNANCY, THIRD TRIMESTER: Primary | ICD-10-CM

## 2024-06-07 DIAGNOSIS — E86.0 DEHYDRATION: Primary | ICD-10-CM

## 2024-06-07 DIAGNOSIS — O99.119 THROMBOCYTOPENIA AFFECTING PREGNANCY, ANTEPARTUM (HCC): ICD-10-CM

## 2024-06-07 DIAGNOSIS — Z3A.30 30 WEEKS GESTATION OF PREGNANCY: ICD-10-CM

## 2024-06-07 DIAGNOSIS — O22.33 DVT COMPLICATING PREGNANCY, THIRD TRIMESTER: ICD-10-CM

## 2024-06-07 LAB
ERYTHROCYTE [DISTWIDTH] IN BLOOD BY AUTOMATED COUNT: 15.9 % (ref 11.6–15.1)
HCT VFR BLD AUTO: 31.4 % (ref 34.8–46.1)
HGB BLD-MCNC: 10.4 G/DL (ref 11.5–15.4)
MCH RBC QN AUTO: 30.8 PG (ref 26.8–34.3)
MCHC RBC AUTO-ENTMCNC: 33.1 G/DL (ref 31.4–37.4)
MCV RBC AUTO: 93 FL (ref 82–98)
PLATELET # BLD AUTO: 116 THOUSANDS/UL (ref 149–390)
PMV BLD AUTO: 12.6 FL (ref 8.9–12.7)
RBC # BLD AUTO: 3.38 MILLION/UL (ref 3.81–5.12)
SL AMB  POCT GLUCOSE, UA: NORMAL
SL AMB POCT URINE PROTEIN: NORMAL
WBC # BLD AUTO: 7.86 THOUSAND/UL (ref 4.31–10.16)

## 2024-06-07 PROCEDURE — PNV: Performed by: STUDENT IN AN ORGANIZED HEALTH CARE EDUCATION/TRAINING PROGRAM

## 2024-06-07 PROCEDURE — 81002 URINALYSIS NONAUTO W/O SCOPE: CPT | Performed by: STUDENT IN AN ORGANIZED HEALTH CARE EDUCATION/TRAINING PROGRAM

## 2024-06-07 PROCEDURE — 96361 HYDRATE IV INFUSION ADD-ON: CPT

## 2024-06-07 PROCEDURE — 85027 COMPLETE CBC AUTOMATED: CPT

## 2024-06-07 PROCEDURE — 96374 THER/PROPH/DIAG INJ IV PUSH: CPT

## 2024-06-07 RX ADMIN — ONDANSETRON 8 MG: 2 INJECTION INTRAMUSCULAR; INTRAVENOUS at 08:52

## 2024-06-07 RX ADMIN — SODIUM CHLORIDE 1000 ML: 0.9 INJECTION, SOLUTION INTRAVENOUS at 09:19

## 2024-06-07 NOTE — ASSESSMENT & PLAN NOTE
-breast cancer diagnosed early in pregnancy. S/p left breast mastectomy with sentinel LN biopsy 1/2/24  -diagnosed with invasive DCIS, ER HI Her2 pos, negative lymph node   -s/p 3 cycles chemo, per onc will not complete 4th cycle as initially planned due to side effects. Has port placed in upper right chest.   -delivery planning as above

## 2024-06-07 NOTE — PROGRESS NOTES
Patient presents for IV hydration. Patient offers no complaints. Patient tolerated hydration well. Pt requested we cancel appts from 7/1-7/5 since she is going on vacation, appts cancelled.  AVS declined, next appointment 6/12/24 at 10am reviewed.

## 2024-06-07 NOTE — PROGRESS NOTES
30 weeks gestation of pregnancy  34yo  at 30.3wks presents for routine prenatal visit     Pt denies contractions, vaginal bleeding, leakage of fluid. Endorses fetal movement.     Patient was counseled about the labor process. Patient was counseled about possible need for operative delivery via vacuum or forceps. Indications for operative delivery discussed. Risks dicussed including but not limited to increased maternal risk of more severe laceration and small risk of  complications of intracranial hemorrhage, lacerations, nerve damage or fracture.     Discussed with patient potential indications of need for  section including arrest of dilation/descent, non-reassuring fetal status, or worsening maternal status. Risks of  section discussed including but not limited to infection, bleeding, injury to the surrounding organs including bowel and bladder. Medical and surgical management of post partum bleeding discussed. Risk of blood transfusion discussed, patient okay with receiving blood transfusion if necessary.     Patient verbalized understanding. All questions answered. Patient signed consent.       After long discussion with MFM, oncology, and patient, plan for delivery at 38wks via IOL. Discussed with patient balance of optimizing  fetal maturity vs delaying cancer treatment. Discussed that breastfeeding at this time is unfortunately contraindicated as there are no studies that show the safety of planned chemotherapy with breast feeding. We did discuss option for donor milk, she would like to think about this more prior to making a decision.    We also discussed anesthesia consult and potential plan for anticoagulation throughout labor. Repeat lower extremity dopplers are scheduled for 24. Official plan will be made pending results.     -continue PNVs   -IOL request sent for 38wks   -birth plan signed today with patient   -declined tdap at this time, would like to speak  to oncologist prior to administration   - labor precautions provided  -follow up in 2-3 weeks     Cancer complicating pregnancy, third trimester  -breast cancer diagnosed early in pregnancy. S/p left breast mastectomy with sentinel LN biopsy 24  -diagnosed with invasive DCIS, ER [place] Her2 pos, negative lymph node   -s/p 3 cycles chemo, per onc will not complete 4th cycle as initially planned due to side effects. Has port placed in upper right chest.   -delivery planning as above    DVT complicating pregnancy, third trimester  -post operative course of left mastectomy complicated by left DVT.   -continue lovenox 75mg BID    Marginal insertion of umbilical cord affecting management of mother  -growth q4wks    Thrombocytopenia affecting pregnancy, antepartum (HCC)  -24 plts 116, stable at this time

## 2024-06-07 NOTE — ASSESSMENT & PLAN NOTE
34yo  at 30.3wks presents for routine prenatal visit     Pt denies contractions, vaginal bleeding, leakage of fluid. Endorses fetal movement.     Patient was counseled about the labor process. Patient was counseled about possible need for operative delivery via vacuum or forceps. Indications for operative delivery discussed. Risks dicussed including but not limited to increased maternal risk of more severe laceration and small risk of  complications of intracranial hemorrhage, lacerations, nerve damage or fracture.     Discussed with patient potential indications of need for  section including arrest of dilation/descent, non-reassuring fetal status, or worsening maternal status. Risks of  section discussed including but not limited to infection, bleeding, injury to the surrounding organs including bowel and bladder. Medical and surgical management of post partum bleeding discussed. Risk of blood transfusion discussed, patient okay with receiving blood transfusion if necessary.     Patient verbalized understanding. All questions answered. Patient signed consent.       After long discussion with MFM, oncology, and patient, plan for delivery at 38wks via IOL. Discussed with patient balance of optimizing  fetal maturity vs delaying cancer treatment. Discussed that breastfeeding at this time is unfortunately contraindicated as there are no studies that show the safety of planned chemotherapy with breast feeding. We did discuss option for donor milk, she would like to think about this more prior to making a decision.    We also discussed anesthesia consult and potential plan for anticoagulation throughout labor. Repeat lower extremity dopplers are scheduled for 24. Official plan will be made pending results.     -continue PNVs   -IOL request sent for 38wks   -birth plan signed today with patient   -declined tdap at this time, would like to speak to oncologist prior to  administration   - labor precautions provided  -follow up in 2-3 weeks

## 2024-06-08 ENCOUNTER — HOSPITAL ENCOUNTER (OUTPATIENT)
Dept: VASCULAR ULTRASOUND | Facility: HOSPITAL | Age: 34
Discharge: HOME | End: 2024-06-08
Payer: COMMERCIAL

## 2024-06-08 DIAGNOSIS — O22.30 DVT (DEEP VEIN THROMBOSIS) IN PREGNANCY: ICD-10-CM

## 2024-06-08 PROCEDURE — 93970 EXTREMITY STUDY: CPT | Performed by: SURGERY

## 2024-06-08 PROCEDURE — 93970 EXTREMITY STUDY: CPT

## 2024-06-10 ENCOUNTER — PATIENT OUTREACH (OUTPATIENT)
Dept: CASE MANAGEMENT | Facility: HOSPITAL | Age: 34
End: 2024-06-10

## 2024-06-10 ENCOUNTER — TELEPHONE (OUTPATIENT)
Dept: OBGYN CLINIC | Facility: CLINIC | Age: 34
End: 2024-06-10

## 2024-06-10 DIAGNOSIS — Z3A.30 30 WEEKS GESTATION OF PREGNANCY: ICD-10-CM

## 2024-06-10 DIAGNOSIS — O9A.113 CANCER COMPLICATING PREGNANCY, THIRD TRIMESTER: Primary | ICD-10-CM

## 2024-06-10 DIAGNOSIS — D69.6 THROMBOCYTOPENIA AFFECTING PREGNANCY, ANTEPARTUM (HCC): ICD-10-CM

## 2024-06-10 DIAGNOSIS — O22.33 DVT COMPLICATING PREGNANCY, THIRD TRIMESTER: ICD-10-CM

## 2024-06-10 DIAGNOSIS — O99.119 THROMBOCYTOPENIA AFFECTING PREGNANCY, ANTEPARTUM (HCC): ICD-10-CM

## 2024-06-10 DIAGNOSIS — O09.521 MULTIGRAVIDA OF ADVANCED MATERNAL AGE IN FIRST TRIMESTER: ICD-10-CM

## 2024-06-10 NOTE — PROGRESS NOTES
Pt r/o to me via email to share that she was denied for SSD income.  She had been prepared for this as we have discussed it in the past, but she's understandably disappointed.  She plans to appeal and we discussed that process.  I have cautioned her that pregnancy is not a covered dx for SSD and she needs to instead focus on the cancer diagnosis and complications that she has had.  She plans to submit a letter from her employer stating that she's been out of work since X date, cannot return until X date, etc.  I did let her know if she continues to be denied she can consult with a SSD , however I would try to appeal first and see what happens.  She notes that their finances are tight and they are struggling financially now with her being out of work.  I did offer to help with use of compassion funds and explained that program to her.  She says that she feels guilty using this and I reassured her that there is no need to feel guilty, as this is what the funds are intended for.  She says that she will consider and let me know.  I will try to see her in the office next week to follow up, or by email if she reaches out that way int he interim.  No other needs noted at this time.

## 2024-06-10 NOTE — TELEPHONE ENCOUNTER
7/28/24@8p into 7/29 with ADAIR per patient preference.     Patient notified of IOL date,time,and location. Advised patient she may eat a light breakfast/dinner prior to going to L&D. In the interim please report any vaginal bleeding,leakage of fluid,decreased fetal movement or contractions.Reviewed fetal kick counts. Advised to keep all upcoming prenatal visits.

## 2024-06-10 NOTE — TELEPHONE ENCOUNTER
----- Message from Nemo Rose DO sent at 6/7/2024 12:19 PM EDT -----  Regarding: IOL  Procedure to be scheduled: IOL  BALTAZAR: Estimated Date of Delivery: 8/13/24  Indication for delivery: breast cancer   Requested date (s) of delivery: 7/30         If requested date is unavailable, is there a date by which the pt must be delivered? 37-39wks, preferably closer to 38wks  Physician preference: none     If IOL, anticipated method: romero cytotec

## 2024-06-10 NOTE — TELEPHONE ENCOUNTER
8/1 8PM NP into 8/2 KTM    Patient requested ADAIR to be delivering provider- looked at call schedules and ADAIR is on call for 7/29  patient is asking to have IOL started on 7/28 PM with AG into 7/29 with ADAIR.     Will discuss with providers and L&D for availability.

## 2024-06-12 ENCOUNTER — HOSPITAL ENCOUNTER (OUTPATIENT)
Dept: INFUSION CENTER | Facility: CLINIC | Age: 34
Discharge: HOME | End: 2024-06-12
Payer: COMMERCIAL

## 2024-06-12 ENCOUNTER — OFFICE VISIT (OUTPATIENT)
Dept: POSTPARTUM | Facility: CLINIC | Age: 34
End: 2024-06-12
Payer: COMMERCIAL

## 2024-06-12 ENCOUNTER — PATIENT MESSAGE (OUTPATIENT)
Dept: POSTPARTUM | Facility: CLINIC | Age: 34
End: 2024-06-12

## 2024-06-12 VITALS
RESPIRATION RATE: 16 BRPM | DIASTOLIC BLOOD PRESSURE: 59 MMHG | TEMPERATURE: 96.8 F | HEART RATE: 90 BPM | SYSTOLIC BLOOD PRESSURE: 122 MMHG

## 2024-06-12 VITALS — SYSTOLIC BLOOD PRESSURE: 108 MMHG | DIASTOLIC BLOOD PRESSURE: 74 MMHG

## 2024-06-12 DIAGNOSIS — C50.812 MALIGNANT NEOPLASM OF OVERLAPPING SITES OF LEFT BREAST IN FEMALE, ESTROGEN RECEPTOR POSITIVE (HCC): Primary | ICD-10-CM

## 2024-06-12 DIAGNOSIS — Z17.0 MALIGNANT NEOPLASM OF OVERLAPPING SITES OF LEFT BREAST IN FEMALE, ESTROGEN RECEPTOR POSITIVE (HCC): Primary | ICD-10-CM

## 2024-06-12 DIAGNOSIS — O91.112: ICD-10-CM

## 2024-06-12 DIAGNOSIS — E86.0 DEHYDRATION: Primary | ICD-10-CM

## 2024-06-12 PROCEDURE — 99205 OFFICE O/P NEW HI 60 MIN: CPT | Performed by: PEDIATRICS

## 2024-06-12 PROCEDURE — 96361 HYDRATE IV INFUSION ADD-ON: CPT

## 2024-06-12 PROCEDURE — 96374 THER/PROPH/DIAG INJ IV PUSH: CPT

## 2024-06-12 RX ADMIN — ONDANSETRON 8 MG: 2 INJECTION INTRAMUSCULAR; INTRAVENOUS at 10:39

## 2024-06-12 RX ADMIN — SODIUM CHLORIDE 1000 ML: 0.9 INJECTION, SOLUTION INTRAVENOUS at 10:39

## 2024-06-12 NOTE — PATIENT INSTRUCTIONS
I will message you via GENW[facility] with information regarding the safety of your medications after touching base with other Breastfeeding and Lactation Medicine Specialists. No personal information will be exchanged.     Weaning can be accomplished as soon as you deliver by avoiding any stimulation of the breast and using cool or cold compresses. Ice works well. Wear a comfortable but supportive bra, something snug but not tight can be helpful.     Skin to skin is still encouraged, but avoid allowing the baby to attach to the breast.    Feel free to call or message, or schedule any additional appointments.     I will reach out when I hear back from Dr. Khalil.

## 2024-06-12 NOTE — PROGRESS NOTES
INITIAL BREAST FEEDING EVALUATION    Informant/Relationship: Jillian/self    Discussion of General Lactation Issues: Jillian found a lump in her left breast in 2023. She has been previously diagnosed with chronic regional pain syndrome and initially thought the lump was related as it seemed to go away. When she noted it again and it was larger, she started evaluation. As the evaluation was getting started, she was found to be pregnant. She had been trying to conceive.    Jillian had a left mastectomy and started on chemo that was halted a cycle early due to intolerance with the pregnancy. Her evaluation showed no mets and her surgery appears to have removed all the cancer. Her right breast was not involved.    She is currently receiving anti-nausea therapy and fluids. She had a DVT secondary to the mastectomy surgery, but has not had any further recurrence or problems.    Induction is planned at 38 weeks due to increased bleeding on lovenox. It is expected that Jillian will remain on the Lovenox until she is 6-8 weeks post partum. The plan also includes staring her on goserelin and an aromatase inhibitor.    Due date is , induction is scheduled for .       History:  Fertility Problem:yes - had been trying for 2 years, she had found a lump in her left breast  Breast changes:yes - her right breast has grown;areola has gotten darker      Past Medical History:   Diagnosis Date    Anesthesia complication     gait dysfunction/ urinary retention after nerve block,    Anxiety     Asthma     CRPS (complex regional pain syndrome), upper limb     left chest/arm/hand    Deep vein thrombosis (HCC) 2024    post op from mastectomy    GERD (gastroesophageal reflux disease)     Heart murmur     HPV (human papilloma virus) infection     unsure of 16, 18, or other    Invasive ductal carcinoma of breast, female, left (HCC)     Kidney stone     Miscarriage 3/15/2015    7 weeks along.    Ovarian  cyst     Left    PONV (postoperative nausea and vomiting) 2024         Current Outpatient Medications:     albuterol (PROVENTIL HFA,VENTOLIN HFA) 90 mcg/act inhaler, TAKE 2 PUFFS BY MOUTH EVERY 6 HOURS AS NEEDED FOR WHEEZE OR FOR SHORTNESS OF BREATH, Disp: 18 g, Rfl: 3    Blood Glucose Monitoring Suppl (OneTouch Verio Flex System) w/Device KIT, Test x4 Daily or as instructed (Patient not taking: Reported on 2024), Disp: 1 kit, Rfl: 0    cetirizine (ZyrTEC) 10 mg tablet, TAKE 1 TABLET BY MOUTH EVERY DAY, Disp: 90 tablet, Rfl: 0    CRANIAL PROSTHESIS, RX,, Place one on head as needed (Patient not taking: Reported on 2024), Disp: 1 each, Rfl: 0    enoxaparin (LOVENOX) 80 mg/0.8 mL, Inject 75mg SC every 12 hours, Disp: 48 mL, Rfl: 2    Lancets (OneTouch Delica Plus [id number]) MISC, USE 4 A DAY OR AS INSTRUCTED (Patient not taking: Reported on 2024), Disp: 100 each, Rfl: 0    lidocaine-prilocaine (EMLA) cream, Apply to port 30 to 60 min prior to use (Patient not taking: Reported on 2024), Disp: 30 g, Rfl: 2    omeprazole (PriLOSEC) 40 MG capsule, Take 1 capsule (40 mg total) by mouth daily, Disp: 90 capsule, Rfl: 2    ondansetron (ZOFRAN) 8 mg tablet, Take 1 tablet (8 mg total) by mouth every 8 (eight) hours as needed for nausea or vomiting, Disp: 30 tablet, Rfl: 3    OneTouch Verio test strip, TEST 4 TIMES DAILY OR AS INSTRUCTED (Patient not taking: Reported on 2024), Disp: 100 strip, Rfl: 3    Prenatal Multivit-Min-Fe-FA (PRE-SONIA PO), Take 1 tablet by mouth in the morning, Disp: , Rfl:   No current facility-administered medications for this visit.    Facility-Administered Medications Ordered in Other Visits:     alteplase (CATHFLO) injection 2 mg, 2 mg, Intracatheter, Q1MIN PRN, Nemo Agostino, DO    Allergies   Allergen Reactions    Formic Acid Swelling and Rash     (Severe reactions to Bug bites)    Latex Rash and Blisters    Nsaids GI Intolerance       Social History     Substance and  Sexual Activity   Drug Use Never       Social History             Mom:  Breast: Not performed today  Nipple Assessment in General: Not performed today  Support System: FOB, mother, mother in law, sister in law  History of Breastfeeding: none  Changes/Stressors/Violence: recent diagnosis and cancer treatment; need for hormone blockers  Concerns/Goals: Jillian had wished to breastfeed but has been counseled by heme-onc doctor and breast surgeon not to breastfeed due to recent chemo (last chemo was 4/3/24) and need to start hormone blockers around 6-8 weeks post partum.     Problems with Mom: recent breast cancer diagnosis and treatment    Physical Exam  Constitutional:       Appearance: Normal appearance. She is normal weight.   HENT:      Head: Normocephalic and atraumatic.   Neurological:      General: No focal deficit present.      Mental Status: She is alert and oriented to person, place, and time.   Psychiatric:         Mood and Affect: Mood normal.         Behavior: Behavior normal.   Vitals and nursing note reviewed.       Education:  Reviewed Mom/Breast care: Weaning can be done from delivery by avoiding any stimulation of the breast and using cool compresses, taking anti-inflammatory or pain medication, and wearing a comfortable and supportive bra.        Plan:  Discussed Jillian's history and medical course with her. Acknowledged Jillian's concerns about her medical condition and the treatment she has had and is scheduled to have. Discussed her feeding plans and how they might be affected by her treatment. At her last ob appointment, Jillian was told that she would not be able to breastfeed on the chemotherapy that would be started 6-8 weeks after delivering. Discussed Jillian's thoughts about breastfeeding. Jillian had already expected that direct breastfeeding was not in her future, and had planned to have a double mastectomy had the surgery been feasible in the time recommended for her anesthesia.    Notified Jillian  that I would review with best scientific resources regarding the breastfeeding safety of the medications recommended and follow up with her. However, it is ultimately most important that she is comfortable with her feeding plan. We created a plan to work comfort and not allowing for any real milk production from the start.    Encouraged skin to skin for bonding, but recommended avoiding any breast or nipple stimulation. Recommended cool or ice compresses and pain control. Encouraged wearing a comfortable but supportive bra, one that is snug but not overly tight may be especially helpful.     I have spent 60 minutes with Patient  today in which greater than 50% of this time was spent in counseling/coordination of care regarding Prognosis, Risks and benefits of tx options, Instructions for management, Patient and family education, Importance of tx compliance, Risk factor reductions, Impressions, Counseling / Coordination of care, Documenting in the medical record, and Obtaining or reviewing history  .

## 2024-06-12 NOTE — PROGRESS NOTES
Pt here for hydration, offering no complaints. PAC accessed with positive blood return noted. Tolerated entire infusion without complications. PAC flushed and de-accessed. AVS declined. Next appt 6/14 at 1030am. Walked out in stable condition.

## 2024-06-14 ENCOUNTER — HOSPITAL ENCOUNTER (OUTPATIENT)
Dept: INFUSION CENTER | Facility: CLINIC | Age: 34
End: 2024-06-14
Payer: COMMERCIAL

## 2024-06-14 VITALS — RESPIRATION RATE: 16 BRPM | HEART RATE: 91 BPM | TEMPERATURE: 97.1 F

## 2024-06-14 DIAGNOSIS — E86.0 DEHYDRATION: Primary | ICD-10-CM

## 2024-06-14 PROCEDURE — 96361 HYDRATE IV INFUSION ADD-ON: CPT

## 2024-06-14 PROCEDURE — 96365 THER/PROPH/DIAG IV INF INIT: CPT

## 2024-06-14 RX ADMIN — SODIUM CHLORIDE 1000 ML: 0.9 INJECTION, SOLUTION INTRAVENOUS at 10:44

## 2024-06-14 RX ADMIN — ONDANSETRON 8 MG: 2 INJECTION INTRAMUSCULAR; INTRAVENOUS at 10:44

## 2024-06-14 NOTE — PROGRESS NOTES
Pt here for hydration and zofran, offering no complaints. PAC accessed with positive blood return noted. Tolerated entire infusion without complications. PAC flushed and de-accessed. AVS declined. Next appt 6/17 at 12pm. Walked out in stable condition.

## 2024-06-17 ENCOUNTER — HOSPITAL ENCOUNTER (OUTPATIENT)
Dept: INFUSION CENTER | Facility: CLINIC | Age: 34
Discharge: HOME | End: 2024-06-17
Payer: COMMERCIAL

## 2024-06-17 ENCOUNTER — TELEPHONE (OUTPATIENT)
Age: 34
End: 2024-06-17

## 2024-06-17 ENCOUNTER — PATIENT OUTREACH (OUTPATIENT)
Dept: CASE MANAGEMENT | Facility: HOSPITAL | Age: 34
End: 2024-06-17

## 2024-06-17 VITALS
DIASTOLIC BLOOD PRESSURE: 67 MMHG | OXYGEN SATURATION: 97 % | RESPIRATION RATE: 18 BRPM | SYSTOLIC BLOOD PRESSURE: 109 MMHG | TEMPERATURE: 98.7 F | HEART RATE: 97 BPM

## 2024-06-17 DIAGNOSIS — E86.0 DEHYDRATION: Primary | ICD-10-CM

## 2024-06-17 PROCEDURE — 96374 THER/PROPH/DIAG INJ IV PUSH: CPT

## 2024-06-17 PROCEDURE — 96361 HYDRATE IV INFUSION ADD-ON: CPT

## 2024-06-17 RX ADMIN — SODIUM CHLORIDE 1000 ML: 0.9 INJECTION, SOLUTION INTRAVENOUS at 12:11

## 2024-06-17 RX ADMIN — ONDANSETRON 8 MG: 2 INJECTION INTRAMUSCULAR; INTRAVENOUS at 12:11

## 2024-06-17 NOTE — TELEPHONE ENCOUNTER
Left message on voice mail that there were 2 [employer] messages sent over the past few days. Received notification that one wasn't read. The other was sent just yesterday. Recommended call back with questions. Will also make attempt to call back in a few days, especially if neither message has been read by that time.

## 2024-06-17 NOTE — PROGRESS NOTES
ALISSA sat with pt in the infusion center this afternoon while she had hydration, she had her baby shower this past weekend and spoke about how beautiful it was and all of the generous gifts she received.  She is also worn out and exhausted from the event and says that she's had beryl saeed contractions earlier today.  She is trying to focus now on resting and recovering.  Her induction has been scheduled for the end of July, so she has a finish line in sight and plans to start organizing the nursery and putting out the items she will need for a  while storing the rest.  She had maternity photos done and showed me, she did some with a wig and some without and showing her mastectomy scar, she is very happy with all of them that she's received so far.  She is going to appeal her SSD denial and we spoke about what additional documentation to send in to support the appeal.  I have emailed her today 3 local SSD attorneys as well if she'd like to reach out for a consult.  She has no outstanding medical bills at this time but I did explain the hospital FA program in case that changes as well.  I will continue to check in with her via email or in person and support or assist her as I am able.  No other needs or concerns noted at this time.

## 2024-06-17 NOTE — PROGRESS NOTES
Pt into clinic for hydration and zofran. Pt offers no complaints. Tolerated infusion. Port de-accessed. Pt aware of next appointment on 6/19/24 at 12:30pm. AVS declined.

## 2024-06-18 ENCOUNTER — ROUTINE PRENATAL (OUTPATIENT)
Dept: OBGYN CLINIC | Facility: MEDICAL CENTER | Age: 34
End: 2024-06-18
Payer: COMMERCIAL

## 2024-06-18 VITALS
BODY MASS INDEX: 28.04 KG/M2 | DIASTOLIC BLOOD PRESSURE: 82 MMHG | HEART RATE: 90 BPM | OXYGEN SATURATION: 99 % | WEIGHT: 179 LBS | SYSTOLIC BLOOD PRESSURE: 120 MMHG

## 2024-06-18 DIAGNOSIS — Z17.0 MALIGNANT NEOPLASM OF OVERLAPPING SITES OF LEFT BREAST IN FEMALE, ESTROGEN RECEPTOR POSITIVE (HCC): ICD-10-CM

## 2024-06-18 DIAGNOSIS — E86.0 DEHYDRATION: ICD-10-CM

## 2024-06-18 DIAGNOSIS — R06.02 SOB (SHORTNESS OF BREATH): ICD-10-CM

## 2024-06-18 DIAGNOSIS — C50.812 MALIGNANT NEOPLASM OF OVERLAPPING SITES OF LEFT BREAST IN FEMALE, ESTROGEN RECEPTOR POSITIVE (HCC): ICD-10-CM

## 2024-06-18 DIAGNOSIS — O22.33 DVT COMPLICATING PREGNANCY, THIRD TRIMESTER: ICD-10-CM

## 2024-06-18 DIAGNOSIS — Z3A.32 32 WEEKS GESTATION OF PREGNANCY: ICD-10-CM

## 2024-06-18 DIAGNOSIS — Z34.83 PRENATAL CARE, SUBSEQUENT PREGNANCY, THIRD TRIMESTER: Primary | ICD-10-CM

## 2024-06-18 LAB
SL AMB  POCT GLUCOSE, UA: NEGATIVE
SL AMB POCT URINE PROTEIN: NEGATIVE

## 2024-06-18 PROCEDURE — 81002 URINALYSIS NONAUTO W/O SCOPE: CPT | Performed by: STUDENT IN AN ORGANIZED HEALTH CARE EDUCATION/TRAINING PROGRAM

## 2024-06-18 PROCEDURE — PNV: Performed by: STUDENT IN AN ORGANIZED HEALTH CARE EDUCATION/TRAINING PROGRAM

## 2024-06-18 NOTE — PROGRESS NOTES
SOB (shortness of breath)  Feels similar to prior iron deficiency. Most recent Hgb unchanged. Will repeat ferritin. Discussed signs and symptoms to call for with palpitations or SOB.    Dehydration  Continue IV hydration. Discussed liquid IV, water, pedialyte. Avoid carbonation.    32 weeks gestation of pregnancy  34 yo here for ob visit.  at 32+0. Having some cramping and round ligament/pelvic discomfort. No leaking or bleeding. Good fetal movement. Return in 2 wks for return visit.    Malignant neoplasm of overlapping sites of left breast in female, estrogen receptor positive   Delivery planned by IOL . Planning to not breastfeed given recommendation to not feed on maintenance upcoming chemo. Was under the impression prior IV chemo also uncertain in safety so feeding in the 6-8 wks before initiation not recommended. She met with lactation which was a little bit stressful.     DVT complicating pregnancy, third trimester  On therapeutic lovenox. Per DOMINIQUE would plan to hold 24 hours before planned epidural. Needs to consult with anesthesia and did discuss if they have an alternative preference we would follow their guidance.     Sex is a surprise, her shower was this past weekend and her mother is in town.

## 2024-06-18 NOTE — ASSESSMENT & PLAN NOTE
Delivery planned by IOL 7/28. Planning to not breastfeed given recommendation to not feed on maintenance upcoming chemo. Was under the impression prior IV chemo also uncertain in safety so feeding in the 6-8 wks before initiation not recommended. She met with lactation which was a little bit stressful.

## 2024-06-18 NOTE — ASSESSMENT & PLAN NOTE
32 yo here for ob visit.  at 32+0. Having some cramping and round ligament/pelvic discomfort. No leaking or bleeding. Good fetal movement. Return in 2 wks for return visit.

## 2024-06-18 NOTE — ASSESSMENT & PLAN NOTE
On therapeutic lovenox. Per DOMINIQUE would plan to hold 24 hours before planned epidural. Needs to consult with anesthesia and did discuss if they have an alternative preference we would follow their guidance.

## 2024-06-18 NOTE — ASSESSMENT & PLAN NOTE
Feels similar to prior iron deficiency. Most recent Hgb unchanged. Will repeat ferritin. Discussed signs and symptoms to call for with palpitations or SOB.

## 2024-06-18 NOTE — PROGRESS NOTES
Patient here for prenatal visit.   GA: 32w0d    Denies leaking of fluid, bleeding  Patient was having cramping on and off all day.  Normal Fetal Movement  Labs-Utd  Delivery consent signed 24  Tdap - waiting to discuss with oncologist  Peds referral placed today  Breast Pump placed today  IOL scheduled 24

## 2024-06-19 ENCOUNTER — TELEPHONE (OUTPATIENT)
Dept: ANESTHESIOLOGY | Facility: CLINIC | Age: 34
End: 2024-06-19

## 2024-06-19 ENCOUNTER — TELEPHONE (OUTPATIENT)
Dept: OBGYN CLINIC | Facility: CLINIC | Age: 34
End: 2024-06-19

## 2024-06-19 ENCOUNTER — HOSPITAL ENCOUNTER (OUTPATIENT)
Dept: INFUSION CENTER | Facility: CLINIC | Age: 34
Discharge: HOME | End: 2024-06-19
Payer: COMMERCIAL

## 2024-06-19 VITALS
DIASTOLIC BLOOD PRESSURE: 63 MMHG | HEART RATE: 84 BPM | RESPIRATION RATE: 18 BRPM | SYSTOLIC BLOOD PRESSURE: 111 MMHG | TEMPERATURE: 97 F

## 2024-06-19 DIAGNOSIS — E86.0 DEHYDRATION: Primary | ICD-10-CM

## 2024-06-19 DIAGNOSIS — R06.02 SOB (SHORTNESS OF BREATH): ICD-10-CM

## 2024-06-19 LAB
DME PARACHUTE DELIVERY DATE REQUESTED: NORMAL
DME PARACHUTE ITEM DESCRIPTION: NORMAL
DME PARACHUTE ORDER STATUS: NORMAL
DME PARACHUTE SUPPLIER NAME: NORMAL
DME PARACHUTE SUPPLIER PHONE: NORMAL
FERRITIN SERPL-MCNC: 76 NG/ML (ref 11–307)

## 2024-06-19 PROCEDURE — 82728 ASSAY OF FERRITIN: CPT

## 2024-06-19 RX ADMIN — SODIUM CHLORIDE 1000 ML: 0.9 INJECTION, SOLUTION INTRAVENOUS at 12:54

## 2024-06-19 RX ADMIN — ONDANSETRON 8 MG: 2 INJECTION INTRAMUSCULAR; INTRAVENOUS at 12:55

## 2024-06-19 NOTE — TELEPHONE ENCOUNTER
María is looking into referral and will call back once she has talked to staff regarding appointment

## 2024-06-19 NOTE — TELEPHONE ENCOUNTER
----- Message from Kathy Barillas MD sent at 6/18/2024 11:08 AM EDT -----  Can we follow up with Jillian's anesthesia consult. Thanks!    LEFT MESSAGE AT SOC doc office to call back regarding referral placed on 6/4

## 2024-06-19 NOTE — PROGRESS NOTES
Pt to clinic for IV hydration and Zofran, offers no complaints today, tolerated infusion without complications, aware of next appointment on 6/21/24 at 0830, port de-accessed, avs declined.

## 2024-06-20 ENCOUNTER — RA CDI HCC (OUTPATIENT)
Dept: OTHER | Facility: HOSPITAL | Age: 34
End: 2024-06-20

## 2024-06-20 ENCOUNTER — TELEPHONE (OUTPATIENT)
Dept: OBGYN CLINIC | Facility: MEDICAL CENTER | Age: 34
End: 2024-06-20

## 2024-06-20 DIAGNOSIS — D50.8 IRON DEFICIENCY ANEMIA SECONDARY TO INADEQUATE DIETARY IRON INTAKE: Primary | ICD-10-CM

## 2024-06-20 DIAGNOSIS — Z3A.32 32 WEEKS GESTATION OF PREGNANCY: ICD-10-CM

## 2024-06-20 RX ORDER — SODIUM CHLORIDE 9 MG/ML
20 INJECTION, SOLUTION INTRAVENOUS ONCE
Status: CANCELLED | OUTPATIENT
Start: 2024-06-21

## 2024-06-20 NOTE — TELEPHONE ENCOUNTER
Spoke with Kesha at [facility]- added on iron therapy plan to already established oncology plan. Iron infusions to take place with Friday infusion appointments. Patient is aware.

## 2024-06-20 NOTE — TELEPHONE ENCOUNTER
Iron levels down trending. I don't think she requires the same IV iron when she was very low but think she might benefit from weekly infusions for the next 4 wks in preparation for delivery. Would she be okay with that?

## 2024-06-20 NOTE — PROGRESS NOTES
HCC coding opportunities       Chart reviewed, no opportunity found: CHART REVIEWED, NO OPPORTUNITY FOUND        Patients Insurance        Commercial Insurance: [employer]

## 2024-06-20 NOTE — TELEPHONE ENCOUNTER
Patient is accepting of Iron infusions- oncology had them added onto her Friday infusions- she is hoping for a similar option- plan initiated and sent for signature will call Shu at [facility] to see if we can add infusions onto her Friday treatment scheduled.

## 2024-06-21 ENCOUNTER — HOSPITAL ENCOUNTER (OUTPATIENT)
Dept: INFUSION CENTER | Facility: CLINIC | Age: 34
End: 2024-06-21
Payer: COMMERCIAL

## 2024-06-21 ENCOUNTER — TELEMEDICINE (OUTPATIENT)
Dept: ANESTHESIOLOGY | Facility: CLINIC | Age: 34
End: 2024-06-21

## 2024-06-21 VITALS
DIASTOLIC BLOOD PRESSURE: 67 MMHG | SYSTOLIC BLOOD PRESSURE: 114 MMHG | HEART RATE: 86 BPM | TEMPERATURE: 97.1 F | RESPIRATION RATE: 18 BRPM

## 2024-06-21 DIAGNOSIS — O99.119 THROMBOCYTOPENIA AFFECTING PREGNANCY, ANTEPARTUM (HCC): ICD-10-CM

## 2024-06-21 DIAGNOSIS — D50.8 IRON DEFICIENCY ANEMIA SECONDARY TO INADEQUATE DIETARY IRON INTAKE: ICD-10-CM

## 2024-06-21 DIAGNOSIS — O22.33 DVT COMPLICATING PREGNANCY, THIRD TRIMESTER: Primary | ICD-10-CM

## 2024-06-21 DIAGNOSIS — O9A.113 CANCER COMPLICATING PREGNANCY, THIRD TRIMESTER: ICD-10-CM

## 2024-06-21 DIAGNOSIS — Z3A.32 32 WEEKS GESTATION OF PREGNANCY: ICD-10-CM

## 2024-06-21 DIAGNOSIS — D69.6 THROMBOCYTOPENIA AFFECTING PREGNANCY, ANTEPARTUM (HCC): ICD-10-CM

## 2024-06-21 DIAGNOSIS — M50.122 CERVICAL DISC DISORDER AT C5-C6 LEVEL WITH RADICULOPATHY: ICD-10-CM

## 2024-06-21 DIAGNOSIS — E86.0 DEHYDRATION: Primary | ICD-10-CM

## 2024-06-21 PROCEDURE — 96365 THER/PROPH/DIAG IV INF INIT: CPT

## 2024-06-21 PROCEDURE — 96375 TX/PRO/DX INJ NEW DRUG ADDON: CPT

## 2024-06-21 PROCEDURE — 96361 HYDRATE IV INFUSION ADD-ON: CPT

## 2024-06-21 RX ORDER — SODIUM CHLORIDE 9 MG/ML
20 INJECTION, SOLUTION INTRAVENOUS ONCE
Status: CANCELLED | OUTPATIENT
Start: 2024-06-28

## 2024-06-21 RX ORDER — SODIUM CHLORIDE 9 MG/ML
20 INJECTION, SOLUTION INTRAVENOUS ONCE
Status: COMPLETED | OUTPATIENT
Start: 2024-06-21 | End: 2024-06-21

## 2024-06-21 RX ADMIN — SODIUM CHLORIDE 20 ML/HR: 9 INJECTION, SOLUTION INTRAVENOUS at 10:13

## 2024-06-21 RX ADMIN — ONDANSETRON 8 MG: 2 INJECTION INTRAMUSCULAR; INTRAVENOUS at 08:46

## 2024-06-21 RX ADMIN — IRON SUCROSE 200 MG: 20 INJECTION, SOLUTION INTRAVENOUS at 10:14

## 2024-06-21 RX ADMIN — SODIUM CHLORIDE 1000 ML: 0.9 INJECTION, SOLUTION INTRAVENOUS at 09:03

## 2024-06-21 NOTE — PROGRESS NOTES
Virtual Regular Visit    Verification of patient location:    Patient is located at Other in the following state in which I hold an active license [place]      Assessment/Plan:  Jillian is a  with complicated medical history (see below) and will have a scheduled IOL on 24. She will hold her therapeutic lovenox for 24 hours prior to induction to limit any delay for epidural placement. I discussed that her recent CBCs show improvement in her hemoglobin and her platelet counts have been well above the 70k threshold for safe epidural placement. Despite her complicated history as long as her lovenox is held appropriately she remains a good candidate for epidural analgesia. Once her epidural is removed she can be restarted on lovenox 4 hours later.    Problem List Items Addressed This Visit    None           Reason for visit is No chief complaint on file.       Encounter provider SOC ANESTESIOLOGIST      Recent Visits  Date Type Provider Dept   24 Telephone Shelby Pan  [facility]   Showing recent visits within past 7 days and meeting all other requirements  Future Appointments  No visits were found meeting these conditions.  Showing future appointments within next 150 days and meeting all other requirements       The patient was identified by name and date of birth. Jillian Ch was informed that this is a telemedicine visit and that the visit is being conducted through the [employer] platform. She agrees to proceed..  My office door was closed. No one else was in the room.  She acknowledged consent and understanding of privacy and security of the video platform. The patient has agreed to participate and understands they can discontinue the visit at any time.    Patient is aware this is a billable service.     Subjective  Jillian Ch is a 33 y.o. female  with a complicated medical history who presents to discuss anesthesia options for delivery. Jillian was  unfortunately diagnosed with early stage breast cancer and underwent left sided mastectomy in Jan 2024 during her first trimester. She developed a DVT and has been managed on therapeutic lovenox. Additionally she has iron deficiency anemia and thrombocytopenia. She has had multiple venofer infusions as well as IV hydration due to hyperemesis gravidum. Her PMH is also notable for degenerative disc disease for which she has had C5-6 and L5-S1 EMIL's many years ago. She unfortunately did not tolerate the steroid injection well secondary to N/V and during one of her cervical injections she developed severe radiculopathy that limited her flexibility of her neck and she had radicular cervical symptoms for several months. In 2021 she developed left sided thoracic outlet syndrome and underwent left first rib resection. Unfortunately she developed CRPS type II afterwards and has chronic pain of her LUE.       HPI   See above    Past Medical History:   Diagnosis Date    Anesthesia complication     gait dysfunction/ urinary retention after nerve block,    Anxiety     Asthma     CRPS (complex regional pain syndrome), upper limb     left chest/arm/hand    Deep vein thrombosis (HCC) 01/05/2024    post op from mastectomy    GERD (gastroesophageal reflux disease)     Heart murmur     HPV (human papilloma virus) infection 2012    unsure of 16, 18, or other    Invasive ductal carcinoma of breast, female, left (HCC) 2023    Kidney stone     Miscarriage 3/15/2015    7 weeks along.    Ovarian cyst     Left    PONV (postoperative nausea and vomiting) 01/02/2024       Past Surgical History:   Procedure Laterality Date    COLPOSCOPY  2012    HPV    EGD      IR PORT PLACEMENT  2/7/2024    IR UPPER EXTREMITY VENOGRAM- DIAGNOSTIC  05/28/2021    NC BX/EXC LYMPH NODE OPEN SUPERFICIAL Left 01/02/2024    Procedure: LEFT BREAST MASTECTOMY, LYMPHATIC MAPPING WITH RADIOACTIVE DYE, SENTINEL LYMPH NODE BIOPSY, ZAHEER LEFT BREAST, INJECTION IN OR AT 0800  BY DR CORNELL;  Surgeon: Elena Cornell MD;  Location: [facility];  Service: Surgical Oncology    WI EXCISION 1ST &/CERVICAL RIB Left 08/12/2021    Procedure: FIRST RIB RESECTION;  Surgeon: Jonathan Schaffer MD;  Location: [facility];  Service: Thoracic    WI INJ RADIOACTIVE TRACER FOR ID OF SENTINEL NODE Left 01/02/2024    Procedure: LEFT BREAST MASTECTOMY, LYMPHATIC MAPPING WITH RADIOACTIVE DYE, SENTINEL LYMPH NODE BIOPSY, ZAHEER LEFT BREAST, INJECTION IN OR AT 0800 BY DR CORNELL;  Surgeon: Elena Cornell MD;  Location: [facility];  Service: Surgical Oncology    WI INTRAOP SENTINEL LYMPH NODE ID W/DYE INJECTION Left 01/02/2024    Procedure: LEFT BREAST MASTECTOMY, LYMPHATIC MAPPING WITH RADIOACTIVE DYE, SENTINEL LYMPH NODE BIOPSY, ZAHEER LEFT BREAST, INJECTION IN OR AT 0800 BY DR CORNELL;  Surgeon: Elena Cornell MD;  Location: [facility];  Service: Surgical Oncology    WI MASTECTOMY SIMPLE COMPLETE Left 01/02/2024    Procedure: LEFT BREAST MASTECTOMY, LYMPHATIC MAPPING WITH RADIOACTIVE DYE, SENTINEL LYMPH NODE BIOPSY, ZAHEER LEFT BREAST, INJECTION IN OR AT 0800 BY DR CORNELL;  Surgeon: Elena Cornell MD;  Location: [facility];  Service: Surgical Oncology    THORACOSCOPY VIDEO ASSISTED SURGERY (VATS) Left 08/12/2021    Procedure: THORACOSCOPY VIDEO ASSISTED SURGERY (VATS);  Surgeon: Jonathan Schaffer MD;  Location: [facility];  Service: Thoracic    US GUIDANCE BREAST BIOPSY LEFT EACH ADDITIONAL Left 12/02/2023    US GUIDED BREAST BIOPSY RIGHT COMPLETE Right 12/02/2023    WISDOM TOOTH EXTRACTION  2012       Current Outpatient Medications   Medication Sig Dispense Refill    albuterol (PROVENTIL HFA,VENTOLIN HFA) 90 mcg/act inhaler TAKE 2 PUFFS BY MOUTH EVERY 6 HOURS AS NEEDED FOR WHEEZE OR FOR SHORTNESS OF BREATH 18 g 3    Blood Glucose Monitoring Suppl (OneTouch Verio Flex System) w/Device KIT Test x4 Daily or as instructed (Patient not taking: Reported on 5/21/2024) 1 kit 0     cetirizine (ZyrTEC) 10 mg tablet TAKE 1 TABLET BY MOUTH EVERY DAY 90 tablet 0    CRANIAL PROSTHESIS, RX, Place one on head as needed (Patient not taking: Reported on 2024) 1 each 0    enoxaparin (LOVENOX) 80 mg/0.8 mL Inject 75mg SC every 12 hours 48 mL 2    Lancets (OneTouch Delica Plus Oiypfv26H) MISC USE 4 A DAY OR AS INSTRUCTED (Patient not taking: Reported on 2024) 100 each 0    lidocaine-prilocaine (EMLA) cream Apply to port 30 to 60 min prior to use 30 g 2    omeprazole (PriLOSEC) 40 MG capsule Take 1 capsule (40 mg total) by mouth daily 90 capsule 2    ondansetron (ZOFRAN) 8 mg tablet Take 1 tablet (8 mg total) by mouth every 8 (eight) hours as needed for nausea or vomiting 30 tablet 3    OneTouch Verio test strip TEST 4 TIMES DAILY OR AS INSTRUCTED (Patient not taking: Reported on 2024) 100 strip 3    Prenatal Multivit-Min-Fe-FA (PRE- PO) Take 1 tablet by mouth in the morning       No current facility-administered medications for this visit.     Facility-Administered Medications Ordered in Other Visits   Medication Dose Route Frequency Provider Last Rate Last Admin    alteplase (CATHFLO) injection 2 mg  2 mg Intracatheter Q1MIN PRN Nemo Agostino, DO        alteplase (CATHFLO) injection 2 mg  2 mg Intracatheter Q1MIN PRN Nemo Agostino, DO        iron sucrose (VENOFER) 200 mg in sodium chloride 0.9% 100 ml IVPB 200 mg  200 mg Intravenous Once Kathy Barillas  mL/hr at 24 1014 200 mg at 24 1014        Allergies   Allergen Reactions    Formic Acid Swelling and Rash     (Severe reactions to Bug bites)    Latex Rash and Blisters    Nsaids GI Intolerance       Review of Systems   Constitutional:  Negative for chills and fever.   HENT:  Negative for ear pain and sore throat.    Eyes:  Negative for pain and visual disturbance.   Respiratory:  Negative for cough and shortness of breath.    Cardiovascular:  Negative for chest pain and palpitations.   Gastrointestinal:   Negative for abdominal pain and vomiting.   Genitourinary:  Negative for dysuria and hematuria.   Musculoskeletal:  Positive for arthralgias and back pain.   Skin:  Negative for color change and rash.   Neurological:  Negative for seizures and syncope.   All other systems reviewed and are negative.      Video Exam    There were no vitals filed for this visit.    Physical Exam  Vitals and nursing note reviewed.   Constitutional:       Appearance: Normal appearance.   HENT:      Head: Normocephalic and atraumatic.      Right Ear: External ear normal.      Left Ear: External ear normal.      Nose: Nose normal.      Mouth/Throat:      Mouth: Mucous membranes are moist.      Pharynx: Oropharynx is clear.   Eyes:      Conjunctiva/sclera: Conjunctivae normal.   Pulmonary:      Effort: Pulmonary effort is normal.   Musculoskeletal:         General: Normal range of motion.      Cervical back: Normal range of motion.   Neurological:      General: No focal deficit present.      Mental Status: She is alert and oriented to person, place, and time. Mental status is at baseline.   Psychiatric:         Mood and Affect: Mood normal.          Visit Time  Total Visit Duration: 30 minutes

## 2024-06-21 NOTE — PROGRESS NOTES
Patient arrives to infusion center for Hydration and Venofer infusion. Patient offers no complaints today. Port accessed, patient tolerated entire infusion without complication. Port de-accessed. AVS offered.     Next appointment: 6/24/24 @ 2094

## 2024-06-24 ENCOUNTER — NUTRITION (OUTPATIENT)
Dept: NUTRITION | Facility: CLINIC | Age: 34
End: 2024-06-24

## 2024-06-24 ENCOUNTER — HOSPITAL ENCOUNTER (OUTPATIENT)
Dept: INFUSION CENTER | Facility: CLINIC | Age: 34
Discharge: HOME | End: 2024-06-24
Payer: COMMERCIAL

## 2024-06-24 VITALS
TEMPERATURE: 96.3 F | SYSTOLIC BLOOD PRESSURE: 133 MMHG | DIASTOLIC BLOOD PRESSURE: 60 MMHG | RESPIRATION RATE: 18 BRPM | HEART RATE: 81 BPM

## 2024-06-24 DIAGNOSIS — E86.0 DEHYDRATION: Primary | ICD-10-CM

## 2024-06-24 DIAGNOSIS — O22.30 DVT (DEEP VEIN THROMBOSIS) IN PREGNANCY: ICD-10-CM

## 2024-06-24 DIAGNOSIS — Z71.3 NUTRITIONAL COUNSELING: Primary | ICD-10-CM

## 2024-06-24 RX ORDER — ENOXAPARIN SODIUM 100 MG/ML
INJECTION SUBCUTANEOUS
Qty: 48 ML | Refills: 5 | Status: SHIPPED | OUTPATIENT
Start: 2024-06-24

## 2024-06-24 RX ADMIN — SODIUM CHLORIDE 1000 ML: 0.9 INJECTION, SOLUTION INTRAVENOUS at 11:01

## 2024-06-24 RX ADMIN — ONDANSETRON 8 MG: 2 INJECTION INTRAMUSCULAR; INTRAVENOUS at 11:01

## 2024-06-24 NOTE — PROGRESS NOTES
Outpatient Oncology Nutrition Consultation   Type of Consult: Follow Up  Care Location: [facility]    Reason for referral: Notification from RN Navigator Kamla HAYWOOD on 12/7/23 that pt has triggered for oncology nutrition care (reason for referral: Patient is pregnant and newly diagnosed with breast cancer. Had questions regarding nutrition and sugar intake, etc).     Nutrition Assessment:   Oncology Diagnosis & Treatments:   Diagnosed with left beast cancer, ER positive, 12/2/23.   S/p left mastectomy 1/2/24.   Chemotherapy (doxorubicin, cytoxan) 2/21/24-4/3/24.   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, ER 80-85% PA 90-95% Her2 1+, han 2, cT2(2.1 cm)N0M0    1/2/2023 - left sided mastectomy with SLNBX - [id number] - oncotype 23    2/21/2024 - start AC q 3 weeks    3/13/2024 - cycle 2 adriamycin dose reduced to 50 mg/m2 and cytoxan dose reduced by 10% due to N/V    4/2024 - stop after 3 cycles of AC due to severe nausea and dehydration with coexisting hyperemesis gravidarum     Malignant neoplasm of overlapping sites of left breast in female, estrogen receptor positive (HCC)   12/2/2023 Initial Diagnosis    Malignant neoplasm of overlapping sites of left female breast (HCC)     12/2/2023 Biopsy    Bilateral breast ultrasound guided biopsy  A. Breast, Left, US Guided Left Breast Biopsy 12:00 6cmfn:  Invasive breast carcinoma of no special type (ductal) with osteoclast-like stromal giant cells  Grade 2  ER 85  PA 95  HER2 1+     B. Breast, Right, US Guided Right Breast Biopsy 10:00 9cmfn:  Benign breast tissue.    In review of the patient’s recent imaging, the left malignancy appears unifocal.  The biopsy-proven carcinoma measured 2.1 cm on ultrasound. Ultrasound of the left axilla was performed on 11/24/2023 and showed no suspicious adenopathy.  Recent imaging of the contralateral right breast dated 12/2/2023 and 11/24/2023 was  reviewed and shows no suspicious findings.  The pathology results for the ultrasound-guided core needle biopsy (right breast 10:00, 9 cm from the nipple) are benign and concordant with imaging.     12/2/2023 Genetic Testing    [facility]  A total of 36 genes were evaluated, including: APC, TOPHER, BARD 1, BMPR1A, BRCA1, BRCA2, BRIP1, CDH1, CDK4, CKDN2A, CHEK2, DICER1, MLH1, MSH2, MSH6, MUTYH, NBN, NF1, NTHL1, PALB2, PMS2, PTEN, RAD51C, RECQL, SMAD4, SMARCA4, STK11, TP53, AXIN2, HOXB13, MSH3, POLD1, AND POLE, EPCAM, AND GREM1   Negative result. No pathogenic sequence variants or deletions/duplications identified       12/2/2023 -  Cancer Staged    Staging form: Breast, AJCC 8th Edition  - Clinical stage from 12/2/2023: Stage IB (cT2, cN0, cM0, G2, ER+, TN+, HER2-) - Signed by Elena Cornell MD on 12/13/2023  Stage prefix: Initial diagnosis  Histologic grading system: 3 grade system       1/2/2024 Surgery    Left breast mastectomy with sentinel lymph node biopsy  Invasive Mammary carcinoma of no special type (ductal)   Grade 2  25 mm  Margins negative  0/2 Lymph nodes  Anatomic stage IIA  Prognostic stage IA      2/21/2024 - 4/3/2024 Chemotherapy    DOXOrubicin (ADRIAMYCIN), 56.25 mg/m2 = 106 mg (93.8 % of original dose 60 mg/m2), Intravenous, Once, 3 of 3 cycles  Dose modification: 56.25 mg/m2 (original dose 60 mg/m2, Cycle 1, Reason: Other (Must fill in a comment), Comment: max recommended dose), 50 mg/m2 (original dose 60 mg/m2, Cycle 2, Reason: Nausea/Vomiting, Comment: dose reduced to 50 mg/m2 due to n/v)  Administration: 106 mg (2/21/2024), 94 mg (3/13/2024), 94 mg (4/3/2024)  alteplase (CATHFLO), 2 mg, Intracatheter, Every 1 Minute as needed, 3 of 3 cycles  cyclophosphamide (CYTOXAN) IVPB, 600 mg/m2 = 1,128 mg, Intravenous, Once, 3 of 3 cycles  Dose modification: 540 mg/m2 (original dose 600 mg/m2, Cycle 2, Reason: Nausea/Vomiting, Comment: 10% dose reduction due to n/v)  Administration: 1,128 mg  (2/21/2024), 1,000 mg (3/13/2024), 1,000 mg (4/3/2024)  fosaprepitant (EMEND) IVPB, 150 mg, Intravenous, Once, 3 of 3 cycles  Administration: 150 mg (2/21/2024), 150 mg (3/13/2024), 150 mg (4/3/2024)     Cancer complicating pregnancy, third trimester   12/14/2023 Initial Diagnosis    Cancer complicating pregnancy in second trimester       Past Medical & Surgical Hx:   Patient Active Problem List   Diagnosis    Mild persistent asthma without complication    Axillary hidradenitis suppurativa    Gastroesophageal reflux disease without esophagitis    Depression with anxiety    Chronic fatigue    Chronic left shoulder pain    Cervical disc disorder at C5-C6 level with radiculopathy    Atypical squamous cells of undetermined significance (ASCUS) on Papanicolaou smear of cervix    Hypertrophic and atrophic condition of skin    Migraine headache    Shoulder impingement syndrome, left    Vitamin D deficiency    Thoracic outlet syndrome    Palpitations    Transaminitis    Right middle lobe pulmonary nodule    Reversible airway obstruction    SOB (shortness of breath)    Unexplained weight gain    Left ovarian cyst    Malignant neoplasm of overlapping sites of left breast in female, estrogen receptor positive (HCC)    Cancer complicating pregnancy, third trimester    32 weeks gestation of pregnancy    History of left mastectomy    Multigravida of advanced maternal age in first trimester    DVT complicating pregnancy, third trimester    Dehydration    Iron deficiency anemia secondary to inadequate dietary iron intake    Marginal insertion of umbilical cord affecting management of mother    Encounter for follow-up surveillance of breast cancer    Thrombocytopenia affecting pregnancy, antepartum (HCC)     Past Medical History:   Diagnosis Date    Anesthesia complication     gait dysfunction/ urinary retention after nerve block,    Anxiety     Asthma     CRPS (complex regional pain syndrome), upper limb     left chest/arm/hand     Deep vein thrombosis (HCC) 01/05/2024    post op from mastectomy    GERD (gastroesophageal reflux disease)     Heart murmur     HPV (human papilloma virus) infection 2012    unsure of 16, 18, or other    Invasive ductal carcinoma of breast, female, left (HCC) 2023    Kidney stone     Miscarriage 3/15/2015    7 weeks along.    Ovarian cyst     Left    PONV (postoperative nausea and vomiting) 01/02/2024     Past Surgical History:   Procedure Laterality Date    COLPOSCOPY  2012    HPV    EGD      IR PORT PLACEMENT  2/7/2024    IR UPPER EXTREMITY VENOGRAM- DIAGNOSTIC  05/28/2021    IA BX/EXC LYMPH NODE OPEN SUPERFICIAL Left 01/02/2024    Procedure: LEFT BREAST MASTECTOMY, LYMPHATIC MAPPING WITH RADIOACTIVE DYE, SENTINEL LYMPH NODE BIOPSY, ZAHEER LEFT BREAST, INJECTION IN OR AT 0800 BY DR CORNELL;  Surgeon: Elena Cornell MD;  Location: [facility];  Service: Surgical Oncology    IA EXCISION 1ST &/CERVICAL RIB Left 08/12/2021    Procedure: FIRST RIB RESECTION;  Surgeon: Jonathan Schaffer MD;  Location: [facility];  Service: Thoracic    IA INJ RADIOACTIVE TRACER FOR ID OF SENTINEL NODE Left 01/02/2024    Procedure: LEFT BREAST MASTECTOMY, LYMPHATIC MAPPING WITH RADIOACTIVE DYE, SENTINEL LYMPH NODE BIOPSY, ZAHEER LEFT BREAST, INJECTION IN OR AT 0800 BY DR CORNELL;  Surgeon: Elena Cornell MD;  Location: [facility];  Service: Surgical Oncology    IA INTRAOP SENTINEL LYMPH NODE ID W/DYE INJECTION Left 01/02/2024    Procedure: LEFT BREAST MASTECTOMY, LYMPHATIC MAPPING WITH RADIOACTIVE DYE, SENTINEL LYMPH NODE BIOPSY, ZAHEER LEFT BREAST, INJECTION IN OR AT 0800 BY DR CORNELL;  Surgeon: Elena Cornell MD;  [facility];  Service: Surgical Oncology    IA MASTECTOMY SIMPLE COMPLETE Left 01/02/2024    Procedure: LEFT BREAST MASTECTOMY, LYMPHATIC MAPPING WITH RADIOACTIVE DYE, SENTINEL LYMPH NODE BIOPSY, ZAHEER LEFT BREAST, INJECTION IN OR AT 0800 BY DR CORNELL;  Surgeon: Elena Cornell MD;   Location: [facility];  Service: Surgical Oncology    THORACOSCOPY VIDEO ASSISTED SURGERY (VATS) Left 08/12/2021    Procedure: THORACOSCOPY VIDEO ASSISTED SURGERY (VATS);  Surgeon: Jonathan Schaffer MD;  Location: [facility];  Service: Thoracic    US GUIDANCE BREAST BIOPSY LEFT EACH ADDITIONAL Left 12/02/2023    US GUIDED BREAST BIOPSY RIGHT COMPLETE Right 12/02/2023    WISDOM TOOTH EXTRACTION  2012       Review of Medications:   Vitamins, Supplements and Herbals: No, pt denies taking supplements; venofer w/ infusion 6/21                                                                                                                                                                                                                                                                                                                                                                                                                                                                                                                             Current Outpatient Medications:     albuterol (PROVENTIL HFA,VENTOLIN HFA) 90 mcg/act inhaler, TAKE 2 PUFFS BY MOUTH EVERY 6 HOURS AS NEEDED FOR WHEEZE OR FOR SHORTNESS OF BREATH, Disp: 18 g, Rfl: 3    Blood Glucose Monitoring Suppl (OneTouch Verio Flex System) w/Device KIT, Test x4 Daily or as instructed (Patient not taking: Reported on 5/21/2024), Disp: 1 kit, Rfl: 0    cetirizine (ZyrTEC) 10 mg tablet, TAKE 1 TABLET BY MOUTH EVERY DAY, Disp: 90 tablet, Rfl: 0    CRANIAL PROSTHESIS, RX,, Place one on head as needed (Patient not taking: Reported on 6/7/2024), Disp: 1 each, Rfl: 0    enoxaparin (LOVENOX) 80 mg/0.8 mL, Inject 75mg SC every 12 hours, Disp: 48 mL, Rfl: 2    Lancets (OneTouch Delica Plus [id number]) MISC, USE 4 A DAY OR AS INSTRUCTED (Patient not taking: Reported on 6/7/2024), Disp: 100 each, Rfl: 0    lidocaine-prilocaine (EMLA) cream, Apply to port 30 to 60 min prior to use, Disp: 30 g,  "Rfl: 2    omeprazole (PriLOSEC) 40 MG capsule, Take 1 capsule (40 mg total) by mouth daily, Disp: 90 capsule, Rfl: 2    ondansetron (ZOFRAN) 8 mg tablet, Take 1 tablet (8 mg total) by mouth every 8 (eight) hours as needed for nausea or vomiting, Disp: 30 tablet, Rfl: 3    OneTouch Verio test strip, TEST 4 TIMES DAILY OR AS INSTRUCTED (Patient not taking: Reported on 2024), Disp: 100 strip, Rfl: 3    Prenatal Multivit-Min-Fe-FA (PRE- PO), Take 1 tablet by mouth in the morning, Disp: , Rfl:   No current facility-administered medications for this visit.    Facility-Administered Medications Ordered in Other Visits:     alteplase (CATHFLO) injection 2 mg, 2 mg, Intracatheter, Q1MIN PRN, Nemo Khalil,     Most Recent Lab Results:   Lab Results   Component Value Date    WBC 7.86 2024    NEUTROABS 4.98 2024    IRON 88 2024    TIBC 363 2024    FERRITIN 76 2024    CHOLESTEROL 127 2023    TRIG 56 2023    HDL 43 (L) 2023    LDLCALC 73 2023    ALT 25 2024    AST 18 2024    ALB 3.5 2024    SODIUM 137 2024    SODIUM 135 2024    K 3.4 (L) 2024    K 3.9 2024     2024    BUN 5 2024    BUN 5 2024    CREATININE 0.43 (L) 2024    CREATININE 0.46 (L) 2024    EGFR 132 2024    PHOS 3.5 2024    POCGLU 71 2024    GLUF 79 2024    GLUF 76 2024    GLUC 104 2024    HGBA1C 5.4 2023    HGBA1C 5.1 2023    CALCIUM 8.8 2024    MG 1.8 (L) 2024       Anthropometric Measurements:   Height: 67\"  Ht Readings from Last 1 Encounters:   24 5' 7\" (1.702 m)     Wt Readings from Last 37 Encounters:   24 81.2 kg (179 lb)   24 81.6 kg (180 lb)   24 78.6 kg (173 lb 3.2 oz)   24 79 kg (174 lb 3.2 oz)   05/10/24 78.8 kg (173 lb 12.8 oz)   24 77 kg (169 lb 11.2 oz)   24 75.3 kg (166 lb)   24 75.6 kg (166 lb 9.6 oz) "   04/29/24 75.6 kg (166 lb 9.6 oz)   04/24/24 75.8 kg (167 lb)   04/05/24 76 kg (167 lb 9.6 oz)   04/03/24 75.4 kg (166 lb 3.2 oz)   04/02/24 74.8 kg (165 lb)   03/28/24 76 kg (167 lb 9.6 oz)   03/15/24 75.3 kg (166 lb)   03/13/24 75.8 kg (167 lb)   03/12/24 74.4 kg (164 lb)   03/08/24 76 kg (167 lb 9.6 oz)   02/29/24 73.7 kg (162 lb 6.4 oz)   02/21/24 74.8 kg (165 lb)   02/09/24 77.1 kg (170 lb)   02/08/24 73.9 kg (163 lb)   02/07/24 74 kg (163 lb 2.3 oz)   01/30/24 77.5 kg (170 lb 12.8 oz)   01/24/24 76.7 kg (169 lb)   01/23/24 76.7 kg (169 lb)   01/18/24 77.6 kg (171 lb)   01/17/24 77.8 kg (171 lb 8 oz)   01/11/24 77.6 kg (171 lb)   01/11/24 76.9 kg (169 lb 9.6 oz)   01/09/24 78 kg (172 lb)   01/02/24 78.2 kg (172 lb 6.4 oz)   12/29/23 78.8 kg (173 lb 12.8 oz)   12/27/23 78.8 kg (173 lb 12.8 oz)   12/21/23 81.1 kg (178 lb 12.8 oz)   12/20/23 80.3 kg (177 lb)   12/15/23 81.6 kg (180 lb)     Weight History:   Usual Weight: 130-145#  Varian: n/a  Home Scale: none    Oncology Nutrition-Anthropometrics      Flowsheet Row Nutrition from 6/24/2024 in [facility] Oncology Dietitian Bradford Nutrition from 6/3/2024 in [facility] Oncology Dietitian Bradford   Patient age (years): 33 years 33 years   Patient (female) height (in): 67 in 67 in   Current Weight to be used for anthropometric calculations (lbs) 179 lbs 173.2 lbs   Current Weight to be used for anthropometric calculations (kg) 81.4 kg 78.7 kg   BMI: 28 27.1   IBW female: 135 lbs 135 lbs   IBW (kg) female: 61.4 kg 61.4 kg   IBW % (female) 132.6 % 128.3 %   Adjusted BW (female): 146 lbs 144.6 lbs   Adjusted BW kg (female): 66.4 kg 65.7 kg   % weight change after 1 week: -0.6 % -0.3 %   Weight change after 1 week (lbs) -1 lbs -0.6 lbs   % weight change after 1 month: 2.8 % 3.7 %   Weight change after 1 month (lbs) 4.8 lbs 6.2 lbs   % weight change after 3 months: 7.8 % 5 %   Weight change after 3 months (lbs) 13 lbs 8.2 lbs   % weight change after 6 months: 1.1  % --   Weight change after 6 months (lbs) 2 lbs --            Nutrition-Focused Physical Findings: none observed    Food/Nutrition-Related History & Client/Social History:    Current Nutrition Impact Symptoms:  [x] Nausea   -increased recently  [x] Reduced Appetite [x] Acid Reflux -tums prn    [] Vomiting  [] Unintended Wt Loss   -hx of [] Malabsorption    [] Diarrhea   -imodium prn   -with intake of certain foods: sauces, some vegetables, meats  [] Unintended Wt Gain  [] Dumping Syndrome    [] Constipation   -miralax and colace are helping  [] Thick Mucous/Secretions  [] Abdominal Pain    [] Dysgeusia (Altered Taste)  [] Xerostomia (Dry Mouth)  [] Gas    [] Dysosmia (Altered Smell)  [] Thrush  [] Difficulty Chewing    [] Oral Mucositis (Sore Mouth)  [x] Fatigue  [] Hyperglycemia   Lab Results   Component Value Date    HGBA1C 5.4 12/02/2023      [] Odynophagia  [] Esophagitis  [] Other: hypokalemia 5/20   [] Dysphagia  [] Early Satiety  [] No Problems Eating      Food Allergies & Intolerances: yes: lactose intolerant     Current Diet: Lactose-Free and No red meat   Current Nutrition Intake: Unchanged from last visit  Appetite: Fair   Nutrition Route: PO  Oral Care: brushes QD  Activity level: Dizzy often in the morning. Degenerative disc disease limits physical activity.     24 Hr Diet Recall: has been increasing the volume of foods that she can tolerate   Breakfast: bread; eggs; cereal with almond milk   Snacks: cheese, grapes, banana  Dinner: tofu; potatoes, squash   Snack: almond milk cottage cheese; or coconut milk yogurt      Beverages: water with Pedialyte or gatorade (32oz x2-4); ginger ale (12oz x0-1), IV hydration 3x weekly.   Supplements:   Plant based protein shake 1x daily     Oncology Nutrition-Estimated Needs      Flowsheet Row Nutrition from 6/24/2024 in [facility] Oncology Dietitian Winnie Nutrition from 6/3/2024 in [facility] Oncology Dietitian Winnie   Weight type used Actual weight  Actual weight   Weight in kilograms (kg) used for estimated needs -- 78.7 kg   Energy needs formula:  Other (single) Other (single)  [Estimated Energy Requirements (EER) = nonpregnant EER +452kcal for 3rd trimester pregnancy]   Single value (kcal/kg): 3098  [Estimated Energy Requirements (EER) = nonpregnant EER + 452kcal for 3rd trimester pregnancy] 3066   Energy needs based on single value: -- 451082 kcal   Protein needs formula: 1.5-2 g/kg 1.5-2 g/kg   Protein needs based on 1.5 g/kg 122 g 118 g   Protein needs based on 2 g/kg 163 g 158 g   Fluid needs formula: 35-40 mL/kg 35-40 mL/kg   Fluid needs based on 35 mL/kg 2849 mL 2751 mL   Fluid needs in ounces 96 oz 93 oz   Fluid needs based on 40 mL/kg 3256 mL 3144 mL   Fluid needs in ounces 110 oz 106 oz          **Estimated nutrition needs are based on comparative standards for 3rd trimester pregnancy.     Discussion & Intervention:   Jillian was evaluated today for an RD follow up regarding poor po intake and pt request for diet guidance throughout treatment .  Jillian has completed tx for breast cancer . She continues to come to [facility] center for hydration. She is doing okay today. She reports an increase in nausea recently. Overall her appetite is unchanged since her last visit, but she is working on increasing the volume of foods she can tolerate eating. She continues to hydrate using Pedialyte mixed with water.    Based on today's assessment, discussion included: MNT for: breast cancer recovery, nausea, appetite changes, adequate hydration & fluid choices, eating smaller more frequent meals every 2-3 hours (5-6 small meals/day), eating when feeling most hungry, and continuing oral nutrition supplements.   Moving forward, Jillian will continue current nutrition plan of care.  Materials Provided: not applicable   All questions and concerns addressed during today’s visit.  Jillian has RD contact information.    Nutrition Diagnosis:   Inadequate Energy Intake related to  physiological causes, disease state and treatment related issues as evidenced by food recall, wt loss and discussion with pt and/or family.  Increased Nutrient Needs (kcal & pro) related to increased demand for nutrients and disease state as evidenced by recovering from cancer tx.  Increased Nutrient Needs related to increased demand for nutrients as evidenced by 3rd trimester pregnancy.  Monitoring & Evaluation:   Goals:  weight maintenance/stabilization  adequate nutrition impact symptom management  pt to meet >/=75% estimated nutrition needs daily  increase protein intake  increase fluid intake  increase calorie intake    Progress Towards Goals: Progressing    Nutrition Rx & Recommendations:  Diet: high calorie, high protein, easy on the stomach   Small, frequent meals/snacks may be easier to tolerate than 3 large daily meals.  Aim for 5-6 small meals per day (every 2-3 hours).  Include protein at all meals/snacks.  Include a variety of foods (as tolerated/allowed by diet).  Incorporate physical activity as able/allowed.  Stay hydrated by sipping fluids of choice/tolerance throughout the day.  Liquid nutrition may be better tolerated than solids at times.  Alter food choices and eating patterns to accommodate changing needs.  Light physical activity (such as walking) is encouraged, as able/tolerated.  For nausea/vomiting: try plain/bland foods; try lemon/lime flavors; eat 5-6 small meals/day (except when vomiting); do not skip meals/snacks; choose appealing foods; sip only small amounts of liquids during meals; stay hydrated in between meals; eat foods/drinks that are room temperature; eat dry toast/crackers (see pages  21-22 and 31-32 in your Eating Hints book).  Weigh yourself regularly. If you notice weight loss, make an effort to increase your daily food/calorie intake. If you continue to notice loss after these efforts, reach out to your dietitian to establish a plan to stabilize weight.  Food and drink that  are easy on the stomach: clear broth (chicken, vegetable, bone, mushroom, beef), juice (caution with acidic juices), potatoes, pretzels, crackers, cereal (Corn Flakes, Rice Chex, Rice Crispies, Cheerios), oatmeal or cream of wheat, white bread or toast, white rice, cooked vegetables (caution with gas producing vegetables), plain pasta, eggs, peanut butter, boiled chicken or turkey, soft cheeses. Foods to avoid: spicy, fried/greasy, high in added sugar, acidic.   Keep a log of foods that you can tolerate and ones that you cannot tolerate. Continue working on increasing volume of foods that you can tolerate.   Continue Pedialyte and gatorade mixed with water to aid in hydration.   Protein sources: eggs, tofu, yogurt, cottage cheese, cheese, protein shake     Follow Up Plan:   7/15/24 during hydration    Recommend Referral to Other Providers: none at this time

## 2024-06-24 NOTE — PROGRESS NOTES
Pt presents to clinic for hydration. Pt offers no complaints. Tolerated infusion without complications. Port de-accessed. Pt aware of next appointment on 6/26/24 at 1400. AVS declined.

## 2024-06-24 NOTE — PATIENT INSTRUCTIONS
Nutrition Rx & Recommendations:  Diet: high calorie, high protein, easy on the stomach   Small, frequent meals/snacks may be easier to tolerate than 3 large daily meals.  Aim for 5-6 small meals per day (every 2-3 hours).  Include protein at all meals/snacks.  Include a variety of foods (as tolerated/allowed by diet).  Incorporate physical activity as able/allowed.  Stay hydrated by sipping fluids of choice/tolerance throughout the day.  Liquid nutrition may be better tolerated than solids at times.  Alter food choices and eating patterns to accommodate changing needs.  Light physical activity (such as walking) is encouraged, as able/tolerated.  For nausea/vomiting: try plain/bland foods; try lemon/lime flavors; eat 5-6 small meals/day (except when vomiting); do not skip meals/snacks; choose appealing foods; sip only small amounts of liquids during meals; stay hydrated in between meals; eat foods/drinks that are room temperature; eat dry toast/crackers (see pages  21-22 and 31-32 in your Eating Hints book).  Weigh yourself regularly. If you notice weight loss, make an effort to increase your daily food/calorie intake. If you continue to notice loss after these efforts, reach out to your dietitian to establish a plan to stabilize weight.  Food and drink that are easy on the stomach: clear broth (chicken, vegetable, bone, mushroom, beef), juice (caution with acidic juices), potatoes, pretzels, crackers, cereal (Corn Flakes, Rice Chex, Rice Crispies, Cheerios), oatmeal or cream of wheat, white bread or toast, white rice, cooked vegetables (caution with gas producing vegetables), plain pasta, eggs, peanut butter, boiled chicken or turkey, soft cheeses. Foods to avoid: spicy, fried/greasy, high in added sugar, acidic.   Keep a log of foods that you can tolerate and ones that you cannot tolerate. Continue working on increasing volume of foods that you can tolerate.   Continue Pedialyte and gatorade mixed with water to aid  in hydration.   Protein sources: eggs, tofu, yogurt, cottage cheese, cheese, protein shake     Follow Up Plan:  7/15/24 during hydration   Recommend Referral to Other Providers: none at this time

## 2024-06-24 NOTE — PROGRESS NOTES
Please refer to the [facility] ultrasound report in Ob Procedures for additional information regarding today's visit

## 2024-06-25 ENCOUNTER — ULTRASOUND (OUTPATIENT)
Dept: PERINATAL CARE | Facility: OTHER | Age: 34
End: 2024-06-25
Payer: COMMERCIAL

## 2024-06-25 VITALS
HEIGHT: 67 IN | DIASTOLIC BLOOD PRESSURE: 64 MMHG | WEIGHT: 183 LBS | BODY MASS INDEX: 28.72 KG/M2 | OXYGEN SATURATION: 99 % | HEART RATE: 90 BPM | SYSTOLIC BLOOD PRESSURE: 90 MMHG

## 2024-06-25 DIAGNOSIS — Z3A.33 33 WEEKS GESTATION OF PREGNANCY: ICD-10-CM

## 2024-06-25 DIAGNOSIS — O09.523 ELDERLY MULTIGRAVIDA, THIRD TRIMESTER: ICD-10-CM

## 2024-06-25 DIAGNOSIS — O22.33 DVT COMPLICATING PREGNANCY, THIRD TRIMESTER: ICD-10-CM

## 2024-06-25 DIAGNOSIS — O9A.113 CANCER COMPLICATING PREGNANCY, THIRD TRIMESTER: Primary | ICD-10-CM

## 2024-06-25 PROCEDURE — 76816 OB US FOLLOW-UP PER FETUS: CPT | Performed by: OBSTETRICS & GYNECOLOGY

## 2024-06-25 PROCEDURE — 99214 OFFICE O/P EST MOD 30 MIN: CPT | Performed by: OBSTETRICS & GYNECOLOGY

## 2024-06-25 NOTE — LETTER
June 25, 2024     Kathy Barillas MD  [address]  [address]  [place] [address]    Patient: Jillian Ch   YOB: 1990   Date of Visit: 6/25/2024       Dear Dr. Le Barillas:    Thank you for referring Jillian Ch to me for evaluation. Below are my notes for this consultation.    If you have questions, please do not hesitate to call me. I look forward to following your patient along with you.         Sincerely,        Hari Alcocer MD        CC: No Recipients    Hari Alcocer MD  6/24/2024  7:12 PM  Sign when Signing Visit  Please refer to the [facility] ultrasound report in Ob Procedures for additional information regarding today's visit

## 2024-06-26 ENCOUNTER — HOSPITAL ENCOUNTER (OUTPATIENT)
Dept: INFUSION CENTER | Facility: CLINIC | Age: 34
Discharge: HOME | End: 2024-06-26
Payer: COMMERCIAL

## 2024-06-26 VITALS
HEART RATE: 95 BPM | TEMPERATURE: 98.7 F | SYSTOLIC BLOOD PRESSURE: 110 MMHG | RESPIRATION RATE: 18 BRPM | DIASTOLIC BLOOD PRESSURE: 65 MMHG

## 2024-06-26 DIAGNOSIS — E86.0 DEHYDRATION: Primary | ICD-10-CM

## 2024-06-26 DIAGNOSIS — M50.122 CERVICAL DISC DISORDER AT C5-C6 LEVEL WITH RADICULOPATHY: Primary | ICD-10-CM

## 2024-06-26 DIAGNOSIS — O09.521 MULTIGRAVIDA OF ADVANCED MATERNAL AGE IN FIRST TRIMESTER: ICD-10-CM

## 2024-06-26 DIAGNOSIS — O9A.113 CANCER COMPLICATING PREGNANCY, THIRD TRIMESTER: ICD-10-CM

## 2024-06-26 DIAGNOSIS — Z3A.33 33 WEEKS GESTATION OF PREGNANCY: ICD-10-CM

## 2024-06-26 PROCEDURE — 96360 HYDRATION IV INFUSION INIT: CPT

## 2024-06-26 RX ADMIN — SODIUM CHLORIDE 1000 ML: 0.9 INJECTION, SOLUTION INTRAVENOUS at 14:27

## 2024-06-26 NOTE — PROGRESS NOTES
Patient here for IV hydration. Reports no complaints. Port accessed with no complications. IV hydration tolerated well. No complications. Port de-accessed.   AVS declined  Appointment reviewed on 6/28/24 at 1030  Patient ambulated unit with no difficulties.

## 2024-06-26 NOTE — PROGRESS NOTES
Spoke with Lidia from the [facility] physical therapy office. They requested I place referral. Their office will call patient today set up appointments for therapy.

## 2024-06-27 ENCOUNTER — OFFICE VISIT (OUTPATIENT)
Dept: FAMILY MEDICINE CLINIC | Facility: CLINIC | Age: 34
End: 2024-06-27
Payer: COMMERCIAL

## 2024-06-27 VITALS
HEART RATE: 90 BPM | HEIGHT: 67 IN | TEMPERATURE: 98.4 F | RESPIRATION RATE: 18 BRPM | WEIGHT: 185.2 LBS | BODY MASS INDEX: 29.07 KG/M2 | DIASTOLIC BLOOD PRESSURE: 80 MMHG | OXYGEN SATURATION: 99 % | SYSTOLIC BLOOD PRESSURE: 120 MMHG

## 2024-06-27 DIAGNOSIS — J45.30 MILD PERSISTENT ASTHMA WITHOUT COMPLICATION: Primary | ICD-10-CM

## 2024-06-27 DIAGNOSIS — F41.8 DEPRESSION WITH ANXIETY: ICD-10-CM

## 2024-06-27 DIAGNOSIS — D50.8 IRON DEFICIENCY ANEMIA SECONDARY TO INADEQUATE DIETARY IRON INTAKE: ICD-10-CM

## 2024-06-27 PROCEDURE — 99214 OFFICE O/P EST MOD 30 MIN: CPT | Performed by: NURSE PRACTITIONER

## 2024-06-27 RX ORDER — ALBUTEROL SULFATE 90 UG/1
2 AEROSOL, METERED RESPIRATORY (INHALATION) EVERY 6 HOURS PRN
Qty: 18 G | Refills: 3 | Status: SHIPPED | OUTPATIENT
Start: 2024-06-27

## 2024-06-27 NOTE — PROGRESS NOTES
PT Evaluation     Today's date: 2024  Patient name: Jillian Ch  : 1990  MRN: [id number]  Referring provider: Hari lAcocer MD  Dx:   Encounter Diagnosis     ICD-10-CM    1. Cancer complicating pregnancy, third trimester  O9A.113       2. Back pain complicating pregnancy in third trimester  O99.891     M54.9           Start Time: 1330  Stop Time: 1430  Total time in clinic (min): 60 minutes    Assessment  Impairments: abnormal muscle tone, abnormal or restricted ROM, impaired physical strength, lacks appropriate home exercise program and pain with function  Symptom irritability: moderate    Assessment details: Jillian is a 33 year old female 33 weeks 3 days pregnant who presents to physical therapy with primary concern of right-sided low back pain that has been increased over the past week.  She presents with decreased mobility as well as limited hip stability strength and increased muscle spasm and tightness in the right paraspinal and upper buttock musculature patient was instructed in ongoing home exercise program this date.  To promote self management of pain.  She can benefit from additional 1 to 3 weeks of physical therapy to manage pain throughout and of this pregnancy.  Demonstrated home exercises with good tolerance and understanding.  Written handouts were provided.   Barriers to therapy: Advanced pregnancy  Medical comorbidities  Understanding of Dx/Px/POC: good     Prognosis: fair    Goals  Short-term goals 1 week  1.  Instruction in an ongoing home exercise program  2 proper engagement transversus abdominis musculature with breath sequencing to minimize stress to the low back and pelvic floor region    Long-term goal 4 weeks  1.  Independent in an ongoing home exercise program  2.  Decreased pain by greater than 50%  3.  Independent in self-management of symptoms  4. 4/5 or greater strength in the hip and core structures    Plan  Patient would benefit from: skilled physical  therapy    Planned therapy interventions: massage, manual therapy, joint mobilization, neuromuscular re-education, motor coordination training, behavior modification, aquatic therapy, postural training, patient/caregiver education, therapeutic activities, therapeutic exercise and home exercise program    Frequency: 1x week  Duration in weeks: 5  Plan of Care beginning date: 2024  Plan of Care expiration date: 2024  Treatment plan discussed with: patient        PT Pelvic Floor Subjective:   History of Present Illness:       History of DDD lumbar area for which she has received PT in the past.  Complicated current medical history with active breast cancer, recent mastectomy, pregnancy and history of CRPS. Complications with CA treatment and surgery where she was sedentary throughout. Feels this may contribute to back pain. Notes increased  right LBP pain last week with bilateral hip pain.  Pain is worse return and with prolonged positioning.        Recurrent probem      Social Support:     Lives with:  Spouse    Relationship status: /committed  Diet and Exercise:      Not currently exercising due to pain and medical status  OB/ gyn History    Gestational History:     Delivery Complications:  This is patient's first pregnancy  Bladder Function:     Voiding Difficulties positive for: urgency and frequent urination       Voiding Difficulties comments:     Urinary leakage: no urine leakage  Bowel Function:     Stool softener use: stool softeners  Sexual Function: Denies pain.  Notes decreased libido since chemotherapy medication:  Pain:     Current pain rating:  3    At best pain ratin    At worst pain ratin    Location:  Right low back; both hips  Patient Goals:     Patient goals for therapy:  Decreased pain and improved pain management    Other patient goals:  Improved strength and tolerance for functional tasks      Objective     Concurrent Complaints  Positive for night pain and disturbed  "sleep. Negative for bladder dysfunction, bowel dysfunction and saddle (S4) numbness    Neurological Testing     Sensation     Lumbar   Left   Intact: light touch    Right   Intact: light touch    Active Range of Motion     Lumbar   Flexion:  Restriction level: moderate  Extension:  WFL  Left lateral flexion:  Restriction level: minimal  Right lateral flexion:  with pain Restriction level: minimal    Strength/Myotome Testing     Left Hip   Planes of Motion   Abduction: 4-  Adduction: 4-    Right Hip   Planes of Motion   Abduction: 4-  Adduction: 4-    Additional Strength Details  No gross motor deficits    Tests     Left Hip   Negative DANE and FADIR.     Right Hip   Positive DANE.   Negative FADIR.   Graphical documentation:     Denies pain.  Notes decreased libido since chemotherapy medication             Precautions: PREGNANT, BALTAZAR 8/13/24 ; planned induction 7/29/24 ACTIVE BREAST CANCER, CRPS Left UE      Manuals 6/28            L SL STM 10'                                                   Neuro Re-Ed                                                                                                        Ther Ex             Ball squeeze with exhale 3\"/10x            TB ABD seated BTB  3\"/10x            TA (hug baby) 3\"/10x            PFMC 10x exhale            Seated ball CW/CCW 20x each                          Fig 4 stretch seated 1x each            Standing modified cat/ child pose verbal            Tennis ball release verbal                                                   Ther Activity                                       Gait Training                                       Modalities                                            "

## 2024-06-27 NOTE — ASSESSMENT & PLAN NOTE
Has been staying indoors due to increased heat.  Remain in air conditioning.  Does have albuterol on hand if needed.

## 2024-06-27 NOTE — PROGRESS NOTES
Depression Screening Follow-up Plan: Patient's depression screening was positive with a PHQ-2 score of . Their PHQ-9 score was 0. Patient assessed for underlying major depression. They have no active suicidal ideations. Brief counseling provided and recommend additional follow-up/re-evaluation next office visit.OFFICE VISIT  Jillian Ch 33 y.o. female MRN: [id number]      Depression Screening and Follow-up Plan: Patient was screened for depression during today's encounter. They screened negative with a PHQ-9 score of 0.         Assessment / Plan:  Problem List Items Addressed This Visit          Respiratory    Mild persistent asthma without complication - Primary     Has been staying indoors due to increased heat.  Remain in air conditioning.  Does have albuterol on hand if needed.         Relevant Medications    albuterol (PROVENTIL HFA,VENTOLIN HFA) 90 mcg/act inhaler       Behavioral Health    Depression with anxiety     Currently off SSRIs due to pregnancy.  Stable.  Does have increased support at home            Blood    Iron deficiency anemia secondary to inadequate dietary iron intake     Under the care of hematology, infusions as ordered with follow-up blood work pending              Reason For Visit / Chief Complaint  Chief Complaint   Patient presents with    Follow-up     ENT referral         HPI:  Jillian Ch is a 33 y.o. female who presents today for routine follow up.  She is 33 weeks pregnant, with early stage ER positive breast cancer.  She initially was receiving chemotherapy but due to stress on her body with pregnancy her therapy was ended.  She is receiving IV fluids for hydration along with iron infusions.    Historical Information   Past Medical History:   Diagnosis Date    Anesthesia complication     gait dysfunction/ urinary retention after nerve block,    Anxiety     Asthma     CRPS (complex regional pain syndrome), upper limb     left chest/arm/hand    Deep vein  thrombosis (HCC) 01/05/2024    post op from mastectomy    GERD (gastroesophageal reflux disease)     Heart murmur     HPV (human papilloma virus) infection 2012    unsure of 16, 18, or other    Invasive ductal carcinoma of breast, female, left (HCC) 2023    Kidney stone     Miscarriage 3/15/2015    7 weeks along.    Ovarian cyst     Left    PONV (postoperative nausea and vomiting) 01/02/2024     Past Surgical History:   Procedure Laterality Date    COLPOSCOPY  2012    HPV    EGD      IR PORT PLACEMENT  2/7/2024    IR UPPER EXTREMITY VENOGRAM- DIAGNOSTIC  05/28/2021    OH BX/EXC LYMPH NODE OPEN SUPERFICIAL Left 01/02/2024    Procedure: LEFT BREAST MASTECTOMY, LYMPHATIC MAPPING WITH RADIOACTIVE DYE, SENTINEL LYMPH NODE BIOPSY, ZAHEER LEFT BREAST, INJECTION IN OR AT 0800 BY DR CORNELL;  Surgeon: Elena Cornell MD;  Location: [facility];  Service: Surgical Oncology    OH EXCISION 1ST &/CERVICAL RIB Left 08/12/2021    Procedure: FIRST RIB RESECTION;  Surgeon: Jonathan Schaffer MD;  Location: [facility];  Service: Thoracic    OH INJ RADIOACTIVE TRACER FOR ID OF SENTINEL NODE Left 01/02/2024    Procedure: LEFT BREAST MASTECTOMY, LYMPHATIC MAPPING WITH RADIOACTIVE DYE, SENTINEL LYMPH NODE BIOPSY, ZAHEER LEFT BREAST, INJECTION IN OR AT 0800 BY DR CORNELL;  Surgeon: Elena Cornell MD;  Location[facility];  Service: Surgical Oncology    OH INTRAOP SENTINEL LYMPH NODE ID W/DYE INJECTION Left 01/02/2024    Procedure: LEFT BREAST MASTECTOMY, LYMPHATIC MAPPING WITH RADIOACTIVE DYE, SENTINEL LYMPH NODE BIOPSY, ZAHEER LEFT BREAST, INJECTION IN OR AT 0800 BY DR CORNELL;  Surgeon: Elena Cornell MD;  [facility];  Service: Surgical Oncology    OH MASTECTOMY SIMPLE COMPLETE Left 01/02/2024    Procedure: LEFT BREAST MASTECTOMY, LYMPHATIC MAPPING WITH RADIOACTIVE DYE, SENTINEL LYMPH NODE BIOPSY, ZAHEER LEFT BREAST, INJECTION IN OR AT 0800 BY DR CORNELL;  Surgeon: Elena Cornell MD;  Location: [facility];  Service: Surgical Oncology    THORACOSCOPY VIDEO ASSISTED SURGERY (VATS) Left 08/12/2021    Procedure: THORACOSCOPY VIDEO ASSISTED SURGERY (VATS);  Surgeon: Jonathan Schaffer MD;  [facility]: [facility];  Service: Thoracic    US GUIDANCE BREAST BIOPSY LEFT EACH ADDITIONAL Left 12/02/2023    US GUIDED BREAST BIOPSY RIGHT COMPLETE Right 12/02/2023    WISDOM TOOTH EXTRACTION  2012     Social History   Social History     Substance and Sexual Activity   Alcohol Use Not Currently    Comment: social     Social History     Substance and Sexual Activity   Drug Use Never     Social History     Tobacco Use   Smoking Status Never   Smokeless Tobacco Never     Family History   Problem Relation Age of Onset    Heart disease Mother     Miscarriages / Stillbirths Mother     Coronary artery disease Mother     No Known Problems Father     No Known Problems Half-Brother     Bone cancer Maternal Grandmother         hilda to liver and spine    Miscarriages / Stillbirths Half-Sister     Breast cancer Neg Hx     Ovarian cancer Neg Hx     Uterine cancer Neg Hx     Cervical cancer Neg Hx        Meds/Allergies   Allergies   Allergen Reactions    Formic Acid Swelling and Rash     (Severe reactions to Bug bites)    Latex Rash and Blisters    Nsaids GI Intolerance       Meds:    Current Outpatient Medications:     albuterol (PROVENTIL HFA,VENTOLIN HFA) 90 mcg/act inhaler, Inhale 2 puffs every 6 (six) hours as needed for wheezing, Disp: 18 g, Rfl: 3    cetirizine (ZyrTEC) 10 mg tablet, TAKE 1 TABLET BY MOUTH EVERY DAY, Disp: 90 tablet, Rfl: 0    enoxaparin (LOVENOX) 80 mg/0.8 mL, EXPEL 5 MG (0.05 ML) FROM SYRINGE AND DISCARD, THEN INJECT 75 MG (0.75 ML) UNDER THE SKIN EVERY 12 HOURS, Disp: 48 mL, Rfl: 5    omeprazole (PriLOSEC) 40 MG capsule, Take 1 capsule (40 mg total) by mouth daily, Disp: 90 capsule, Rfl: 2    ondansetron (ZOFRAN) 8 mg tablet, Take 1 tablet (8 mg total) by mouth every 8 (eight) hours as needed for nausea or vomiting,  Disp: 30 tablet, Rfl: 3    Prenatal Multivit-Min-Fe-FA (PRE- PO), Take 1 tablet by mouth in the morning, Disp: , Rfl:     Blood Glucose Monitoring Suppl (OneTouch Verio Flex System) w/Device KIT, Test x4 Daily or as instructed (Patient not taking: Reported on 2024), Disp: 1 kit, Rfl: 0    CRANIAL PROSTHESIS, RX,, Place one on head as needed (Patient not taking: Reported on 2024), Disp: 1 each, Rfl: 0    Lancets (OneTouch Delica Plus [id number]) MISC, USE 4 A DAY OR AS INSTRUCTED (Patient not taking: Reported on 2024), Disp: 100 each, Rfl: 0    lidocaine-prilocaine (EMLA) cream, Apply to port 30 to 60 min prior to use (Patient not taking: Reported on 2024), Disp: 30 g, Rfl: 2    OneTouch Verio test strip, TEST 4 TIMES DAILY OR AS INSTRUCTED (Patient not taking: Reported on 2024), Disp: 100 strip, Rfl: 3  No current facility-administered medications for this visit.    Facility-Administered Medications Ordered in Other Visits:     alteplase (CATHFLO) injection 2 mg, 2 mg, Intracatheter, Q1MIN PRN, Nemo Agostino, DO    ondansetron (ZOFRAN) 8 mg in sodium chloride 0.9 % 50 mL IVPB, 8 mg, Intravenous, Once, Nemo Agostino, DO      REVIEW OF SYSTEMS  Review of Systems   Constitutional:  Positive for fatigue. Negative for activity change, chills and fever.   HENT:  Negative for congestion, ear discharge, ear pain, sinus pressure, sinus pain, sore throat, tinnitus and trouble swallowing.    Eyes:  Negative for photophobia, pain, discharge, itching and visual disturbance.   Respiratory:  Negative for cough, chest tightness, shortness of breath and wheezing.    Cardiovascular:  Negative for chest pain and leg swelling.   Gastrointestinal:  Negative for abdominal distention, abdominal pain, constipation, diarrhea, nausea and vomiting.   Endocrine: Negative for polydipsia, polyphagia and polyuria.   Genitourinary:  Negative for dysuria and frequency.   Musculoskeletal:  Negative for arthralgias,  "myalgias, neck pain and neck stiffness.   Skin:  Negative for color change.   Neurological:  Negative for dizziness, syncope, weakness, numbness and headaches.   Hematological:  Does not bruise/bleed easily.   Psychiatric/Behavioral:  Negative for behavioral problems, confusion, self-injury, sleep disturbance and suicidal ideas. The patient is not nervous/anxious.            Current Vitals:   Blood Pressure: 120/80 (24 1018)  Pulse: 90 (24 1018)  Temperature: 98.4 °F (36.9 °C) (24 1018)  Temp Source: Tympanic (24 1018)  Respirations: 18 (24 1018)  Height: 5' 7\" (170.2 cm) (24 1018)  Weight - Scale: 84 kg (185 lb 3.2 oz) (24 1018)  SpO2: 99 % (24 101)  [unfilled]    PHYSICAL EXAMS:  Physical Exam  Vitals and nursing note reviewed.   Constitutional:       Appearance: Normal appearance.   HENT:      Head: Normocephalic and atraumatic.   Cardiovascular:      Rate and Rhythm: Normal rate and regular rhythm.      Pulses: Normal pulses.      Heart sounds: Normal heart sounds.   Pulmonary:      Effort: Pulmonary effort is normal.      Breath sounds: Normal breath sounds.   Abdominal:      Comments:    Musculoskeletal:         General: Normal range of motion.   Skin:     General: Skin is warm.   Neurological:      General: No focal deficit present.      Mental Status: She is alert and oriented to person, place, and time.   Psychiatric:         Mood and Affect: Mood normal.         Behavior: Behavior normal.         Thought Content: Thought content normal.         Judgment: Judgment normal.             Lab, imaging and other studies: I have personally reviewed pertinent reports.  .             "

## 2024-06-28 ENCOUNTER — HOSPITAL ENCOUNTER (OUTPATIENT)
Dept: INFUSION CENTER | Facility: CLINIC | Age: 34
End: 2024-06-28
Payer: COMMERCIAL

## 2024-06-28 ENCOUNTER — EVALUATION (OUTPATIENT)
Dept: PHYSICAL THERAPY | Age: 34
End: 2024-06-28
Payer: COMMERCIAL

## 2024-06-28 VITALS
TEMPERATURE: 96.2 F | RESPIRATION RATE: 18 BRPM | SYSTOLIC BLOOD PRESSURE: 124 MMHG | DIASTOLIC BLOOD PRESSURE: 66 MMHG | HEART RATE: 82 BPM

## 2024-06-28 DIAGNOSIS — O09.521 MULTIGRAVIDA OF ADVANCED MATERNAL AGE IN FIRST TRIMESTER: ICD-10-CM

## 2024-06-28 DIAGNOSIS — M50.122 CERVICAL DISC DISORDER AT C5-C6 LEVEL WITH RADICULOPATHY: ICD-10-CM

## 2024-06-28 DIAGNOSIS — O9A.113 CANCER COMPLICATING PREGNANCY, THIRD TRIMESTER: Primary | ICD-10-CM

## 2024-06-28 DIAGNOSIS — Z3A.33 33 WEEKS GESTATION OF PREGNANCY: ICD-10-CM

## 2024-06-28 DIAGNOSIS — D50.8 IRON DEFICIENCY ANEMIA SECONDARY TO INADEQUATE DIETARY IRON INTAKE: ICD-10-CM

## 2024-06-28 DIAGNOSIS — O99.891 BACK PAIN COMPLICATING PREGNANCY IN THIRD TRIMESTER: ICD-10-CM

## 2024-06-28 DIAGNOSIS — Z3A.32 32 WEEKS GESTATION OF PREGNANCY: ICD-10-CM

## 2024-06-28 DIAGNOSIS — M54.9 BACK PAIN COMPLICATING PREGNANCY IN THIRD TRIMESTER: ICD-10-CM

## 2024-06-28 DIAGNOSIS — E86.0 DEHYDRATION: Primary | ICD-10-CM

## 2024-06-28 LAB
DME PARACHUTE DELIVERY DATE ACTUAL: NORMAL
DME PARACHUTE DELIVERY DATE REQUESTED: NORMAL
DME PARACHUTE ITEM DESCRIPTION: NORMAL
DME PARACHUTE ORDER STATUS: NORMAL
DME PARACHUTE SUPPLIER NAME: NORMAL
DME PARACHUTE SUPPLIER PHONE: NORMAL

## 2024-06-28 PROCEDURE — 97140 MANUAL THERAPY 1/> REGIONS: CPT | Performed by: PHYSICAL THERAPIST

## 2024-06-28 PROCEDURE — 96375 TX/PRO/DX INJ NEW DRUG ADDON: CPT

## 2024-06-28 PROCEDURE — 96365 THER/PROPH/DIAG IV INF INIT: CPT

## 2024-06-28 PROCEDURE — 97110 THERAPEUTIC EXERCISES: CPT | Performed by: PHYSICAL THERAPIST

## 2024-06-28 PROCEDURE — 97161 PT EVAL LOW COMPLEX 20 MIN: CPT | Performed by: PHYSICAL THERAPIST

## 2024-06-28 RX ORDER — SODIUM CHLORIDE 9 MG/ML
20 INJECTION, SOLUTION INTRAVENOUS ONCE
Status: CANCELLED | OUTPATIENT
Start: 2024-07-08

## 2024-06-28 RX ORDER — SODIUM CHLORIDE 9 MG/ML
20 INJECTION, SOLUTION INTRAVENOUS ONCE
Status: COMPLETED | OUTPATIENT
Start: 2024-06-28 | End: 2024-06-28

## 2024-06-28 RX ADMIN — ONDANSETRON 8 MG: 2 INJECTION INTRAMUSCULAR; INTRAVENOUS at 10:40

## 2024-06-28 RX ADMIN — IRON SUCROSE 200 MG: 20 INJECTION, SOLUTION INTRAVENOUS at 11:11

## 2024-06-28 RX ADMIN — SODIUM CHLORIDE 20 ML/HR: 9 INJECTION, SOLUTION INTRAVENOUS at 11:11

## 2024-06-28 RX ADMIN — SODIUM CHLORIDE 1000 ML: 0.9 INJECTION, SOLUTION INTRAVENOUS at 10:40

## 2024-06-28 NOTE — PROGRESS NOTES
Pt to clinic for Venofer and hydration. Pt offers no complaints. Pt tolerated infusion without complications.   Port de-accessed. Pt aware of next appointment on 7/8/24 at 1000. AVS declined.

## 2024-07-05 ENCOUNTER — PATIENT OUTREACH (OUTPATIENT)
Dept: HEMATOLOGY ONCOLOGY | Facility: CLINIC | Age: 34
End: 2024-07-05

## 2024-07-05 NOTE — PROGRESS NOTES
Oncology Care Coordinator attempted to outreach patient to assess any questions or concerns regarding Barriers of care. A voicemail was left stating a reason for my call and my contact information was provided . I requested a return call at patient's earliest convenience.

## 2024-07-08 ENCOUNTER — HOSPITAL ENCOUNTER (OUTPATIENT)
Dept: INFUSION CENTER | Facility: CLINIC | Age: 34
Discharge: HOME | End: 2024-07-08
Payer: COMMERCIAL

## 2024-07-08 VITALS
TEMPERATURE: 97 F | RESPIRATION RATE: 18 BRPM | SYSTOLIC BLOOD PRESSURE: 120 MMHG | DIASTOLIC BLOOD PRESSURE: 65 MMHG | HEART RATE: 85 BPM

## 2024-07-08 DIAGNOSIS — E86.0 DEHYDRATION: Primary | ICD-10-CM

## 2024-07-08 PROCEDURE — 96374 THER/PROPH/DIAG INJ IV PUSH: CPT

## 2024-07-08 PROCEDURE — 96361 HYDRATE IV INFUSION ADD-ON: CPT

## 2024-07-08 RX ADMIN — SODIUM CHLORIDE 1000 ML: 0.9 INJECTION, SOLUTION INTRAVENOUS at 10:46

## 2024-07-08 RX ADMIN — ONDANSETRON 8 MG: 2 INJECTION INTRAMUSCULAR; INTRAVENOUS at 10:22

## 2024-07-08 NOTE — PROGRESS NOTES
Pt to clinic for IV hydration and zofran, offers no complaints today, tolerated infusions without complications, aware of next appointment on 7/10/24 at 10:30, port de-accessed, avs declined.

## 2024-07-09 ENCOUNTER — HOSPITAL ENCOUNTER (INPATIENT)
Facility: HOSPITAL | Age: 34
LOS: 3 days | Discharge: HOME | End: 2024-07-12
Attending: STUDENT IN AN ORGANIZED HEALTH CARE EDUCATION/TRAINING PROGRAM | Admitting: STUDENT IN AN ORGANIZED HEALTH CARE EDUCATION/TRAINING PROGRAM
Payer: COMMERCIAL

## 2024-07-09 ENCOUNTER — ROUTINE PRENATAL (OUTPATIENT)
Dept: OBGYN CLINIC | Facility: MEDICAL CENTER | Age: 34
End: 2024-07-09
Payer: COMMERCIAL

## 2024-07-09 VITALS
WEIGHT: 187 LBS | DIASTOLIC BLOOD PRESSURE: 74 MMHG | SYSTOLIC BLOOD PRESSURE: 136 MMHG | BODY MASS INDEX: 29.29 KG/M2 | TEMPERATURE: 105 F | OXYGEN SATURATION: 99 %

## 2024-07-09 DIAGNOSIS — O46.90 VAGINAL BLEEDING DURING PREGNANCY: ICD-10-CM

## 2024-07-09 DIAGNOSIS — O99.119 THROMBOCYTOPENIA AFFECTING PREGNANCY, ANTEPARTUM (HCC): ICD-10-CM

## 2024-07-09 DIAGNOSIS — Z17.0 MALIGNANT NEOPLASM OF OVERLAPPING SITES OF LEFT BREAST IN FEMALE, ESTROGEN RECEPTOR POSITIVE (HCC): ICD-10-CM

## 2024-07-09 DIAGNOSIS — D69.6 THROMBOCYTOPENIA AFFECTING PREGNANCY, ANTEPARTUM (HCC): ICD-10-CM

## 2024-07-09 DIAGNOSIS — D50.8 IRON DEFICIENCY ANEMIA SECONDARY TO INADEQUATE DIETARY IRON INTAKE: ICD-10-CM

## 2024-07-09 DIAGNOSIS — Z90.12 HISTORY OF LEFT MASTECTOMY: ICD-10-CM

## 2024-07-09 DIAGNOSIS — O43.199 MARGINAL INSERTION OF UMBILICAL CORD AFFECTING MANAGEMENT OF MOTHER: ICD-10-CM

## 2024-07-09 DIAGNOSIS — R53.82 CHRONIC FATIGUE: ICD-10-CM

## 2024-07-09 DIAGNOSIS — C50.812 MALIGNANT NEOPLASM OF OVERLAPPING SITES OF LEFT BREAST IN FEMALE, ESTROGEN RECEPTOR POSITIVE (HCC): ICD-10-CM

## 2024-07-09 DIAGNOSIS — J45.30 MILD PERSISTENT ASTHMA WITHOUT COMPLICATION: ICD-10-CM

## 2024-07-09 DIAGNOSIS — Z3A.35 35 WEEKS GESTATION OF PREGNANCY: Primary | ICD-10-CM

## 2024-07-09 DIAGNOSIS — Z3A.35 35 WEEKS GESTATION OF PREGNANCY: ICD-10-CM

## 2024-07-09 DIAGNOSIS — O22.33 DVT COMPLICATING PREGNANCY, THIRD TRIMESTER: ICD-10-CM

## 2024-07-09 DIAGNOSIS — Z34.83 PRENATAL CARE, SUBSEQUENT PREGNANCY, THIRD TRIMESTER: Primary | ICD-10-CM

## 2024-07-09 DIAGNOSIS — O9A.113 CANCER COMPLICATING PREGNANCY, THIRD TRIMESTER: ICD-10-CM

## 2024-07-09 DIAGNOSIS — R87.610 ATYPICAL SQUAMOUS CELLS OF UNDETERMINED SIGNIFICANCE (ASCUS) ON PAPANICOLAOU SMEAR OF CERVIX: ICD-10-CM

## 2024-07-09 LAB
ABO GROUP BLD: NORMAL
APTT PPP: 26 SECONDS (ref 23–37)
BLD GP AB SCN SERPL QL: NEGATIVE
ERYTHROCYTE [DISTWIDTH] IN BLOOD BY AUTOMATED COUNT: 13.9 % (ref 11.6–15.1)
EXTERNAL GROUP B STREP ANTIGEN: NORMAL
FETAL RBC/1000 RBC BLD KLEIH BETKE-RTO: 0 % (ref 0–1)
FIBRINOGEN PPP-MCNC: 645 MG/DL (ref 207–520)
HCT VFR BLD AUTO: 37.5 % (ref 34.8–46.1)
HGB BLD-MCNC: 12.2 G/DL (ref 11.5–15.4)
HOLD SPECIMEN: NORMAL
INR PPP: 0.89 (ref 0.84–1.19)
MCH RBC QN AUTO: 30.7 PG (ref 26.8–34.3)
MCHC RBC AUTO-ENTMCNC: 32.5 G/DL (ref 31.4–37.4)
MCV RBC AUTO: 95 FL (ref 82–98)
PLATELET # BLD AUTO: 104 THOUSANDS/UL (ref 149–390)
PMV BLD AUTO: 12.2 FL (ref 8.9–12.7)
PROTHROMBIN TIME: 12.6 SECONDS (ref 11.6–14.5)
RBC # BLD AUTO: 3.97 MILLION/UL (ref 3.81–5.12)
RH BLD: POSITIVE
SL AMB  POCT GLUCOSE, UA: NEGATIVE
SL AMB POCT URINE PROTEIN: NEGATIVE
SPECIMEN EXPIRATION DATE: NORMAL
TREPONEMA PALLIDUM IGG+IGM AB [PRESENCE] IN SERUM OR PLASMA BY IMMUNOASSAY: NORMAL
WBC # BLD AUTO: 8.74 THOUSAND/UL (ref 4.31–10.16)

## 2024-07-09 PROCEDURE — 85460 HEMOGLOBIN FETAL: CPT

## 2024-07-09 PROCEDURE — 85730 THROMBOPLASTIN TIME PARTIAL: CPT

## 2024-07-09 PROCEDURE — 85610 PROTHROMBIN TIME: CPT

## 2024-07-09 PROCEDURE — 87150 DNA/RNA AMPLIFIED PROBE: CPT | Performed by: STUDENT IN AN ORGANIZED HEALTH CARE EDUCATION/TRAINING PROGRAM

## 2024-07-09 PROCEDURE — 86900 BLOOD TYPING SEROLOGIC ABO: CPT

## 2024-07-09 PROCEDURE — PNV: Performed by: STUDENT IN AN ORGANIZED HEALTH CARE EDUCATION/TRAINING PROGRAM

## 2024-07-09 PROCEDURE — NC001 PR NO CHARGE: Performed by: STUDENT IN AN ORGANIZED HEALTH CARE EDUCATION/TRAINING PROGRAM

## 2024-07-09 PROCEDURE — 99213 OFFICE O/P EST LOW 20 MIN: CPT

## 2024-07-09 PROCEDURE — 85027 COMPLETE CBC AUTOMATED: CPT

## 2024-07-09 PROCEDURE — 86901 BLOOD TYPING SEROLOGIC RH(D): CPT

## 2024-07-09 PROCEDURE — 86780 TREPONEMA PALLIDUM: CPT

## 2024-07-09 PROCEDURE — 81002 URINALYSIS NONAUTO W/O SCOPE: CPT | Performed by: STUDENT IN AN ORGANIZED HEALTH CARE EDUCATION/TRAINING PROGRAM

## 2024-07-09 PROCEDURE — G0463 HOSPITAL OUTPT CLINIC VISIT: HCPCS

## 2024-07-09 PROCEDURE — 86850 RBC ANTIBODY SCREEN: CPT

## 2024-07-09 PROCEDURE — 85384 FIBRINOGEN ACTIVITY: CPT

## 2024-07-09 RX ORDER — SODIUM CHLORIDE, SODIUM LACTATE, POTASSIUM CHLORIDE, CALCIUM CHLORIDE 600; 310; 30; 20 MG/100ML; MG/100ML; MG/100ML; MG/100ML
125 INJECTION, SOLUTION INTRAVENOUS CONTINUOUS
Status: DISCONTINUED | OUTPATIENT
Start: 2024-07-09 | End: 2024-07-10

## 2024-07-09 RX ORDER — BUPIVACAINE HYDROCHLORIDE 2.5 MG/ML
30 INJECTION, SOLUTION EPIDURAL; INFILTRATION; INTRACAUDAL ONCE AS NEEDED
Status: DISCONTINUED | OUTPATIENT
Start: 2024-07-09 | End: 2024-07-10

## 2024-07-09 RX ORDER — ALBUTEROL SULFATE 90 UG/1
2 AEROSOL, METERED RESPIRATORY (INHALATION) EVERY 6 HOURS PRN
Status: DISCONTINUED | OUTPATIENT
Start: 2024-07-09 | End: 2024-07-12 | Stop reason: HOSPADM

## 2024-07-09 RX ORDER — ONDANSETRON 2 MG/ML
4 INJECTION INTRAMUSCULAR; INTRAVENOUS EVERY 6 HOURS PRN
Status: DISCONTINUED | OUTPATIENT
Start: 2024-07-09 | End: 2024-07-10

## 2024-07-09 RX ADMIN — SODIUM CHLORIDE, SODIUM LACTATE, POTASSIUM CHLORIDE, AND CALCIUM CHLORIDE 1000 ML: .6; .31; .03; .02 INJECTION, SOLUTION INTRAVENOUS at 12:53

## 2024-07-09 RX ADMIN — SODIUM CHLORIDE, SODIUM LACTATE, POTASSIUM CHLORIDE, AND CALCIUM CHLORIDE 125 ML/HR: .6; .31; .03; .02 INJECTION, SOLUTION INTRAVENOUS at 15:45

## 2024-07-09 RX ADMIN — PENICILLIN G POTASSIUM 5 MILLION UNITS: 20000000 INJECTION, POWDER, FOR SOLUTION INTRAVENOUS at 15:45

## 2024-07-09 RX ADMIN — SODIUM CHLORIDE, SODIUM LACTATE, POTASSIUM CHLORIDE, AND CALCIUM CHLORIDE 125 ML/HR: .6; .31; .03; .02 INJECTION, SOLUTION INTRAVENOUS at 19:44

## 2024-07-09 RX ADMIN — PENICILLIN G POTASSIUM 2.5 MILLION UNITS: 20000000 INJECTION, POWDER, FOR SOLUTION INTRAVENOUS at 21:35

## 2024-07-09 NOTE — ASSESSMENT & PLAN NOTE
Patient on Lovenox 80 mg twice daily for DVT prophylaxis in the setting of an occlusive DVT in the left distal popliteal vein and peroneal veins that developed 3 days following her left mastectomy in 2024  Patient discontinued her Lovenox on the morning of 2024 in the setting of vaginal bleeding  Patient restarted lovenox 24 at 0130 6 hrs post   Plan for a minimum of 6 weeks of postpartum therapy

## 2024-07-09 NOTE — PROGRESS NOTES
DVT complicating pregnancy, third trimester  Plan to hold 24 hours before IOL.    Thrombocytopenia affecting pregnancy, antepartum (HCC)  Stable at 116    Iron deficiency anemia secondary to inadequate dietary iron intake  Iron infusions weekly prior to delivery with dropping ferritin on last check and increasing fatigue    Malignant neoplasm of overlapping sites of left breast in female, estrogen receptor positive (HCC)  Delivery planned by IOL . Not planning to breastfeed based on concerns with prior chemo and planned postpartum chemo.    35 weeks gestation of pregnancy  34 yo here for ob visit.  at 35+0. No contractions or leaking. She is having new onset bright red bleeding on arrival to the office today. It is not brisk but present every time she wipes. Good fetal movement. Very anxious today about her bleeding.     Speculum exam shows quarter sized clot in the vault and slow active bleed that appear to come from uterus rather than cervical surface. Patient sent to triage for ctx and fetal monitoring.   GBS collected

## 2024-07-09 NOTE — ASSESSMENT & PLAN NOTE
Routine postpartum care  Encourage ambulation  Encourage familial bonding  Lactation support as needed  Pain: Motrin/Tylenol around the clock, oxycodone if needed     Statement Selected

## 2024-07-09 NOTE — ASSESSMENT & PLAN NOTE
Delivery planned by IOL 7/28. Not planning to breastfeed based on concerns with prior chemo and planned postpartum chemo.

## 2024-07-09 NOTE — ASSESSMENT & PLAN NOTE
34 yo here for ob visit.  at 35+0. No contractions or leaking. She is having new onset bright red bleeding on arrival to the office today. It is not brisk but present every time she wipes. Good fetal movement. Very anxious today about her bleeding.     Speculum exam shows quarter sized clot in the vault and slow active bleed that appear to come from uterus rather than cervical surface. Patient sent to triage for ctx and fetal monitoring.

## 2024-07-09 NOTE — PLAN OF CARE
Problem: ANTEPARTUM  Goal: Maintain pregnancy as long as maternal and/or fetal condition is stable  Description: INTERVENTIONS:  - Maternal surveillance  - Fetal surveillance  - Monitor uterine activity  - Medications as ordered  - Bedrest  Outcome: Progressing     Problem: PAIN - ADULT  Goal: Verbalizes/displays adequate comfort level or baseline comfort level  Description: Interventions:  - Encourage patient to monitor pain and request assistance  - Assess pain using appropriate pain scale  - Administer analgesics based on type and severity of pain and evaluate response  - Implement non-pharmacological measures as appropriate and evaluate response  - Consider cultural and social influences on pain and pain management  - Notify physician/advanced practitioner if interventions unsuccessful or patient reports new pain  Outcome: Progressing     Problem: INFECTION - ADULT  Goal: Absence or prevention of progression during hospitalization  Description: INTERVENTIONS:  - Assess and monitor for signs and symptoms of infection  - Monitor lab/diagnostic results  - Monitor all insertion sites, i.e. indwelling lines, tubes, and drains  - Monitor endotracheal if appropriate and nasal secretions for changes in amount and color  - [place] appropriate cooling/warming therapies per order  - Administer medications as ordered  - Instruct and encourage patient and family to use good hand hygiene technique  - Identify and instruct in appropriate isolation precautions for identified infection/condition  Outcome: Progressing  Goal: Absence of fever/infection during neutropenic period  Description: INTERVENTIONS:  - Monitor WBC    Outcome: Progressing     Problem: SAFETY ADULT  Goal: Patient will remain free of falls  Description: INTERVENTIONS:  - Educate patient/family on patient safety including physical limitations  - Instruct patient to call for assistance with activity   - Consult OT/PT to assist with strengthening/mobility   -  Keep Call bell within reach  - Keep bed low and locked with side rails adjusted as appropriate  - Keep care items and personal belongings within reach  - Initiate and maintain comfort rounds  - Make Fall Risk Sign visible to staff  - Apply yellow socks and bracelet for high fall risk patients  - Consider moving patient to room near nurses station  Outcome: Progressing  Goal: Maintain or return to baseline ADL function  Description: INTERVENTIONS:  -  Assess patient's ability to carry out ADLs; assess patient's baseline for ADL function and identify physical deficits which impact ability to perform ADLs (bathing, care of mouth/teeth, toileting, grooming, dressing, etc.)  - Assess/evaluate cause of self-care deficits   - Assess range of motion  - Assess patient's mobility; develop plan if impaired  - Assess patient's need for assistive devices and provide as appropriate  - Encourage maximum independence but intervene and supervise when necessary  - Involve family in performance of ADLs  - Assess for home care needs following discharge   - Consider OT consult to assist with ADL evaluation and planning for discharge  - Provide patient education as appropriate  Outcome: Progressing  Goal: Maintains/Returns to pre admission functional level  Description: INTERVENTIONS:  - Perform AM-PAC 6 Click Basic Mobility/ Daily Activity assessment daily.  - Set and communicate daily mobility goal to care team and patient/family/caregiver.   - Collaborate with rehabilitation services on mobility goals if consulted  - Out of bed for toileting  - Record patient progress and toleration of activity level   Outcome: Progressing     Problem: Knowledge Deficit  Goal: Patient/family/caregiver demonstrates understanding of disease process, treatment plan, medications, and discharge instructions  Description: Complete learning assessment and assess knowledge base.  Interventions:  - Provide teaching at level of understanding  - Provide teaching  via preferred learning methods  Outcome: Progressing     Problem: DISCHARGE PLANNING  Goal: Discharge to home or other facility with appropriate resources  Description: INTERVENTIONS:  - Identify barriers to discharge w/patient and caregiver  - Arrange for needed discharge resources and transportation as appropriate  - Identify discharge learning needs (meds, wound care, etc.)  - Arrange for interpretive services to assist at discharge as needed  - Refer to Case Management Department for coordinating discharge planning if the patient needs post-hospital services based on physician/advanced practitioner order or complex needs related to functional status, cognitive ability, or social support system  Outcome: Progressing

## 2024-07-09 NOTE — ASSESSMENT & PLAN NOTE
Iron infusions weekly prior to delivery with dropping ferritin on last check and increasing fatigue

## 2024-07-09 NOTE — ASSESSMENT & PLAN NOTE
Moderate amount of bleeding noted on speculum exam  Blood tinged mucus noted on glove following SVE  Abruption labs collected and within normal limits  Patient reports this has decreased overnight  Still noted on glove on AM SVE  Continue to monitor

## 2024-07-10 ENCOUNTER — HOSPITAL ENCOUNTER (OUTPATIENT)
Dept: INFUSION CENTER | Facility: CLINIC | Age: 34
Discharge: HOME | End: 2024-07-10

## 2024-07-10 ENCOUNTER — ANESTHESIA EVENT (INPATIENT)
Dept: ANESTHESIOLOGY | Facility: HOSPITAL | Age: 34
End: 2024-07-10
Payer: COMMERCIAL

## 2024-07-10 ENCOUNTER — ANESTHESIA (INPATIENT)
Dept: ANESTHESIOLOGY | Facility: HOSPITAL | Age: 34
End: 2024-07-10
Payer: COMMERCIAL

## 2024-07-10 LAB
BASE EXCESS BLDCOA CALC-SCNC: -11 MMOL/L (ref 3–11)
BASE EXCESS BLDCOV CALC-SCNC: -7.6 MMOL/L (ref 1–9)
HCO3 BLDCOA-SCNC: 19.4 MMOL/L (ref 17.3–27.3)
HCO3 BLDCOV-SCNC: 19.1 MMOL/L (ref 12.2–28.6)
O2 CT VFR BLDCOA CALC: 11.1 ML/DL
OXYHGB MFR BLDCOA: 42.8 %
OXYHGB MFR BLDCOV: 56.3 %
PCO2 BLDCOA: 60.8 MM[HG] (ref 30–60)
PCO2 BLDCOV: 43.1 MM HG (ref 27–43)
PH BLDCOA: 7.12 [PH] (ref 7.23–7.43)
PH BLDCOV: 7.26 [PH] (ref 7.19–7.49)
PO2 BLDCOA: 24.3 MM HG (ref 5–25)
PO2 BLDCOV: 26 MM HG (ref 15–45)
SAO2 % BLDCOV: 15 ML/DL

## 2024-07-10 PROCEDURE — NC001 PR NO CHARGE: Performed by: OBSTETRICS & GYNECOLOGY

## 2024-07-10 PROCEDURE — 0HQ9XZZ REPAIR PERINEUM SKIN, EXTERNAL APPROACH: ICD-10-PCS | Performed by: OBSTETRICS & GYNECOLOGY

## 2024-07-10 PROCEDURE — 99223 1ST HOSP IP/OBS HIGH 75: CPT | Performed by: OBSTETRICS & GYNECOLOGY

## 2024-07-10 PROCEDURE — 0UQG7ZZ REPAIR VAGINA, VIA NATURAL OR ARTIFICIAL OPENING: ICD-10-PCS | Performed by: OBSTETRICS & GYNECOLOGY

## 2024-07-10 PROCEDURE — 82805 BLOOD GASES W/O2 SATURATION: CPT | Performed by: STUDENT IN AN ORGANIZED HEALTH CARE EDUCATION/TRAINING PROGRAM

## 2024-07-10 PROCEDURE — 4A1HXCZ MONITORING OF PRODUCTS OF CONCEPTION, CARDIAC RATE, EXTERNAL APPROACH: ICD-10-PCS | Performed by: OBSTETRICS & GYNECOLOGY

## 2024-07-10 PROCEDURE — 10907ZC DRAINAGE OF AMNIOTIC FLUID, THERAPEUTIC FROM PRODUCTS OF CONCEPTION, VIA NATURAL OR ARTIFICIAL OPENING: ICD-10-PCS | Performed by: OBSTETRICS & GYNECOLOGY

## 2024-07-10 PROCEDURE — 3E033VJ INTRODUCTION OF OTHER HORMONE INTO PERIPHERAL VEIN, PERCUTANEOUS APPROACH: ICD-10-PCS | Performed by: OBSTETRICS & GYNECOLOGY

## 2024-07-10 PROCEDURE — 59400 OBSTETRICAL CARE: CPT | Performed by: OBSTETRICS & GYNECOLOGY

## 2024-07-10 RX ORDER — CETIRIZINE HYDROCHLORIDE 10 MG/1
10 TABLET ORAL DAILY
Status: DISCONTINUED | OUTPATIENT
Start: 2024-07-10 | End: 2024-07-10 | Stop reason: SDUPTHER

## 2024-07-10 RX ORDER — ONDANSETRON 2 MG/ML
4 INJECTION INTRAMUSCULAR; INTRAVENOUS EVERY 8 HOURS PRN
Status: DISCONTINUED | OUTPATIENT
Start: 2024-07-10 | End: 2024-07-12 | Stop reason: HOSPADM

## 2024-07-10 RX ORDER — BENZOCAINE/MENTHOL 6 MG-10 MG
1 LOZENGE MUCOUS MEMBRANE DAILY PRN
Status: DISCONTINUED | OUTPATIENT
Start: 2024-07-10 | End: 2024-07-12 | Stop reason: HOSPADM

## 2024-07-10 RX ORDER — OMEPRAZOLE 20 MG/1
20 CAPSULE, DELAYED RELEASE ORAL
Status: DISCONTINUED | OUTPATIENT
Start: 2024-07-10 | End: 2024-07-10 | Stop reason: SDUPTHER

## 2024-07-10 RX ORDER — CETIRIZINE HYDROCHLORIDE 10 MG/1
10 TABLET ORAL DAILY
Status: DISCONTINUED | OUTPATIENT
Start: 2024-07-10 | End: 2024-07-12 | Stop reason: HOSPADM

## 2024-07-10 RX ORDER — SIMETHICONE 80 MG
80 TABLET,CHEWABLE ORAL 4 TIMES DAILY PRN
Status: DISCONTINUED | OUTPATIENT
Start: 2024-07-10 | End: 2024-07-12 | Stop reason: HOSPADM

## 2024-07-10 RX ORDER — ENOXAPARIN SODIUM 100 MG/ML
75 INJECTION SUBCUTANEOUS EVERY 12 HOURS SCHEDULED
Status: DISCONTINUED | OUTPATIENT
Start: 2024-07-11 | End: 2024-07-12 | Stop reason: HOSPADM

## 2024-07-10 RX ORDER — BUPIVACAINE HYDROCHLORIDE 5 MG/ML
INJECTION, SOLUTION EPIDURAL; INTRACAUDAL AS NEEDED
Status: DISCONTINUED | OUTPATIENT
Start: 2024-07-10 | End: 2024-07-10 | Stop reason: HOSPADM

## 2024-07-10 RX ORDER — PANTOPRAZOLE SODIUM 40 MG/1
40 TABLET, DELAYED RELEASE ORAL
Status: DISCONTINUED | OUTPATIENT
Start: 2024-07-10 | End: 2024-07-12 | Stop reason: HOSPADM

## 2024-07-10 RX ORDER — SENNOSIDES 8.6 MG
1 TABLET ORAL DAILY
Status: DISCONTINUED | OUTPATIENT
Start: 2024-07-11 | End: 2024-07-12 | Stop reason: HOSPADM

## 2024-07-10 RX ORDER — LIDOCAINE HYDROCHLORIDE AND EPINEPHRINE 15; 5 MG/ML; UG/ML
INJECTION, SOLUTION EPIDURAL
Status: COMPLETED | OUTPATIENT
Start: 2024-07-10 | End: 2024-07-10

## 2024-07-10 RX ORDER — LORATADINE 10 MG/1
10 TABLET ORAL DAILY
Status: DISCONTINUED | OUTPATIENT
Start: 2024-07-10 | End: 2024-07-10

## 2024-07-10 RX ORDER — BUPIVACAINE HYDROCHLORIDE 2.5 MG/ML
INJECTION, SOLUTION EPIDURAL; INFILTRATION; INTRACAUDAL AS NEEDED
Status: DISCONTINUED | OUTPATIENT
Start: 2024-07-10 | End: 2024-07-10 | Stop reason: HOSPADM

## 2024-07-10 RX ORDER — OXYTOCIN/RINGER'S LACTATE 30/500 ML
1-30 PLASTIC BAG, INJECTION (ML) INTRAVENOUS
Status: DISCONTINUED | OUTPATIENT
Start: 2024-07-10 | End: 2024-07-10

## 2024-07-10 RX ORDER — ACETAMINOPHEN 325 MG/1
650 TABLET ORAL EVERY 4 HOURS PRN
Status: DISCONTINUED | OUTPATIENT
Start: 2024-07-10 | End: 2024-07-12 | Stop reason: HOSPADM

## 2024-07-10 RX ORDER — OXYTOCIN/RINGER'S LACTATE 30/500 ML
250 PLASTIC BAG, INJECTION (ML) INTRAVENOUS ONCE
Status: DISCONTINUED | OUTPATIENT
Start: 2024-07-10 | End: 2024-07-12 | Stop reason: HOSPADM

## 2024-07-10 RX ORDER — CALCIUM CARBONATE 500 MG/1
1000 TABLET, CHEWABLE ORAL DAILY PRN
Status: DISCONTINUED | OUTPATIENT
Start: 2024-07-10 | End: 2024-07-12 | Stop reason: HOSPADM

## 2024-07-10 RX ORDER — DOCUSATE SODIUM 100 MG/1
100 CAPSULE, LIQUID FILLED ORAL 2 TIMES DAILY
Status: DISCONTINUED | OUTPATIENT
Start: 2024-07-10 | End: 2024-07-12 | Stop reason: HOSPADM

## 2024-07-10 RX ADMIN — ROPIVACAINE HYDROCHLORIDE: 2 INJECTION, SOLUTION EPIDURAL; INFILTRATION at 14:28

## 2024-07-10 RX ADMIN — ACETAMINOPHEN 650 MG: 325 TABLET, FILM COATED ORAL at 23:44

## 2024-07-10 RX ADMIN — PENICILLIN G POTASSIUM 2.5 MILLION UNITS: 20000000 INJECTION, POWDER, FOR SOLUTION INTRAVENOUS at 02:35

## 2024-07-10 RX ADMIN — Medication 10 MG: at 10:31

## 2024-07-10 RX ADMIN — BUPIVACAINE HYDROCHLORIDE 3 ML: 5 INJECTION, SOLUTION EPIDURAL; INTRACAUDAL; PERINEURAL at 15:28

## 2024-07-10 RX ADMIN — OXYTOCIN 2 MILLI-UNITS/MIN: 10 INJECTION INTRAVENOUS at 10:22

## 2024-07-10 RX ADMIN — BENZOCAINE AND LEVOMENTHOL 1 APPLICATION: 200; 5 SPRAY TOPICAL at 23:44

## 2024-07-10 RX ADMIN — WITCH HAZEL 1 PAD: 500 SOLUTION RECTAL; TOPICAL at 23:44

## 2024-07-10 RX ADMIN — SODIUM CHLORIDE, SODIUM LACTATE, POTASSIUM CHLORIDE, AND CALCIUM CHLORIDE 125 ML/HR: .6; .31; .03; .02 INJECTION, SOLUTION INTRAVENOUS at 13:55

## 2024-07-10 RX ADMIN — LIDOCAINE HYDROCHLORIDE AND EPINEPHRINE 3 ML: 15; 5 INJECTION, SOLUTION EPIDURAL at 14:18

## 2024-07-10 RX ADMIN — HYDROCORTISONE 1 APPLICATION: 1 CREAM TOPICAL at 23:44

## 2024-07-10 RX ADMIN — ONDANSETRON 4 MG: 2 INJECTION INTRAMUSCULAR; INTRAVENOUS at 08:22

## 2024-07-10 RX ADMIN — PENICILLIN G POTASSIUM 2.5 MILLION UNITS: 20000000 INJECTION, POWDER, FOR SOLUTION INTRAVENOUS at 05:48

## 2024-07-10 RX ADMIN — PANTOPRAZOLE SODIUM 40 MG: 40 TABLET, DELAYED RELEASE ORAL at 09:16

## 2024-07-10 RX ADMIN — PENICILLIN G POTASSIUM 2.5 MILLION UNITS: 20000000 INJECTION, POWDER, FOR SOLUTION INTRAVENOUS at 14:31

## 2024-07-10 RX ADMIN — ONDANSETRON 4 MG: 2 INJECTION INTRAMUSCULAR; INTRAVENOUS at 15:14

## 2024-07-10 RX ADMIN — PENICILLIN G POTASSIUM 2.5 MILLION UNITS: 20000000 INJECTION, POWDER, FOR SOLUTION INTRAVENOUS at 10:19

## 2024-07-10 RX ADMIN — B-COMPLEX W/ C & FOLIC ACID TAB 1 TABLET: TAB at 09:17

## 2024-07-10 RX ADMIN — BUPIVACAINE HYDROCHLORIDE 3 ML: 2.5 INJECTION, SOLUTION EPIDURAL; INFILTRATION; INTRACAUDAL at 15:28

## 2024-07-10 RX ADMIN — SODIUM CHLORIDE, SODIUM LACTATE, POTASSIUM CHLORIDE, AND CALCIUM CHLORIDE 125 ML/HR: .6; .31; .03; .02 INJECTION, SOLUTION INTRAVENOUS at 05:47

## 2024-07-10 RX ADMIN — SODIUM CHLORIDE, SODIUM LACTATE, POTASSIUM CHLORIDE, AND CALCIUM CHLORIDE 125 ML/HR: .6; .31; .03; .02 INJECTION, SOLUTION INTRAVENOUS at 20:31

## 2024-07-10 NOTE — OB LABOR/OXYTOCIN SAFETY PROGRESS
Oxytocin Safety Progress Check Note - Jillian Ch 33 y.o. female MRN: [id number]    Unit/Bed#: -01 Encounter: [id number]    Dose (aleida-units/min) Oxytocin: 10 aleida-units/min  Contraction Frequency (minutes): 2-3  Contraction Intensity: Moderate  Uterine Activity Characteristics: Regular  Cervical Dilation: 8        Cervical Effacement: 90  Fetal Station: 0  Baseline Rate (FHR): 140 bpm  Fetal Heart Rate (FHT): 141 BPM  FHR Category: 2               Vital Signs:   Vitals:    07/10/24 1553   BP: 111/59   Pulse: 65   Resp:    Temp:    SpO2:        Notes/comments:     Jillian is comfortable with her epidural. On cervical exam, she is 8/90/0. FHT is cat I with early decels. Continue with pitocin      Shelby Lopez MD 7/10/2024 4:00 PM

## 2024-07-10 NOTE — ASSESSMENT & PLAN NOTE
I had a long discussion with Jillian and her  Efraín at the bedside this morning regarding the plan of care moving forward.  She has unexplained vaginal bleeding that was initially thought to be due to cervical change in the setting of early  labor as her cervical exam changed .  However she has no longer been experiencing contractions and her cervical exam is not changing and she is continuing to have ongoing vaginal bleeding mixed dark and bright red blood.  Fetal status thankfully remains reassuring at a category 1 fetal heart tracing.    Of note her pregnancy has been quite complicated by a mastectomy for ER positive breast cancer in the first trimester s/p 3 cycles of AC chemo, and a post op DVT for which she is maintained on therapeutic Lovenox.  She stopped her Lovenox when she noticed the vaginal bleeding prior to being admitted to the hospital.  Her initial plan had been delivery between 37 to 39 weeks for planned post partum neoadjuvant therapy.    We discussed the recommendation to augment labor. We discussed that given the unexplained ongoing vaginal bleeding with additional concern for  labor and balanced against low likelihood of continued gestation even without intervention (given that she has already changed her cervix to 4 cm) that I would recommend and support augmentation of labor at this point despite her  status given the vaginal bleeding that is unexplained and could reflect a partial placental abruption (thankfully coags were normal on admission), as well as the need to safely minimize time off of anticoagulation for the mother's wellbeing.     We discussed the risks of late  delivery.  I recommended NICU consultation be obtained for further discussion with the family.     We discussed that late  steroids have become more recently offered after the ALPS trial demonstrated a benefit in reducing NICU admission, which was the most significant potential  benefit of ALPS betamethasone in addition to a decrease of composite  respiratory treatment, though most of these were driven by reductions in high flow nasal cannula and CPAP. Therefore, some argue that these easily treatable conditions may not strongly outweigh the potential known and unknown risks in the late  period. This includes risk for  hypoglycemia, as well as concerns for some potential long term harms. This has included potential adverse effects on neurodevelopmental/neurosensory outcomes with in utero exposure of betamethasone late in gestation, though further research is still needed into this area.    We discussed that in her specific case given that we are augmenting labor she would be unlikely to make it through a 48-hour course of betamethasone and while there may be partial benefit from a partial course this should be weighed against the possible risks. Jillian shares this morning that she has had bad reactions to prior steroid injections into her neck back and hip.  I recommend that this be addressed with the NICU as well if they do not feel that there is adequate time to receive much benefit I think it would be reasonable to defer in this situation.

## 2024-07-10 NOTE — ASSESSMENT & PLAN NOTE
Planning for goserelin and aromatase inhibitor postpartum, breastfeeding is not recommended while on these medications due to lack of any safety data

## 2024-07-10 NOTE — PLAN OF CARE
Problem: PAIN - ADULT  Goal: Verbalizes/displays adequate comfort level or baseline comfort level  Description: Interventions:  - Encourage patient to monitor pain and request assistance  - Assess pain using appropriate pain scale  - Administer analgesics based on type and severity of pain and evaluate response  - Implement non-pharmacological measures as appropriate and evaluate response  - Consider cultural and social influences on pain and pain management  - Notify physician/advanced practitioner if interventions unsuccessful or patient reports new pain  Outcome: Progressing     Problem: INFECTION - ADULT  Goal: Absence or prevention of progression during hospitalization  Description: INTERVENTIONS:  - Assess and monitor for signs and symptoms of infection  - Monitor lab/diagnostic results  - Monitor all insertion sites, i.e. indwelling lines, tubes, and drains  - Monitor endotracheal if appropriate and nasal secretions for changes in amount and color  - [place] appropriate cooling/warming therapies per order  - Administer medications as ordered  - Instruct and encourage patient and family to use good hand hygiene technique  - Identify and instruct in appropriate isolation precautions for identified infection/condition  Outcome: Progressing  Goal: Absence of fever/infection during neutropenic period  Description: INTERVENTIONS:  - Monitor WBC    Outcome: Progressing     Problem: SAFETY ADULT  Goal: Patient will remain free of falls  Description: INTERVENTIONS:  - Educate patient/family on patient safety including physical limitations  - Instruct patient to call for assistance with activity   - Consult OT/PT to assist with strengthening/mobility   - Keep Call bell within reach  - Keep bed low and locked with side rails adjusted as appropriate  - Keep care items and personal belongings within reach  - Initiate and maintain comfort rounds  - Make Fall Risk Sign visible to staff  - Apply yellow socks and bracelet  for high fall risk patients  - Consider moving patient to room near nurses station  Outcome: Progressing  Goal: Maintain or return to baseline ADL function  Description: INTERVENTIONS:  -  Assess patient's ability to carry out ADLs; assess patient's baseline for ADL function and identify physical deficits which impact ability to perform ADLs (bathing, care of mouth/teeth, toileting, grooming, dressing, etc.)  - Assess/evaluate cause of self-care deficits   - Assess range of motion  - Assess patient's mobility; develop plan if impaired  - Assess patient's need for assistive devices and provide as appropriate  - Encourage maximum independence but intervene and supervise when necessary  - Involve family in performance of ADLs  - Assess for home care needs following discharge   - Consider OT consult to assist with ADL evaluation and planning for discharge  - Provide patient education as appropriate  Outcome: Progressing  Goal: Maintains/Returns to pre admission functional level  Description: INTERVENTIONS:  - Perform AM-PAC 6 Click Basic Mobility/ Daily Activity assessment daily.  - Set and communicate daily mobility goal to care team and patient/family/caregiver.   - Collaborate with rehabilitation services on mobility goals if consulted  - Out of bed for toileting  - Record patient progress and toleration of activity level   Outcome: Progressing     Problem: Knowledge Deficit  Goal: Patient/family/caregiver demonstrates understanding of disease process, treatment plan, medications, and discharge instructions  Description: Complete learning assessment and assess knowledge base.  Interventions:  - Provide teaching at level of understanding  - Provide teaching via preferred learning methods  Outcome: Progressing  Goal: Verbalizes understanding of labor plan  Description: Assess patient/family/caregiver's baseline knowledge level and ability to understand information.  Provide education via patient/family/caregiver's  preferred learning method at appropriate level of understanding.     1. Provide teaching at level of understanding.  2. Provide teaching via preferred learning method(s).  Outcome: Progressing     Problem: DISCHARGE PLANNING  Goal: Discharge to home or other facility with appropriate resources  Description: INTERVENTIONS:  - Identify barriers to discharge w/patient and caregiver  - Arrange for needed discharge resources and transportation as appropriate  - Identify discharge learning needs (meds, wound care, etc.)  - Arrange for interpretive services to assist at discharge as needed  - Refer to Case Management Department for coordinating discharge planning if the patient needs post-hospital services based on physician/advanced practitioner order or complex needs related to functional status, cognitive ability, or social support system  Outcome: Progressing     Problem: Labor & Delivery  Goal: Manages discomfort  Description: Assess and monitor for signs and symptoms of discomfort.  Assess patient's pain level regularly and per hospital policy.  Administer medications as ordered. Support use of nonpharmacological methods to help control pain such as distraction, imagery, relaxation, and application of heat and cold.  Collaborate with interdisciplinary team and patient to determine appropriate pain management plan.    1. Include patient in decisions related to comfort.  2. Offer non-pharmacological pain management interventions.  3. Report ineffective pain management to physician.  Outcome: Progressing  Goal: Patient vital signs are stable  Description: 1. Assess vital signs - vaginal delivery.  Outcome: Progressing

## 2024-07-10 NOTE — PROGRESS NOTES
Progress Note - Antepartum   Jillian Ch 33 y.o. female MRN: [id number]  Unit/Bed#: -01 Encounter: [id number]  Admit date: 2024.  Today's date: 07/10/24    Assessment & Plan     Ms. Eileen Ch is a 33 y.o.  at 35w1d, Hospital Day: 2, admitted with concern for  labor.  By issue:    Vaginal bleeding during pregnancy  Assessment & Plan  Moderate amount of bleeding noted on speculum exam  Blood tinged mucus noted on glove following SVE  Abruption labs collected and within normal limits  Patient reports this has decreased overnight  Still noted on glove on AM SVE  Continue to monitor    DVT complicating pregnancy, third trimester  Assessment & Plan  Patient on Lovenox 80 mg twice daily for DVT prophylaxis in the setting of an occlusive DVT in the left distal popliteal vein and peroneal veins that developed 3 days following her left mastectomy in 2024  Patient discontinued her Lovenox on the morning of 2024 in the setting of vaginal bleeding  Patient noted to make cervical change and is presumed to be in early labor.  Therefore per M's recommendation, we will hold anticoagulation during her labor course as the clot has resolved on her most recent imaging.  Will plan to restart Lovenox 6 hours following  or 12 hours following   Plan for a minimum of 6 weeks of postpartum therapy  Patient with no additional cervical change overnight, should patient be discharged 7/10, will discuss restarting anticoagulation upon discharge     * 35 weeks gestation of pregnancy  Assessment & Plan  Admit to OBGYN   Clear liquid diet   F/u T&S, CBC, RPR   IVF LR 125cc/hr   Continuous fetal monitoring and tocometry   Analgesia at maternal request   Vertex by [facility]  Expectant Management                 Subjective    Contractions: yes, but decreased from yesterday  Loss of fluid: no  Vaginal bleeding: yes, but decreased from yesterday  Fetal movement: yes  We discussed the  "possibility of discharge today, as she has not made additional change overnight. Will also discuss whether or not to restart Lovenox should patient be discharge antepartum.           Objective      Patient Vitals for the past 24 hrs:   BP Temp Temp src Pulse Resp SpO2 Height Weight   07/10/24 0300 99/51 98.3 °F (36.8 °C) Oral 62 18 98 % -- --   07/09/24 2347 98/54 98.6 °F (37 °C) Oral 60 17 100 % -- --   07/09/24 1948 123/65 98.6 °F (37 °C) Oral 73 18 100 % -- --   07/09/24 1733 -- -- -- -- -- -- 5' 7\" (1.702 m) 84.8 kg (187 lb)   07/09/24 1229 109/70 -- -- 71 -- -- -- --   07/09/24 1214 111/80 -- -- 74 -- -- -- --   07/09/24 1104 -- 98.6 °F (37 °C) Oral -- 16 -- -- --       Physical Exam  Constitutional:       General: She is not in acute distress.     Appearance: Normal appearance.   HENT:      Head: Normocephalic and atraumatic.   Cardiovascular:      Rate and Rhythm: Normal rate.      Pulses: Normal pulses.   Pulmonary:      Effort: Pulmonary effort is normal. No respiratory distress.      Breath sounds: Normal breath sounds.   Abdominal:      General: Abdomen is flat.      Palpations: Abdomen is soft.   Genitourinary:     Comments: Unchanged on SVE 4/70/-2  Small amount of bleeding noted   Skin:     General: Skin is warm and dry.   Neurological:      General: No focal deficit present.      Mental Status: She is alert.   Psychiatric:         Mood and Affect: Mood normal.         Behavior: Behavior normal.         I/O         07/08 0701 07/09 0700 07/09 0701  07/10 0700    I.V. (mL/kg)  497.9 (5.9)    Total Intake(mL/kg)  497.9 (5.9)    Net  +497.9                  Invasive Devices       Central Venous Catheter Line  Duration             Port A Cath 02/07/24 Right Internal jugular 153 days                    Results from last 7 days   Lab Units 07/09/24  1150   WBC Thousand/uL 8.74   HEMOGLOBIN g/dL 12.2   MCV fL 95   PLATELETS Thousands/uL 104*           Invalid input(s): \"GLU\", \"CA\"      Results from last 7 " days   Lab Units 07/09/24  1150   PLATELETS Thousands/uL 104*   INR  0.89   PROTIME seconds 12.6   PTT seconds 26   FIBRINOGEN mg/dL 645*                           Brief review of pertinent history:  Past Medical History:   Diagnosis Date    Anesthesia complication     gait dysfunction/ urinary retention after nerve block,    Anxiety     Asthma     CRPS (complex regional pain syndrome), upper limb     left chest/arm/hand    Deep vein thrombosis (HCC) 01/05/2024    post op from mastectomy    GERD (gastroesophageal reflux disease)     Heart murmur     HPV (human papilloma virus) infection 2012    unsure of 16, 18, or other    Invasive ductal carcinoma of breast, female, left (HCC) 2023    Kidney stone     Miscarriage 3/15/2015    7 weeks along.    Ovarian cyst     Left    PONV (postoperative nausea and vomiting) 01/02/2024     Past Surgical History:   Procedure Laterality Date    COLPOSCOPY  2012    HPV    EGD      IR PORT PLACEMENT  2/7/2024    IR UPPER EXTREMITY VENOGRAM- DIAGNOSTIC  05/28/2021    MD BX/EXC LYMPH NODE OPEN SUPERFICIAL Left 01/02/2024    Procedure: LEFT BREAST MASTECTOMY, LYMPHATIC MAPPING WITH RADIOACTIVE DYE, SENTINEL LYMPH NODE BIOPSY, ZAHEER LEFT BREAST, INJECTION IN OR AT 0800 BY DR CORNELL;  Surgeon: Elena Cornell MD;  Location: [facility];  Service: Surgical Oncology    MD EXCISION 1ST &/CERVICAL RIB Left 08/12/2021    Procedure: FIRST RIB RESECTION;  Surgeon: Jonathan Schaffer MD;  Location: [facility];  Service: Thoracic    MD INJ RADIOACTIVE TRACER FOR ID OF SENTINEL NODE Left 01/02/2024    Procedure: LEFT BREAST MASTECTOMY, LYMPHATIC MAPPING WITH RADIOACTIVE DYE, SENTINEL LYMPH NODE BIOPSY, ZAHEER LEFT BREAST, INJECTION IN OR AT 0800 BY DR CORNELL;  Surgeon: Elena Cornell MD;  Location: [facility];  Service: Surgical Oncology    MD INTRAOP SENTINEL LYMPH NODE ID W/DYE INJECTION Left 01/02/2024    Procedure: LEFT BREAST MASTECTOMY, LYMPHATIC MAPPING WITH RADIOACTIVE DYE,  SENTINEL LYMPH NODE BIOPSY, ZAHEER LEFT BREAST, INJECTION IN OR AT 0800 BY DR MOORE;  Surgeon: Elena Moore MD;  Location: [facility];  Service: Surgical Oncology    DC MASTECTOMY SIMPLE COMPLETE Left 2024    Procedure: LEFT BREAST MASTECTOMY, LYMPHATIC MAPPING WITH RADIOACTIVE DYE, SENTINEL LYMPH NODE BIOPSY, ZAHEER LEFT BREAST, INJECTION IN OR AT 0800 BY DR MOORE;  Surgeon: Elena Moore MD;  Location: [facility];  Service: Surgical Oncology    THORACOSCOPY VIDEO ASSISTED SURGERY (VATS) Left 2021    Procedure: THORACOSCOPY VIDEO ASSISTED SURGERY (VATS);  Surgeon: Jonathan Schaffer MD;  Location: [facility];  Service: Thoracic    US GUIDANCE BREAST BIOPSY LEFT EACH ADDITIONAL Left 2023    US GUIDED BREAST BIOPSY RIGHT COMPLETE Right 2023    WISDOM TOOTH EXTRACTION       OB History    Para Term  AB Living   2   0   1 0   SAB IAB Ectopic Multiple Live Births   1       0      # Outcome Date GA Lbr Germán/2nd Weight Sex Type Anes PTL Lv   2 Current            1 SAB 03/15/15 7w0d    SAB         Obstetric Comments          Fetal data:    Baseline:  140  Variability: moderate  Accelerations: present, 15x15  Decelerations: absent  Contractions: present, approx Q 5-10  Assessment: reactive      MEDS:   Medication Administration - last 24 hours from 2024 0554 to 07/10/2024 0554         Date/Time Order Dose Route Action Action by     07/10/2024 0547 EDT lactated ringers infusion 125 mL/hr Intravenous New Callie Billings RN     2024 1944 EDT lactated ringers infusion 125 mL/hr Intravenous New Bag Meka Billings RN     2024 1545 EDT lactated ringers infusion 125 mL/hr Intravenous New Bag Ketty Graham RN     2024 1507 EDT lactated ringers bolus 1,000 mL 0 mL Intravenous Stopped Ketty Graham RN     2024 1253 EDT lactated ringers bolus 1,000 mL 1,000 mL Intravenous New Bag Ketty Graham RN     2024 2037 EDT  penicillin G (PFIZERPEN-G) in 0.9% sodium chloride 250 mL IVPB 5 Million Units 0 Million Units Intravenous Stopped Amy Blancas RN     07/09/2024 1545 EDT penicillin G (PFIZERPEN-G) in 0.9% sodium chloride 250 mL IVPB 5 Million Units 5 Million Units Intravenous New Bag Ketty Graham RN     07/10/2024 0548 EDT penicillin G (PFIZERPEN-G) in 0.9% sodium chloride 100 mL IVPB 2.5 Million Units 2.5 Million Units Intravenous New Bag Amy Blancas RN     07/10/2024 0335 EDT penicillin G (PFIZERPEN-G) in 0.9% sodium chloride 100 mL IVPB 2.5 Million Units 0 Million Units Intravenous Stopped Amy Blancas RN     07/10/2024 0235 EDT penicillin G (PFIZERPEN-G) in 0.9% sodium chloride 100 mL IVPB 2.5 Million Units 2.5 Million Units Intravenous New Bag Amy Blancas RN     07/09/2024 2235 EDT penicillin G (PFIZERPEN-G) in 0.9% sodium chloride 100 mL IVPB 2.5 Million Units 0 Million Units Intravenous Stopped Amy Blancas RN     07/09/2024 2135 EDT penicillin G (PFIZERPEN-G) in 0.9% sodium chloride 100 mL IVPB 2.5 Million Units 2.5 Million Units Intravenous New Bag Amy Blancas RN     07/09/2024 1830 EDT penicillin G (PFIZERPEN-G) in 0.9% sodium chloride 100 mL IVPB 2.5 Million Units -- Intravenous Canceled Entry Amy Blancas RN          Current Facility-Administered Medications   Medication Dose Route Frequency    albuterol (PROVENTIL HFA,VENTOLIN HFA) inhaler 2 puff  2 puff Inhalation Q6H PRN    bupivacaine (PF) (MARCAINE) 0.25 % injection 30 mL  30 mL Infiltration Once PRN    lactated ringers infusion  125 mL/hr Intravenous Continuous    lactated ringers infusion  125 mL/hr Intravenous Continuous    ondansetron (ZOFRAN) injection 4 mg  4 mg Intravenous Q6H PRN    penicillin G (PFIZERPEN-G) in 0.9% sodium chloride 100 mL IVPB 2.5 Million Units  2.5 Million Units Intravenous Q4H     Facility-Administered Medications Ordered in Other Encounters   Medication Dose Route Frequency    [MAR Hold] alteplase (CATHFLO)  injection 2 mg  2 mg Intracatheter Q1MIN PRN            Margarita Muro MD  OBGYN, PGY-3  7/10/2024  5:54 AM

## 2024-07-10 NOTE — L&D DELIVERY NOTE
Vaginal Delivery Summary - OB/GYN   Jillian Ch 33 y.o. female MRN: [id number]  Unit/Bed#:  206-01 Encounter: [id number]    Pre-delivery Diagnosis:   Pregnancy at 35 weeks   Vaginal bleeding during pregnancy  DVT complicating pregnancy      Post-delivery Diagnosis:   Same, delivered  Vaginal bleeding during pregnancy  DVT complicating pregnancy    Procedure: Spontaneous Vaginal Delivery     Attending Physician: Dr. Cosby  Resident Physician: Dr. Keon Chinchilla    Anesthesia: Epidural    QBL: 59 cc  Admission H.2 g/dL  Admission platelets: 104 thousands/uL    Complications: none apparent    Specimens:   1. Arterial and venous cord gases  2. Cord blood  3. Segment of umbilical cord  4. Placenta to storage     Findings:  1. Viable male on 7/10/2024 at 7:20 PM, with APGARS of 8  and 9  at 1 and 5 minutes respectively. Weight pending at time of dictation for skin to skin bonding.  2. Spontaneous delivery of intact placenta at 1928  3. 1 degree laceration repaired with 3-0 Vicryl Rapide    Gases:  Umbilical Artery  Recent Labs     07/10/24  1926   PHCART 7.122*   BECART -11.0*       Umbilical Vein  Recent Labs     07/10/24  1926   PHCVEN 7.265   BECVEN -7.6*         Brief history and labor course:  Jillian Ch is now a 33 y.o. [id number] 35w1d who presented to labor and delivery for  labor. Her pregnancy was complicated by vaginal bleeding in pregnancy, DVT in pregnancy, and breast cancer s/p left mastectomy. On exam, she was noted to be 3/70/-3.  She was induced with pitocin. She received an epidural and amniotomy was performed for clear fluid. She progressed to complete cervical dilation and began pushing.    Description of delivery:    After pushing for 105 minutes, Jillian delivered a viable male , wt pending as mother is doing skin to skin bonding. There was a nuchal cord x1, wrapped around the neck which was easily reduced. The anterior shoulder delivered atraumatically  with maternal expulsive forces and the assistance of gentle downward traction. The posterior shoulder delivered with maternal expulsive forces and the assistance of gentle upward traction. The remainder of the fetus delivered spontaneously.     Upon delivery, the infant was placed on the mothers abdomen and the cord was doubly clamped and cut. Delayed cord clamping was achieved.The infant was noted to cry spontaneously and was moving all extremities appropriately. There was no evidence for injury. Nurse resuscitators evaluating the . Arterial and venous cord blood gases and cord blood was collected for analysis. These were promptly sent to the lab. In the immediate post-partum, active management of the 3rd stage of labor was performed with massage, the administration of 30 units of IV pitocin, and gentle traction on the umbilical cord. The placenta delivered spontaneously and was noted to have a centrally inserted 3 vessel cord.  The placenta was sent to storage.    The vagina, cervix, perineum, and rectum were inspected and there was noted to be a 1st degree laceration.    Laceration Repair    The patient was comfortable with epidural for analgesia. The apex of the midline vaginal laceration was identified and a suture of 3-0 Vicryl Rapide was placed 1cm above the apex. The vaginal mucosa and underlying rectovaginal fascia were closed using a running suture to the hymenal ring. Hemostasis was achieved.    Bimanual exam revealed minimal clots and good uterine tone.  The fundus was firm and at the level of the umbilicus.  At the conclusion of the procedure, all needle, sponge, and instrument counts were noted to be correct. Patient tolerated the procedure well and was allowed to recover in labor and delivery room with family and  before being transferred to the post-partum floor. Dr. Cosby was present and participated in all key portions of the case.    Disposition:  The patient and the  both  tolerated the procedure well and are recovering in labor and delivery room       Keon Chinchilla MD  Obstetrics & Gynecology PGY-1  7/10/2024  11:31 PM

## 2024-07-10 NOTE — ANESTHESIA PROCEDURE NOTES
Epidural Block    Patient location during procedure: OB/L&D  Start time: 7/10/2024 2:17 PM  Reason for block: procedure for pain  Staffing  Performed by: Chun Tarango MD  Authorized by: Chun Tarango MD    Preanesthetic Checklist  Completed: patient identified, IV checked, site marked, risks and benefits discussed, surgical consent, monitors and equipment checked, pre-op evaluation and timeout performed  Epidural  Patient position: sitting  Prep: ChloraPrep  Sedation Level: no sedation  Patient monitoring: frequent blood pressure checks, continuous pulse oximetry and heart rate  Approach: midline  Location: lumbar, L3-4  Injection technique: PEDRO saline and PEDRO air  Needle  Needle type: Tuohy   Needle gauge: 17 G  Needle insertion depth: 5.5 cm  Catheter type: multi-orifice  Catheter size: 19 G  Catheter at skin depth: 11 cm  Catheter securement method: stabilization device and clear occlusive dressing  Test dose: negativelidocaine-epinephrine (XYLOCAINE-MPF/EPINEPHRINE) 1.5 %-1:200,000 injection 3 mL - Epidural   3 mL - 7/10/2024 2:18:00 PM  Assessment  Sensory level: T10  Number of attempts: 1negative aspiration for CSF, negative aspiration for heme and no paresthesia on injection  patient tolerated the procedure well with no immediate complications  Additional Notes  Single skin pass. PEDRO at 5.5cm. Catheter threaded easily.

## 2024-07-10 NOTE — CONSULTS
Consultation - Maternal Fetal Medicine   Jillianpoly Ch 33 y.o. female MRN: [id number]  Unit/Bed#: -01 Encounter: [id number]  Admit date: 2024.  Today's date: 07/10/24    Assessment & Plan   Ms. Eileen Ch is a 33 y.o. year-old  at 35w1d, Hospital Day: 2, admitted for  labor, complicated by gestational breast cancer, history of DVT this pregnancy on therapeutic lovenox.  By issue:    Vaginal bleeding during pregnancy  Assessment & Plan  I had a long discussion with Jillina and her  Efraín at the bedside this morning regarding the plan of care moving forward.  She has unexplained vaginal bleeding that was initially thought to be due to cervical change in the setting of early  labor as her cervical exam changed .  However she has no longer been experiencing contractions and her cervical exam is not changing and she is continuing to have ongoing vaginal bleeding mixed dark and bright red blood.  Fetal status thankfully remains reassuring at a category 1 fetal heart tracing.    Of note her pregnancy has been quite complicated by a mastectomy for ER positive breast cancer in the first trimester s/p 3 cycles of AC chemo, and a post op DVT for which she is maintained on therapeutic Lovenox.  She stopped her Lovenox when she noticed the vaginal bleeding prior to being admitted to the hospital.  Her initial plan had been delivery between 37 to 39 weeks for planned post partum neoadjuvant therapy.    We discussed the recommendation to augment labor. We discussed that given the unexplained ongoing vaginal bleeding with additional concern for  labor and balanced against low likelihood of continued gestation even without intervention (given that she has already changed her cervix to 4 cm) that I would recommend and support augmentation of labor at this point despite her  status given the vaginal bleeding that is unexplained and could reflect a partial  "placental abruption (thankfully coags were normal on admission), as well as the need to safely minimize time off of anticoagulation for the mother's wellbeing.     We discussed the risks of late  delivery.  I recommended NICU consultation be obtained for further discussion with the family.     We discussed that late  steroids have become more recently offered after the ALPS trial demonstrated a benefit in reducing NICU admission, which was the most significant potential benefit of ALPS betamethasone in addition to a decrease of composite  respiratory treatment, though most of these were driven by reductions in high flow nasal cannula and CPAP. Therefore, some argue that these easily treatable conditions may not strongly outweigh the potential known and unknown risks in the late  period. This includes risk for  hypoglycemia, as well as concerns for some potential long term harms. This has included potential adverse effects on neurodevelopmental/neurosensory outcomes with in utero exposure of betamethasone late in gestation, though further research is still needed into this area.    We discussed that in her specific case given that we are augmenting labor she would be unlikely to make it through a 48-hour course of betamethasone and while there may be partial benefit from a partial course this should be weighed against the possible risks. Jillian shares this morning that she has had bad reactions to prior steroid injections into her neck back and hip.  I recommend that this be addressed with the NICU as well if they do not feel that there is adequate time to receive much benefit I think it would be reasonable to defer in this situation.    DVT complicating pregnancy, third trimester  Assessment & Plan  Repeat duplex study on 24 at 30w4d shows resolution of DVT \"In comparison to previous exam on 24 there is resolution of previous DVT in the popliteal and peroneal " "veins\"  Resume therapeutic lovenox 4-6 hours after  or 12 hours after . Defer to anesthesia in regards to timing of resumption after neuraxial blockade and catheter removal (Per ACOG  - wait 24 hours after blockade AND at least 4 hours after catheter removal)  SCDs should be on throughout labor course    Malignant neoplasm of overlapping sites of left breast in female, estrogen receptor positive (HCC)  Assessment & Plan  Planning for goserelin and aromatase inhibitor postpartum, breastfeeding is not recommended while on these medications due to lack of any safety data           Thank you for this kind consult and please do not hesitate to reach back out with questions/concerns.    Zina Cummings MD  Attending Physician, Maternal-Fetal Medicine  [facility]      Chief Complaint   Patient presents with    Laboring       Physician Requesting Consult: Nemo Rose DO  Reason for Consult / Principal Problem: Concern for  labor, bleeding, maternal medical issues  Subspeciality: Perinatology    Dear Dr. Rose and Dr Joseph,    Thank you for referring patient Jillian Ch for Maternal-Fetal Medicine consultation regarding management of  labor and vaginal bleeding in the later  period in setting of multiple maternal medical comorbidities    HPI: As you know, Ms. Eileen Ch is a 33 y.o. year-old  with an BALTAZAR of Estimated Date of Delivery: 24 at 35w1d, Hospital Day: 2, admitted for  labor and bleeding.  Her current pregnancy is significant for ER positive breast cancer and left mastectomy with SLN biopsy on 24. She is s/p 3 cycles of AC and was stopped from side effects of nausea/vomiting. Her postop course was complicated by a DVT for which she is now maintained on therapeutic lovenox.    Other obstetric review of symptoms:  Contractions: Irregular.    Leakage of fluid: None.    Bleeding:  has bright red and darker " blood trickling continued since admission .    Fetal movement: present.    Pertinent items are noted in HPI.      Other history is as follows:    Historical Information   OB History    Para Term  AB Living   2   0   1 0   SAB IAB Ectopic Multiple Live Births   1       0      # Outcome Date GA Lbr Germán/2nd Weight Sex Type Anes PTL Lv   2 Current            1 SAB 03/15/15 7w0d    SAB         Obstetric Comments        Gynecologic history: Patient's last menstrual period was 10/31/2023.     Past Medical History:   Diagnosis Date    Anesthesia complication     gait dysfunction/ urinary retention after nerve block,    Anxiety     Asthma     CRPS (complex regional pain syndrome), upper limb     left chest/arm/hand    Deep vein thrombosis (HCC) 2024    post op from mastectomy    GERD (gastroesophageal reflux disease)     Heart murmur     HPV (human papilloma virus) infection     unsure of 16, 18, or other    Invasive ductal carcinoma of breast, female, left (HCC)     Kidney stone     Miscarriage 3/15/2015    7 weeks along.    Ovarian cyst     Left    PONV (postoperative nausea and vomiting) 2024     Past Surgical History:   Procedure Laterality Date    COLPOSCOPY      HPV    EGD      IR PORT PLACEMENT  2024    IR UPPER EXTREMITY VENOGRAM- DIAGNOSTIC  2021    OR BX/EXC LYMPH NODE OPEN SUPERFICIAL Left 2024    Procedure: LEFT BREAST MASTECTOMY, LYMPHATIC MAPPING WITH RADIOACTIVE DYE, SENTINEL LYMPH NODE BIOPSY, ZAHEER LEFT BREAST, INJECTION IN OR AT 0800 BY DR CORNELL;  Surgeon: Elena Cornell MD;  [facility];  Service: Surgical Oncology    OR EXCISION 1ST &/CERVICAL RIB Left 2021    Procedure: FIRST RIB RESECTION;  Surgeon: Jonathan Schaffer MD;  [facility];  Service: Thoracic    OR INJ RADIOACTIVE TRACER FOR ID OF SENTINEL NODE Left 2024    Procedure: LEFT BREAST MASTECTOMY, LYMPHATIC MAPPING WITH RADIOACTIVE DYE, SENTINEL LYMPH  NODE BIOPSY, ZAHEER LEFT BREAST, INJECTION IN OR AT 0800 BY DR CORNELL;  Surgeon: Elena Cornell MD;  [facility];  Service: Surgical Oncology    ID INTRAOP SENTINEL LYMPH NODE ID W/DYE INJECTION Left 01/02/2024    Procedure: LEFT BREAST MASTECTOMY, LYMPHATIC MAPPING WITH RADIOACTIVE DYE, SENTINEL LYMPH NODE BIOPSY, ZAHEER LEFT BREAST, INJECTION IN OR AT 0800 BY DR CORNELL;  Surgeon: Elena Cornell MD;  [facility];  Service: Surgical Oncology    ID MASTECTOMY SIMPLE COMPLETE Left 01/02/2024    Procedure: LEFT BREAST MASTECTOMY, LYMPHATIC MAPPING WITH RADIOACTIVE DYE, SENTINEL LYMPH NODE BIOPSY, ZAHEER LEFT BREAST, INJECTION IN OR AT 0800 BY DR CORNELL;  Surgeon: Elena Cornell MD;  [facility];  Service: Surgical Oncology    THORACOSCOPY VIDEO ASSISTED SURGERY (VATS) Left 08/12/2021    Procedure: THORACOSCOPY VIDEO ASSISTED SURGERY (VATS);  Surgeon: Jonathan Schaffer MD;  [facility];  Service: Thoracic    US GUIDANCE BREAST BIOPSY LEFT EACH ADDITIONAL Left 12/02/2023    US GUIDED BREAST BIOPSY RIGHT COMPLETE Right 12/02/2023    WISDOM TOOTH EXTRACTION  2012     Social History   Social History     Substance and Sexual Activity   Alcohol Use Not Currently    Comment: social     Social History     Substance and Sexual Activity   Drug Use Never     Social History     Tobacco Use   Smoking Status Never   Smokeless Tobacco Never     Family History: non-contributory    Meds/Allergies   Prior to Admission Medications   Prescriptions Last Dose Informant Patient Reported? Taking?   Blood Glucose Monitoring Suppl (OneTouch Verio Flex System) w/Device KIT   No No   Sig: Test x4 Daily or as instructed   Patient not taking: Reported on 5/21/2024   CRANIAL PROSTHESIS, RX,   No No   Sig: Place one on head as needed   Patient not taking: Reported on 6/7/2024   Lancets (OneTouch Delica Plus Dnaems11P) MISC   No No   Sig: USE 4 A DAY OR AS INSTRUCTED   Patient not taking:  Reported on 2024   OneTouch Verio test strip   No No   Sig: TEST 4 TIMES DAILY OR AS INSTRUCTED   Patient not taking: Reported on 2024   Prenatal Multivit-Min-Fe-FA (PRE- PO)  Self Yes No   Sig: Take 1 tablet by mouth in the morning   albuterol (PROVENTIL HFA,VENTOLIN HFA) 90 mcg/act inhaler   No No   Sig: Inhale 2 puffs every 6 (six) hours as needed for wheezing   cetirizine (ZyrTEC) 10 mg tablet  Self No No   Sig: TAKE 1 TABLET BY MOUTH EVERY DAY   enoxaparin (LOVENOX) 80 mg/0.8 mL 2024 at 2230  No Yes   Sig: EXPEL 5 MG (0.05 ML) FROM SYRINGE AND DISCARD, THEN INJECT 75 MG (0.75 ML) UNDER THE SKIN EVERY 12 HOURS   lidocaine-prilocaine (EMLA) cream  Self No No   Sig: Apply to port 30 to 60 min prior to use   Patient not taking: Reported on 2024   omeprazole (PriLOSEC) 40 MG capsule  Self No No   Sig: Take 1 capsule (40 mg total) by mouth daily   ondansetron (ZOFRAN) 8 mg tablet  Self No No   Sig: Take 1 tablet (8 mg total) by mouth every 8 (eight) hours as needed for nausea or vomiting      Facility-Administered Medications: None     Medication Administration - last 24 hours from 2024 0847 to 07/10/2024 0847         Date/Time Order Dose Route Action Action by     07/10/2024 0547 EDT lactated ringers infusion 125 mL/hr Intravenous New Bag Meka Billings RN     2024 1944 EDT lactated ringers infusion 125 mL/hr Intravenous New Bag Meka Billings RN     2024 1545 EDT lactated ringers infusion 125 mL/hr Intravenous New Bag Ketty Graham RN     2024 1507 EDT lactated ringers bolus 1,000 mL 0 mL Intravenous Stopped Ketty Graham RN     2024 1253 EDT lactated ringers bolus 1,000 mL 1,000 mL Intravenous New Bag Ketty Graham RN     07/10/2024 0822 EDT ondansetron (ZOFRAN) injection 4 mg 4 mg Intravenous Given Kate Hill RN     2024 EDT penicillin G (PFIZERPEN-G) in 0.9% sodium chloride 250 mL IVPB 5 Million Units 0 Million Units Intravenous  Stopped Amy Blancas RN     07/09/2024 1545 EDT penicillin G (PFIZERPEN-G) in 0.9% sodium chloride 250 mL IVPB 5 Million Units 5 Million Units Intravenous New Bag Ketty Graham RN     07/10/2024 0548 EDT penicillin G (PFIZERPEN-G) in 0.9% sodium chloride 100 mL IVPB 2.5 Million Units 2.5 Million Units Intravenous New Bag Amy Blancas RN     07/10/2024 0335 EDT penicillin G (PFIZERPEN-G) in 0.9% sodium chloride 100 mL IVPB 2.5 Million Units 0 Million Units Intravenous Stopped Amy Blancas RN     07/10/2024 0235 EDT penicillin G (PFIZERPEN-G) in 0.9% sodium chloride 100 mL IVPB 2.5 Million Units 2.5 Million Units Intravenous New Bag Amy Blancas RN     07/09/2024 2235 EDT penicillin G (PFIZERPEN-G) in 0.9% sodium chloride 100 mL IVPB 2.5 Million Units 0 Million Units Intravenous Stopped Amy Blancas RN     07/09/2024 2135 EDT penicillin G (PFIZERPEN-G) in 0.9% sodium chloride 100 mL IVPB 2.5 Million Units 2.5 Million Units Intravenous New Bag Amy Blancas RN     07/09/2024 1830 EDT penicillin G (PFIZERPEN-G) in 0.9% sodium chloride 100 mL IVPB 2.5 Million Units -- Intravenous Canceled Entry Amy Blancas RN          Current Facility-Administered Medications   Medication Dose Route Frequency    albuterol (PROVENTIL HFA,VENTOLIN HFA) inhaler 2 puff  2 puff Inhalation Q6H PRN    bupivacaine (PF) (MARCAINE) 0.25 % injection 30 mL  30 mL Infiltration Once PRN    lactated ringers infusion  125 mL/hr Intravenous Continuous    lactated ringers infusion  125 mL/hr Intravenous Continuous    loratadine (CLARITIN) tablet 10 mg  10 mg Oral Daily    multivitamin stress formula tablet 1 tablet  1 tablet Oral Daily    ondansetron (ZOFRAN) injection 4 mg  4 mg Intravenous Q6H PRN    pantoprazole (PROTONIX) EC tablet 40 mg  40 mg Oral Daily Before Breakfast    penicillin G (PFIZERPEN-G) in 0.9% sodium chloride 100 mL IVPB 2.5 Million Units  2.5 Million Units Intravenous Q4H     Facility-Administered Medications Ordered  "in Other Encounters   Medication Dose Route Frequency    [MAR Hold] alteplase (CATHFLO) injection 2 mg  2 mg Intracatheter Q1MIN PRN     Allergies   Allergen Reactions    Formic Acid Swelling and Rash     (Severe reactions to Bug bites)    Latex Rash and Blisters    Nsaids GI Intolerance       Objective    Patient Vitals for the past 24 hrs:   BP Temp Temp src Pulse Resp SpO2 Height Weight   07/10/24 0735 113/76 98.3 °F (36.8 °C) Oral 68 18 98 % -- --   07/10/24 0300 99/51 98.3 °F (36.8 °C) Oral 62 18 98 % -- --   07/09/24 2347 98/54 98.6 °F (37 °C) Oral 60 17 100 % -- --   07/09/24 1948 123/65 98.6 °F (37 °C) Oral 73 18 100 % -- --   07/09/24 1733 -- -- -- -- -- -- 5' 7\" (1.702 m) 84.8 kg (187 lb)   07/09/24 1229 109/70 -- -- 71 -- -- -- --   07/09/24 1214 111/80 -- -- 74 -- -- -- --   07/09/24 1104 -- 98.6 °F (37 °C) Oral -- 16 -- -- --     Vitals: Blood pressure 113/76, pulse 68, temperature 98.3 °F (36.8 °C), temperature source Oral, resp. rate 18, height 5' 7\" (1.702 m), weight 84.8 kg (187 lb), last menstrual period 10/31/2023, SpO2 98%, not currently breastfeeding.Body mass index is 29.29 kg/m².    Physical Exam  Constitutional:       General: She is not in acute distress.     Appearance: Normal appearance. She is not ill-appearing, toxic-appearing or diaphoretic.   HENT:      Head: Normocephalic and atraumatic.      Nose: No congestion or rhinorrhea.   Eyes:      General: No scleral icterus.        Right eye: No discharge.         Left eye: No discharge.      Extraocular Movements: Extraocular movements intact.      Conjunctiva/sclera: Conjunctivae normal.   Pulmonary:      Effort: Pulmonary effort is normal. No respiratory distress.   Musculoskeletal:      Cervical back: Normal range of motion.   Skin:     Coloration: Skin is not jaundiced or pale.      Findings: No erythema, lesion or rash.   Neurological:      General: No focal deficit present.      Mental Status: She is alert and oriented to person, " "place, and time.   Psychiatric:         Mood and Affect: Mood normal.         Behavior: Behavior normal.       Repeat cervical exam not performed as it was performed by the inpatient/labor team    Prenatal Labs: Blood Type:   Lab Results   Component Value Date    ABO B 07/09/2024     , D (Rh type):   Lab Results   Component Value Date    RH Positive 07/09/2024     , Platelets: No results found for: \"PLATELETS\"   , Group B Strep:  No results found for: \"STREPGRPB\"       Results from last 7 days   Lab Units 07/09/24  1150   WBC Thousand/uL 8.74   HEMOGLOBIN g/dL 12.2   MCV fL 95   PLATELETS Thousands/uL 104*               Invalid input(s): \"GLU\", \"CA\"      Results from last 7 days   Lab Units 07/09/24  1150   PLATELETS Thousands/uL 104*   INR  0.89   PROTIME seconds 12.6   PTT seconds 26   FIBRINOGEN mg/dL 645*                           Fetal data:  Nonstress test: date 07/10/24    Baseline:  150  Variability: moderate  Accelerations: present, 15x15  Decelerations: absent  Contractions:  irregular  Assessment: reactive  Plan: continuous    MFM ultrasound report key findings:  from 6/25 date at 33.0 gestational age, EFW [id number], 46%ile             Zina Cummings MD  7/10/2024  8:47 AM          "

## 2024-07-10 NOTE — ANESTHESIA PREPROCEDURE EVALUATION
Procedure:  LABOR ANALGESIA    Relevant Problems   CARDIO   (+) DVT complicating pregnancy, third trimester   (+) Migraine headache      GI/HEPATIC   (+) Gastroesophageal reflux disease without esophagitis      GYN   (+) 35 weeks gestation of pregnancy   (+) Malignant neoplasm of overlapping sites of left breast in female, estrogen receptor positive (HCC)   (+) Multigravida of advanced maternal age in first trimester      HEMATOLOGY   (+) Iron deficiency anemia secondary to inadequate dietary iron intake   (+) Thrombocytopenia affecting pregnancy, antepartum (HCC)      MUSCULOSKELETAL   (+) Shoulder impingement syndrome, left      NEURO/PSYCH   (+) Chronic left shoulder pain   (+) Depression with anxiety   (+) Migraine headache      PULMONARY   (+) Mild persistent asthma without complication   (+) SOB (shortness of breath)        Physical Exam    Airway    Mallampati score: III         Dental       Cardiovascular      Pulmonary      Other Findings  post-pubertal.      Anesthesia Plan  ASA Score- 3     Anesthesia Type- epidural with ASA Monitors.         Additional Monitors:     Airway Plan:            Plan Factors-    Chart reviewed.   Existing labs reviewed. Patient summary reviewed.    Patient is not a current smoker.              Induction-     Postoperative Plan-     Perioperative Resuscitation Plan - Level 1 - Full Code.       Informed Consent- Anesthetic plan and risks discussed with patient.  I personally reviewed this patient with the CRNA. Discussed and agreed on the Anesthesia Plan with the CRNA..

## 2024-07-10 NOTE — PROGRESS NOTES
During morning rounds, conversation was had between OB/GYN team and maternal-fetal medicine regarding plan for this patient.  In the setting of vaginal bleeding that does not seem attributable to cervical change given her lack of further dilation overnight, the discussion was had about the appropriateness of moving forward with augmentation in the setting of vaginal bleeding.  This plan was presented to patient and her  at bedside, they were both amenable to this plan.   Will continue discussing with OB/MFM team will likely move forward with augmentation.     Margarita Muro MD  OBGYN PGY-3  7/10/2024 6:37 AM

## 2024-07-10 NOTE — OB LABOR/OXYTOCIN SAFETY PROGRESS
Oxytocin Safety Progress Check Note - Jillian Ch 33 y.o. female MRN: [id number]    Unit/Bed#: -01 Encounter: [id number]       Contraction Frequency (minutes): occasional  Contraction Intensity: Mild  Uterine Activity Characteristics: Irritability  Cervical Dilation: 4        Cervical Effacement: 70  Fetal Station: -2  Baseline Rate (FHR): 150 bpm  Fetal Heart Rate (FHT): 150 BPM  FHR Category: 1               Vital Signs: vss  Vitals:    07/10/24 0735   BP: 113/76   Pulse: 68   Resp: 18   Temp: 98.3 °F (36.8 °C)   SpO2: 98%       Notes/comments:         Marilu Cosby MD 7/10/2024 8:57 AM

## 2024-07-10 NOTE — ASSESSMENT & PLAN NOTE
"Repeat duplex study on 24 at 30w4d shows resolution of DVT \"In comparison to previous exam on 24 there is resolution of previous DVT in the popliteal and peroneal veins\"  Resume therapeutic lovenox 4-6 hours after  or 12 hours after . Defer to anesthesia in regards to timing of resumption after neuraxial blockade and catheter removal (Per ACOG  - wait 24 hours after blockade AND at least 4 hours after catheter removal)  SCDs should be on throughout labor course  "

## 2024-07-10 NOTE — OB LABOR/OXYTOCIN SAFETY PROGRESS
Oxytocin Safety Progress Check Note - Jillian Ch 33 y.o. female MRN: [id number]    Unit/Bed#: -01 Encounter: [id number]    Dose (aleida-units/min) Oxytocin: 8 aleida-units/min  Contraction Frequency (minutes): 2-3  Contraction Intensity: Mild  Uterine Activity Characteristics: Irregular  Cervical Dilation: 5        Cervical Effacement: 80  Fetal Station: -2  Baseline Rate (FHR): 150 bpm  Fetal Heart Rate (FHT): 145 BPM  FHR Category: I               Vital Signs:   Vitals:    07/10/24 1206   BP: 114/72   Pulse: 65   Resp:    Temp:    SpO2:        Notes/comments:   Pt comfortable with contractions, progressing in labor w/ cervix dilation, fetal monitoring reassuring.  Pitocin titration.     Puneet Webster MD 7/10/2024 1:31 PM

## 2024-07-10 NOTE — OB LABOR/OXYTOCIN SAFETY PROGRESS
Oxytocin Safety Progress Check Note - Jillian Ch 33 y.o. female MRN: [id number]    Unit/Bed#: -01 Encounter: [id number]    Dose (aleida-units/min) Oxytocin: 10 aleida-units/min  Contraction Frequency (minutes): 5  Contraction Intensity: Mild  Uterine Activity Characteristics: Irregular  Cervical Dilation: 5        Cervical Effacement: 80  Fetal Station: -2  Baseline Rate (FHR): 150 bpm  Fetal Heart Rate (FHT): 131 BPM  FHR Category: I             Vital Signs:   Vitals:    07/10/24 1447   BP: 134/87   Pulse: 67   Resp:    Temp:    SpO2:        Notes/comments:   Pt received epidural, she is comfortable with the contractions, AROM 1447 clear liquid.  Pt progressing well in labor.       Puneet Webster MD 7/10/2024 2:57 PM

## 2024-07-10 NOTE — DISCHARGE SUMMARY
Obstetrics Discharge Summary  Jillian Ch 33 y.o. female MRN: [id number]  Unit/Bed#: -01 Encounter: [id number]    Admission Date: 2024     Discharge Date: 2024    Admitting Attending: Dr. Rose  Delivery Attending: Dr. Cosby  Discharging Attending:  Dr. Smyth    Admitting Diagnoses:   35 weeks gestation of pregnancy  Vaginal bleeding in pregnancy  DVT in pregnancy    Discharge Diagnoses:   Same, delivered  Vaginal bleeding in pregnancy  DVT in pregnancy    Procedures: Spontaneous vaginal delivery    Anesthesia: epidural    Hospital course: Jillian Ch is now a 33 y.o.  who was admitted for  labor. On admission she was 3/70/-3. She was started on pitocin and then progressed to 5/80/-2. She epiduralized and then AROM'd. She progressed to complete cervical dilation and began pushing.    Delivery Findings:  After pushing for 105 minutes, Jillian delivered a viable female  on 7/10/2024 7:20 PM via Vaginal, Spontaneous. The delivery was uncomplicated.  She sustained a 1st degree laceration during delivery which was adequately repaired. Patient tolerated the procedure well.  was admitted to the  nursery.    Baby's Weight: 2195 g (4 lb 13.4 oz); 77.43    Apgar scores: 8  and 9  at 1 and 5 minutes, respectively  QBL: Non-Surgical QBL (mL): 59        Her post-delivery course was uncomplicated. Her postpartum pain was well controlled with oral analgesics. Maternal blood type is Rh positive  so RhoGAM was not indicated.    On day of discharge, she was ambulating and able to reasonably perform all ADLs. She was voiding and had appropriate bowel function. Pain was well controlled. She was discharged home on postpartum day #2 without complications. Patient was instructed to follow up with her OBGYN as an outpatient and was given appropriate warnings to call provider if she develops signs of infection or uncontrolled pain.    Complications: none  apparent    Condition at discharge: Good    Disposition: Home    Planned Readmission: No    Discharge Medications:   Please see AVS for a complete list of discharge medications.    Discharge instructions :   -Do not place anything (no partner, tampons or douche) in your vagina for 6 weeks  -You may walk for exercise for the first 6 weeks then gradually return to your usual activities   -Please do not drive for 1 week if you have no stitches and for 2 weeks if you have stitches    -You may take baths or shower per your preference   -Please examine your breasts in the mirror daily and call your doctor for redness or tenderness or increased warmth    -Please call your doctor's office if temperature > 100.4*F or 38* C, worsening pain or a foul discharge.    Follow Up:  - Follow up in 3 weeks for postpartum visit    Nae Johnson MD  PGY-2

## 2024-07-10 NOTE — PLAN OF CARE
Problem: ANTEPARTUM  Goal: Maintain pregnancy as long as maternal and/or fetal condition is stable  Description: INTERVENTIONS:  - Maternal surveillance  - Fetal surveillance  - Monitor uterine activity  - Medications as ordered  - Bedrest  Outcome: Progressing     Problem: PAIN - ADULT  Goal: Verbalizes/displays adequate comfort level or baseline comfort level  Description: Interventions:  - Encourage patient to monitor pain and request assistance  - Assess pain using appropriate pain scale  - Administer analgesics based on type and severity of pain and evaluate response  - Implement non-pharmacological measures as appropriate and evaluate response  - Consider cultural and social influences on pain and pain management  - Notify physician/advanced practitioner if interventions unsuccessful or patient reports new pain  Outcome: Progressing     Problem: INFECTION - ADULT  Goal: Absence or prevention of progression during hospitalization  Description: INTERVENTIONS:  - Assess and monitor for signs and symptoms of infection  - Monitor lab/diagnostic results  - Monitor all insertion sites, i.e. indwelling lines, tubes, and drains  - Monitor endotracheal if appropriate and nasal secretions for changes in amount and color  - [place] appropriate cooling/warming therapies per order  - Administer medications as ordered  - Instruct and encourage patient and family to use good hand hygiene technique  - Identify and instruct in appropriate isolation precautions for identified infection/condition  Outcome: Progressing  Goal: Absence of fever/infection during neutropenic period  Description: INTERVENTIONS:  - Monitor WBC    Outcome: Progressing     Problem: SAFETY ADULT  Goal: Patient will remain free of falls  Description: INTERVENTIONS:  - Educate patient/family on patient safety including physical limitations  - Instruct patient to call for assistance with activity   - Consult OT/PT to assist with strengthening/mobility   -  Keep Call bell within reach  - Keep bed low and locked with side rails adjusted as appropriate  - Keep care items and personal belongings within reach  - Initiate and maintain comfort rounds  - Make Fall Risk Sign visible to staff  - Apply yellow socks and bracelet for high fall risk patients  - Consider moving patient to room near nurses station  Outcome: Progressing  Goal: Maintain or return to baseline ADL function  Description: INTERVENTIONS:  -  Assess patient's ability to carry out ADLs; assess patient's baseline for ADL function and identify physical deficits which impact ability to perform ADLs (bathing, care of mouth/teeth, toileting, grooming, dressing, etc.)  - Assess/evaluate cause of self-care deficits   - Assess range of motion  - Assess patient's mobility; develop plan if impaired  - Assess patient's need for assistive devices and provide as appropriate  - Encourage maximum independence but intervene and supervise when necessary  - Involve family in performance of ADLs  - Assess for home care needs following discharge   - Consider OT consult to assist with ADL evaluation and planning for discharge  - Provide patient education as appropriate  Outcome: Progressing  Goal: Maintains/Returns to pre admission functional level  Description: INTERVENTIONS:  - Perform AM-PAC 6 Click Basic Mobility/ Daily Activity assessment daily.  - Set and communicate daily mobility goal to care team and patient/family/caregiver.   - Collaborate with rehabilitation services on mobility goals if consulted  - Out of bed for toileting  - Record patient progress and toleration of activity level   Outcome: Progressing     Problem: Knowledge Deficit  Goal: Patient/family/caregiver demonstrates understanding of disease process, treatment plan, medications, and discharge instructions  Description: Complete learning assessment and assess knowledge base.  Interventions:  - Provide teaching at level of understanding  - Provide teaching  via preferred learning methods  Outcome: Progressing     Problem: DISCHARGE PLANNING  Goal: Discharge to home or other facility with appropriate resources  Description: INTERVENTIONS:  - Identify barriers to discharge w/patient and caregiver  - Arrange for needed discharge resources and transportation as appropriate  - Identify discharge learning needs (meds, wound care, etc.)  - Arrange for interpretive services to assist at discharge as needed  - Refer to Case Management Department for coordinating discharge planning if the patient needs post-hospital services based on physician/advanced practitioner order or complex needs related to functional status, cognitive ability, or social support system  Outcome: Progressing

## 2024-07-11 LAB
GP B STREP DNA SPEC QL NAA+PROBE: NEGATIVE
HOLD SPECIMEN: NORMAL

## 2024-07-11 PROCEDURE — 97163 PT EVAL HIGH COMPLEX 45 MIN: CPT

## 2024-07-11 PROCEDURE — 99024 POSTOP FOLLOW-UP VISIT: CPT | Performed by: OBSTETRICS & GYNECOLOGY

## 2024-07-11 RX ORDER — POLYETHYLENE GLYCOL 3350 17 G/17G
17 POWDER, FOR SOLUTION ORAL DAILY
Status: DISCONTINUED | OUTPATIENT
Start: 2024-07-12 | End: 2024-07-12 | Stop reason: HOSPADM

## 2024-07-11 RX ADMIN — ENOXAPARIN SODIUM 80 MG: 100 INJECTION SUBCUTANEOUS at 01:48

## 2024-07-11 RX ADMIN — SENNOSIDES 8.6 MG: 8.6 TABLET, FILM COATED ORAL at 08:30

## 2024-07-11 RX ADMIN — DOCUSATE SODIUM 100 MG: 100 CAPSULE, LIQUID FILLED ORAL at 08:30

## 2024-07-11 RX ADMIN — Medication 10 MG: at 09:59

## 2024-07-11 RX ADMIN — DOCUSATE SODIUM 100 MG: 100 CAPSULE, LIQUID FILLED ORAL at 18:04

## 2024-07-11 RX ADMIN — ENOXAPARIN SODIUM 80 MG: 100 INJECTION SUBCUTANEOUS at 13:30

## 2024-07-11 RX ADMIN — PANTOPRAZOLE SODIUM 40 MG: 40 TABLET, DELAYED RELEASE ORAL at 08:30

## 2024-07-11 NOTE — CASE MANAGEMENT
Case Management Progress Note    Patient name Jillian Ch  Location /-01 MRN [id number]  : 1990 Date 2024       LOS (days): 2  Geometric Mean LOS (GMLOS) (days):   Days to GMLOS:        OBJECTIVE:        Current admission status: Inpatient  Preferred Pharmacy:   [facility]/pharmacy #[id number] - [place] - [address]  [address]  [facility] [address]  Phone: [phone] Fax: [phone]    [facility] - [place], [place] - [address]  [address] [facility] [address]  Phone: [phone] Fax: [phone]    [place], [place] - [address] [place] [address]  [address] [place] [address]  Phone: [phone] Fax: [phone]    Primary Care Provider: JOSE Stover    Primary Insurance: BLUE CROSS  Secondary Insurance:     PROGRESS NOTE:    CM placed order for rolling walker in parachute and delivered to patients room, patient did not qualify for home PT.  CM informed provider and RN, ANTOLIN provided outpatient PT resources.

## 2024-07-11 NOTE — PLAN OF CARE
Problem: POSTPARTUM  Goal: Experiences normal postpartum course  Description: INTERVENTIONS:  - Monitor maternal vital signs  - Assess uterine involution and lochia  Outcome: Progressing  Goal: Appropriate maternal -  bonding  Description: INTERVENTIONS:  - Identify family support  - Assess for appropriate maternal/infant bonding   -Encourage maternal/infant bonding opportunities  - Referral to  or  as needed  Outcome: Progressing  Goal: Establishment of infant feeding pattern  Description: INTERVENTIONS:  - Assess breast/bottle feeding  - Refer to lactation as needed  Outcome: Progressing  Goal: Incision(s), wounds(s) or drain site(s) healing without S/S of infection  Description: INTERVENTIONS  - Assess and document dressing, incision, wound bed, drain sites and surrounding tissue  - Provide patient and family education  - Perform skin care/dressing changes  Outcome: Progressing     Problem: PAIN - ADULT  Goal: Verbalizes/displays adequate comfort level or baseline comfort level  Description: Interventions:  - Encourage patient to monitor pain and request assistance  - Assess pain using appropriate pain scale  - Administer analgesics based on type and severity of pain and evaluate response  - Implement non-pharmacological measures as appropriate and evaluate response  - Consider cultural and social influences on pain and pain management  - Notify physician/advanced practitioner if interventions unsuccessful or patient reports new pain  Outcome: Progressing     Problem: INFECTION - ADULT  Goal: Absence or prevention of progression during hospitalization  Description: INTERVENTIONS:  - Assess and monitor for signs and symptoms of infection  - Monitor lab/diagnostic results  - Monitor all insertion sites, i.e. indwelling lines, tubes, and drains  - Monitor endotracheal if appropriate and nasal secretions for changes in amount and color  - [place] appropriate cooling/warming  therapies per order  - Administer medications as ordered  - Instruct and encourage patient and family to use good hand hygiene technique  - Identify and instruct in appropriate isolation precautions for identified infection/condition  Outcome: Progressing  Goal: Absence of fever/infection during neutropenic period  Description: INTERVENTIONS:  - Monitor WBC    Outcome: Progressing     Problem: SAFETY ADULT  Goal: Patient will remain free of falls  Description: INTERVENTIONS:  - Educate patient/family on patient safety including physical limitations  - Instruct patient to call for assistance with activity   - Consult OT/PT to assist with strengthening/mobility   - Keep Call bell within reach  - Keep bed low and locked with side rails adjusted as appropriate  - Keep care items and personal belongings within reach  - Initiate and maintain comfort rounds  - Make Fall Risk Sign visible to staff  - Offer Toileting   - Initiate/Maintain alarm  - Obtain necessary fall risk management equipment:   - Apply yellow socks and bracelet for high fall risk patients  - Consider moving patient to room near nurses station  Outcome: Progressing  Goal: Maintain or return to baseline ADL function  Description: INTERVENTIONS:  -  Assess patient's ability to carry out ADLs; assess patient's baseline for ADL function and identify physical deficits which impact ability to perform ADLs (bathing, care of mouth/teeth, toileting, grooming, dressing, etc.)  - Assess/evaluate cause of self-care deficits   - Assess range of motion  - Assess patient's mobility; develop plan if impaired  - Assess patient's need for assistive devices and provide as appropriate  - Encourage maximum independence but intervene and supervise when necessary  - Involve family in performance of ADLs  - Assess for home care needs following discharge   - Consider OT consult to assist with ADL evaluation and planning for discharge  - Provide patient education as  appropriate  Outcome: Progressing  Goal: Maintains/Returns to pre admission functional level  Description: INTERVENTIONS:  - Perform AM-PAC 6 Click Basic Mobility/ Daily Activity assessment daily.  - Set and communicate daily mobility goal to care team and patient/family/caregiver.   - Collaborate with rehabilitation services on mobility goals if consulted  - Perform Range of Motion   - Reposition patient   - Dangle patient   - Stand patient   - Ambulate patient   - Out of bed to chair   - Out of bed for meals   - Out of bed for toileting  - Record patient progress and toleration of activity level   Outcome: Progressing     Problem: Knowledge Deficit  Goal: Patient/family/caregiver demonstrates understanding of disease process, treatment plan, medications, and discharge instructions  Description: Complete learning assessment and assess knowledge base.  Interventions:  - Provide teaching at level of understanding  - Provide teaching via preferred learning methods  Outcome: Completed  Goal: Verbalizes understanding of labor plan  Description: Assess patient/family/caregiver's baseline knowledge level and ability to understand information.  Provide education via patient/family/caregiver's preferred learning method at appropriate level of understanding.     1. Provide teaching at level of understanding.  2. Provide teaching via preferred learning method(s).  Outcome: Completed     Problem: DISCHARGE PLANNING  Goal: Discharge to home or other facility with appropriate resources  Description: INTERVENTIONS:  - Identify barriers to discharge w/patient and caregiver  - Arrange for needed discharge resources and transportation as appropriate  - Identify discharge learning needs (meds, wound care, etc.)  - Arrange for interpretive services to assist at discharge as needed  - Refer to Case Management Department for coordinating discharge planning if the patient needs post-hospital services based on physician/advanced  practitioner order or complex needs related to functional status, cognitive ability, or social support system  Outcome: Progressing     Problem: Labor & Delivery  Goal: Manages discomfort  Description: Assess and monitor for signs and symptoms of discomfort.  Assess patient's pain level regularly and per hospital policy.  Administer medications as ordered. Support use of nonpharmacological methods to help control pain such as distraction, imagery, relaxation, and application of heat and cold.  Collaborate with interdisciplinary team and patient to determine appropriate pain management plan.    1. Include patient in decisions related to comfort.  2. Offer non-pharmacological pain management interventions.  3. Report ineffective pain management to physician.  Outcome: Completed  Goal: Patient vital signs are stable  Description: 1. Assess vital signs - vaginal delivery.  Outcome: Completed

## 2024-07-11 NOTE — PLAN OF CARE
Problem: PAIN - ADULT  Goal: Verbalizes/displays adequate comfort level or baseline comfort level  Description: Interventions:  - Encourage patient to monitor pain and request assistance  - Assess pain using appropriate pain scale  - Administer analgesics based on type and severity of pain and evaluate response  - Implement non-pharmacological measures as appropriate and evaluate response  - Consider cultural and social influences on pain and pain management  - Notify physician/advanced practitioner if interventions unsuccessful or patient reports new pain  Outcome: Progressing     Problem: INFECTION - ADULT  Goal: Absence or prevention of progression during hospitalization  Description: INTERVENTIONS:  - Assess and monitor for signs and symptoms of infection  - Monitor lab/diagnostic results  - Monitor all insertion sites, i.e. indwelling lines, tubes, and drains  - Monitor endotracheal if appropriate and nasal secretions for changes in amount and color  - [place] appropriate cooling/warming therapies per order  - Administer medications as ordered  - Instruct and encourage patient and family to use good hand hygiene technique  - Identify and instruct in appropriate isolation precautions for identified infection/condition  Outcome: Progressing  Goal: Absence of fever/infection during neutropenic period  Description: INTERVENTIONS:  - Monitor WBC    Outcome: Progressing     Problem: SAFETY ADULT  Goal: Patient will remain free of falls  Description: INTERVENTIONS:  - Educate patient/family on patient safety including physical limitations  - Instruct patient to call for assistance with activity   - Consult OT/PT to assist with strengthening/mobility   - Keep Call bell within reach  - Keep bed low and locked with side rails adjusted as appropriate  - Keep care items and personal belongings within reach  - Initiate and maintain comfort rounds  - Make Fall Risk Sign visible to staff  - Offer Toileting every PRN  Hours, in advance of need  - Apply yellow socks and bracelet for high fall risk patients  - Consider moving patient to room near nurses station  Outcome: Progressing  Goal: Maintain or return to baseline ADL function  Description: INTERVENTIONS:  -  Assess patient's ability to carry out ADLs; assess patient's baseline for ADL function and identify physical deficits which impact ability to perform ADLs (bathing, care of mouth/teeth, toileting, grooming, dressing, etc.)  - Assess/evaluate cause of self-care deficits   - Assess range of motion  - Assess patient's mobility; develop plan if impaired  - Assess patient's need for assistive devices and provide as appropriate  - Encourage maximum independence but intervene and supervise when necessary  - Involve family in performance of ADLs  - Assess for home care needs following discharge   - Consider OT consult to assist with ADL evaluation and planning for discharge  - Provide patient education as appropriate  Outcome: Progressing  Goal: Maintains/Returns to pre admission functional level  Description: INTERVENTIONS:  - Perform AM-PAC 6 Click Basic Mobility/ Daily Activity assessment daily.  - Set and communicate daily mobility goal to care team and patient/family/caregiver.   - Collaborate with rehabilitation services on mobility goals if consulted  - Perform Range of Motion PRN times a day.  - Reposition patient every PRN hours.  - Dangle patient PRN times a day  - Stand patient PRN times a day  - Ambulate patient PRN times a day  - Out of bed to chair PRN times a day   - Out of bed for meals PRN times a day  - Out of bed for toileting  - Record patient progress and toleration of activity level   Outcome: Progressing     Problem: DISCHARGE PLANNING  Goal: Discharge to home or other facility with appropriate resources  Description: INTERVENTIONS:  - Identify barriers to discharge w/patient and caregiver  - Arrange for needed discharge resources and transportation as  appropriate  - Identify discharge learning needs (meds, wound care, etc.)  - Arrange for interpretive services to assist at discharge as needed  - Refer to Case Management Department for coordinating discharge planning if the patient needs post-hospital services based on physician/advanced practitioner order or complex needs related to functional status, cognitive ability, or social support system  Outcome: Progressing     Problem: POSTPARTUM  Goal: Experiences normal postpartum course  Description: INTERVENTIONS:  - Monitor maternal vital signs  - Assess uterine involution and lochia  Outcome: Progressing  Goal: Appropriate maternal -  bonding  Description: INTERVENTIONS:  - Identify family support  - Assess for appropriate maternal/infant bonding   -Encourage maternal/infant bonding opportunities  - Referral to  or  as needed  Outcome: Progressing  Goal: Establishment of infant feeding pattern  Description: INTERVENTIONS:  - Assess breast/bottle feeding  - Refer to lactation as needed  Outcome: Progressing  Goal: Incision(s), wounds(s) or drain site(s) healing without S/S of infection  Description: INTERVENTIONS  - Assess and document dressing, incision, wound bed, drain sites and surrounding tissue  - Provide patient and family education  - Perform skin care/dressing changes every PRN  Outcome: Progressing

## 2024-07-11 NOTE — PHYSICAL THERAPY NOTE
PT EVALUATION    NAME:  Jillian Ch  AGE:   33 y.o.  Mrn:   [id number]  Length Of Stay: 2    ADMIT DX:  Vaginal bleeding during pregnancy [O46.90]  35 weeks gestation of pregnancy [Z3A.35]  Encounter for full-term uncomplicated delivery [O80]    Past Medical History:   Diagnosis Date    Anesthesia complication     gait dysfunction/ urinary retention after nerve block,    Anxiety     Asthma     CRPS (complex regional pain syndrome), upper limb     left chest/arm/hand    Deep vein thrombosis (HCC) 01/05/2024    post op from mastectomy    GERD (gastroesophageal reflux disease)     Heart murmur     HPV (human papilloma virus) infection 2012    unsure of 16, 18, or other    Invasive ductal carcinoma of breast, female, left (HCC) 2023    Kidney stone     Miscarriage 3/15/2015    7 weeks along.    Ovarian cyst     Left    PONV (postoperative nausea and vomiting) 01/02/2024     Past Surgical History:   Procedure Laterality Date    COLPOSCOPY  2012    HPV    EGD      IR PORT PLACEMENT  2/7/2024    IR UPPER EXTREMITY VENOGRAM- DIAGNOSTIC  05/28/2021    LA BX/EXC LYMPH NODE OPEN SUPERFICIAL Left 01/02/2024    Procedure: LEFT BREAST MASTECTOMY, LYMPHATIC MAPPING WITH RADIOACTIVE DYE, SENTINEL LYMPH NODE BIOPSY, ZAHEER LEFT BREAST, INJECTION IN OR AT 0800 BY DR CORNELL;  Surgeon: Elena Cornell MD;  [facility];  Service: Surgical Oncology    LA EXCISION 1ST &/CERVICAL RIB Left 08/12/2021    Procedure: FIRST RIB RESECTION;  Surgeon: Jonathan Schaffer MD;  [facility];  Service: Thoracic    LA INJ RADIOACTIVE TRACER FOR ID OF SENTINEL NODE Left 01/02/2024    Procedure: LEFT BREAST MASTECTOMY, LYMPHATIC MAPPING WITH RADIOACTIVE DYE, SENTINEL LYMPH NODE BIOPSY, ZAHEER LEFT BREAST, INJECTION IN OR AT 0800 BY DR CORNELL;  Surgeon: Elena Cornell MD;  [facility];  Service: Surgical Oncology    LA INTRAOP SENTINEL LYMPH NODE ID W/DYE INJECTION Left 01/02/2024    Procedure:  LEFT BREAST MASTECTOMY, LYMPHATIC MAPPING WITH RADIOACTIVE DYE, SENTINEL LYMPH NODE BIOPSY, ZAHEER LEFT BREAST, INJECTION IN OR AT 0800 BY DR CORNELL;  Surgeon: Elena Cornell MD;  [facility];  Service: Surgical Oncology    MO MASTECTOMY SIMPLE COMPLETE Left 01/02/2024    Procedure: LEFT BREAST MASTECTOMY, LYMPHATIC MAPPING WITH RADIOACTIVE DYE, SENTINEL LYMPH NODE BIOPSY, ZAHEER LEFT BREAST, INJECTION IN OR AT 0800 BY DR CORNELL;  Surgeon: Elena Cornell MD;  [facility];  Service: Surgical Oncology    THORACOSCOPY VIDEO ASSISTED SURGERY (VATS) Left 08/12/2021    Procedure: THORACOSCOPY VIDEO ASSISTED SURGERY (VATS);  Surgeon: Jonathan Schaffer MD;  [facility];  Service: Thoracic    US GUIDANCE BREAST BIOPSY LEFT EACH ADDITIONAL Left 12/02/2023    US GUIDED BREAST BIOPSY RIGHT COMPLETE Right 12/02/2023    WISDOM TOOTH EXTRACTION  2012 07/11/24 1356   PT Last Visit   PT Visit Date 07/11/24   Note Type   Note type Evaluation   Pain Assessment   Pain Assessment Tool 0-10   Pain Score 4   Pain Location/Orientation   (Perineum)   Pain Onset/Description Descriptor: [facility] Pain Intervention(s) Repositioned;Ambulation/increased activity;Emotional support;Rest   Restrictions/Precautions   Other Precautions Fall Risk;Bed Alarm;Chair Alarm;Pain   Home Living   Type of Home House   Home Layout Two level;Bed/bath upstairs;Stairs to enter without rails  (3+1+1 CHA)   Bathroom Shower/Tub Tub/shower unit   Bathroom Toilet Standard   Bathroom Equipment Shower chair;Commode   Bathroom Accessibility Not accessible  (Bathroom on the second floor via FOS with rail)   Home Equipment   (None per patient)   Prior Function   Level of [place] Independent with ADLs;Independent with functional mobility;Independent with IADLS   Lives With Spouse   Receives Help From Family   IADLs Independent with driving;Independent with meal prep;Independent with medication management   Falls in  "the last 6 months 0  (Denies)   Comments Patient completely independent without an assistive device prior to admission   General   Additional Pertinent History Jillian Ch is a 33 y.o. s/p  at 35w1d.  Patient has a history of breast CA left status postmastectomy.  Patient also has a history of lower extremity weakness following rib resection approximately 3 years ago for thoracic outlet syndrome..   Family/Caregiver Present Yes  (Patient's spouse)   Cognition   Overall Cognitive Status WFL   Arousal/Participation Cooperative   Orientation Level Oriented X4   Memory Within functional limits   Following Commands Follows all commands and directions without difficulty   Comments At least 2 patient identifiers including name and date of birth   Subjective   Subjective \"My right leg is really weak\"   RLE Assessment   RLE Assessment X  (WFL A/AAROM; hip grossly 2 -/5, knee grossly 2 -/5, ankle grossly 2+ to 3 -/5)   LLE Assessment   LLE Assessment X  (A/AAROM WFL; hip grossly 2+ to 3 -/5, knee 3-3/5, ankle 3-3+/5)   Coordination   Movements are Fluid and Coordinated 0   Coordination and Movement Description Marked incoordination bilateral lower extremities with mobility secondary to weakness   Sensation WFL   Light Touch   RLE Light Touch Grossly intact   LLE Light Touch Grossly intact   Proprioception   RLE Proprioception Grossly intact   LLE Proprioception Grossly Intact   Bed Mobility   Supine to Sit 6  Modified independent   Additional items Assist x 1;Verbal cues;Increased time required;LE management;HOB elevated  (Patient uses upper extremities to mobilize lower extremities)   Sit to Supine 6  Modified independent   Additional items Assist x 1;Verbal cues;Increased time required;LE management  (Assist x 1; Verbal cues; Increased time required; LE management)   Transfers   Sit to Stand   (Mod/min assist)   Additional items Assist x 1;Verbal cues;Increased time required  (Cues for safe hand placement) "   Stand to Sit   (Mod/min assist)   Additional items Assist x 1;Verbal cues;Increased time required  (Cues for safe hand placement)   Ambulation/Elevation   Gait pattern Short stride;Step to;R Foot drag;L Foot drag;Decreased foot clearance;Decreased R stance;Decreased L stance;Inconsistent sandy;Foward flexed;Redundant gait at times;Decreased heel strike;Excessively slow   Gait Assistance 4  Minimal assist  (Patient bearing marked weight through upper extremities)   Additional items Assist x 1;Verbal cues;Tactile cues   Assistive Device Rolling walker   Distance 12 feet with change in direction; patient fatigued at end of ambulation 2/2 effort it took to ambulate 2/2 lower extremity weakness   Balance   Static Sitting Good   Static Standing Fair -  (With roller walker)   Ambulatory Poor +  (With roller walker)   Endurance Deficit   Endurance Deficit Yes   Activity Tolerance   Activity Tolerance Patient limited by fatigue;Treatment limited secondary to medical complications (Comment)  (BLE weakness)   Nurse Made Aware Mannie Zhang RN   Assessment   Problem List Decreased strength;Decreased range of motion;Decreased endurance;Impaired balance;Decreased mobility;Decreased safety awareness;Impaired tone   Assessment Patient seen for Physical Therapy evaluation. Patient admitted with Spontaneous vaginal delivery.  Comorbidities affecting patient's physical performance include: Asthma, left breast cancer, DVT, chronic fatigue, cervical disc disc order with radiculopathy C5-C6, dehydration, shortness of breath, thoracic outlet syndrome.  Personal factors affecting patient at time of initial evaluation include: lives in two story house, stairs to enter home, inability to navigate community distances, inability to navigate level surfaces without external assistance, and inability to perform dynamic tasks in community. Prior to admission, patient was independent with functional mobility without assistive device,  independent with ADLS, independent with IADLS, living with spouse in a two level home with 3+1+1 steps to enter, ambulating household distance, and ambulating community distances.  Please find objective findings from Physical Therapy assessment regarding body systems outlined above with impairments and limitations including weakness, decreased ROM, impaired balance, decreased endurance, impaired coordination, gait deviations, decreased activity tolerance, decreased functional mobility tolerance, decreased safety awareness, fall risk, and impaired tone.  The Barthel Index was used as a functional outcome tool presenting with a score of Barthel Index Score: 45 today indicating marked limitations of functional mobility and ADLS.  Patient's clinical presentation is currently unstable/unpredictable as seen in patient's presentation of vital sign response, changing level of pain, increased fall risk, new onset of impairment of functional mobility, decreased endurance, and new onset of weakness. Pt would benefit from continued Physical Therapy treatment to address deficits as defined above and maximize level of functional mobility. As demonstrated by objective findings, the assigned level of complexity for this evaluation is high.    The patient's AM-PAC Basic Mobility Inpatient Short Form Raw Score is 15. A Raw score of less than or equal to 16 suggests the patient may benefit from discharge to post-acute rehabilitation services. Please also refer to the recommendation of the Physical Therapist for safe discharge planning.   Goals   Patient Goals To get stronger; be able to walk; go home   STG Expiration Date 07/21/24   Short Term Goal #1 Patient will: Increase bilateral LE strength 1 grade to facilitate independent mobility, Perform all bed mobility tasks independently to improve pt's independence w/ repositioning for decrease risk of skin breakdown, Perform all transfers independently consistently from various height  surfaces in order to improve I w/ engagement w/ real-world environments/situations, Ambulate at least 75 ft. with least restrictive assistive device independently w/o LOB to facilitate return and engagement w/ previous living environment, Navigate flight of stairs independently with unilateral handrail to either improve independence w/ entering home and/or so patient can fully access living areas in home, Increase ambulatory and static and dynamic standing balance 1 grade to decrease risk for falls, Tolerate at least 15 consecutive minutes of activity to demonstrate improved activity tolerance and endurance, and Tolerate 3 hr OOB to faciliate upright tolerance   Plan   Treatment/Interventions ADL retraining;Functional transfer training;LE strengthening/ROM;Elevations;Therapeutic exercise;Endurance training;Patient/family training;Equipment eval/education;Bed mobility;Gait training;Compensatory technique education;Spoke to nursing;Spoke to case management;Family   PT Frequency 3-5x/wk   Discharge Recommendation   Rehab Resource Intensity Level, PT III (Minimum Resource Intensity) with family support/assist/care.  Patient will need a roller walker and will benefit from home PT when medically stable for discharge, CM aware.   AM-PAC Basic Mobility Inpatient   Turning in Flat Bed Without Bedrails 3   Lying on Back to Sitting on Edge of Flat Bed Without Bedrails 3   Moving Bed to Chair 3   Standing Up From Chair Using Arms 2   Walk in Room 3   Climb 3-5 Stairs With Railing 1   Basic Mobility Inpatient Raw Score 15   Basic Mobility Standardized Score 36.97   [facility] Highest Level Of Mobility   -[facility] Goal 4: Move to chair/commode   -HLM Achieved 6: Walk 10 steps or more   Barthel Index   Feeding 10   Bathing 0   Grooming Score 0   Dressing Score 5   Bladder Score 10   Bowels Score 10   Toilet Use Score 5   Transfers (Bed/Chair) Score 5   Mobility (Level Surface) Score 0   Stairs Score 0   Barthel Index Score 45    End of Consult   Patient Position at End of Consult Supine;All needs within reach;Bed/Chair alarm activated   Licensure   [place] License Number  Kate L CONSUELO [place]   Portions of the documentation may have been created using voice recognition software. Occasional wrong word or sound alike substitutions may have occurred due to the inherent limitation of the voice recognition software. Read the chart carefully and recognize, using context, where substitutions have occurred.

## 2024-07-11 NOTE — PROGRESS NOTES
Progress Note - OB/GYN   Jillian Ch 33 y.o. female MRN: [id number]  Unit/Bed#:  319-01 Encounter: [id number]      Assessment/Plan    Jillian Ch is a 33 y.o.  who is PPD #1 s/p  at 35w1d     DVT complicating pregnancy, third trimester  Assessment & Plan  Patient on Lovenox 80 mg twice daily for DVT prophylaxis in the setting of an occlusive DVT in the left distal popliteal vein and peroneal veins that developed 3 days following her left mastectomy in 2024  Patient discontinued her Lovenox on the morning of 2024 in the setting of vaginal bleeding  Patient restarted lovenox 24 at 0130 6 hrs post   Plan for a minimum of 6 weeks of postpartum therapy      Malignant neoplasm of overlapping sites of left breast in female, estrogen receptor positive (HCC)  Assessment & Plan  Plan for follow up with oncologist postpartum    Mild persistent asthma without complication  Assessment & Plan  Well controlled    * Spontaneous vaginal delivery  Assessment & Plan  Routine postpartum care  Encourage ambulation  Encourage familial bonding  Lactation support as needed  Pain: Motrin/Tylenol around the clock, oxycodone if needed         Disposition: Anticipate discharge home postpartum Day #2      Subjective/Objective     Subjective: Postpartum state    Pain: no  Tolerating PO: yes  Voiding: yes  Flatus: yes  BM: no  Ambulating: yes  Breastfeeding: Bottle feeding  Chest pain: no  Shortness of breath: no  Leg pain: no  Lochia: wnl    Objective:     Vitals:  Vitals:    07/10/24 2130 07/10/24 2145 24 0020 24 0418   BP: 109/60 104/58 118/63 111/59   BP Location:   Right arm Right arm   Pulse:   72 69   Resp:   18 18   Temp:   98.6 °F (37 °C) 98.4 °F (36.9 °C)   TempSrc:   Oral Oral   SpO2:   98% 98%   Weight:       Height:           Physical Exam:   GEN: appears well, alert and oriented x 3, pleasant and cooperative   CV: Regular rate  RESP: non labored  breathing  ABDOMEN: soft, no tenderness, no distention, Uterine fundus firm and non-tender, -1 cm below the umbilicus  EXTREMITIES: non-tender  NEURO Alert and oriented to person, place, and time.       Lab Results   Component Value Date    WBC 8.74 07/09/2024    HGB 12.2 07/09/2024    HCT 37.5 07/09/2024    MCV 95 07/09/2024     (L) 07/09/2024         Theresa Avalos DO  Obstetrics & Gynecology  07/11/24

## 2024-07-11 NOTE — PLAN OF CARE
Problem: PHYSICAL THERAPY ADULT  Goal: Performs mobility at highest level of function for planned discharge setting.  See evaluation for individualized goals.  Description: Treatment/Interventions: ADL retraining, Functional transfer training, LE strengthening/ROM, Elevations, Therapeutic exercise, Endurance training, Patient/family training, Equipment eval/education, Bed mobility, Gait training, Compensatory technique education, Spoke to nursing, Spoke to case management, Family          See flowsheet documentation for full assessment, interventions and recommendations.  Note:    Problem List: Decreased strength, Decreased range of motion, Decreased endurance, Impaired balance, Decreased mobility, Decreased safety awareness, Impaired tone  Assessment: Patient seen for Physical Therapy evaluation. Patient admitted with Spontaneous vaginal delivery.  Comorbidities affecting patient's physical performance include: Asthma, left breast cancer, DVT, chronic fatigue, cervical disc disc order with radiculopathy C5-C6, dehydration, shortness of breath, thoracic outlet syndrome.  Personal factors affecting patient at time of initial evaluation include: lives in two story house, stairs to enter home, inability to navigate community distances, inability to navigate level surfaces without external assistance, and inability to perform dynamic tasks in community. Prior to admission, patient was independent with functional mobility without assistive device, independent with ADLS, independent with IADLS, living with spouse in a two level home with 3+1+1 steps to enter, ambulating household distance, and ambulating community distances.  Please find objective findings from Physical Therapy assessment regarding body systems outlined above with impairments and limitations including weakness, decreased ROM, impaired balance, decreased endurance, impaired coordination, gait deviations, decreased activity tolerance, decreased functional  mobility tolerance, decreased safety awareness, fall risk, and impaired tone.  The Barthel Index was used as a functional outcome tool presenting with a score of Barthel Index Score: 45 today indicating marked limitations of functional mobility and ADLS.  Patient's clinical presentation is currently unstable/unpredictable as seen in patient's presentation of vital sign response, changing level of pain, increased fall risk, new onset of impairment of functional mobility, decreased endurance, and new onset of weakness. Pt would benefit from continued Physical Therapy treatment to address deficits as defined above and maximize level of functional mobility. As demonstrated by objective findings, the assigned level of complexity for this evaluation is high.The patient's AM-[facility] Basic Mobility Inpatient Short Form Raw Score is 15. A Raw score of less than or equal to 16 suggests the patient may benefit from discharge to post-acute rehabilitation services. Please also refer to the recommendation of the Physical Therapist for safe discharge planning.        Rehab Resource Intensity Level, PT: III (Minimum Resource Intensity)    See flowsheet documentation for full assessment.

## 2024-07-12 ENCOUNTER — HOSPITAL ENCOUNTER (OUTPATIENT)
Dept: INFUSION CENTER | Facility: CLINIC | Age: 34
Discharge: HOME | End: 2024-07-12

## 2024-07-12 ENCOUNTER — TELEPHONE (OUTPATIENT)
Dept: HEMATOLOGY ONCOLOGY | Facility: CLINIC | Age: 34
End: 2024-07-12

## 2024-07-12 VITALS
DIASTOLIC BLOOD PRESSURE: 58 MMHG | WEIGHT: 187 LBS | RESPIRATION RATE: 16 BRPM | HEART RATE: 77 BPM | BODY MASS INDEX: 29.35 KG/M2 | HEIGHT: 67 IN | TEMPERATURE: 98.6 F | OXYGEN SATURATION: 97 % | SYSTOLIC BLOOD PRESSURE: 116 MMHG

## 2024-07-12 PROBLEM — R29.898 LEG WEAKNESS, BILATERAL: Status: ACTIVE | Noted: 2024-07-12

## 2024-07-12 PROCEDURE — 99024 POSTOP FOLLOW-UP VISIT: CPT | Performed by: OBSTETRICS & GYNECOLOGY

## 2024-07-12 PROCEDURE — NC001 PR NO CHARGE: Performed by: OBSTETRICS & GYNECOLOGY

## 2024-07-12 RX ORDER — BENZOCAINE/MENTHOL 6 MG-10 MG
1 LOZENGE MUCOUS MEMBRANE DAILY PRN
Start: 2024-07-12

## 2024-07-12 RX ORDER — POLYETHYLENE GLYCOL 3350 17 G/17G
17 POWDER, FOR SOLUTION ORAL DAILY
Start: 2024-07-12 | End: 2024-07-24 | Stop reason: ALTCHOICE

## 2024-07-12 RX ORDER — DOCUSATE SODIUM 100 MG/1
100 CAPSULE, LIQUID FILLED ORAL 2 TIMES DAILY
Start: 2024-07-12

## 2024-07-12 RX ORDER — ACETAMINOPHEN 325 MG/1
650 TABLET ORAL EVERY 4 HOURS PRN
Start: 2024-07-12

## 2024-07-12 RX ORDER — SIMETHICONE 80 MG
80 TABLET,CHEWABLE ORAL 4 TIMES DAILY PRN
Start: 2024-07-12 | End: 2024-07-17

## 2024-07-12 RX ADMIN — ENOXAPARIN SODIUM 80 MG: 100 INJECTION SUBCUTANEOUS at 01:20

## 2024-07-12 RX ADMIN — POLYETHYLENE GLYCOL 3350 17 G: 17 POWDER, FOR SOLUTION ORAL at 08:56

## 2024-07-12 RX ADMIN — BENZOCAINE AND LEVOMENTHOL 1 APPLICATION: 200; 5 SPRAY TOPICAL at 17:02

## 2024-07-12 RX ADMIN — ACETAMINOPHEN 650 MG: 325 TABLET, FILM COATED ORAL at 01:20

## 2024-07-12 RX ADMIN — DOCUSATE SODIUM 100 MG: 100 CAPSULE, LIQUID FILLED ORAL at 08:56

## 2024-07-12 RX ADMIN — PANTOPRAZOLE SODIUM 40 MG: 40 TABLET, DELAYED RELEASE ORAL at 07:36

## 2024-07-12 RX ADMIN — ENOXAPARIN SODIUM 80 MG: 100 INJECTION SUBCUTANEOUS at 13:37

## 2024-07-12 RX ADMIN — Medication 10 MG: at 08:56

## 2024-07-12 RX ADMIN — SENNOSIDES 8.6 MG: 8.6 TABLET, FILM COATED ORAL at 08:56

## 2024-07-12 NOTE — PROGRESS NOTES
Progress Note - OB/GYN   Jillian Ch 33 y.o. female MRN: [id number]  Unit/Bed#:  319-01 Encounter: [id number]      Assessment/Plan    Jillian Ch is a 33 y.o.  who is PPD #2 s/p  at 35w1d     DVT complicating pregnancy, third trimester  Assessment & Plan  Patient on Lovenox 80 mg twice daily for DVT prophylaxis in the setting of an occlusive DVT in the left distal popliteal vein and peroneal veins that developed 3 days following her left mastectomy in 2024  Patient discontinued her Lovenox on the morning of 2024 in the setting of vaginal bleeding  Patient restarted lovenox 24 at 0130 6 hrs post   Plan for a minimum of 6 weeks of postpartum therapy      Malignant neoplasm of overlapping sites of left breast in female, estrogen receptor positive (HCC)  Assessment & Plan  Plan for follow up with oncologist postpartum    Mild persistent asthma without complication  Assessment & Plan  Well controlled    * Spontaneous vaginal delivery  Assessment & Plan  Routine postpartum care  Encourage ambulation  Encourage familial bonding  Lactation support as needed  Pain: Motrin/Tylenol around the clock, oxycodone if needed         Disposition: Anticipate discharge home postpartum Day #2      Subjective/Objective     Subjective: Postpartum state    Pain: no  Tolerating PO: yes  Voiding: yes  Flatus: yes  BM: no  Ambulating: yes  Breastfeeding: Bottle feeding  Chest pain: no  Shortness of breath: no  Leg pain: no  Lochia: wnl    Objective:     Vitals:  Vitals:    24 1147 24 1625 248 24 0011   BP: 113/62 114/65 117/67 116/58   BP Location: Right arm Left arm Right arm Right arm   Pulse: 73 72 75 77   Resp: 18 16 18 16   Temp: 98.6 °F (37 °C) 98.1 °F (36.7 °C) 98.1 °F (36.7 °C) 98.6 °F (37 °C)   TempSrc: Oral Oral Oral Oral   SpO2: 99% 99% 98% 97%   Weight:       Height:           Physical Exam:   GEN: appears well, alert and oriented x 3, pleasant  and cooperative   CV: Regular rate  RESP: non labored breathing  ABDOMEN: soft, no tenderness, no distention, Uterine fundus firm and non-tender, -1 cm below the umbilicus  EXTREMITIES: non-tender  NEURO Alert and oriented to person, place, and time.       Lab Results   Component Value Date    WBC 8.74 07/09/2024    HGB 12.2 07/09/2024    HCT 37.5 07/09/2024    MCV 95 07/09/2024     (L) 07/09/2024         Nae Johnson MD  Obstetrics & Gynecology  07/12/24

## 2024-07-12 NOTE — TELEPHONE ENCOUNTER
Patient called in upset because she tried to connect to her virtual visit today with  for 12:40PM but no one connected to the visit.     Per patient she still wanted to be seen cause she has some concerns she will like to discuss. Per patient she tried to connect the virtual call 5-10 mins after 12:40. Advised patient about our 15 mins late policy.     Please call patient.     Thanks!

## 2024-07-12 NOTE — TELEPHONE ENCOUNTER
Spoke with patient, informed her Dr. Khalil will call her room number at the end of the day.  She may be discharged, if so, she will try her 's number per patient's request.

## 2024-07-12 NOTE — PLAN OF CARE
Problem: POSTPARTUM  Goal: Experiences normal postpartum course  Description: INTERVENTIONS:  - Monitor maternal vital signs  - Assess uterine involution and lochia  Outcome: Progressing  Goal: Appropriate maternal -  bonding  Description: INTERVENTIONS:  - Identify family support  - Assess for appropriate maternal/infant bonding   -Encourage maternal/infant bonding opportunities  - Referral to  or  as needed  Outcome: Progressing  Goal: Establishment of infant feeding pattern  Description: INTERVENTIONS:  - Assess breast/bottle feeding  - Refer to lactation as needed  Outcome: Progressing  Goal: Incision(s), wounds(s) or drain site(s) healing without S/S of infection  Description: INTERVENTIONS  - Assess and document dressing, incision, wound bed, drain sites and surrounding tissue  - Provide patient and family education  - Perform skin care/dressing changes  Outcome: Progressing     Problem: PAIN - ADULT  Goal: Verbalizes/displays adequate comfort level or baseline comfort level  Description: Interventions:  - Encourage patient to monitor pain and request assistance  - Assess pain using appropriate pain scale  - Administer analgesics based on type and severity of pain and evaluate response  - Implement non-pharmacological measures as appropriate and evaluate response  - Consider cultural and social influences on pain and pain management  - Notify physician/advanced practitioner if interventions unsuccessful or patient reports new pain  Outcome: Progressing     Problem: INFECTION - ADULT  Goal: Absence or prevention of progression during hospitalization  Description: INTERVENTIONS:  - Assess and monitor for signs and symptoms of infection  - Monitor lab/diagnostic results  - Monitor all insertion sites, i.e. indwelling lines, tubes, and drains  - Monitor endotracheal if appropriate and nasal secretions for changes in amount and color  - [place] appropriate cooling/warming  therapies per order  - Administer medications as ordered  - Instruct and encourage patient and family to use good hand hygiene technique  - Identify and instruct in appropriate isolation precautions for identified infection/condition  Outcome: Progressing  Goal: Absence of fever/infection during neutropenic period  Description: INTERVENTIONS:  - Monitor WBC    Outcome: Progressing     Problem: SAFETY ADULT  Goal: Patient will remain free of falls  Description: INTERVENTIONS:  - Educate patient/family on patient safety including physical limitations  - Instruct patient to call for assistance with activity   - Consult OT/PT to assist with strengthening/mobility   - Keep Call bell within reach  - Keep bed low and locked with side rails adjusted as appropriate  - Keep care items and personal belongings within reach  - Initiate and maintain comfort rounds  - Make Fall Risk Sign visible to staff  - Offer Toileting  - Initiate/Maintain alarm  - Obtain necessary fall risk management equipment:   - Apply yellow socks and bracelet for high fall risk patients  - Consider moving patient to room near nurses station  Outcome: Progressing  Goal: Maintain or return to baseline ADL function  Description: INTERVENTIONS:  -  Assess patient's ability to carry out ADLs; assess patient's baseline for ADL function and identify physical deficits which impact ability to perform ADLs (bathing, care of mouth/teeth, toileting, grooming, dressing, etc.)  - Assess/evaluate cause of self-care deficits   - Assess range of motion  - Assess patient's mobility; develop plan if impaired  - Assess patient's need for assistive devices and provide as appropriate  - Encourage maximum independence but intervene and supervise when necessary  - Involve family in performance of ADLs  - Assess for home care needs following discharge   - Consider OT consult to assist with ADL evaluation and planning for discharge  - Provide patient education as  appropriate  Outcome: Progressing  Goal: Maintains/Returns to pre admission functional level  Description: INTERVENTIONS:  - Perform AM-PAC 6 Click Basic Mobility/ Daily Activity assessment daily.  - Set and communicate daily mobility goal to care team and patient/family/caregiver.   - Collaborate with rehabilitation services on mobility goals if consulted  - Perform Range of Motion   - Reposition patient  - Dangle patient   - Stand patient  - Ambulate patient   - Out of bed to chair   - Out of bed for meals   - Out of bed for toileting  - Record patient progress and toleration of activity level   Outcome: Progressing     Problem: DISCHARGE PLANNING  Goal: Discharge to home or other facility with appropriate resources  Description: INTERVENTIONS:  - Identify barriers to discharge w/patient and caregiver  - Arrange for needed discharge resources and transportation as appropriate  - Identify discharge learning needs (meds, wound care, etc.)  - Arrange for interpretive services to assist at discharge as needed  - Refer to Case Management Department for coordinating discharge planning if the patient needs post-hospital services based on physician/advanced practitioner order or complex needs related to functional status, cognitive ability, or social support system  Outcome: Progressing

## 2024-07-12 NOTE — TELEPHONE ENCOUNTER
Call keeps going to voicemail.  Sent patient a  my chart message.  We can not do a visit while patient is inpatient.  Dr. Khalil can call her at the end of the day to discuss any concerns.

## 2024-07-12 NOTE — ASSESSMENT & PLAN NOTE
After epidural, bilateral leg weakness unable bear weight  Occurred after epidural. This has occurred before with prior general anesthesia.   PT has evaluate her. Walker given and recommendations for outpatient PT.  Case management consulted

## 2024-07-12 NOTE — CASE MANAGEMENT
Case Management Discharge Planning Note    Patient name Jillian Ch  Location /-01 MRN [id number]  : 1990 Date 2024       Current Admission Date: 2024  Current Admission Diagnosis:Spontaneous vaginal delivery   Patient Active Problem List    Diagnosis Date Noted Date Diagnosed    Leg weakness, bilateral 2024     Vaginal bleeding during pregnancy 2024     Thrombocytopenia affecting pregnancy, antepartum (HCC) 2024     Encounter for follow-up surveillance of breast cancer 2024     Marginal insertion of umbilical cord affecting management of mother 2024     Iron deficiency anemia secondary to inadequate dietary iron intake 2024     Dehydration 2024     Multigravida of advanced maternal age in first trimester 2024     DVT complicating pregnancy, third trimester 2024     History of left mastectomy 2024     Spontaneous vaginal delivery 2023     Cancer complicating pregnancy, third trimester 2023     Malignant neoplasm of overlapping sites of left breast in female, estrogen receptor positive (HCC) 2023     Left ovarian cyst 2023     Unexplained weight gain 2023     Reversible airway obstruction 2022     SOB (shortness of breath) 2022     Right middle lobe pulmonary nodule 2021     Transaminitis 2021     Palpitations 2021     Thoracic outlet syndrome 2021     Vitamin D deficiency 2021     Atypical squamous cells of undetermined significance (ASCUS) on Papanicolaou smear of cervix 2021     Hypertrophic and atrophic condition of skin 2021     Migraine headache 2021     Shoulder impingement syndrome, left 2021     Cervical disc disorder at C5-C6 level with radiculopathy 2020     Chronic left shoulder pain 2020     Gastroesophageal reflux disease without esophagitis 10/18/2019     Depression with anxiety 10/18/2019      Chronic fatigue 10/18/2019     Mild persistent asthma without complication 05/29/2019     Axillary hidradenitis suppurativa 05/29/2019       LOS (days): 3  Geometric Mean LOS (GMLOS) (days):   Days to GMLOS:     OBJECTIVE:  Risk of Unplanned Readmission Score: 15.86         Current admission status: Inpatient   Preferred Pharmacy:   [id number] - [place] [address] - [address]  [place] [address]  Phone: [phone] Fax: [phone]    [facility] - [place], [place] - [address]  [address] [address]  Phone: [phone] Fax: [phone]    [place], [place] - [address]  [address] [place] [address]  Phone: [phone] Fax: [phone]    Primary Care Provider: JOSE Stover    Primary Insurance: BLUE CROSS  Secondary Insurance:     DISCHARGE DETAILS:    Discharge planning discussed with:: MOB and FOB  Freedom of Choice: Yes  Comments - Freedom of Choice: Cm met with MOB and FOB at bedside. They are interested in HHC for PT upon discharge for MOB. They have agreed to work with [facility]  CM contacted family/caregiver?: Yes  Were Treatment Team discharge recommendations reviewed with patient/caregiver?: Yes  Did patient/caregiver verbalize understanding of patient care needs?: Yes  Were patient/caregiver advised of the risks associated with not following Treatment Team discharge recommendations?: Yes         Requested Home Health Care         Is the patient interested in HHC at discharge?: Yes  Home Health Discipline requested:: Physical Therapy  [facility] Reason (only applicable if external HHA name selected): Scheduling access issues  Home Health Follow-Up Provider:: PCP  Home Health Services Needed:: Strengthening/Theraputic Exercises to Improve Function, Gait/ADL Training, Evaluate  Functional Status and Safety  Homebound Criteria Met:: Uses an Assist Device (i.e. cane, walker, etc), Requires the Assistance of Another Person for Safe Ambulation or to Leave the Home  Supporting Clincal Findings:: Fatigues Easliy in Short Distances, Limited Endurance    DME Referral Provided  Referral made for DME?: Yes  DME referral completed for the following items:: Walker  DME Supplier Name:: [employer]    Other Referral/Resources/Interventions Provided:  Interventions: HHC, DME  Referral Comments: Rolling walker ordered and deliverd to MOB at bedside. HHC reserved with [facility]         Treatment Team Recommendation: Home with Home Health Care  Discharge Destination Plan:: Home with Home Health Care  Transport at Discharge : Family

## 2024-07-12 NOTE — TELEPHONE ENCOUNTER
Left message advising patient we would need to cancel her appointment for today since she is still admitted into the hospital.  Told patient to call the hopeline and let us know when she would like to r/s and Dr. Macdonald's nurse  will find her an appt spot.

## 2024-07-12 NOTE — PLAN OF CARE
Problem: PAIN - ADULT  Goal: Verbalizes/displays adequate comfort level or baseline comfort level  Description: Interventions:  - Encourage patient to monitor pain and request assistance  - Assess pain using appropriate pain scale  - Administer analgesics based on type and severity of pain and evaluate response  - Implement non-pharmacological measures as appropriate and evaluate response  - Consider cultural and social influences on pain and pain management  - Notify physician/advanced practitioner if interventions unsuccessful or patient reports new pain  Outcome: Progressing     Problem: INFECTION - ADULT  Goal: Absence or prevention of progression during hospitalization  Description: INTERVENTIONS:  - Assess and monitor for signs and symptoms of infection  - Monitor lab/diagnostic results  - Monitor all insertion sites, i.e. indwelling lines, tubes, and drains  - Monitor endotracheal if appropriate and nasal secretions for changes in amount and color  - [place] appropriate cooling/warming therapies per order  - Administer medications as ordered  - Instruct and encourage patient and family to use good hand hygiene technique  - Identify and instruct in appropriate isolation precautions for identified infection/condition  Outcome: Progressing  Goal: Absence of fever/infection during neutropenic period  Description: INTERVENTIONS:  - Monitor WBC    Outcome: Progressing     Problem: SAFETY ADULT  Goal: Patient will remain free of falls  Description: INTERVENTIONS:  - Educate patient/family on patient safety including physical limitations  - Instruct patient to call for assistance with activity   - Consult OT/PT to assist with strengthening/mobility   - Keep Call bell within reach  - Keep bed low and locked with side rails adjusted as appropriate  - Keep care items and personal belongings within reach  - Initiate and maintain comfort rounds  - Make Fall Risk Sign visible to staff  - Offer Toileting every PRN  Hours, in advance of need  -Outcome: Progressing  Goal: Maintain or return to baseline ADL function  Description: INTERVENTIONS:  -  Assess patient's ability to carry out ADLs; assess patient's baseline for ADL function and identify physical deficits which impact ability to perform ADLs (bathing, care of mouth/teeth, toileting, grooming, dressing, etc.)  - Assess/evaluate cause of self-care deficits   - Assess range of motion  - Assess patient's mobility; develop plan if impaired  - Assess patient's need for assistive devices and provide as appropriate  - Encourage maximum independence but intervene and supervise when necessary  - Involve family in performance of ADLs  - Assess for home care needs following discharge   - Consider OT consult to assist with ADL evaluation and planning for discharge  - Provide patient education as appropriate  Outcome: Progressing  Goal: Maintains/Returns to pre admission functional level  Description: INTERVENTIONS:  - Perform AM-PAC 6 Click Basic Mobility/ Daily Activity assessment daily.  - Set and communicate daily mobility goal to care team and patient/family/caregiver.   - Collaborate with rehabilitation services on mobility goals if consulted  - Out of bed for toileting  - Record patient progress and toleration of activity level   Outcome: Progressing     Problem: DISCHARGE PLANNING  Goal: Discharge to home or other facility with appropriate resources  Description: INTERVENTIONS:  - Identify barriers to discharge w/patient and caregiver  - Arrange for needed discharge resources and transportation as appropriate  - Identify discharge learning needs (meds, wound care, etc.)  - Arrange for interpretive services to assist at discharge as needed  - Refer to Case Management Department for coordinating discharge planning if the patient needs post-hospital services based on physician/advanced practitioner order or complex needs related to functional status, cognitive ability, or social  support system  Outcome: Progressing     Problem: POSTPARTUM  Goal: Establishment of infant feeding pattern  Description: INTERVENTIONS:  - Assess breast/bottle feeding  - Refer to lactation as needed  Outcome: Progressing  Goal: Incision(s), wounds(s) or drain site(s) healing without S/S of infection  Description: INTERVENTIONS  - Assess and document dressing, incision, wound bed, drain sites and surrounding tissue  - Provide patient and family education  - Perform skin care/dressing changes every PRN  Outcome: Progressing

## 2024-07-15 ENCOUNTER — HOSPITAL ENCOUNTER (OUTPATIENT)
Dept: INFUSION CENTER | Facility: CLINIC | Age: 34
End: 2024-07-15

## 2024-07-15 ENCOUNTER — TRANSITIONAL CARE MANAGEMENT (OUTPATIENT)
Dept: FAMILY MEDICINE CLINIC | Facility: CLINIC | Age: 34
End: 2024-07-15

## 2024-07-15 ENCOUNTER — TELEPHONE (OUTPATIENT)
Dept: NUTRITION | Facility: CLINIC | Age: 34
End: 2024-07-15

## 2024-07-15 NOTE — TELEPHONE ENCOUNTER
Jillian was scheduled for oncology nutrition follow up during hydration today. Per chart review, hydration cancelled for today. Will cancel nutrition follow up, pt to reach out as needed with nutrition needs.

## 2024-07-15 NOTE — UTILIZATION REVIEW
"NOTIFICATION OF INPATIENT ADMISSION   MATERNITY/DELIVERY AUTHORIZATION REQUEST   SERVICING FACILITY:   [facility]  Parent Child Health - L&D, [place], NICU  [address] [place] [address]  Tax ID: [id number]  NPI: [id number]   ATTENDING PROVIDER:  Attending Name and NPI#: Marilu Maldonado Md [[id number]]  Address: [address] [place] [address]  Phone: [phone]   ADMISSION INFORMATION:  Place of Service: Inpatient [facility]  Place of Service Code: 21  Inpatient Admission Date/Time: 24  2:23 PM  Discharge Date/Time: 2024  6:30 PM  Admitting Diagnosis Code/Description:  Vaginal bleeding during pregnancy [O46.90]  35 weeks gestation of pregnancy [Z3A.35]  Encounter for full-term uncomplicated delivery [O80]     Mother: Jillian Ch 1990 Estimated Date of Delivery: 24  Delivering clinician: Marilu Maldonado   OB History          2    Para   1    Term   0       1    AB   1    Living   1         SAB   1    IAB        Ectopic        Multiple   0    Live Births   1           Obstetric Comments                   Name & MRN:   Information for the patient's :  Dima Juan Brad [[id number]]    Delivery Information:  Sex: male  Delivered 7/10/2024 7:20 PM by Vaginal, Spontaneous; Gestational Age: 35w1d    [place] Measurements:  Weight: 4 lb 13.4 oz (2195 g);  Height: 17\"    APGAR 1 minute 5 minutes 10 minutes   Totals: 8 9       UTILIZATION REVIEW CONTACT:  Jelly Alcantar Utilization   Network Utilization Review Department  Phone: [phone]  Fax [phone]  Email: [email or web address]  Contact for approvals/pending authorizations, clinical reviews, and discharge.     PHYSICIAN ADVISORY SERVICES:  Medical Necessity Denial & Vhbx-hz-Geul Review  Phone: [phone]  Fax: [phone]  Email: [email or web address]     DISCHARGE SUPPORT TEAM:  For Patients Discharge " Needs & Updates  Phone: [phone] opt. 2 Fax: [phone]  Email: [email or web address]          NOTIFICATION OF ADMISSION DISCHARGE   This is a Notification of Discharge from [facility]. Please be advised that this patient has been discharge from our facility. Below you will find the admission and discharge date and time including the patient’s disposition.   UTILIZATION REVIEW CONTACT:  Jelly Alcantar  Utilization   Network Utilization Review Department  Phone: [phone] x carefully listen to the prompts. All voicemails are confidential.  Email: [email or web address]     ADMISSION INFORMATION  PRESENTATION DATE: 7/9/2024 10:48 AM  OBERVATION ADMISSION DATE: N/A  INPATIENT ADMISSION DATE: 7/9/24  2:23 PM   DISCHARGE DATE: 7/12/2024  6:30 PM   DISPOSITION:Home with Home Health Care    [facility] Utilization Review Department  ATTENTION: Please call with any questions or concerns to [phone] and carefully listen to the prompts so that you are directed to the right person. All voicemails are confidential.   For Discharge needs, contact Care Management DC Support Team at [phone] opt. 2  Send all requests for admission clinical reviews, approved or denied determinations and any other requests to dedicated fax number below belonging to the campus where the patient is receiving treatment. List of dedicated fax numbers for the Facilities:  FACILITY NAME UR FAX NUMBER   ADMISSION DENIALS (Administrative/Medical Necessity) [phone]   DISCHARGE SUPPORT TEAM ([facility]) [phone]   PARENT CHILD HEALTH (Maternity/NICU/Pediatrics) [phone]   [facility] [phone]   [facility] [phone]   [facility] [phone]   [facility] [phone]   [facility] [phone]   [facility] [phone]    [facility] [phone]   [facility] [phone]   [facility] [phone]   [facility] [phone]   [facility] [phone]   [facility] [phone]

## 2024-07-16 LAB — PLACENTA IN STORAGE: NORMAL

## 2024-07-17 ENCOUNTER — POSTPARTUM VISIT (OUTPATIENT)
Dept: OBGYN CLINIC | Facility: MEDICAL CENTER | Age: 34
End: 2024-07-17
Payer: COMMERCIAL

## 2024-07-17 VITALS
DIASTOLIC BLOOD PRESSURE: 90 MMHG | TEMPERATURE: 97.3 F | SYSTOLIC BLOOD PRESSURE: 142 MMHG | BODY MASS INDEX: 26.84 KG/M2 | HEIGHT: 67 IN | WEIGHT: 171 LBS

## 2024-07-17 DIAGNOSIS — R03.0 TRANSIENT ELEVATED BLOOD PRESSURE: ICD-10-CM

## 2024-07-17 DIAGNOSIS — N61.0 MASTITIS: Primary | ICD-10-CM

## 2024-07-17 DIAGNOSIS — R10.2 PELVIC PRESSURE IN FEMALE: ICD-10-CM

## 2024-07-17 LAB
SL AMB  POCT GLUCOSE, UA: ABNORMAL
SL AMB LEUKOCYTE ESTERASE,UA: ABNORMAL
SL AMB POCT BILIRUBIN,UA: ABNORMAL
SL AMB POCT BLOOD,UA: ABNORMAL
SL AMB POCT KETONES,UA: ABNORMAL
SL AMB POCT NITRITE,UA: ABNORMAL
SL AMB POCT PH,UA: 6
SL AMB POCT SPECIFIC GRAVITY,UA: 1
SL AMB POCT URINE PROTEIN: ABNORMAL
SL AMB POCT UROBILINOGEN: 0.2

## 2024-07-17 PROCEDURE — 99024 POSTOP FOLLOW-UP VISIT: CPT | Performed by: CLINICAL NURSE SPECIALIST

## 2024-07-17 PROCEDURE — 87086 URINE CULTURE/COLONY COUNT: CPT | Performed by: CLINICAL NURSE SPECIALIST

## 2024-07-17 PROCEDURE — 81002 URINALYSIS NONAUTO W/O SCOPE: CPT | Performed by: CLINICAL NURSE SPECIALIST

## 2024-07-17 RX ORDER — DICLOXACILLIN SODIUM 250 MG/1
250 CAPSULE ORAL 4 TIMES DAILY
Qty: 40 CAPSULE | Refills: 0 | Status: SHIPPED | OUTPATIENT
Start: 2024-07-17 | End: 2024-07-27

## 2024-07-17 NOTE — ASSESSMENT & PLAN NOTE
1 wk S/P  at 35 wks  Pt with breast and urinary c/o - see other A&P  Bldg minimal.  Some discomfort of perineal laceration, exam with mild erythema , but no induration, separation, or drainage.  Supportive care-with [place] wash after each void. Place hydrocortisone on pad and apply to area, Continue dermaplast and witch hazel pads. If no improvement by the weekend re-eval with Doc.

## 2024-07-17 NOTE — ASSESSMENT & PLAN NOTE
Mildly elevated BP on arrival today 142/90, with slight improvement on recheck  138/80.  + mild HA, but hasn't taken anything yet today.   Labs ordered to r/o pre-e. Symptoms to call for reviewed including -continued HA after tylenol, rest/fluids. Or vision changes or RUQ pain.

## 2024-07-17 NOTE — ASSESSMENT & PLAN NOTE
Pt concerned for UTI today- symptoms same as previous UTI. UA in office largely nml. Will send culture and f/u if abnromal.

## 2024-07-17 NOTE — PROGRESS NOTES
Assessment/Plan:        1wk PostPartum S/P        1. Mastitis  Assessment & Plan:  Singificant right breast pain- not Breastfeeding.   See HPI  Exam - Redness seemingly resolved since yesterday.  But very tender- inferior and medially in particular.  Mild low grade temp yesterday.  Will tx for mastitis  Avoid stimulation since not breastfeeding.  Orders:  -     dicloxacillin (DYNAPEN) 250 mg capsule; Take 1 capsule (250 mg total) by mouth 4 (four) times a day for 10 days  2. Pelvic pressure in female  Assessment & Plan:  Pt concerned for UTI today- symptoms same as previous UTI. UA in office largely nml. Will send culture and f/u if abnromal.   Orders:  -     Urine culture  -     POCT urine dip  3. Transient elevated blood pressure  Assessment & Plan:  Mildly elevated BP on arrival today 142/90, with slight improvement on recheck  138/80.  + mild HA, but hasn't taken anything yet today.   Labs ordered to r/o pre-e. Symptoms to call for reviewed including -continued HA after tylenol, rest/fluids. Or vision changes or RUQ pain.  Orders:  -     Comprehensive metabolic panel; Future  -     CBC and differential; Future  -     Protein / creatinine ratio, urine; Future  4. Spontaneous vaginal delivery  Assessment & Plan:  1 wk S/P  at 35 wks  Pt with breast and urinary c/o - see other A&P  Bldg minimal.  Some discomfort of perineal laceration, exam with mild erythema , but no induration, separation, or drainage.  Supportive care-with Peribottle wash after each void. Place hydrocortisone on pad and apply to area, Continue dermaplast and witch hazel pads. If no improvement by the weekend re-eval with Doc.           Subjective:   HPI     Patient ID: Jillian Ch is a 33 y.o. female.  She presents for Problem postpartum visit  1 wk S/P - see details below.    C/o breast issue. C/O redness around Rt breast-areola and increasing pain.  + Low grade temp yesterday.- afebrile today    (Left mastectomy due  "to cancer)  Started sudafed as recommended by on call doc and redness decreased     Also c/o increased bladder pressure- had UTI previously and this feels the same.   Slight burning but more so when touches  bldg    BP mildly elevated.  C/O more frequent HA, denies vision changes or RUQ pain  .  .   She is now a  who is 1wk s/p  on 7/10/24 at 35w1d. Induction due  + VB   Baby girl    Anesthesia: epidural  Perineum: 1st degree lac repaired  Pregnancy complicated by breast cancer, DVT and thrombocytopenia as well as being AMA      Reports bldg is getting lighter  Perineum still somewhat painful, hasn't improved much since d/c    Baby doing well. Formula feeding    [place]  Depression Scale Total: 10         The following portions of the patient's history were reviewed and updated as appropriate: allergies, current medications, past family history, past medical history, past social history, past surgical history, and problem list.    Review of Systems             Objective:  /90 (BP Location: Left arm, Patient Position: Sitting, Cuff Size: Standard)   Temp (!) 97.3 °F (36.3 °C)   Ht 5' 7\" (1.702 m)   Wt 77.6 kg (171 lb)   LMP 10/31/2023   Breastfeeding Yes   BMI 26.78 kg/m²     Physical Exam  Constitutional:       General: She is not in acute distress.     Appearance: Normal appearance.   Genitourinary:      Genitourinary Comments:         Right Labia: No lesions.     Left Labia: No lesions.           Vaginal bleeding present.      Uterus is enlarged (involuting- u-4-5).      Bladder is tender (slight).   Breasts:     Right: Swelling, mass (overall lumpy in general.) and tenderness present. No inverted nipple or skin change.      Left: Absent.   Cardiovascular:      Rate and Rhythm: Normal rate.   Pulmonary:      Effort: Pulmonary effort is normal.   Chest:       Abdominal:      Palpations: Abdomen is soft.   Musculoskeletal:         General: Normal range of motion.   Neurological:     "  Mental Status: She is alert and oriented to person, place, and time.   Skin:     General: Skin is warm and dry.   Psychiatric:         Mood and Affect: Mood normal.         Behavior: Behavior normal.       Results for orders placed or performed in visit on 07/17/24   POCT urine dip   Result Value Ref Range    LEUKOCYTE ESTERASE,UA trace     NITRITE,UA neg     SL AMB POCT UROBILINOGEN 0.2     POCT URINE PROTEIN neg      PH,UA 6     BLOOD,UA ++- still having Vag bldg     SPECIFIC GRAVITY,UA 1.005     KETONES,UA neg     BILIRUBIN,UA neg     GLUCOSE, UA neg      *Note: Due to a large number of results and/or encounters for the requested time period, some results have not been displayed. A complete set of results can be found in Results Review.

## 2024-07-17 NOTE — ASSESSMENT & PLAN NOTE
Singificant right breast pain- not Breastfeeding.   See HPI  Exam - Redness seemingly resolved since yesterday.  But very tender- inferior and medially in particular.  Mild low grade temp yesterday.  Will tx for mastitis  Avoid stimulation since not breastfeeding.

## 2024-07-18 ENCOUNTER — LAB (OUTPATIENT)
Dept: LAB | Facility: CLINIC | Age: 34
End: 2024-07-18
Payer: COMMERCIAL

## 2024-07-18 DIAGNOSIS — E86.0 DEHYDRATION: Primary | ICD-10-CM

## 2024-07-18 DIAGNOSIS — D50.8 IRON DEFICIENCY ANEMIA SECONDARY TO INADEQUATE DIETARY IRON INTAKE: ICD-10-CM

## 2024-07-18 DIAGNOSIS — R03.0 TRANSIENT ELEVATED BLOOD PRESSURE: ICD-10-CM

## 2024-07-18 LAB
ALBUMIN SERPL BCG-MCNC: 3.8 G/DL (ref 3.5–5)
ALP SERPL-CCNC: 73 U/L (ref 34–104)
ALT SERPL W P-5'-P-CCNC: 68 U/L (ref 7–52)
ANION GAP SERPL CALCULATED.3IONS-SCNC: 10 MMOL/L (ref 4–13)
AST SERPL W P-5'-P-CCNC: 33 U/L (ref 13–39)
BASOPHILS # BLD AUTO: 0.02 THOUSANDS/ÂΜL (ref 0–0.1)
BASOPHILS NFR BLD AUTO: 0 % (ref 0–1)
BILIRUB SERPL-MCNC: 0.2 MG/DL (ref 0.2–1)
BUN SERPL-MCNC: 10 MG/DL (ref 5–25)
CALCIUM SERPL-MCNC: 9 MG/DL (ref 8.4–10.2)
CHLORIDE SERPL-SCNC: 106 MMOL/L (ref 96–108)
CO2 SERPL-SCNC: 25 MMOL/L (ref 21–32)
CREAT SERPL-MCNC: 0.67 MG/DL (ref 0.6–1.3)
CREAT UR-MCNC: 215 MG/DL
EOSINOPHIL # BLD AUTO: 0.24 THOUSAND/ÂΜL (ref 0–0.61)
EOSINOPHIL NFR BLD AUTO: 4 % (ref 0–6)
ERYTHROCYTE [DISTWIDTH] IN BLOOD BY AUTOMATED COUNT: 13.1 % (ref 11.6–15.1)
FERRITIN SERPL-MCNC: 162 NG/ML (ref 11–307)
GFR SERPL CREATININE-BSD FRML MDRD: 114 ML/MIN/1.73SQ M
GLUCOSE SERPL-MCNC: 73 MG/DL (ref 65–140)
HCT VFR BLD AUTO: 38.9 % (ref 34.8–46.1)
HGB BLD-MCNC: 12.4 G/DL (ref 11.5–15.4)
IMM GRANULOCYTES # BLD AUTO: 0.03 THOUSAND/UL (ref 0–0.2)
IMM GRANULOCYTES NFR BLD AUTO: 1 % (ref 0–2)
IRON SATN MFR SERPL: 17 % (ref 15–50)
IRON SERPL-MCNC: 65 UG/DL (ref 50–212)
LYMPHOCYTES # BLD AUTO: 1.13 THOUSANDS/ÂΜL (ref 0.6–4.47)
LYMPHOCYTES NFR BLD AUTO: 20 % (ref 14–44)
MCH RBC QN AUTO: 30.2 PG (ref 26.8–34.3)
MCHC RBC AUTO-ENTMCNC: 31.9 G/DL (ref 31.4–37.4)
MCV RBC AUTO: 95 FL (ref 82–98)
MONOCYTES # BLD AUTO: 0.45 THOUSAND/ÂΜL (ref 0.17–1.22)
MONOCYTES NFR BLD AUTO: 8 % (ref 4–12)
NEUTROPHILS # BLD AUTO: 3.92 THOUSANDS/ÂΜL (ref 1.85–7.62)
NEUTS SEG NFR BLD AUTO: 67 % (ref 43–75)
NRBC BLD AUTO-RTO: 0 /100 WBCS
PLATELET # BLD AUTO: 175 THOUSANDS/UL (ref 149–390)
PMV BLD AUTO: 11.2 FL (ref 8.9–12.7)
POTASSIUM SERPL-SCNC: 3.9 MMOL/L (ref 3.5–5.3)
PROT SERPL-MCNC: 6.8 G/DL (ref 6.4–8.4)
PROT UR-MCNC: 17 MG/DL
PROT/CREAT UR: 0.08 MG/G{CREAT} (ref 0–0.1)
RBC # BLD AUTO: 4.1 MILLION/UL (ref 3.81–5.12)
SODIUM SERPL-SCNC: 141 MMOL/L (ref 135–147)
TIBC SERPL-MCNC: 380 UG/DL (ref 250–450)
UIBC SERPL-MCNC: 315 UG/DL (ref 155–355)
WBC # BLD AUTO: 5.79 THOUSAND/UL (ref 4.31–10.16)

## 2024-07-18 PROCEDURE — 82570 ASSAY OF URINE CREATININE: CPT

## 2024-07-18 PROCEDURE — 85025 COMPLETE CBC W/AUTO DIFF WBC: CPT

## 2024-07-18 PROCEDURE — 84156 ASSAY OF PROTEIN URINE: CPT

## 2024-07-18 PROCEDURE — 83550 IRON BINDING TEST: CPT

## 2024-07-18 PROCEDURE — 83540 ASSAY OF IRON: CPT

## 2024-07-18 PROCEDURE — 36415 COLL VENOUS BLD VENIPUNCTURE: CPT

## 2024-07-18 PROCEDURE — 80053 COMPREHEN METABOLIC PANEL: CPT

## 2024-07-18 PROCEDURE — 82728 ASSAY OF FERRITIN: CPT

## 2024-07-19 ENCOUNTER — HOSPITAL ENCOUNTER (OUTPATIENT)
Dept: INFUSION CENTER | Facility: CLINIC | Age: 34
End: 2024-07-19
Payer: COMMERCIAL

## 2024-07-19 VITALS
TEMPERATURE: 96.4 F | HEART RATE: 99 BPM | DIASTOLIC BLOOD PRESSURE: 78 MMHG | RESPIRATION RATE: 18 BRPM | SYSTOLIC BLOOD PRESSURE: 115 MMHG

## 2024-07-19 DIAGNOSIS — E86.0 DEHYDRATION: Primary | ICD-10-CM

## 2024-07-19 DIAGNOSIS — Z91.09 ENVIRONMENTAL ALLERGIES: ICD-10-CM

## 2024-07-19 LAB — BACTERIA UR CULT: NORMAL

## 2024-07-19 PROCEDURE — 96361 HYDRATE IV INFUSION ADD-ON: CPT

## 2024-07-19 PROCEDURE — 96374 THER/PROPH/DIAG INJ IV PUSH: CPT

## 2024-07-19 RX ORDER — CETIRIZINE HYDROCHLORIDE 10 MG/1
10 TABLET ORAL DAILY
Qty: 90 TABLET | Refills: 1 | Status: SHIPPED | OUTPATIENT
Start: 2024-07-19

## 2024-07-19 RX ADMIN — ONDANSETRON 8 MG: 2 INJECTION INTRAMUSCULAR; INTRAVENOUS at 10:46

## 2024-07-19 RX ADMIN — SODIUM CHLORIDE 1000 ML: 0.9 INJECTION, SOLUTION INTRAVENOUS at 11:07

## 2024-07-19 NOTE — PROGRESS NOTES
Pt to clinic for IV hydration and Zofran, offers no complaints today, tolerated infusion without complications, aware of next appointment on 7/22/24 at 2pm, port de-accessed, avs declined.

## 2024-07-19 NOTE — RESULT ENCOUNTER NOTE
Spoke with pt regarding labs- mild ALT elevation otherwise OK  Has hx of same in pregnancy.  BP was borderline at time of appt- 140/90 which is why labs were ordered. Subsequently has been to infusion center today and Bp very normal. D/w Dr Lujan who recommended visit early next week for recheck. No new S/s pre-e. S/s reviewed with pt      Staff please reach out to pt to be added on to schedule by Wednesday

## 2024-07-22 ENCOUNTER — TELEPHONE (OUTPATIENT)
Age: 34
End: 2024-07-22

## 2024-07-22 ENCOUNTER — HOSPITAL ENCOUNTER (OUTPATIENT)
Dept: INFUSION CENTER | Facility: CLINIC | Age: 34
Discharge: HOME | End: 2024-07-22

## 2024-07-22 DIAGNOSIS — E86.0 DEHYDRATION: Primary | ICD-10-CM

## 2024-07-22 NOTE — TELEPHONE ENCOUNTER
Patient just saw that she has an appt for today.  Rescheduled for 7/24/24.  Patient states she is unable to make today.  Nothing was found for tomorrow.  Is the 7/24/24 appt ok.

## 2024-07-22 NOTE — TELEPHONE ENCOUNTER
Spoke with patient to inquire if she still wants her fluids three times a week since having her baby. The patient states that she would only like fluids twice a week now and would like her appointment today to be canceled. I informed her that I will have this canceled for her and to reach out if she veer can't make an appointment or needs to reschedule. Patient verbalized understanding and is in agreement with the plan.

## 2024-07-23 NOTE — PROGRESS NOTES
"Jillian Ch  1990    S:  33 y.o. female here for postpartum visit.  She is s/p    on 7/10/24 at 7:20 pm at 35 weeks. (Induction due to vaginal bleeding) .  1st degree laceration repaired. AMA with hx of DVT in pregnancy and breast cancer s/p left mastectomy.   She plans to follow up with Dr Cornell in October (to be scheduled) and plans to start chemo around that time.     Last in office on 24 with c/o right breast mastitis. Dicloxicillin was ordered. The pharmacy did not  have the medication for 3 days. Her symptoms resolved by that point so she never started the antibiotic and feels back to normal now.     BP was mildly elevated on arrival at her last visit. PreE labs were->ALT 68, otherwise normal labs.   BP today of 130/82.. Slight intermittent HA (none now) and taking tylenol as needed which is effective. No other symptoms.     Messaged the office today around 2 am with c/o increased vaginal bleeding and passed a \"peach pit sized clot\". Denies lifting anything heavier than 10 lbs. Bleeding has normalized. No pelvic pain.      Gender: male \"Juan Salinas-he's great!\"  Apgars: 8, 9  Weight: 4 lbs 13.4 oz. He now weight 5 lbs  Her lochia has resolved.    She is Bottle feeding without problems. His feedings are increasing.   She denies postpartum blues/depression.  Her EPDS is 8.  Admits to anxiety ( not depression) history for which she used to take celexa prepregnancy. Considering a restart. Plans to follow up with a therapist first. If symptoms persist or worsen, will consider celexa restart. Believes her anxiety is fear based. This first started after deaths of 3 people close to her. It is now triggered by her current health concerns.       Last Pap: 2024 normal with negative       O:   LMP 10/31/2023   She appears well and in no distress  Abdomen is soft and non tender  External genitals are normal  Vagina is normal; scant lochia rubra.   Cervix, uterus and adnexa are nontender, " no masses palpable.   Uterus not fully involuted but firm.   Breast exam deferred    A/P:  Postpartum visit.   Encouraged to schedule an appt with Dr Budinetz for fertility conversation/egg freezing discussion.  Encouraged to schedule follow up appt with surgical/medical oncology specialists.   Establish with a therapist. If no improvement or worsening of anxiety, consider medical management with celexa again.   If symptoms of depression occur, please call the office to schedule an appt. This may happen up until your baby's first birthday.  Avoid lifting anything heavier than 10 lbs currently.   Return to office in one week for BP check and postpartum exam (already scheduled).   Continue prenatal vitamin daily until you've run out.   Consider taking prenatal vitamin 6-12 months before a future pregnancy to reduce risk of neural tube defect.  Will discuss contraception at next appt. Consider condom use.    No sexual activity until bleeding has completely discontinued, once comfortable and protected to do so.   May need to use a lubricant with sex due for  vaginal dryness.   Referral placed to pelvic floor PT for history of dyspareunia

## 2024-07-23 NOTE — PROGRESS NOTES
Jillian Ch is here for her first prenatal visit  Age: 33 y.o.  LMP: Patient's last menstrual period was 10/31/2023.    Gestational age 35w1d based on LMP consistent with early US    2  Para 1           Previous C Section: No    She {Actions; denies/admits to:5300} nausea and vomiting  She {Actions; has had/no:68430} vaginal bleeding  Patients {COMPLAINTS; NONE/WILDCARD:13974}    She  is planning consultation with maternal fetal medicine for a sequential screen and genetic screening. This is scheduled for 24.    Allergies: Formic acid, Latex, and Nsaids  Medication use :   Current Outpatient Medications   Medication Sig Dispense Refill    acetaminophen (TYLENOL) 325 mg tablet Take 2 tablets (650 mg total) by mouth every 4 (four) hours as needed for mild pain      albuterol (PROVENTIL HFA,VENTOLIN HFA) 90 mcg/act inhaler Inhale 2 puffs every 6 (six) hours as needed for wheezing 18 g 3    benzocaine-menthol-lanolin-aloe (DERMOPLAST) 20-0.5 % topical spray Apply 1 Application topically every 6 (six) hours as needed for mild pain or irritation      Blood Glucose Monitoring Suppl (OneTouch Verio Flex System) w/Device KIT Test x4 Daily or as instructed (Patient not taking: Reported on 2024) 1 kit 0    cetirizine (ZyrTEC) 10 mg tablet TAKE 1 TABLET BY MOUTH EVERY DAY 90 tablet 1    CRANIAL PROSTHESIS, RX, Place one on head as needed (Patient not taking: Reported on 2024) 1 each 0    dicloxacillin (DYNAPEN) 250 mg capsule Take 1 capsule (250 mg total) by mouth 4 (four) times a day for 10 days 40 capsule 0    docusate sodium (COLACE) 100 mg capsule Take 1 capsule (100 mg total) by mouth 2 (two) times a day (Patient not taking: Reported on 2024)      enoxaparin (LOVENOX) 80 mg/0.8 mL EXPEL 5 MG (0.05 ML) FROM SYRINGE AND DISCARD, THEN INJECT 75 MG (0.75 ML) UNDER THE SKIN EVERY 12 HOURS 48 mL 5    hydrocortisone 1 % cream Apply 1 Application topically daily as needed for irritation or  rash      lidocaine-prilocaine (EMLA) cream Apply to port 30 to 60 min prior to use (Patient not taking: Reported on 2024) 30 g 2    omeprazole (PriLOSEC) 40 MG capsule Take 1 capsule (40 mg total) by mouth daily 90 capsule 2    ondansetron (ZOFRAN) 8 mg tablet Take 1 tablet (8 mg total) by mouth every 8 (eight) hours as needed for nausea or vomiting 30 tablet 3    polyethylene glycol (MIRALAX) 17 g packet Take 17 g by mouth daily      Prenatal Multivit-Min-Fe-FA (PRE-SONIA PO) Take 1 tablet by mouth in the morning      witch hazel-glycerin (TUCKS) topical pad Apply 1 Pad topically every 4 (four) hours as needed for irritation       No current facility-administered medications for this visit.     She is a {Smoker?:93305}  In this pregnancy her  medical history is significant for {NONE:67304}    Prenatal Labs      positive  Antibody negative  CBC    <  Hep B and Hep C nonreactive  HIV nonreactive  Syphilis NR   Rubella immune  UA UC WNL    GC/CT collected today  Pap   AFP      Her obstetrical, medical, surgical and family history was reviewed  Her physical exam was {NORMAL/ABNORMAL:00128}  FHT's:     Discussed as well during this visit was diet, prenatal vitamins, prenatal visits, lab testing, breast feeding, vaccinations, maternal fetal medicine consultations, prevention of exposure to infectious diseases and toxic chemicals, lifestyle.    Influenza vaccine:  Covid vaccine:     Risk factors for this pregnancy include: {NONE:42553}

## 2024-07-24 ENCOUNTER — POSTPARTUM VISIT (OUTPATIENT)
Dept: OBGYN CLINIC | Facility: MEDICAL CENTER | Age: 34
End: 2024-07-24

## 2024-07-24 ENCOUNTER — PATIENT MESSAGE (OUTPATIENT)
Dept: OBGYN CLINIC | Facility: MEDICAL CENTER | Age: 34
End: 2024-07-24

## 2024-07-24 ENCOUNTER — HOSPITAL ENCOUNTER (OUTPATIENT)
Dept: INFUSION CENTER | Facility: CLINIC | Age: 34
Discharge: HOME | End: 2024-07-24
Payer: COMMERCIAL

## 2024-07-24 VITALS
RESPIRATION RATE: 18 BRPM | DIASTOLIC BLOOD PRESSURE: 79 MMHG | HEART RATE: 82 BPM | SYSTOLIC BLOOD PRESSURE: 127 MMHG | TEMPERATURE: 96.3 F

## 2024-07-24 VITALS — WEIGHT: 168 LBS | DIASTOLIC BLOOD PRESSURE: 82 MMHG | BODY MASS INDEX: 26.31 KG/M2 | SYSTOLIC BLOOD PRESSURE: 130 MMHG

## 2024-07-24 DIAGNOSIS — E86.0 DEHYDRATION: Primary | ICD-10-CM

## 2024-07-24 DIAGNOSIS — N94.10 DYSPAREUNIA IN FEMALE: ICD-10-CM

## 2024-07-24 DIAGNOSIS — F41.9 ANXIETY: ICD-10-CM

## 2024-07-24 DIAGNOSIS — Z87.898 HISTORY OF MASTITIS: ICD-10-CM

## 2024-07-24 PROCEDURE — 99024 POSTOP FOLLOW-UP VISIT: CPT | Performed by: NURSE PRACTITIONER

## 2024-07-24 PROCEDURE — 96374 THER/PROPH/DIAG INJ IV PUSH: CPT

## 2024-07-24 PROCEDURE — 96361 HYDRATE IV INFUSION ADD-ON: CPT

## 2024-07-24 RX ADMIN — ONDANSETRON 8 MG: 2 INJECTION INTRAMUSCULAR; INTRAVENOUS at 13:48

## 2024-07-24 RX ADMIN — SODIUM CHLORIDE 1000 ML: 0.9 INJECTION, SOLUTION INTRAVENOUS at 14:07

## 2024-07-24 NOTE — PROGRESS NOTES
Pt to clinic for IV hydration and Zofran, offers no complaints today, tolerated infusion without complications, aware of next appointment on 7/26/24 at 3pm, port de-accessed, avs declined.

## 2024-07-24 NOTE — PATIENT INSTRUCTIONS
Encouraged to schedule an appt with Dr Budinetz for fertility conversation/egg freezing discussion.  Encouraged to schedule follow up appt with surgical/medical oncology specialists.   Establish with a therapist. If no improvement or worsening of anxiety, consider medical management with celexa again.   If symptoms of depression occur, please call the office to schedule an appt. This may happen up until your baby's first birthday.  Avoid lifting anything heavier than 10 lbs currently.   Return to office in one week for BP check and postpartum exam.   Continue prenatal vitamin daily until you've run out.   Consider taking prenatal vitamin 6-12 months before a future pregnancy to reduce risk of neural tube defect.  Will discuss contraception at next appt. Consider condom use.    No sexual activity until bleeding has completely discontinued, once comfortable and protected to do so.   May need to use a lubricant with sex due for  vaginal dryness.   Referral placed to pelvic floor PT for history of dyspareunia

## 2024-07-25 ENCOUNTER — PATIENT OUTREACH (OUTPATIENT)
Dept: HEMATOLOGY ONCOLOGY | Facility: CLINIC | Age: 34
End: 2024-07-25

## 2024-07-25 NOTE — PROGRESS NOTES
I reached out and spoke with Jillian to follow up and to review for any changes in barriers to care and offer supportive services as needed.    Barriers noted previously; co-morbidities.     Current barriers and interventions provided: co-morbidities.     Pt states that she had to apply for disability and was denied but she has appealed the decision and is currently waiting  for that decision.  Pt states that she has been in contact with ALISSA Allan and she will follow up with her again to see if she has heard anything.    Bases on individual needs I will follow up with them in 4-6 weeks.   I have provided my direct contact information and welcome them to contact me if their needs as discussed above change. They were appreciative for the call.

## 2024-07-26 ENCOUNTER — HOSPITAL ENCOUNTER (OUTPATIENT)
Dept: INFUSION CENTER | Facility: CLINIC | Age: 34
End: 2024-07-26
Payer: COMMERCIAL

## 2024-07-26 VITALS
RESPIRATION RATE: 18 BRPM | HEART RATE: 90 BPM | TEMPERATURE: 96.6 F | DIASTOLIC BLOOD PRESSURE: 86 MMHG | SYSTOLIC BLOOD PRESSURE: 118 MMHG

## 2024-07-26 DIAGNOSIS — E86.0 DEHYDRATION: Primary | ICD-10-CM

## 2024-07-26 PROCEDURE — 96374 THER/PROPH/DIAG INJ IV PUSH: CPT

## 2024-07-26 PROCEDURE — 96361 HYDRATE IV INFUSION ADD-ON: CPT

## 2024-07-26 RX ADMIN — ONDANSETRON 8 MG: 2 INJECTION INTRAMUSCULAR; INTRAVENOUS at 08:42

## 2024-07-26 RX ADMIN — SODIUM CHLORIDE 1000 ML: 0.9 INJECTION, SOLUTION INTRAVENOUS at 08:42

## 2024-07-26 NOTE — PROGRESS NOTES
Pt here for hydration infusion, offering no complaints. Port a cath accessed with positive blood return noted. Tolerated infusion without incident. Port a cath flushed positive nlood return noted, de accessed without difficulty. AVS declined. Walked out in stable condition. Next appointment on 7/31/24 at 10:30am.

## 2024-07-30 ENCOUNTER — POSTPARTUM VISIT (OUTPATIENT)
Dept: OBGYN CLINIC | Facility: MEDICAL CENTER | Age: 34
End: 2024-07-30

## 2024-07-30 VITALS — DIASTOLIC BLOOD PRESSURE: 80 MMHG | SYSTOLIC BLOOD PRESSURE: 136 MMHG | BODY MASS INDEX: 26.22 KG/M2 | WEIGHT: 167.4 LBS

## 2024-07-30 DIAGNOSIS — C50.812 MALIGNANT NEOPLASM OF OVERLAPPING SITES OF LEFT BREAST IN FEMALE, ESTROGEN RECEPTOR POSITIVE (HCC): Primary | ICD-10-CM

## 2024-07-30 DIAGNOSIS — Z17.0 MALIGNANT NEOPLASM OF OVERLAPPING SITES OF LEFT BREAST IN FEMALE, ESTROGEN RECEPTOR POSITIVE (HCC): Primary | ICD-10-CM

## 2024-07-30 PROCEDURE — 99024 POSTOP FOLLOW-UP VISIT: CPT | Performed by: STUDENT IN AN ORGANIZED HEALTH CARE EDUCATION/TRAINING PROGRAM

## 2024-07-30 NOTE — PROGRESS NOTES
Postpartum clinic visit     Subjective:    Jillian Ch presents for her post partum visit.   She delivered on 7/10/24 by  at 35+1 weeks gestation.   I have fully reviewed the prenatal and intrapartum course.   The baby boy weighed 4 lbs 13 oz and is doing well.   Baby is feeding by bottle.   The patient currently has thin lochia.   Patient has not resumed sexually active.   Contraceptive plan is condoms  Postpartum depression screening: positive. EPDS 10  Patient reports no additional complaints.  She is feeling anxious about getting things lined up for fertility, chemo and testing in October with Dr. Cornell.    Patient's medications, allergies, past medical, surgical, social and family histories were reviewed and updated as appropriate.    Review of Systems  All other systems are negative  Pertinent items are noted in HPI.    Objective:    Patient's last menstrual period was 10/31/2023.    General: alert, cooperative, no distress, and appears stated age    Assessment:    Postpartum exam.     Plan:    1. Contraception: condoms  2. Elevated EPDS: wants to establish with therapy, she wants to hold on celexa for now and knows she can reach out  3. Follow up: 6-8 wk pp for repeat bottom check, prn if needed prior

## 2024-07-31 ENCOUNTER — HOSPITAL ENCOUNTER (OUTPATIENT)
Dept: INFUSION CENTER | Facility: CLINIC | Age: 34
Discharge: HOME | End: 2024-07-31
Payer: COMMERCIAL

## 2024-07-31 VITALS
RESPIRATION RATE: 18 BRPM | HEART RATE: 98 BPM | TEMPERATURE: 96.6 F | SYSTOLIC BLOOD PRESSURE: 116 MMHG | DIASTOLIC BLOOD PRESSURE: 67 MMHG

## 2024-07-31 DIAGNOSIS — E86.0 DEHYDRATION: Primary | ICD-10-CM

## 2024-07-31 PROCEDURE — 96360 HYDRATION IV INFUSION INIT: CPT

## 2024-07-31 RX ADMIN — SODIUM CHLORIDE 1000 ML: 0.9 INJECTION, SOLUTION INTRAVENOUS at 10:45

## 2024-07-31 NOTE — PROGRESS NOTES
Pt to clinic for IV hydration, offers no complaints today, tolerated infusion without complications, aware of next appointment on 8/2/24 at 1:30pm, port de-accessed, avs declined.

## 2024-08-02 ENCOUNTER — HOSPITAL ENCOUNTER (OUTPATIENT)
Dept: INFUSION CENTER | Facility: CLINIC | Age: 34
End: 2024-08-02
Payer: COMMERCIAL

## 2024-08-02 VITALS
HEART RATE: 83 BPM | RESPIRATION RATE: 18 BRPM | SYSTOLIC BLOOD PRESSURE: 130 MMHG | DIASTOLIC BLOOD PRESSURE: 74 MMHG | TEMPERATURE: 98.5 F

## 2024-08-02 DIAGNOSIS — E86.0 DEHYDRATION: Primary | ICD-10-CM

## 2024-08-02 PROCEDURE — 96374 THER/PROPH/DIAG INJ IV PUSH: CPT

## 2024-08-02 PROCEDURE — 96361 HYDRATE IV INFUSION ADD-ON: CPT

## 2024-08-02 RX ADMIN — SODIUM CHLORIDE 1000 ML: 0.9 INJECTION, SOLUTION INTRAVENOUS at 14:10

## 2024-08-02 RX ADMIN — ONDANSETRON 8 MG: 2 INJECTION INTRAMUSCULAR; INTRAVENOUS at 14:42

## 2024-08-02 NOTE — PROGRESS NOTES
Pt here for hydration, states she is still having nausea.  Right port accessed with positive blood return noted.  Tolerated infusion without incident.  Port flushed and de-accessed.  AVS declined.  Next appt is 8/7 @ 130 pm. Walked out in stable condition.     room air

## 2024-08-07 ENCOUNTER — HOSPITAL ENCOUNTER (OUTPATIENT)
Dept: INFUSION CENTER | Facility: CLINIC | Age: 34
Discharge: HOME | End: 2024-08-07
Payer: COMMERCIAL

## 2024-08-07 VITALS
HEART RATE: 95 BPM | DIASTOLIC BLOOD PRESSURE: 79 MMHG | RESPIRATION RATE: 18 BRPM | SYSTOLIC BLOOD PRESSURE: 123 MMHG | TEMPERATURE: 96 F

## 2024-08-07 DIAGNOSIS — E86.0 DEHYDRATION: Primary | ICD-10-CM

## 2024-08-07 PROCEDURE — 96361 HYDRATE IV INFUSION ADD-ON: CPT

## 2024-08-07 PROCEDURE — 96374 THER/PROPH/DIAG INJ IV PUSH: CPT

## 2024-08-07 RX ADMIN — ONDANSETRON 8 MG: 2 INJECTION INTRAMUSCULAR; INTRAVENOUS at 13:56

## 2024-08-07 RX ADMIN — SODIUM CHLORIDE 1000 ML: 0.9 INJECTION, SOLUTION INTRAVENOUS at 13:56

## 2024-08-09 ENCOUNTER — TELEPHONE (OUTPATIENT)
Dept: FAMILY MEDICINE CLINIC | Facility: CLINIC | Age: 34
End: 2024-08-09

## 2024-08-09 NOTE — TELEPHONE ENCOUNTER
Spoke with patient who reports having US done this year and everything looked good she will continue to check annually.      ----- Message from JOSE Parker sent at 8/8/2024  6:32 PM EDT -----  Pt is due for pelvic US follow up from last year, does she want to talk with obgyn about a repeat needed?  ----- Message -----  From: Chelo Wakefield  Sent: 8/7/2024   5:43 PM EDT  To: JOSE Stover        Dear Provider,    Our records indicate that your patient was recommended to have a follow up exam based on a prior imaging study.    This letter is to notify you that your patient has not returned to a [facility]'s site for the recommended study. Enclosed, please find a copy of the patient report for your review. The Radiologist's recommendation can be found in the impression section at the bottom of the report.     If you have further questions, please feel free to contact us at [phone]. If you need assistance in making a scheduled appointment for your patient, please contact Central Scheduling at [phone].    Thank You,      Carlo Gallegos M.D.  , Department of Radiology

## 2024-08-14 ENCOUNTER — HOSPITAL ENCOUNTER (OUTPATIENT)
Dept: INFUSION CENTER | Facility: CLINIC | Age: 34
Discharge: HOME | End: 2024-08-14
Payer: COMMERCIAL

## 2024-08-14 VITALS
SYSTOLIC BLOOD PRESSURE: 120 MMHG | TEMPERATURE: 99 F | RESPIRATION RATE: 18 BRPM | HEART RATE: 94 BPM | DIASTOLIC BLOOD PRESSURE: 82 MMHG

## 2024-08-14 DIAGNOSIS — E86.0 DEHYDRATION: Primary | ICD-10-CM

## 2024-08-14 PROCEDURE — 96365 THER/PROPH/DIAG IV INF INIT: CPT

## 2024-08-14 PROCEDURE — 96361 HYDRATE IV INFUSION ADD-ON: CPT

## 2024-08-14 RX ADMIN — SODIUM CHLORIDE 1000 ML: 0.9 INJECTION, SOLUTION INTRAVENOUS at 15:48

## 2024-08-14 RX ADMIN — ONDANSETRON 8 MG: 2 INJECTION INTRAMUSCULAR; INTRAVENOUS at 15:28

## 2024-08-14 NOTE — PROGRESS NOTES
Patient presents to clinic for hydration and zofran. Port a cath accessed with blood return and no difficulties. Tolerating infusion well. AVS declined.   Reviewed next appointment for 8/16/24 at 2pm.   Report given to Tamiko CULVER RN.

## 2024-08-14 NOTE — PROGRESS NOTES
Pt tolerated remainder of infusion without complications. Aware of next appointment on 8/16/24 at 2pm. Port de-accessed. AVS declined.

## 2024-08-16 ENCOUNTER — HOSPITAL ENCOUNTER (OUTPATIENT)
Dept: INFUSION CENTER | Facility: CLINIC | Age: 34
End: 2024-08-16
Payer: COMMERCIAL

## 2024-08-16 DIAGNOSIS — E86.0 DEHYDRATION: Primary | ICD-10-CM

## 2024-08-16 PROCEDURE — 96360 HYDRATION IV INFUSION INIT: CPT

## 2024-08-16 RX ADMIN — SODIUM CHLORIDE 1000 ML: 0.9 INJECTION, SOLUTION INTRAVENOUS at 14:27

## 2024-08-16 NOTE — PROGRESS NOTES
Pt presents for IV fluids offering no complaints. Pt tolerated fluids well. AVS declined, next appointment 8/21 3:30pm.

## 2024-08-21 ENCOUNTER — HOSPITAL ENCOUNTER (OUTPATIENT)
Dept: INFUSION CENTER | Facility: CLINIC | Age: 34
Discharge: HOME | End: 2024-08-21
Payer: COMMERCIAL

## 2024-08-21 VITALS
SYSTOLIC BLOOD PRESSURE: 122 MMHG | RESPIRATION RATE: 16 BRPM | DIASTOLIC BLOOD PRESSURE: 69 MMHG | TEMPERATURE: 96.8 F | HEART RATE: 83 BPM

## 2024-08-21 DIAGNOSIS — E86.0 DEHYDRATION: Primary | ICD-10-CM

## 2024-08-21 PROCEDURE — 96361 HYDRATE IV INFUSION ADD-ON: CPT

## 2024-08-21 PROCEDURE — 96374 THER/PROPH/DIAG INJ IV PUSH: CPT

## 2024-08-21 RX ADMIN — ONDANSETRON 8 MG: 2 INJECTION INTRAMUSCULAR; INTRAVENOUS at 15:38

## 2024-08-21 RX ADMIN — SODIUM CHLORIDE 1000 ML: 0.9 INJECTION, SOLUTION INTRAVENOUS at 15:38

## 2024-08-21 NOTE — PROGRESS NOTES
Patient tolerated infusion without incident.  Port flushed and de-accessed.  AVS declined.  Next appt 8/23 @ 3pm.  Walked out in stable condition.

## 2024-08-21 NOTE — PROGRESS NOTES
Pt here for hydration, offering no complaints. PAC accessed with positive blood return noted. Tolerating infusion thus far. Next appt 8/23 3pm. Report given to AWILDA Morrison.

## 2024-08-23 ENCOUNTER — HOSPITAL ENCOUNTER (OUTPATIENT)
Dept: INFUSION CENTER | Facility: CLINIC | Age: 34
End: 2024-08-23
Payer: COMMERCIAL

## 2024-08-23 VITALS
RESPIRATION RATE: 18 BRPM | SYSTOLIC BLOOD PRESSURE: 112 MMHG | HEART RATE: 84 BPM | TEMPERATURE: 98.6 F | DIASTOLIC BLOOD PRESSURE: 75 MMHG

## 2024-08-23 DIAGNOSIS — E86.0 DEHYDRATION: Primary | ICD-10-CM

## 2024-08-23 PROCEDURE — 96374 THER/PROPH/DIAG INJ IV PUSH: CPT

## 2024-08-23 PROCEDURE — 96361 HYDRATE IV INFUSION ADD-ON: CPT

## 2024-08-23 RX ADMIN — SODIUM CHLORIDE 1000 ML: 0.9 INJECTION, SOLUTION INTRAVENOUS at 15:09

## 2024-08-23 RX ADMIN — ONDANSETRON 8 MG: 2 INJECTION INTRAMUSCULAR; INTRAVENOUS at 15:13

## 2024-08-23 NOTE — PROGRESS NOTES
Jillian Ch  tolerated treatment well with no complications.      Jillian Ch is aware of future appt on 8/28 at 1430.     AVS   No (Declined by Jillian Ch) d/c from unit stable

## 2024-08-27 ENCOUNTER — TELEPHONE (OUTPATIENT)
Dept: HEMATOLOGY ONCOLOGY | Facility: CLINIC | Age: 34
End: 2024-08-27

## 2024-08-27 DIAGNOSIS — D50.8 IRON DEFICIENCY ANEMIA SECONDARY TO INADEQUATE DIETARY IRON INTAKE: Primary | ICD-10-CM

## 2024-08-27 NOTE — TELEPHONE ENCOUNTER
Informed patient to stop her anticoagulation and provider would like to see her mid to late September with   Labs completed at that time. Our office will call back with a date and time. Patient prefers mornings.

## 2024-08-27 NOTE — TELEPHONE ENCOUNTER
Please advise patient to stop her anticoagulation  She needs an appointment in mid to late September with a CBC, CMP and iron studies    Please schedule & let her know    Thanks!

## 2024-08-27 NOTE — TELEPHONE ENCOUNTER
----- Message from Nemo Khalil DO sent at 8/27/2024  1:10 PM EDT -----  Regarding: RE: appt and blood thinners  She can stop her anticoagulation.  I need to see her in mid to late sept with a cbc, cmp and iron studies prior.  I will put in the order for the labs.  ----- Message -----  From: Claire Leong RN  Sent: 8/27/2024   8:41 AM EDT  To: Nemo Khalil DO  Subject: appt and blood thinners                          Jillian is about 6 weeks out from giving birth. She would like to know when to stop the blood thinners. Also, when would you like to see her back for an appointment?    Let me know!  Claire

## 2024-08-28 ENCOUNTER — HOSPITAL ENCOUNTER (OUTPATIENT)
Dept: INFUSION CENTER | Facility: CLINIC | Age: 34
Discharge: HOME | End: 2024-08-28
Payer: COMMERCIAL

## 2024-08-28 VITALS
RESPIRATION RATE: 20 BRPM | SYSTOLIC BLOOD PRESSURE: 131 MMHG | TEMPERATURE: 98.6 F | DIASTOLIC BLOOD PRESSURE: 81 MMHG | HEART RATE: 96 BPM

## 2024-08-28 DIAGNOSIS — E86.0 DEHYDRATION: Primary | ICD-10-CM

## 2024-08-28 PROCEDURE — 96360 HYDRATION IV INFUSION INIT: CPT

## 2024-08-28 RX ADMIN — SODIUM CHLORIDE 1000 ML: 0.9 INJECTION, SOLUTION INTRAVENOUS at 14:39

## 2024-08-28 NOTE — PROGRESS NOTES
Pt presents for IV fluids offering no complaints. Port accessed with positive blood return. PT tolerated fluids well. Port de accessed. AVS declined, next appointment 8/30 at 2:00 pm.

## 2024-08-29 ENCOUNTER — TELEPHONE (OUTPATIENT)
Dept: HEMATOLOGY ONCOLOGY | Facility: CLINIC | Age: 34
End: 2024-08-29

## 2024-08-29 NOTE — TELEPHONE ENCOUNTER
Called patient to schedule a FU APPT on 9/27 at 1140 am.  Appt date and time per Yoanna.Patient confirmed she can do that date and time.

## 2024-08-30 ENCOUNTER — HOSPITAL ENCOUNTER (OUTPATIENT)
Dept: INFUSION CENTER | Facility: CLINIC | Age: 34
End: 2024-08-30
Payer: COMMERCIAL

## 2024-08-30 VITALS
HEART RATE: 78 BPM | RESPIRATION RATE: 18 BRPM | SYSTOLIC BLOOD PRESSURE: 114 MMHG | DIASTOLIC BLOOD PRESSURE: 68 MMHG | TEMPERATURE: 98.9 F

## 2024-08-30 DIAGNOSIS — E86.0 DEHYDRATION: Primary | ICD-10-CM

## 2024-08-30 PROCEDURE — 96374 THER/PROPH/DIAG INJ IV PUSH: CPT

## 2024-08-30 PROCEDURE — 96361 HYDRATE IV INFUSION ADD-ON: CPT

## 2024-08-30 RX ADMIN — SODIUM CHLORIDE 1000 ML: 0.9 INJECTION, SOLUTION INTRAVENOUS at 14:25

## 2024-08-30 RX ADMIN — ONDANSETRON 8 MG: 2 INJECTION INTRAMUSCULAR; INTRAVENOUS at 14:09

## 2024-08-30 NOTE — PROGRESS NOTES
Patient tolerated Hydration infusion without incident. Pt is aware of all future appointments. Declined AVS.

## 2024-09-03 ENCOUNTER — APPOINTMENT (EMERGENCY)
Dept: VASCULAR ULTRASOUND | Facility: HOSPITAL | Age: 34
End: 2024-09-03
Payer: COMMERCIAL

## 2024-09-03 ENCOUNTER — HOSPITAL ENCOUNTER (EMERGENCY)
Facility: HOSPITAL | Age: 34
Discharge: HOME | End: 2024-09-03
Attending: EMERGENCY MEDICINE
Payer: COMMERCIAL

## 2024-09-03 ENCOUNTER — TELEPHONE (OUTPATIENT)
Dept: FAMILY MEDICINE CLINIC | Facility: CLINIC | Age: 34
End: 2024-09-03

## 2024-09-03 ENCOUNTER — NURSE TRIAGE (OUTPATIENT)
Age: 34
End: 2024-09-03

## 2024-09-03 VITALS
RESPIRATION RATE: 16 BRPM | HEART RATE: 75 BPM | OXYGEN SATURATION: 99 % | SYSTOLIC BLOOD PRESSURE: 113 MMHG | TEMPERATURE: 98.3 F | DIASTOLIC BLOOD PRESSURE: 67 MMHG

## 2024-09-03 DIAGNOSIS — M79.662 PAIN OF LEFT CALF: Primary | ICD-10-CM

## 2024-09-03 LAB
ANION GAP SERPL CALCULATED.3IONS-SCNC: 10 MMOL/L (ref 4–13)
BASOPHILS # BLD AUTO: 0.03 THOUSANDS/ÂΜL (ref 0–0.1)
BASOPHILS NFR BLD AUTO: 0 % (ref 0–1)
BUN SERPL-MCNC: 12 MG/DL (ref 5–25)
CALCIUM SERPL-MCNC: 9.3 MG/DL (ref 8.4–10.2)
CHLORIDE SERPL-SCNC: 105 MMOL/L (ref 96–108)
CO2 SERPL-SCNC: 23 MMOL/L (ref 21–32)
CREAT SERPL-MCNC: 0.72 MG/DL (ref 0.6–1.3)
EOSINOPHIL # BLD AUTO: 0.38 THOUSAND/ÂΜL (ref 0–0.61)
EOSINOPHIL NFR BLD AUTO: 5 % (ref 0–6)
ERYTHROCYTE [DISTWIDTH] IN BLOOD BY AUTOMATED COUNT: 12.5 % (ref 11.6–15.1)
GFR SERPL CREATININE-BSD FRML MDRD: 108 ML/MIN/1.73SQ M
GLUCOSE SERPL-MCNC: 83 MG/DL (ref 65–140)
HCT VFR BLD AUTO: 41 % (ref 34.8–46.1)
HGB BLD-MCNC: 13 G/DL (ref 11.5–15.4)
IMM GRANULOCYTES # BLD AUTO: 0.02 THOUSAND/UL (ref 0–0.2)
IMM GRANULOCYTES NFR BLD AUTO: 0 % (ref 0–2)
LYMPHOCYTES # BLD AUTO: 1.6 THOUSANDS/ÂΜL (ref 0.6–4.47)
LYMPHOCYTES NFR BLD AUTO: 21 % (ref 14–44)
MCH RBC QN AUTO: 29.1 PG (ref 26.8–34.3)
MCHC RBC AUTO-ENTMCNC: 31.7 G/DL (ref 31.4–37.4)
MCV RBC AUTO: 92 FL (ref 82–98)
MONOCYTES # BLD AUTO: 0.7 THOUSAND/ÂΜL (ref 0.17–1.22)
MONOCYTES NFR BLD AUTO: 9 % (ref 4–12)
NEUTROPHILS # BLD AUTO: 5.07 THOUSANDS/ÂΜL (ref 1.85–7.62)
NEUTS SEG NFR BLD AUTO: 65 % (ref 43–75)
NRBC BLD AUTO-RTO: 0 /100 WBCS
PLATELET # BLD AUTO: 189 THOUSANDS/UL (ref 149–390)
PMV BLD AUTO: 11 FL (ref 8.9–12.7)
POTASSIUM SERPL-SCNC: 3.5 MMOL/L (ref 3.5–5.3)
RBC # BLD AUTO: 4.47 MILLION/UL (ref 3.81–5.12)
SODIUM SERPL-SCNC: 138 MMOL/L (ref 135–147)
WBC # BLD AUTO: 7.8 THOUSAND/UL (ref 4.31–10.16)

## 2024-09-03 PROCEDURE — 85025 COMPLETE CBC W/AUTO DIFF WBC: CPT | Performed by: EMERGENCY MEDICINE

## 2024-09-03 PROCEDURE — 93005 ELECTROCARDIOGRAM TRACING: CPT

## 2024-09-03 PROCEDURE — 96360 HYDRATION IV INFUSION INIT: CPT

## 2024-09-03 PROCEDURE — 93971 EXTREMITY STUDY: CPT

## 2024-09-03 PROCEDURE — 99284 EMERGENCY DEPT VISIT MOD MDM: CPT | Performed by: EMERGENCY MEDICINE

## 2024-09-03 PROCEDURE — 36415 COLL VENOUS BLD VENIPUNCTURE: CPT | Performed by: EMERGENCY MEDICINE

## 2024-09-03 PROCEDURE — 93971 EXTREMITY STUDY: CPT | Performed by: SURGERY

## 2024-09-03 PROCEDURE — 99283 EMERGENCY DEPT VISIT LOW MDM: CPT

## 2024-09-03 PROCEDURE — 80048 BASIC METABOLIC PNL TOTAL CA: CPT | Performed by: EMERGENCY MEDICINE

## 2024-09-03 RX ORDER — POTASSIUM CHLORIDE 1500 MG/1
40 TABLET, EXTENDED RELEASE ORAL ONCE
Status: COMPLETED | OUTPATIENT
Start: 2024-09-03 | End: 2024-09-03

## 2024-09-03 RX ADMIN — SODIUM CHLORIDE 1000 ML: 0.9 INJECTION, SOLUTION INTRAVENOUS at 18:48

## 2024-09-03 RX ADMIN — POTASSIUM CHLORIDE 40 MEQ: 1500 TABLET, EXTENDED RELEASE ORAL at 19:32

## 2024-09-03 NOTE — TELEPHONE ENCOUNTER
"Pt stated Oncology Provider:   Dr Khalil    Actionable item:  FYI- Pt going to ED for evaluation    What is the reason for the call/chief complaint?    Patient c/o dizziness and lightheadedness with low fluid intake. Stated she started feeling like this a couple days ago and has been unable to drink any water. PT saw patient today and HR was 110-120. Also noticed cramping in her L leg that started last night. Denies any redness or swelling in that leg, no hot feeling in extremity, or pain, but stated this is how her last DVT presented as well.     Advised patient to be evaluated in the ED. Stated her  should be back home around 3PM and will be going then, but will try to see if someone can take her sooner. Pt will be going to [place] ED and placed on tracking board.     Reason for Disposition  • History of prior 'blood clot' in leg or lungs (i.e., deep vein thrombosis, pulmonary embolism)    Answer Assessment - Initial Assessment Questions  1. ONSET: \"When did the pain start?\"       A few days ago, feel cramping in leg with no swelling or redness  2. LOCATION: \"Where is the pain located?\"       L leg  4. 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 OTHER SYMPTOMS: \"Do you have any other symptoms?\" (e.g., chest pain, back pain, breathing difficulty, swelling, rash, fever, numbness, weakness)      High HR, unable to drink water, dizziness, lightheadedness    Protocols used: Leg Pain-ADULT-[place]    "

## 2024-09-03 NOTE — ED PROVIDER NOTES
"History  Chief Complaint   Patient presents with    Leg Pain     LLE pain, recently d/c blood thinners and feels that she should be back on them,, hx dvt, states intermittent SOB     33-year-old female history of DVT and breast cancer presenting with left leg pain.  Patient reports left calf pain beginning couple days ago.  Reports occasional cramping pain.  Otherwise patient reports that she feels \"off\".  Denies any chest pain shortness of breath.  Denies any abdominal pain nausea vomiting diarrhea.  Denies any leg swelling.  Reports that she was found to have a DVT after mastectomy surgery earlier in the year and has been on Lovenox due to pregnancy up until a couple months ago.  Not currently on anticoagulation.  Denies any other complaints.  Chart reviewed.    Past Medical History:  No date: Anesthesia complication      Comment:  gait dysfunction/ urinary retention after nerve block,  No date: Anxiety  No date: Asthma  No date: CRPS (complex regional pain syndrome), upper limb      Comment:  left chest/arm/hand  2024: Deep vein thrombosis (HCC)      Comment:  post op from mastectomy  No date: GERD (gastroesophageal reflux disease)  No date: Heart murmur  : HPV (human papilloma virus) infection      Comment:  unsure of 16, 18, or other  : Invasive ductal carcinoma of breast, female, left (HCC)  No date: Kidney stone  3/15/2015: Miscarriage      Comment:  7 weeks along.  No date: Ovarian cyst      Comment:  Left  2024: PONV (postoperative nausea and vomiting)  Family History: non-contributory  Social History          Prior to Admission Medications   Prescriptions Last Dose Informant Patient Reported? Taking?   Prenatal Multivit-Min-Fe-FA (PRE- PO)  Self Yes No   Sig: Take 1 tablet by mouth in the morning   acetaminophen (TYLENOL) 325 mg tablet   No No   Sig: Take 2 tablets (650 mg total) by mouth every 4 (four) hours as needed for mild pain   albuterol (PROVENTIL HFA,VENTOLIN HFA) 90 " mcg/act inhaler   No No   Sig: Inhale 2 puffs every 6 (six) hours as needed for wheezing   benzocaine-menthol-lanolin-aloe (DERMOPLAST) 20-0.5 % topical spray   No No   Sig: Apply 1 Application topically every 6 (six) hours as needed for mild pain or irritation   cetirizine (ZyrTEC) 10 mg tablet   No No   Sig: TAKE 1 TABLET BY MOUTH EVERY DAY   enoxaparin (LOVENOX) 80 mg/0.8 mL   No No   Sig: EXPEL 5 MG (0.05 ML) FROM SYRINGE AND DISCARD, THEN INJECT 75 MG (0.75 ML) UNDER THE SKIN EVERY 12 HOURS   hydrocortisone 1 % cream   No No   Sig: Apply 1 Application topically daily as needed for irritation or rash   omeprazole (PriLOSEC) 40 MG capsule  Self No No   Sig: Take 1 capsule (40 mg total) by mouth daily   ondansetron (ZOFRAN) 8 mg tablet  Self No No   Sig: Take 1 tablet (8 mg total) by mouth every 8 (eight) hours as needed for nausea or vomiting   witch hazel-glycerin (TUCKS) topical pad   No No   Sig: Apply 1 Pad topically every 4 (four) hours as needed for irritation      Facility-Administered Medications: None       Past Medical History:   Diagnosis Date    Anesthesia complication     gait dysfunction/ urinary retention after nerve block,    Anxiety     Asthma     CRPS (complex regional pain syndrome), upper limb     left chest/arm/hand    Deep vein thrombosis (HCC) 01/05/2024    post op from mastectomy    GERD (gastroesophageal reflux disease)     Heart murmur     HPV (human papilloma virus) infection 2012    unsure of 16, 18, or other    Invasive ductal carcinoma of breast, female, left (HCC) 2023    Kidney stone     Miscarriage 3/15/2015    7 weeks along.    Ovarian cyst     Left    PONV (postoperative nausea and vomiting) 01/02/2024       Past Surgical History:   Procedure Laterality Date    COLPOSCOPY  2012    HPV    EGD      IR PORT PLACEMENT  2/7/2024    IR UPPER EXTREMITY VENOGRAM- DIAGNOSTIC  05/28/2021    MA BX/EXC LYMPH NODE OPEN SUPERFICIAL Left 01/02/2024    Procedure: LEFT BREAST MASTECTOMY,  LYMPHATIC MAPPING WITH RADIOACTIVE DYE, SENTINEL LYMPH NODE BIOPSY, ZAHEER LEFT BREAST, INJECTION IN OR AT 0800 BY DR CORNELL;  Surgeon: Elena Cornell MD;  Location: [facility];  Service: Surgical Oncology    DE EXCISION 1ST &/CERVICAL RIB Left 08/12/2021    Procedure: FIRST RIB RESECTION;  Surgeon: Jonathan Schaffer MD;  Location: [facility];  Service: Thoracic    DE INJ RADIOACTIVE TRACER FOR ID OF SENTINEL NODE Left 01/02/2024    Procedure: LEFT BREAST MASTECTOMY, LYMPHATIC MAPPING WITH RADIOACTIVE DYE, SENTINEL LYMPH NODE BIOPSY, ZAHEER LEFT BREAST, INJECTION IN OR AT 0800 BY DR CORNELL;  Surgeon: Elena Cornell MD;  Location: [facility];  Service: Surgical Oncology    DE INTRAOP SENTINEL LYMPH NODE ID W/DYE INJECTION Left 01/02/2024    Procedure: LEFT BREAST MASTECTOMY, LYMPHATIC MAPPING WITH RADIOACTIVE DYE, SENTINEL LYMPH NODE BIOPSY, ZAHEER LEFT BREAST, INJECTION IN OR AT 0800 BY DR CORNELL;  Surgeon: Elena Cornell MD;  [facility];  Service: Surgical Oncology    DE MASTECTOMY SIMPLE COMPLETE Left 01/02/2024    Procedure: LEFT BREAST MASTECTOMY, LYMPHATIC MAPPING WITH RADIOACTIVE DYE, SENTINEL LYMPH NODE BIOPSY, ZAHEER LEFT BREAST, INJECTION IN OR AT 0800 BY DR CORNELL;  Surgeon: Elena Cornell MD;  [facility];  Service: Surgical Oncology    THORACOSCOPY VIDEO ASSISTED SURGERY (VATS) Left 08/12/2021    Procedure: THORACOSCOPY VIDEO ASSISTED SURGERY (VATS);  Surgeon: Jonathan Schaffer MD;  [facility]: [facility];  Service: Thoracic    US GUIDANCE BREAST BIOPSY LEFT EACH ADDITIONAL Left 12/02/2023    US GUIDED BREAST BIOPSY RIGHT COMPLETE Right 12/02/2023    WISDOM TOOTH EXTRACTION  2012       Family History   Problem Relation Age of Onset    Heart disease Mother     Miscarriages / Stillbirths Mother     Coronary artery disease Mother     No Known Problems Father     No Known Problems Half-Brother     Bone cancer Maternal Grandmother         hilda to liver and  spine    Miscarriages / Stillbirths Half-Sister     Breast cancer Neg Hx     Ovarian cancer Neg Hx     Uterine cancer Neg Hx     Cervical cancer Neg Hx      I have reviewed and agree with the history as documented.    E-Cigarette/Vaping    E-Cigarette Use Never User      E-Cigarette/Vaping Substances    Nicotine No     THC No     CBD No     Flavoring No     Other No     Unknown No      Social History     Tobacco Use    Smoking status: Never    Smokeless tobacco: Never   Vaping Use    Vaping status: Never Used   Substance Use Topics    Alcohol use: Not Currently     Comment: social    Drug use: Never       Review of Systems   Constitutional:  Negative for appetite change, chills, diaphoresis, fever and unexpected weight change.   HENT:  Negative for congestion and rhinorrhea.    Eyes:  Negative for photophobia and visual disturbance.   Respiratory:  Negative for cough, chest tightness and shortness of breath.    Cardiovascular:  Negative for chest pain, palpitations and leg swelling.   Gastrointestinal:  Negative for abdominal distention, abdominal pain, blood in stool, constipation, diarrhea, nausea and vomiting.   Genitourinary:  Negative for dysuria and hematuria.   Musculoskeletal:  Positive for myalgias. Negative for back pain, joint swelling, neck pain and neck stiffness.   Skin:  Negative for color change, pallor, rash and wound.   Neurological:  Negative for dizziness, syncope, weakness, light-headedness and headaches.   Psychiatric/Behavioral:  Negative for agitation.    All other systems reviewed and are negative.      Physical Exam  Physical Exam  Vitals and nursing note reviewed.   Constitutional:       General: She is not in acute distress.     Appearance: Normal appearance. She is well-developed. She is not ill-appearing, toxic-appearing or diaphoretic.   HENT:      Head: Normocephalic and atraumatic.      Nose: Nose normal. No congestion or rhinorrhea.      Mouth/Throat:      Mouth: Mucous membranes  are moist.      Pharynx: Oropharynx is clear. No oropharyngeal exudate or posterior oropharyngeal erythema.   Eyes:      General: No scleral icterus.        Right eye: No discharge.         Left eye: No discharge.      Extraocular Movements: Extraocular movements intact.      Conjunctiva/sclera: Conjunctivae normal.      Pupils: Pupils are equal, round, and reactive to light.   Neck:      Vascular: No JVD.      Trachea: No tracheal deviation.      Comments: Supple. Normal range of motion.   Cardiovascular:      Rate and Rhythm: Normal rate and regular rhythm.      Heart sounds: Normal heart sounds. No murmur heard.     No friction rub. No gallop.      Comments: Normal rate and regular rhythm  Pulmonary:      Effort: Pulmonary effort is normal. No respiratory distress.      Breath sounds: Normal breath sounds. No stridor. No wheezing or rales.      Comments: Clear to auscultation bilaterally  Chest:      Chest wall: No tenderness.   Abdominal:      General: Bowel sounds are normal. There is no distension.      Palpations: Abdomen is soft.      Tenderness: There is no abdominal tenderness. There is no right CVA tenderness, left CVA tenderness, guarding or rebound.      Comments: Soft, nontender, nondistended.  Normal bowel sounds throughout   Musculoskeletal:         General: No swelling, tenderness, deformity or signs of injury. Normal range of motion.      Cervical back: Normal range of motion and neck supple. No rigidity. No muscular tenderness.      Right lower leg: No edema.      Left lower leg: No edema.      Comments: No calf swelling or tenderness.  2+ DP PT pulses   Lymphadenopathy:      Cervical: No cervical adenopathy.   Skin:     General: Skin is warm and dry.      Coloration: Skin is not pale.      Findings: No erythema or rash.   Neurological:      General: No focal deficit present.      Mental Status: She is alert. Mental status is at baseline.      Sensory: No sensory deficit.      Motor: No weakness  or abnormal muscle tone.      Coordination: Coordination normal.      Gait: Gait normal.      Comments: Alert.  Strength and sensation grossly intact.  Ambulatory without difficulty at baseline.    Psychiatric:         Behavior: Behavior normal.         Thought Content: Thought content normal.         Vital Signs  ED Triage Vitals [09/03/24 1826]   Temperature Pulse Respirations Blood Pressure SpO2   98.3 °F (36.8 °C) 90 18 119/78 100 %      Temp Source Heart Rate Source Patient Position - Orthostatic VS BP Location FiO2 (%)   Oral Monitor Sitting Left arm --      Pain Score       --           Vitals:    09/03/24 1826 09/03/24 2000   BP: 119/78 116/69   Pulse: 90 77   Patient Position - Orthostatic VS: Sitting Lying         Visual Acuity      ED Medications  Medications   sodium chloride 0.9 % bolus 1,000 mL (0 mL Intravenous Stopped 9/3/24 1959)   potassium chloride (Klor-Con M20) CR tablet 40 mEq (40 mEq Oral Given 9/3/24 1932)       Diagnostic Studies  Results Reviewed       Procedure Component Value Units Date/Time    Basic metabolic panel [[id number]] Collected: 09/03/24 1848    Lab Status: Final result Specimen: Blood from Arm, Right Updated: 09/03/24 1907     Sodium 138 mmol/L      Potassium 3.5 mmol/L      Chloride 105 mmol/L      CO2 23 mmol/L      ANION GAP 10 mmol/L      BUN 12 mg/dL      Creatinine 0.72 mg/dL      Glucose 83 mg/dL      Calcium 9.3 mg/dL      eGFR 108 ml/min/1.73sq m     Narrative:      National Kidney Disease Foundation guidelines for Chronic Kidney Disease (CKD):     Stage 1 with normal or high GFR (GFR > 90 mL/min/1.73 square meters)    Stage 2 Mild CKD (GFR = 60-89 mL/min/1.73 square meters)    Stage 3A Moderate CKD (GFR = 45-59 mL/min/1.73 square meters)    Stage 3B Moderate CKD (GFR = 30-44 mL/min/1.73 square meters)    Stage 4 Severe CKD (GFR = 15-29 mL/min/1.73 square meters)    Stage 5 End Stage CKD (GFR <15 mL/min/1.73 square meters)  Note: GFR calculation is accurate only with a  steady state creatinine    CBC and differential [[id number]] Collected: 09/03/24 1848    Lab Status: Final result Specimen: Blood from Arm, Right Updated: 09/03/24 1853     WBC 7.80 Thousand/uL      RBC 4.47 Million/uL      Hemoglobin 13.0 g/dL      Hematocrit 41.0 %      MCV 92 fL      MCH 29.1 pg      MCHC 31.7 g/dL      RDW 12.5 %      MPV 11.0 fL      Platelets 189 Thousands/uL      nRBC 0 /100 WBCs      Segmented % 65 %      Immature Grans % 0 %      Lymphocytes % 21 %      Monocytes % 9 %      Eosinophils Relative 5 %      Basophils Relative 0 %      Absolute Neutrophils 5.07 Thousands/µL      Absolute Immature Grans 0.02 Thousand/uL      Absolute Lymphocytes 1.60 Thousands/µL      Absolute Monocytes 0.70 Thousand/µL      Eosinophils Absolute 0.38 Thousand/µL      Basophils Absolute 0.03 Thousands/µL                    VAS lower limb venous duplex study, unilateral/limited    (Results Pending)              Procedures  Procedures         ED Course                                 SBIRT 20yo+      Flowsheet Row Most Recent Value   Initial Alcohol Screen: US AUDIT-C     1. How often do you have a drink containing alcohol? 0 Filed at: 09/03/2024 1827   2. How many drinks containing alcohol do you have on a typical day you are drinking?  0 Filed at: 09/03/2024 1827   3b. FEMALE Any Age, or MALE 65+: How often do you have 4 or more drinks on one occassion? 0 Filed at: 09/03/2024 1827   Audit-C Score 0 Filed at: 09/03/2024 1827   PETRA: How many times in the past year have you...    Used an illegal drug or used a prescription medication for non-medical reasons? Never Filed at: 09/03/2024 1827                      Medical Decision Making  33-year-old female history of DVT and breast cancer presenting with left leg pain.  Plan for duplex study.  Plan for basic labs and IV fluids.  Reassess.    Duplex negative per vascular tech.  Labs interpreted by me with borderline potassium at 3.5.  Possibly contributing so will  replete with p.o. potassium.  Suspect musculoskeletal etiology. Discussed results and recommendations. Advised follow up PCP. Medication recommendations. Given instructions and return precautions. Patient/family at bedside acknowledged understanding of all written and verbal instructions and return precautions. Discharged.     Amount and/or Complexity of Data Reviewed  Labs: ordered.    Risk  Prescription drug management.                 Disposition  Final diagnoses:   Pain of left calf     Time reflects when diagnosis was documented in both MDM as applicable and the Disposition within this note       Time User Action Codes Description Comment    9/3/2024  6:53 PM Agustín Small Add [M79.662] Pain of left calf           ED Disposition       ED Disposition   Discharge    Condition   Stable    Date/Time   Tue Sep 3, 2024 1939    Comment   Jillian Arellano Ch discharge to home/self care.                   Follow-up Information       Follow up With Specialties Details Why Contact Info    [facility], Nurse Practitioner Schedule an appointment as soon as possible for a visit in 1 week  [address] [place] [address]  [phone]              Patient's Medications   Discharge Prescriptions    No medications on file       No discharge procedures on file.    PDMP Review         Value Time User    PDMP Reviewed  Yes 6/20/2022 11:59 AM [facility]            ED Provider  Electronically Signed by             Agustín Small MD  09/03/24 2031

## 2024-09-03 NOTE — TELEPHONE ENCOUNTER
Finished the blood thinners about a week ago and she still isn't feeling any better. Tired lightheaded not very hungry. She is also feeling pain in her left leg. Heart rate is elavated between 110 and 120  that is constant even when sitting. Would you want to see her today she could come in this afternoon?

## 2024-09-04 ENCOUNTER — HOSPITAL ENCOUNTER (OUTPATIENT)
Dept: INFUSION CENTER | Facility: CLINIC | Age: 34
Discharge: HOME | End: 2024-09-04
Payer: COMMERCIAL

## 2024-09-04 VITALS
TEMPERATURE: 97.1 F | HEART RATE: 108 BPM | SYSTOLIC BLOOD PRESSURE: 120 MMHG | RESPIRATION RATE: 18 BRPM | DIASTOLIC BLOOD PRESSURE: 72 MMHG

## 2024-09-04 DIAGNOSIS — E86.0 DEHYDRATION: Primary | ICD-10-CM

## 2024-09-04 LAB
ATRIAL RATE: 78 BPM
P AXIS: 0 DEGREES
PR INTERVAL: 144 MS
QRS AXIS: 66 DEGREES
QRSD INTERVAL: 68 MS
QT INTERVAL: 344 MS
QTC INTERVAL: 392 MS
T WAVE AXIS: 32 DEGREES
VENTRICULAR RATE: 78 BPM

## 2024-09-04 PROCEDURE — 93010 ELECTROCARDIOGRAM REPORT: CPT | Performed by: INTERNAL MEDICINE

## 2024-09-04 PROCEDURE — 96374 THER/PROPH/DIAG INJ IV PUSH: CPT

## 2024-09-04 PROCEDURE — 96361 HYDRATE IV INFUSION ADD-ON: CPT

## 2024-09-04 RX ADMIN — ONDANSETRON 8 MG: 2 INJECTION INTRAMUSCULAR; INTRAVENOUS at 14:53

## 2024-09-04 RX ADMIN — SODIUM CHLORIDE 1000 ML: 0.9 INJECTION, SOLUTION INTRAVENOUS at 14:47

## 2024-09-04 NOTE — PROGRESS NOTES
Jillian Ch  tolerated treatment well with no complications.      Jillian Ch is aware of future appt on 9/6 at 1530.     AVS   No (Declined by Jillian Ch) d/c from unit stable

## 2024-09-06 ENCOUNTER — HOSPITAL ENCOUNTER (OUTPATIENT)
Dept: INFUSION CENTER | Facility: CLINIC | Age: 34
End: 2024-09-06
Payer: COMMERCIAL

## 2024-09-06 VITALS
TEMPERATURE: 97 F | HEART RATE: 81 BPM | RESPIRATION RATE: 18 BRPM | DIASTOLIC BLOOD PRESSURE: 66 MMHG | SYSTOLIC BLOOD PRESSURE: 119 MMHG

## 2024-09-06 DIAGNOSIS — E86.0 DEHYDRATION: Primary | ICD-10-CM

## 2024-09-06 PROCEDURE — 96361 HYDRATE IV INFUSION ADD-ON: CPT

## 2024-09-06 PROCEDURE — 96374 THER/PROPH/DIAG INJ IV PUSH: CPT

## 2024-09-06 RX ADMIN — ONDANSETRON 8 MG: 2 INJECTION INTRAMUSCULAR; INTRAVENOUS at 15:45

## 2024-09-06 RX ADMIN — SODIUM CHLORIDE 1000 ML: 0.9 INJECTION, SOLUTION INTRAVENOUS at 15:40

## 2024-09-06 NOTE — PROGRESS NOTES
Pt here today for hydration, offers no complaints, pt aware of their next appt on 9/11 at 3:30, AVS declined, reported off to Charles Singh RN.

## 2024-09-06 NOTE — PROGRESS NOTES
Patinet tolerated remainder of infusion without incident.  Port flushed and de-accessed.  Walked out in stable condition.

## 2024-09-10 ENCOUNTER — PATIENT OUTREACH (OUTPATIENT)
Dept: HEMATOLOGY ONCOLOGY | Facility: CLINIC | Age: 34
End: 2024-09-10

## 2024-09-10 NOTE — PROGRESS NOTES
I reached out and spoke to Jillian to follow up with them and to review for any changes in barriers to care and offer supportive services as needed.    Barriers noted previously: co-morbidities    Current barriers and interventions provided: co-morbidities      This patient will no longer require follow up.  I have provided care team contact information to the patient and welcome them to contact me if their needs as discussed above change.  Patient is interested in scheduling an appointment for Survivorship and is aware that someone will reach out to schedule that with her.

## 2024-09-11 ENCOUNTER — HOSPITAL ENCOUNTER (OUTPATIENT)
Dept: INFUSION CENTER | Facility: CLINIC | Age: 34
Discharge: HOME | End: 2024-09-11
Payer: COMMERCIAL

## 2024-09-11 DIAGNOSIS — E86.0 DEHYDRATION: Primary | ICD-10-CM

## 2024-09-11 PROCEDURE — 96360 HYDRATION IV INFUSION INIT: CPT

## 2024-09-11 RX ADMIN — SODIUM CHLORIDE 1000 ML: 0.9 INJECTION, SOLUTION INTRAVENOUS at 15:42

## 2024-09-11 NOTE — PROGRESS NOTES
Pt presents for IV fluids offering no complaints. Pt tolerated fluids well. AVS declined, next appointment 9/13 3:30pm.

## 2024-09-13 ENCOUNTER — HOSPITAL ENCOUNTER (OUTPATIENT)
Dept: INFUSION CENTER | Facility: CLINIC | Age: 34
End: 2024-09-13
Payer: COMMERCIAL

## 2024-09-13 VITALS
RESPIRATION RATE: 18 BRPM | SYSTOLIC BLOOD PRESSURE: 127 MMHG | HEART RATE: 89 BPM | DIASTOLIC BLOOD PRESSURE: 69 MMHG | TEMPERATURE: 97.2 F

## 2024-09-13 DIAGNOSIS — E86.0 DEHYDRATION: Primary | ICD-10-CM

## 2024-09-13 PROCEDURE — 96360 HYDRATION IV INFUSION INIT: CPT

## 2024-09-13 RX ADMIN — SODIUM CHLORIDE 1000 ML: 0.9 INJECTION, SOLUTION INTRAVENOUS at 15:45

## 2024-09-13 NOTE — PROGRESS NOTES
Pt tolerated remainder of the infusion without issue. Port de-accessed. Pt left clinic in stable condition.

## 2024-09-13 NOTE — PROGRESS NOTES
Patient to clinic for Hydration. Offers no complaints today.Port accessed with positive blood return noted throughout treatment.  Tolerating infusion without complications. Aware of next appointment on 9/18/24 at 12:30 pm.  Report given to Maureen KAISER.

## 2024-09-17 ENCOUNTER — OFFICE VISIT (OUTPATIENT)
Dept: OBGYN CLINIC | Facility: MEDICAL CENTER | Age: 34
End: 2024-09-17

## 2024-09-17 VITALS — WEIGHT: 170 LBS | SYSTOLIC BLOOD PRESSURE: 138 MMHG | DIASTOLIC BLOOD PRESSURE: 96 MMHG | BODY MASS INDEX: 26.63 KG/M2

## 2024-09-17 DIAGNOSIS — C50.812 MALIGNANT NEOPLASM OF OVERLAPPING SITES OF LEFT BREAST IN FEMALE, ESTROGEN RECEPTOR POSITIVE (HCC): ICD-10-CM

## 2024-09-17 DIAGNOSIS — R87.610 ATYPICAL SQUAMOUS CELLS OF UNDETERMINED SIGNIFICANCE (ASCUS) ON PAPANICOLAOU SMEAR OF CERVIX: ICD-10-CM

## 2024-09-17 DIAGNOSIS — Z17.0 MALIGNANT NEOPLASM OF OVERLAPPING SITES OF LEFT BREAST IN FEMALE, ESTROGEN RECEPTOR POSITIVE (HCC): ICD-10-CM

## 2024-09-17 PROBLEM — O99.119 THROMBOCYTOPENIA AFFECTING PREGNANCY, ANTEPARTUM (HCC): Status: RESOLVED | Noted: 2024-04-30 | Resolved: 2024-09-17

## 2024-09-17 PROBLEM — O43.199 MARGINAL INSERTION OF UMBILICAL CORD AFFECTING MANAGEMENT OF MOTHER: Status: RESOLVED | Noted: 2024-04-05 | Resolved: 2024-09-17

## 2024-09-17 PROBLEM — O46.90 VAGINAL BLEEDING DURING PREGNANCY: Status: RESOLVED | Noted: 2024-07-09 | Resolved: 2024-09-17

## 2024-09-17 PROBLEM — N61.0 MASTITIS: Status: RESOLVED | Noted: 2024-07-17 | Resolved: 2024-09-17

## 2024-09-17 PROBLEM — D69.6 THROMBOCYTOPENIA AFFECTING PREGNANCY, ANTEPARTUM (HCC): Status: RESOLVED | Noted: 2024-04-30 | Resolved: 2024-09-17

## 2024-09-17 PROCEDURE — 99024 POSTOP FOLLOW-UP VISIT: CPT | Performed by: STUDENT IN AN ORGANIZED HEALTH CARE EDUCATION/TRAINING PROGRAM

## 2024-09-17 NOTE — ASSESSMENT & PLAN NOTE
6/21 NILM, HPV negative  Colpo for postcoital bleeding CIN1  10/22 NILM, HPV negative    Plan per asccp due 10/25

## 2024-09-17 NOTE — ASSESSMENT & PLAN NOTE
Has follow up in September with oncology  Seeing Dr. Budinetz in November for onco-fertility consultation

## 2024-09-17 NOTE — PROGRESS NOTES
Assessment/Plan:   Spontaneous vaginal delivery  Pelvic exam well healed  Periods resumed  Okay to resume all activity- plans condom use    Atypical squamous cells of undetermined significance (ASCUS) on Papanicolaou smear of cervix  6/21 NILM, HPV negative  Colpo for postcoital bleeding CIN1  10/22 NILM, HPV negative    Plan per asccp due 10/25    Malignant neoplasm of overlapping sites of left breast in female, estrogen receptor positive (HCC)  Has follow up in September with oncology  Seeing Dr. Budinetz in November for onco-fertility consultation    Return for annual in 1/2025   Diagnoses and all orders for this visit:    Spontaneous vaginal delivery    Atypical squamous cells of undetermined significance (ASCUS) on Papanicolaou smear of cervix    Malignant neoplasm of overlapping sites of left breast in female, estrogen receptor positive (HCC)    Other orders  -     Multiple Vitamin (MULTIVITAMINS PO); Take by mouth          Subjective:     Patient ID: Jillian Ch is a 33 y.o. female.    32 yo here for postpartum follow up. Bleeding stopped. She has had 2 periods. She reports BP at her other visits are all wnl. No breast concerns. Has not resumed intercourse. Has upcoming appts with onc and fertility.         Review of Systems   Constitutional:  Negative for chills and fever.   HENT:  Negative for ear pain and sore throat.    Eyes:  Negative for pain and visual disturbance.   Respiratory:  Negative for cough and shortness of breath.    Cardiovascular:  Negative for chest pain and palpitations.   Gastrointestinal:  Negative for abdominal pain, constipation and diarrhea.   Genitourinary:  Positive for pelvic pain (pelvic girdle pain). Negative for dysuria, frequency, hematuria, urgency, vaginal bleeding, vaginal discharge and vaginal pain.   Musculoskeletal:  Negative for arthralgias and back pain.   Skin:  Negative for color change and rash.   Neurological:  Negative for seizures and syncope.   All  other systems reviewed and are negative.        Objective:     Physical Exam  Vitals reviewed.   Constitutional:       General: She is not in acute distress.     Appearance: She is well-developed. She is not diaphoretic.   HENT:      Head: Normocephalic and atraumatic.   Eyes:      Extraocular Movements: EOM normal.   Pulmonary:      Effort: Pulmonary effort is normal. No respiratory distress.   Genitourinary:     Labia:         Right: No rash, tenderness, lesion or injury.         Left: No rash, tenderness, lesion or injury.       Vagina: No vaginal discharge, erythema, tenderness or bleeding.      Cervix: No friability, lesion, erythema or cervical bleeding.   Musculoskeletal:      Cervical back: Normal range of motion.   Neurological:      Mental Status: She is alert and oriented to person, place, and time.   Psychiatric:         Mood and Affect: Mood and affect normal.         Behavior: Behavior normal.         Thought Content: Thought content normal.         Judgment: Judgment normal.

## 2024-09-18 ENCOUNTER — HOSPITAL ENCOUNTER (OUTPATIENT)
Dept: INFUSION CENTER | Facility: CLINIC | Age: 34
Discharge: HOME | End: 2024-09-18
Payer: COMMERCIAL

## 2024-09-18 VITALS
TEMPERATURE: 97 F | DIASTOLIC BLOOD PRESSURE: 67 MMHG | SYSTOLIC BLOOD PRESSURE: 127 MMHG | HEART RATE: 87 BPM | RESPIRATION RATE: 18 BRPM

## 2024-09-18 DIAGNOSIS — E86.0 DEHYDRATION: Primary | ICD-10-CM

## 2024-09-18 PROCEDURE — 96361 HYDRATE IV INFUSION ADD-ON: CPT

## 2024-09-18 PROCEDURE — 96374 THER/PROPH/DIAG INJ IV PUSH: CPT

## 2024-09-18 RX ADMIN — ONDANSETRON 8 MG: 2 INJECTION INTRAMUSCULAR; INTRAVENOUS at 12:45

## 2024-09-18 RX ADMIN — SODIUM CHLORIDE 1000 ML: 0.9 INJECTION, SOLUTION INTRAVENOUS at 12:44

## 2024-09-18 NOTE — PROGRESS NOTES
Patient arrives to infusion center for IV Hydration. Patient offers concerns about her blood pressure, patient encouraged to follow up with PCP. Port accessed, patient tolerated entire infusion without complication. Port de-accessed. AVS offered.     Next appointment: 9/20/24 @ 5925

## 2024-09-19 ENCOUNTER — OFFICE VISIT (OUTPATIENT)
Dept: FAMILY MEDICINE CLINIC | Facility: CLINIC | Age: 34
End: 2024-09-19
Payer: COMMERCIAL

## 2024-09-19 VITALS
BODY MASS INDEX: 27 KG/M2 | HEART RATE: 76 BPM | DIASTOLIC BLOOD PRESSURE: 65 MMHG | RESPIRATION RATE: 18 BRPM | SYSTOLIC BLOOD PRESSURE: 108 MMHG | OXYGEN SATURATION: 99 % | HEIGHT: 67 IN | WEIGHT: 172 LBS | TEMPERATURE: 95.9 F

## 2024-09-19 DIAGNOSIS — K21.9 GASTROESOPHAGEAL REFLUX DISEASE WITHOUT ESOPHAGITIS: ICD-10-CM

## 2024-09-19 DIAGNOSIS — C50.812 MALIGNANT NEOPLASM OF OVERLAPPING SITES OF LEFT BREAST IN FEMALE, ESTROGEN RECEPTOR POSITIVE (HCC): ICD-10-CM

## 2024-09-19 DIAGNOSIS — R03.0 ELEVATED BLOOD PRESSURE READING WITHOUT DIAGNOSIS OF HYPERTENSION: Primary | ICD-10-CM

## 2024-09-19 DIAGNOSIS — Z17.0 MALIGNANT NEOPLASM OF OVERLAPPING SITES OF LEFT BREAST IN FEMALE, ESTROGEN RECEPTOR POSITIVE (HCC): ICD-10-CM

## 2024-09-19 PROCEDURE — 99214 OFFICE O/P EST MOD 30 MIN: CPT | Performed by: NURSE PRACTITIONER

## 2024-09-19 NOTE — ASSESSMENT & PLAN NOTE
Currently on omeprazole.  Did discuss if symptoms continue or worsen may reach out to nutritional services if appetite remains fair to poor.

## 2024-09-19 NOTE — PROGRESS NOTES
Ambulatory Visit  Name: Jillian Ch      : 1990      MRN: [id number]  Encounter Provider: JOSE Stover  Encounter Date: 2024   Encounter department: [facility]    Assessment & Plan  Elevated blood pressure reading without diagnosis of hypertension  Purchase home blood pressure cuff.  Monitor as discussed.  May bring blood pressure cuff into office to check accuracy.  We discussed diet, avoiding high salt foods.       Malignant neoplasm of overlapping sites of left breast in female, estrogen receptor positive (HCC)  Has upcoming appointment with oncology for further treatment discussion         Gastroesophageal reflux disease without esophagitis  Currently on omeprazole.  Did discuss if symptoms continue or worsen may reach out to nutritional services if appetite remains fair to poor.            History of Present Illness     She presents today for concern of blood pressure. She reports an OBGYN visit of blood pressure 140/82. She reports another check at end of appt 136/86. She reports usually having a low blood pressure. She reports a headache and does complain of fatigue, dizziness.     Hypertension  Associated symptoms include headaches. Pertinent negatives include no chest pain, neck pain or shortness of breath. There are no associated agents to hypertension. Risk factors for coronary artery disease include stress. Past treatments include nothing. There are no compliance problems.          Review of Systems   Constitutional:  Positive for appetite change and fatigue. Negative for activity change, chills and fever.   HENT:  Negative for congestion, ear discharge, ear pain, sinus pressure, sinus pain, sore throat, tinnitus and trouble swallowing.    Eyes:  Negative for photophobia, pain, discharge, itching and visual disturbance.   Respiratory:  Negative for cough, chest tightness, shortness of breath and wheezing.    Cardiovascular:  Negative for  "chest pain and leg swelling.   Gastrointestinal:  Negative for abdominal distention, abdominal pain, constipation, diarrhea, nausea and vomiting.   Endocrine: Negative for polydipsia, polyphagia and polyuria.   Genitourinary:  Negative for dysuria and frequency.   Musculoskeletal:  Negative for arthralgias, myalgias, neck pain and neck stiffness.   Skin:  Negative for color change.   Neurological:  Positive for dizziness and headaches. Negative for syncope, weakness and numbness.   Hematological:  Does not bruise/bleed easily.   Psychiatric/Behavioral:  Positive for sleep disturbance. Negative for behavioral problems, confusion, self-injury and suicidal ideas. The patient is not nervous/anxious.            Objective     /65 (BP Location: Right arm, Patient Position: Sitting, Cuff Size: Standard)   Pulse 76   Temp (!) 95.9 °F (35.5 °C) (Tympanic)   Resp 18   Ht 5' 7\" (1.702 m)   Wt 78 kg (172 lb)   SpO2 99%   BMI 26.94 kg/m²     Physical Exam  Vitals and nursing note reviewed.   Constitutional:       Appearance: Normal appearance.   HENT:      Head: Normocephalic and atraumatic.   Cardiovascular:      Rate and Rhythm: Normal rate and regular rhythm.      Pulses: Normal pulses.      Heart sounds: Normal heart sounds.   Pulmonary:      Effort: Pulmonary effort is normal.      Breath sounds: Normal breath sounds.   Skin:     General: Skin is warm.   Neurological:      General: No focal deficit present.      Mental Status: She is alert and oriented to person, place, and time.   Psychiatric:         Mood and Affect: Mood normal.         Behavior: Behavior normal.         Thought Content: Thought content normal.         Judgment: Judgment normal.         "

## 2024-09-19 NOTE — ASSESSMENT & PLAN NOTE
Purchase home blood pressure cuff.  Monitor as discussed.  May bring blood pressure cuff into office to check accuracy.  We discussed diet, avoiding high salt foods.

## 2024-09-20 ENCOUNTER — HOSPITAL ENCOUNTER (OUTPATIENT)
Dept: INFUSION CENTER | Facility: CLINIC | Age: 34
End: 2024-09-20
Payer: COMMERCIAL

## 2024-09-20 VITALS — SYSTOLIC BLOOD PRESSURE: 99 MMHG | HEART RATE: 80 BPM | TEMPERATURE: 97.8 F | DIASTOLIC BLOOD PRESSURE: 53 MMHG

## 2024-09-20 DIAGNOSIS — E86.0 DEHYDRATION: Primary | ICD-10-CM

## 2024-09-20 PROCEDURE — 96360 HYDRATION IV INFUSION INIT: CPT

## 2024-09-20 RX ADMIN — SODIUM CHLORIDE 1000 ML: 0.9 INJECTION, SOLUTION INTRAVENOUS at 14:40

## 2024-09-20 NOTE — PROGRESS NOTES
Pt here today for hydration, offers no complaints , infusion completed w/o complications, pt aware of their next appt on 9/27 at 11am , AVS declined and pt D/C home.

## 2024-09-26 ENCOUNTER — APPOINTMENT (OUTPATIENT)
Dept: LAB | Facility: CLINIC | Age: 34
End: 2024-09-26
Payer: COMMERCIAL

## 2024-09-26 DIAGNOSIS — D50.8 IRON DEFICIENCY ANEMIA SECONDARY TO INADEQUATE DIETARY IRON INTAKE: ICD-10-CM

## 2024-09-26 DIAGNOSIS — L50.1 IDIOPATHIC URTICARIA: ICD-10-CM

## 2024-09-26 DIAGNOSIS — R22.1 THROAT SWELLING: ICD-10-CM

## 2024-09-26 PROBLEM — L50.3 DERMATOGRAPHIA: Status: ACTIVE | Noted: 2024-09-26

## 2024-09-26 PROBLEM — Z91.040 LATEX ALLERGY: Status: ACTIVE | Noted: 2024-09-26

## 2024-09-26 PROBLEM — J30.89 ALLERGIC RHINITIS DUE TO DUST MITE: Status: ACTIVE | Noted: 2024-09-26

## 2024-09-26 LAB
BASOPHILS # BLD AUTO: 0.03 THOUSANDS/ΜL (ref 0–0.1)
BASOPHILS NFR BLD AUTO: 1 % (ref 0–1)
EOSINOPHIL # BLD AUTO: 0.26 THOUSAND/ΜL (ref 0–0.61)
EOSINOPHIL NFR BLD AUTO: 5 % (ref 0–6)
ERYTHROCYTE [DISTWIDTH] IN BLOOD BY AUTOMATED COUNT: 12.6 % (ref 11.6–15.1)
FERRITIN SERPL-MCNC: 64 NG/ML (ref 11–307)
HCT VFR BLD AUTO: 40.8 % (ref 34.8–46.1)
HGB BLD-MCNC: 13.1 G/DL (ref 11.5–15.4)
IMM GRANULOCYTES # BLD AUTO: 0.01 THOUSAND/UL (ref 0–0.2)
IMM GRANULOCYTES NFR BLD AUTO: 0 % (ref 0–2)
LYMPHOCYTES # BLD AUTO: 1.09 THOUSANDS/ΜL (ref 0.6–4.47)
LYMPHOCYTES NFR BLD AUTO: 19 % (ref 14–44)
MCH RBC QN AUTO: 29.2 PG (ref 26.8–34.3)
MCHC RBC AUTO-ENTMCNC: 32.1 G/DL (ref 31.4–37.4)
MCV RBC AUTO: 91 FL (ref 82–98)
MONOCYTES # BLD AUTO: 0.58 THOUSAND/ΜL (ref 0.17–1.22)
MONOCYTES NFR BLD AUTO: 10 % (ref 4–12)
NEUTROPHILS # BLD AUTO: 3.73 THOUSANDS/ΜL (ref 1.85–7.62)
NEUTS SEG NFR BLD AUTO: 65 % (ref 43–75)
NRBC BLD AUTO-RTO: 0 /100 WBCS
PLATELET # BLD AUTO: 166 THOUSANDS/UL (ref 149–390)
PMV BLD AUTO: 11.9 FL (ref 8.9–12.7)
RBC # BLD AUTO: 4.49 MILLION/UL (ref 3.81–5.12)
TSH SERPL DL<=0.05 MIU/L-ACNC: 2.98 UIU/ML (ref 0.45–4.5)
WBC # BLD AUTO: 5.7 THOUSAND/UL (ref 4.31–10.16)

## 2024-09-26 PROCEDURE — 86161 COMPLEMENT/FUNCTION ACTIVITY: CPT

## 2024-09-26 PROCEDURE — 80053 COMPREHEN METABOLIC PANEL: CPT

## 2024-09-26 PROCEDURE — 83540 ASSAY OF IRON: CPT

## 2024-09-26 PROCEDURE — 86160 COMPLEMENT ANTIGEN: CPT

## 2024-09-26 PROCEDURE — 85025 COMPLETE CBC W/AUTO DIFF WBC: CPT

## 2024-09-26 PROCEDURE — 82728 ASSAY OF FERRITIN: CPT

## 2024-09-26 PROCEDURE — 84443 ASSAY THYROID STIM HORMONE: CPT

## 2024-09-26 PROCEDURE — 86038 ANTINUCLEAR ANTIBODIES: CPT

## 2024-09-26 PROCEDURE — 83550 IRON BINDING TEST: CPT

## 2024-09-26 PROCEDURE — 86376 MICROSOMAL ANTIBODY EACH: CPT

## 2024-09-26 PROCEDURE — 83520 IMMUNOASSAY QUANT NOS NONAB: CPT

## 2024-09-26 PROCEDURE — 36415 COLL VENOUS BLD VENIPUNCTURE: CPT

## 2024-09-26 PROCEDURE — 82785 ASSAY OF IGE: CPT

## 2024-09-27 ENCOUNTER — HOSPITAL ENCOUNTER (OUTPATIENT)
Dept: INFUSION CENTER | Facility: CLINIC | Age: 34
Discharge: HOME | End: 2024-09-27

## 2024-09-27 ENCOUNTER — TELEPHONE (OUTPATIENT)
Dept: HEMATOLOGY ONCOLOGY | Facility: CLINIC | Age: 34
End: 2024-09-27

## 2024-09-27 ENCOUNTER — OFFICE VISIT (OUTPATIENT)
Dept: HEMATOLOGY ONCOLOGY | Facility: CLINIC | Age: 34
End: 2024-09-27
Payer: COMMERCIAL

## 2024-09-27 ENCOUNTER — HOSPITAL ENCOUNTER (OUTPATIENT)
Dept: INFUSION CENTER | Facility: CLINIC | Age: 34
End: 2024-09-27
Payer: COMMERCIAL

## 2024-09-27 VITALS
RESPIRATION RATE: 17 BRPM | BODY MASS INDEX: 27.78 KG/M2 | OXYGEN SATURATION: 99 % | HEIGHT: 67 IN | DIASTOLIC BLOOD PRESSURE: 92 MMHG | HEART RATE: 90 BPM | WEIGHT: 177 LBS | TEMPERATURE: 98 F | SYSTOLIC BLOOD PRESSURE: 130 MMHG

## 2024-09-27 VITALS
RESPIRATION RATE: 18 BRPM | DIASTOLIC BLOOD PRESSURE: 66 MMHG | HEART RATE: 80 BPM | TEMPERATURE: 97.1 F | SYSTOLIC BLOOD PRESSURE: 108 MMHG

## 2024-09-27 DIAGNOSIS — C50.812 MALIGNANT NEOPLASM OF OVERLAPPING SITES OF LEFT FEMALE BREAST, UNSPECIFIED ESTROGEN RECEPTOR STATUS (HCC): Primary | ICD-10-CM

## 2024-09-27 DIAGNOSIS — E86.0 DEHYDRATION: Primary | ICD-10-CM

## 2024-09-27 DIAGNOSIS — Z51.81 THERAPEUTIC DRUG MONITORING: ICD-10-CM

## 2024-09-27 DIAGNOSIS — D50.0 IRON DEFICIENCY ANEMIA DUE TO CHRONIC BLOOD LOSS: ICD-10-CM

## 2024-09-27 LAB
ALBUMIN SERPL BCG-MCNC: 4.3 G/DL (ref 3.5–5)
ALP SERPL-CCNC: 82 U/L (ref 34–104)
ALT SERPL W P-5'-P-CCNC: 39 U/L (ref 7–52)
ANA SER QL IA: NEGATIVE
ANION GAP SERPL CALCULATED.3IONS-SCNC: 9 MMOL/L (ref 4–13)
AST SERPL W P-5'-P-CCNC: 20 U/L (ref 13–39)
BILIRUB SERPL-MCNC: 0.34 MG/DL (ref 0.2–1)
BUN SERPL-MCNC: 12 MG/DL (ref 5–25)
C4 SERPL-MCNC: 38 MG/DL (ref 19–52)
CALCIUM SERPL-MCNC: 9 MG/DL (ref 8.4–10.2)
CHLORIDE SERPL-SCNC: 107 MMOL/L (ref 96–108)
CO2 SERPL-SCNC: 21 MMOL/L (ref 21–32)
CREAT SERPL-MCNC: 0.69 MG/DL (ref 0.6–1.3)
GFR SERPL CREATININE-BSD FRML MDRD: 112 ML/MIN/1.73SQ M
GLUCOSE SERPL-MCNC: 93 MG/DL (ref 65–140)
IRON SATN MFR SERPL: 20 % (ref 15–50)
IRON SERPL-MCNC: 50 UG/DL (ref 50–212)
POTASSIUM SERPL-SCNC: 4.1 MMOL/L (ref 3.5–5.3)
PROT SERPL-MCNC: 7.3 G/DL (ref 6.4–8.4)
SODIUM SERPL-SCNC: 137 MMOL/L (ref 135–147)
TIBC SERPL-MCNC: 245 UG/DL (ref 250–450)
UIBC SERPL-MCNC: 195 UG/DL (ref 155–355)

## 2024-09-27 PROCEDURE — 96361 HYDRATE IV INFUSION ADD-ON: CPT

## 2024-09-27 PROCEDURE — 96374 THER/PROPH/DIAG INJ IV PUSH: CPT

## 2024-09-27 PROCEDURE — 99215 OFFICE O/P EST HI 40 MIN: CPT | Performed by: INTERNAL MEDICINE

## 2024-09-27 RX ORDER — FERROUS SULFATE 324(65)MG
324 TABLET, DELAYED RELEASE (ENTERIC COATED) ORAL EVERY OTHER DAY
Qty: 45 TABLET | Refills: 3 | Status: SHIPPED | OUTPATIENT
Start: 2024-09-27

## 2024-09-27 RX ADMIN — ONDANSETRON 8 MG: 2 INJECTION INTRAMUSCULAR; INTRAVENOUS at 15:21

## 2024-09-27 RX ADMIN — SODIUM CHLORIDE 1000 ML: 0.9 INJECTION, SOLUTION INTRAVENOUS at 15:20

## 2024-09-27 NOTE — PROGRESS NOTES
Patient arrives to infusion center for IV hydration. Patient offers no complaints today. Port accessed, patient tolerated entire infusion without complication. Port de-accessed. AVS offered.     Next appointment: 10/2/24 @ 3109

## 2024-09-27 NOTE — TELEPHONE ENCOUNTER
Left Vm for patient to call me back because she has an appt today at 1140 and wanted to see if she thought it was virtual. Appt can be changed if needed.

## 2024-09-27 NOTE — TELEPHONE ENCOUNTER
Called patient in regards to her appointment with Dr. Cornell. I left a voicemail the new date and time is going to be October 16, 2024 @ 1 pm. If this date and time doesn't work call us back at [phone].      Thank you

## 2024-09-28 LAB — THYROPEROXIDASE AB SERPL-ACNC: <9 IU/ML (ref 0–34)

## 2024-09-29 ENCOUNTER — APPOINTMENT (EMERGENCY)
Dept: CT IMAGING | Facility: HOSPITAL | Age: 34
End: 2024-09-29
Payer: COMMERCIAL

## 2024-09-29 ENCOUNTER — APPOINTMENT (EMERGENCY)
Dept: RADIOLOGY | Facility: HOSPITAL | Age: 34
End: 2024-09-29
Payer: COMMERCIAL

## 2024-09-29 ENCOUNTER — HOSPITAL ENCOUNTER (EMERGENCY)
Facility: HOSPITAL | Age: 34
Discharge: HOME | End: 2024-09-29
Attending: EMERGENCY MEDICINE | Admitting: EMERGENCY MEDICINE
Payer: COMMERCIAL

## 2024-09-29 VITALS
TEMPERATURE: 98.2 F | RESPIRATION RATE: 18 BRPM | SYSTOLIC BLOOD PRESSURE: 117 MMHG | DIASTOLIC BLOOD PRESSURE: 69 MMHG | HEART RATE: 83 BPM | WEIGHT: 172.18 LBS | HEIGHT: 67 IN | OXYGEN SATURATION: 99 % | BODY MASS INDEX: 27.02 KG/M2

## 2024-09-29 DIAGNOSIS — R11.0 NAUSEA: ICD-10-CM

## 2024-09-29 DIAGNOSIS — R07.89 NON-CARDIAC CHEST PAIN: ICD-10-CM

## 2024-09-29 DIAGNOSIS — R10.13 ACUTE EPIGASTRIC PAIN: Primary | ICD-10-CM

## 2024-09-29 LAB
2HR DELTA HS TROPONIN: 0 NG/L
ALBUMIN SERPL BCG-MCNC: 4.2 G/DL (ref 3.5–5)
ALP SERPL-CCNC: 81 U/L (ref 34–104)
ALT SERPL W P-5'-P-CCNC: 33 U/L (ref 7–52)
ANION GAP SERPL CALCULATED.3IONS-SCNC: 7 MMOL/L (ref 4–13)
APTT PPP: 24 SECONDS (ref 23–34)
AST SERPL W P-5'-P-CCNC: 16 U/L (ref 13–39)
BACTERIA UR QL AUTO: ABNORMAL /HPF
BASOPHILS # BLD AUTO: 0.02 THOUSANDS/ÂΜL (ref 0–0.1)
BASOPHILS NFR BLD AUTO: 0 % (ref 0–1)
BILIRUB SERPL-MCNC: 0.39 MG/DL (ref 0.2–1)
BILIRUB UR QL STRIP: NEGATIVE
BNP SERPL-MCNC: 21 PG/ML (ref 0–100)
BUN SERPL-MCNC: 13 MG/DL (ref 5–25)
CALCIUM SERPL-MCNC: 8.8 MG/DL (ref 8.4–10.2)
CARDIAC TROPONIN I PNL SERPL HS: 3 NG/L
CARDIAC TROPONIN I PNL SERPL HS: 3 NG/L
CHLORIDE SERPL-SCNC: 106 MMOL/L (ref 96–108)
CLARITY UR: CLEAR
CO2 SERPL-SCNC: 24 MMOL/L (ref 21–32)
COLOR UR: ABNORMAL
CREAT SERPL-MCNC: 0.7 MG/DL (ref 0.6–1.3)
EOSINOPHIL # BLD AUTO: 0.23 THOUSAND/ÂΜL (ref 0–0.61)
EOSINOPHIL NFR BLD AUTO: 3 % (ref 0–6)
ERYTHROCYTE [DISTWIDTH] IN BLOOD BY AUTOMATED COUNT: 12.5 % (ref 11.6–15.1)
EXT PREGNANCY TEST URINE: NEGATIVE
EXT. CONTROL: NORMAL
GFR SERPL CREATININE-BSD FRML MDRD: 111 ML/MIN/1.73SQ M
GLUCOSE SERPL-MCNC: 85 MG/DL (ref 65–140)
GLUCOSE UR STRIP-MCNC: NEGATIVE MG/DL
HCG SERPL QL: NEGATIVE
HCT VFR BLD AUTO: 38.8 % (ref 34.8–46.1)
HGB BLD-MCNC: 12.4 G/DL (ref 11.5–15.4)
HGB UR QL STRIP.AUTO: NEGATIVE
IMM GRANULOCYTES # BLD AUTO: 0.01 THOUSAND/UL (ref 0–0.2)
IMM GRANULOCYTES NFR BLD AUTO: 0 % (ref 0–2)
INR PPP: 0.94 (ref 0.85–1.19)
KETONES UR STRIP-MCNC: NEGATIVE MG/DL
LEUKOCYTE ESTERASE UR QL STRIP: ABNORMAL
LIPASE SERPL-CCNC: 36 U/L (ref 11–82)
LYMPHOCYTES # BLD AUTO: 1.51 THOUSANDS/ÂΜL (ref 0.6–4.47)
LYMPHOCYTES NFR BLD AUTO: 21 % (ref 14–44)
MAGNESIUM SERPL-MCNC: 1.9 MG/DL (ref 1.9–2.7)
MCH RBC QN AUTO: 29.2 PG (ref 26.8–34.3)
MCHC RBC AUTO-ENTMCNC: 32 G/DL (ref 31.4–37.4)
MCV RBC AUTO: 92 FL (ref 82–98)
MONOCYTES # BLD AUTO: 0.43 THOUSAND/ÂΜL (ref 0.17–1.22)
MONOCYTES NFR BLD AUTO: 6 % (ref 4–12)
NEUTROPHILS # BLD AUTO: 5.02 THOUSANDS/ÂΜL (ref 1.85–7.62)
NEUTS SEG NFR BLD AUTO: 70 % (ref 43–75)
NITRITE UR QL STRIP: NEGATIVE
NON-SQ EPI CELLS URNS QL MICRO: ABNORMAL /HPF
NRBC BLD AUTO-RTO: 0 /100 WBCS
PH UR STRIP.AUTO: 5.5 [PH]
PLATELET # BLD AUTO: 159 THOUSANDS/UL (ref 149–390)
PMV BLD AUTO: 10.9 FL (ref 8.9–12.7)
POTASSIUM SERPL-SCNC: 3.7 MMOL/L (ref 3.5–5.3)
PROT SERPL-MCNC: 7.4 G/DL (ref 6.4–8.4)
PROT UR STRIP-MCNC: NEGATIVE MG/DL
PROTHROMBIN TIME: 13.3 SECONDS (ref 12.3–15)
RBC # BLD AUTO: 4.24 MILLION/UL (ref 3.81–5.12)
RBC #/AREA URNS AUTO: ABNORMAL /HPF
SODIUM SERPL-SCNC: 137 MMOL/L (ref 135–147)
SP GR UR STRIP.AUTO: 1.02 (ref 1–1.03)
TOTAL IGE SMQN RAST: 112 KU/L (ref 0–113)
TRYPTASE SERPL-MCNC: 4 UG/L (ref 2.2–13.2)
TSH SERPL DL<=0.05 MIU/L-ACNC: 2.04 UIU/ML (ref 0.45–4.5)
UROBILINOGEN UR STRIP-ACNC: <2 MG/DL
WBC # BLD AUTO: 7.22 THOUSAND/UL (ref 4.31–10.16)
WBC #/AREA URNS AUTO: ABNORMAL /HPF

## 2024-09-29 PROCEDURE — 36415 COLL VENOUS BLD VENIPUNCTURE: CPT

## 2024-09-29 PROCEDURE — 85730 THROMBOPLASTIN TIME PARTIAL: CPT | Performed by: EMERGENCY MEDICINE

## 2024-09-29 PROCEDURE — 99285 EMERGENCY DEPT VISIT HI MDM: CPT

## 2024-09-29 PROCEDURE — 96374 THER/PROPH/DIAG INJ IV PUSH: CPT

## 2024-09-29 PROCEDURE — 71045 X-RAY EXAM CHEST 1 VIEW: CPT

## 2024-09-29 PROCEDURE — 84484 ASSAY OF TROPONIN QUANT: CPT | Performed by: EMERGENCY MEDICINE

## 2024-09-29 PROCEDURE — 93005 ELECTROCARDIOGRAM TRACING: CPT

## 2024-09-29 PROCEDURE — 81025 URINE PREGNANCY TEST: CPT | Performed by: EMERGENCY MEDICINE

## 2024-09-29 PROCEDURE — 96375 TX/PRO/DX INJ NEW DRUG ADDON: CPT

## 2024-09-29 PROCEDURE — 96361 HYDRATE IV INFUSION ADD-ON: CPT

## 2024-09-29 PROCEDURE — 84703 CHORIONIC GONADOTROPIN ASSAY: CPT | Performed by: EMERGENCY MEDICINE

## 2024-09-29 PROCEDURE — 81001 URINALYSIS AUTO W/SCOPE: CPT | Performed by: EMERGENCY MEDICINE

## 2024-09-29 PROCEDURE — 83880 ASSAY OF NATRIURETIC PEPTIDE: CPT | Performed by: EMERGENCY MEDICINE

## 2024-09-29 PROCEDURE — 83690 ASSAY OF LIPASE: CPT | Performed by: EMERGENCY MEDICINE

## 2024-09-29 PROCEDURE — 83735 ASSAY OF MAGNESIUM: CPT | Performed by: EMERGENCY MEDICINE

## 2024-09-29 PROCEDURE — 85025 COMPLETE CBC W/AUTO DIFF WBC: CPT | Performed by: EMERGENCY MEDICINE

## 2024-09-29 PROCEDURE — 71275 CT ANGIOGRAPHY CHEST: CPT

## 2024-09-29 PROCEDURE — 99285 EMERGENCY DEPT VISIT HI MDM: CPT | Performed by: EMERGENCY MEDICINE

## 2024-09-29 PROCEDURE — 84443 ASSAY THYROID STIM HORMONE: CPT | Performed by: EMERGENCY MEDICINE

## 2024-09-29 PROCEDURE — 80053 COMPREHEN METABOLIC PANEL: CPT | Performed by: EMERGENCY MEDICINE

## 2024-09-29 PROCEDURE — 74177 CT ABD & PELVIS W/CONTRAST: CPT

## 2024-09-29 PROCEDURE — 85610 PROTHROMBIN TIME: CPT | Performed by: EMERGENCY MEDICINE

## 2024-09-29 RX ORDER — PANTOPRAZOLE SODIUM 40 MG/10ML
40 INJECTION, POWDER, LYOPHILIZED, FOR SOLUTION INTRAVENOUS ONCE
Status: COMPLETED | OUTPATIENT
Start: 2024-09-29 | End: 2024-09-29

## 2024-09-29 RX ORDER — DICYCLOMINE HCL 20 MG
20 TABLET ORAL ONCE
Status: COMPLETED | OUTPATIENT
Start: 2024-09-29 | End: 2024-09-29

## 2024-09-29 RX ORDER — ACETAMINOPHEN 10 MG/ML
1000 INJECTION, SOLUTION INTRAVENOUS ONCE
Status: COMPLETED | OUTPATIENT
Start: 2024-09-29 | End: 2024-09-29

## 2024-09-29 RX ORDER — SUCRALFATE 1 G/1
1 TABLET ORAL ONCE
Status: COMPLETED | OUTPATIENT
Start: 2024-09-29 | End: 2024-09-29

## 2024-09-29 RX ORDER — MAGNESIUM HYDROXIDE/ALUMINUM HYDROXICE/SIMETHICONE 120; 1200; 1200 MG/30ML; MG/30ML; MG/30ML
30 SUSPENSION ORAL ONCE
Status: COMPLETED | OUTPATIENT
Start: 2024-09-29 | End: 2024-09-29

## 2024-09-29 RX ORDER — ONDANSETRON 4 MG/1
4 TABLET, ORALLY DISINTEGRATING ORAL EVERY 8 HOURS PRN
Qty: 12 TABLET | Refills: 0 | Status: SHIPPED | OUTPATIENT
Start: 2024-09-29

## 2024-09-29 RX ORDER — LIDOCAINE HYDROCHLORIDE 20 MG/ML
10 SOLUTION OROPHARYNGEAL ONCE
Status: COMPLETED | OUTPATIENT
Start: 2024-09-29 | End: 2024-09-29

## 2024-09-29 RX ORDER — ONDANSETRON 2 MG/ML
4 INJECTION INTRAMUSCULAR; INTRAVENOUS ONCE
Status: COMPLETED | OUTPATIENT
Start: 2024-09-29 | End: 2024-09-29

## 2024-09-29 RX ORDER — FAMOTIDINE 20 MG/1
20 TABLET, FILM COATED ORAL 2 TIMES DAILY
Qty: 30 TABLET | Refills: 0 | Status: SHIPPED | OUTPATIENT
Start: 2024-09-29

## 2024-09-29 RX ORDER — DICYCLOMINE HCL 20 MG
20 TABLET ORAL EVERY 8 HOURS PRN
Qty: 12 TABLET | Refills: 0 | Status: SHIPPED | OUTPATIENT
Start: 2024-09-29

## 2024-09-29 RX ADMIN — ALUMINUM HYDROXIDE, MAGNESIUM HYDROXIDE, AND SIMETHICONE 30 ML: 1200; 120; 1200 SUSPENSION ORAL at 19:54

## 2024-09-29 RX ADMIN — IOHEXOL 100 ML: 350 INJECTION, SOLUTION INTRAVENOUS at 20:33

## 2024-09-29 RX ADMIN — ONDANSETRON 4 MG: 2 INJECTION INTRAMUSCULAR; INTRAVENOUS at 19:53

## 2024-09-29 RX ADMIN — PANTOPRAZOLE SODIUM 40 MG: 40 INJECTION, POWDER, FOR SOLUTION INTRAVENOUS at 19:54

## 2024-09-29 RX ADMIN — DICYCLOMINE HYDROCHLORIDE 20 MG: 20 TABLET ORAL at 21:27

## 2024-09-29 RX ADMIN — ACETAMINOPHEN 1000 MG: 1000 INJECTION, SOLUTION INTRAVENOUS at 19:53

## 2024-09-29 RX ADMIN — LIDOCAINE HYDROCHLORIDE 10 ML: 20 SOLUTION ORAL; TOPICAL at 19:54

## 2024-09-29 RX ADMIN — SODIUM CHLORIDE 1000 ML: 0.9 INJECTION, SOLUTION INTRAVENOUS at 19:53

## 2024-09-29 RX ADMIN — SUCRALFATE 1 G: 1 TABLET ORAL at 19:54

## 2024-09-29 NOTE — PROGRESS NOTES
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, ER 80-85% ME 90-95% Her2 1+, han 2, cT2(2.1 cm)N0M0    2023 - left sided mastectomy with SLNBX - [id number] - oncotype 23    Post-op - LLE DVT - treated with lovenox until 6 weeks after delivery of her baby    2024 - start AC q 3 weeks    3/13/2024 - cycle 2 adriamycin dose reduced to 50 mg/m2 and cytoxan dose reduced by 10% due to N/V    2024 - stop after 3 cycles of AC due to severe nausea and dehydration with coexisting hyperemesis gravidarum    2024 - vaginal delivery of healthy baby boy     Malignant neoplasm of overlapping sites of left breast in female, estrogen receptor positive (HCC)   2023 Initial Diagnosis    Malignant neoplasm of overlapping sites of left female breast (HCC)     2023 Biopsy    Bilateral breast ultrasound guided biopsy  A. Breast, Left, US Guided Left Breast Biopsy 12:00 6cmfn:  Invasive breast carcinoma of no special type (ductal) with osteoclast-like stromal giant cells  Grade 2  ER 85  ME 95  HER2 1+     B. Breast, Right, US Guided Right Breast Biopsy 10:00 9cmfn:  Benign breast tissue.    In review of the patient’s recent imaging, the left malignancy appears unifocal.  The biopsy-proven carcinoma measured 2.1 cm on ultrasound. Ultrasound of the left axilla was performed on 2023 and showed no suspicious adenopathy.  Recent imaging of the contralateral right breast dated 2023 and 2023 was reviewed and shows no suspicious findings.  The pathology results for the ultrasound-guided core needle biopsy (right breast 10:00, 9 cm from the nipple) are benign and concordant with imaging.     2023 Genetic Testing    [facility]  A total of  36 genes were evaluated, including: APC, TOPHER, BARD 1, BMPR1A, BRCA1, BRCA2, BRIP1, CDH1, CDK4, CKDN2A, CHEK2, DICER1, MLH1, MSH2, MSH6, MUTYH, NBN, NF1, NTHL1, PALB2, PMS2, PTEN, RAD51C, RECQL, SMAD4, SMARCA4, STK11, TP53, AXIN2, HOXB13, MSH3, POLD1, AND POLE, EPCAM, AND GREM1   Negative result. No pathogenic sequence variants or deletions/duplications identified       12/2/2023 -  Cancer Staged    Staging form: Breast, AJCC 8th Edition  - Clinical stage from 12/2/2023: Stage IB (cT2, cN0, cM0, G2, ER+, KY+, HER2-) - Signed by Elena Cornell MD on 12/13/2023  Stage prefix: Initial diagnosis  Histologic grading system: 3 grade system       1/2/2024 Surgery    Left breast mastectomy with sentinel lymph node biopsy  Invasive Mammary carcinoma of no special type (ductal)   Grade 2  25 mm  Margins negative  0/2 Lymph nodes  Anatomic stage IIA  Prognostic stage IA      2/21/2024 - 4/3/2024 Chemotherapy    DOXOrubicin (ADRIAMYCIN), 56.25 mg/m2 = 106 mg (93.8 % of original dose 60 mg/m2), Intravenous, Once, 3 of 3 cycles  Dose modification: 56.25 mg/m2 (original dose 60 mg/m2, Cycle 1, Reason: Other (Must fill in a comment), Comment: max recommended dose), 50 mg/m2 (original dose 60 mg/m2, Cycle 2, Reason: Nausea/Vomiting, Comment: dose reduced to 50 mg/m2 due to n/v)  Administration: 106 mg (2/21/2024), 94 mg (3/13/2024), 94 mg (4/3/2024)  alteplase (CATHFLO), 2 mg, Intracatheter, Every 1 Minute as needed, 3 of 3 cycles  cyclophosphamide (CYTOXAN) IVPB, 600 mg/m2 = 1,128 mg, Intravenous, Once, 3 of 3 cycles  Dose modification: 540 mg/m2 (original dose 600 mg/m2, Cycle 2, Reason: Nausea/Vomiting, Comment: 10% dose reduction due to n/v)  Administration: 1,128 mg (2/21/2024), 1,000 mg (3/13/2024), 1,000 mg (4/3/2024)  fosaprepitant (EMEND) IVPB, 150 mg, Intravenous, Once, 3 of 3 cycles  Administration: 150 mg (2/21/2024), 150 mg (3/13/2024), 150 mg (4/3/2024)     Cancer complicating pregnancy, third trimester    12/14/2023 Initial Diagnosis    Cancer complicating pregnancy in second trimester         Interval History: Patient presents for follow up of her left-sided breast cancer.  Her periods have restarted after the delivery of her son.  She was in the ER on September 3 with left leg pain.  At that point she had stopped anticoagulation and there was no evidence of DVT.  Labs prior to this visit show a ferritin of 64 and a hemoglobin of 13.1.  She has also been waking up with headaches on a daily basis.  They are associated with some lightheadedness.  She does have a history of migraines, but this is happening more frequently than they usually do.  Her blood pressures have been running high somewhere between 130/92 and 140/102.  When she was in the ER she had an EKG and a commented on some abnormalities in her septum as well.  She does have occasional chest pain and palpitations.  She also has a new nodule on the chest wall above her mastectomy site.         REVIEW OF SYSTEMS:  Please note that a 14-point review of systems was performed to include Constitutional, HEENT, Respiratory, CVS, GI, , Musculoskeletal, Integumentary, Neurologic, Rheumatologic, Endocrinologic, Psychiatric, Lymphatic, and Hematologic/Oncologic systems were reviewed and are negative unless otherwise stated in HPI. Positive and negative findings pertinent to this evaluation are incorporated into the history of present illness.      ECOG PS: 0    PROBLEM LIST:  Patient Active Problem List   Diagnosis    Mild persistent asthma without complication    Axillary hidradenitis suppurativa    Laryngopharyngeal reflux (LPR)    Depression with anxiety    Chronic fatigue    Chronic left shoulder pain    Cervical disc disorder at C5-C6 level with radiculopathy    Atypical squamous cells of undetermined significance (ASCUS) on Papanicolaou smear of cervix    Hypertrophic and atrophic condition of skin    Migraine headache    Shoulder impingement syndrome, left     Vitamin D deficiency    Thoracic outlet syndrome    Palpitations    Transaminitis    Right middle lobe pulmonary nodule    Reversible airway obstruction    SOB (shortness of breath)    Unexplained weight gain    Left ovarian cyst    Malignant neoplasm of overlapping sites of left breast in female, estrogen receptor positive (HCC)    Cancer complicating pregnancy, third trimester    Spontaneous vaginal delivery    History of left mastectomy    Multigravida of advanced maternal age in first trimester    DVT complicating pregnancy, third trimester    Dehydration    Iron deficiency anemia secondary to inadequate dietary iron intake    Encounter for follow-up surveillance of breast cancer    Leg weakness, bilateral    Elevated blood pressure reading without diagnosis of hypertension    Pelvic pressure in female    Idiopathic urticaria    Throat swelling    Dermatographia    Allergic rhinitis due to dust mite    Latex allergy       Assessment / Plan: 34-year-old female with a pT2 N0 ER positive breast cancer with an Oncotype of 25.  She completed 3 cycles of adjuvant AC while pregnant.  She is now about 3 months postpartum.  She has stopped her anticoagulation.  I am going to get an MRI of the brain because of the headaches and lightheadedness she is having.  I am also going to refer her to our cardio oncologist for evaluation of her hypertension and EKG findings.  I have ordered an echo as well.  Ideally I would like to start her on goserelin and then an aromatase inhibitor.  She has some plans with Dr. Budinetz for egg collection.  I will talk to her about the timing of starting the goserelin around that procedure.  I cannot give her tamoxifen because of her DVT.  I gave her oral iron to take every other day because her periods restarted.  She has a follow-up appointment with Dr. Cornell that I will try to move to sooner.  She has a hard nodule on the upper left chest wall above her mastectomy site that measures just  shy of a centimeter.  This should be removed for biopsy.  I will plan on seeing her back in 1 month to follow-up on all of these issues.  She knows to call in the interim with any questions or concerns.       I spent 45 minutes on chart review, face to face counseling time, coordination of care and documentation.    Past Medical History:   has a past medical history of Anesthesia complication, Anxiety, Asthma, CRPS (complex regional pain syndrome), upper limb, Deep vein thrombosis (HCC) (01/05/2024), GERD (gastroesophageal reflux disease), Heart murmur, HPV (human papilloma virus) infection (2012), Invasive ductal carcinoma of breast, female, left (HCC) (2023), Kidney stone, Miscarriage (3/15/2015), Ovarian cyst, and PONV (postoperative nausea and vomiting) (01/02/2024).    PAST SURGICAL HISTORY:   has a past surgical history that includes [place] tooth extraction (2012); IR upper extremity venogram- Diagnostic (05/28/2021); pr excision 1st &/cervical rib (Left, 08/12/2021); THORACOSCOPY VIDEO ASSISTED SURGERY (VATS) (Left, 08/12/2021); EGD; US guided breast biopsy right complete (Right, 12/02/2023); US guidance breast biopsy left each additional (Left, 12/02/2023); pr mastectomy simple complete (Left, 01/02/2024); pr bx/exc lymph node open superficial (Left, 01/02/2024); pr intraop sentinel lymph node id w/dye injection (Left, 01/02/2024); pr inj radioactive tracer for id of sentinel node (Left, 01/02/2024); Colposcopy (2012); and IR port placement (2/7/2024).    CURRENT MEDICATIONS  Current Outpatient Medications   Medication Sig Dispense Refill    acetaminophen (TYLENOL) 325 mg tablet Take 2 tablets (650 mg total) by mouth every 4 (four) hours as needed for mild pain      albuterol (PROVENTIL HFA,VENTOLIN HFA) 90 mcg/act inhaler Inhale 2 puffs every 4 (four) hours as needed for wheezing 6.7 g 3    Albuterol-Budesonide (Airsupra) 90-80 MCG/ACT AERO 2 puffs as needed shortness of breath BIN: [id number]    PCN: [id number]       GRP: [id number]    ID: [id number] 10.7 g 3    cetirizine (ZyrTEC) 10 mg tablet TAKE 1 TABLET BY MOUTH EVERY DAY 90 tablet 1    ferrous sulfate 324 (65 Fe) mg Take 1 tablet (324 mg total) by mouth every other day On an empty stomach with something acidic (orange juice). 45 tablet 3    fluticasone (FLONASE) 50 mcg/act nasal spray 2 sprays into each nostril daily 18 mL 5    hydrOXYzine HCL (ATARAX) 25 mg tablet Take 1 tablet (25 mg total) by mouth every 6 (six) hours as needed for itching 30 tablet 1    Multiple Vitamin (MULTIVITAMINS PO) Take by mouth      omeprazole (PriLOSEC) 40 MG capsule Take 1 capsule (40 mg total) by mouth daily 90 capsule 2    ondansetron (ZOFRAN) 8 mg tablet Take 1 tablet (8 mg total) by mouth every 8 (eight) hours as needed for nausea or vomiting 30 tablet 3    Spacer/Aero-Holding Chambers (Vortex Valved Holding Chamber) GILLIAN Use 2 (two) times a day 1 each 0    EPINEPHrine (EPIPEN) 0.3 mg/0.3 mL SOAJ Inject 0.3 mL (0.3 mg total) into a muscle once for 1 dose 2 each 3     No current facility-administered medications for this visit.     Facility-Administered Medications Ordered in Other Visits   Medication Dose Route Frequency Provider Last Rate Last Admin    alteplase (CATHFLO) injection 2 mg  2 mg Intracatheter Q1MIN PRN Nemo Khalil DO         [unfilled]    SOCIAL HISTORY:   reports that she has never smoked. She has been exposed to tobacco smoke. She has never used smokeless tobacco. She reports that she does not currently use alcohol. She reports that she does not use drugs.     FAMILY HISTORY:  family history includes Bone cancer in her maternal grandmother; Coronary artery disease in her mother; Heart disease in her mother; Miscarriages / Stillbirths in her half-sister and mother; No Known Problems in her father and half-brother.     ALLERGIES:  is allergic to formic acid, latex, and nsaids.      Physical Exam:  Vital Signs:   Visit Vitals  /92 (BP Location: Left arm, Patient  "Position: Sitting, Cuff Size: Standard)   Pulse 90   Temp 98 °F (36.7 °C)   Resp 17   Ht 5' 7\" (1.702 m)   Wt 80.3 kg (177 lb)   SpO2 99%   BMI 27.72 kg/m²   OB Status Recent pregnancy   Smoking Status Never   BSA 1.92 m²     Body mass index is 27.72 kg/m².  Body surface area is 1.92 meters squared.    GEN: Alert, awake oriented x3, in no acute distress  HEENT- No pallor, icterus, cyanosis, no oral mucosal lesions,   LAD - no palpable cervical, clavicle, axillary, inguinal LAD  Heart- normal S1 S2, regular rate and rhythm, No murmur, rubs.   Lungs- clear breathing sound bilateral.   Abdomen- soft, Non tender, bowel sounds present  Extremities- No cyanosis, clubbing, edema  Neuro- No focal neurological deficit    Labs:  Lab Results   Component Value Date    WBC 5.70 09/26/2024    HGB 13.1 09/26/2024    HCT 40.8 09/26/2024    MCV 91 09/26/2024     09/26/2024     Lab Results   Component Value Date    SODIUM 137 09/26/2024    K 4.1 09/26/2024     09/26/2024    CO2 21 09/26/2024    AGAP 9 09/26/2024    BUN 12 09/26/2024    CREATININE 0.69 09/26/2024    GLUC 93 09/26/2024    GLUF 79 04/30/2024    CALCIUM 9.0 09/26/2024    AST 20 09/26/2024    ALT 39 09/26/2024    ALKPHOS 82 09/26/2024    TP 7.3 09/26/2024    TBILI 0.34 09/26/2024    EGFR 112 09/26/2024       "

## 2024-09-30 LAB
ATRIAL RATE: 76 BPM
C1INH SERPL-MCNC: 17 MG/DL (ref 21–39)
P AXIS: 43 DEGREES
PR INTERVAL: 140 MS
QRS AXIS: 90 DEGREES
QRSD INTERVAL: 78 MS
QT INTERVAL: 354 MS
QTC INTERVAL: 398 MS
T WAVE AXIS: 79 DEGREES
VENTRICULAR RATE: 76 BPM

## 2024-09-30 PROCEDURE — 93010 ELECTROCARDIOGRAM REPORT: CPT | Performed by: INTERNAL MEDICINE

## 2024-09-30 NOTE — ED NOTES
Fluids stopped and IV removed by Kate RIVERA Discharged per provider      Ana Luisa Mclean RN  09/29/24 3891

## 2024-09-30 NOTE — ED PROVIDER NOTES
Final diagnoses:   Acute epigastric pain   Nausea   Non-cardiac chest pain     ED Disposition       ED Disposition   Discharge    Condition   Stable    Date/Time   Sun Sep 29, 2024  9:22 PM    Comment   Jillian Ch discharge to home/self care.                   Assessment & Plan       Medical Decision Making  34-year-old female with history of left breast cancer status postmastectomy and chemotherapy which ended in April, prior DVT status post anticoagulant treatment, complex regional pain syndrome, presents to the ED with primary complaint of acute epigastric pain associate with nausea as well as left-sided chest pain, central burning chest pain and palpitations.  Suspect GERD, gastritis or peptic ulcer disease.  Acid reflux or esophagitis also considered and likely the cause of her chest pain.  Overall low suspicion for cardiac etiology.  Given her history of prior DVT as well as cancer, acute PE also considered.  Will evaluate with cardiac and abdominal labs, CTA chest with CT abdomen and pelvis.  Will provide GI cocktail and IV fluid bolus for symptom relief.    Amount and/or Complexity of Data Reviewed  Labs: ordered. Decision-making details documented in ED Course.  Radiology: ordered and independent interpretation performed. Decision-making details documented in ED Course.  ECG/medicine tests: ordered and independent interpretation performed. Decision-making details documented in ED Course.    Risk  OTC drugs.  Prescription drug management.        ED Course as of 09/29/24 2125   Sun Sep 29, 2024   2022 Bacteria, UA: None Seen   2112 Updated patient about normal workup including blood work, urinalysis, EKG and CT scan.  Patient states the chest discomfort has improved however she still feels pain in her epigastrium.  I did offer Toradol but patient states she cannot tolerate NSAIDs.  Will give Bentyl.  She already takes Prilosec 40 mg daily and advised her to continue taking this and will add on  Pepcid and Bentyl to be used as needed, Zofran as needed as well.  Will place referral to GI for further evaluation of her epigastric pain.  She already plans to follow-up with cardiology for her palpitations and I encouraged her to do this.  Advised close follow-up with PCP.  Discussed ED return parameters.       Medications   dicyclomine (BENTYL) tablet 20 mg (has no administration in time range)   sodium chloride 0.9 % bolus 1,000 mL (1,000 mL Intravenous New Bag 9/29/24 1953)   ondansetron (ZOFRAN) injection 4 mg (4 mg Intravenous Given 9/29/24 1953)   acetaminophen (Ofirmev) injection 1,000 mg (1,000 mg Intravenous New Bag 9/29/24 1953)   pantoprazole (PROTONIX) injection 40 mg (40 mg Intravenous Given 9/29/24 1954)   Lidocaine Viscous HCl (XYLOCAINE) 2 % mucosal solution 10 mL (10 mL Swish & Swallow Given 9/29/24 1954)   aluminum-magnesium hydroxide-simethicone (MAALOX) oral suspension 30 mL (30 mL Oral Given 9/29/24 1954)   sucralfate (CARAFATE) tablet 1 g (1 g Oral Given 9/29/24 1954)   iohexol (OMNIPAQUE) 350 MG/ML injection (MULTI-DOSE) 100 mL (100 mL Intravenous Given 9/29/24 2033)       ED Risk Strat Scores   HEART Risk Score      Flowsheet Row Most Recent Value   Heart Score Risk Calculator    History 0 Filed at: 09/29/2024 2037   ECG 0 Filed at: 09/29/2024 2037   Age 0 Filed at: 09/29/2024 2037   Risk Factors 1 Filed at: 09/29/2024 2037   Troponin 0 Filed at: 09/29/2024 2037   HEART Score 1 Filed at: 09/29/2024 2037                               SBIRT 22yo+      Flowsheet Row Most Recent Value   Initial Alcohol Screen: US AUDIT-C     1. How often do you have a drink containing alcohol? 0 Filed at: 09/29/2024 1701   2. How many drinks containing alcohol do you have on a typical day you are drinking?  0 Filed at: 09/29/2024 1701   3a. Male UNDER 65: How often do you have five or more drinks on one occasion? 0 Filed at: 09/29/2024 1700   3b. FEMALE Any Age, or MALE 65+: How often do you have 4 or more  "drinks on one occassion? 0 Filed at: 09/29/2024 1701   Audit-C Score 0 Filed at: 09/29/2024 1701   PETRA: How many times in the past year have you...    Used an illegal drug or used a prescription medication for non-medical reasons? Never Filed at: 09/29/2024 1701            Wells' Criteria for PE      Flowsheet Row Most Recent Value   Wells' Criteria for PE    Clinical signs and symptoms of DVT 0 Filed at: 09/29/2024 2124   PE is primary diagnosis or equally likely 0 Filed at: 09/29/2024 2124   HR >100 0 Filed at: 09/29/2024 2124   Immobilization at least 3 days or Surgery in the previous 4 weeks 0 Filed at: 09/29/2024 2124   Previous, objectively diagnosed PE or DVT 1.5 Filed at: 09/29/2024 2124   Hemoptysis 0 Filed at: 09/29/2024 2124   Malignancy with treatment within 6 months or palliative 1 Filed at: 09/29/2024 2124   Wells' Criteria Total 2.5 Filed at: 09/29/2024 2124                        History of Present Illness       Chief Complaint   Patient presents with    Abdominal Pain     Reports woke up with abd pain over night and cold sweats with nausea. Now having chest and back pains. Chest pain pain described as \"firey pain.\" Reports hx of CRPS type 2- \"I dont know if I am having inflammation.\"       Past Medical History:   Diagnosis Date    Anesthesia complication     gait dysfunction/ urinary retention after nerve block,    Anxiety     Asthma     CRPS (complex regional pain syndrome), upper limb     left chest/arm/hand    Deep vein thrombosis (HCC) 01/05/2024    post op from mastectomy    GERD (gastroesophageal reflux disease)     Heart murmur     HPV (human papilloma virus) infection 2012    unsure of 16, 18, or other    Invasive ductal carcinoma of breast, female, left (HCC) 2023    Kidney stone     Miscarriage 3/15/2015    7 weeks along.    Ovarian cyst     Left    PONV (postoperative nausea and vomiting) 01/02/2024      Past Surgical History:   Procedure Laterality Date    COLPOSCOPY  2012    HPV    EGD "      IR PORT PLACEMENT  2/7/2024    IR UPPER EXTREMITY VENOGRAM- DIAGNOSTIC  05/28/2021    OK BX/EXC LYMPH NODE OPEN SUPERFICIAL Left 01/02/2024    Procedure: LEFT BREAST MASTECTOMY, LYMPHATIC MAPPING WITH RADIOACTIVE DYE, SENTINEL LYMPH NODE BIOPSY, ZAHEER LEFT BREAST, INJECTION IN OR AT 0800 BY DR CORNELL;  Surgeon: Elena Cornell MD;  Location: [facility];  Service: Surgical Oncology    OK EXCISION 1ST &/CERVICAL RIB Left 08/12/2021    Procedure: FIRST RIB RESECTION;  Surgeon: Jonathan Schaffer MD;  Location: [facility];  Service: Thoracic    OK INJ RADIOACTIVE TRACER FOR ID OF SENTINEL NODE Left 01/02/2024    Procedure: LEFT BREAST MASTECTOMY, LYMPHATIC MAPPING WITH RADIOACTIVE DYE, SENTINEL LYMPH NODE BIOPSY, ZAHEER LEFT BREAST, INJECTION IN OR AT 0800 BY DR CORNELL;  Surgeon: Elena Cornell MD;  Location: [facility];  Service: Surgical Oncology    OK INTRAOP SENTINEL LYMPH NODE ID W/DYE INJECTION Left 01/02/2024    Procedure: LEFT BREAST MASTECTOMY, LYMPHATIC MAPPING WITH RADIOACTIVE DYE, SENTINEL LYMPH NODE BIOPSY, ZAHEER LEFT BREAST, INJECTION IN OR AT 0800 BY DR CORNELL;  Surgeon: Elena Cornell MD;  [facility];  Service: Surgical Oncology    OK MASTECTOMY SIMPLE COMPLETE Left 01/02/2024    Procedure: LEFT BREAST MASTECTOMY, LYMPHATIC MAPPING WITH RADIOACTIVE DYE, SENTINEL LYMPH NODE BIOPSY, ZAHEER LEFT BREAST, INJECTION IN OR AT 0800 BY DR CORNELL;  Surgeon: Elena Cornell MD;  Location: [facility];  Service: Surgical Oncology    THORACOSCOPY VIDEO ASSISTED SURGERY (VATS) Left 08/12/2021    Procedure: THORACOSCOPY VIDEO ASSISTED SURGERY (VATS);  Surgeon: Jonathan Schaffer MD;  Location: [facility];  Service: Thoracic    US GUIDANCE BREAST BIOPSY LEFT EACH ADDITIONAL Left 12/02/2023    US GUIDED BREAST BIOPSY RIGHT COMPLETE Right 12/02/2023    WISDOM TOOTH EXTRACTION  2012      Family History   Problem Relation Age of Onset    Heart disease Mother     Miscarriages /  Stillbirths Mother     Coronary artery disease Mother     No Known Problems Father     No Known Problems Half-Brother     Bone cancer Maternal Grandmother         hilda to liver and spine    Miscarriages / Stillbirths Half-Sister     Breast cancer Neg Hx     Ovarian cancer Neg Hx     Uterine cancer Neg Hx     Cervical cancer Neg Hx       Social History     Tobacco Use    Smoking status: Never     Passive exposure: Past    Smokeless tobacco: Never   Vaping Use    Vaping status: Never Used   Substance Use Topics    Alcohol use: Not Currently     Comment: social    Drug use: Never      E-Cigarette/Vaping    E-Cigarette Use Never User       E-Cigarette/Vaping Substances    Nicotine No     THC No     CBD No     Flavoring No     Other No     Unknown No       I have reviewed and agree with the history as documented.     Patient is a 34-year-old female with past medical history significant for left-sided breast cancer status post left mastectomy in January 2024, status post chemotherapy ending in April 2024, history of DVT diagnosed several days after her mastectomy in January 2024 that is post 7 months of anticoagulant therapy recently discontinued 1 month ago, history of complex regional pain syndrome type II, presents to the emergency department for acute epigastric pain that started at around 2:30 AM.  Patient states that when the pain first started it was very severe lasting about 2 hours.  She states the pain has not gone away but it is more mild in severity.  She reports it as a sharp pain and denies experiencing similar pain.  She also reports feeling pain in her central and left side of her chest.  She states she has had left-sided chest pain before however the central chest pain which she describes as fiery pain, is new.  She reports associated nausea and when the pain for started early this morning she was very sweaty.  She denies vomiting, diarrhea, blood per rectum or melena, lower abdominal pain, UTI symptoms,  dyspnea, cough, hemoptysis, URI symptoms, fevers, chills, headache, dizziness or near syncope, skin rash or color change, leg pain or swelling or focal neurologic deficits.  Patient does report having palpitations however this is not new for her.  She states these come and go and she plans on following up with a cardiologist for this.  Denies any prior abdominal surgeries.  Denies any known history of ulcers.      History provided by:  Patient   used: No    Abdominal Pain  Associated symptoms: chest pain and nausea    Associated symptoms: no chills, no constipation, no cough, no diarrhea, no dysuria, no fever, no hematuria, no shortness of breath, no sore throat and no vomiting        Review of Systems   Constitutional:  Negative for chills and fever.   HENT:  Negative for congestion, ear pain, rhinorrhea and sore throat.    Respiratory:  Negative for cough, chest tightness, shortness of breath and wheezing.    Cardiovascular:  Positive for chest pain and palpitations. Negative for leg swelling.   Gastrointestinal:  Positive for abdominal pain and nausea. Negative for abdominal distention, blood in stool, constipation, diarrhea and vomiting.   Genitourinary:  Negative for dysuria, flank pain, frequency and hematuria.   Musculoskeletal:  Negative for back pain, neck pain and neck stiffness.   Skin:  Negative for color change, pallor, rash and wound.   Allergic/Immunologic: Negative for immunocompromised state.   Neurological:  Negative for dizziness, syncope, weakness, light-headedness, numbness and headaches.   Hematological:  Negative for adenopathy. Does not bruise/bleed easily.   Psychiatric/Behavioral:  Negative for confusion and decreased concentration.    All other systems reviewed and are negative.          Objective       ED Triage Vitals [09/29/24 1658]   Temperature Pulse Blood Pressure Respirations SpO2 Patient Position - Orthostatic VS   98.2 °F (36.8 °C) 85 114/69 18 99 % Sitting     "  Temp Source Heart Rate Source BP Location FiO2 (%) Pain Score    Oral Monitor Right arm -- --      Vitals      Date and Time Temp Pulse SpO2 Resp BP Pain Score FACES Pain Rating User   09/29/24 2000 -- 83 99 % 18 -- -- -- TF   09/29/24 1858 -- 79 99 % -- 117/69 -- -- YAKOV   09/29/24 1658 98.2 °F (36.8 °C) 85 99 % 18 114/69 -- -- BS          Vitals:    09/29/24 1658 09/29/24 1858 09/29/24 2000   BP: 114/69 117/69    BP Location: Right arm     Pulse: 85 79 83   Resp: 18  18   Temp: 98.2 °F (36.8 °C)     TempSrc: Oral     SpO2: 99% 99% 99%   Weight: 78.1 kg (172 lb 2.9 oz)     Height: 5' 7\" (1.702 m)          Physical Exam  Vitals and nursing note reviewed.   Constitutional:       General: She is not in acute distress.     Appearance: Normal appearance. She is well-developed. She is not ill-appearing, toxic-appearing or diaphoretic.   HENT:      Head: Normocephalic and atraumatic.      Right Ear: External ear normal.      Left Ear: External ear normal.      Mouth/Throat:      Mouth: Mucous membranes are moist.      Pharynx: Oropharynx is clear.   Eyes:      Extraocular Movements: Extraocular movements intact.      Conjunctiva/sclera: Conjunctivae normal.   Neck:      Vascular: No JVD.   Cardiovascular:      Rate and Rhythm: Normal rate and regular rhythm.      Pulses: Normal pulses.      Heart sounds: Normal heart sounds. No murmur heard.     No friction rub. No gallop.   Pulmonary:      Effort: Pulmonary effort is normal. No respiratory distress.      Breath sounds: Normal breath sounds. No wheezing, rhonchi or rales.      Comments: +Left chest wall tenderness. Incision over left mastectomy site appears unremarkable without signs of infection.   Chest:      Chest wall: Tenderness present.   Abdominal:      General: There is no distension.      Palpations: Abdomen is soft.      Tenderness: There is abdominal tenderness. There is no guarding or rebound.      Comments: +Epigastric and mild RUQ abdominal tenderness. " Negative English's sign.    Musculoskeletal:         General: No swelling or tenderness. Normal range of motion.      Cervical back: Normal range of motion and neck supple. No rigidity.   Skin:     General: Skin is warm and dry.      Coloration: Skin is not pale.      Findings: No erythema or rash.   Neurological:      General: No focal deficit present.      Mental Status: She is alert and oriented to person, place, and time.      Sensory: No sensory deficit.      Motor: No weakness.   Psychiatric:         Mood and Affect: Mood normal.         Behavior: Behavior normal.         Results Reviewed       Procedure Component Value Units Date/Time    HS Troponin I 2hr [[id number]]  (Normal) Collected: 09/29/24 1952    Lab Status: Final result Specimen: Blood from Arm, Right Updated: 09/29/24 2030     hs TnI 2hr 3 ng/L      Delta 2hr hsTnI 0 ng/L     Urine Microscopic [[id number]]  (Abnormal) Collected: 09/29/24 2001    Lab Status: Final result Specimen: Urine, Clean Catch Updated: 09/29/24 2011     RBC, UA 1-2 /hpf      WBC, UA 2-4 /hpf      Epithelial Cells Occasional /hpf      Bacteria, UA None Seen /hpf     UA (URINE) with reflex to Scope [[id number]]  (Abnormal) Collected: 09/29/24 2001    Lab Status: Final result Specimen: Urine, Clean Catch Updated: 09/29/24 2010     Color, UA Light Yellow     Clarity, UA Clear     Specific Gravity, UA 1.021     pH, UA 5.5     Leukocytes, UA Trace     Nitrite, UA Negative     Protein, UA Negative mg/dl      Glucose, UA Negative mg/dl      Ketones, UA Negative mg/dl      Urobilinogen, UA <2.0 mg/dl      Bilirubin, UA Negative     Occult Blood, UA Negative    POCT pregnancy, urine [[id number]]  (Normal) Resulted: 09/29/24 1952    Lab Status: Final result Updated: 09/29/24 1952     EXT Preg Test, Ur Negative     Control Valid    Magnesium [[id number]]  (Normal) Collected: 09/29/24 1708    Lab Status: Final result Specimen: Blood from Arm, Right Updated: 09/29/24 1947     Magnesium 1.9 mg/dL      hCG, qualitative pregnancy [[id number]]  (Normal) Collected: 09/29/24 1708    Lab Status: Final result Specimen: Blood from Arm, Right Updated: 09/29/24 1947     Preg, Serum Negative    TSH, 3rd generation with Free T4 reflex [[id number]]  (Normal) Collected: 09/29/24 1708    Lab Status: Final result Specimen: Blood from Arm, Right Updated: 09/29/24 1947     TSH 3RD GENERATON 2.036 uIU/mL     B-Type Natriuretic Peptide(BNP) [[id number]]  (Normal) Collected: 09/29/24 1708    Lab Status: Final result Specimen: Blood from Arm, Right Updated: 09/29/24 1934     BNP 21 pg/mL     Protime-INR [[id number]]  (Normal) Collected: 09/29/24 1708    Lab Status: Final result Specimen: Blood from Arm, Right Updated: 09/29/24 1925     Protime 13.3 seconds      INR 0.94    Narrative:      INR Therapeutic Range    Indication                                             INR Range      Atrial Fibrillation                                               2.0-3.0  Hypercoagulable State                                    2.0.2.3  Left Ventricular Asist Device                            2.0-3.0  Mechanical Heart Valve                                  -    Aortic(with afib, MI, embolism, HF, LA enlargement,    and/or coagulopathy)                                     2.0-3.0 (2.5-3.5)     Mitral                                                             2.5-3.5  Prosthetic/Bioprosthetic Heart Valve               2.0-3.0  Venous thromboembolism (VTE: VT, PE        2.0-3.0    APTT [[id number]]  (Normal) Collected: 09/29/24 1708    Lab Status: Final result Specimen: Blood from Arm, Right Updated: 09/29/24 1925     PTT 24 seconds     HS Troponin 0hr (reflex protocol) [[id number]]  (Normal) Collected: 09/29/24 1708    Lab Status: Final result Specimen: Blood from Arm, Right Updated: 09/29/24 1737     hs TnI 0hr 3 ng/L     Comprehensive metabolic panel [[id number]] Collected: 09/29/24 1708    Lab Status: Final result Specimen: Blood from Arm, Right  Updated: 09/29/24 1731     Sodium 137 mmol/L      Potassium 3.7 mmol/L      Chloride 106 mmol/L      CO2 24 mmol/L      ANION GAP 7 mmol/L      BUN 13 mg/dL      Creatinine 0.70 mg/dL      Glucose 85 mg/dL      Calcium 8.8 mg/dL      AST 16 U/L      ALT 33 U/L      Alkaline Phosphatase 81 U/L      Total Protein 7.4 g/dL      Albumin 4.2 g/dL      Total Bilirubin 0.39 mg/dL      eGFR 111 ml/min/1.73sq m     Narrative:      National Kidney Disease Foundation guidelines for Chronic Kidney Disease (CKD):     Stage 1 with normal or high GFR (GFR > 90 mL/min/1.73 square meters)    Stage 2 Mild CKD (GFR = 60-89 mL/min/1.73 square meters)    Stage 3A Moderate CKD (GFR = 45-59 mL/min/1.73 square meters)    Stage 3B Moderate CKD (GFR = 30-44 mL/min/1.73 square meters)    Stage 4 Severe CKD (GFR = 15-29 mL/min/1.73 square meters)    Stage 5 End Stage CKD (GFR <15 mL/min/1.73 square meters)  Note: GFR calculation is accurate only with a steady state creatinine    Lipase [[id number]]  (Normal) Collected: 09/29/24 1708    Lab Status: Final result Specimen: Blood from Arm, Right Updated: 09/29/24 1731     Lipase 36 u/L     CBC and differential [[id number]] Collected: 09/29/24 1708    Lab Status: Final result Specimen: Blood from Arm, Right Updated: 09/29/24 1714     WBC 7.22 Thousand/uL      RBC 4.24 Million/uL      Hemoglobin 12.4 g/dL      Hematocrit 38.8 %      MCV 92 fL      MCH 29.2 pg      MCHC 32.0 g/dL      RDW 12.5 %      MPV 10.9 fL      Platelets 159 Thousands/uL      nRBC 0 /100 WBCs      Segmented % 70 %      Immature Grans % 0 %      Lymphocytes % 21 %      Monocytes % 6 %      Eosinophils Relative 3 %      Basophils Relative 0 %      Absolute Neutrophils 5.02 Thousands/µL      Absolute Immature Grans 0.01 Thousand/uL      Absolute Lymphocytes 1.51 Thousands/µL      Absolute Monocytes 0.43 Thousand/µL      Eosinophils Absolute 0.23 Thousand/µL      Basophils Absolute 0.02 Thousands/µL             PE Study with CT  Abdomen and Pelvis with contrast   Final Interpretation by Kelsey Bradford MD ([id number])      No acute findings in the chest, abdomen or pelvis.                     Workstation performed: [id number]         XR chest 1 view portable   ED Interpretation by Yoanna Benson DO ([id number])   No acute abnormality in the chest.          ECG 12 Lead Documentation Only    Date/Time: 2024 5:05 PM    Performed by: Yoanna Benson DO  Authorized by: Yoanna Benson DO    ECG reviewed by me, the ED Provider: yes    Patient location:  ED  Previous ECG:     Previous ECG:  Compared to current    Comparison ECG info:  9-3-2024    Comparison to previous ECG: Axis has shifted right.  T wave inversion new in aVL.  Rate:     ECG rate:  76    ECG rate assessment: normal    Rhythm:     Rhythm: sinus rhythm    Ectopy:     Ectopy: none    QRS:     QRS axis:  Right    QRS intervals:  Normal  Conduction:     Conduction: normal    ST segments:     ST segments:  Normal  T waves:     T waves: inverted      Inverted:  AVL  Other findings:     Other findings: early repolarization        ED Medication and Procedure Management   Prior to Admission Medications   Prescriptions Last Dose Informant Patient Reported? Taking?   Albuterol-Budesonide (Airsupra) 90-80 MCG/ACT AERO   No No   Si puffs as needed shortness of breath BIN: [id number]    PCN: [id number]      GRP: [id number]    ID: [id number]   EPINEPHrine (EPIPEN) 0.3 mg/0.3 mL SOAJ   No No   Sig: Inject 0.3 mL (0.3 mg total) into a muscle once for 1 dose   Multiple Vitamin (MULTIVITAMINS PO)   Yes No   Sig: Take by mouth   Spacer/Aero-Holding Chambers (Vortex Valved Holding Chamber) GILLIAN   No No   Sig: Use 2 (two) times a day   acetaminophen (TYLENOL) 325 mg tablet   No No   Sig: Take 2 tablets (650 mg total) by mouth every 4 (four) hours as needed for mild pain   albuterol (PROVENTIL HFA,VENTOLIN HFA) 90 mcg/act inhaler   No No   Sig: Inhale 2 puffs every 4 (four) hours  as needed for wheezing   cetirizine (ZyrTEC) 10 mg tablet   No No   Sig: TAKE 1 TABLET BY MOUTH EVERY DAY   ferrous sulfate 324 (65 Fe) mg   No No   Sig: Take 1 tablet (324 mg total) by mouth every other day On an empty stomach with something acidic (orange juice).   fluticasone (FLONASE) 50 mcg/act nasal spray   No No   Si sprays into each nostril daily   hydrOXYzine HCL (ATARAX) 25 mg tablet   No No   Sig: Take 1 tablet (25 mg total) by mouth every 6 (six) hours as needed for itching   omeprazole (PriLOSEC) 40 MG capsule  Self No No   Sig: Take 1 capsule (40 mg total) by mouth daily   ondansetron (ZOFRAN) 8 mg tablet  Self No No   Sig: Take 1 tablet (8 mg total) by mouth every 8 (eight) hours as needed for nausea or vomiting      Facility-Administered Medications: None     Patient's Medications   Discharge Prescriptions    DICYCLOMINE (BENTYL) 20 MG TABLET    Take 1 tablet (20 mg total) by mouth every 8 (eight) hours as needed (abdominal pain)       Start Date: 2024 End Date: --       Order Dose: 20 mg       Quantity: 12 tablet    Refills: 0    FAMOTIDINE (PEPCID) 20 MG TABLET    Take 1 tablet (20 mg total) by mouth 2 (two) times a day       Start Date: 2024 End Date: --       Order Dose: 20 mg       Quantity: 30 tablet    Refills: 0    ONDANSETRON (ZOFRAN-ODT) 4 MG DISINTEGRATING TABLET    Take 1 tablet (4 mg total) by mouth every 8 (eight) hours as needed for nausea or vomiting       Start Date: 2024 End Date: --       Order Dose: 4 mg       Quantity: 12 tablet    Refills: 0       ED SEPSIS DOCUMENTATION   Time reflects when diagnosis was documented in both MDM as applicable and the Disposition within this note       Time User Action Codes Description Comment    2024  9:22 PM Yoanna Benson [R10.13] Acute epigastric pain     2024  9:22 PM Yoanna Benson [R11.0] Nausea     2024  9:22 PM Yoanna Benson [R07.89] Non-cardiac chest pain                  Yoanna  Cheyanne Benson,   09/29/24 2127

## 2024-10-01 ENCOUNTER — TELEPHONE (OUTPATIENT)
Age: 34
End: 2024-10-01

## 2024-10-01 LAB — C1INH ACT/NOR SERPL: >110 %MEAN NORMAL

## 2024-10-01 NOTE — RESULT ENCOUNTER NOTE
C1 esterase inhibitor level low  TSH, C4, LEIDA, antimicrosomal antibody, total IgE, tryptase are in normal and acceptable limits

## 2024-10-01 NOTE — TELEPHONE ENCOUNTER
Kathy, an RN case manager at [facility]/ wanted Makayla to know that she enrolled the patient in the case management program at [facility].  Pt was diagnosed with breast cancer.

## 2024-10-02 ENCOUNTER — HOSPITAL ENCOUNTER (OUTPATIENT)
Dept: INFUSION CENTER | Facility: CLINIC | Age: 34
Discharge: HOME | End: 2024-10-02
Payer: COMMERCIAL

## 2024-10-02 VITALS
SYSTOLIC BLOOD PRESSURE: 120 MMHG | RESPIRATION RATE: 20 BRPM | HEART RATE: 84 BPM | DIASTOLIC BLOOD PRESSURE: 69 MMHG | TEMPERATURE: 97.2 F

## 2024-10-02 DIAGNOSIS — E86.0 DEHYDRATION: Primary | ICD-10-CM

## 2024-10-02 PROCEDURE — 96360 HYDRATION IV INFUSION INIT: CPT

## 2024-10-02 RX ADMIN — SODIUM CHLORIDE 1000 ML: 0.9 INJECTION, SOLUTION INTRAVENOUS at 14:49

## 2024-10-02 NOTE — PROGRESS NOTES
Pt presents for IV fluids offering no complaints. Port accessed with positive blood return noted. PT tolerated fluids well. Port flushed and deaccessed without complications.AVS declined, next appointment 10/4 at 3:30.

## 2024-10-03 ENCOUNTER — HOSPITAL ENCOUNTER (OUTPATIENT)
Dept: MRI IMAGING | Facility: HOSPITAL | Age: 34
End: 2024-10-03
Attending: INTERNAL MEDICINE
Payer: COMMERCIAL

## 2024-10-03 ENCOUNTER — CONSULT (OUTPATIENT)
Dept: CARDIOLOGY CLINIC | Facility: CLINIC | Age: 34
End: 2024-10-03
Payer: COMMERCIAL

## 2024-10-03 VITALS
DIASTOLIC BLOOD PRESSURE: 74 MMHG | SYSTOLIC BLOOD PRESSURE: 126 MMHG | OXYGEN SATURATION: 99 % | WEIGHT: 167 LBS | HEIGHT: 67 IN | RESPIRATION RATE: 16 BRPM | HEART RATE: 68 BPM | BODY MASS INDEX: 26.21 KG/M2

## 2024-10-03 DIAGNOSIS — C50.812 MALIGNANT NEOPLASM OF OVERLAPPING SITES OF LEFT FEMALE BREAST, UNSPECIFIED ESTROGEN RECEPTOR STATUS (HCC): ICD-10-CM

## 2024-10-03 DIAGNOSIS — R07.89 OTHER CHEST PAIN: ICD-10-CM

## 2024-10-03 DIAGNOSIS — Z17.0 MALIGNANT NEOPLASM OF OVERLAPPING SITES OF LEFT BREAST IN FEMALE, ESTROGEN RECEPTOR POSITIVE (HCC): Primary | ICD-10-CM

## 2024-10-03 DIAGNOSIS — C50.812 MALIGNANT NEOPLASM OF OVERLAPPING SITES OF LEFT BREAST IN FEMALE, ESTROGEN RECEPTOR POSITIVE (HCC): Primary | ICD-10-CM

## 2024-10-03 DIAGNOSIS — R00.2 PALPITATIONS: ICD-10-CM

## 2024-10-03 DIAGNOSIS — Z51.81 THERAPEUTIC DRUG MONITORING: ICD-10-CM

## 2024-10-03 PROCEDURE — 70553 MRI BRAIN STEM W/O & W/DYE: CPT

## 2024-10-03 PROCEDURE — 99204 OFFICE O/P NEW MOD 45 MIN: CPT | Performed by: PHYSICIAN ASSISTANT

## 2024-10-03 PROCEDURE — A9585 GADOBUTROL INJECTION: HCPCS | Performed by: INTERNAL MEDICINE

## 2024-10-03 RX ORDER — GADOBUTROL 604.72 MG/ML
7 INJECTION INTRAVENOUS
Status: COMPLETED | OUTPATIENT
Start: 2024-10-03 | End: 2024-10-03

## 2024-10-03 RX ADMIN — GADOBUTROL 7 ML: 604.72 INJECTION INTRAVENOUS at 14:21

## 2024-10-03 NOTE — ASSESSMENT & PLAN NOTE
A substernal burning sensation that can happen at rest or with exertion.  Sometimes associated with palpitations.    Stress echocardiogram will be obtained to ensure that there is no ischemic cause especially in the setting of a an EKG that showed septal wall MI.

## 2024-10-03 NOTE — ASSESSMENT & PLAN NOTE
S/p chemotherapy with AC completed on April 3, 2024.   Patient did not undergo radiation due to being pregnant at the time  S/p left mastectomy  Will refer to cardio oncology  In the meantime echocardiogram will be obtained to assess LV function

## 2024-10-03 NOTE — PROGRESS NOTES
[facility]   Outpatient Note  Jillian Ch  1990  [id number]  [facility] [address] [address]  [phone]    Jillian Ch is a 34 y.o. female    Assessment and Plan:   Malignant neoplasm of overlapping sites of left breast in female, estrogen receptor positive (HCC)  S/p chemotherapy with AC completed on April 3, 2024.   Patient did not undergo radiation due to being pregnant at the time  S/p left mastectomy  Will refer to cardio oncology  In the meantime echocardiogram will be obtained to assess LV function        Palpitations  Sensation of racing heart rate as well as palpitations when at rest accompanied by a burning sensation in the chest at times.  This radiates into the left arm.   Can happen at random times and sometimes aggravated with exertion.    2-week ZIO monitor will be obtained to assess for significant arrhythmias  As well 2D echo will be obtained to assess LV function and look for WMA  In the setting of ECG that showed possible septal wall MI patient will also undergo exercise stress echo to ensure that there is no ischemic cause of chest pain.    Other chest pain  A substernal burning sensation that can happen at rest or with exertion.  Sometimes associated with palpitations.    Stress echocardiogram will be obtained to ensure that there is no ischemic cause especially in the setting of a an EKG that showed septal wall MI.      Additional Plan:   Medications as detailed above.    Pertinent testing orders as outlined.      Available lab and test results are reviewed with the patient and any additional required labs are ordered as noted.     Return visit will be in one month or earlier if there are problems.     The patient is encouraged to call in the meantime if there are questions or concerns.      Subjective:   The patient is seen in the office today as a new  "patient.  She has a history of breast cancer and underwent chemotherapy with AC.  She was unable to undergo radiation due to being pregnant at the time.  Her baby is 13 weeks old.  She continues to have palpitations that bother her as they increase in severity and duration.  She has a smart watch that alerted her that her heart rate was slow and minutes later it was fast heart rate went from .  She was feeling a little \"off\" and had some chest burning associated with palpitations.  She randomly gets chest burning sometimes with exertion and other times at rest.  It radiates into the left arm.  Patient states that it needs to be severe for her to realize that she is having chest burning sensations due to being diagnosed with complex regional pain syndrome of the left side.    She denies any dizziness lightheadedness or syncope.  She denies any TIA or CVA symptoms.  She has no edema orthopnea or PND.  She does not smoke or drink.  She has no family history of CAD.    In the emergency room for chest discomfort and was thought to have GI source as troponin and EKG were normal.  Prior to this on September 3 patient was also seen in the emergency room and EKG at that time showed possible old septal wall MI.        Social History  Social History     Tobacco Use   Smoking Status Never    Passive exposure: Past   Smokeless Tobacco Never   ,   Social History     Substance and Sexual Activity   Alcohol Use Not Currently    Comment: social   ,   Social History     Substance and Sexual Activity   Drug Use Never     Family History   Problem Relation Age of Onset    Heart disease Mother     Miscarriages / Stillbirths Mother     Coronary artery disease Mother     No Known Problems Father     No Known Problems Half-Brother     Bone cancer Maternal Grandmother         hilda to liver and spine    Miscarriages / Stillbirths Half-Sister     Breast cancer Neg Hx     Ovarian cancer Neg Hx     Uterine cancer Neg Hx     Cervical cancer " Neg Hx        Medical and Surgical History  Past Medical History:   Diagnosis Date    Anesthesia complication     gait dysfunction/ urinary retention after nerve block,    Anxiety     Asthma     CRPS (complex regional pain syndrome), upper limb     left chest/arm/hand    Deep vein thrombosis (HCC) 01/05/2024    post op from mastectomy    GERD (gastroesophageal reflux disease)     Heart murmur     HPV (human papilloma virus) infection 2012    unsure of 16, 18, or other    Invasive ductal carcinoma of breast, female, left (HCC) 2023    Kidney stone     Miscarriage 3/15/2015    7 weeks along.    Ovarian cyst     Left    PONV (postoperative nausea and vomiting) 01/02/2024     Past Surgical History:   Procedure Laterality Date    COLPOSCOPY  2012    HPV    EGD      IR PORT PLACEMENT  2/7/2024    IR UPPER EXTREMITY VENOGRAM- DIAGNOSTIC  05/28/2021    MT BX/EXC LYMPH NODE OPEN SUPERFICIAL Left 01/02/2024    Procedure: LEFT BREAST MASTECTOMY, LYMPHATIC MAPPING WITH RADIOACTIVE DYE, SENTINEL LYMPH NODE BIOPSY, ZAHEER LEFT BREAST, INJECTION IN OR AT 0800 BY DR CRONELL;  Surgeon: Elena Cornell MD;  [facility];  Service: Surgical Oncology    MT EXCISION 1ST &/CERVICAL RIB Left 08/12/2021    Procedure: FIRST RIB RESECTION;  Surgeon: Jonathan Schaffer MD;  [facility];  Service: Thoracic    MT INJ RADIOACTIVE TRACER FOR ID OF SENTINEL NODE Left 01/02/2024    Procedure: LEFT BREAST MASTECTOMY, LYMPHATIC MAPPING WITH RADIOACTIVE DYE, SENTINEL LYMPH NODE BIOPSY, ZAHEER LEFT BREAST, INJECTION IN OR AT 0800 BY DR CORNELL;  Surgeon: Elena Cornell MD;  [facility];  Service: Surgical Oncology    MT INTRAOP SENTINEL LYMPH NODE ID W/DYE INJECTION Left 01/02/2024    Procedure: LEFT BREAST MASTECTOMY, LYMPHATIC MAPPING WITH RADIOACTIVE DYE, SENTINEL LYMPH NODE BIOPSY, ZAHEER LEFT BREAST, INJECTION IN OR AT 0800 BY DR CORNELL;  Surgeon: Elena Cornell MD;  [facility];  Service:  Surgical Oncology    DE MASTECTOMY SIMPLE COMPLETE Left 01/02/2024    Procedure: LEFT BREAST MASTECTOMY, LYMPHATIC MAPPING WITH RADIOACTIVE DYE, SENTINEL LYMPH NODE BIOPSY, ZAHEER LEFT BREAST, INJECTION IN OR AT 0800 BY DR CORNELL;  Surgeon: Elena Cornell MD;  [facility];  Service: Surgical Oncology    THORACOSCOPY VIDEO ASSISTED SURGERY (VATS) Left 08/12/2021    Procedure: THORACOSCOPY VIDEO ASSISTED SURGERY (VATS);  Surgeon: Jonathan Schaffer MD;  [facility];  Service: Thoracic    US GUIDANCE BREAST BIOPSY LEFT EACH ADDITIONAL Left 12/02/2023    US GUIDED BREAST BIOPSY RIGHT COMPLETE Right 12/02/2023    WISDOM TOOTH EXTRACTION  2012         Current Outpatient Medications:     acetaminophen (TYLENOL) 325 mg tablet, Take 2 tablets (650 mg total) by mouth every 4 (four) hours as needed for mild pain, Disp: , Rfl:     albuterol (PROVENTIL HFA,VENTOLIN HFA) 90 mcg/act inhaler, Inhale 2 puffs every 4 (four) hours as needed for wheezing, Disp: 6.7 g, Rfl: 3    Albuterol-Budesonide (Airsupra) 90-80 MCG/ACT AERO, 2 puffs as needed shortness of breath BIN: [id number]    PCN: [id number]      GRP: [id number]    ID: [id number], Disp: 10.7 g, Rfl: 3    cetirizine (ZyrTEC) 10 mg tablet, TAKE 1 TABLET BY MOUTH EVERY DAY, Disp: 90 tablet, Rfl: 1    dicyclomine (BENTYL) 20 mg tablet, Take 1 tablet (20 mg total) by mouth every 8 (eight) hours as needed (abdominal pain), Disp: 12 tablet, Rfl: 0    famotidine (PEPCID) 20 mg tablet, Take 1 tablet (20 mg total) by mouth 2 (two) times a day, Disp: 30 tablet, Rfl: 0    ferrous sulfate 324 (65 Fe) mg, Take 1 tablet (324 mg total) by mouth every other day On an empty stomach with something acidic (orange juice)., Disp: 45 tablet, Rfl: 3    fluticasone (FLONASE) 50 mcg/act nasal spray, 2 sprays into each nostril daily, Disp: 18 mL, Rfl: 5    hydrOXYzine HCL (ATARAX) 25 mg tablet, Take 1 tablet (25 mg total) by mouth every 6 (six) hours as needed for itching, Disp: 30 tablet,  "Rfl: 1    Multiple Vitamin (MULTIVITAMINS PO), Take by mouth, Disp: , Rfl:     omeprazole (PriLOSEC) 40 MG capsule, Take 1 capsule (40 mg total) by mouth daily, Disp: 90 capsule, Rfl: 2    ondansetron (ZOFRAN) 8 mg tablet, Take 1 tablet (8 mg total) by mouth every 8 (eight) hours as needed for nausea or vomiting, Disp: 30 tablet, Rfl: 3    ondansetron (ZOFRAN-ODT) 4 mg disintegrating tablet, Take 1 tablet (4 mg total) by mouth every 8 (eight) hours as needed for nausea or vomiting, Disp: 12 tablet, Rfl: 0    EPINEPHrine (EPIPEN) 0.3 mg/0.3 mL SOAJ, Inject 0.3 mL (0.3 mg total) into a muscle once for 1 dose, Disp: 2 each, Rfl: 3    Spacer/Aero-Holding Chambers (Vortex Valved Holding Chamber) GILLIAN, Use 2 (two) times a day, Disp: 1 each, Rfl: 0  No current facility-administered medications for this visit.    Facility-Administered Medications Ordered in Other Visits:     alteplase (CATHFLO) injection 2 mg, 2 mg, Intracatheter, Q1MIN PRN, Nemo Agostino, DO    ondansetron (ZOFRAN) 8 mg in sodium chloride 0.9 % 50 mL IVPB, 8 mg, Intravenous, Once, Nemo Agostino, DO  Allergies   Allergen Reactions    Formic Acid Swelling and Rash     (Severe reactions to Bug bites)    Latex Rash and Blisters    Nsaids GI Intolerance       ROS    Objective:   /74 (BP Location: Right arm, Patient Position: Sitting, Cuff Size: Standard)   Pulse 68   Resp 16   Ht 5' 7\" (1.702 m)   Wt 75.8 kg (167 lb)   SpO2 99%   Breastfeeding No   BMI 26.16 kg/m²   Physical Exam    Lab Review:   No results found for: \"CHOL\"  Lab Results   Component Value Date    HDL 43 (L) 12/02/2023     Lab Results   Component Value Date    LDLCALC 73 12/02/2023     Lab Results   Component Value Date    TRIG 56 12/02/2023     Results Reviewed       None          Results Reviewed       None          Results Reviewed       None            Recent Cardiovascular Testing:   Echo stress echo and Holter are pending    Echo 2/8/2024 normal LVEF 60% Wall motion is " normal    ECG Review:   9/29/2024: Normal sinus rhythm rightward axis    9/3/2024: Normal sinus rhythm septal wall infarct, age undetermined

## 2024-10-03 NOTE — H&P (VIEW-ONLY)
[facility]   Outpatient Note  Jillian Ch  1990  [id number]  [facility] [address] [address]  [phone]    Jillian Ch is a 34 y.o. female    Assessment and Plan:   Malignant neoplasm of overlapping sites of left breast in female, estrogen receptor positive (HCC)  S/p chemotherapy with AC completed on April 3, 2024.   Patient did not undergo radiation due to being pregnant at the time  S/p left mastectomy  Will refer to cardio oncology  In the meantime echocardiogram will be obtained to assess LV function        Palpitations  Sensation of racing heart rate as well as palpitations when at rest accompanied by a burning sensation in the chest at times.  This radiates into the left arm.   Can happen at random times and sometimes aggravated with exertion.    2-week ZIO monitor will be obtained to assess for significant arrhythmias  As well 2D echo will be obtained to assess LV function and look for WMA  In the setting of ECG that showed possible septal wall MI patient will also undergo exercise stress echo to ensure that there is no ischemic cause of chest pain.    Other chest pain  A substernal burning sensation that can happen at rest or with exertion.  Sometimes associated with palpitations.    Stress echocardiogram will be obtained to ensure that there is no ischemic cause especially in the setting of a an EKG that showed septal wall MI.      Additional Plan:   Medications as detailed above.    Pertinent testing orders as outlined.      Available lab and test results are reviewed with the patient and any additional required labs are ordered as noted.     Return visit will be in one month or earlier if there are problems.     The patient is encouraged to call in the meantime if there are questions or concerns.      Subjective:   The patient is seen in the office today as a new  "patient.  She has a history of breast cancer and underwent chemotherapy with AC.  She was unable to undergo radiation due to being pregnant at the time.  Her baby is 13 weeks old.  She continues to have palpitations that bother her as they increase in severity and duration.  She has a smart watch that alerted her that her heart rate was slow and minutes later it was fast heart rate went from .  She was feeling a little \"off\" and had some chest burning associated with palpitations.  She randomly gets chest burning sometimes with exertion and other times at rest.  It radiates into the left arm.  Patient states that it needs to be severe for her to realize that she is having chest burning sensations due to being diagnosed with complex regional pain syndrome of the left side.    She denies any dizziness lightheadedness or syncope.  She denies any TIA or CVA symptoms.  She has no edema orthopnea or PND.  She does not smoke or drink.  She has no family history of CAD.    In the emergency room for chest discomfort and was thought to have GI source as troponin and EKG were normal.  Prior to this on September 3 patient was also seen in the emergency room and EKG at that time showed possible old septal wall MI.        Social History  Social History     Tobacco Use   Smoking Status Never    Passive exposure: Past   Smokeless Tobacco Never   ,   Social History     Substance and Sexual Activity   Alcohol Use Not Currently    Comment: social   ,   Social History     Substance and Sexual Activity   Drug Use Never     Family History   Problem Relation Age of Onset    Heart disease Mother     Miscarriages / Stillbirths Mother     Coronary artery disease Mother     No Known Problems Father     No Known Problems Half-Brother     Bone cancer Maternal Grandmother         hilda to liver and spine    Miscarriages / Stillbirths Half-Sister     Breast cancer Neg Hx     Ovarian cancer Neg Hx     Uterine cancer Neg Hx     Cervical cancer " Neg Hx        Medical and Surgical History  Past Medical History:   Diagnosis Date    Anesthesia complication     gait dysfunction/ urinary retention after nerve block,    Anxiety     Asthma     CRPS (complex regional pain syndrome), upper limb     left chest/arm/hand    Deep vein thrombosis (HCC) 01/05/2024    post op from mastectomy    GERD (gastroesophageal reflux disease)     Heart murmur     HPV (human papilloma virus) infection 2012    unsure of 16, 18, or other    Invasive ductal carcinoma of breast, female, left (HCC) 2023    Kidney stone     Miscarriage 3/15/2015    7 weeks along.    Ovarian cyst     Left    PONV (postoperative nausea and vomiting) 01/02/2024     Past Surgical History:   Procedure Laterality Date    COLPOSCOPY  2012    HPV    EGD      IR PORT PLACEMENT  2/7/2024    IR UPPER EXTREMITY VENOGRAM- DIAGNOSTIC  05/28/2021    VA BX/EXC LYMPH NODE OPEN SUPERFICIAL Left 01/02/2024    Procedure: LEFT BREAST MASTECTOMY, LYMPHATIC MAPPING WITH RADIOACTIVE DYE, SENTINEL LYMPH NODE BIOPSY, ZAHEER LEFT BREAST, INJECTION IN OR AT 0800 BY DR CORNELL;  Surgeon: Elena Cornell MD;  [facility];  Service: Surgical Oncology    VA EXCISION 1ST &/CERVICAL RIB Left 08/12/2021    Procedure: FIRST RIB RESECTION;  Surgeon: Jonathan Schaffer MD;  [facility];  Service: Thoracic    VA INJ RADIOACTIVE TRACER FOR ID OF SENTINEL NODE Left 01/02/2024    Procedure: LEFT BREAST MASTECTOMY, LYMPHATIC MAPPING WITH RADIOACTIVE DYE, SENTINEL LYMPH NODE BIOPSY, ZAHEER LEFT BREAST, INJECTION IN OR AT 0800 BY DR CORNELL;  Surgeon: Elena Cornell MD;  [facility];  Service: Surgical Oncology    VA INTRAOP SENTINEL LYMPH NODE ID W/DYE INJECTION Left 01/02/2024    Procedure: LEFT BREAST MASTECTOMY, LYMPHATIC MAPPING WITH RADIOACTIVE DYE, SENTINEL LYMPH NODE BIOPSY, ZAHEER LEFT BREAST, INJECTION IN OR AT 0800 BY DR CORNELL;  Surgeon: Elena Cornell MD;  [facility];  Service:  Surgical Oncology    PA MASTECTOMY SIMPLE COMPLETE Left 01/02/2024    Procedure: LEFT BREAST MASTECTOMY, LYMPHATIC MAPPING WITH RADIOACTIVE DYE, SENTINEL LYMPH NODE BIOPSY, ZAHEER LEFT BREAST, INJECTION IN OR AT 0800 BY DR CORNELL;  Surgeon: Elena Cornell MD;  [facility];  Service: Surgical Oncology    THORACOSCOPY VIDEO ASSISTED SURGERY (VATS) Left 08/12/2021    Procedure: THORACOSCOPY VIDEO ASSISTED SURGERY (VATS);  Surgeon: Jonathan Schaffer MD;  [facility];  Service: Thoracic    US GUIDANCE BREAST BIOPSY LEFT EACH ADDITIONAL Left 12/02/2023    US GUIDED BREAST BIOPSY RIGHT COMPLETE Right 12/02/2023    WISDOM TOOTH EXTRACTION  2012         Current Outpatient Medications:     acetaminophen (TYLENOL) 325 mg tablet, Take 2 tablets (650 mg total) by mouth every 4 (four) hours as needed for mild pain, Disp: , Rfl:     albuterol (PROVENTIL HFA,VENTOLIN HFA) 90 mcg/act inhaler, Inhale 2 puffs every 4 (four) hours as needed for wheezing, Disp: 6.7 g, Rfl: 3    Albuterol-Budesonide (Airsupra) 90-80 MCG/ACT AERO, 2 puffs as needed shortness of breath BIN: [id number]    PCN: [id number]      GRP: [id number]    ID: [id number], Disp: 10.7 g, Rfl: 3    cetirizine (ZyrTEC) 10 mg tablet, TAKE 1 TABLET BY MOUTH EVERY DAY, Disp: 90 tablet, Rfl: 1    dicyclomine (BENTYL) 20 mg tablet, Take 1 tablet (20 mg total) by mouth every 8 (eight) hours as needed (abdominal pain), Disp: 12 tablet, Rfl: 0    famotidine (PEPCID) 20 mg tablet, Take 1 tablet (20 mg total) by mouth 2 (two) times a day, Disp: 30 tablet, Rfl: 0    ferrous sulfate 324 (65 Fe) mg, Take 1 tablet (324 mg total) by mouth every other day On an empty stomach with something acidic (orange juice)., Disp: 45 tablet, Rfl: 3    fluticasone (FLONASE) 50 mcg/act nasal spray, 2 sprays into each nostril daily, Disp: 18 mL, Rfl: 5    hydrOXYzine HCL (ATARAX) 25 mg tablet, Take 1 tablet (25 mg total) by mouth every 6 (six) hours as needed for itching, Disp: 30 tablet,  "Rfl: 1    Multiple Vitamin (MULTIVITAMINS PO), Take by mouth, Disp: , Rfl:     omeprazole (PriLOSEC) 40 MG capsule, Take 1 capsule (40 mg total) by mouth daily, Disp: 90 capsule, Rfl: 2    ondansetron (ZOFRAN) 8 mg tablet, Take 1 tablet (8 mg total) by mouth every 8 (eight) hours as needed for nausea or vomiting, Disp: 30 tablet, Rfl: 3    ondansetron (ZOFRAN-ODT) 4 mg disintegrating tablet, Take 1 tablet (4 mg total) by mouth every 8 (eight) hours as needed for nausea or vomiting, Disp: 12 tablet, Rfl: 0    EPINEPHrine (EPIPEN) 0.3 mg/0.3 mL SOAJ, Inject 0.3 mL (0.3 mg total) into a muscle once for 1 dose, Disp: 2 each, Rfl: 3    Spacer/Aero-Holding Chambers (Vortex Valved Holding Chamber) GILLIAN, Use 2 (two) times a day, Disp: 1 each, Rfl: 0  No current facility-administered medications for this visit.    Facility-Administered Medications Ordered in Other Visits:     alteplase (CATHFLO) injection 2 mg, 2 mg, Intracatheter, Q1MIN PRN, Nemo Agostino, DO    ondansetron (ZOFRAN) 8 mg in sodium chloride 0.9 % 50 mL IVPB, 8 mg, Intravenous, Once, Nemo Agostino, DO  Allergies   Allergen Reactions    Formic Acid Swelling and Rash     (Severe reactions to Bug bites)    Latex Rash and Blisters    Nsaids GI Intolerance       ROS    Objective:   /74 (BP Location: Right arm, Patient Position: Sitting, Cuff Size: Standard)   Pulse 68   Resp 16   Ht 5' 7\" (1.702 m)   Wt 75.8 kg (167 lb)   SpO2 99%   Breastfeeding No   BMI 26.16 kg/m²   Physical Exam    Lab Review:   No results found for: \"CHOL\"  Lab Results   Component Value Date    HDL 43 (L) 12/02/2023     Lab Results   Component Value Date    LDLCALC 73 12/02/2023     Lab Results   Component Value Date    TRIG 56 12/02/2023     Results Reviewed       None          Results Reviewed       None          Results Reviewed       None            Recent Cardiovascular Testing:   Echo stress echo and Holter are pending    Echo 2/8/2024 normal LVEF 60% Wall motion is " normal    ECG Review:   9/29/2024: Normal sinus rhythm rightward axis    9/3/2024: Normal sinus rhythm septal wall infarct, age undetermined

## 2024-10-03 NOTE — PATIENT INSTRUCTIONS
Stress test   Echo   2 week monitor     And referral to cardio-oncology    Return in one month     Call in the meantime if you have any concerns

## 2024-10-03 NOTE — ASSESSMENT & PLAN NOTE
Sensation of racing heart rate as well as palpitations when at rest accompanied by a burning sensation in the chest at times.  This radiates into the left arm.   Can happen at random times and sometimes aggravated with exertion.    2-week ZIO monitor will be obtained to assess for significant arrhythmias  As well 2D echo will be obtained to assess LV function and look for WMA  In the setting of ECG that showed possible septal wall MI patient will also undergo exercise stress echo to ensure that there is no ischemic cause of chest pain.

## 2024-10-04 ENCOUNTER — HOSPITAL ENCOUNTER (OUTPATIENT)
Dept: INFUSION CENTER | Facility: CLINIC | Age: 34
End: 2024-10-04
Payer: COMMERCIAL

## 2024-10-04 ENCOUNTER — TELEPHONE (OUTPATIENT)
Dept: HEMATOLOGY ONCOLOGY | Facility: CLINIC | Age: 34
End: 2024-10-04

## 2024-10-04 VITALS
RESPIRATION RATE: 18 BRPM | DIASTOLIC BLOOD PRESSURE: 66 MMHG | SYSTOLIC BLOOD PRESSURE: 126 MMHG | TEMPERATURE: 96.3 F | HEART RATE: 87 BPM

## 2024-10-04 DIAGNOSIS — E86.0 DEHYDRATION: Primary | ICD-10-CM

## 2024-10-04 PROCEDURE — 96374 THER/PROPH/DIAG INJ IV PUSH: CPT

## 2024-10-04 PROCEDURE — 96361 HYDRATE IV INFUSION ADD-ON: CPT

## 2024-10-04 RX ADMIN — ONDANSETRON 8 MG: 2 INJECTION INTRAMUSCULAR; INTRAVENOUS at 15:51

## 2024-10-04 RX ADMIN — SODIUM CHLORIDE 1000 ML: 9 INJECTION, SOLUTION INTRAVENOUS at 15:51

## 2024-10-04 NOTE — PROGRESS NOTES
Pt presents to clinic for hydration. Pt reports 4/10 headache. Tolerating infusion without complications. Pt aware of next appointment on 10/9/24 at 830. AVS declined. Report given to Cindy Yan RN.    
Report taken from Maureen [facility], pt completed her infusion w/o complications and D/C home  
(3) occasionally moist

## 2024-10-04 NOTE — TELEPHONE ENCOUNTER
Left VM for patient to let her know that Dr. Khalil reviewed her brain MRI and there is no evidence of cancer, but there are changed consistent with migraines. I will also send a Nextnav message to the patient as well with results.

## 2024-10-07 ENCOUNTER — HOSPITAL ENCOUNTER (OUTPATIENT)
Dept: NON INVASIVE DIAGNOSTICS | Facility: CLINIC | Age: 34
Discharge: HOME | End: 2024-10-07
Payer: COMMERCIAL

## 2024-10-07 VITALS
HEIGHT: 67 IN | HEART RATE: 75 BPM | DIASTOLIC BLOOD PRESSURE: 66 MMHG | SYSTOLIC BLOOD PRESSURE: 126 MMHG | BODY MASS INDEX: 26.21 KG/M2 | WEIGHT: 167 LBS

## 2024-10-07 DIAGNOSIS — R93.1 ABNORMAL ECHOCARDIOGRAM: Primary | ICD-10-CM

## 2024-10-07 DIAGNOSIS — Z51.81 THERAPEUTIC DRUG MONITORING: ICD-10-CM

## 2024-10-07 LAB
AORTIC ROOT: 2.4 CM
APICAL FOUR CHAMBER EJECTION FRACTION: 61 %
ASCENDING AORTA: 2.7 CM
BSA FOR ECHO PROCEDURE: 1.87 M2
E WAVE DECELERATION TIME: 273 MS
E/A RATIO: 0.99
FRACTIONAL SHORTENING: 30 (ref 28–44)
INTERVENTRICULAR SEPTUM IN DIASTOLE (PARASTERNAL SHORT AXIS VIEW): 0.7 CM
INTERVENTRICULAR SEPTUM: 0.7 CM (ref 0.6–1.1)
LAAS-AP2: 12 CM2
LAAS-AP4: 15.2 CM2
LEFT ATRIUM SIZE: 3.2 CM
LEFT ATRIUM VOLUME (MOD BIPLANE): 38 ML
LEFT ATRIUM VOLUME INDEX (MOD BIPLANE): 20.3 ML/M2
LEFT INTERNAL DIMENSION IN SYSTOLE: 3 CM (ref 2.1–4)
LEFT VENTRICULAR INTERNAL DIMENSION IN DIASTOLE: 4.3 CM (ref 3.5–6)
LEFT VENTRICULAR POSTERIOR WALL IN END DIASTOLE: 0.7 CM
LEFT VENTRICULAR STROKE VOLUME: 49 ML
LVSV (TEICH): 49 ML
MV E'TISSUE VEL-SEP: 8 CM/S
MV PEAK A VEL: 0.74 M/S
MV PEAK E VEL: 73 CM/S
MV STENOSIS PRESSURE HALF TIME: 79 MS
MV VALVE AREA P 1/2 METHOD: 2.78
RIGHT ATRIUM AREA SYSTOLE A4C: 8 CM2
RIGHT VENTRICLE ID DIMENSION: 2.7 CM
SL CV LEFT ATRIUM LENGTH A2C: 3.6 CM
SL CV LV EF: 60
SL CV PED ECHO LEFT VENTRICLE DIASTOLIC VOLUME (MOD BIPLANE) 2D: 85 ML
SL CV PED ECHO LEFT VENTRICLE SYSTOLIC VOLUME (MOD BIPLANE) 2D: 36 ML
TR MAX PG: 10 MMHG
TR PEAK VELOCITY: 1.6 M/S
TRICUSPID ANNULAR PLANE SYSTOLIC EXCURSION: 2.1 CM
TRICUSPID VALVE PEAK REGURGITATION VELOCITY: 1.56 M/S

## 2024-10-07 PROCEDURE — 93356 MYOCRD STRAIN IMG SPCKL TRCK: CPT | Performed by: INTERNAL MEDICINE

## 2024-10-07 PROCEDURE — 93306 TTE W/DOPPLER COMPLETE: CPT

## 2024-10-07 PROCEDURE — 93306 TTE W/DOPPLER COMPLETE: CPT | Performed by: INTERNAL MEDICINE

## 2024-10-07 PROCEDURE — 93356 MYOCRD STRAIN IMG SPCKL TRCK: CPT

## 2024-10-07 NOTE — PROGRESS NOTES
Spoke to patient about abnormal echo result with possible thrombus v vegetation. Will schedule a GUS for better assessment. Patient is agreeable. In the meantime will  postpone scheduled stress test until GUS is obtained.

## 2024-10-08 ENCOUNTER — APPOINTMENT (OUTPATIENT)
Dept: LAB | Facility: CLINIC | Age: 34
End: 2024-10-08
Payer: COMMERCIAL

## 2024-10-08 ENCOUNTER — TELEPHONE (OUTPATIENT)
Dept: HEMATOLOGY ONCOLOGY | Facility: CLINIC | Age: 34
End: 2024-10-08

## 2024-10-08 DIAGNOSIS — K21.9 LARYNGOPHARYNGEAL REFLUX (LPR): ICD-10-CM

## 2024-10-08 DIAGNOSIS — L50.3 DERMATOGRAPHIA: ICD-10-CM

## 2024-10-08 DIAGNOSIS — R09.A2 GLOBUS SENSATION: ICD-10-CM

## 2024-10-08 DIAGNOSIS — L50.1 IDIOPATHIC URTICARIA: ICD-10-CM

## 2024-10-08 DIAGNOSIS — Z86.69 HISTORY OF MIGRAINE: Primary | ICD-10-CM

## 2024-10-08 PROCEDURE — 86160 COMPLEMENT ANTIGEN: CPT

## 2024-10-08 PROCEDURE — 36415 COLL VENOUS BLD VENIPUNCTURE: CPT

## 2024-10-09 ENCOUNTER — HOSPITAL ENCOUNTER (OUTPATIENT)
Dept: INFUSION CENTER | Facility: CLINIC | Age: 34
Discharge: HOME | End: 2024-10-09

## 2024-10-09 ENCOUNTER — TELEPHONE (OUTPATIENT)
Dept: CARDIOLOGY CLINIC | Facility: CLINIC | Age: 34
End: 2024-10-09

## 2024-10-10 ENCOUNTER — ANESTHESIA (OUTPATIENT)
Dept: NON INVASIVE DIAGNOSTICS | Facility: HOSPITAL | Age: 34
End: 2024-10-10
Payer: COMMERCIAL

## 2024-10-10 ENCOUNTER — HOSPITAL ENCOUNTER (OUTPATIENT)
Dept: NON INVASIVE DIAGNOSTICS | Facility: HOSPITAL | Age: 34
Discharge: HOME | End: 2024-10-10
Payer: COMMERCIAL

## 2024-10-10 ENCOUNTER — ANESTHESIA EVENT (OUTPATIENT)
Dept: NON INVASIVE DIAGNOSTICS | Facility: HOSPITAL | Age: 34
End: 2024-10-10
Payer: COMMERCIAL

## 2024-10-10 ENCOUNTER — ANTICOAG VISIT (OUTPATIENT)
Dept: CARDIOLOGY CLINIC | Facility: CLINIC | Age: 34
End: 2024-10-10
Payer: COMMERCIAL

## 2024-10-10 ENCOUNTER — DOCUMENTATION (OUTPATIENT)
Dept: CARDIOLOGY CLINIC | Facility: CLINIC | Age: 34
End: 2024-10-10

## 2024-10-10 ENCOUNTER — HOSPITAL ENCOUNTER (OUTPATIENT)
Dept: NON INVASIVE DIAGNOSTICS | Facility: HOSPITAL | Age: 34
Discharge: HOME | End: 2024-10-10

## 2024-10-10 ENCOUNTER — TELEPHONE (OUTPATIENT)
Dept: HEMATOLOGY ONCOLOGY | Facility: CLINIC | Age: 34
End: 2024-10-10

## 2024-10-10 VITALS
HEART RATE: 78 BPM | WEIGHT: 167 LBS | BODY MASS INDEX: 26.21 KG/M2 | HEIGHT: 67 IN | RESPIRATION RATE: 22 BRPM | SYSTOLIC BLOOD PRESSURE: 110 MMHG | OXYGEN SATURATION: 100 % | TEMPERATURE: 97 F | DIASTOLIC BLOOD PRESSURE: 64 MMHG

## 2024-10-10 DIAGNOSIS — R93.1 ABNORMAL ECHOCARDIOGRAM: ICD-10-CM

## 2024-10-10 DIAGNOSIS — Z79.01 CURRENT USE OF LONG TERM ANTICOAGULATION: ICD-10-CM

## 2024-10-10 DIAGNOSIS — Z95.828 PORT-A-CATH IN PLACE: Primary | ICD-10-CM

## 2024-10-10 DIAGNOSIS — I51.3 RIGHT ATRIAL THROMBUS: Primary | ICD-10-CM

## 2024-10-10 LAB
C1INH SERPL-MCNC: 34 MG/DL (ref 21–39)
EXT PREGNANCY TEST URINE: NEGATIVE
EXT. CONTROL: NORMAL
SL CV LV EF: 60

## 2024-10-10 PROCEDURE — 93325 DOPPLER ECHO COLOR FLOW MAPG: CPT | Performed by: INTERNAL MEDICINE

## 2024-10-10 PROCEDURE — 81025 URINE PREGNANCY TEST: CPT | Performed by: STUDENT IN AN ORGANIZED HEALTH CARE EDUCATION/TRAINING PROGRAM

## 2024-10-10 PROCEDURE — 93312 ECHO TRANSESOPHAGEAL: CPT | Performed by: INTERNAL MEDICINE

## 2024-10-10 PROCEDURE — 93793 ANTICOAG MGMT PT WARFARIN: CPT | Performed by: PHYSICIAN ASSISTANT

## 2024-10-10 PROCEDURE — 93320 DOPPLER ECHO COMPLETE: CPT | Performed by: INTERNAL MEDICINE

## 2024-10-10 RX ORDER — MIDAZOLAM HYDROCHLORIDE 2 MG/2ML
INJECTION, SOLUTION INTRAMUSCULAR; INTRAVENOUS AS NEEDED
Status: DISCONTINUED | OUTPATIENT
Start: 2024-10-10 | End: 2024-10-10

## 2024-10-10 RX ORDER — SODIUM CHLORIDE, SODIUM LACTATE, POTASSIUM CHLORIDE, CALCIUM CHLORIDE 600; 310; 30; 20 MG/100ML; MG/100ML; MG/100ML; MG/100ML
INJECTION, SOLUTION INTRAVENOUS CONTINUOUS PRN
Status: DISCONTINUED | OUTPATIENT
Start: 2024-10-10 | End: 2024-10-10

## 2024-10-10 RX ORDER — PROPOFOL 10 MG/ML
INJECTION, EMULSION INTRAVENOUS AS NEEDED
Status: DISCONTINUED | OUTPATIENT
Start: 2024-10-10 | End: 2024-10-10

## 2024-10-10 RX ORDER — PROPOFOL 10 MG/ML
INJECTION, EMULSION INTRAVENOUS CONTINUOUS PRN
Status: DISCONTINUED | OUTPATIENT
Start: 2024-10-10 | End: 2024-10-10

## 2024-10-10 RX ORDER — ONDANSETRON 2 MG/ML
4 INJECTION INTRAMUSCULAR; INTRAVENOUS ONCE AS NEEDED
Status: DISCONTINUED | OUTPATIENT
Start: 2024-10-10 | End: 2024-10-11 | Stop reason: HOSPADM

## 2024-10-10 RX ORDER — WARFARIN SODIUM 5 MG/1
TABLET ORAL
Qty: 45 TABLET | Refills: 5 | Status: SHIPPED | OUTPATIENT
Start: 2024-10-10 | End: 2024-10-12

## 2024-10-10 RX ORDER — SODIUM CHLORIDE, SODIUM LACTATE, POTASSIUM CHLORIDE, CALCIUM CHLORIDE 600; 310; 30; 20 MG/100ML; MG/100ML; MG/100ML; MG/100ML
100 INJECTION, SOLUTION INTRAVENOUS CONTINUOUS
Status: DISCONTINUED | OUTPATIENT
Start: 2024-10-10 | End: 2024-10-11 | Stop reason: HOSPADM

## 2024-10-10 RX ADMIN — PROPOFOL 130 MCG/KG/MIN: 10 INJECTION, EMULSION INTRAVENOUS at 14:15

## 2024-10-10 RX ADMIN — PROPOFOL 130 MG: 10 INJECTION, EMULSION INTRAVENOUS at 14:15

## 2024-10-10 RX ADMIN — SODIUM CHLORIDE, SODIUM LACTATE, POTASSIUM CHLORIDE, AND CALCIUM CHLORIDE: .6; .31; .03; .02 INJECTION, SOLUTION INTRAVENOUS at 14:11

## 2024-10-10 RX ADMIN — SODIUM CHLORIDE, SODIUM LACTATE, POTASSIUM CHLORIDE, AND CALCIUM CHLORIDE 100 ML/HR: .6; .31; .03; .02 INJECTION, SOLUTION INTRAVENOUS at 13:36

## 2024-10-10 RX ADMIN — MIDAZOLAM 2 MG: 1 INJECTION INTRAMUSCULAR; INTRAVENOUS at 14:11

## 2024-10-10 NOTE — ANESTHESIA POSTPROCEDURE EVALUATION
Post-Op Assessment Note    CV Status:  Stable  Pain Score: 0    Pain management: adequate       Mental Status:  Awake and sleepy   Hydration Status:  Euvolemic   PONV Controlled:  Controlled   Airway Patency:  Patent     Post Op Vitals Reviewed: Yes    No anethesia notable event occurred.    Staff: CRNA           Last Filed PACU Vitals:  Vitals Value Taken Time   Temp     Pulse 84    /55    Resp 14    SpO2 100        Modified Valeri:  No data recorded

## 2024-10-10 NOTE — TELEPHONE ENCOUNTER
I called Josefina to discuss results of her GUS.  It is too far into the right atrium and there is possibly a thrombus at the tip of it associated with the right atrial wall.  She has a month supply of Lovenox at home and I asked her to restart the 1 mg/kg twice daily dosing.  Cardiology sent her Coumadin but I think we can just give her the Lovenox for the next month.  I am going to arrange for her port to be removed as well.  I have a message out to cardiology about the duration of this anticoagulation.  Patient reports that they will be doing another echo in 3 months and I want to clarify we should continue the anticoagulation until that time.

## 2024-10-10 NOTE — PROGRESS NOTES
Patient had GUS today    Per dr Scott:     Erwin Scott MD sent to Belkys Montes PA-C; Nemo Khalil DO; JOSE Stover  I did a GUS on this patient today to evaluate the possible thrombus noted on her echocardiogram. The GUS seems  to suggest that her chemotherapy port is a bit too far into the RA and is touching/almost touching the RA free wall. At the tip of the port catheter or the adjacent RA free wall, there is a probable thrombus . It seems to me that she would benefit from port check and re-position at the least (possibly pulling back 1-2 cm) and a short course of anticoagulation. If no further plans for chemotherapy, then perhaps removing it completely maybe the best course. I don't really know here otherwise myself, and was just doing the GUS. So I would defer plan to you for this.      I called the patient and started Coumadin Anticoagulation therapy.target INR will be 2.0-3.0. she will be on AC therapy for at least three months with repeat Echo at that time to reassess for RA thrombus.   She is advised to call oncologist to plan for port adjustment.

## 2024-10-10 NOTE — ANESTHESIA POSTPROCEDURE EVALUATION
Post-Op Assessment Note    CV Status:  Stable    Pain management: adequate       Mental Status:  Alert and awake   Hydration Status:  Euvolemic   PONV Controlled:  Controlled   Airway Patency:  Patent     Post Op Vitals Reviewed: Yes    No anethesia notable event occurred.            Last Filed PACU Vitals:  Vitals Value Taken Time   Temp 97 °F (36.1 °C) 10/10/24 1500   Pulse 75 10/10/24 1504   /64 10/10/24 1501   Resp 20 10/10/24 1504   SpO2 100 % 10/10/24 1504   Vitals shown include unfiled device data.    Modified Valeri:  Activity: 2 (10/10/2024  3:00 PM)  Respiration: 2 (10/10/2024  3:00 PM)  Circulation: 2 (10/10/2024  3:00 PM)  Consciousness: 1 (10/10/2024  3:00 PM)  Oxygen Saturation: 2 (10/10/2024  3:00 PM)  Modified Valeri Score: 9 (10/10/2024  3:00 PM)

## 2024-10-10 NOTE — PATIENT INSTRUCTIONS
Start warfarin 5 mg daily     New Coumadin Education scheduled for 10/17th     Belkys Montes PA-C

## 2024-10-10 NOTE — ANESTHESIA PREPROCEDURE EVALUATION
Procedure:  GUS OR/GI ([facility] ONLY)  For thrombus vs vegetation seen on TTE    Hx of DVT after mastectomy 1/2023 - off of AC  Asthma well controlled  GERD - took her medications today, no sx  Hx of breast cancer s/p chemo and mastectomy with LN dissection (left)  CRPS  NPO approp  Neg preg test  Relevant Problems   CARDIO   (+) DVT complicating pregnancy, third trimester   (+) Migraine headache   (+) Other chest pain      GYN   (+) Malignant neoplasm of overlapping sites of left breast in female, estrogen receptor positive (HCC)   (+) Multigravida of advanced maternal age in first trimester      HEMATOLOGY   (+) Iron deficiency anemia secondary to inadequate dietary iron intake      MUSCULOSKELETAL   (+) Shoulder impingement syndrome, left      NEURO/PSYCH   (+) Chronic left shoulder pain   (+) Depression with anxiety   (+) Leg weakness, bilateral   (+) Migraine headache      PULMONARY   (+) Mild persistent asthma without complication   (+) SOB (shortness of breath)        Physical Exam    Airway    Mallampati score: II  TM Distance: >3 FB       Dental   No notable dental hx     Cardiovascular  Cardiovascular exam normal    Pulmonary  Pulmonary exam normal     Other Findings  post-pubertal.      Anesthesia Plan  ASA Score- 3     Anesthesia Type- IV sedation with anesthesia with ASA Monitors.         Additional Monitors:     Airway Plan:            Plan Factors-    Chart reviewed.   Existing labs reviewed. Patient summary reviewed.    Patient is not a current smoker.              Induction- intravenous.    Postoperative Plan-         Informed Consent- Anesthetic plan and risks discussed with patient.  I personally reviewed this patient with the CRNA. Discussed and agreed on the Anesthesia Plan with the CRNA..

## 2024-10-10 NOTE — INTERVAL H&P NOTE
Update: (This section must be completed if the H&P was completed greater than 24 hrs to procedure or admission)    H&P reviewed. After examining the patient, I find no changed to the H&P since it had been written.    Personally reviewed prior TTE images, and RA echodensity noted along free - ?thrombus  Discussed risks-benefits of transesophageal echocardiogram procedure with patient, and obtained informed consent from patient for same  She is agreeable to proceed.    Plan  Proceed with GUS to evaluate echodensity in right atrium/ along tricuspid valve    Patient re-evaluated. Accept as history and physical.    Erwin Scott MD/October 10, 2024/2:09 PM

## 2024-10-11 ENCOUNTER — HOSPITAL ENCOUNTER (OUTPATIENT)
Dept: INFUSION CENTER | Facility: CLINIC | Age: 34
End: 2024-10-11
Payer: COMMERCIAL

## 2024-10-11 VITALS
HEART RATE: 102 BPM | SYSTOLIC BLOOD PRESSURE: 138 MMHG | TEMPERATURE: 97.2 F | RESPIRATION RATE: 18 BRPM | DIASTOLIC BLOOD PRESSURE: 76 MMHG

## 2024-10-11 DIAGNOSIS — E86.0 DEHYDRATION: Primary | ICD-10-CM

## 2024-10-11 PROCEDURE — 96360 HYDRATION IV INFUSION INIT: CPT

## 2024-10-11 RX ADMIN — SODIUM CHLORIDE 1000 ML: 0.9 INJECTION, SOLUTION INTRAVENOUS at 15:06

## 2024-10-11 NOTE — PROGRESS NOTES
Pt to clinic for IV hydration, offers no complaints today, states her port is being removed for a possible clot around it, PIV placed instead of accessing port, pt tolerated infusion without complications, aware of next appointment on 10/16/24 at 3:30pm, PIV removed, avs declined.

## 2024-10-12 ENCOUNTER — TELEPHONE (OUTPATIENT)
Dept: HEMATOLOGY ONCOLOGY | Facility: CLINIC | Age: 34
End: 2024-10-12

## 2024-10-12 DIAGNOSIS — I82.90 THROMBOSIS: ICD-10-CM

## 2024-10-12 DIAGNOSIS — O22.30 DVT (DEEP VEIN THROMBOSIS) IN PREGNANCY: ICD-10-CM

## 2024-10-12 NOTE — TELEPHONE ENCOUNTER
I called the patient to update her on my discussion with cardiology.  She will not need another GUS in 3 months.  We can just do an echo at that time.  We will keep her on anticoagulation until we make sure that the right atrium looks okay on that study.  She is currently on twice daily Lovenox.  I am going to send Eliquis to her pharmacy and gave her instructions on how to switch over to it.  If it is extremely expensive she should call me on Monday and I can give her samples to get through the next 3 months.  Her port is planned to be pulled on October 25.

## 2024-10-15 ENCOUNTER — TELEPHONE (OUTPATIENT)
Dept: NEUROLOGY | Facility: CLINIC | Age: 34
End: 2024-10-15

## 2024-10-15 ENCOUNTER — TELEPHONE (OUTPATIENT)
Age: 34
End: 2024-10-15

## 2024-10-15 ENCOUNTER — ANTICOAG VISIT (OUTPATIENT)
Dept: CARDIOLOGY CLINIC | Facility: CLINIC | Age: 34
End: 2024-10-15

## 2024-10-15 NOTE — TELEPHONE ENCOUNTER
Pt called stating that she just missed a call from Belkys Montes regarding getting blood work for her coumadin levels.    Pt states that she is not on coumadin, she is doing lovenox injections and then switched to Eliquis.

## 2024-10-16 ENCOUNTER — HOSPITAL ENCOUNTER (OUTPATIENT)
Dept: INFUSION CENTER | Facility: CLINIC | Age: 34
Discharge: HOME | End: 2024-10-16

## 2024-10-16 ENCOUNTER — OFFICE VISIT (OUTPATIENT)
Dept: SURGICAL ONCOLOGY | Facility: CLINIC | Age: 34
End: 2024-10-16
Payer: COMMERCIAL

## 2024-10-16 VITALS
DIASTOLIC BLOOD PRESSURE: 81 MMHG | TEMPERATURE: 98.1 F | SYSTOLIC BLOOD PRESSURE: 114 MMHG | BODY MASS INDEX: 27.55 KG/M2 | HEART RATE: 91 BPM | HEIGHT: 67 IN | WEIGHT: 175.5 LBS | OXYGEN SATURATION: 99 %

## 2024-10-16 DIAGNOSIS — Z08 ENCOUNTER FOR FOLLOW-UP SURVEILLANCE OF BREAST CANCER: ICD-10-CM

## 2024-10-16 DIAGNOSIS — Z90.12 HISTORY OF LEFT MASTECTOMY: ICD-10-CM

## 2024-10-16 DIAGNOSIS — Z17.0 MALIGNANT NEOPLASM OF OVERLAPPING SITES OF LEFT BREAST IN FEMALE, ESTROGEN RECEPTOR POSITIVE (HCC): Primary | ICD-10-CM

## 2024-10-16 DIAGNOSIS — Z85.3 ENCOUNTER FOR FOLLOW-UP SURVEILLANCE OF BREAST CANCER: ICD-10-CM

## 2024-10-16 DIAGNOSIS — C50.812 MALIGNANT NEOPLASM OF OVERLAPPING SITES OF LEFT BREAST IN FEMALE, ESTROGEN RECEPTOR POSITIVE (HCC): Primary | ICD-10-CM

## 2024-10-16 DIAGNOSIS — L98.9 SKIN LESION OF CHEST WALL: ICD-10-CM

## 2024-10-16 PROCEDURE — 88305 TISSUE EXAM BY PATHOLOGIST: CPT | Performed by: PATHOLOGY

## 2024-10-16 PROCEDURE — 99214 OFFICE O/P EST MOD 30 MIN: CPT | Performed by: SURGERY

## 2024-10-16 PROCEDURE — 99204 OFFICE O/P NEW MOD 45 MIN: CPT | Performed by: SURGERY

## 2024-10-16 NOTE — H&P (VIEW-ONLY)
"Surgical Oncology Follow Up  [facility]  [facility] [place]  [address] [place] [id number]    Jillian Ch  1990  [id number]      Chief Complaint   Patient presents with    Follow-up     6 Month Follow Up Left Breast        Assessment & Plan:   34-year-old female with stage Ib left breast cancer in first trimester she has completed her surgery in January with sentinel\" biopsy Oncotype 23.  She is overall doing well.  Baby is 16 weeks.  She had an ongoing left chest wall nodule medial aspect away from the surgical scar.  She states this has recently enlarged in size.  Otherwise she has no other additional mass or masses.  We did obtain 6 mm punch biopsy under strict sterile condition with 1% lidocaine local anesthesia.  Will follow her in 2 weeks time hopefully will have biopsy results.  She will also do her contralateral mammogram in November which we will order at her next visit she has completed her adjuvant chemotherapy.    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, ER 80-85% CA 90-95% Her2 1+, han 2, cT2(2.1 cm)N0M0    1/2/2023 - left sided mastectomy with SLNBX - [id number] - oncotype 23    Post-op - LLE DVT - treated with lovenox until 6 weeks after delivery of her baby    2/21/2024 - start AC q 3 weeks    3/13/2024 - cycle 2 adriamycin dose reduced to 50 mg/m2 and cytoxan dose reduced by 10% due to N/V    4/2024 - stop after 3 cycles of AC due to severe nausea and dehydration with coexisting hyperemesis gravidarum    7/2024 - vaginal delivery of healthy baby boy     Malignant neoplasm of overlapping sites of left breast in female, estrogen receptor positive (HCC)   12/2/2023 Initial Diagnosis    Malignant neoplasm of overlapping sites of left female breast (HCC)     12/2/2023 Biopsy    Bilateral breast ultrasound guided biopsy  A. " Breast, Left, US Guided Left Breast Biopsy 12:00 6cmfn:  Invasive breast carcinoma of no special type (ductal) with osteoclast-like stromal giant cells  Grade 2  ER 85  MT 95  HER2 1+     B. Breast, Right, US Guided Right Breast Biopsy 10:00 9cmfn:  Benign breast tissue.    In review of the patient’s recent imaging, the left malignancy appears unifocal.  The biopsy-proven carcinoma measured 2.1 cm on ultrasound. Ultrasound of the left axilla was performed on 11/24/2023 and showed no suspicious adenopathy.  Recent imaging of the contralateral right breast dated 12/2/2023 and 11/24/2023 was reviewed and shows no suspicious findings.  The pathology results for the ultrasound-guided core needle biopsy (right breast 10:00, 9 cm from the nipple) are benign and concordant with imaging.     12/2/2023 Genetic Testing    [facility]  A total of 36 genes were evaluated, including: APC, TOPHER, BARD 1, BMPR1A, BRCA1, BRCA2, BRIP1, CDH1, CDK4, CKDN2A, CHEK2, DICER1, MLH1, MSH2, MSH6, MUTYH, NBN, NF1, NTHL1, PALB2, PMS2, PTEN, RAD51C, RECQL, SMAD4, SMARCA4, STK11, TP53, AXIN2, HOXB13, MSH3, POLD1, AND POLE, EPCAM, AND GREM1   Negative result. No pathogenic sequence variants or deletions/duplications identified       12/2/2023 -  Cancer Staged    Staging form: Breast, AJCC 8th Edition  - Clinical stage from 12/2/2023: Stage IB (cT2, cN0, cM0, G2, ER+, MT+, HER2-) - Signed by Elena Cornell MD on 12/13/2023  Stage prefix: Initial diagnosis  Histologic grading system: 3 grade system       1/2/2024 Surgery    Left breast mastectomy with sentinel lymph node biopsy  Invasive Mammary carcinoma of no special type (ductal)   Grade 2  25 mm  Margins negative  0/2 Lymph nodes  Anatomic stage IIA  Prognostic stage IA      2/21/2024 - 4/3/2024 Chemotherapy    DOXOrubicin (ADRIAMYCIN), 56.25 mg/m2 = 106 mg (93.8 % of original dose 60 mg/m2), Intravenous, Once, 3 of 3 cycles  Dose modification: 56.25 mg/m2 (original dose 60 mg/m2, Cycle 1,  Reason: Other (Must fill in a comment), Comment: max recommended dose), 50 mg/m2 (original dose 60 mg/m2, Cycle 2, Reason: Nausea/Vomiting, Comment: dose reduced to 50 mg/m2 due to n/v)  Administration: 106 mg (2/21/2024), 94 mg (3/13/2024), 94 mg (4/3/2024)  alteplase (CATHFLO), 2 mg, Intracatheter, Every 1 Minute as needed, 3 of 3 cycles  cyclophosphamide (CYTOXAN) IVPB, 600 mg/m2 = 1,128 mg, Intravenous, Once, 3 of 3 cycles  Dose modification: 540 mg/m2 (original dose 600 mg/m2, Cycle 2, Reason: Nausea/Vomiting, Comment: 10% dose reduction due to n/v)  Administration: 1,128 mg (2/21/2024), 1,000 mg (3/13/2024), 1,000 mg (4/3/2024)  fosaprepitant (EMEND) IVPB, 150 mg, Intravenous, Once, 3 of 3 cycles  Administration: 150 mg (2/21/2024), 150 mg (3/13/2024), 150 mg (4/3/2024)     Cancer complicating pregnancy, third trimester   12/14/2023 Initial Diagnosis    Cancer complicating pregnancy in second trimester           Interval History:   Follow-up with left breast cancer    Review of Systems:   Review of Systems   Constitutional:  Negative for chills and fever.   HENT:  Negative for ear pain and sore throat.    Eyes:  Negative for pain and visual disturbance.   Respiratory:  Negative for cough and shortness of breath.    Cardiovascular:  Negative for chest pain and palpitations.   Gastrointestinal:  Negative for abdominal pain and vomiting.   Genitourinary:  Negative for dysuria and hematuria.   Musculoskeletal:  Negative for arthralgias and back pain.   Skin:  Negative for color change and rash.   Neurological:  Negative for seizures and syncope.   All other systems reviewed and are negative.      Past Medical History     Patient Active Problem List   Diagnosis    Mild persistent asthma without complication    Axillary hidradenitis suppurativa    Other chest pain    Laryngopharyngeal reflux (LPR)    Depression with anxiety    Chronic fatigue    Chronic left shoulder pain    Cervical disc disorder at C5-C6 level  with radiculopathy    Atypical squamous cells of undetermined significance (ASCUS) on Papanicolaou smear of cervix    Hypertrophic and atrophic condition of skin    Migraine headache    Shoulder impingement syndrome, left    Vitamin D deficiency    Thoracic outlet syndrome    Palpitations    Transaminitis    Right middle lobe pulmonary nodule    Reversible airway obstruction    SOB (shortness of breath)    Unexplained weight gain    Left ovarian cyst    Malignant neoplasm of overlapping sites of left breast in female, estrogen receptor positive (HCC)    Cancer complicating pregnancy, third trimester    Spontaneous vaginal delivery    History of left mastectomy    Multigravida of advanced maternal age in first trimester    DVT complicating pregnancy, third trimester    Dehydration    Iron deficiency anemia secondary to inadequate dietary iron intake    Encounter for follow-up surveillance of breast cancer    Leg weakness, bilateral    Elevated blood pressure reading without diagnosis of hypertension    Pelvic pressure in female    Idiopathic urticaria    Throat swelling    Dermatographia    Allergic rhinitis due to dust mite    Latex allergy     Past Medical History:   Diagnosis Date    Anesthesia complication     gait dysfunction/ urinary retention after nerve block,    Anxiety     Asthma     CRPS (complex regional pain syndrome), upper limb     left chest/arm/hand    Deep vein thrombosis (HCC) 01/05/2024    post op from mastectomy    GERD (gastroesophageal reflux disease)     Heart murmur     HPV (human papilloma virus) infection 2012    unsure of 16, 18, or other    Invasive ductal carcinoma of breast, female, left (HCC) 2023    Kidney stone     Miscarriage 3/15/2015    7 weeks along.    Ovarian cyst     Left    PONV (postoperative nausea and vomiting) 01/02/2024     Past Surgical History:   Procedure Laterality Date    COLPOSCOPY  2012    HPV    EGD      IR PORT PLACEMENT  2/7/2024    IR UPPER EXTREMITY VENOGRAM-  DIAGNOSTIC  05/28/2021    CO BX/EXC LYMPH NODE OPEN SUPERFICIAL Left 01/02/2024    Procedure: LEFT BREAST MASTECTOMY, LYMPHATIC MAPPING WITH RADIOACTIVE DYE, SENTINEL LYMPH NODE BIOPSY, ZAHEER LEFT BREAST, INJECTION IN OR AT 0800 BY DR CORNELL;  Surgeon: Elena Cornell MD;  Location: [facility];  Service: Surgical Oncology    CO EXCISION 1ST &/CERVICAL RIB Left 08/12/2021    Procedure: FIRST RIB RESECTION;  Surgeon: Jonathan Schaffer MD;  Location: [facility];  Service: Thoracic    CO INJ RADIOACTIVE TRACER FOR ID OF SENTINEL NODE Left 01/02/2024    Procedure: LEFT BREAST MASTECTOMY, LYMPHATIC MAPPING WITH RADIOACTIVE DYE, SENTINEL LYMPH NODE BIOPSY, ZAHEER LEFT BREAST, INJECTION IN OR AT 0800 BY DR CORNELL;  Surgeon: Elena Cornell MD;  [facility];  Service: Surgical Oncology    CO INTRAOP SENTINEL LYMPH NODE ID W/DYE INJECTION Left 01/02/2024    Procedure: LEFT BREAST MASTECTOMY, LYMPHATIC MAPPING WITH RADIOACTIVE DYE, SENTINEL LYMPH NODE BIOPSY, ZAHEER LEFT BREAST, INJECTION IN OR AT 0800 BY DR CORNELL;  Surgeon: Elena Cornell MD;  [facility];  Service: Surgical Oncology    CO MASTECTOMY SIMPLE COMPLETE Left 01/02/2024    Procedure: LEFT BREAST MASTECTOMY, LYMPHATIC MAPPING WITH RADIOACTIVE DYE, SENTINEL LYMPH NODE BIOPSY, ZAHEER LEFT BREAST, INJECTION IN OR AT 0800 BY DR CORNELL;  Surgeon: Elena Cornell MD;  [facility];  Service: Surgical Oncology    THORACOSCOPY VIDEO ASSISTED SURGERY (VATS) Left 08/12/2021    Procedure: THORACOSCOPY VIDEO ASSISTED SURGERY (VATS);  Surgeon: Jonathan Schaffer MD;  [facility];  Service: Thoracic    US GUIDANCE BREAST BIOPSY LEFT EACH ADDITIONAL Left 12/02/2023    US GUIDED BREAST BIOPSY RIGHT COMPLETE Right 12/02/2023    WISDOM TOOTH EXTRACTION  2012     Family History   Problem Relation Age of Onset    Heart disease Mother     Miscarriages / Stillbirths Mother     Coronary artery disease Mother     No  Known Problems Father     No Known Problems Half-Brother     Bone cancer Maternal Grandmother         hilda to liver and spine    Miscarriages / Stillbirths Half-Sister     Breast cancer Neg Hx     Ovarian cancer Neg Hx     Uterine cancer Neg Hx     Cervical cancer Neg Hx      Social History     Socioeconomic History    Marital status: /Civil Union     Spouse name: Not on file    Number of children: Not on file    Years of education: Not on file    Highest education level: Not on file   Occupational History    Not on file   Tobacco Use    Smoking status: Never     Passive exposure: Past    Smokeless tobacco: Never   Vaping Use    Vaping status: Never Used   Substance and Sexual Activity    Alcohol use: Not Currently     Comment: social    Drug use: Never    Sexual activity: Yes     Partners: Male     Birth control/protection: None   Other Topics Concern    Not on file   Social History Narrative    Do you have pets? 2 cat & 2 dog  Is pet allowed in bedroom?Yes    Are you a smoker? Never    Does anyone smoke in your home? No       Do you live with smokers? No    [place] frequently? No   How many times a year? N/A      Social Determinants of Health     Financial Resource Strain: Not on file   Food Insecurity: No Food Insecurity (7/11/2024)    Hunger Vital Sign     Worried About Running Out of Food in the Last Year: Never true     Ran Out of Food in the Last Year: Never true   Transportation Needs: No Transportation Needs (7/11/2024)    PRAPARE - Transportation     Lack of Transportation (Medical): No     Lack of Transportation (Non-Medical): No   Physical Activity: Not on file   Stress: Not on file   Social Connections: Not on file   Intimate Partner Violence: Not on file   Housing Stability: Not on file       Current Outpatient Medications:     acetaminophen (TYLENOL) 325 mg tablet, Take 2 tablets (650 mg total) by mouth every 4 (four) hours as needed for mild pain, Disp: , Rfl:     albuterol (PROVENTIL  HFA,VENTOLIN HFA) 90 mcg/act inhaler, Inhale 2 puffs every 4 (four) hours as needed for wheezing, Disp: 6.7 g, Rfl: 3    Albuterol-Budesonide (Airsupra) 90-80 MCG/ACT AERO, 2 puffs as needed shortness of breath BIN: [id number]    PCN: [id number]      GRP: [id number]    ID: [id number], Disp: 10.7 g, Rfl: 3    apixaban (Eliquis) 5 mg, Take 1 tablet (5 mg total) by mouth 2 (two) times a day, Disp: 60 tablet, Rfl: 2    cetirizine (ZyrTEC) 10 mg tablet, TAKE 1 TABLET BY MOUTH EVERY DAY, Disp: 90 tablet, Rfl: 1    EPINEPHrine (EPIPEN) 0.3 mg/0.3 mL SOAJ, Inject 0.3 mL (0.3 mg total) into a muscle once for 1 dose, Disp: 2 each, Rfl: 3    fluticasone (FLONASE) 50 mcg/act nasal spray, 2 sprays into each nostril daily, Disp: 18 mL, Rfl: 5    Multiple Vitamin (MULTIVITAMINS PO), Take by mouth, Disp: , Rfl:     omeprazole (PriLOSEC) 40 MG capsule, Take 1 capsule (40 mg total) by mouth daily, Disp: 90 capsule, Rfl: 2    ondansetron (ZOFRAN) 8 mg tablet, Take 1 tablet (8 mg total) by mouth every 8 (eight) hours as needed for nausea or vomiting, Disp: 30 tablet, Rfl: 3    ondansetron (ZOFRAN-ODT) 4 mg disintegrating tablet, Take 1 tablet (4 mg total) by mouth every 8 (eight) hours as needed for nausea or vomiting, Disp: 12 tablet, Rfl: 0    Spacer/Aero-Holding Chambers (Vortex Valved Holding Chamber) GILLIAN, Use 2 (two) times a day, Disp: 1 each, Rfl: 0    dicyclomine (BENTYL) 20 mg tablet, Take 1 tablet (20 mg total) by mouth every 8 (eight) hours as needed (abdominal pain) (Patient not taking: Reported on 10/8/2024), Disp: 12 tablet, Rfl: 0    famotidine (PEPCID) 20 mg tablet, Take 1 tablet (20 mg total) by mouth 2 (two) times a day (Patient not taking: Reported on 10/8/2024), Disp: 30 tablet, Rfl: 0    ferrous sulfate 324 (65 Fe) mg, Take 1 tablet (324 mg total) by mouth every other day On an empty stomach with something acidic (orange juice). (Patient not taking: Reported on 10/8/2024), Disp: 45 tablet, Rfl: 3    hydrOXYzine HCL (ATARAX) 25  mg tablet, Take 1 tablet (25 mg total) by mouth every 6 (six) hours as needed for itching (Patient not taking: Reported on 10/8/2024), Disp: 30 tablet, Rfl: 1  Allergies   Allergen Reactions    Formic Acid Swelling and Rash     (Severe reactions to Bug bites)    Latex Rash and Blisters    Nsaids GI Intolerance       Physical Exam:     Vitals:    10/16/24 1307   BP: 114/81   Pulse: 91   Temp: 98.1 °F (36.7 °C)   SpO2: 99%     Physical Exam  Constitutional:       Appearance: Normal appearance.   HENT:      Head: Normocephalic and atraumatic.      Nose: Nose normal.      Mouth/Throat:      Mouth: Mucous membranes are moist.   Eyes:      Pupils: Pupils are equal, round, and reactive to light.   Cardiovascular:      Rate and Rhythm: Normal rate.      Pulses: Normal pulses.      Heart sounds: Normal heart sounds.   Pulmonary:      Effort: Pulmonary effort is normal.      Breath sounds: Normal breath sounds.   Chest:          Comments: 5 mm hard nodule left anterior chest wall just below the clavicle.  Left axilla and supraclavicular examination no palpable adenopathy.  Right breast no palpable mass masses nipple discharge nipple retraction or skin changes.  Right axillary and supraclavicular examination no palpable adenopathy.  Patient was examined seated as well as supine position.  Abdominal:      General: Bowel sounds are normal.      Palpations: Abdomen is soft.   Musculoskeletal:         General: Normal range of motion.      Cervical back: Normal range of motion and neck supple.   Skin:     General: Skin is warm.   Neurological:      General: No focal deficit present.      Mental Status: She is alert and oriented to person, place, and time.   Psychiatric:         Mood and Affect: Mood normal.         Behavior: Behavior normal.         Thought Content: Thought content normal.         Judgment: Judgment normal.           Results & Discussion:   CT PULMONARY ANGIOGRAM OF THE CHEST AND CT ABDOMEN AND PELVIS WITH  INTRAVENOUS CONTRAST     INDICATION: Chest pain and palpitations, history of prior DVT as well as breast cancer, patient also having acute epigastric pain and nausea.     COMPARISON: 3/5/22.     TECHNIQUE: CT examination of the chest, abdomen and pelvis was performed. Thin section CT angiographic technique was used in the chest in order to evaluate for pulmonary embolus and coronal 3D MIP postprocessing was performed on the acquisition scanner.   Multiplanar 2D reformatted images were created from the source data.     This examination, like all CT scans performed in the [facility], was performed utilizing techniques to minimize radiation dose exposure, including the use of iterative reconstruction and automated exposure control. Radiation dose length   product (DLP) for this visit: 1004 mGy-cm     IV Contrast: 100 mL of iohexol (OMNIPAQUE)  Enteric Contrast: Not administered.     FINDINGS:     CHEST     PULMONARY ARTERIAL TREE:  No pulmonary embolus.     LUNGS: Lungs are clear. No tracheal or endobronchial lesion.     PLEURA: Unremarkable.     HEART/AORTA: Heart is unremarkable for patient's age. No thoracic aortic aneurysm.     MEDIASTINUM AND KALEB: Unremarkable.     CHEST WALL AND LOWER NECK: Left mastectomy. Left first rib resection     ABDOMEN     LIVER/BILIARY TREE: Unremarkable.     GALLBLADDER: No calcified gallstones. No pericholecystic inflammatory change.     SPLEEN: Unremarkable.     PANCREAS: Unremarkable.     ADRENAL GLANDS: Unremarkable.     KIDNEYS/URETERS: Unremarkable. No hydronephrosis.     STOMACH AND BOWEL: Unremarkable.     APPENDIX: No findings to suggest appendicitis.     ABDOMINOPELVIC CAVITY: No ascites. No pneumoperitoneum. No lymphadenopathy.     VESSELS: Unremarkable for patient's age.     PELVIS     REPRODUCTIVE ORGANS: Unremarkable for patient's age.     URINARY BLADDER: Unremarkable.     ABDOMINAL WALL/INGUINAL REGIONS: Unremarkable.     BONES: No acute fracture or  suspicious osseous lesion.     IMPRESSION:     No acute findings in the chest, abdomen or pelvis.   MRI BRAIN WITH AND WITHOUT CONTRAST     INDICATION: C50.812: Malignant neoplasm of overlapping sites of left female breast.     headaches, history of breast cancer     COMPARISON: MRI cervical spine without contrast 9/24/2020.     CORRELATIVE STUDIES: CTA chest PE study abdomen pelvis with contrast 9/29/2024     TECHNIQUE:  Multiplanar, multisequence imaging of the brain was performed before and after gadolinium administration.        IV Contrast:  7 mL of Gadobutrol injection (SINGLE-DOSE)     IMAGE QUALITY:   Diagnostic.     FINDINGS:     BRAIN PARENCHYMA:  There is no discrete mass, mass effect or midline shift. There is no intracranial hemorrhage.  Normal posterior fossa. No acute infarction.     Small scattered hyperintensities on T2/FLAIR imaging are noted in the periventricular and subcortical white matter demonstrating an appearance that is statistically most likely to represent mild microangiopathic change.     Postcontrast imaging of the brain demonstrates no abnormal enhancement.     VENTRICLES:  Normal for the patient's age.     SELLA AND PITUITARY GLAND:  Normal.     ORBITS:  Normal.     PARANASAL SINUSES: Small left maxillary mucous retention cyst.     VASCULATURE:  Evaluation of the major intracranial vasculature demonstrates appropriate flow voids.     CALVARIUM AND SKULL BASE:  Normal.     EXTRACRANIAL SOFT TISSUES:  Normal.     IMPRESSION:     No intracranial metastasis or acute intracranial abnormality.     Mild nonspecific white matter disease, likely chronic microangiopathy or chronic migraines. Less likely differentials include demyelinating disease (no specific features), Lyme disease, collagen vascular disorder, among other differentials.       I did review the reasons for the skin nodules postmastectomy the benefits of punch biopsy to delineate also reviewed and discussed.  Follow her after 2  weeks hopefully by that time we should have the results.  I did discussed in detail nature of breast cancer and follow-up and need for breast images.  She also had a brain MRI recently this has no evidence of brain metastasis.  She also had a CTA all the studies were reviewed and discussed she understands and  agrees . All patient questions were answered.       Advance Care Planning/Advance Directives:  I Elena Cornell MD discussed the disease status with Jillian hC  today 10/16/24  treatment plans and follow-up with the patient.

## 2024-10-16 NOTE — PROGRESS NOTES
"Surgical Oncology Follow Up  [facility]  [facility] [place]  [address] [facility] [address]    Jillian Ch  1990  [id number]      Chief Complaint   Patient presents with    Follow-up     6 Month Follow Up Left Breast        Assessment & Plan:   34-year-old female with stage Ib left breast cancer in first trimester she has completed her surgery in January with sentinel\" biopsy Oncotype 23.  She is overall doing well.  Baby is 16 weeks.  She had an ongoing left chest wall nodule medial aspect away from the surgical scar.  She states this has recently enlarged in size.  Otherwise she has no other additional mass or masses.  We did obtain 6 mm punch biopsy under strict sterile condition with 1% lidocaine local anesthesia.  Will follow her in 2 weeks time hopefully will have biopsy results.  She will also do her contralateral mammogram in November which we will order at her next visit she has completed her adjuvant chemotherapy.    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, ER 80-85% NE 90-95% Her2 1+, han 2, cT2(2.1 cm)N0M0    1/2/2023 - left sided mastectomy with SLNBX - [id number] - oncotype 23    Post-op - LLE DVT - treated with lovenox until 6 weeks after delivery of her baby    2/21/2024 - start AC q 3 weeks    3/13/2024 - cycle 2 adriamycin dose reduced to 50 mg/m2 and cytoxan dose reduced by 10% due to N/V    4/2024 - stop after 3 cycles of AC due to severe nausea and dehydration with coexisting hyperemesis gravidarum    7/2024 - vaginal delivery of healthy baby boy     Malignant neoplasm of overlapping sites of left breast in female, estrogen receptor positive (HCC)   12/2/2023 Initial Diagnosis    Malignant neoplasm of overlapping sites of left female breast (HCC)     12/2/2023 Biopsy    Bilateral breast ultrasound guided biopsy  A. " Breast, Left, US Guided Left Breast Biopsy 12:00 6cmfn:  Invasive breast carcinoma of no special type (ductal) with osteoclast-like stromal giant cells  Grade 2  ER 85  OK 95  HER2 1+     B. Breast, Right, US Guided Right Breast Biopsy 10:00 9cmfn:  Benign breast tissue.    In review of the patient’s recent imaging, the left malignancy appears unifocal.  The biopsy-proven carcinoma measured 2.1 cm on ultrasound. Ultrasound of the left axilla was performed on 11/24/2023 and showed no suspicious adenopathy.  Recent imaging of the contralateral right breast dated 12/2/2023 and 11/24/2023 was reviewed and shows no suspicious findings.  The pathology results for the ultrasound-guided core needle biopsy (right breast 10:00, 9 cm from the nipple) are benign and concordant with imaging.     12/2/2023 Genetic Testing    [facility]  A total of 36 genes were evaluated, including: APC, TOPHER, BARD 1, BMPR1A, BRCA1, BRCA2, BRIP1, CDH1, CDK4, CKDN2A, CHEK2, DICER1, MLH1, MSH2, MSH6, MUTYH, NBN, NF1, NTHL1, PALB2, PMS2, PTEN, RAD51C, RECQL, SMAD4, SMARCA4, STK11, TP53, AXIN2, HOXB13, MSH3, POLD1, AND POLE, EPCAM, AND GREM1   Negative result. No pathogenic sequence variants or deletions/duplications identified       12/2/2023 -  Cancer Staged    Staging form: Breast, AJCC 8th Edition  - Clinical stage from 12/2/2023: Stage IB (cT2, cN0, cM0, G2, ER+, OK+, HER2-) - Signed by Elena Cornell MD on 12/13/2023  Stage prefix: Initial diagnosis  Histologic grading system: 3 grade system       1/2/2024 Surgery    Left breast mastectomy with sentinel lymph node biopsy  Invasive Mammary carcinoma of no special type (ductal)   Grade 2  25 mm  Margins negative  0/2 Lymph nodes  Anatomic stage IIA  Prognostic stage IA      2/21/2024 - 4/3/2024 Chemotherapy    DOXOrubicin (ADRIAMYCIN), 56.25 mg/m2 = 106 mg (93.8 % of original dose 60 mg/m2), Intravenous, Once, 3 of 3 cycles  Dose modification: 56.25 mg/m2 (original dose 60 mg/m2, Cycle 1,  Reason: Other (Must fill in a comment), Comment: max recommended dose), 50 mg/m2 (original dose 60 mg/m2, Cycle 2, Reason: Nausea/Vomiting, Comment: dose reduced to 50 mg/m2 due to n/v)  Administration: 106 mg (2/21/2024), 94 mg (3/13/2024), 94 mg (4/3/2024)  alteplase (CATHFLO), 2 mg, Intracatheter, Every 1 Minute as needed, 3 of 3 cycles  cyclophosphamide (CYTOXAN) IVPB, 600 mg/m2 = 1,128 mg, Intravenous, Once, 3 of 3 cycles  Dose modification: 540 mg/m2 (original dose 600 mg/m2, Cycle 2, Reason: Nausea/Vomiting, Comment: 10% dose reduction due to n/v)  Administration: 1,128 mg (2/21/2024), 1,000 mg (3/13/2024), 1,000 mg (4/3/2024)  fosaprepitant (EMEND) IVPB, 150 mg, Intravenous, Once, 3 of 3 cycles  Administration: 150 mg (2/21/2024), 150 mg (3/13/2024), 150 mg (4/3/2024)     Cancer complicating pregnancy, third trimester   12/14/2023 Initial Diagnosis    Cancer complicating pregnancy in second trimester           Interval History:   Follow-up with left breast cancer    Review of Systems:   Review of Systems   Constitutional:  Negative for chills and fever.   HENT:  Negative for ear pain and sore throat.    Eyes:  Negative for pain and visual disturbance.   Respiratory:  Negative for cough and shortness of breath.    Cardiovascular:  Negative for chest pain and palpitations.   Gastrointestinal:  Negative for abdominal pain and vomiting.   Genitourinary:  Negative for dysuria and hematuria.   Musculoskeletal:  Negative for arthralgias and back pain.   Skin:  Negative for color change and rash.   Neurological:  Negative for seizures and syncope.   All other systems reviewed and are negative.      Past Medical History     Patient Active Problem List   Diagnosis    Mild persistent asthma without complication    Axillary hidradenitis suppurativa    Other chest pain    Laryngopharyngeal reflux (LPR)    Depression with anxiety    Chronic fatigue    Chronic left shoulder pain    Cervical disc disorder at C5-C6 level  with radiculopathy    Atypical squamous cells of undetermined significance (ASCUS) on Papanicolaou smear of cervix    Hypertrophic and atrophic condition of skin    Migraine headache    Shoulder impingement syndrome, left    Vitamin D deficiency    Thoracic outlet syndrome    Palpitations    Transaminitis    Right middle lobe pulmonary nodule    Reversible airway obstruction    SOB (shortness of breath)    Unexplained weight gain    Left ovarian cyst    Malignant neoplasm of overlapping sites of left breast in female, estrogen receptor positive (HCC)    Cancer complicating pregnancy, third trimester    Spontaneous vaginal delivery    History of left mastectomy    Multigravida of advanced maternal age in first trimester    DVT complicating pregnancy, third trimester    Dehydration    Iron deficiency anemia secondary to inadequate dietary iron intake    Encounter for follow-up surveillance of breast cancer    Leg weakness, bilateral    Elevated blood pressure reading without diagnosis of hypertension    Pelvic pressure in female    Idiopathic urticaria    Throat swelling    Dermatographia    Allergic rhinitis due to dust mite    Latex allergy     Past Medical History:   Diagnosis Date    Anesthesia complication     gait dysfunction/ urinary retention after nerve block,    Anxiety     Asthma     CRPS (complex regional pain syndrome), upper limb     left chest/arm/hand    Deep vein thrombosis (HCC) 01/05/2024    post op from mastectomy    GERD (gastroesophageal reflux disease)     Heart murmur     HPV (human papilloma virus) infection 2012    unsure of 16, 18, or other    Invasive ductal carcinoma of breast, female, left (HCC) 2023    Kidney stone     Miscarriage 3/15/2015    7 weeks along.    Ovarian cyst     Left    PONV (postoperative nausea and vomiting) 01/02/2024     Past Surgical History:   Procedure Laterality Date    COLPOSCOPY  2012    HPV    EGD      IR PORT PLACEMENT  2/7/2024    IR UPPER EXTREMITY VENOGRAM-  DIAGNOSTIC  05/28/2021    KY BX/EXC LYMPH NODE OPEN SUPERFICIAL Left 01/02/2024    Procedure: LEFT BREAST MASTECTOMY, LYMPHATIC MAPPING WITH RADIOACTIVE DYE, SENTINEL LYMPH NODE BIOPSY, ZAHEER LEFT BREAST, INJECTION IN OR AT 0800 BY DR CORNELL;  Surgeon: Elena Cornell MD;  Location: [facility];  Service: Surgical Oncology    KY EXCISION 1ST &/CERVICAL RIB Left 08/12/2021    Procedure: FIRST RIB RESECTION;  Surgeon: Jonathan Schaffer MD;  [facility]: [facility];  Service: Thoracic    KY INJ RADIOACTIVE TRACER FOR ID OF SENTINEL NODE Left 01/02/2024    Procedure: LEFT BREAST MASTECTOMY, LYMPHATIC MAPPING WITH RADIOACTIVE DYE, SENTINEL LYMPH NODE BIOPSY, ZAHEER LEFT BREAST, INJECTION IN OR AT 0800 BY DR CORNELL;  Surgeon: Elena Cornell MD;  [facility]: [facility];  Service: Surgical Oncology    KY INTRAOP SENTINEL LYMPH NODE ID W/DYE INJECTION Left 01/02/2024    Procedure: LEFT BREAST MASTECTOMY, LYMPHATIC MAPPING WITH RADIOACTIVE DYE, SENTINEL LYMPH NODE BIOPSY, ZAHEER LEFT BREAST, INJECTION IN OR AT 0800 BY DR CORNELL;  Surgeon: Elena Cornell MD;  [facility];  Service: Surgical Oncology    KY MASTECTOMY SIMPLE COMPLETE Left 01/02/2024    Procedure: LEFT BREAST MASTECTOMY, LYMPHATIC MAPPING WITH RADIOACTIVE DYE, SENTINEL LYMPH NODE BIOPSY, ZAHEER LEFT BREAST, INJECTION IN OR AT 0800 BY DR CORNELL;  Surgeon: Elena Cornell MD;  [facility];  Service: Surgical Oncology    THORACOSCOPY VIDEO ASSISTED SURGERY (VATS) Left 08/12/2021    Procedure: THORACOSCOPY VIDEO ASSISTED SURGERY (VATS);  Surgeon: Jonathan Schaffer MD;  [facility];  Service: Thoracic    US GUIDANCE BREAST BIOPSY LEFT EACH ADDITIONAL Left 12/02/2023    US GUIDED BREAST BIOPSY RIGHT COMPLETE Right 12/02/2023    WISDOM TOOTH EXTRACTION  2012     Family History   Problem Relation Age of Onset    Heart disease Mother     Miscarriages / Stillbirths Mother     Coronary artery disease Mother     No  Known Problems Father     No Known Problems Half-Brother     Bone cancer Maternal Grandmother         hilda to liver and spine    Miscarriages / Stillbirths Half-Sister     Breast cancer Neg Hx     Ovarian cancer Neg Hx     Uterine cancer Neg Hx     Cervical cancer Neg Hx      Social History     Socioeconomic History    Marital status: /Civil Union     Spouse name: Not on file    Number of children: Not on file    Years of education: Not on file    Highest education level: Not on file   Occupational History    Not on file   Tobacco Use    Smoking status: Never     Passive exposure: Past    Smokeless tobacco: Never   Vaping Use    Vaping status: Never Used   Substance and Sexual Activity    Alcohol use: Not Currently     Comment: social    Drug use: Never    Sexual activity: Yes     Partners: Male     Birth control/protection: None   Other Topics Concern    Not on file   Social History Narrative    Do you have pets? 2 cat & 2 dog  Is pet allowed in bedroom?Yes    Are you a smoker? Never    Does anyone smoke in your home? No       Do you live with smokers? No    [place] frequently? No   How many times a year? N/A      Social Determinants of Health     Financial Resource Strain: Not on file   Food Insecurity: No Food Insecurity (7/11/2024)    Hunger Vital Sign     Worried About Running Out of Food in the Last Year: Never true     Ran Out of Food in the Last Year: Never true   Transportation Needs: No Transportation Needs (7/11/2024)    PRAPARE - Transportation     Lack of Transportation (Medical): No     Lack of Transportation (Non-Medical): No   Physical Activity: Not on file   Stress: Not on file   Social Connections: Not on file   Intimate Partner Violence: Not on file   Housing Stability: Not on file       Current Outpatient Medications:     acetaminophen (TYLENOL) 325 mg tablet, Take 2 tablets (650 mg total) by mouth every 4 (four) hours as needed for mild pain, Disp: , Rfl:     albuterol (PROVENTIL  HFA,VENTOLIN HFA) 90 mcg/act inhaler, Inhale 2 puffs every 4 (four) hours as needed for wheezing, Disp: 6.7 g, Rfl: 3    Albuterol-Budesonide (Airsupra) 90-80 MCG/ACT AERO, 2 puffs as needed shortness of breath BIN: [id number]    PCN: [id number]      GRP: [id number]    ID: [id number], Disp: 10.7 g, Rfl: 3    apixaban (Eliquis) 5 mg, Take 1 tablet (5 mg total) by mouth 2 (two) times a day, Disp: 60 tablet, Rfl: 2    cetirizine (ZyrTEC) 10 mg tablet, TAKE 1 TABLET BY MOUTH EVERY DAY, Disp: 90 tablet, Rfl: 1    EPINEPHrine (EPIPEN) 0.3 mg/0.3 mL SOAJ, Inject 0.3 mL (0.3 mg total) into a muscle once for 1 dose, Disp: 2 each, Rfl: 3    fluticasone (FLONASE) 50 mcg/act nasal spray, 2 sprays into each nostril daily, Disp: 18 mL, Rfl: 5    Multiple Vitamin (MULTIVITAMINS PO), Take by mouth, Disp: , Rfl:     omeprazole (PriLOSEC) 40 MG capsule, Take 1 capsule (40 mg total) by mouth daily, Disp: 90 capsule, Rfl: 2    ondansetron (ZOFRAN) 8 mg tablet, Take 1 tablet (8 mg total) by mouth every 8 (eight) hours as needed for nausea or vomiting, Disp: 30 tablet, Rfl: 3    ondansetron (ZOFRAN-ODT) 4 mg disintegrating tablet, Take 1 tablet (4 mg total) by mouth every 8 (eight) hours as needed for nausea or vomiting, Disp: 12 tablet, Rfl: 0    Spacer/Aero-Holding Chambers (Vortex Valved Holding Chamber) GILLIAN, Use 2 (two) times a day, Disp: 1 each, Rfl: 0    dicyclomine (BENTYL) 20 mg tablet, Take 1 tablet (20 mg total) by mouth every 8 (eight) hours as needed (abdominal pain) (Patient not taking: Reported on 10/8/2024), Disp: 12 tablet, Rfl: 0    famotidine (PEPCID) 20 mg tablet, Take 1 tablet (20 mg total) by mouth 2 (two) times a day (Patient not taking: Reported on 10/8/2024), Disp: 30 tablet, Rfl: 0    ferrous sulfate 324 (65 Fe) mg, Take 1 tablet (324 mg total) by mouth every other day On an empty stomach with something acidic (orange juice). (Patient not taking: Reported on 10/8/2024), Disp: 45 tablet, Rfl: 3    hydrOXYzine HCL (ATARAX) 25  mg tablet, Take 1 tablet (25 mg total) by mouth every 6 (six) hours as needed for itching (Patient not taking: Reported on 10/8/2024), Disp: 30 tablet, Rfl: 1  Allergies   Allergen Reactions    Formic Acid Swelling and Rash     (Severe reactions to Bug bites)    Latex Rash and Blisters    Nsaids GI Intolerance       Physical Exam:     Vitals:    10/16/24 1307   BP: 114/81   Pulse: 91   Temp: 98.1 °F (36.7 °C)   SpO2: 99%     Physical Exam  Constitutional:       Appearance: Normal appearance.   HENT:      Head: Normocephalic and atraumatic.      Nose: Nose normal.      Mouth/Throat:      Mouth: Mucous membranes are moist.   Eyes:      Pupils: Pupils are equal, round, and reactive to light.   Cardiovascular:      Rate and Rhythm: Normal rate.      Pulses: Normal pulses.      Heart sounds: Normal heart sounds.   Pulmonary:      Effort: Pulmonary effort is normal.      Breath sounds: Normal breath sounds.   Chest:          Comments: 5 mm hard nodule left anterior chest wall just below the clavicle.  Left axilla and supraclavicular examination no palpable adenopathy.  Right breast no palpable mass masses nipple discharge nipple retraction or skin changes.  Right axillary and supraclavicular examination no palpable adenopathy.  Patient was examined seated as well as supine position.  Abdominal:      General: Bowel sounds are normal.      Palpations: Abdomen is soft.   Musculoskeletal:         General: Normal range of motion.      Cervical back: Normal range of motion and neck supple.   Skin:     General: Skin is warm.   Neurological:      General: No focal deficit present.      Mental Status: She is alert and oriented to person, place, and time.   Psychiatric:         Mood and Affect: Mood normal.         Behavior: Behavior normal.         Thought Content: Thought content normal.         Judgment: Judgment normal.           Results & Discussion:   CT PULMONARY ANGIOGRAM OF THE CHEST AND CT ABDOMEN AND PELVIS WITH  INTRAVENOUS CONTRAST     INDICATION: Chest pain and palpitations, history of prior DVT as well as breast cancer, patient also having acute epigastric pain and nausea.     COMPARISON: 3/5/22.     TECHNIQUE: CT examination of the chest, abdomen and pelvis was performed. Thin section CT angiographic technique was used in the chest in order to evaluate for pulmonary embolus and coronal 3D MIP postprocessing was performed on the acquisition scanner.   Multiplanar 2D reformatted images were created from the source data.     This examination, like all CT scans performed in the [facility], was performed utilizing techniques to minimize radiation dose exposure, including the use of iterative reconstruction and automated exposure control. Radiation dose length   product (DLP) for this visit: 1004 mGy-cm     IV Contrast: 100 mL of iohexol (OMNIPAQUE)  Enteric Contrast: Not administered.     FINDINGS:     CHEST     PULMONARY ARTERIAL TREE:  No pulmonary embolus.     LUNGS: Lungs are clear. No tracheal or endobronchial lesion.     PLEURA: Unremarkable.     HEART/AORTA: Heart is unremarkable for patient's age. No thoracic aortic aneurysm.     MEDIASTINUM AND KALEB: Unremarkable.     CHEST WALL AND LOWER NECK: Left mastectomy. Left first rib resection     ABDOMEN     LIVER/BILIARY TREE: Unremarkable.     GALLBLADDER: No calcified gallstones. No pericholecystic inflammatory change.     SPLEEN: Unremarkable.     PANCREAS: Unremarkable.     ADRENAL GLANDS: Unremarkable.     KIDNEYS/URETERS: Unremarkable. No hydronephrosis.     STOMACH AND BOWEL: Unremarkable.     APPENDIX: No findings to suggest appendicitis.     ABDOMINOPELVIC CAVITY: No ascites. No pneumoperitoneum. No lymphadenopathy.     VESSELS: Unremarkable for patient's age.     PELVIS     REPRODUCTIVE ORGANS: Unremarkable for patient's age.     URINARY BLADDER: Unremarkable.     ABDOMINAL WALL/INGUINAL REGIONS: Unremarkable.     BONES: No acute fracture or  suspicious osseous lesion.     IMPRESSION:     No acute findings in the chest, abdomen or pelvis.   MRI BRAIN WITH AND WITHOUT CONTRAST     INDICATION: C50.812: Malignant neoplasm of overlapping sites of left female breast.     headaches, history of breast cancer     COMPARISON: MRI cervical spine without contrast 9/24/2020.     CORRELATIVE STUDIES: CTA chest PE study abdomen pelvis with contrast 9/29/2024     TECHNIQUE:  Multiplanar, multisequence imaging of the brain was performed before and after gadolinium administration.        IV Contrast:  7 mL of Gadobutrol injection (SINGLE-DOSE)     IMAGE QUALITY:   Diagnostic.     FINDINGS:     BRAIN PARENCHYMA:  There is no discrete mass, mass effect or midline shift. There is no intracranial hemorrhage.  Normal posterior fossa. No acute infarction.     Small scattered hyperintensities on T2/FLAIR imaging are noted in the periventricular and subcortical white matter demonstrating an appearance that is statistically most likely to represent mild microangiopathic change.     Postcontrast imaging of the brain demonstrates no abnormal enhancement.     VENTRICLES:  Normal for the patient's age.     SELLA AND PITUITARY GLAND:  Normal.     ORBITS:  Normal.     PARANASAL SINUSES: Small left maxillary mucous retention cyst.     VASCULATURE:  Evaluation of the major intracranial vasculature demonstrates appropriate flow voids.     CALVARIUM AND SKULL BASE:  Normal.     EXTRACRANIAL SOFT TISSUES:  Normal.     IMPRESSION:     No intracranial metastasis or acute intracranial abnormality.     Mild nonspecific white matter disease, likely chronic microangiopathy or chronic migraines. Less likely differentials include demyelinating disease (no specific features), Lyme disease, collagen vascular disorder, among other differentials.       I did review the reasons for the skin nodules postmastectomy the benefits of punch biopsy to delineate also reviewed and discussed.  Follow her after 2  weeks hopefully by that time we should have the results.  I did discussed in detail nature of breast cancer and follow-up and need for breast images.  She also had a brain MRI recently this has no evidence of brain metastasis.  She also had a CTA all the studies were reviewed and discussed she understands and  agrees . All patient questions were answered.       Advance Care Planning/Advance Directives:  I Elena Cornell MD discussed the disease status with Jillian Ch  today 10/16/24  treatment plans and follow-up with the patient.

## 2024-10-17 ENCOUNTER — DOCUMENTATION (OUTPATIENT)
Dept: CARDIOLOGY CLINIC | Facility: CLINIC | Age: 34
End: 2024-10-17

## 2024-10-17 NOTE — PROGRESS NOTES
Pt presented today for coumadin education, however she is currently bridging with lovenox to Eliqus per her oncology team.  I confirmed this in her chart.    Appt cancelled.

## 2024-10-18 ENCOUNTER — TELEPHONE (OUTPATIENT)
Dept: SURGICAL ONCOLOGY | Facility: CLINIC | Age: 34
End: 2024-10-18

## 2024-10-18 ENCOUNTER — PATIENT OUTREACH (OUTPATIENT)
Dept: CASE MANAGEMENT | Facility: HOSPITAL | Age: 34
End: 2024-10-18

## 2024-10-18 NOTE — TELEPHONE ENCOUNTER
E mail and intake messages received regarding the patient and high co pay for her Eliquis:      From: Claire Leong <[email or web address]>   Sent: Friday, October 18, 2024 11:47 AM  To: [employer] <[email or web address]>  Subject: Eliquis H.MARINA Agostaarti    Good morning!  Patient Jillian Ch 1990 needs [employer] funding as it costs her over $500. I am giving her some samples and a 30-day co-pay card, but will need some assistance.     Thanks  Claire     My response       I was unable to process the co pay card online but the [facility] site suggested that the patient call them directly for the card.  I called the patient and provided her with the number [phone]

## 2024-10-18 NOTE — PROGRESS NOTES
Pt checked in with me today via email asking for help with a few bills, which she had attached.  2 of the 5 were submitted for compassion fund assistance.  The other 3 are [facility] medical bills.  I replied back and sent her the hospital FA application and explained what supporting documents will be needed to complete the application.  I will follow up as needed moving forward.

## 2024-10-21 PROCEDURE — 88305 TISSUE EXAM BY PATHOLOGIST: CPT | Performed by: PATHOLOGY

## 2024-10-21 RX ORDER — SODIUM CHLORIDE 9 MG/ML
75 INJECTION, SOLUTION INTRAVENOUS CONTINUOUS
Status: CANCELLED | OUTPATIENT
Start: 2024-10-21

## 2024-10-23 ENCOUNTER — TELEPHONE (OUTPATIENT)
Dept: HEMATOLOGY ONCOLOGY | Facility: CLINIC | Age: 34
End: 2024-10-23

## 2024-10-25 ENCOUNTER — HOSPITAL ENCOUNTER (OUTPATIENT)
Dept: NON INVASIVE DIAGNOSTICS | Facility: HOSPITAL | Age: 34
Discharge: HOME | End: 2024-10-25
Attending: INTERNAL MEDICINE
Payer: COMMERCIAL

## 2024-10-25 VITALS
HEART RATE: 75 BPM | WEIGHT: 171.74 LBS | DIASTOLIC BLOOD PRESSURE: 55 MMHG | TEMPERATURE: 97.1 F | RESPIRATION RATE: 17 BRPM | HEIGHT: 67 IN | OXYGEN SATURATION: 100 % | SYSTOLIC BLOOD PRESSURE: 109 MMHG | BODY MASS INDEX: 26.96 KG/M2

## 2024-10-25 DIAGNOSIS — Z95.828 PORT-A-CATH IN PLACE: ICD-10-CM

## 2024-10-25 LAB
EXT PREGNANCY TEST URINE: NEGATIVE
EXT. CONTROL: NORMAL

## 2024-10-25 PROCEDURE — 36590 REMOVAL TUNNELED CV CATH: CPT

## 2024-10-25 PROCEDURE — 99152 MOD SED SAME PHYS/QHP 5/>YRS: CPT

## 2024-10-25 PROCEDURE — 99152 MOD SED SAME PHYS/QHP 5/>YRS: CPT | Performed by: RADIOLOGY

## 2024-10-25 PROCEDURE — 36590 REMOVAL TUNNELED CV CATH: CPT | Performed by: RADIOLOGY

## 2024-10-25 RX ORDER — SODIUM CHLORIDE 9 MG/ML
75 INJECTION, SOLUTION INTRAVENOUS CONTINUOUS
Status: DISCONTINUED | OUTPATIENT
Start: 2024-10-25 | End: 2024-10-26 | Stop reason: HOSPADM

## 2024-10-25 RX ORDER — MIDAZOLAM HYDROCHLORIDE 2 MG/2ML
INJECTION, SOLUTION INTRAMUSCULAR; INTRAVENOUS AS NEEDED
Status: COMPLETED | OUTPATIENT
Start: 2024-10-25 | End: 2024-10-25

## 2024-10-25 RX ORDER — FENTANYL CITRATE 50 UG/ML
INJECTION, SOLUTION INTRAMUSCULAR; INTRAVENOUS AS NEEDED
Status: COMPLETED | OUTPATIENT
Start: 2024-10-25 | End: 2024-10-25

## 2024-10-25 RX ADMIN — Medication 5 ML: at 11:07

## 2024-10-25 RX ADMIN — Medication 5 ML: at 11:03

## 2024-10-25 RX ADMIN — MIDAZOLAM HYDROCHLORIDE 1 MG: 1 INJECTION, SOLUTION INTRAMUSCULAR; INTRAVENOUS at 10:58

## 2024-10-25 RX ADMIN — SODIUM CHLORIDE 75 ML/HR: 0.9 INJECTION, SOLUTION INTRAVENOUS at 10:34

## 2024-10-25 RX ADMIN — FENTANYL CITRATE 50 MCG: 50 INJECTION, SOLUTION INTRAMUSCULAR; INTRAVENOUS at 10:58

## 2024-10-25 NOTE — TELEPHONE ENCOUNTER
I contacted patient  to schedule Survivorship consult. RI spoke with patient and went over the survivorship program. Patient was interested in scheduling this. I offered and she agreed to the below. Patient had no further questions or concerns at this time. I encouraged her to call should any arise.     Introduced myself and my role.      Type of appointment-  Provider-  Date-  Time-  Location-    Patient had no other questions or concerns at this time. I provided my contact information in case any should arise.

## 2024-10-25 NOTE — INTERVAL H&P NOTE
"Patient arrived to IR for port removal    The procedure and risks were discussed with the patient.  All questions were answered. Informed consent was obtained.    H & P reviewed after examining the patient and I find no changes in the patient condition since the H & P has been written.    /64   Pulse 79   Temp (!) 97.1 °F (36.2 °C) (Temporal)   Resp 15   Ht 5' 7\" (1.702 m)   Wt 77.9 kg (171 lb 11.8 oz)   LMP 10/03/2024 (Approximate)   SpO2 99%   BMI 26.90 kg/m²     Patient re-evaluated. Accept as history and physical.    Ramu Salinas, DO/October 25, 2024/10:10 AM  "

## 2024-10-25 NOTE — QUICK NOTE
Patient requested that sutures from recent biopsy performed by Dr. Cornell be removed. I contacted Dr. Cornell and he was ok with us removing the sutures.  The sutures were removed sterilely without incident.

## 2024-10-28 PROBLEM — D23.9 DERMATOFIBROMA: Status: ACTIVE | Noted: 2024-10-28

## 2024-10-29 ENCOUNTER — TELEPHONE (OUTPATIENT)
Dept: CARDIOLOGY CLINIC | Facility: CLINIC | Age: 34
End: 2024-10-29

## 2024-10-29 ENCOUNTER — TELEPHONE (OUTPATIENT)
Dept: SURGICAL ONCOLOGY | Facility: CLINIC | Age: 34
End: 2024-10-29

## 2024-10-29 ENCOUNTER — TELEMEDICINE (OUTPATIENT)
Dept: HEMATOLOGY ONCOLOGY | Facility: CLINIC | Age: 34
End: 2024-10-29
Payer: COMMERCIAL

## 2024-10-29 DIAGNOSIS — Z51.81 THERAPEUTIC DRUG MONITORING: Primary | ICD-10-CM

## 2024-10-29 PROCEDURE — 99213 OFFICE O/P EST LOW 20 MIN: CPT | Performed by: INTERNAL MEDICINE

## 2024-10-29 NOTE — TELEPHONE ENCOUNTER
Called pt to ensure she went to the correct location since we were expecting her at 840am. LVOM with my direct TEAMS number.

## 2024-10-29 NOTE — TELEPHONE ENCOUNTER
SKYLA as patient was a no show for her appointment this morning, 10/29/24, with Dr. Tolentino in cardio-oncology.     Left the phone number to call and re-schedule the consult.

## 2024-10-29 NOTE — TELEPHONE ENCOUNTER
Called patient to get her checked out after her appointment. Left a voicemail and gave patient her Follow up appointment and her Echo appointment as well. I informed patient she can also see them in her  MYC and if the dates or times don't work to please call our office back at [phone].    Thank you

## 2024-10-30 ENCOUNTER — TELEPHONE (OUTPATIENT)
Dept: HEMATOLOGY ONCOLOGY | Facility: CLINIC | Age: 34
End: 2024-10-30

## 2024-10-30 ENCOUNTER — TELEPHONE (OUTPATIENT)
Age: 34
End: 2024-10-30

## 2024-10-30 ENCOUNTER — OFFICE VISIT (OUTPATIENT)
Dept: SURGICAL ONCOLOGY | Facility: CLINIC | Age: 34
End: 2024-10-30
Payer: COMMERCIAL

## 2024-10-30 VITALS
HEIGHT: 67 IN | TEMPERATURE: 97.7 F | BODY MASS INDEX: 26.84 KG/M2 | DIASTOLIC BLOOD PRESSURE: 76 MMHG | OXYGEN SATURATION: 97 % | WEIGHT: 171 LBS | SYSTOLIC BLOOD PRESSURE: 100 MMHG | HEART RATE: 98 BPM

## 2024-10-30 DIAGNOSIS — C50.812 MALIGNANT NEOPLASM OF OVERLAPPING SITES OF LEFT BREAST IN FEMALE, ESTROGEN RECEPTOR POSITIVE (HCC): ICD-10-CM

## 2024-10-30 DIAGNOSIS — Z90.12 HISTORY OF LEFT MASTECTOMY: ICD-10-CM

## 2024-10-30 DIAGNOSIS — Z08 ENCOUNTER FOR FOLLOW-UP SURVEILLANCE OF BREAST CANCER: ICD-10-CM

## 2024-10-30 DIAGNOSIS — Z85.3 ENCOUNTER FOR FOLLOW-UP SURVEILLANCE OF BREAST CANCER: ICD-10-CM

## 2024-10-30 DIAGNOSIS — Z17.0 MALIGNANT NEOPLASM OF OVERLAPPING SITES OF LEFT BREAST IN FEMALE, ESTROGEN RECEPTOR POSITIVE (HCC): ICD-10-CM

## 2024-10-30 DIAGNOSIS — D23.9 DERMATOFIBROMA: Primary | ICD-10-CM

## 2024-10-30 PROCEDURE — 99215 OFFICE O/P EST HI 40 MIN: CPT | Performed by: SURGERY

## 2024-10-30 NOTE — TELEPHONE ENCOUNTER
AWILDA Chavez called to inform PCP that pt was being followed for breast CA.  States that pt is not responsive to Kathy's calls.    Will close pt case for now.  States pt can re-enroll when she is ready as pt has Kathy's contact number.

## 2024-10-30 NOTE — PROGRESS NOTES
Surgical Oncology Follow Up  [facility]  [facility]  [address] [address]    Jillian Ch  1990  [id number]      Chief Complaint   Patient presents with   • Follow-up     2 wk f/u after 10/16/24 left chest wall punch bx dermatofibroma        Assessment & Plan:   34-year-old female with a left breast cancer she is here after left skin flap nodule punch biopsy which is consistent with dermatofibroma.  This was reviewed and discussed.  She is due for her next mammogram for contralateral breast which we will order and see her.    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, ER 80-85% WI 90-95% Her2 1+, han 2, cT2(2.1 cm)N0M0    1/2/2023 - left sided mastectomy with SLNBX - [id number] - oncotype 23    Post-op - LLE DVT - treated with lovenox until 6 weeks after delivery of her baby    2/21/2024 - start AC q 3 weeks    3/13/2024 - cycle 2 adriamycin dose reduced to 50 mg/m2 and cytoxan dose reduced by 10% due to N/V    4/2024 - stop after 3 cycles of AC due to severe nausea and dehydration with coexisting hyperemesis gravidarum    7/2024 - vaginal delivery of healthy baby boy     Malignant neoplasm of overlapping sites of left breast in female, estrogen receptor positive (HCC)   12/2/2023 Initial Diagnosis    Malignant neoplasm of overlapping sites of left female breast (HCC)     12/2/2023 Biopsy    Bilateral breast ultrasound guided biopsy  A. Breast, Left, US Guided Left Breast Biopsy 12:00 6cmfn:  Invasive breast carcinoma of no special type (ductal) with osteoclast-like stromal giant cells  Grade 2  ER 85  WI 95  HER2 1+     B. Breast, Right, US Guided Right Breast Biopsy 10:00 9cmfn:  Benign breast tissue.    In review of the patient’s recent imaging, the left malignancy appears unifocal.  The biopsy-proven carcinoma measured 2.1  cm on ultrasound. Ultrasound of the left axilla was performed on 11/24/2023 and showed no suspicious adenopathy.  Recent imaging of the contralateral right breast dated 12/2/2023 and 11/24/2023 was reviewed and shows no suspicious findings.  The pathology results for the ultrasound-guided core needle biopsy (right breast 10:00, 9 cm from the nipple) are benign and concordant with imaging.     12/2/2023 Genetic Testing    [facility]  A total of 36 genes were evaluated, including: APC, TOPHER, BARD 1, BMPR1A, BRCA1, BRCA2, BRIP1, CDH1, CDK4, CKDN2A, CHEK2, DICER1, MLH1, MSH2, MSH6, MUTYH, NBN, NF1, NTHL1, PALB2, PMS2, PTEN, RAD51C, RECQL, SMAD4, SMARCA4, STK11, TP53, AXIN2, HOXB13, MSH3, POLD1, AND POLE, EPCAM, AND GREM1   Negative result. No pathogenic sequence variants or deletions/duplications identified       12/2/2023 -  Cancer Staged    Staging form: Breast, AJCC 8th Edition  - Clinical stage from 12/2/2023: Stage IB (cT2, cN0, cM0, G2, ER+, AL+, HER2-) - Signed by Elena Cornell MD on 12/13/2023  Stage prefix: Initial diagnosis  Histologic grading system: 3 grade system       1/2/2024 Surgery    Left breast mastectomy with sentinel lymph node biopsy  Invasive Mammary carcinoma of no special type (ductal)   Grade 2  25 mm  Margins negative  0/2 Lymph nodes  Anatomic stage IIA  Prognostic stage IA      2/21/2024 - 4/3/2024 Chemotherapy    DOXOrubicin (ADRIAMYCIN), 56.25 mg/m2 = 106 mg (93.8 % of original dose 60 mg/m2), Intravenous, Once, 3 of 3 cycles  Dose modification: 56.25 mg/m2 (original dose 60 mg/m2, Cycle 1, Reason: Other (Must fill in a comment), Comment: max recommended dose), 50 mg/m2 (original dose 60 mg/m2, Cycle 2, Reason: Nausea/Vomiting, Comment: dose reduced to 50 mg/m2 due to n/v)  Administration: 106 mg (2/21/2024), 94 mg (3/13/2024), 94 mg (4/3/2024)  alteplase (CATHFLO), 2 mg, Intracatheter, Every 1 Minute as needed, 3 of 3 cycles  cyclophosphamide (CYTOXAN) IVPB, 600 mg/m2 = 1,128 mg,  Intravenous, Once, 3 of 3 cycles  Dose modification: 540 mg/m2 (original dose 600 mg/m2, Cycle 2, Reason: Nausea/Vomiting, Comment: 10% dose reduction due to n/v)  Administration: 1,128 mg (2/21/2024), 1,000 mg (3/13/2024), 1,000 mg (4/3/2024)  fosaprepitant (EMEND) IVPB, 150 mg, Intravenous, Once, 3 of 3 cycles  Administration: 150 mg (2/21/2024), 150 mg (3/13/2024), 150 mg (4/3/2024)     Cancer complicating pregnancy, third trimester   12/14/2023 Initial Diagnosis    Cancer complicating pregnancy in second trimester           Interval History:   Follow-up after left skin punch biopsy    Review of Systems:   Review of Systems   Constitutional:  Negative for chills and fever.   HENT:  Negative for ear pain and sore throat.    Eyes:  Negative for pain and visual disturbance.   Respiratory:  Negative for cough and shortness of breath.    Cardiovascular:  Negative for chest pain and palpitations.   Gastrointestinal:  Negative for abdominal pain and vomiting.   Genitourinary:  Negative for dysuria and hematuria.   Musculoskeletal:  Negative for arthralgias and back pain.   Skin:  Negative for color change and rash.   Neurological:  Negative for seizures and syncope.   All other systems reviewed and are negative.    Past Medical History     Patient Active Problem List   Diagnosis   • Mild persistent asthma without complication   • Axillary hidradenitis suppurativa   • Other chest pain   • Laryngopharyngeal reflux (LPR)   • Depression with anxiety   • Chronic fatigue   • Chronic left shoulder pain   • Cervical disc disorder at C5-C6 level with radiculopathy   • Atypical squamous cells of undetermined significance (ASCUS) on Papanicolaou smear of cervix   • Hypertrophic and atrophic condition of skin   • Migraine headache   • Shoulder impingement syndrome, left   • Vitamin D deficiency   • Thoracic outlet syndrome   • Palpitations   • Transaminitis   • Right middle lobe pulmonary nodule   • Reversible airway obstruction    • SOB (shortness of breath)   • Unexplained weight gain   • Left ovarian cyst   • Malignant neoplasm of overlapping sites of left breast in female, estrogen receptor positive (HCC)   • Cancer complicating pregnancy, third trimester   • Spontaneous vaginal delivery   • History of left mastectomy   • Multigravida of advanced maternal age in first trimester   • DVT complicating pregnancy, third trimester   • Dehydration   • Iron deficiency anemia secondary to inadequate dietary iron intake   • Encounter for follow-up surveillance of breast cancer   • Leg weakness, bilateral   • Elevated blood pressure reading without diagnosis of hypertension   • Pelvic pressure in female   • Idiopathic urticaria   • Throat swelling   • Dermatographia   • Allergic rhinitis due to dust mite   • Latex allergy   • Dermatofibroma     Past Medical History:   Diagnosis Date   • Anesthesia complication     gait dysfunction/ urinary retention after nerve block,   • Anxiety    • Asthma    • CRPS (complex regional pain syndrome), upper limb     left chest/arm/hand   • Deep vein thrombosis (HCC) 01/05/2024    post op from mastectomy   • GERD (gastroesophageal reflux disease)    • Heart murmur    • HPV (human papilloma virus) infection 2012    unsure of 16, 18, or other   • Invasive ductal carcinoma of breast, female, left (HCC) 2023   • Kidney stone    • Miscarriage 3/15/2015    7 weeks along.   • Ovarian cyst     Left   • PONV (postoperative nausea and vomiting) 01/02/2024     Past Surgical History:   Procedure Laterality Date   • COLPOSCOPY  2012    HPV   • EGD     • IR PORT PLACEMENT  2/7/2024   • IR PORT REMOVAL  10/25/2024   • IR UPPER EXTREMITY VENOGRAM- DIAGNOSTIC  05/28/2021   • NJ BX/EXC LYMPH NODE OPEN SUPERFICIAL Left 01/02/2024    Procedure: LEFT BREAST MASTECTOMY, LYMPHATIC MAPPING WITH RADIOACTIVE DYE, SENTINEL LYMPH NODE BIOPSY, ZAHEER LEFT BREAST, INJECTION IN OR AT 0800 BY DR CORNELL;  Surgeon: Elena Cronell MD;   Location: [facility];  Service: Surgical Oncology   • ND EXCISION 1ST &/CERVICAL RIB Left 08/12/2021    Procedure: FIRST RIB RESECTION;  Surgeon: Jonathan Schaffer MD;  Location: [facility];  Service: Thoracic   • ND INJ RADIOACTIVE TRACER FOR ID OF SENTINEL NODE Left 01/02/2024    Procedure: LEFT BREAST MASTECTOMY, LYMPHATIC MAPPING WITH RADIOACTIVE DYE, SENTINEL LYMPH NODE BIOPSY, ZAHEER LEFT BREAST, INJECTION IN OR AT 0800 BY DR CORNELL;  Surgeon: Elena Cornell MD;  [facility];  Service: Surgical Oncology   • ND INTRAOP SENTINEL LYMPH NODE ID W/DYE INJECTION Left 01/02/2024    Procedure: LEFT BREAST MASTECTOMY, LYMPHATIC MAPPING WITH RADIOACTIVE DYE, SENTINEL LYMPH NODE BIOPSY, ZAHEER LEFT BREAST, INJECTION IN OR AT 0800 BY DR CORNELL;  Surgeon: Elena Cornell MD;  [facility];  Service: Surgical Oncology   • ND MASTECTOMY SIMPLE COMPLETE Left 01/02/2024    Procedure: LEFT BREAST MASTECTOMY, LYMPHATIC MAPPING WITH RADIOACTIVE DYE, SENTINEL LYMPH NODE BIOPSY, ZAHEER LEFT BREAST, INJECTION IN OR AT 0800 BY DR CORNELL;  Surgeon: Elena Cornell MD;  [facility];  Service: Surgical Oncology   • THORACOSCOPY VIDEO ASSISTED SURGERY (VATS) Left 08/12/2021    Procedure: THORACOSCOPY VIDEO ASSISTED SURGERY (VATS);  Surgeon: Jonathan Schaffer MD;  [facility];  Service: Thoracic   • US GUIDANCE BREAST BIOPSY LEFT EACH ADDITIONAL Left 12/02/2023   • US GUIDED BREAST BIOPSY RIGHT COMPLETE Right 12/02/2023   • WISDOM TOOTH EXTRACTION  2012     Family History   Problem Relation Age of Onset   • Heart disease Mother    • Miscarriages / Stillbirths Mother    • Coronary artery disease Mother    • No Known Problems Father    • No Known Problems Half-Brother    • Bone cancer Maternal Grandmother         hilda to liver and spine   • Miscarriages / Stillbirths Half-Sister    • Breast cancer Neg Hx    • Ovarian cancer Neg Hx    • Uterine cancer Neg Hx    • Cervical cancer Neg  Hx      Social History     Socioeconomic History   • Marital status: /Civil Union     Spouse name: Not on file   • Number of children: Not on file   • Years of education: Not on file   • Highest education level: Not on file   Occupational History   • Not on file   Tobacco Use   • Smoking status: Never     Passive exposure: Past   • Smokeless tobacco: Never   Vaping Use   • Vaping status: Never Used   Substance and Sexual Activity   • Alcohol use: Not Currently     Comment: social   • Drug use: Never   • Sexual activity: Yes     Partners: Male     Birth control/protection: None   Other Topics Concern   • Not on file   Social History Narrative    Do you have pets? 2 cat & 2 dog  Is pet allowed in bedroom?Yes    Are you a smoker? Never    Does anyone smoke in your home? No       Do you live with smokers? No    [place] frequently? No   How many times a year? N/A      Social Determinants of Health     Financial Resource Strain: Not on file   Food Insecurity: No Food Insecurity (7/11/2024)    Nursing - Inadequate Food Risk Classification    • Worried About Running Out of Food in the Last Year: Never true    • Ran Out of Food in the Last Year: Never true    • Ran Out of Food in the Last Year: Not on file   Transportation Needs: No Transportation Needs (7/11/2024)    PRAPARE - Transportation    • Lack of Transportation (Medical): No    • Lack of Transportation (Non-Medical): No   Physical Activity: Not on file   Stress: Not on file   Social Connections: Not on file   Intimate Partner Violence: Not on file   Housing Stability: Not on file       Current Outpatient Medications:   •  acetaminophen (TYLENOL) 325 mg tablet, Take 2 tablets (650 mg total) by mouth every 4 (four) hours as needed for mild pain, Disp: , Rfl:   •  albuterol (PROVENTIL HFA,VENTOLIN HFA) 90 mcg/act inhaler, Inhale 2 puffs every 4 (four) hours as needed for wheezing, Disp: 6.7 g, Rfl: 3  •  Albuterol-Budesonide (Airsupra) 90-80 MCG/ACT AERO, 2  puffs as needed shortness of breath BIN: [id number]    PCN: [id number]      GRP: [id number]    ID: [id number], Disp: 10.7 g, Rfl: 3  •  apixaban (Eliquis) 5 mg, Take 1 tablet (5 mg total) by mouth 2 (two) times a day, Disp: 60 tablet, Rfl: 2  •  cetirizine (ZyrTEC) 10 mg tablet, TAKE 1 TABLET BY MOUTH EVERY DAY, Disp: 90 tablet, Rfl: 1  •  dicyclomine (BENTYL) 20 mg tablet, Take 1 tablet (20 mg total) by mouth every 8 (eight) hours as needed (abdominal pain) (Patient not taking: Reported on 10/8/2024), Disp: 12 tablet, Rfl: 0  •  EPINEPHrine (EPIPEN) 0.3 mg/0.3 mL SOAJ, Inject 0.3 mL (0.3 mg total) into a muscle once for 1 dose, Disp: 2 each, Rfl: 3  •  famotidine (PEPCID) 20 mg tablet, Take 1 tablet (20 mg total) by mouth 2 (two) times a day (Patient not taking: Reported on 10/8/2024), Disp: 30 tablet, Rfl: 0  •  ferrous sulfate 324 (65 Fe) mg, Take 1 tablet (324 mg total) by mouth every other day On an empty stomach with something acidic (orange juice). (Patient not taking: Reported on 10/8/2024), Disp: 45 tablet, Rfl: 3  •  fluticasone (FLONASE) 50 mcg/act nasal spray, 2 sprays into each nostril daily, Disp: 18 mL, Rfl: 5  •  hydrOXYzine HCL (ATARAX) 25 mg tablet, Take 1 tablet (25 mg total) by mouth every 6 (six) hours as needed for itching (Patient not taking: Reported on 10/8/2024), Disp: 30 tablet, Rfl: 1  •  Multiple Vitamin (MULTIVITAMINS PO), Take by mouth, Disp: , Rfl:   •  omeprazole (PriLOSEC) 40 MG capsule, Take 1 capsule (40 mg total) by mouth daily, Disp: 90 capsule, Rfl: 2  •  ondansetron (ZOFRAN) 8 mg tablet, Take 1 tablet (8 mg total) by mouth every 8 (eight) hours as needed for nausea or vomiting, Disp: 30 tablet, Rfl: 3  •  ondansetron (ZOFRAN-ODT) 4 mg disintegrating tablet, Take 1 tablet (4 mg total) by mouth every 8 (eight) hours as needed for nausea or vomiting, Disp: 12 tablet, Rfl: 0  •  Spacer/Aero-Holding Chambers (Vortex Valved Holding Chamber) GILLIAN, Use 2 (two) times a day, Disp: 1 each, Rfl:  0  Allergies   Allergen Reactions   • Formic Acid Swelling and Rash     (Severe reactions to Bug bites)   • Latex Rash and Blisters   • Nsaids GI Intolerance       Physical Exam:   There were no vitals filed for this visit.  Physical Exam  Constitutional:       Appearance: Normal appearance.   HENT:      Head: Normocephalic and atraumatic.      Nose: Nose normal.      Mouth/Throat:      Mouth: Mucous membranes are moist.   Eyes:      Pupils: Pupils are equal, round, and reactive to light.   Cardiovascular:      Rate and Rhythm: Normal rate.      Pulses: Normal pulses.      Heart sounds: Normal heart sounds.   Pulmonary:      Effort: Pulmonary effort is normal.      Breath sounds: Normal breath sounds.   Chest:          Comments: Right breast no palpable mass masses.  Dense breast tissue.   right axillary and supraclavicular examination no palpable adenopathy.  Hysterectomy flaps intact no palpable mass or masses.  Biopsy site skin suture has been removed.  Left axillary and supraclavicular examination no palpable adenopathy.  Abdominal:      General: Bowel sounds are normal.      Palpations: Abdomen is soft.   Musculoskeletal:         General: Normal range of motion.      Cervical back: Normal range of motion and neck supple.   Skin:     General: Skin is warm.   Neurological:      General: No focal deficit present.      Mental Status: She is alert and oriented to person, place, and time.   Psychiatric:         Mood and Affect: Mood normal.         Behavior: Behavior normal.         Thought Content: Thought content normal.         Judgment: Judgment normal.       Results & Discussion:   A. Skin, left chest wall:  DERMATOFIBROMA     I did review pathology in detail.  I did discussed in detail nature of breast cancer and need for contralateral breast diagnostic mammogram were reviewed and discussed.  she understands and  agrees . All patient questions were answered.       Advance Care Planning/Advance Directives:  I  Elena Cornell MD discussed the disease status with Jillian Ch  today 10/30/24  treatment plans and follow-up with the patient.

## 2024-10-31 NOTE — PROGRESS NOTES
Virtual Regular Visit  Name: Jillian Ch      : 1990      MRN: [id number]  Encounter Provider: Nemo Khalil DO  Encounter Date: 10/29/2024   Encounter [facility]    Verification of patient location:    Patient is located at Home in the following state in which I hold an active license [place]    Assessment & Plan  Therapeutic drug monitoring    Orders:    Echo complete w/ contrast if indicated; Future      34-year-old female with a pT2 N0 ER positive breast cancer with high Oncotype.  She completed 3 cycles of adjuvant AC.  Her port has been removed.  I am going to keep her on 3 months of anticoagulation and repeat an echo in January.  If everything looks good she can stop the Eliquis.  I set aside 6 more weeks of samples for her to  when she has a chance.  She can take iron orally every other day to support her levels now that her menstrual period has come back.  It has been heavy on Eliquis.  We will also discuss adjuvant endocrine therapy after she has her fertility assessment with Dr. Budinetz.  She knows to call in the interim with any questions or concerns.    Encounter provider Nemo Khalil DO    The patient was identified by name and date of birth. Jillian Ch was informed that this is a telemedicine visit and that the visit is being conducted through the Epic Embedded platform. She agrees to proceed..  My office door was closed. No one else was in the room.  She acknowledged consent and understanding of privacy and security of the video platform. The patient has agreed to participate and understands they can discontinue the visit at any time.    Patient is aware this is a billable service.     History of Present Illness     Jillian Ch is a 34 y.o. female who presents for follow-up of her ER positive breast cancer.  Her port was pulled without incident.  She is on Eliquis for the clot that was  on the tip of her port and possibly adherent to the right atrium.  Eliquis was $500 a month when she went to get it so I have given her 6 weeks of samples so far.  She is also wearing a heart monitor.  She denies any bleeding issues on Eliquis.  Her port site is healing well.  She also had a dermatofibroma removed from the chest wall.  Her most recent iron studies show a ferritin of 64 and a percent sat of 20.  She is having the return of her menstrual period.  She is seeing Dr. Budinetz for fertility assessment in mid November.  We have held off on starting adjuvant endocrine therapy until this assessment can be done.    History obtained from : patient  Review of Systems        Objective     LMP 10/03/2024 (Approximate)   Physical Exam    AAO x 3 NAD    Visit Time  Total Visit Duration: 12 min

## 2024-11-06 ENCOUNTER — TELEPHONE (OUTPATIENT)
Dept: NEUROLOGY | Facility: CLINIC | Age: 34
End: 2024-11-06

## 2024-11-07 ENCOUNTER — TELEPHONE (OUTPATIENT)
Dept: NEUROLOGY | Facility: CLINIC | Age: 34
End: 2024-11-07

## 2024-11-07 ENCOUNTER — OFFICE VISIT (OUTPATIENT)
Dept: NEUROLOGY | Facility: CLINIC | Age: 34
End: 2024-11-07
Payer: COMMERCIAL

## 2024-11-07 VITALS
WEIGHT: 170.7 LBS | HEART RATE: 97 BPM | HEIGHT: 67 IN | DIASTOLIC BLOOD PRESSURE: 80 MMHG | BODY MASS INDEX: 26.79 KG/M2 | SYSTOLIC BLOOD PRESSURE: 110 MMHG

## 2024-11-07 DIAGNOSIS — F41.9 ANXIETY DISORDER: ICD-10-CM

## 2024-11-07 DIAGNOSIS — G43.709 CHRONIC MIGRAINE WITHOUT AURA WITHOUT STATUS MIGRAINOSUS, NOT INTRACTABLE: Primary | ICD-10-CM

## 2024-11-07 PROBLEM — G43.909 MIGRAINE HEADACHE: Status: RESOLVED | Noted: 2021-03-05 | Resolved: 2024-11-07

## 2024-11-07 PROCEDURE — 99205 OFFICE O/P NEW HI 60 MIN: CPT | Performed by: STUDENT IN AN ORGANIZED HEALTH CARE EDUCATION/TRAINING PROGRAM

## 2024-11-07 NOTE — ASSESSMENT & PLAN NOTE
Ms. Ch reports a longstanding history of headaches since she was about 19 years old.  She was previously following with neurology in the past, but has not been seen in many years.  She was recently diagnosed and treated for breast cancer as well.  We discussed a variety of potential treatment options, but she was open to trying Emgality monthly injections for prevention.  From an abortive standpoint, I will have her try Ubrelvy.  I would ideally like to avoid triptans for the time being and given her history of cardiac thrombus and recent DVT.  There are certain factors that are contributing to her headaches that I am unable to control at this time including poor sleep since she has a 4 month old at home, but I anticipate that as this improves and we get further away from her recent treatments, I suspect her symptoms will also improve.

## 2024-11-07 NOTE — PROGRESS NOTES
Neurology Ambulatory Visit  Name: Jillian hC       : 1990       MRN: [id number]   Encounter Provider: Kaiser Parker DO   Encounter Date: 2024  Encounter department: [facility]    Jillian Ch is a 34 y.o.  right handed female with a past medical history significant for asthma, anxiety, depression, cervical radiculopathy, migraines, breast cancer, CRPS who is presenting to the Neurology office for evaluation of headaches.    Assessment and Plan  1. Chronic migraine without aura without status migrainosus, not intractable  Assessment & Plan:  Ms. Ch reports a longstanding history of headaches since she was about 19 years old.  She was previously following with neurology in the past, but has not been seen in many years.  She was recently diagnosed and treated for breast cancer as well.  We discussed a variety of potential treatment options, but she was open to trying Emgality monthly injections for prevention.  From an abortive standpoint, I will have her try Ubrelvy.  I would ideally like to avoid triptans for the time being and given her history of cardiac thrombus and recent DVT.  There are certain factors that are contributing to her headaches that I am unable to control at this time including poor sleep since she has a 4 month old at home, but I anticipate that as this improves and we get further away from her recent treatments, I suspect her symptoms will also improve.  Orders:  -     Ambulatory Referral to Neurology  -     Galcanezumab-gnlm 120 MG/ML SOAJ; Inject 120 mg under the skin every 30 (thirty) days Following the first month loading dose of 2 pens.  -     Galcanezumab-gnlm 120 MG/ML SOAJ; Inject 240 mg under the skin once for 1 dose For just the first month loading dose, followed by 1 injection monthly on separate prescription.  -     Ubrogepant (UBRELVY) 100 MG tablet; Take 1 tablet (100 mg) one time as needed for migraine. May  repeat one additional tablet (100 mg) at least two hours after the first dose. Do not use more than two doses per day, or for more than eight days per month.  2. Anxiety disorder    Workup:  - Neurologic assessment reveals unremarkable neurological exam.  - MRI brain with and without contrast 10/30/2024: No acute intracranial findings.  No evidence of metastases.  Mild, nonspecific white matter disease.  No postcontrast enhancement    Preventative:  - we discussed headache hygiene and lifestyle factors that may improve headaches  - Emgality monthly injection  - Currently on through other providers: None  - Past/ failed/contraindicated: Citalopram, Gabapentin, Trazodone, Topamax (side effects), Nortriptyline (side effects), avoid Aimovig due to constipation, avoid anti-HTN/BB due to baseline vitals at times  - future options: Memantine, Diamox, CGRP med, botox    Acute:  - discussed not taking over-the-counter or prescription pain medications more than 3 days per week to prevent medication overuse/rebound headache  - Ubrelvy 100mg  - Currently on through other providers: Zofran  - Past/ failed/contraindicated: Avoid NSAIDs due to anticoagulation use, Rizatriptan (did not help), avoid triptans for the time being due to clots  - future options:  prochlorperazine, could consider trial of 5 days of Depakote 500 mg nightly or dexamethasone 2 mg daily for prolonged migraine, reyvow, nurtec, zavzpret  Patient instructions   Headache Calendar  Please maintain a headache calendar  Consider using phone applications such as Migraine Kulwinder or [place] Migraine Tracker    Headache/migraine treatment:   Acute medications (for immediate treatment of a headache):   It is ok to take acetaminophen (Tylenol) if they help your headaches you should limit these to No more than 2-3 times a week to avoid medication overuse/rebound headaches.     For your more moderate to severe migraines take this medication early  Ubrelvy 100mg tabs - take  one at the onset of headache. May repeat one time after 2 hours if pain has not resolved.   (Max 2 a day)    Prescription preventive medications for headaches/migraines   Emgality/Galcanezumab - the 1st dose is 240 mg loading dose of 2 consecutive 120 mg injections.  Thereafter, 120 mg injections every 30 days    If needed there is a coupon card for the copay at [employer]     READ INSTRUCTIONS that come with the medication. REFRIGERATE. Keep out of direct sunlight. Prior to administration, allow to come to room temperature for 30 minutes. Do not warm using a heat source (eg, microwave or hot water). Do not shake. Administer in preferably abdomen (avoiding 2 inches around the navel), thigh, upper arm, or buttocks avoiding areas of skin that are tender, bruised, red or hard. Deliver entire contents of single-use prefilled pen or syringe.  Unknown impact in pregnancy therefore would recommend stopping 6 months prior to considering pregnancy.    Lifestyle Recommendations:  [x] SLEEP - Maintain a regular sleep schedule: Adults need at least 7-8 hours of uninterrupted a night. Maintain good sleep hygiene:  Going to bed and waking up at consistent times, avoiding excessive daytime naps, avoiding caffeinated beverages in the evening, avoid excessive stimulation in the evening and generally using bed primarily for sleeping.  One hour before bedtime would recommend turning lights down lower, decreasing your activity (may read quietly, listen to music at a low volume). When you get into bed, should eliminate all technology (no texting, emailing, playing with your phone, iPad or tablet in bed).  [x] HYDRATION - Maintain good hydration.  Drink  2L of fluid a day (4 typical small water bottles)  [x] DIET - Maintain good nutrition. In particular don't skip meals and try and eat healthy balanced meals regularly.  [x] TRIGGERS - Look for other triggers and avoid them: Limit caffeine to 1-2 cups a day or less. Avoid dietary  triggers that you have noticed bring on your headaches (this could include aged cheese, peanuts, MSG, aspartame and nitrates).  [x] EXERCISE - physical exercise as we all know is good for you in many ways, and not only is good for your heart, but also is beneficial for your mental health, cognitive health and  chronic pain/headaches. I would encourage at the least 5 days of physical exercise weekly for at least 30 minutes.     Education and Follow-up  [x] Please call with any questions or concerns. Of course if any new concerning symptoms go to the emergency department.  [x] Follow up in 6 months  History of Present Illness:         Headaches started at what age? 19 years old  How often do the headaches occur?   - as of 11/7/2024: 15/30 (of those, 5 can be more severe)  What time of the day do the headaches start?  No particular time of day  How long do the headaches last? About 2 days  Are you ever headache free? Yes    HIT-6 (60> Severe impact on life, 56-59 substantial impact on your life, 50-55 some impact, <49 little to no impact on your life.)  When you have headaches, how often is the pain severe?  Never (6), Rarely (8), Sometimes (10), Very often (11), Always (13)  2.    How often do headaches limit your ability to do usual daily activities including household work, work, school, or social activities?  Never (6), Rarely (8), Sometimes (10), Very often (11), Always (13)  3.    When you have a headache, how often do you wish you could lie down?  Never (6), Rarely (8), Sometimes (10), Very often (11), Always (13)  4.     In the past 4 weeks, how often have you felt too tired to do work or daily activities because of your headaches?  Never (6), Rarely (8), Sometimes (10), Very often (11), Always (13)  5.     In the past 4 weeks, how often have you felt fed up or irritated because of your headaches?  Never (6), Rarely (8), Sometimes (10), Very often (11), Always (13)  6.     In the past 4 weeks, how often did  "headaches limit your ability to concentrate on work or daily activities?  Never (6), Rarely (8), Sometimes (10), Very often (11), Always (13)  Total: 76    Aura/warning? No    Last eye exam: \"quite a while ago\"    Where is your headache located?   frontalis and occipitalis    Describe your usual headache  \"Fiery\", aching, pounding    What is the intensity of pain?   Worst 10/10, Average: 6/10    Associated symptoms:   [x] Nausea       [x] Vomiting        [x] Diarrhea  [x] Stiff or sore neck   [x] Photophobia     [x]Phonophobia      [x] Osmophobia  [x] Blurred vision   [x] Prefer quiet, dark room  [x] Light-headed or dizzy     [x] Tinnitus   [x] Hands or feet tingle or feel numb/paresthesias      What medications do you take or have you taken for your headaches?   ABORTIVE:    OTC medications: Tylenol (daily - not always for headache)  Prescription: Zofran    Past/ failed/contraindicated:  OTC medications: None  Prescription: Avoid NSAIDs due to anticoagulation use, Rizatriptan (did not help), avoid triptans for the time being due to clots    PREVENTIVE:   None    Past/ failed/contraindicated:  Citalopram, Gabapentin, Trazodone, Topamax (side effects), Nortriptyline (side effects), avoid Aimovig due to constipation, avoid anti-HTN/BB due to baseline vitals at times    Alternative therapies used in the past for headaches? [x] Massage   [x] Physical therapy (other aches and pains)   [] Chiropractor [] Acupuncture [] Acupressure   [] CBT  [] Biofeedback  [x] None  Headache are worse with: [x] Coughing, [x] Sneezing, [x] Bending over, [x] Exertion    Headache triggers:  Loud sounds, bright lights    Have you seen someone else for headaches or pain? Yes, neurology  Have you had trigger point injection performed and how often? No  Have you had Botox injection performed and how often? No   Have you had epidural injections or transforaminal injections performed? Yes    Are you current pregnant or planning on getting " "pregnant? No  Are you breastfeeding or planning on breastfeeding? No - using formula    Have you ever had any Brain imaging? yes; I personally reviewed these images.  -MRI brain with and without contrast 10/30/2024: No acute intracranial findings.  No evidence of metastases.  Mild, nonspecific white matter disease.  No postcontrast enhancement  Pertinent labs: None    Sleep History  Prior Sleep study:  Yes, 2 prior  - Most recent 11 years ago - \"pretty much a normal study\"    Is your sleep restful? No  Do you wake up with headaches? Yes  How many hours do you actually sleep? About 3  Do you snore while asleep? No  Have you been told that you stop breathing during sleeping? No  Do you wake up tired in the morning? Yes  Do you take frequent naps during the day? Yes  Do you have jaw pain? Yes  Do you grind/clench your teeth at night? 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   HENT: Negative.  Negative for hearing loss, tinnitus, trouble swallowing and voice change.    Eyes: Negative.  Negative for photophobia, pain and visual disturbance.   Respiratory: Negative.  Negative for shortness of breath.    Cardiovascular: Negative.  Negative for palpitations.   Gastrointestinal: Negative.  Negative for nausea and vomiting.   Endocrine: Negative.  Negative for cold intolerance. " "  Genitourinary: Negative.  Negative for dysuria, frequency and urgency.   Musculoskeletal:  Negative for back pain, gait problem, myalgias, neck pain and neck stiffness.   Skin: Negative.  Negative for rash.   Allergic/Immunologic: Negative.    Neurological:  Positive for headaches. Negative for dizziness, tremors, seizures, syncope, facial asymmetry, speech difficulty, weakness, light-headedness and numbness.   Hematological: Negative.  Does not bruise/bleed easily.   Psychiatric/Behavioral: Negative.  Negative for confusion, hallucinations and sleep disturbance.    Objective:                                                                  Vitals:            Constitutional:    /80 (BP Location: Right arm, Patient Position: Sitting, Cuff Size: Standard)   Pulse 97   Ht 5' 7\" (1.702 m)   Wt 77.4 kg (170 lb 11.2 oz)   LMP 10/03/2024 (Approximate)   BMI 26.74 kg/m²   BP Readings from Last 3 Encounters:   11/07/24 110/80   10/30/24 100/76   10/25/24 109/55     Pulse Readings from Last 3 Encounters:   11/07/24 97   10/30/24 98   10/25/24 75         Well developed, well nourished, well groomed. No dysmorphic features.       HEENT:  Normocephalic atraumatic.   Oropharynx is clear and moist. No oral mucosal lesions.   Chest:  Respirations regular and unlabored.    Cardiovascular:  Distal extremities warm without palpable edema or tenderness, no observed significant swelling.    Musculoskeletal:  (see below under neurologic exam for evaluation of motor function and gait)   Skin:  warm and dry, not diaphoretic. No apparent birthmarks or stigmata of neurocutaneous disease.   Psychiatric:  Normal behavior and appropriate affect     Neurological Examination:     Mental status/cognitive function:   Orientated to time, place and person. Recent and remote memory intact. Attention span and concentration as well as fund of knowledge are appropriate for age. Normal language and spontaneous speech.    Cranial " Nerves:  II-visual fields full.   Fundi poorly visualized due to pupillary constriction  III, IV, VI-Pupils were equal, round, and reactive to light and accomodation. Extraocular movements were full and conjugate without nystagmus.  Conjugate gaze, normal smooth pursuits, normal saccades   V-facial sensation symmetric.    VII-facial expression symmetric, intact forehead wrinkle, strong eye closure, symmetric smile    VIII-hearing grossly intact bilaterally   IX, X-palate elevation symmetric, no dysarthria.   XI-shoulder shrug strength intact    XII-tongue protrusion midline.    Motor Exam: symmetric bulk and tone throughout, no pronator drift. Power/strength 5/5 bilateral upper and lower extremities, no atrophy, fasciculations or abnormal movements noted.   Sensory: grossly intact light touch in all extremities.   Reflexes: brachioradialis 2+, biceps 2+, knee 2+, ankle 2+ bilaterally. No ankle clonus  Coordination: Finger nose finger intact bilaterally, no apparent dysmetria, ataxia or tremor noted  Gait: steady casual and tandem gait.         I have spent 40 minutes with the patient today in which greater than 50% of this time was spent in counseling/coordination of care regarding Diagnostic results, Prognosis, Risks and benefits of tx options, Patient and family education, Importance of tx compliance, Impressions, Documenting in the medical record, Reviewing / ordering tests, medicine, procedures  , and Obtaining or reviewing history  . I also spent 15 minutes non face to face for this patient the same day.     Activity Minutes   Precharting/reviewing 10   Patient care/counseling 40   Postcharting/care coordination 5     Voice recognition software was used in the generation of this note. There may be unintentional errors including grammatical errors, spelling errors, or pronoun errors.

## 2024-11-08 ENCOUNTER — TELEPHONE (OUTPATIENT)
Dept: NEUROLOGY | Facility: CLINIC | Age: 34
End: 2024-11-08

## 2024-11-08 NOTE — TELEPHONE ENCOUNTER
Received prior auth for medication Emgality and ubrelvy, forms have been scanned into patients chart.

## 2024-11-11 ENCOUNTER — TELEPHONE (OUTPATIENT)
Dept: CARDIOLOGY CLINIC | Facility: CLINIC | Age: 34
End: 2024-11-11

## 2024-11-11 NOTE — TELEPHONE ENCOUNTER
Emgality PA started on CMM. (Key: [id number])  Message from plan-  Patient not found     Called PA dept [phone], spoke to Andreas. Pt's name on their record is Jillian EVERETT completed on CMM  CaseId:[id number];Status:Approved;Review Type:Prior Auth;Coverage Start Date:10/12/2024;Coverage End Date:11/11/2025    Ubreljohann EVERETT initiated on CMM. (Key: [id number])   CaseId:[id number];Status:Approved;Review Type:Prior Auth;Coverage Start Date:10/12/2024;Coverage End Date:11/11/2025;

## 2024-11-11 NOTE — TELEPHONE ENCOUNTER
Received notification from company that patient did not send back Zio Heart Monitor yet. She states that there was a delay in placing it on, so she will be sending it back today.     Will await results

## 2024-11-19 ENCOUNTER — CLINICAL SUPPORT (OUTPATIENT)
Dept: CARDIOLOGY CLINIC | Facility: CLINIC | Age: 34
End: 2024-11-19
Payer: COMMERCIAL

## 2024-11-19 ENCOUNTER — TELEPHONE (OUTPATIENT)
Age: 34
End: 2024-11-19

## 2024-11-19 DIAGNOSIS — R00.2 PALPITATIONS: ICD-10-CM

## 2024-11-19 DIAGNOSIS — R07.89 OTHER CHEST PAIN: ICD-10-CM

## 2024-11-19 NOTE — TELEPHONE ENCOUNTER
Pt stated Oncology Provider: Dr Khalil    Actionable item: call back from Dr Khalil    What is the reason for the call/chief complaint?  Dr Budinetz called from [facility] regarding patient. She is requesting a call from Dr Khalil to discuss planned treatment and medications for patient, who is scheduled for egg retrieval and embryo freezing at their facility.

## 2024-11-20 ENCOUNTER — APPOINTMENT (OUTPATIENT)
Dept: LAB | Facility: CLINIC | Age: 34
End: 2024-11-20
Payer: COMMERCIAL

## 2024-11-20 DIAGNOSIS — Z11.59 SCREENING EXAMINATION FOR RUBELLA: ICD-10-CM

## 2024-11-20 DIAGNOSIS — Z31.41 FERTILITY TESTING: ICD-10-CM

## 2024-11-20 DIAGNOSIS — Z11.59 NEED FOR HEPATITIS C SCREENING TEST: ICD-10-CM

## 2024-11-20 DIAGNOSIS — Z13.29 SCREENING FOR THYROID DISORDER: ICD-10-CM

## 2024-11-20 DIAGNOSIS — Z11.3 SCREEN FOR SEXUALLY TRANSMITTED DISEASES: ICD-10-CM

## 2024-11-20 DIAGNOSIS — Z13.0 SCREENING FOR IRON DEFICIENCY ANEMIA: ICD-10-CM

## 2024-11-20 DIAGNOSIS — Z01.83 ENCOUNTER FOR BLOOD TYPING: ICD-10-CM

## 2024-11-20 DIAGNOSIS — Z11.59 NEED FOR HEPATITIS B SCREENING TEST: ICD-10-CM

## 2024-11-20 DIAGNOSIS — Z11.4 ENCOUNTER FOR SCREENING FOR HIV: ICD-10-CM

## 2024-11-20 DIAGNOSIS — Z11.59 SCREENING FOR VIRAL DISEASE: ICD-10-CM

## 2024-11-20 LAB
ABO GROUP BLD: NORMAL
BASOPHILS # BLD AUTO: 0.02 THOUSANDS/ÂΜL (ref 0–0.1)
BASOPHILS NFR BLD AUTO: 0 % (ref 0–1)
BLD GP AB SCN SERPL QL: NEGATIVE
EOSINOPHIL # BLD AUTO: 0.11 THOUSAND/ÂΜL (ref 0–0.61)
EOSINOPHIL NFR BLD AUTO: 1 % (ref 0–6)
ERYTHROCYTE [DISTWIDTH] IN BLOOD BY AUTOMATED COUNT: 12.6 % (ref 11.6–15.1)
HCT VFR BLD AUTO: 39.6 % (ref 34.8–46.1)
HGB BLD-MCNC: 13 G/DL (ref 11.5–15.4)
IMM GRANULOCYTES # BLD AUTO: 0.02 THOUSAND/UL (ref 0–0.2)
IMM GRANULOCYTES NFR BLD AUTO: 0 % (ref 0–2)
LYMPHOCYTES # BLD AUTO: 0.93 THOUSANDS/ÂΜL (ref 0.6–4.47)
LYMPHOCYTES NFR BLD AUTO: 11 % (ref 14–44)
MCH RBC QN AUTO: 29.7 PG (ref 26.8–34.3)
MCHC RBC AUTO-ENTMCNC: 32.8 G/DL (ref 31.4–37.4)
MCV RBC AUTO: 90 FL (ref 82–98)
MONOCYTES # BLD AUTO: 0.45 THOUSAND/ÂΜL (ref 0.17–1.22)
MONOCYTES NFR BLD AUTO: 5 % (ref 4–12)
NEUTROPHILS # BLD AUTO: 6.78 THOUSANDS/ÂΜL (ref 1.85–7.62)
NEUTS SEG NFR BLD AUTO: 83 % (ref 43–75)
NRBC BLD AUTO-RTO: 0 /100 WBCS
PLATELET # BLD AUTO: 184 THOUSANDS/UL (ref 149–390)
PMV BLD AUTO: 11.5 FL (ref 8.9–12.7)
RBC # BLD AUTO: 4.38 MILLION/UL (ref 3.81–5.12)
RH BLD: POSITIVE
RUBV IGG SERPL IA-ACNC: 23.8 IU/ML
TSH SERPL DL<=0.05 MIU/L-ACNC: 2.1 UIU/ML (ref 0.45–4.5)
VZV IGG SER QL IA: NORMAL
WBC # BLD AUTO: 8.31 THOUSAND/UL (ref 4.31–10.16)

## 2024-11-20 PROCEDURE — 86901 BLOOD TYPING SEROLOGIC RH(D): CPT

## 2024-11-20 PROCEDURE — 87591 N.GONORRHOEAE DNA AMP PROB: CPT

## 2024-11-20 PROCEDURE — 86900 BLOOD TYPING SEROLOGIC ABO: CPT

## 2024-11-20 PROCEDURE — 85025 COMPLETE CBC W/AUTO DIFF WBC: CPT

## 2024-11-20 PROCEDURE — 86850 RBC ANTIBODY SCREEN: CPT

## 2024-11-20 PROCEDURE — 86762 RUBELLA ANTIBODY: CPT

## 2024-11-20 PROCEDURE — 86803 HEPATITIS C AB TEST: CPT

## 2024-11-20 PROCEDURE — 86787 VARICELLA-ZOSTER ANTIBODY: CPT

## 2024-11-20 PROCEDURE — 84443 ASSAY THYROID STIM HORMONE: CPT

## 2024-11-20 PROCEDURE — 86780 TREPONEMA PALLIDUM: CPT

## 2024-11-20 PROCEDURE — 36415 COLL VENOUS BLD VENIPUNCTURE: CPT

## 2024-11-20 PROCEDURE — 87340 HEPATITIS B SURFACE AG IA: CPT

## 2024-11-20 PROCEDURE — 82166 ASSAY ANTI-MULLERIAN HORM: CPT

## 2024-11-20 PROCEDURE — 87491 CHLMYD TRACH DNA AMP PROBE: CPT

## 2024-11-20 PROCEDURE — 87389 HIV-1 AG W/HIV-1&-2 AB AG IA: CPT

## 2024-11-21 LAB
C TRACH DNA SPEC QL NAA+PROBE: NEGATIVE
HBV SURFACE AG SER QL: NORMAL
HCV AB SER QL: NORMAL
HIV 1+2 AB+HIV1 P24 AG SERPL QL IA: NORMAL
HIV 2 AB SERPL QL IA: NORMAL
HIV1 AB SERPL QL IA: NORMAL
HIV1 P24 AG SERPL QL IA: NORMAL
N GONORRHOEA DNA SPEC QL NAA+PROBE: NEGATIVE
TREPONEMA PALLIDUM IGG+IGM AB [PRESENCE] IN SERUM OR PLASMA BY IMMUNOASSAY: NORMAL

## 2024-11-21 PROCEDURE — 93248 EXT ECG>7D<15D REV&INTERPJ: CPT | Performed by: INTERNAL MEDICINE

## 2024-11-24 LAB — MIS SERPL-MCNC: 1.25 NG/ML

## 2024-11-25 ENCOUNTER — TELEPHONE (OUTPATIENT)
Dept: HEMATOLOGY ONCOLOGY | Facility: CLINIC | Age: 34
End: 2024-11-25

## 2024-11-25 NOTE — TELEPHONE ENCOUNTER
Patient called requesting to speak to Claire. Advised patient that Claire is not in today but I can transfer her to [facility]. Patient declined and requested Claire calls patients back when able. Patient did not want to give further details on reason for call.     Please call patient.       Thank you!

## 2024-11-25 NOTE — PROGRESS NOTES
Cardio-Oncology / Heart Failure Cardiology Clinic Note    Jillian hC 34 y.o. female   MRN: [id number]  Encounter: [id number]        Assessment / Plan:    # Cardio-Oncology Pertinent History  Breast cancer  Dx 2023.  Left sided.   S/p surgery   S/p Adriamycin, cyclophosphamide    # right atrial echodensity  Echo showed a RA echodensity and recommending GUS.  The patient had the GUS which showed the Port-A-Cath was quite distal almost abutting the RA free wall and in this area there was suspected thrombus at the distal port and with echodensity adjacent / adherent to the RA free wall.  Plan:    Port removed 10-25-24.  Continue anticoagulation for now.    Obtain cardiac MRI in late December to ensure that there is no residual mass in the right atrium which could represent thrombus or tumor (most likely thrombus).  I am choosing MRI because a surface echo would not be definitive to rule this out --  and although a GUS would be more definitive in ruling out cardiac mass in RA --  if there is a residual mass present it would not provide tissue characterization.    # palpitations  Had a Holter monitor in 2021 demonstrating rare ectopy and brief episode of Wenckebach  Recent ziopatch -  triggered recordings did not correlate with any change in rate or rhythm. But did have brief atrial runs.    Palpitations are bothersome, trial Toprol    # chest pain  Troponins checked in September in the ER and were negative.  KARLOS Gr - had ordered stress echo, not done yet.    Sx's atypical for ischemia    # Elevated BP without diagnosis of hypertension  BP today at goal    # Cardio-oncology survivorship  To be addressed at a later visit as she is further out from chemotherapy    # Thoracic outlet syndrome  S/p Left thoracoscopic 1st rib resection 8/12/21  After surgery has a complex regional pain syndrome    # Migrane headaches  noted    Today's Plan Summary:  See above assessment/plan for full details of  "today's plan.  Briefly,     Start Toprol to see if helps palpitations  Cardiac MRI in late December  Follow-up in early January                    Reason For Visit / Chief Complaint:  New patient -  RA echodensity     HPI:   Jillian Ch is a 34 y.o.  female with history as noted in the problem list and further detailed in the above assessment and plan.    New patient -  RA echodensity     The patient was originally referred to cardio-oncology by Dr. Khalil.  This was for high blood pressures and an EKG that was read as \"septal infarct\"  -- in the setting of prior anthracycline exposure for breast cancer.  For some reason the patient ended up seeing general cardiology and reported palpitations and chest burning at that visit.  The plan was echo, stress test, Zio patch, and referral to cardio-oncology.    The patient's echo ended up being abnormal showing a right atrial echodensity and recommending GUS.  The patient had the GUS which showed the Port-A-Cath was quite distal almost abutting the RA free wall and in this area there was suspected thrombus at the distal port and adjacent to the RA free wall.    The patient was started on to anticoagulation and port was removed.    Today, the patient reports  -    chronic chest pain.    Reports palpitations.   Endorses SOB with any exertion.  Tunnel vision on walks.    Reports orthopnea.   No leg edema.  No or syncope.       On medical leave.   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  Has a 4 month old baby at home.     Non smoker.  No ETOH. No drugs.            Cardiac Imaging personally reviewed:  EKG 9-3-24  Sinus rhythm.  Possible \"septal infarct\"    9-29-24  NSR       Holter or event monitor [employer]   The basic mechanism was sinus with rates ranging from 51 - 173 beats per minute while in sinus rhythm.  The average heart rate was 85 beats per minute.  Atrial ectopy was uncommon but included short runs of high heart rate. "   Ventricular ectopy was rare and included a 4 beat run.   No pauses were present.  Triggered recordings did not correlate with any change in rate or rhythm.       Echo 10-7-24  EF 60%.  GLS -20.8%  Right atrium echodensity, consider GUS       GUS 10-10-24  In the right atrium there is a probable thrombus overlying the eustachian ridge and surrounding right atrial free wall. The port-catheter tip seems to abut in this region, and possibly also has thrombus over the tip.   Port-catheter tip appears to be too far deep into the RA and abutting the RA free wall at the eustachian ridge and maybe contributing to thrombus formation as well.          Cardiac MRI    Stress testing    Coronary CTA or [facility]    RHC    CPET              Patient Active Problem List    Diagnosis Date Noted    Chronic migraine without aura without status migrainosus, not intractable 11/07/2024    Dermatofibroma 10/28/2024    Idiopathic urticaria 09/26/2024    Throat swelling 09/26/2024    Dermatographia 09/26/2024    Allergic rhinitis due to dust mite 09/26/2024    Latex allergy 09/26/2024    Elevated blood pressure reading without diagnosis of hypertension 07/17/2024    Pelvic pressure in female 07/17/2024    Leg weakness, bilateral 07/12/2024    Encounter for follow-up surveillance of breast cancer 04/24/2024    Iron deficiency anemia secondary to inadequate dietary iron intake 04/02/2024    Dehydration 01/23/2024    Multigravida of advanced maternal age in first trimester 01/11/2024    DVT complicating pregnancy, third trimester 01/11/2024    History of left mastectomy 01/03/2024    Spontaneous vaginal delivery 12/29/2023    Cancer complicating pregnancy, third trimester 12/14/2023    Malignant neoplasm of overlapping sites of left breast in female, estrogen receptor positive (HCC) 12/05/2023    Left ovarian cyst 06/08/2023    Unexplained weight gain 02/17/2023    Reversible airway obstruction 02/08/2022    SOB (shortness of breath) 02/08/2022     Right middle lobe pulmonary nodule 11/17/2021    Transaminitis 08/18/2021    Palpitations 06/25/2021    Thoracic outlet syndrome 06/08/2021    Vitamin D deficiency 04/05/2021    Atypical squamous cells of undetermined significance (ASCUS) on Papanicolaou smear of cervix 03/05/2021    Hypertrophic and atrophic condition of skin 03/05/2021    Shoulder impingement syndrome, left 03/05/2021    Cervical disc disorder at C5-C6 level with radiculopathy 06/02/2020    Chronic left shoulder pain 05/29/2020    Laryngopharyngeal reflux (LPR) 10/18/2019    Depression with anxiety 10/18/2019    Chronic fatigue 10/18/2019    Other chest pain 09/05/2019    Mild persistent asthma without complication 05/29/2019    Axillary hidradenitis suppurativa 05/29/2019       Past Medical History:   Diagnosis Date    Anesthesia complication     gait dysfunction/ urinary retention after nerve block,    Anxiety     Asthma     CRPS (complex regional pain syndrome), upper limb     left chest/arm/hand    Deep vein thrombosis (HCC) 01/05/2024    post op from mastectomy    GERD (gastroesophageal reflux disease)     Heart murmur     HPV (human papilloma virus) infection 2012    unsure of 16, 18, or other    Invasive ductal carcinoma of breast, female, left (HCC) 2023    Kidney stone     Miscarriage 3/15/2015    7 weeks along.    Ovarian cyst     Left    PONV (postoperative nausea and vomiting) 01/02/2024         Allergies   Allergen Reactions    Formic Acid Swelling and Rash     (Severe reactions to Bug bites)    Latex Rash and Blisters    Nsaids GI Intolerance       Current Outpatient Medications   Medication Instructions    acetaminophen (TYLENOL) 650 mg, Oral, Every 4 hours PRN    albuterol (PROVENTIL HFA,VENTOLIN HFA) 90 mcg/act inhaler 2 puffs, Inhalation, Every 4 hours PRN    Albuterol-Budesonide (Airsupra) 90-80 MCG/ACT AERO 2 puffs as needed shortness of breath BIN: [id number]    PCN: [id number]      GRP: [id number]    ID: [id number]    apixaban (ELIQUIS)  5 mg, Oral, 2 times daily    cetirizine (ZYRTEC) 10 mg, Oral, Daily    dicyclomine (BENTYL) 20 mg, Oral, Every 8 hours PRN    EPINEPHrine (EPIPEN) 0.3 mg, Intramuscular, Once    ferrous sulfate 324 mg, Oral, Every other day, On an empty stomach with something acidic (orange juice).    fluticasone (FLONASE) 50 mcg/act nasal spray 2 sprays, Nasal, Daily    Galcanezumab-gnlm 120 mg, Subcutaneous, Every 30 days, Following the first month loading dose of 2 pens.    metoprolol succinate (TOPROL-XL) 25 mg, Oral, Daily    Multiple Vitamin (MULTIVITAMINS PO) Take by mouth    omeprazole (PRILOSEC) 40 mg, Oral, Daily    ondansetron (ZOFRAN) 8 mg, Oral, Every 8 hours PRN    Spacer/Aero-Holding Chambers (Vortex Valved Holding Chamber) GILLIAN Does not apply, 2 times daily    Ubrogepant (UBRELVY) 100 MG tablet Take 1 tablet (100 mg) one time as needed for migraine. May repeat one additional tablet (100 mg) at least two hours after the first dose. Do not use more than two doses per day, or for more than eight days per month.         Social History     Socioeconomic History    Marital status: /Civil Union     Spouse name: Not on file    Number of children: Not on file    Years of education: Not on file    Highest education level: Not on file   Occupational History    Not on file   Tobacco Use    Smoking status: Never     Passive exposure: Past    Smokeless tobacco: Never   Vaping Use    Vaping status: Never Used   Substance and Sexual Activity    Alcohol use: Not Currently     Comment: social    Drug use: Never    Sexual activity: Yes     Partners: Male     Birth control/protection: None   Other Topics Concern    Not on file   Social History Narrative    Do you have pets? 2 cat & 2 dog  Is pet allowed in bedroom?Yes    Are you a smoker? Never    Does anyone smoke in your home? No       Do you live with smokers? No    [place] frequently? No   How many times a year? N/A      Social Drivers of Health     Financial Resource  "Strain: Not on file   Food Insecurity: No Food Insecurity (7/11/2024)    Nursing - Inadequate Food Risk Classification     Worried About Running Out of Food in the Last Year: Never true     Ran Out of Food in the Last Year: Never true     Ran Out of Food in the Last Year: Not on file   Transportation Needs: No Transportation Needs (7/11/2024)    PRAPARE - Transportation     Lack of Transportation (Medical): No     Lack of Transportation (Non-Medical): No   Physical Activity: Not on file   Stress: Not on file   Social Connections: Not on file   Intimate Partner Violence: Not on file   Housing Stability: Not on file       Family History   Problem Relation Age of Onset    Heart disease Mother     Miscarriages / Stillbirths Mother     Coronary artery disease Mother     No Known Problems Father     No Known Problems Half-Brother     Bone cancer Maternal Grandmother         hilda to liver and spine    Miscarriages / Stillbirths Half-Sister     Breast cancer Neg Hx     Ovarian cancer Neg Hx     Uterine cancer Neg Hx     Cervical cancer Neg Hx        Physical Exam:  Blood pressure 108/78, pulse 82, height 5' 7\" (1.702 m), weight 77.6 kg (171 lb), SpO2 99%, unknown if currently breastfeeding.  Body mass index is 26.78 kg/m².  Wt Readings from Last 3 Encounters:   11/26/24 77.6 kg (171 lb)   11/07/24 77.4 kg (170 lb 11.2 oz)   10/30/24 77.6 kg (171 lb)     Physical Exam  Vitals reviewed.   Constitutional:       General: She is not in acute distress.     Appearance: She is not toxic-appearing.   HENT:      Head: Normocephalic and atraumatic.   Eyes:      General: No scleral icterus.     Conjunctiva/sclera: Conjunctivae normal.   Neck:      Vascular: No carotid bruit.      Comments: No JVP elevation  Cardiovascular:      Rate and Rhythm: Normal rate and regular rhythm.      Heart sounds: No murmur heard.     No friction rub. No gallop.   Pulmonary:      Breath sounds: Normal breath sounds. No wheezing, rhonchi or rales. " "  Abdominal:      General: There is no distension.      Palpations: Abdomen is soft.      Tenderness: There is no abdominal tenderness. There is no guarding.   Musculoskeletal:      Right lower leg: No edema.      Left lower leg: No edema.   Skin:     Coloration: Skin is not jaundiced or pale.      Findings: No erythema.   Neurological:      Mental Status: She is alert. Mental status is at baseline.   Psychiatric:         Mood and Affect: Mood normal.         Behavior: Behavior normal.         Labs & Results:  Lab Results   Component Value Date    SODIUM 137 09/29/2024    K 3.7 09/29/2024     09/29/2024    CO2 24 09/29/2024    BUN 13 09/29/2024    CREATININE 0.70 09/29/2024    GLUC 85 09/29/2024    CALCIUM 8.8 09/29/2024     No results found for: \"NTBNP\"       Time Statement:  Follow-up patient as she has previously seen general cardiology but new to me.  Extensive chart review and imaging review performed.  I have spent a total time of 55 minutes in caring for this patient on the day of the visit/encounter including Diagnostic results, Prognosis, Risks and benefits of tx options, Instructions for management, Patient and family education, Importance of tx compliance, Risk factor reductions, Impressions, Counseling / Coordination of care, Documenting in the medical record, Reviewing / ordering tests, medicine, procedures  , Obtaining or reviewing history  , and Communicating with other healthcare professionals .      Thank you for the opportunity to participate in the care of this patient.    Chun Tolentino MD, [facility]  Staff Cardiologist  Director of Cardio-Oncology  [facility]  "

## 2024-11-26 ENCOUNTER — OFFICE VISIT (OUTPATIENT)
Dept: CARDIOLOGY CLINIC | Facility: CLINIC | Age: 34
End: 2024-11-26
Payer: COMMERCIAL

## 2024-11-26 VITALS
DIASTOLIC BLOOD PRESSURE: 78 MMHG | HEIGHT: 67 IN | HEART RATE: 82 BPM | SYSTOLIC BLOOD PRESSURE: 108 MMHG | BODY MASS INDEX: 26.84 KG/M2 | OXYGEN SATURATION: 99 % | WEIGHT: 171 LBS

## 2024-11-26 DIAGNOSIS — R07.89 OTHER CHEST PAIN: ICD-10-CM

## 2024-11-26 DIAGNOSIS — I51.89 RIGHT ATRIAL MASS: Primary | ICD-10-CM

## 2024-11-26 DIAGNOSIS — I51.89 CARDIAC MASS: ICD-10-CM

## 2024-11-26 DIAGNOSIS — C50.812 MALIGNANT NEOPLASM OF OVERLAPPING SITES OF LEFT BREAST IN FEMALE, ESTROGEN RECEPTOR POSITIVE (HCC): ICD-10-CM

## 2024-11-26 DIAGNOSIS — R03.0 ELEVATED BP WITHOUT DIAGNOSIS OF HYPERTENSION: ICD-10-CM

## 2024-11-26 DIAGNOSIS — Z17.0 MALIGNANT NEOPLASM OF OVERLAPPING SITES OF LEFT BREAST IN FEMALE, ESTROGEN RECEPTOR POSITIVE (HCC): ICD-10-CM

## 2024-11-26 DIAGNOSIS — Z51.81 THERAPEUTIC DRUG MONITORING: ICD-10-CM

## 2024-11-26 DIAGNOSIS — R00.2 PALPITATIONS: ICD-10-CM

## 2024-11-26 PROCEDURE — 99205 OFFICE O/P NEW HI 60 MIN: CPT | Performed by: INTERNAL MEDICINE

## 2024-11-26 RX ORDER — METOPROLOL SUCCINATE 25 MG/1
25 TABLET, EXTENDED RELEASE ORAL DAILY
Qty: 90 TABLET | Refills: 3 | Status: SHIPPED | OUTPATIENT
Start: 2024-11-26

## 2024-11-26 NOTE — Clinical Note
Nemo,  You sent this patient to me a while ago....  but somehow she ended up with general cardiology.    In any event, as you know, they ordered an echo for her symptoms and ended up finding a right atrial mass which is likely port related thrombus.  I finally saw her for the first time today.  I recommend continue anticoagulation and checking cardiac MRI in December.  I will follow-up with her shortly after the MRI.  Thanks Efraín

## 2024-11-26 NOTE — TELEPHONE ENCOUNTER
Spoke with patient regarding her missed call yesterday. Patient needs updated letters for work with a reason why she cannot return to work at this time. I informed her that I will update the letter for this month and future months and send them to her via [employer]. Patient verbalized understanding and is in agreement with the plan.

## 2024-11-26 NOTE — PATIENT INSTRUCTIONS
Start a new medication called metoprolol to help with palpitations.  Check MRI of heart in late December

## 2024-11-30 DIAGNOSIS — C50.812 MALIGNANT NEOPLASM OF OVERLAPPING SITES OF LEFT BREAST IN FEMALE, ESTROGEN RECEPTOR POSITIVE (HCC): ICD-10-CM

## 2024-11-30 DIAGNOSIS — R00.2 PALPITATIONS: ICD-10-CM

## 2024-11-30 DIAGNOSIS — Z17.0 MALIGNANT NEOPLASM OF OVERLAPPING SITES OF LEFT BREAST IN FEMALE, ESTROGEN RECEPTOR POSITIVE (HCC): ICD-10-CM

## 2024-11-30 DIAGNOSIS — I51.89 RIGHT ATRIAL MASS: Primary | ICD-10-CM

## 2024-11-30 DIAGNOSIS — R03.0 ELEVATED BP WITHOUT DIAGNOSIS OF HYPERTENSION: ICD-10-CM

## 2024-12-06 ENCOUNTER — TELEMEDICINE (OUTPATIENT)
Dept: HEMATOLOGY ONCOLOGY | Facility: CLINIC | Age: 34
End: 2024-12-06
Payer: COMMERCIAL

## 2024-12-06 DIAGNOSIS — C50.812 MALIGNANT NEOPLASM OF OVERLAPPING SITES OF LEFT BREAST IN FEMALE, ESTROGEN RECEPTOR POSITIVE (HCC): ICD-10-CM

## 2024-12-06 DIAGNOSIS — C50.812 MALIGNANT NEOPLASM OF OVERLAPPING SITES OF LEFT FEMALE BREAST, UNSPECIFIED ESTROGEN RECEPTOR STATUS (HCC): Primary | ICD-10-CM

## 2024-12-06 DIAGNOSIS — D50.8 IRON DEFICIENCY ANEMIA SECONDARY TO INADEQUATE DIETARY IRON INTAKE: ICD-10-CM

## 2024-12-06 DIAGNOSIS — Z17.0 MALIGNANT NEOPLASM OF OVERLAPPING SITES OF LEFT BREAST IN FEMALE, ESTROGEN RECEPTOR POSITIVE (HCC): ICD-10-CM

## 2024-12-06 PROCEDURE — 99443 PR PHYS/QHP TELEPHONE EVALUATION 21-30 MIN: CPT | Performed by: INTERNAL MEDICINE

## 2024-12-08 NOTE — PROGRESS NOTES
Virtual Brief Visit  Name: Jillian Ch      : 1990      MRN: [id number]  Encounter Provider: Nemo Khalil DO  Encounter Date: 2024   Encounter department: [facility]    This Visit is being completed by telephone. The Patient is located at Other and in the following state in which I hold an active license [place]    The patient was identified by name and date of birth. Jillian Ch was informed that this is a telemedicine visit and that the visit is being conducted through Telephone.  My office door was closed. No one else was in the room.  She acknowledged consent and understanding of privacy and security of the video platform. The patient has agreed to participate and understands they can discontinue the visit at any time.    Patient is aware this is a billable service.     :  Assessment & Plan  Malignant neoplasm of overlapping sites of left female breast, unspecified estrogen receptor status (HCC)         34-year-old female with a pT2 N0 ER positive breast cancer with high Oncotype.  She completed adjuvant chemotherapy while pregnant.  Our intention was to do another egg collection before starting adjuvant endocrine therapy, but a big roadblock has occurred.  I am going to get her started on monthly goserelin.  We can stop it if she is able to doing a collection.  Once her menstrual period stops we will start anastrozole.  She has an MRI of the heart next week and if all looks good, we can stop the Eliquis.  Her port has been removed.  I will put her in for a visit in mid January via telemedicine to check in on how she is doing.  I will touch base with Dr. Budinetz about this plan and will let her know if she has any concerns.    History of Present Illness   HPI    34-year-old female with a pT2 N0 ER positive breast cancer with high Oncotype.  She has had a roadblock in her fertility treatment.  She was hoping to do an egg  collection before we started adjuvant endocrine therapy, but it was denied by her insurance and if she is having some major financial roadblocks.  She is investigating alternate funding sources.  She is getting her menstrual period regularly and it is having.  She has an MRI of the heart ordered by Dr. Tolentino next week to assess if there is any residual thrombus in the right atria.  She is on Eliquis right now.    Visit Time  Total Visit Duration: 25 min

## 2024-12-09 ENCOUNTER — TELEPHONE (OUTPATIENT)
Dept: HEMATOLOGY ONCOLOGY | Facility: CLINIC | Age: 34
End: 2024-12-09

## 2024-12-09 ENCOUNTER — PATIENT OUTREACH (OUTPATIENT)
Dept: CASE MANAGEMENT | Facility: HOSPITAL | Age: 34
End: 2024-12-09

## 2024-12-09 NOTE — PROGRESS NOTES
Message received from Dr. Khalil's office regarding fertility preservation and financial concerns.  Call out to pt, no answer, LM for her with my direct call back number for a call at her convenience.  Also sent pt an email with the [facility] fertility assistance program information as well and asked her to reach out when she's able to talk.

## 2024-12-12 ENCOUNTER — PATIENT MESSAGE (OUTPATIENT)
Dept: CARDIOLOGY CLINIC | Facility: CLINIC | Age: 34
End: 2024-12-12

## 2024-12-12 ENCOUNTER — APPOINTMENT (OUTPATIENT)
Dept: LAB | Facility: CLINIC | Age: 34
End: 2024-12-12
Payer: COMMERCIAL

## 2024-12-12 DIAGNOSIS — C50.812 MALIGNANT NEOPLASM OF OVERLAPPING SITES OF LEFT BREAST IN FEMALE, ESTROGEN RECEPTOR POSITIVE (HCC): ICD-10-CM

## 2024-12-12 DIAGNOSIS — R00.2 PALPITATIONS: ICD-10-CM

## 2024-12-12 DIAGNOSIS — Z17.0 MALIGNANT NEOPLASM OF OVERLAPPING SITES OF LEFT BREAST IN FEMALE, ESTROGEN RECEPTOR POSITIVE (HCC): ICD-10-CM

## 2024-12-12 DIAGNOSIS — R03.0 ELEVATED BP WITHOUT DIAGNOSIS OF HYPERTENSION: ICD-10-CM

## 2024-12-12 DIAGNOSIS — I51.3 RIGHT ATRIAL THROMBUS: ICD-10-CM

## 2024-12-12 DIAGNOSIS — Z79.01 CURRENT USE OF LONG TERM ANTICOAGULATION: ICD-10-CM

## 2024-12-12 DIAGNOSIS — I51.89 RIGHT ATRIAL MASS: ICD-10-CM

## 2024-12-12 LAB
ANION GAP SERPL CALCULATED.3IONS-SCNC: 8 MMOL/L (ref 4–13)
BUN SERPL-MCNC: 9 MG/DL (ref 5–25)
CALCIUM SERPL-MCNC: 9 MG/DL (ref 8.4–10.2)
CHLORIDE SERPL-SCNC: 104 MMOL/L (ref 96–108)
CO2 SERPL-SCNC: 26 MMOL/L (ref 21–32)
CREAT SERPL-MCNC: 0.73 MG/DL (ref 0.6–1.3)
GFR SERPL CREATININE-BSD FRML MDRD: 106 ML/MIN/1.73SQ M
GLUCOSE P FAST SERPL-MCNC: 91 MG/DL (ref 65–99)
INR PPP: 1.37 (ref 0.85–1.19)
POTASSIUM SERPL-SCNC: 3.8 MMOL/L (ref 3.5–5.3)
PROTHROMBIN TIME: 17.1 SECONDS (ref 12.3–15)
SODIUM SERPL-SCNC: 138 MMOL/L (ref 135–147)

## 2024-12-12 PROCEDURE — 85610 PROTHROMBIN TIME: CPT

## 2024-12-12 PROCEDURE — 80048 BASIC METABOLIC PNL TOTAL CA: CPT

## 2024-12-12 PROCEDURE — 36415 COLL VENOUS BLD VENIPUNCTURE: CPT

## 2024-12-13 ENCOUNTER — HOSPITAL ENCOUNTER (OUTPATIENT)
Dept: MRI IMAGING | Facility: HOSPITAL | Age: 34
End: 2024-12-13
Attending: INTERNAL MEDICINE
Payer: COMMERCIAL

## 2024-12-13 ENCOUNTER — TELEPHONE (OUTPATIENT)
Dept: CARDIOLOGY CLINIC | Facility: CLINIC | Age: 34
End: 2024-12-13

## 2024-12-13 DIAGNOSIS — I51.89 RIGHT ATRIAL MASS: ICD-10-CM

## 2024-12-13 DIAGNOSIS — C50.812 MALIGNANT NEOPLASM OF OVERLAPPING SITES OF LEFT BREAST IN FEMALE, ESTROGEN RECEPTOR POSITIVE (HCC): ICD-10-CM

## 2024-12-13 DIAGNOSIS — I51.89 CARDIAC MASS: ICD-10-CM

## 2024-12-13 DIAGNOSIS — Z17.0 MALIGNANT NEOPLASM OF OVERLAPPING SITES OF LEFT BREAST IN FEMALE, ESTROGEN RECEPTOR POSITIVE (HCC): ICD-10-CM

## 2024-12-13 PROCEDURE — A9585 GADOBUTROL INJECTION: HCPCS | Performed by: INTERNAL MEDICINE

## 2024-12-13 PROCEDURE — 75561 CARDIAC MRI FOR MORPH W/DYE: CPT

## 2024-12-13 RX ORDER — GADOBUTROL 604.72 MG/ML
14 INJECTION INTRAVENOUS
Status: COMPLETED | OUTPATIENT
Start: 2024-12-13 | End: 2024-12-13

## 2024-12-13 RX ORDER — GADOBUTROL 604.72 MG/ML
7 INJECTION INTRAVENOUS
Status: DISCONTINUED | OUTPATIENT
Start: 2024-12-13 | End: 2024-12-13

## 2024-12-13 RX ADMIN — GADOBUTROL 14 ML: 604.72 INJECTION INTRAVENOUS at 10:45

## 2024-12-13 NOTE — TELEPHONE ENCOUNTER
LVOM for pt regarding lab results. Instructed patient to call my direct TEAMS number [phone] with any questions or concerns.  __________________________________  Isai,     Please see patient message and touch base with her.  I ordered a BMP for her MRI.  I did not think she needed it ....   but radiology requested it.  In any event, the BMP looks normal.      It looks like her local cardiology associate provider ordered an INR and I do not know why they ordered it.  The INR is likely abnormal because of the Eliquis.  This is not a concerning result in my interpretation.     PUSHPA Ch to P Cardiology Pod Clinical (supporting Belkys Montes PA-C)         12/12/24  8:28 PM  Hi,      I just got my results from this morning’s blood work, is this something I need to worry about. I don’t understand any of the numbers and when I looked online, I didn’t like the results I saw.      Please let me know asap.      Thank you,   Jillian

## 2024-12-16 ENCOUNTER — TELEPHONE (OUTPATIENT)
Dept: NEUROLOGY | Facility: CLINIC | Age: 34
End: 2024-12-16

## 2024-12-16 ENCOUNTER — HOSPITAL ENCOUNTER (OUTPATIENT)
Dept: INFUSION CENTER | Facility: CLINIC | Age: 34
Discharge: HOME | End: 2024-12-16
Payer: COMMERCIAL

## 2024-12-16 ENCOUNTER — TELEPHONE (OUTPATIENT)
Age: 34
End: 2024-12-16

## 2024-12-16 ENCOUNTER — PATIENT OUTREACH (OUTPATIENT)
Dept: CASE MANAGEMENT | Facility: HOSPITAL | Age: 34
End: 2024-12-16

## 2024-12-16 DIAGNOSIS — Z17.0 MALIGNANT NEOPLASM OF OVERLAPPING SITES OF LEFT BREAST IN FEMALE, ESTROGEN RECEPTOR POSITIVE (HCC): Primary | ICD-10-CM

## 2024-12-16 DIAGNOSIS — C50.812 MALIGNANT NEOPLASM OF OVERLAPPING SITES OF LEFT BREAST IN FEMALE, ESTROGEN RECEPTOR POSITIVE (HCC): Primary | ICD-10-CM

## 2024-12-16 DIAGNOSIS — D50.8 IRON DEFICIENCY ANEMIA SECONDARY TO INADEQUATE DIETARY IRON INTAKE: ICD-10-CM

## 2024-12-16 PROCEDURE — 96402 CHEMO HORMON ANTINEOPL SQ/IM: CPT

## 2024-12-16 RX ADMIN — GOSERELIN ACETATE 3.6 MG: 3.6 IMPLANT SUBCUTANEOUS at 10:41

## 2024-12-16 NOTE — TELEPHONE ENCOUNTER
LMOM for pt to inform them that their appt on 5/12 the location was changed to . Gave pt the address. We can r/s pt to a date in [facility] if they prefer, call center reach out to me if needed for r/s. Or they are okay to be put as a VV for this visit and next appt can be in-person at the preferred location. Gave pt c/b #.

## 2024-12-16 NOTE — PROGRESS NOTES
MSW checked in with pt in the infusion center this morning, she says that she did receive my email and is looking into financial resources for fertility.  She is also working with her clinic to see if there is anything they can offer.  I let her know that if they need information or paperwork signed from us we would be happy to do so, and to please let me know.  She says that she is otherwise doing well.  Her hair is growing in very nicely.  Her mother has moved here, which was the plan for awhile, and is helping with the baby, she shared pictures of him and spoke about how well he is doing.  She denies any other needs or concerns at this time.  I will remain available to assist or support her as needed moving forward.

## 2024-12-16 NOTE — PROGRESS NOTES
Pt to clinic for Zoladex injection. Pt offers no complaints. Tolerated injection in RLQ without complications. First dose education given to pt. AVS declined. Pt aware of next appointment on 1/13/25 at 1400.

## 2024-12-16 NOTE — TELEPHONE ENCOUNTER
Patient calling in regards to her appointment today at 8:30am at the [facility].  Patient states her road has not been plowed yet and she is unable to attend her appointment at 8:30am.  Outreach was made to the infusion center and a messaged was left on the voice mailbox to call patient to reschedule the infusion appointment.  Patient states she will be able to come in later today for an infusion appointment if there is availability.  Please call patient at [phone] to reschedule her infusion appointment.

## 2024-12-17 ENCOUNTER — TELEPHONE (OUTPATIENT)
Dept: CARDIOLOGY CLINIC | Facility: CLINIC | Age: 34
End: 2024-12-17

## 2024-12-17 NOTE — TELEPHONE ENCOUNTER
I called the patient to let her know that the MRI has not been read yet. She verbalized understanding.

## 2024-12-19 ENCOUNTER — HOSPITAL ENCOUNTER (OUTPATIENT)
Dept: MAMMOGRAPHY | Facility: CLINIC | Age: 34
End: 2024-12-19
Payer: COMMERCIAL

## 2024-12-19 VITALS — BODY MASS INDEX: 26.84 KG/M2 | WEIGHT: 171 LBS | HEIGHT: 67 IN

## 2024-12-19 DIAGNOSIS — Z17.0 MALIGNANT NEOPLASM OF OVERLAPPING SITES OF LEFT BREAST IN FEMALE, ESTROGEN RECEPTOR POSITIVE (HCC): ICD-10-CM

## 2024-12-19 DIAGNOSIS — C50.812 MALIGNANT NEOPLASM OF OVERLAPPING SITES OF LEFT BREAST IN FEMALE, ESTROGEN RECEPTOR POSITIVE (HCC): ICD-10-CM

## 2024-12-19 DIAGNOSIS — Z08 ENCOUNTER FOR FOLLOW-UP SURVEILLANCE OF BREAST CANCER: ICD-10-CM

## 2024-12-19 DIAGNOSIS — Z85.3 ENCOUNTER FOR FOLLOW-UP SURVEILLANCE OF BREAST CANCER: ICD-10-CM

## 2024-12-19 PROCEDURE — G0279 TOMOSYNTHESIS, MAMMO: HCPCS

## 2024-12-19 PROCEDURE — 77065 DX MAMMO INCL CAD UNI: CPT

## 2024-12-20 ENCOUNTER — OFFICE VISIT (OUTPATIENT)
Dept: GASTROENTEROLOGY | Facility: CLINIC | Age: 34
End: 2024-12-20
Payer: COMMERCIAL

## 2024-12-20 ENCOUNTER — RESULTS FOLLOW-UP (OUTPATIENT)
Dept: CARDIOLOGY CLINIC | Facility: CLINIC | Age: 34
End: 2024-12-20

## 2024-12-20 VITALS
SYSTOLIC BLOOD PRESSURE: 116 MMHG | OXYGEN SATURATION: 97 % | HEART RATE: 100 BPM | DIASTOLIC BLOOD PRESSURE: 78 MMHG | WEIGHT: 173 LBS | TEMPERATURE: 97.5 F | HEIGHT: 67 IN | BODY MASS INDEX: 27.15 KG/M2

## 2024-12-20 DIAGNOSIS — R13.10 DYSPHAGIA, UNSPECIFIED TYPE: ICD-10-CM

## 2024-12-20 DIAGNOSIS — R07.9 CHEST PAIN, UNSPECIFIED TYPE: ICD-10-CM

## 2024-12-20 DIAGNOSIS — R09.89 THROAT TIGHTNESS: ICD-10-CM

## 2024-12-20 DIAGNOSIS — K21.9 GASTROESOPHAGEAL REFLUX DISEASE WITHOUT ESOPHAGITIS: Primary | ICD-10-CM

## 2024-12-20 PROCEDURE — 99213 OFFICE O/P EST LOW 20 MIN: CPT | Performed by: PHYSICIAN ASSISTANT

## 2024-12-20 RX ORDER — GANIRELIX ACETATE 250 UG/.5ML
INJECTION, SOLUTION SUBCUTANEOUS
COMMUNITY
Start: 2024-11-18

## 2024-12-20 RX ORDER — CONTAINER,EMPTY
EACH MISCELLANEOUS
COMMUNITY
Start: 2024-11-18

## 2024-12-20 RX ORDER — UBIQUINOL 100 MG
CAPSULE ORAL
COMMUNITY
Start: 2024-11-18

## 2024-12-20 NOTE — PROGRESS NOTES
Name: Jillian Ch      : 1990      MRN: [id number]  Encounter Provider: Gina Grimes PA-C  Encounter Date: 2024   Encounter department: [facility] [place]  :  Assessment & Plan  Gastroesophageal reflux disease without esophagitis    Dysphagia, unspecified type    Throat tightness    Chest pain, unspecified type  She a long history of reflux and maintains on omeprazole 40 mg once daily  During her pregnancy last year she had severe nighttime regurgitation which has resolved    She has a long history of trouble swallowing  She describes this as feeling as if she is being suffocated when she swallows  She admits to difficulty getting the food down    She saw Dr. Mullins in  and had an upper endoscopy which was normal.  Empiric dilation was performed without improvement    She has not started following with ENT and allergy  Unfortunately, before any further workup could be done with them she was diagnosed with breast cancer and underwent surgery and chemotherapy     She has more recently been ordered a barium swallow and swallowing evaluation  She did schedule these however was diagnosed with a clot at her port and had to reschedule    It is obvious that she is very much suffering with the symptoms    She more recently reports awakening in the middle of the night with severe right sided chest pain    I advised that she try increasing her omeprazole to twice a day but she is very hesitant as she does not like the risk of taking any medication  I advised her to at least take the famotidine before bed    Will follow-up on speech eval and barium swallow  Consider that she will need esophageal manometry and 24-hour pH monitoring           History of Present Illness   HPI  Jillian Ch is a 34 y.o. female with a history of GERD as well as a recent history of breast cancer status post surgery and chemotherapy, recent history of clot at  "her port, CRPS who presents for evaluation of dysphagia.  She has a long history of reflux and is maintained omeprazole 40 mg once daily.  She saw Dr. Mullins in 2023 with complaints of difficulty swallowing.  He performed an endoscopy in October 2023 which was a visually normal exam.  He did do an empiric dilation of her esophagus which was not helpful.  Biopsies of her esophagus were negative for eosinophilic esophagitis.  She was referred to ENT and allergy.  Allergy has been trialing her on various medications which she does not think are helping.  ENT has advised a swallowing evaluation and barium swallow.  She had the scheduled but then developed a clot at her port site and had to reschedule.  She reports that every day when she swallows she feels as if she is \"suffocating\".  She is not choking necessarily.  She does feel like the food is having a difficult time going down and takes a very long time.  She reports that in the morning it is better than in the afternoon and at night.  She reports that she is barely eating at night because of her symptoms.  More recently she has been awakening at night with severe right sided chest pain.  She thinks that this is due to reflux but cannot be sure.  She does have a history of nighttime regurgitation which was problematic when she was pregnant last year but this has resolved.    History obtained from: patient    Review of Systems  Medical History Reviewed by provider this encounter:  Tobacco  Allergies  Meds  Problems  Med Hx  Surg Hx  Fam Hx     .  Past Medical History   Past Medical History:   Diagnosis Date    Anesthesia complication     gait dysfunction/ urinary retention after nerve block,    Anxiety     Asthma     CRPS (complex regional pain syndrome), upper limb     left chest/arm/hand    Deep vein thrombosis (HCC) 01/05/2024    post op from mastectomy    Encounter for current long-term use of anticoagulants     GERD (gastroesophageal reflux disease)  "    Heart murmur     HPV (human papilloma virus) infection 2012    unsure of 16, 18, or other    Invasive ductal carcinoma of breast, female, left (HCC) 2023    Kidney stone     Miscarriage 3/15/2015    7 weeks along.    Ovarian cyst     Left    PONV (postoperative nausea and vomiting) 01/02/2024    Right atrial mass     Right atrial thrombus      Past Surgical History:   Procedure Laterality Date    COLPOSCOPY  2012    HPV    EGD      IR PORT PLACEMENT  2/7/2024    IR PORT REMOVAL  10/25/2024    IR UPPER EXTREMITY VENOGRAM- DIAGNOSTIC  05/28/2021    MN BX/EXC LYMPH NODE OPEN SUPERFICIAL Left 01/02/2024    Procedure: LEFT BREAST MASTECTOMY, LYMPHATIC MAPPING WITH RADIOACTIVE DYE, SENTINEL LYMPH NODE BIOPSY, ZAHEER LEFT BREAST, INJECTION IN OR AT 0800 BY DR CORNELL;  Surgeon: Elena Cornell MD;  Location: [facility];  Service: Surgical Oncology    MN EXCISION 1ST &/CERVICAL RIB Left 08/12/2021    Procedure: FIRST RIB RESECTION;  Surgeon: Jonathan Schaffer MD;  Location: [facility];  Service: Thoracic    MN INJ RADIOACTIVE TRACER FOR ID OF SENTINEL NODE Left 01/02/2024    Procedure: LEFT BREAST MASTECTOMY, LYMPHATIC MAPPING WITH RADIOACTIVE DYE, SENTINEL LYMPH NODE BIOPSY, ZAHEER LEFT BREAST, INJECTION IN OR AT 0800 BY DR CORNELL;  Surgeon: Elena Cornell MD;  Location: [facility];  Service: Surgical Oncology    MN INTRAOP SENTINEL LYMPH NODE ID W/DYE INJECTION Left 01/02/2024    Procedure: LEFT BREAST MASTECTOMY, LYMPHATIC MAPPING WITH RADIOACTIVE DYE, SENTINEL LYMPH NODE BIOPSY, ZAHEER LEFT BREAST, INJECTION IN OR AT 0800 BY DR CORNELL;  Surgeon: Elena Cornell MD;  Location: [facility];  Service: Surgical Oncology    MN MASTECTOMY SIMPLE COMPLETE Left 01/02/2024    Procedure: LEFT BREAST MASTECTOMY, LYMPHATIC MAPPING WITH RADIOACTIVE DYE, SENTINEL LYMPH NODE BIOPSY, ZAHEER LEFT BREAST, INJECTION IN OR AT 0800 BY DR CORNELL;  Surgeon: Elena Cornell MD;  Location: [facility];  Service:  Surgical Oncology    THORACOSCOPY VIDEO ASSISTED SURGERY (VATS) Left 08/12/2021    Procedure: THORACOSCOPY VIDEO ASSISTED SURGERY (VATS);  Surgeon: Jonathan Schaffer MD;  Location: [facility];  Service: Thoracic    US GUIDANCE BREAST BIOPSY LEFT EACH ADDITIONAL Left 12/02/2023    US GUIDED BREAST BIOPSY RIGHT COMPLETE Right 12/02/2023    WISDOM TOOTH EXTRACTION  2012     Family History   Problem Relation Age of Onset    Heart disease Mother     Miscarriages / Stillbirths Mother     Coronary artery disease Mother     No Known Problems Father     No Known Problems Half-Brother     Bone cancer Maternal Grandmother         hilda to liver and spine    Miscarriages / Stillbirths Half-Sister     Breast cancer Neg Hx     Ovarian cancer Neg Hx     Uterine cancer Neg Hx     Cervical cancer Neg Hx       reports that she has never smoked. She has been exposed to tobacco smoke. She has never used smokeless tobacco. She reports that she does not currently use alcohol. She reports that she does not use drugs.  Current Outpatient Medications on File Prior to Visit   Medication Sig Dispense Refill    acetaminophen (TYLENOL) 325 mg tablet Take 2 tablets (650 mg total) by mouth every 4 (four) hours as needed for mild pain      albuterol (PROVENTIL HFA,VENTOLIN HFA) 90 mcg/act inhaler Inhale 2 puffs every 4 (four) hours as needed for wheezing 6.7 g 3    Albuterol-Budesonide (Airsupra) 90-80 MCG/ACT AERO 2 puffs as needed shortness of breath BIN: [id number]    PCN: [id number]      GRP: [id number]    ID: [id number] 10.7 g 3    Alcohol Swabs (Alcohol Prep) 70 % PADS Use as directed      apixaban (Eliquis) 5 mg Take 1 tablet (5 mg total) by mouth 2 (two) times a day 60 tablet 2    cetirizine (ZyrTEC) 10 mg tablet TAKE 1 TABLET BY MOUTH EVERY DAY 90 tablet 1    dicyclomine (BENTYL) 20 mg tablet Take 1 tablet (20 mg total) by mouth every 8 (eight) hours as needed (abdominal pain) 12 tablet 0    fluticasone (FLONASE) 50 mcg/act nasal spray 2 sprays into  each nostril daily 18 mL 5    Ganirelix Acetate 250 MCG/0.5ML SOSY INJECT 1 PREFILLED SYRINGE SUBCUTANEOUSLY (UNDER THE SKIN)  EVERY DAY      metoprolol succinate (TOPROL-XL) 25 mg 24 hr tablet Take 1 tablet (25 mg total) by mouth daily 90 tablet 3    Multiple Vitamin (MULTIVITAMINS PO) Take by mouth      omeprazole (PriLOSEC) 40 MG capsule Take 1 capsule (40 mg total) by mouth daily 90 capsule 2    ondansetron (ZOFRAN) 8 mg tablet Take 1 tablet (8 mg total) by mouth every 8 (eight) hours as needed for nausea or vomiting 30 tablet 3    sharps container Use as directed      Spacer/Aero-Holding Chambers (Vortex Valved Holding Chamber) GILLIAN Use 2 (two) times a day 1 each 0    EPINEPHrine (EPIPEN) 0.3 mg/0.3 mL SOAJ Inject 0.3 mL (0.3 mg total) into a muscle once for 1 dose 2 each 3    ferrous sulfate 324 (65 Fe) mg Take 1 tablet (324 mg total) by mouth every other day On an empty stomach with something acidic (orange juice). (Patient not taking: Reported on 10/8/2024) 45 tablet 3    Galcanezumab-gnlm 120 MG/ML SOAJ Inject 120 mg under the skin every 30 (thirty) days Following the first month loading dose of 2 pens. (Patient not taking: Reported on 11/26/2024) 1 mL 11    Ubrogepant (UBRELVY) 100 MG tablet Take 1 tablet (100 mg) one time as needed for migraine. May repeat one additional tablet (100 mg) at least two hours after the first dose. Do not use more than two doses per day, or for more than eight days per month. (Patient not taking: Reported on 11/26/2024) 16 tablet 6     No current facility-administered medications on file prior to visit.     Allergies   Allergen Reactions    Formic Acid Swelling and Rash     (Severe reactions to Bug bites)    Latex Rash and Blisters    Nsaids GI Intolerance      Current Outpatient Medications on File Prior to Visit   Medication Sig Dispense Refill    acetaminophen (TYLENOL) 325 mg tablet Take 2 tablets (650 mg total) by mouth every 4 (four) hours as needed for mild pain       albuterol (PROVENTIL HFA,VENTOLIN HFA) 90 mcg/act inhaler Inhale 2 puffs every 4 (four) hours as needed for wheezing 6.7 g 3    Albuterol-Budesonide (Airsupra) 90-80 MCG/ACT AERO 2 puffs as needed shortness of breath BIN: [id number]    PCN: [id number]      GRP: [id number]    ID: [id number] 10.7 g 3    Alcohol Swabs (Alcohol Prep) 70 % PADS Use as directed      apixaban (Eliquis) 5 mg Take 1 tablet (5 mg total) by mouth 2 (two) times a day 60 tablet 2    cetirizine (ZyrTEC) 10 mg tablet TAKE 1 TABLET BY MOUTH EVERY DAY 90 tablet 1    dicyclomine (BENTYL) 20 mg tablet Take 1 tablet (20 mg total) by mouth every 8 (eight) hours as needed (abdominal pain) 12 tablet 0    fluticasone (FLONASE) 50 mcg/act nasal spray 2 sprays into each nostril daily 18 mL 5    Ganirelix Acetate 250 MCG/0.5ML SOSY INJECT 1 PREFILLED SYRINGE SUBCUTANEOUSLY (UNDER THE SKIN)  EVERY DAY      metoprolol succinate (TOPROL-XL) 25 mg 24 hr tablet Take 1 tablet (25 mg total) by mouth daily 90 tablet 3    Multiple Vitamin (MULTIVITAMINS PO) Take by mouth      omeprazole (PriLOSEC) 40 MG capsule Take 1 capsule (40 mg total) by mouth daily 90 capsule 2    ondansetron (ZOFRAN) 8 mg tablet Take 1 tablet (8 mg total) by mouth every 8 (eight) hours as needed for nausea or vomiting 30 tablet 3    sharps container Use as directed      Spacer/Aero-Holding Chambers (Vortex Valved Holding Chamber) GILLIAN Use 2 (two) times a day 1 each 0    EPINEPHrine (EPIPEN) 0.3 mg/0.3 mL SOAJ Inject 0.3 mL (0.3 mg total) into a muscle once for 1 dose 2 each 3    ferrous sulfate 324 (65 Fe) mg Take 1 tablet (324 mg total) by mouth every other day On an empty stomach with something acidic (orange juice). (Patient not taking: Reported on 10/8/2024) 45 tablet 3    Galcanezumab-gnlm 120 MG/ML SOAJ Inject 120 mg under the skin every 30 (thirty) days Following the first month loading dose of 2 pens. (Patient not taking: Reported on 11/26/2024) 1 mL 11    Ubrogepant (UBRELVY) 100 MG tablet Take 1  "tablet (100 mg) one time as needed for migraine. May repeat one additional tablet (100 mg) at least two hours after the first dose. Do not use more than two doses per day, or for more than eight days per month. (Patient not taking: Reported on 11/26/2024) 16 tablet 6     No current facility-administered medications on file prior to visit.      Social History     Tobacco Use    Smoking status: Never     Passive exposure: Past    Smokeless tobacco: Never   Vaping Use    Vaping status: Never Used   Substance and Sexual Activity    Alcohol use: Not Currently     Comment: social    Drug use: Never    Sexual activity: Yes     Partners: Male     Birth control/protection: None        Objective   /78 (BP Location: Right arm, Patient Position: Sitting, Cuff Size: Standard)   Pulse 100   Temp 97.5 °F (36.4 °C) (Temporal)   Ht 5' 7\" (1.702 m)   Wt 78.5 kg (173 lb)   LMP 12/15/2024 (Within Days)   SpO2 97%   BMI 27.10 kg/m²      Physical Exam      "

## 2024-12-20 NOTE — RESULT ENCOUNTER NOTE
Cardiac MRI -  12/20/24   Normal left ventricular size and function.  Normal right ventricular size and function.  No evidence of myocardial inflammation, infiltrative disease or scarring.  No evidence of right atrial thrombus.

## 2024-12-25 DIAGNOSIS — R13.10 DYSPHAGIA, UNSPECIFIED TYPE: ICD-10-CM

## 2024-12-25 DIAGNOSIS — K21.9 GASTROESOPHAGEAL REFLUX DISEASE WITHOUT ESOPHAGITIS: ICD-10-CM

## 2024-12-25 RX ORDER — OMEPRAZOLE 40 MG/1
40 CAPSULE, DELAYED RELEASE ORAL DAILY
Qty: 90 CAPSULE | Refills: 1 | Status: SHIPPED | OUTPATIENT
Start: 2024-12-25

## 2025-01-02 ENCOUNTER — OFFICE VISIT (OUTPATIENT)
Dept: SURGICAL ONCOLOGY | Facility: CLINIC | Age: 35
End: 2025-01-02
Payer: COMMERCIAL

## 2025-01-02 VITALS
HEART RATE: 83 BPM | BODY MASS INDEX: 26.84 KG/M2 | HEIGHT: 67 IN | OXYGEN SATURATION: 97 % | TEMPERATURE: 97.9 F | WEIGHT: 171 LBS

## 2025-01-02 DIAGNOSIS — C50.812 MALIGNANT NEOPLASM OF OVERLAPPING SITES OF LEFT BREAST IN FEMALE, ESTROGEN RECEPTOR POSITIVE (HCC): Primary | ICD-10-CM

## 2025-01-02 DIAGNOSIS — Z90.12 HISTORY OF LEFT MASTECTOMY: ICD-10-CM

## 2025-01-02 DIAGNOSIS — Z08 ENCOUNTER FOR FOLLOW-UP SURVEILLANCE OF BREAST CANCER: ICD-10-CM

## 2025-01-02 DIAGNOSIS — Z85.3 ENCOUNTER FOR FOLLOW-UP SURVEILLANCE OF BREAST CANCER: ICD-10-CM

## 2025-01-02 DIAGNOSIS — D23.9 DERMATOFIBROMA: ICD-10-CM

## 2025-01-02 DIAGNOSIS — Z17.0 MALIGNANT NEOPLASM OF OVERLAPPING SITES OF LEFT BREAST IN FEMALE, ESTROGEN RECEPTOR POSITIVE (HCC): Primary | ICD-10-CM

## 2025-01-02 PROCEDURE — 99215 OFFICE O/P EST HI 40 MIN: CPT | Performed by: SURGERY

## 2025-01-02 NOTE — PROGRESS NOTES
Surgical Oncology Follow Up  [facility]  [facility] [place]  [address] [id number]    Jillian Ch  1990  [id number]      Chief Complaint   Patient presents with    Follow-up     F/u after 12/19/24 right dx mammo        Assessment & Plan:   34-year-old female with left breast cancer mastectomy followed by adjuvant chemotherapy during pregnancy she is overall doing well.  Actually she had surgery approximately 1 year ago she is overall doing well.  She had a right breast mammogram which I reviewed and no worrisome features we will see her in 6 months time.  She will also continue follow-up with her medical oncologist as well.    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, ER 80-85% MA 90-95% Her2 1+, han 2, cT2(2.1 cm)N0M0    1/2/2023 - left sided mastectomy with SLNBX - [id number] - oncotype 23    Post-op - LLE DVT - treated with lovenox until 6 weeks after delivery of her baby    2/21/2024 - start AC q 3 weeks    3/13/2024 - cycle 2 adriamycin dose reduced to 50 mg/m2 and cytoxan dose reduced by 10% due to N/V    4/2024 - stop after 3 cycles of AC due to severe nausea and dehydration with coexisting hyperemesis gravidarum    7/2024 - vaginal delivery of healthy baby boy     Malignant neoplasm of overlapping sites of left breast in female, estrogen receptor positive (HCC)   12/2/2023 Initial Diagnosis    Malignant neoplasm of overlapping sites of left female breast (HCC)     12/2/2023 Biopsy    Bilateral breast ultrasound guided biopsy  A. Breast, Left, US Guided Left Breast Biopsy 12:00 6cmfn:  Invasive breast carcinoma of no special type (ductal) with osteoclast-like stromal giant cells  Grade 2  ER 85  MA 95  HER2 1+     B. Breast, Right, US Guided Right Breast Biopsy 10:00 9cmfn:  Benign breast tissue.    In review of the patient’s  recent imaging, the left malignancy appears unifocal.  The biopsy-proven carcinoma measured 2.1 cm on ultrasound. Ultrasound of the left axilla was performed on 11/24/2023 and showed no suspicious adenopathy.  Recent imaging of the contralateral right breast dated 12/2/2023 and 11/24/2023 was reviewed and shows no suspicious findings.  The pathology results for the ultrasound-guided core needle biopsy (right breast 10:00, 9 cm from the nipple) are benign and concordant with imaging.     12/2/2023 Genetic Testing    [facility]  A total of 36 genes were evaluated, including: APC, TOPHER, BARD 1, BMPR1A, BRCA1, BRCA2, BRIP1, CDH1, CDK4, CKDN2A, CHEK2, DICER1, MLH1, MSH2, MSH6, MUTYH, NBN, NF1, NTHL1, PALB2, PMS2, PTEN, RAD51C, RECQL, SMAD4, SMARCA4, STK11, TP53, AXIN2, HOXB13, MSH3, POLD1, AND POLE, EPCAM, AND GREM1   Negative result. No pathogenic sequence variants or deletions/duplications identified       12/2/2023 -  Cancer Staged    Staging form: Breast, AJCC 8th Edition  - Clinical stage from 12/2/2023: Stage IB (cT2, cN0, cM0, G2, ER+, OH+, HER2-) - Signed by Elena Cornell MD on 12/13/2023  Stage prefix: Initial diagnosis  Histologic grading system: 3 grade system       1/2/2024 Surgery    Left breast mastectomy with sentinel lymph node biopsy  Invasive Mammary carcinoma of no special type (ductal)   Grade 2  25 mm  Margins negative  0/2 Lymph nodes  Anatomic stage IIA  Prognostic stage IA      2/21/2024 - 4/3/2024 Chemotherapy    DOXOrubicin (ADRIAMYCIN), 56.25 mg/m2 = 106 mg (93.8 % of original dose 60 mg/m2), Intravenous, Once, 3 of 3 cycles  Dose modification: 56.25 mg/m2 (original dose 60 mg/m2, Cycle 1, Reason: Other (Must fill in a comment), Comment: max recommended dose), 50 mg/m2 (original dose 60 mg/m2, Cycle 2, Reason: Nausea/Vomiting, Comment: dose reduced to 50 mg/m2 due to n/v)  Administration: 106 mg (2/21/2024), 94 mg (3/13/2024), 94 mg (4/3/2024)  alteplase (CATHFLO), 2 mg, Intracatheter,  Every 1 Minute as needed, 3 of 3 cycles  cyclophosphamide (CYTOXAN) IVPB, 600 mg/m2 = 1,128 mg, Intravenous, Once, 3 of 3 cycles  Dose modification: 540 mg/m2 (original dose 600 mg/m2, Cycle 2, Reason: Nausea/Vomiting, Comment: 10% dose reduction due to n/v)  Administration: 1,128 mg (2/21/2024), 1,000 mg (3/13/2024), 1,000 mg (4/3/2024)  fosaprepitant (EMEND) IVPB, 150 mg, Intravenous, Once, 3 of 3 cycles  Administration: 150 mg (2/21/2024), 150 mg (3/13/2024), 150 mg (4/3/2024)     Cancer complicating pregnancy, third trimester   12/14/2023 Initial Diagnosis    Cancer complicating pregnancy in second trimester     Dermatofibroma   10/28/2024 Initial Diagnosis    Dermatofibroma           Interval History:   Follow-up with left breast cancer    Review of Systems:   Review of Systems   Constitutional:  Negative for chills and fever.   HENT:  Negative for ear pain and sore throat.    Eyes:  Negative for pain and visual disturbance.   Respiratory:  Negative for cough and shortness of breath.    Cardiovascular:  Negative for chest pain and palpitations.   Gastrointestinal:  Negative for abdominal pain and vomiting.   Genitourinary:  Negative for dysuria and hematuria.   Musculoskeletal:  Negative for arthralgias and back pain.   Skin:  Negative for color change and rash.   Neurological:  Negative for seizures and syncope.   Hematological:  Negative for adenopathy.   All other systems reviewed and are negative.      Past Medical History     Patient Active Problem List   Diagnosis    Mild persistent asthma without complication    Axillary hidradenitis suppurativa    Other chest pain    Laryngopharyngeal reflux (LPR)    Depression with anxiety    Chronic fatigue    Chronic left shoulder pain    Cervical disc disorder at C5-C6 level with radiculopathy    Atypical squamous cells of undetermined significance (ASCUS) on Papanicolaou smear of cervix    Hypertrophic and atrophic condition of skin    Shoulder impingement  syndrome, left    Vitamin D deficiency    Thoracic outlet syndrome    Palpitations    Transaminitis    Right middle lobe pulmonary nodule    Reversible airway obstruction    SOB (shortness of breath)    Unexplained weight gain    Left ovarian cyst    Malignant neoplasm of overlapping sites of left breast in female, estrogen receptor positive (HCC)    Cancer complicating pregnancy, third trimester    Spontaneous vaginal delivery    History of left mastectomy    Multigravida of advanced maternal age in first trimester    DVT complicating pregnancy, third trimester    Dehydration    Iron deficiency anemia secondary to inadequate dietary iron intake    Encounter for follow-up surveillance of breast cancer    Leg weakness, bilateral    Elevated blood pressure reading without diagnosis of hypertension    Pelvic pressure in female    Idiopathic urticaria    Throat swelling    Dermatographia    Allergic rhinitis due to dust mite    Latex allergy    Dermatofibroma    Chronic migraine without aura without status migrainosus, not intractable     Past Medical History:   Diagnosis Date    Anesthesia complication     gait dysfunction/ urinary retention after nerve block,    Anxiety     Asthma     CRPS (complex regional pain syndrome), upper limb     left chest/arm/hand    Deep vein thrombosis (HCC) 01/05/2024    post op from mastectomy    Encounter for current long-term use of anticoagulants     GERD (gastroesophageal reflux disease)     Heart murmur     HPV (human papilloma virus) infection 2012    unsure of 16, 18, or other    Invasive ductal carcinoma of breast, female, left (HCC) 2023    Kidney stone     Miscarriage 3/15/2015    7 weeks along.    Ovarian cyst     Left    PONV (postoperative nausea and vomiting) 01/02/2024    Right atrial mass     Right atrial thrombus      Past Surgical History:   Procedure Laterality Date    COLPOSCOPY  2012    HPV    EGD      IR PORT PLACEMENT  2/7/2024    IR PORT REMOVAL  10/25/2024    IR  UPPER EXTREMITY VENOGRAM- DIAGNOSTIC  05/28/2021    ND BX/EXC LYMPH NODE OPEN SUPERFICIAL Left 01/02/2024    Procedure: LEFT BREAST MASTECTOMY, LYMPHATIC MAPPING WITH RADIOACTIVE DYE, SENTINEL LYMPH NODE BIOPSY, ZAHEER LEFT BREAST, INJECTION IN OR AT 0800 BY DR CORNELL;  Surgeon: Elena Cornell MD;  Location: [facility];  Service: Surgical Oncology    ND EXCISION 1ST &/CERVICAL RIB Left 08/12/2021    Procedure: FIRST RIB RESECTION;  Surgeon: Jonathan Schaffer MD;  Location: [facility];  Service: Thoracic    ND INJ RADIOACTIVE TRACER FOR ID OF SENTINEL NODE Left 01/02/2024    Procedure: LEFT BREAST MASTECTOMY, LYMPHATIC MAPPING WITH RADIOACTIVE DYE, SENTINEL LYMPH NODE BIOPSY, ZAHEER LEFT BREAST, INJECTION IN OR AT 0800 BY DR CORNELL;  Surgeon: Elena Cornell MD;  Location: [facility];  Service: Surgical Oncology    ND INTRAOP SENTINEL LYMPH NODE ID W/DYE INJECTION Left 01/02/2024    Procedure: LEFT BREAST MASTECTOMY, LYMPHATIC MAPPING WITH RADIOACTIVE DYE, SENTINEL LYMPH NODE BIOPSY, ZAHEER LEFT BREAST, INJECTION IN OR AT 0800 BY DR CORNELL;  Surgeon: Elena Cornell MD;  [facility]: [facility];  Service: Surgical Oncology    ND MASTECTOMY SIMPLE COMPLETE Left 01/02/2024    Procedure: LEFT BREAST MASTECTOMY, LYMPHATIC MAPPING WITH RADIOACTIVE DYE, SENTINEL LYMPH NODE BIOPSY, ZAHEER LEFT BREAST, INJECTION IN OR AT 0800 BY DR CORNELL;  Surgeon: Elena Cornell MD;  [facility];  Service: Surgical Oncology    THORACOSCOPY VIDEO ASSISTED SURGERY (VATS) Left 08/12/2021    Procedure: THORACOSCOPY VIDEO ASSISTED SURGERY (VATS);  Surgeon: Jonathan Schaffer MD;  [facility]: [facility];  Service: Thoracic    US GUIDANCE BREAST BIOPSY LEFT EACH ADDITIONAL Left 12/02/2023    US GUIDED BREAST BIOPSY RIGHT COMPLETE Right 12/02/2023    WISDOM TOOTH EXTRACTION  2012     Family History   Problem Relation Age of Onset    Heart disease Mother     Miscarriages / Stillbirths Mother     Coronary artery  disease Mother     No Known Problems Father     No Known Problems Half-Brother     Bone cancer Maternal Grandmother         hilda to liver and spine    Miscarriages / Stillbirths Half-Sister     Breast cancer Neg Hx     Ovarian cancer Neg Hx     Uterine cancer Neg Hx     Cervical cancer Neg Hx      Social History     Socioeconomic History    Marital status: /Civil Union     Spouse name: Not on file    Number of children: Not on file    Years of education: Not on file    Highest education level: Not on file   Occupational History    Not on file   Tobacco Use    Smoking status: Never     Passive exposure: Past    Smokeless tobacco: Never   Vaping Use    Vaping status: Never Used   Substance and Sexual Activity    Alcohol use: Not Currently     Comment: social    Drug use: Never    Sexual activity: Yes     Partners: Male     Birth control/protection: None   Other Topics Concern    Not on file   Social History Narrative    Do you have pets? 2 cat & 2 dog  Is pet allowed in bedroom?Yes    Are you a smoker? Never    Does anyone smoke in your home? No       Do you live with smokers? No    [place] frequently? No   How many times a year? N/A      Social Drivers of Health     Financial Resource Strain: Not on file   Food Insecurity: No Food Insecurity (7/11/2024)    Nursing - Inadequate Food Risk Classification     Worried About Running Out of Food in the Last Year: Never true     Ran Out of Food in the Last Year: Never true     Ran Out of Food in the Last Year: Not on file   Transportation Needs: No Transportation Needs (7/11/2024)    PRAPARE - Transportation     Lack of Transportation (Medical): No     Lack of Transportation (Non-Medical): No   Physical Activity: Not on file   Stress: Not on file   Social Connections: Not on file   Intimate Partner Violence: Not on file   Housing Stability: Not on file       Current Outpatient Medications:     acetaminophen (TYLENOL) 325 mg tablet, Take 2 tablets (650 mg total)  by mouth every 4 (four) hours as needed for mild pain, Disp: , Rfl:     albuterol (PROVENTIL HFA,VENTOLIN HFA) 90 mcg/act inhaler, Inhale 2 puffs every 4 (four) hours as needed for wheezing, Disp: 6.7 g, Rfl: 3    Albuterol-Budesonide (Airsupra) 90-80 MCG/ACT AERO, 2 puffs as needed shortness of breath BIN: [id number]    PCN: [id number]      GRP: [id number]    ID: [id number], Disp: 10.7 g, Rfl: 3    Alcohol Swabs (Alcohol Prep) 70 % PADS, Use as directed, Disp: , Rfl:     apixaban (Eliquis) 5 mg, Take 1 tablet (5 mg total) by mouth 2 (two) times a day, Disp: 60 tablet, Rfl: 2    cetirizine (ZyrTEC) 10 mg tablet, TAKE 1 TABLET BY MOUTH EVERY DAY, Disp: 90 tablet, Rfl: 1    dicyclomine (BENTYL) 20 mg tablet, Take 1 tablet (20 mg total) by mouth every 8 (eight) hours as needed (abdominal pain), Disp: 12 tablet, Rfl: 0    EPINEPHrine (EPIPEN) 0.3 mg/0.3 mL SOAJ, Inject 0.3 mL (0.3 mg total) into a muscle once for 1 dose, Disp: 2 each, Rfl: 3    ferrous sulfate 324 (65 Fe) mg, Take 1 tablet (324 mg total) by mouth every other day On an empty stomach with something acidic (orange juice). (Patient not taking: Reported on 10/8/2024), Disp: 45 tablet, Rfl: 3    fluticasone (FLONASE) 50 mcg/act nasal spray, 2 sprays into each nostril daily, Disp: 18 mL, Rfl: 5    Galcanezumab-gnlm 120 MG/ML SOAJ, Inject 120 mg under the skin every 30 (thirty) days Following the first month loading dose of 2 pens. (Patient not taking: Reported on 11/26/2024), Disp: 1 mL, Rfl: 11    Ganirelix Acetate 250 MCG/0.5ML SOSY, INJECT 1 PREFILLED SYRINGE SUBCUTANEOUSLY (UNDER THE SKIN)  EVERY DAY, Disp: , Rfl:     metoprolol succinate (TOPROL-XL) 25 mg 24 hr tablet, Take 1 tablet (25 mg total) by mouth daily, Disp: 90 tablet, Rfl: 3    Multiple Vitamin (MULTIVITAMINS PO), Take by mouth, Disp: , Rfl:     omeprazole (PriLOSEC) 40 MG capsule, TAKE 1 CAPSULE DAILY, Disp: 90 capsule, Rfl: 1    ondansetron (ZOFRAN) 8 mg tablet, Take 1 tablet (8 mg total) by mouth every  8 (eight) hours as needed for nausea or vomiting, Disp: 30 tablet, Rfl: 3    sharps container, Use as directed, Disp: , Rfl:     Spacer/Aero-Holding Chambers (Vortex Valved Holding Chamber) GILLIAN, Use 2 (two) times a day, Disp: 1 each, Rfl: 0    Ubrogepant (UBRELVY) 100 MG tablet, Take 1 tablet (100 mg) one time as needed for migraine. May repeat one additional tablet (100 mg) at least two hours after the first dose. Do not use more than two doses per day, or for more than eight days per month. (Patient not taking: Reported on 11/26/2024), Disp: 16 tablet, Rfl: 6  Allergies   Allergen Reactions    Formic Acid Swelling and Rash     (Severe reactions to Bug bites)    Latex Rash and Blisters    Nsaids GI Intolerance       Physical Exam:     Vitals:    01/02/25 1009   Pulse: 83   Temp: 97.9 °F (36.6 °C)   SpO2: 97%     Physical Exam  Constitutional:       Appearance: Normal appearance.   HENT:      Head: Normocephalic and atraumatic.      Nose: Nose normal.      Mouth/Throat:      Mouth: Mucous membranes are moist.   Eyes:      Pupils: Pupils are equal, round, and reactive to light.   Cardiovascular:      Rate and Rhythm: Normal rate.      Pulses: Normal pulses.      Heart sounds: Normal heart sounds.   Pulmonary:      Effort: Pulmonary effort is normal.      Breath sounds: Normal breath sounds.   Chest:          Comments: Left mastectomy flaps clean intact no evidence of local regional recurrence.  Right breast no palpable mass masses nipple discharge nipple retraction or skin changes right axillary and supraclavicular examination no palpable adenopathy.  Left axillary and left supraclavicular examination no palpable adenopathy.  Patient was examined seated as well as supine position.  Abdominal:      General: Bowel sounds are normal.      Palpations: Abdomen is soft.   Musculoskeletal:         General: Normal range of motion.      Cervical back: Normal range of motion and neck supple.   Skin:     General: Skin is warm.    Neurological:      General: No focal deficit present.      Mental Status: She is alert and oriented to person, place, and time.   Psychiatric:         Mood and Affect: Mood normal.         Behavior: Behavior normal.         Thought Content: Thought content normal.         Judgment: Judgment normal.           Results & Discussion:     Narrative & Impression   DIAGNOSIS: Malignant neoplasm of overlapping sites of left breast in female, estrogen receptor positive (HCC); Encounter for follow-up surveillance of breast cancer      TECHNIQUE:  Digital diagnostic mammography was performed. Computer Aided Detection (CAD) analyzed all applicable images.     COMPARISONS: Prior breast imaging dated: 01/02/2024, 12/02/2023, 12/02/2023, 12/02/2023, 11/24/2023, and 11/24/2023     RELEVANT HISTORY:   Family Breast Cancer History: History of breast cancer in Neg Hx.  Family Medical History: No known relevant family medical history.   Personal History: Hormone history includes other. No known relevant surgical history. Medical history includes breast cancer.     RISK ASSESSMENT:   [facility] risk assessment reporting was suppressed due to the patient's history and/or demographic factors.     TISSUE DENSITY:   The breasts are heterogeneously dense, which may obscure small masses.      INDICATION: Jillian Ch is a 34 y.o. female presenting for history of breast cancer.     FINDINGS:   There are no suspicious masses, grouped microcalcifications or areas of unexplained architectural distortion. The skin and nipple areolar complex are unremarkable.  Few diffusely distributed calcifications are noted.  Biopsy marker clip is noted.        IMPRESSION:   No evidence of malignancy.           ASSESSMENT/BI-RADS CATEGORY:  Right: 2 - Benign  Overall: 2 - Benign     RECOMMENDATION:       - Diagnostic mammogram in 1 year for the right breast.         I did review right breast mammogram films and agree with the report.  I did  discussed in detail nature of breast cancer follow-up.  Mastectomy flaps are clean and intact.  We also discussed possible delayed reconstruction she does not want to proceed with that at this time.  I will see her in 6 months time.  She states that she has some diarrhea and difficulty swallowing.  She states that she has a gastroenterologist and she is going to call her to schedule for upper and lower endoscopy.  she understands and  agrees . All patient questions were answered.       Advance Care Planning/Advance Directives:  I Elena Cornell MD discussed the disease status with Jillian Ch  today 01/02/25  treatment plans and follow-up with the patient.

## 2025-04-08 NOTE — PATIENT INSTRUCTIONS
Your Child's Health  11-14 Year Old Visit      Angela Blair  April 8, 2025    Visit Vitals  /70   LMP 03/24/2025 (Approximate)     Weight:       YOUR CHILD'S 11 to 14 YEAR-OLD VISIT    Personal Safety  Violence in the community and schools can impact a child's physical and emotional health. Talk to your child about bullying and emphasize that it should always be reported. Work with schools and administrators to address bullying and intimidation in your child's school. Your child should know they can always call you when they are uncomfortable or concerned that they are getting into a dangerous situation. In dating situations, teens need to know it is always okay to say \"no\" to something they are uncomfortable with and that \"no\" means NO and must be respected. Sadly, youth in this age range are most likely to be targeted for exploitation (human trafficking, prostitution). Tell your child that they should never form a relationship with someone who forbids them to tell their parents about it - these are always dangerous situations.    Smoking and Other Drugs  It's always important to tell your child that you do not want them to smoke or use drugs. When there are family members, friends, or peers who are smoking, discuss with your child how strong the addiction is and how difficult it is to quit. Studies show that youth who are exposed to e-cigarettes are more likely to try them themselves, and using e-cigarettes is a risk factor for smoking regular cigarettes.    Self-Esteem & Emotional Health  A strong sense of self-esteem contributes to good mental health and can help your child succeed in many aspects of life. As your child is becoming more independent, it is still important to spend time together as a family, start discussions about lots of topics (not just about things you disagree on), listen respectfully to your child and answer questions honestly. Make sure you are clear about family rules and  Nutrition Rx & Recommendations:  Diet: High Calorie, High Protein (for high calorie foods see pages 52-53, and for high protein foods see pages 49-51 in your Eating Hints book)  Small, frequent meals/snacks may be easier to tolerate than 3 large daily meals.  Aim for 5-6 small meals per day (every 2-3 hours).  Include protein at all meals/snacks.  Include a variety of foods (as tolerated/allowed by diet).  Follow a Cancer Preventative Nutrition Pattern: colorful, plant-based, low-fat, avoid added sugars, limit alcohol, include high fiber foods, limit processed meats, limit red meat, choose lean protein sources, use low-fat cooking methods, balance calories with physical activity, avoid excessive weight gain throughout life.  Incorporate physical activity as able/allowed.  Stay hydrated by sipping fluids of choice/tolerance throughout the day.  Liquid nutrition may be better tolerated than solids at times.  Alter food choices and eating patterns to accommodate changing needs.  Light physical activity (such as walking) is encouraged, as able/tolerated.  Weigh yourself regularly. If you notice weight loss, make an effort to increase your daily food/calorie intake. If you continue to notice loss after these efforts, reach out to your dietitian to establish a plan to stabilize weight.    For nausea/vomiting, suggestions include:  Eat 5-6 smaller meals throughout the day instead of 3 large meals.   Sip on calorie containing fluids throughout the day: 100% fruit juice, diluted juice, bone broth (higher protein broth), creamy soups, sports drinks (Gatorade, Poweraide, Pedialyte, etc.), Italian ice, popsicles, milkshakes, smoothies, oral nutrition supplements (Ensure, Boost, Glucerna etc.), gelatin/Jello, etc. (see page 41 of Eating Hints Book for other examples).  Continue Ensure Clear   Continue lactose free protein shake   Eat bland foods and foods easy on the stomach: clear broth (chicken, vegetable, bone, mushroom,  beef), juice (caution with acidic juices), potatoes, pretzels, crackers, cereal (Corn Flakes, Rice Chex, Rice Crispies, Cheerios), oatmeal or cream of wheat, white bread or toast, white rice, cooked vegetables (caution with gas producing vegetables), plain pasta, eggs, peanut butter, boiled chicken or turkey, soft cheeses.  Consume foods and drinks at room temperature. Try to avoid eating/drinking anything too hot or cold.   Try to avoid foods with strong odors.   Suck on tart candies such as lemon drops or ginger chews to help relieve nausea.   Ginger tea or flat ginger ale.   Foods to avoid:  High sugar foods such as soda, candies, desserts.   Greasy/fried foods  Gas producing foods such as broccoli, cabbage, [place] sprouts, raw fruits/vegetables, beans.  Caution with diary products unless they are low lactose or lactose free.   Alcohol  Spicy foods   Acidic foods: coffee, tea, citrus, tomato   Avoid eating your favorite foods when you feel nauseous so you don't develop a dislike of those foods.   Refer to pages 21-22 in Eating Hints Book for additional suggestions.    Follow Up Plan: 2/2/24 11am phone call follow up   Recommend Referral to Other Providers: none at this time   expectations regarding their dress, activities, media use, etc.    Success in school also builds self-esteem. Children who do well in school are less likely to be involved in risky behaviors. As school becomes more challenging academically, increasing demands will cause some stress that your child needs to learn to handle in a healthy way. Also, subtle learning or attention problems which may have been overlooked previously may become evident at higher grade levels. Poor school performance could also be a sign of anxiety or depression. Frequent absenteeism is a risk factor for dropping out of school; talk to your doctors or teachers if your child is missing a lot of school.    Involvement with activities that really interest your child is another way to build their self-esteem. Even if they are having some struggles with schoolwork, try to find an opportunity for them to pursue something that really interests them, like music, art, drama or sports.    Preteens and young teens can often be epstein and withdrawn, and this is often normal as long as it does not last long or significantly impact their schoolwork, activities, or relationships. Depression can impact adolescents with typical symptoms being irritability, persistent boredom, poor school performance and withdrawal from activities and relationships. Young teens identifying as lesbian, cummings, bisexual, transgender, or questioning are particularly at risk for emotional health problems; family acceptance of these young people is one of the strongest factors leading to positive outcomes for them.    Sexuality  While most youth in this age range have not had sexual experiences, your child needs to know that you do not want them to engage in sexual activity and that they can come to you with any questions about sex. Not having sex is the safest way to prevent pregnancy and avoid sexually transmitted infections. Experimenting with drugs or alcohol will increase your  child's risk of making poor decisions, which is another reason to continue to remind them that you do not want them to use drugs or alcohol. Studies show that parents have more influence on teen sexual values and decision-making than peers, media, siblings, teachers, and Rastafarian teachings. But you can't ignore the topic --talk about it! Use what is going on at school, what they are seeing on TV and what is happening with friends to talk about relationships and sex. Discuss how they can respond to pressures to use drugs or engage in sexual activity. If you aren't sure how to talk to your child about sex, find resources at the American Academy of Pediatrics [email or web address].org website.     Dental Health  It is important to care for permanent teeth by brushing at least twice a day with a fluoridated toothpaste,  flossing daily and seeing a dentist regularly. Limiting carbonated sodas and other sweet beverages is also important to prevent tooth decay. Gum chewers should choose sugarless gum. Youth who are active in contact sports should wear a mouth guard. Let us know if your water supply comes from a well; fluoride supplements may be indicated.    Body Image and Healthy Lifestyle  Youth at this age can be very sensitive to how they look compared to others. The media, unfortunately, frequently presents unhealthy, unrealistic body images to youth. It's not news that a healthy body size is obtained by eating healthy foods and being physically active every day and limiting unhealthy habits like junk food and too much time spent just sitting. Encourage healthy habits in your child and remember to provide praise about all of their good aspects, not just how they look.    Make your child a partner in healthy lifestyle choices by having them help with meal planning, shopping, and food preparation at this age. Do your best to eat meals as a family as often as you can, despite busy schedules. (And remember to keep the TV off  and phones away from the table - this is one of the best times for a good family face-to-face communication.) Keep healthy choices in the home for in between meals snacks and do NOT keep a lot of junk food and unhealthy snacks in your home - if they are there, your children will eat them! Encourage lots of water to drink and avoid keeping soda and other sweet beverages in your home on a regular basis. Calcium and vitamin D remain very important for optimal bone health at this age. Your child needs 3 to 4 servings of a good source of calcium daily (low-fat or skim milk, yogurt, cheese, calcium-fortified orange juice or other foods). 600 IU of vitamin D daily is recommended. Most people cannot meet their vitamin D needs with their usual diet, so a multivitamin with iron supplement is a good way to get it. (A pure vitamin D supplement with 400 or 600 IU is also okay.)    Finding time to be physically active every day is a challenge with increasing school work and other activities. Encourage at least 60 minutes of physical activity each day. It does not have to be all at the same time. Physical education classes can count for some of it, but other activities, such as biking, dancing, and simply playing with the kids in the neighborhood are usually needed to get to the goal of 60 minutes a day. When participating in sports, make sure appropriate safety equipment is used (helmet, mouth  guard, eye protection, wrist guards, elbow and knee pads, athletic supporter). Limiting how much screen time or media use your child has is often necessary to make sure they have time for the recommended physical activity and exercise.    Sleep   Adequate sleep is important to all aspects of your child's health. Using digital devices before bedtime can interfere with quality sleep. Keeping electronic devices out of your child's room at night is a great step towards ensuring adequate sleep and rest. If you don't already have set rules about  electronic media use, check out the American Academy of Pediatrics healthychildren.org website (search for “media use plan”).    Safety On the Land and In the Water   Seatbelts do not fit properly until a child is taller than 4'9\" and weighs more than 80 pounds. Smaller kids in this age group won’t like riding in a booster seat but that is where they are safest. High-backed booster seats should be used if there are low seat backs or no head rests in your car; backless boosters can be used if your car has high seat backs and head rests. Children are safest in the back seat until they are 13 years old. Talk to your child about never getting into a car with someone who is under the influence of drugs or alcohol; let them know that they can always call you for help if this situation ever occurs.     Helmets and other protective gear should always be worn when biking, skating or skateboarding; they may be recommended for additional sports (kayaking, water polo, etc) for older children.  All-terrain vehicles (ATVs) are very dangerous and adolescents under 16 years of age should not be allowed to ride them. When on a boat or engaging in water sports, a [employer] approved lifejacket should always be worn.    Sun and Gun safety  Most teenagers love the sun, but the ultraviolet radiation of the sun causes skin damage (premature aging) and increases the risk of skin cancer. The same sun protection recommendations apply to all ages: do not spend time the sun without using sunscreen with SPF of 15 or higher, avoid prolonged exposure between 11 and 3 PM when the sun intensity is the highest, and wear sunglasses and other sun protective clothing. Use of tanning beds should not be permitted for adolescents.    If you have an adolescent with a history of depression, suicide attempts, or aggressive behaviors, it is very dangerous to keep a firearm in your home. If firearms are stored in your home, be sure they are stored  unloaded and locked with ammunition locked separately and be sure that children do not have access to the keys.    MEDICATION FOR FEVER OR PAIN:   Acetaminophen liquid (e.g., Tylenol or Tempra) may be given every four hours as needed for pain or fever. Acetaminophen liquid is less concentrated than the infant dropper bottle type. Be sure to check which product CONCENTRATION you are using.    CHILDREN’S Tylenol/Acetaminophen  (160 MG/5 mL)    Child’s Weight:  Dose:  36 - 47 pounds:    240 mg (7.5 mL (1 1/2 Teaspoons))  48 - 59 pounds:    320 mg (10.0 mL (2 Teaspoons))  60 - 71 pounds:    400 mg (12.5 mL (2 1/2 Teaspoons))  72 - 95 pounds:    480 mg (15.0 mL (3 Teaspoons))  Greater than 96 pounds:   640 mg (20.0 mL (4 Teaspoons))    CHILDREN’S Tylenol/Acetaminophen MELTAWAYS ( 80 MG tablets)    Child’s Weight:  Dose:  36 - 47 pounds:    240 mg (3 meltaway tablets)  48 - 59 pounds:    320 mg (4 meltaway tablets)  60 - 71 pounds:    400 mg (5 meltaway tablets)  72 - 95 pounds:    480 mg (6 meltaway tablets)  Greater than 96 pounds:   640 mg (8 meltaway tablets)    Flavio (Jr) Tylenol/Acetaminophen MELTAWAYS (160 MG tablets)    Child’s Weight:  Dose:  36 - 47 pounds:    240 mg (1 1/2 meltaway tablets)  48 - 59 pounds:    320 mg (2 meltaway tablets)  60 - 71 pounds:    400 mg (2 1/2 meltaway tablets)  72 - 95 pounds:    480 mg (3 meltaway tablets)  Greater than 96 pounds:   640 mg (4 meltaway tablets)    CHILDREN'S Ibuprofen (e.g., Advil or Motrin) may be given every six hours as needed for pain or fever.    48 - 59 pounds:    200 mg (2 Teaspoons)  60 - 71 pounds:    250 mg (2 1/2 Teaspoons)  72 - 95 pounds:    300 mg (3 Teaspoons)        NEXT VISIT:  IN 1 YEAR    Thank you for entrusting your care to [facility].    Also, check out “Children’s Health” on the [employer] [facility] Blog for updates on timely topics regarding children’s health!

## (undated) DEVICE — SUT MONOCRYL 3-0 PS-2 27 IN Y427H

## (undated) DEVICE — SUT SILK 2-0 SH 30 IN K833H

## (undated) DEVICE — POV-IOD SOLUTION 4OZ BT

## (undated) DEVICE — BETHLEHEM UNIVERSAL BREAST PK: Brand: CARDINAL HEALTH

## (undated) DEVICE — BRA SURGICAL SZ MED (33-36)

## (undated) DEVICE — TELFA NON-ADHERENT ABSORBENT DRESSING: Brand: TELFA

## (undated) DEVICE — SHEATH, GUIDE, SAVI SCOUT®: Brand: SAVI SCOUT®

## (undated) DEVICE — DRAPE EQUIPMENT RF WAND

## (undated) DEVICE — INTENDED FOR TISSUE SEPARATION, AND OTHER PROCEDURES THAT REQUIRE A SHARP SURGICAL BLADE TO PUNCTURE OR CUT.: Brand: BARD-PARKER SAFETY BLADES SIZE 10, STERILE

## (undated) DEVICE — PENCIL SMOKE EVAC TELESCOPING W/TUBING

## (undated) DEVICE — PLUMEPEN PRO 10FT

## (undated) DEVICE — DRAPE SHEET THREE QUARTER

## (undated) DEVICE — LIGHT HANDLE COVER SLEEVE DISP BLUE STELLAR

## (undated) DEVICE — BRA SURGICAL SZ LGE (36-39)

## (undated) DEVICE — 4-PORT MANIFOLD: Brand: NEPTUNE 2

## (undated) DEVICE — ADHESIVE SKIN CLOSURE SYS EXOFIN FUSION 22CM

## (undated) DEVICE — GLOVE INDICATOR PI UNDERGLOVE SZ 7.5 BLUE

## (undated) DEVICE — SPONGE STICK WITH PVP-I: Brand: KENDALL

## (undated) DEVICE — HEMOSTAT POWDER ADSORB SURGICEL 3GM

## (undated) DEVICE — SUT VICRYL 2-0 SH 27 IN UNDYED J417H

## (undated) DEVICE — JP CHANNEL DRAIN 19FR, FULL FLUTES: Brand: JACKSON-PRATT

## (undated) DEVICE — PACK UNIVERSAL DRAPES SUB-Q ICD

## (undated) DEVICE — BULB SYRINGE,IRRIGATION WITH PROTECTIVE CAP: Brand: DOVER

## (undated) DEVICE — BRA SURGICAL SZ XLGE (39-42)

## (undated) DEVICE — GLOVE SRG BIOGEL ORTHOPEDIC 7.5